# Patient Record
Sex: MALE | Race: OTHER | HISPANIC OR LATINO | Employment: FULL TIME | URBAN - METROPOLITAN AREA
[De-identification: names, ages, dates, MRNs, and addresses within clinical notes are randomized per-mention and may not be internally consistent; named-entity substitution may affect disease eponyms.]

---

## 2020-04-02 ENCOUNTER — APPOINTMENT (EMERGENCY)
Dept: RADIOLOGY | Facility: HOSPITAL | Age: 65
DRG: 415 | End: 2020-04-02
Payer: OTHER MISCELLANEOUS

## 2020-04-02 ENCOUNTER — ANESTHESIA EVENT (INPATIENT)
Dept: ANESTHESIOLOGY | Facility: HOSPITAL | Age: 65
DRG: 415 | End: 2020-04-02
Payer: OTHER MISCELLANEOUS

## 2020-04-02 ENCOUNTER — HOSPITAL ENCOUNTER (INPATIENT)
Facility: HOSPITAL | Age: 65
LOS: 11 days | Discharge: HOME | DRG: 415 | End: 2020-04-13
Attending: EMERGENCY MEDICINE | Admitting: ANESTHESIOLOGY
Payer: OTHER MISCELLANEOUS

## 2020-04-02 DIAGNOSIS — J96.01 ACUTE RESPIRATORY FAILURE WITH HYPOXIA (HCC): ICD-10-CM

## 2020-04-02 DIAGNOSIS — U07.1 COVID-19: Primary | ICD-10-CM

## 2020-04-02 DIAGNOSIS — J18.9 BILATERAL PNEUMONIA: ICD-10-CM

## 2020-04-02 PROBLEM — N17.9 AKI (ACUTE KIDNEY INJURY) (HCC): Status: ACTIVE | Noted: 2020-04-02

## 2020-04-02 PROBLEM — E87.20 LACTIC ACID ACIDOSIS: Status: RESOLVED | Noted: 2020-04-02 | Resolved: 2020-04-02

## 2020-04-02 PROBLEM — E87.2 LACTIC ACID ACIDOSIS: Status: RESOLVED | Noted: 2020-04-02 | Resolved: 2020-04-02

## 2020-04-02 PROBLEM — E87.2 LACTIC ACID ACIDOSIS: Status: ACTIVE | Noted: 2020-04-02

## 2020-04-02 PROBLEM — R79.89 TROPONIN LEVEL ELEVATED: Status: ACTIVE | Noted: 2020-04-02

## 2020-04-02 PROBLEM — E87.2 HIGH ANION GAP METABOLIC ACIDOSIS: Status: ACTIVE | Noted: 2020-04-02

## 2020-04-02 PROBLEM — Z78.9 ELECTRONIC CIGARETTE USE: Status: ACTIVE | Noted: 2020-04-02

## 2020-04-02 PROBLEM — R79.89 ELEVATED BRAIN NATRIURETIC PEPTIDE (BNP) LEVEL: Status: ACTIVE | Noted: 2020-04-02

## 2020-04-02 PROBLEM — R74.01 TRANSAMINITIS: Status: ACTIVE | Noted: 2020-04-02

## 2020-04-02 PROBLEM — R77.8 TROPONIN LEVEL ELEVATED: Status: ACTIVE | Noted: 2020-04-02

## 2020-04-02 PROBLEM — A41.9 SEVERE SEPSIS (HCC): Status: ACTIVE | Noted: 2020-04-02

## 2020-04-02 PROBLEM — E87.20 LACTIC ACID ACIDOSIS: Status: ACTIVE | Noted: 2020-04-02

## 2020-04-02 PROBLEM — R65.20 SEVERE SEPSIS (HCC): Status: ACTIVE | Noted: 2020-04-02

## 2020-04-02 PROBLEM — J96.91 RESPIRATORY FAILURE WITH HYPOXIA (HCC): Status: ACTIVE | Noted: 2020-04-02

## 2020-04-02 PROBLEM — E87.29 HIGH ANION GAP METABOLIC ACIDOSIS: Status: ACTIVE | Noted: 2020-04-02

## 2020-04-02 LAB
ALBUMIN SERPL BCP-MCNC: 2.7 G/DL (ref 3.5–5)
ALP SERPL-CCNC: 78 U/L (ref 46–116)
ALT SERPL W P-5'-P-CCNC: 23 U/L (ref 12–78)
ANION GAP SERPL CALCULATED.3IONS-SCNC: 18 MMOL/L (ref 4–13)
APTT PPP: 31 SECONDS (ref 23–37)
AST SERPL W P-5'-P-CCNC: 58 U/L (ref 5–45)
BACTERIA UR QL AUTO: ABNORMAL /HPF
BASE EX.OXY STD BLDV CALC-SCNC: 52.9 % (ref 60–80)
BASE EXCESS BLDA CALC-SCNC: -5.5 MMOL/L
BASE EXCESS BLDV CALC-SCNC: -5.5 MMOL/L
BASOPHILS # BLD AUTO: 0.03 THOUSANDS/ΜL (ref 0–0.1)
BASOPHILS NFR BLD AUTO: 0 % (ref 0–1)
BILIRUB SERPL-MCNC: 0.8 MG/DL (ref 0.2–1)
BILIRUB UR QL STRIP: ABNORMAL
BODY TEMPERATURE: 102.2 DEGREES FEHRENHEIT
BUN SERPL-MCNC: 25 MG/DL (ref 5–25)
CALCIUM SERPL-MCNC: 8.7 MG/DL (ref 8.3–10.1)
CHLORIDE SERPL-SCNC: 96 MMOL/L (ref 100–108)
CLARITY UR: ABNORMAL
CO2 SERPL-SCNC: 20 MMOL/L (ref 21–32)
COARSE GRAN CASTS URNS QL MICRO: ABNORMAL /LPF
COLOR UR: ABNORMAL
CREAT SERPL-MCNC: 1.54 MG/DL (ref 0.6–1.3)
CRP SERPL QL: >90 MG/L
D DIMER PPP FEU-MCNC: 3.77 UG/ML FEU
EOSINOPHIL # BLD AUTO: 0.01 THOUSAND/ΜL (ref 0–0.61)
EOSINOPHIL NFR BLD AUTO: 0 % (ref 0–6)
ERYTHROCYTE [DISTWIDTH] IN BLOOD BY AUTOMATED COUNT: 13.7 % (ref 11.6–15.1)
GFR SERPL CREATININE-BSD FRML MDRD: 48 ML/MIN/1.73SQ M
GLUCOSE SERPL-MCNC: 162 MG/DL (ref 65–140)
GLUCOSE SERPL-MCNC: 171 MG/DL (ref 65–140)
GLUCOSE SERPL-MCNC: 192 MG/DL (ref 65–140)
GLUCOSE SERPL-MCNC: 203 MG/DL (ref 65–140)
GLUCOSE UR STRIP-MCNC: NEGATIVE MG/DL
HCO3 BLDA-SCNC: 21.9 MMOL/L (ref 22–28)
HCO3 BLDV-SCNC: 19.4 MMOL/L (ref 24–30)
HCT VFR BLD AUTO: 47 % (ref 36.5–49.3)
HGB BLD-MCNC: 15.3 G/DL (ref 12–17)
HGB UR QL STRIP.AUTO: NEGATIVE
HOROWITZ INDEX BLDA+IHG-RTO: 80 MM[HG]
I-TIME: 0.8
IMM GRANULOCYTES # BLD AUTO: 0.17 THOUSAND/UL (ref 0–0.2)
IMM GRANULOCYTES NFR BLD AUTO: 1 % (ref 0–2)
INR PPP: 1.18 (ref 0.84–1.19)
KETONES UR STRIP-MCNC: NEGATIVE MG/DL
LACTATE SERPL-SCNC: 2 MMOL/L (ref 0.5–2)
LACTATE SERPL-SCNC: 6.6 MMOL/L (ref 0.5–2)
LEUKOCYTE ESTERASE UR QL STRIP: NEGATIVE
LYMPHOCYTES # BLD AUTO: 0.42 THOUSANDS/ΜL (ref 0.6–4.47)
LYMPHOCYTES NFR BLD AUTO: 3 % (ref 14–44)
MCH RBC QN AUTO: 28.5 PG (ref 26.8–34.3)
MCHC RBC AUTO-ENTMCNC: 32.6 G/DL (ref 31.4–37.4)
MCV RBC AUTO: 88 FL (ref 82–98)
MONOCYTES # BLD AUTO: 0.33 THOUSAND/ΜL (ref 0.17–1.22)
MONOCYTES NFR BLD AUTO: 2 % (ref 4–12)
NEUTROPHILS # BLD AUTO: 13.11 THOUSANDS/ΜL (ref 1.85–7.62)
NEUTS SEG NFR BLD AUTO: 94 % (ref 43–75)
NITRITE UR QL STRIP: NEGATIVE
NON-SQ EPI CELLS URNS QL MICRO: ABNORMAL /HPF
NRBC BLD AUTO-RTO: 0 /100 WBCS
NT-PROBNP SERPL-MCNC: 570 PG/ML
O2 CT BLDA-SCNC: 21.1 ML/DL (ref 16–23)
O2 CT BLDV-SCNC: 11.9 ML/DL
OXYHGB MFR BLDA: 95.2 % (ref 94–97)
PCO2 BLDA: 50 MM HG (ref 36–44)
PCO2 BLDV: 36.2 MM HG (ref 42–50)
PEEP RESPIRATORY: 16 CM[H2O]
PH BLDA: 7.26 [PH] (ref 7.35–7.45)
PH BLDV: 7.35 [PH] (ref 7.3–7.4)
PH UR STRIP.AUTO: 5.5 [PH]
PLATELET # BLD AUTO: 212 THOUSANDS/UL (ref 149–390)
PLATELET # BLD AUTO: 226 THOUSANDS/UL (ref 149–390)
PMV BLD AUTO: 11.1 FL (ref 8.9–12.7)
PMV BLD AUTO: 11.1 FL (ref 8.9–12.7)
PO2 BLDA: 108.7 MM HG (ref 75–129)
PO2 BLDV: 31.4 MM HG (ref 35–45)
POTASSIUM SERPL-SCNC: 3.4 MMOL/L (ref 3.5–5.3)
PROCALCITONIN SERPL-MCNC: 0.4 NG/ML
PROT SERPL-MCNC: 7.2 G/DL (ref 6.4–8.2)
PROT UR STRIP-MCNC: ABNORMAL MG/DL
PROTHROMBIN TIME: 15 SECONDS (ref 11.6–14.5)
RBC # BLD AUTO: 5.36 MILLION/UL (ref 3.88–5.62)
RBC #/AREA URNS AUTO: ABNORMAL /HPF
S PNEUM AG UR QL: NEGATIVE
SODIUM SERPL-SCNC: 134 MMOL/L (ref 136–145)
SP GR UR STRIP.AUTO: >=1.03 (ref 1–1.03)
SPECIMEN SOURCE: ABNORMAL
TROPONIN I SERPL-MCNC: 0.13 NG/ML
TROPONIN I SERPL-MCNC: 0.2 NG/ML
TROPONIN I SERPL-MCNC: 0.22 NG/ML
UROBILINOGEN UR QL STRIP.AUTO: 4 E.U./DL
VENT AC: 16
VENT- AC: AC
VT SETTING VENT: 450 ML
WBC # BLD AUTO: 14.07 THOUSAND/UL (ref 4.31–10.16)
WBC #/AREA URNS AUTO: ABNORMAL /HPF

## 2020-04-02 PROCEDURE — 4A133J1 MONITORING OF ARTERIAL PULSE, PERIPHERAL, PERCUTANEOUS APPROACH: ICD-10-PCS | Performed by: ANESTHESIOLOGY

## 2020-04-02 PROCEDURE — 99291 CRITICAL CARE FIRST HOUR: CPT | Performed by: ANESTHESIOLOGY

## 2020-04-02 PROCEDURE — 80053 COMPREHEN METABOLIC PANEL: CPT | Performed by: EMERGENCY MEDICINE

## 2020-04-02 PROCEDURE — 94760 N-INVAS EAR/PLS OXIMETRY 1: CPT

## 2020-04-02 PROCEDURE — 84484 ASSAY OF TROPONIN QUANT: CPT | Performed by: NURSE PRACTITIONER

## 2020-04-02 PROCEDURE — 87205 SMEAR GRAM STAIN: CPT | Performed by: NURSE PRACTITIONER

## 2020-04-02 PROCEDURE — 87635 SARS-COV-2 COVID-19 AMP PRB: CPT | Performed by: EMERGENCY MEDICINE

## 2020-04-02 PROCEDURE — 83625 ASSAY OF LDH ENZYMES: CPT | Performed by: EMERGENCY MEDICINE

## 2020-04-02 PROCEDURE — 85610 PROTHROMBIN TIME: CPT | Performed by: EMERGENCY MEDICINE

## 2020-04-02 PROCEDURE — 31500 INSERT EMERGENCY AIRWAY: CPT | Performed by: NURSE ANESTHETIST, CERTIFIED REGISTERED

## 2020-04-02 PROCEDURE — 86140 C-REACTIVE PROTEIN: CPT | Performed by: EMERGENCY MEDICINE

## 2020-04-02 PROCEDURE — 82948 REAGENT STRIP/BLOOD GLUCOSE: CPT

## 2020-04-02 PROCEDURE — 85025 COMPLETE CBC W/AUTO DIFF WBC: CPT | Performed by: EMERGENCY MEDICINE

## 2020-04-02 PROCEDURE — 99285 EMERGENCY DEPT VISIT HI MDM: CPT | Performed by: EMERGENCY MEDICINE

## 2020-04-02 PROCEDURE — 85730 THROMBOPLASTIN TIME PARTIAL: CPT | Performed by: EMERGENCY MEDICINE

## 2020-04-02 PROCEDURE — 99285 EMERGENCY DEPT VISIT HI MDM: CPT

## 2020-04-02 PROCEDURE — 87070 CULTURE OTHR SPECIMN AEROBIC: CPT | Performed by: NURSE PRACTITIONER

## 2020-04-02 PROCEDURE — 87449 NOS EACH ORGANISM AG IA: CPT | Performed by: NURSE PRACTITIONER

## 2020-04-02 PROCEDURE — 0BH17EZ INSERTION OF ENDOTRACHEAL AIRWAY INTO TRACHEA, VIA NATURAL OR ARTIFICIAL OPENING: ICD-10-PCS | Performed by: ANESTHESIOLOGY

## 2020-04-02 PROCEDURE — 93005 ELECTROCARDIOGRAM TRACING: CPT

## 2020-04-02 PROCEDURE — 85379 FIBRIN DEGRADATION QUANT: CPT | Performed by: EMERGENCY MEDICINE

## 2020-04-02 PROCEDURE — 5A1955Z RESPIRATORY VENTILATION, GREATER THAN 96 CONSECUTIVE HOURS: ICD-10-PCS | Performed by: ANESTHESIOLOGY

## 2020-04-02 PROCEDURE — 83615 LACTATE (LD) (LDH) ENZYME: CPT | Performed by: EMERGENCY MEDICINE

## 2020-04-02 PROCEDURE — 82805 BLOOD GASES W/O2 SATURATION: CPT | Performed by: EMERGENCY MEDICINE

## 2020-04-02 PROCEDURE — 02HV33Z INSERTION OF INFUSION DEVICE INTO SUPERIOR VENA CAVA, PERCUTANEOUS APPROACH: ICD-10-PCS | Performed by: STUDENT IN AN ORGANIZED HEALTH CARE EDUCATION/TRAINING PROGRAM

## 2020-04-02 PROCEDURE — 85049 AUTOMATED PLATELET COUNT: CPT | Performed by: NURSE PRACTITIONER

## 2020-04-02 PROCEDURE — 83605 ASSAY OF LACTIC ACID: CPT | Performed by: NURSE PRACTITIONER

## 2020-04-02 PROCEDURE — 03HY32Z INSERTION OF MONITORING DEVICE INTO UPPER ARTERY, PERCUTANEOUS APPROACH: ICD-10-PCS | Performed by: ANESTHESIOLOGY

## 2020-04-02 PROCEDURE — 87040 BLOOD CULTURE FOR BACTERIA: CPT | Performed by: EMERGENCY MEDICINE

## 2020-04-02 PROCEDURE — 82728 ASSAY OF FERRITIN: CPT | Performed by: NURSE PRACTITIONER

## 2020-04-02 PROCEDURE — 84145 PROCALCITONIN (PCT): CPT | Performed by: EMERGENCY MEDICINE

## 2020-04-02 PROCEDURE — 36620 INSERTION CATHETER ARTERY: CPT | Performed by: ANESTHESIOLOGY

## 2020-04-02 PROCEDURE — 94002 VENT MGMT INPAT INIT DAY: CPT

## 2020-04-02 PROCEDURE — 82805 BLOOD GASES W/O2 SATURATION: CPT | Performed by: NURSE PRACTITIONER

## 2020-04-02 PROCEDURE — 36415 COLL VENOUS BLD VENIPUNCTURE: CPT | Performed by: EMERGENCY MEDICINE

## 2020-04-02 PROCEDURE — 99292 CRITICAL CARE ADDL 30 MIN: CPT | Performed by: ANESTHESIOLOGY

## 2020-04-02 PROCEDURE — 71045 X-RAY EXAM CHEST 1 VIEW: CPT

## 2020-04-02 PROCEDURE — 83880 ASSAY OF NATRIURETIC PEPTIDE: CPT | Performed by: NURSE PRACTITIONER

## 2020-04-02 PROCEDURE — 4A133B1 MONITORING OF ARTERIAL PRESSURE, PERIPHERAL, PERCUTANEOUS APPROACH: ICD-10-PCS | Performed by: ANESTHESIOLOGY

## 2020-04-02 PROCEDURE — 81001 URINALYSIS AUTO W/SCOPE: CPT | Performed by: NURSE PRACTITIONER

## 2020-04-02 PROCEDURE — 87081 CULTURE SCREEN ONLY: CPT | Performed by: NURSE PRACTITIONER

## 2020-04-02 PROCEDURE — NC001 PR NO CHARGE: Performed by: ANESTHESIOLOGY

## 2020-04-02 RX ORDER — VECURONIUM BROMIDE 1 MG/ML
10 INJECTION, POWDER, LYOPHILIZED, FOR SOLUTION INTRAVENOUS ONCE
Status: COMPLETED | OUTPATIENT
Start: 2020-04-02 | End: 2020-04-02

## 2020-04-02 RX ORDER — FENTANYL CITRATE 50 UG/ML
100 INJECTION, SOLUTION INTRAMUSCULAR; INTRAVENOUS ONCE
Status: DISCONTINUED | OUTPATIENT
Start: 2020-04-02 | End: 2020-04-04

## 2020-04-02 RX ORDER — MINERAL OIL AND PETROLATUM 150; 830 MG/G; MG/G
OINTMENT OPHTHALMIC
Status: DISCONTINUED | OUTPATIENT
Start: 2020-04-02 | End: 2020-04-04

## 2020-04-02 RX ORDER — METHYLPREDNISOLONE SODIUM SUCCINATE 125 MG/2ML
200 INJECTION, POWDER, LYOPHILIZED, FOR SOLUTION INTRAMUSCULAR; INTRAVENOUS ONCE
Status: COMPLETED | OUTPATIENT
Start: 2020-04-02 | End: 2020-04-02

## 2020-04-02 RX ORDER — ATORVASTATIN CALCIUM 40 MG/1
40 TABLET, FILM COATED ORAL
Status: DISCONTINUED | OUTPATIENT
Start: 2020-04-02 | End: 2020-04-13

## 2020-04-02 RX ORDER — AZITHROMYCIN 250 MG/1
250 TABLET, FILM COATED ORAL EVERY 24 HOURS
Status: DISCONTINUED | OUTPATIENT
Start: 2020-04-03 | End: 2020-04-02

## 2020-04-02 RX ORDER — VECURONIUM BROMIDE 1 MG/ML
20 INJECTION, POWDER, LYOPHILIZED, FOR SOLUTION INTRAVENOUS ONCE
Status: DISCONTINUED | OUTPATIENT
Start: 2020-04-02 | End: 2020-04-04

## 2020-04-02 RX ORDER — CHLORHEXIDINE GLUCONATE 0.12 MG/ML
15 RINSE ORAL EVERY 12 HOURS SCHEDULED
Status: DISCONTINUED | OUTPATIENT
Start: 2020-04-02 | End: 2020-04-07

## 2020-04-02 RX ORDER — VANCOMYCIN HYDROCHLORIDE 1 G/200ML
12.5 INJECTION, SOLUTION INTRAVENOUS EVERY 12 HOURS
Status: DISCONTINUED | OUTPATIENT
Start: 2020-04-02 | End: 2020-04-03

## 2020-04-02 RX ORDER — HYDROXYCHLOROQUINE SULFATE 200 MG/1
200 TABLET, FILM COATED ORAL EVERY 8 HOURS
Status: DISCONTINUED | OUTPATIENT
Start: 2020-04-02 | End: 2020-04-02

## 2020-04-02 RX ORDER — VANCOMYCIN HYDROCHLORIDE 1 G/200ML
1000 INJECTION, SOLUTION INTRAVENOUS ONCE
Status: COMPLETED | OUTPATIENT
Start: 2020-04-02 | End: 2020-04-02

## 2020-04-02 RX ORDER — FENTANYL CITRATE-0.9 % NACL/PF 10 MCG/ML
75 PLASTIC BAG, INJECTION (ML) INTRAVENOUS CONTINUOUS
Status: DISCONTINUED | OUTPATIENT
Start: 2020-04-02 | End: 2020-04-08

## 2020-04-02 RX ORDER — ACETAMINOPHEN 325 MG/1
975 TABLET ORAL EVERY 8 HOURS PRN
Status: DISCONTINUED | OUTPATIENT
Start: 2020-04-02 | End: 2020-04-09

## 2020-04-02 RX ORDER — HYDROXYCHLOROQUINE SULFATE 200 MG/1
600 TABLET, FILM COATED ORAL 2 TIMES DAILY
Status: DISCONTINUED | OUTPATIENT
Start: 2020-04-02 | End: 2020-04-02

## 2020-04-02 RX ORDER — AZITHROMYCIN 250 MG/1
500 TABLET, FILM COATED ORAL EVERY 24 HOURS
Status: DISCONTINUED | OUTPATIENT
Start: 2020-04-02 | End: 2020-04-02

## 2020-04-02 RX ORDER — ZINC GLUCONATE 50 MG
50 TABLET ORAL 2 TIMES DAILY
Status: DISCONTINUED | OUTPATIENT
Start: 2020-04-02 | End: 2020-04-04 | Stop reason: RX

## 2020-04-02 RX ORDER — MIDAZOLAM HYDROCHLORIDE 2 MG/2ML
4 INJECTION, SOLUTION INTRAMUSCULAR; INTRAVENOUS ONCE
Status: DISCONTINUED | OUTPATIENT
Start: 2020-04-02 | End: 2020-04-04

## 2020-04-02 RX ORDER — HEPARIN SODIUM 5000 [USP'U]/ML
5000 INJECTION, SOLUTION INTRAVENOUS; SUBCUTANEOUS EVERY 8 HOURS SCHEDULED
Status: DISCONTINUED | OUTPATIENT
Start: 2020-04-02 | End: 2020-04-12

## 2020-04-02 RX ORDER — ASCORBIC ACID 500 MG
1000 TABLET ORAL 2 TIMES DAILY
Status: DISCONTINUED | OUTPATIENT
Start: 2020-04-02 | End: 2020-04-11

## 2020-04-02 RX ORDER — EPINEPHRINE 1 MG/ML
INJECTION, SOLUTION, CONCENTRATE INTRAVENOUS
Status: DISPENSED
Start: 2020-04-02 | End: 2020-04-03

## 2020-04-02 RX ORDER — HYDROXYCHLOROQUINE SULFATE 200 MG/1
200 TABLET, FILM COATED ORAL EVERY 8 HOURS
Status: COMPLETED | OUTPATIENT
Start: 2020-04-03 | End: 2020-04-07

## 2020-04-02 RX ORDER — PROPOFOL 10 MG/ML
5-50 INJECTION, EMULSION INTRAVENOUS
Status: DISCONTINUED | OUTPATIENT
Start: 2020-04-02 | End: 2020-04-07

## 2020-04-02 RX ORDER — HYDROXYCHLOROQUINE SULFATE 200 MG/1
600 TABLET, FILM COATED ORAL 2 TIMES DAILY
Status: COMPLETED | OUTPATIENT
Start: 2020-04-02 | End: 2020-04-02

## 2020-04-02 RX ORDER — AZITHROMYCIN 250 MG/1
250 TABLET, FILM COATED ORAL EVERY 24 HOURS
Status: COMPLETED | OUTPATIENT
Start: 2020-04-03 | End: 2020-04-07

## 2020-04-02 RX ORDER — NOREPINEPHRINE BITARTRATE 1 MG/ML
INJECTION, SOLUTION INTRAVENOUS
Status: COMPLETED
Start: 2020-04-02 | End: 2020-04-02

## 2020-04-02 RX ADMIN — HEPARIN SODIUM 5000 UNITS: 5000 INJECTION INTRAVENOUS; SUBCUTANEOUS at 13:32

## 2020-04-02 RX ADMIN — NOREPINEPHRINE BITARTRATE: 1 INJECTION INTRAVENOUS at 15:05

## 2020-04-02 RX ADMIN — PROPOFOL 35 MCG/KG/MIN: 10 INJECTION, EMULSION INTRAVENOUS at 21:29

## 2020-04-02 RX ADMIN — METHYLPREDNISOLONE SODIUM SUCCINATE 200 MG: 125 INJECTION, POWDER, FOR SOLUTION INTRAMUSCULAR; INTRAVENOUS at 13:31

## 2020-04-02 RX ADMIN — Medication: at 19:01

## 2020-04-02 RX ADMIN — HYDROXYCHLOROQUINE SULFATE 600 MG: 200 TABLET, FILM COATED ORAL at 18:57

## 2020-04-02 RX ADMIN — SODIUM CHLORIDE 1 UNITS/HR: 9 INJECTION, SOLUTION INTRAVENOUS at 18:39

## 2020-04-02 RX ADMIN — VANCOMYCIN HYDROCHLORIDE 1000 MG: 1 INJECTION, SOLUTION INTRAVENOUS at 11:17

## 2020-04-02 RX ADMIN — HEPARIN SODIUM 5000 UNITS: 5000 INJECTION INTRAVENOUS; SUBCUTANEOUS at 22:08

## 2020-04-02 RX ADMIN — Medication 50 MG: at 13:33

## 2020-04-02 RX ADMIN — ATORVASTATIN CALCIUM 40 MG: 40 TABLET, FILM COATED ORAL at 18:57

## 2020-04-02 RX ADMIN — SODIUM CHLORIDE 500 MG: 0.9 INJECTION, SOLUTION INTRAVENOUS at 11:13

## 2020-04-02 RX ADMIN — PROPOFOL 50 MCG/KG/MIN: 10 INJECTION, EMULSION INTRAVENOUS at 14:41

## 2020-04-02 RX ADMIN — PROPOFOL 50 MCG/KG/MIN: 10 INJECTION, EMULSION INTRAVENOUS at 18:51

## 2020-04-02 RX ADMIN — Medication: at 20:00

## 2020-04-02 RX ADMIN — SODIUM CHLORIDE 3 MCG/MIN: 0.9 INJECTION, SOLUTION INTRAVENOUS at 15:06

## 2020-04-02 RX ADMIN — OXYCODONE HYDROCHLORIDE AND ACETAMINOPHEN 1000 MG: 500 TABLET ORAL at 13:33

## 2020-04-02 RX ADMIN — HYDROXYCHLOROQUINE SULFATE 600 MG: 200 TABLET, FILM COATED ORAL at 13:32

## 2020-04-02 RX ADMIN — PROPOFOL 30 MCG/KG/MIN: 10 INJECTION, EMULSION INTRAVENOUS at 13:00

## 2020-04-02 RX ADMIN — Medication: at 22:08

## 2020-04-02 RX ADMIN — CHLORHEXIDINE GLUCONATE 0.12% ORAL RINSE 15 ML: 1.2 LIQUID ORAL at 20:45

## 2020-04-02 RX ADMIN — Medication 50 MG: at 18:55

## 2020-04-02 RX ADMIN — OXYCODONE HYDROCHLORIDE AND ACETAMINOPHEN 1000 MG: 500 TABLET ORAL at 19:01

## 2020-04-02 RX ADMIN — FENTANYL CITRATE 75 MCG/HR: 50 INJECTION, SOLUTION INTRAMUSCULAR; INTRAVENOUS at 14:15

## 2020-04-02 RX ADMIN — SODIUM CHLORIDE 0.1 MCG/KG/MIN: 0.9 INJECTION, SOLUTION INTRAVENOUS at 17:12

## 2020-04-02 RX ADMIN — ACETAMINOPHEN 975 MG: 325 TABLET ORAL at 14:32

## 2020-04-02 RX ADMIN — VECURONIUM BROMIDE 10 MG: 1 INJECTION, POWDER, LYOPHILIZED, FOR SOLUTION INTRAVENOUS at 16:34

## 2020-04-02 RX ADMIN — CHLORHEXIDINE GLUCONATE 0.12% ORAL RINSE 15 ML: 1.2 LIQUID ORAL at 13:32

## 2020-04-02 RX ADMIN — CEFEPIME HYDROCHLORIDE 2000 MG: 2 INJECTION, POWDER, FOR SOLUTION INTRAVENOUS at 20:38

## 2020-04-02 NOTE — ANESTHESIA PROCEDURE NOTES
"Central Line Insertion  Performed by: Reggie Francois CRNA  Authorized by: Reggie Francois CRNA   Date/Time: 4/2/2020 1:10 PM  Catheter Type:  triple lumen  Consent: Verbal consent obtained. Written consent obtained.  Consent given by: patient and spouse  Patient understanding: patient states understanding of the procedure being performed  Patient consent: the patient's understanding of the procedure matches consent given  Procedure consent: procedure consent matches procedure scheduled  Relevant documents: relevant documents present and verified  Test results: test results available and properly labeled  Site marked: the operative site was marked  Patient identity confirmed: arm band and hospital-assigned identification number  Time out: Immediately prior to procedure a \"time out\" was called to verify the correct patient, procedure, equipment, support staff and site/side marked as required.  Indications: vascular access  Location details: left internal jugular  Catheter size: 7 Fr  Patient position: Trendelenburg  Anesthesia: see MAR for details    Sedation:  Patient sedated: GETA.    Assessment: blood return through all ports and free fluid flow (CXR Pending)  Preparation: Chloraprep  Skin prep agent dried: skin prep agent completely dried prior to procedure  Sterile barriers: all five maximum sterile barriers used - cap, mask, sterile gown, sterile gloves, and large sterile sheet  Hand hygiene: hand hygiene performed prior to central venous catheter insertion  Ultrasound guidance: yes  sterile gel and probe cover used in ultrasound-guided central venous catheter insertionultrasound permanent image saved  Reason All Sterile Barriers Not Used:  All elements of maximal sterile barrier technique not followed for medical reasons  Pre-procedure: landmarks identified  Vessel of Catheter Tip End: SVC  Number of attempts: 1  Successful placement: yes  Post-procedure: chlorhexidine patch applied,  dressing applied and line " sutured  Patient tolerance: Patient tolerated the procedure well with no immediate complications

## 2020-04-03 LAB
ALBUMIN SERPL BCP-MCNC: 2.5 G/DL (ref 3.5–5)
ALP SERPL-CCNC: 76 U/L (ref 46–116)
ALT SERPL W P-5'-P-CCNC: 19 U/L (ref 12–78)
ANION GAP SERPL CALCULATED.3IONS-SCNC: 10 MMOL/L (ref 4–13)
AST SERPL W P-5'-P-CCNC: 43 U/L (ref 5–45)
ATRIAL RATE: 100 BPM
ATRIAL RATE: 158 BPM
BASE EXCESS BLDA CALC-SCNC: -3.8 MMOL/L
BASE EXCESS BLDA CALC-SCNC: -4.4 MMOL/L
BASE EXCESS BLDA CALC-SCNC: -4.5 MMOL/L
BASE EXCESS BLDA CALC-SCNC: -4.6 MMOL/L
BASE EXCESS BLDA CALC-SCNC: -5.5 MMOL/L
BASE EXCESS BLDA CALC-SCNC: -7 MMOL/L
BILIRUB SERPL-MCNC: 0.9 MG/DL (ref 0.2–1)
BUN SERPL-MCNC: 41 MG/DL (ref 5–25)
CALCIUM SERPL-MCNC: 8.5 MG/DL (ref 8.3–10.1)
CHLORIDE SERPL-SCNC: 97 MMOL/L (ref 100–108)
CO2 SERPL-SCNC: 25 MMOL/L (ref 21–32)
CREAT SERPL-MCNC: 1.93 MG/DL (ref 0.6–1.3)
CRP SERPL QL: >90 MG/L
D DIMER PPP FEU-MCNC: 3.87 UG/ML FEU
ERYTHROCYTE [DISTWIDTH] IN BLOOD BY AUTOMATED COUNT: 14.3 % (ref 11.6–15.1)
ERYTHROCYTE [SEDIMENTATION RATE] IN BLOOD: 85 MM/HOUR (ref 0–10)
FERRITIN SERPL-MCNC: 4877 NG/ML (ref 8–388)
FERRITIN SERPL-MCNC: 5570 NG/ML (ref 8–388)
FIBRINOGEN PPP-MCNC: 844 MG/DL (ref 227–495)
GFR SERPL CREATININE-BSD FRML MDRD: 36 ML/MIN/1.73SQ M
GLUCOSE SERPL-MCNC: 103 MG/DL (ref 65–140)
GLUCOSE SERPL-MCNC: 112 MG/DL (ref 65–140)
GLUCOSE SERPL-MCNC: 115 MG/DL (ref 65–140)
GLUCOSE SERPL-MCNC: 116 MG/DL (ref 65–140)
GLUCOSE SERPL-MCNC: 123 MG/DL (ref 65–140)
GLUCOSE SERPL-MCNC: 124 MG/DL (ref 65–140)
GLUCOSE SERPL-MCNC: 126 MG/DL (ref 65–140)
GLUCOSE SERPL-MCNC: 139 MG/DL (ref 65–140)
GLUCOSE SERPL-MCNC: 146 MG/DL (ref 65–140)
GLUCOSE SERPL-MCNC: 151 MG/DL (ref 65–140)
GLUCOSE SERPL-MCNC: 156 MG/DL (ref 65–140)
GLUCOSE SERPL-MCNC: 158 MG/DL (ref 65–140)
GLUCOSE SERPL-MCNC: 159 MG/DL (ref 65–140)
HCO3 BLDA-SCNC: 18.5 MMOL/L (ref 22–28)
HCO3 BLDA-SCNC: 21.1 MMOL/L (ref 22–28)
HCO3 BLDA-SCNC: 21.6 MMOL/L (ref 22–28)
HCO3 BLDA-SCNC: 22.1 MMOL/L (ref 22–28)
HCO3 BLDA-SCNC: 23 MMOL/L (ref 22–28)
HCO3 BLDA-SCNC: 25.1 MMOL/L (ref 22–28)
HCT VFR BLD AUTO: 43.5 % (ref 36.5–49.3)
HGB BLD-MCNC: 14.3 G/DL (ref 12–17)
HOROWITZ INDEX BLDA+IHG-RTO: 40 MM[HG]
L PNEUMO1 AG UR QL IA.RAPID: NEGATIVE
LDH SERPL-CCNC: 736 U/L (ref 81–234)
LDH SERPL-CCNC: 865 IU/L (ref 121–224)
LDH1 CFR SERPL ELPH: 12 % (ref 17–32)
LDH2 CFR SERPL ELPH: 29 % (ref 25–40)
LDH3 CFR SERPL ELPH: 30 % (ref 17–27)
LDH4 CFR SERPL ELPH: 18 % (ref 5–13)
LDH5 CFR SERPL ELPH: 11 % (ref 4–20)
MCH RBC QN AUTO: 29.3 PG (ref 26.8–34.3)
MCHC RBC AUTO-ENTMCNC: 32.9 G/DL (ref 31.4–37.4)
MCV RBC AUTO: 89 FL (ref 82–98)
NT-PROBNP SERPL-MCNC: 2447 PG/ML
O2 CT BLDA-SCNC: 16.6 ML/DL (ref 16–23)
O2 CT BLDA-SCNC: 18.6 ML/DL (ref 16–23)
O2 CT BLDA-SCNC: 19.6 ML/DL (ref 16–23)
O2 CT BLDA-SCNC: 19.6 ML/DL (ref 16–23)
O2 CT BLDA-SCNC: 19.7 ML/DL (ref 16–23)
O2 CT BLDA-SCNC: 20.1 ML/DL (ref 16–23)
OXYHGB MFR BLDA: 93.9 % (ref 94–97)
OXYHGB MFR BLDA: 93.9 % (ref 94–97)
OXYHGB MFR BLDA: 95.2 % (ref 94–97)
OXYHGB MFR BLDA: 96.3 % (ref 94–97)
OXYHGB MFR BLDA: 96.9 % (ref 94–97)
OXYHGB MFR BLDA: 97.2 % (ref 94–97)
P AXIS: 69 DEGREES
P AXIS: 83 DEGREES
PCO2 BLDA: 37.3 MM HG (ref 36–44)
PCO2 BLDA: 43 MM HG (ref 36–44)
PCO2 BLDA: 43.9 MM HG (ref 36–44)
PCO2 BLDA: 45.4 MM HG (ref 36–44)
PCO2 BLDA: 51.7 MM HG (ref 36–44)
PCO2 BLDA: 66.5 MM HG (ref 36–44)
PEEP RESPIRATORY: 16 CM[H2O]
PH BLDA: 7.2 [PH] (ref 7.35–7.45)
PH BLDA: 7.27 [PH] (ref 7.35–7.45)
PH BLDA: 7.29 [PH] (ref 7.35–7.45)
PH BLDA: 7.31 [PH] (ref 7.35–7.45)
PH BLDA: 7.31 [PH] (ref 7.35–7.45)
PH BLDA: 7.33 [PH] (ref 7.35–7.45)
PLATELET # BLD AUTO: 262 THOUSANDS/UL (ref 149–390)
PMV BLD AUTO: 11 FL (ref 8.9–12.7)
PO2 BLDA: 102.6 MM HG (ref 75–129)
PO2 BLDA: 105.6 MM HG (ref 75–129)
PO2 BLDA: 127.4 MM HG (ref 75–129)
PO2 BLDA: 80.3 MM HG (ref 75–129)
PO2 BLDA: 87.1 MM HG (ref 75–129)
PO2 BLDA: 88.5 MM HG (ref 75–129)
POTASSIUM SERPL-SCNC: 4.3 MMOL/L (ref 3.5–5.3)
PR INTERVAL: 120 MS
PR INTERVAL: 124 MS
PROCALCITONIN SERPL-MCNC: 2.8 NG/ML
PROT SERPL-MCNC: 6.7 G/DL (ref 6.4–8.2)
QRS AXIS: 102 DEGREES
QRS AXIS: 73 DEGREES
QRSD INTERVAL: 108 MS
QRSD INTERVAL: 74 MS
QT INTERVAL: 268 MS
QT INTERVAL: 338 MS
QTC INTERVAL: 434 MS
QTC INTERVAL: 436 MS
RBC # BLD AUTO: 4.88 MILLION/UL (ref 3.88–5.62)
SODIUM SERPL-SCNC: 132 MMOL/L (ref 136–145)
SPECIMEN SOURCE: ABNORMAL
T WAVE AXIS: 257 DEGREES
T WAVE AXIS: 43 DEGREES
TROPONIN I SERPL-MCNC: 0.05 NG/ML
VENT AC: 16
VENT AC: 24
VENT- AC: AC
VENTRICULAR RATE: 100 BPM
VENTRICULAR RATE: 158 BPM
VT SETTING VENT: 450 ML
WBC # BLD AUTO: 15.31 THOUSAND/UL (ref 4.31–10.16)

## 2020-04-03 PROCEDURE — 82948 REAGENT STRIP/BLOOD GLUCOSE: CPT

## 2020-04-03 PROCEDURE — 93010 ELECTROCARDIOGRAM REPORT: CPT | Performed by: INTERNAL MEDICINE

## 2020-04-03 PROCEDURE — 82728 ASSAY OF FERRITIN: CPT | Performed by: NURSE PRACTITIONER

## 2020-04-03 PROCEDURE — 85379 FIBRIN DEGRADATION QUANT: CPT | Performed by: NURSE PRACTITIONER

## 2020-04-03 PROCEDURE — 82805 BLOOD GASES W/O2 SATURATION: CPT | Performed by: PHYSICIAN ASSISTANT

## 2020-04-03 PROCEDURE — 83880 ASSAY OF NATRIURETIC PEPTIDE: CPT | Performed by: NURSE PRACTITIONER

## 2020-04-03 PROCEDURE — 83615 LACTATE (LD) (LDH) ENZYME: CPT | Performed by: NURSE PRACTITIONER

## 2020-04-03 PROCEDURE — 82805 BLOOD GASES W/O2 SATURATION: CPT | Performed by: NURSE PRACTITIONER

## 2020-04-03 PROCEDURE — 86140 C-REACTIVE PROTEIN: CPT | Performed by: NURSE PRACTITIONER

## 2020-04-03 PROCEDURE — 84145 PROCALCITONIN (PCT): CPT | Performed by: PHYSICIAN ASSISTANT

## 2020-04-03 PROCEDURE — 84484 ASSAY OF TROPONIN QUANT: CPT | Performed by: NURSE PRACTITIONER

## 2020-04-03 PROCEDURE — 93005 ELECTROCARDIOGRAM TRACING: CPT

## 2020-04-03 PROCEDURE — 82805 BLOOD GASES W/O2 SATURATION: CPT | Performed by: ANESTHESIOLOGY

## 2020-04-03 PROCEDURE — 80053 COMPREHEN METABOLIC PANEL: CPT | Performed by: NURSE PRACTITIONER

## 2020-04-03 PROCEDURE — 85652 RBC SED RATE AUTOMATED: CPT | Performed by: NURSE PRACTITIONER

## 2020-04-03 PROCEDURE — 94760 N-INVAS EAR/PLS OXIMETRY 1: CPT

## 2020-04-03 PROCEDURE — 85027 COMPLETE CBC AUTOMATED: CPT | Performed by: NURSE PRACTITIONER

## 2020-04-03 PROCEDURE — 85384 FIBRINOGEN ACTIVITY: CPT | Performed by: NURSE PRACTITIONER

## 2020-04-03 PROCEDURE — 99233 SBSQ HOSP IP/OBS HIGH 50: CPT | Performed by: ANESTHESIOLOGY

## 2020-04-03 PROCEDURE — 94003 VENT MGMT INPAT SUBQ DAY: CPT

## 2020-04-03 RX ORDER — METHYLPREDNISOLONE SODIUM SUCCINATE 125 MG/2ML
200 INJECTION, POWDER, LYOPHILIZED, FOR SOLUTION INTRAMUSCULAR; INTRAVENOUS DAILY
Status: DISCONTINUED | OUTPATIENT
Start: 2020-04-04 | End: 2020-04-03

## 2020-04-03 RX ORDER — METHYLPREDNISOLONE SODIUM SUCCINATE 125 MG/2ML
100 INJECTION, POWDER, LYOPHILIZED, FOR SOLUTION INTRAMUSCULAR; INTRAVENOUS EVERY 12 HOURS SCHEDULED
Status: DISCONTINUED | OUTPATIENT
Start: 2020-04-03 | End: 2020-04-03

## 2020-04-03 RX ORDER — METHYLPREDNISOLONE SODIUM SUCCINATE 125 MG/2ML
200 INJECTION, POWDER, LYOPHILIZED, FOR SOLUTION INTRAMUSCULAR; INTRAVENOUS DAILY
Status: DISCONTINUED | OUTPATIENT
Start: 2020-04-03 | End: 2020-04-03 | Stop reason: DRUGHIGH

## 2020-04-03 RX ORDER — METHYLPREDNISOLONE SODIUM SUCCINATE 125 MG/2ML
100 INJECTION, POWDER, LYOPHILIZED, FOR SOLUTION INTRAMUSCULAR; INTRAVENOUS EVERY 12 HOURS SCHEDULED
Status: DISCONTINUED | OUTPATIENT
Start: 2020-04-03 | End: 2020-04-06

## 2020-04-03 RX ORDER — METHYLPREDNISOLONE SODIUM SUCCINATE 125 MG/2ML
100 INJECTION, POWDER, LYOPHILIZED, FOR SOLUTION INTRAMUSCULAR; INTRAVENOUS ONCE
Status: DISCONTINUED | OUTPATIENT
Start: 2020-04-03 | End: 2020-04-03

## 2020-04-03 RX ADMIN — HYDROXYCHLOROQUINE SULFATE 200 MG: 200 TABLET, FILM COATED ORAL at 16:10

## 2020-04-03 RX ADMIN — HEPARIN SODIUM 5000 UNITS: 5000 INJECTION INTRAVENOUS; SUBCUTANEOUS at 05:08

## 2020-04-03 RX ADMIN — Medication 50 MG: at 08:07

## 2020-04-03 RX ADMIN — Medication: at 10:06

## 2020-04-03 RX ADMIN — Medication: at 13:23

## 2020-04-03 RX ADMIN — FENTANYL CITRATE 75 MCG/HR: 50 INJECTION, SOLUTION INTRAMUSCULAR; INTRAVENOUS at 00:09

## 2020-04-03 RX ADMIN — SODIUM CHLORIDE 2 MCG/MIN: 0.9 INJECTION, SOLUTION INTRAVENOUS at 18:00

## 2020-04-03 RX ADMIN — OXYCODONE HYDROCHLORIDE AND ACETAMINOPHEN 1000 MG: 500 TABLET ORAL at 09:32

## 2020-04-03 RX ADMIN — ATORVASTATIN CALCIUM 40 MG: 40 TABLET, FILM COATED ORAL at 16:11

## 2020-04-03 RX ADMIN — Medication: at 04:00

## 2020-04-03 RX ADMIN — PROPOFOL 50 MCG/KG/MIN: 10 INJECTION, EMULSION INTRAVENOUS at 18:58

## 2020-04-03 RX ADMIN — Medication 50 MG: at 18:00

## 2020-04-03 RX ADMIN — Medication: at 00:00

## 2020-04-03 RX ADMIN — FENTANYL CITRATE 75 MCG/HR: 50 INJECTION, SOLUTION INTRAMUSCULAR; INTRAVENOUS at 13:30

## 2020-04-03 RX ADMIN — VANCOMYCIN HYDROCHLORIDE 750 MG: 750 INJECTION, SOLUTION INTRAVENOUS at 13:34

## 2020-04-03 RX ADMIN — CHLORHEXIDINE GLUCONATE 0.12% ORAL RINSE 15 ML: 1.2 LIQUID ORAL at 20:13

## 2020-04-03 RX ADMIN — PROPOFOL 40 MCG/KG/MIN: 10 INJECTION, EMULSION INTRAVENOUS at 13:26

## 2020-04-03 RX ADMIN — METHYLPREDNISOLONE SODIUM SUCCINATE 100 MG: 125 INJECTION, POWDER, FOR SOLUTION INTRAMUSCULAR; INTRAVENOUS at 21:02

## 2020-04-03 RX ADMIN — AZITHROMYCIN 250 MG: 250 TABLET, FILM COATED ORAL at 08:08

## 2020-04-03 RX ADMIN — PROPOFOL 50 MCG/KG/MIN: 10 INJECTION, EMULSION INTRAVENOUS at 22:05

## 2020-04-03 RX ADMIN — Medication: at 06:02

## 2020-04-03 RX ADMIN — Medication: at 02:00

## 2020-04-03 RX ADMIN — Medication: at 19:56

## 2020-04-03 RX ADMIN — CHLORHEXIDINE GLUCONATE 0.12% ORAL RINSE 15 ML: 1.2 LIQUID ORAL at 08:07

## 2020-04-03 RX ADMIN — SODIUM CHLORIDE 2.5 MCG/KG/MIN: 0.9 INJECTION, SOLUTION INTRAVENOUS at 19:45

## 2020-04-03 RX ADMIN — OXYCODONE HYDROCHLORIDE AND ACETAMINOPHEN 1000 MG: 500 TABLET ORAL at 18:00

## 2020-04-03 RX ADMIN — HEPARIN SODIUM 5000 UNITS: 5000 INJECTION INTRAVENOUS; SUBCUTANEOUS at 21:02

## 2020-04-03 RX ADMIN — PROPOFOL 45 MCG/KG/MIN: 10 INJECTION, EMULSION INTRAVENOUS at 00:54

## 2020-04-03 RX ADMIN — Medication: at 23:45

## 2020-04-03 RX ADMIN — METHYLPREDNISOLONE SODIUM SUCCINATE 100 MG: 125 INJECTION, POWDER, FOR SOLUTION INTRAMUSCULAR; INTRAVENOUS at 10:08

## 2020-04-03 RX ADMIN — CEFEPIME HYDROCHLORIDE 2000 MG: 2 INJECTION, POWDER, FOR SOLUTION INTRAVENOUS at 20:13

## 2020-04-03 RX ADMIN — Medication: at 08:07

## 2020-04-03 RX ADMIN — HYDROXYCHLOROQUINE SULFATE 200 MG: 200 TABLET, FILM COATED ORAL at 09:37

## 2020-04-03 RX ADMIN — VANCOMYCIN HYDROCHLORIDE 1000 MG: 1 INJECTION, SOLUTION INTRAVENOUS at 00:26

## 2020-04-03 RX ADMIN — Medication: at 16:11

## 2020-04-03 RX ADMIN — METHYLPREDNISOLONE SODIUM SUCCINATE 100 MG: 125 INJECTION, POWDER, FOR SOLUTION INTRAMUSCULAR; INTRAVENOUS at 09:39

## 2020-04-03 RX ADMIN — PROPOFOL 50 MCG/KG/MIN: 10 INJECTION, EMULSION INTRAVENOUS at 09:31

## 2020-04-03 RX ADMIN — HEPARIN SODIUM 5000 UNITS: 5000 INJECTION INTRAVENOUS; SUBCUTANEOUS at 13:34

## 2020-04-03 RX ADMIN — Medication: at 21:02

## 2020-04-03 RX ADMIN — Medication: at 18:00

## 2020-04-03 RX ADMIN — PROPOFOL 45 MCG/KG/MIN: 10 INJECTION, EMULSION INTRAVENOUS at 16:12

## 2020-04-04 ENCOUNTER — ANESTHESIA (INPATIENT)
Dept: ANESTHESIOLOGY | Facility: HOSPITAL | Age: 65
DRG: 415 | End: 2020-04-04
Payer: OTHER MISCELLANEOUS

## 2020-04-04 PROBLEM — E87.29 HIGH ANION GAP METABOLIC ACIDOSIS: Status: RESOLVED | Noted: 2020-04-02 | Resolved: 2020-04-04

## 2020-04-04 PROBLEM — R74.01 TRANSAMINITIS: Status: RESOLVED | Noted: 2020-04-02 | Resolved: 2020-04-04

## 2020-04-04 PROBLEM — E87.2 HIGH ANION GAP METABOLIC ACIDOSIS: Status: RESOLVED | Noted: 2020-04-02 | Resolved: 2020-04-04

## 2020-04-04 LAB
ALBUMIN SERPL BCP-MCNC: 2.1 G/DL (ref 3.5–5)
ALP SERPL-CCNC: 64 U/L (ref 46–116)
ALT SERPL W P-5'-P-CCNC: 22 U/L (ref 12–78)
ANION GAP SERPL CALCULATED.3IONS-SCNC: 9 MMOL/L (ref 4–13)
AST SERPL W P-5'-P-CCNC: 47 U/L (ref 5–45)
ATRIAL RATE: 84 BPM
BACTERIA SPT RESP CULT: NORMAL
BASE EXCESS BLDA CALC-SCNC: -2 MMOL/L
BASE EXCESS BLDA CALC-SCNC: -3.7 MMOL/L
BASE EXCESS BLDA CALC-SCNC: -4.2 MMOL/L
BASOPHILS # BLD MANUAL: 0 THOUSAND/UL (ref 0–0.1)
BASOPHILS NFR MAR MANUAL: 0 % (ref 0–1)
BILIRUB SERPL-MCNC: 0.5 MG/DL (ref 0.2–1)
BUN SERPL-MCNC: 56 MG/DL (ref 5–25)
CA-I BLD-SCNC: 1.07 MMOL/L (ref 1.12–1.32)
CALCIUM SERPL-MCNC: 7.9 MG/DL (ref 8.3–10.1)
CHLORIDE SERPL-SCNC: 103 MMOL/L (ref 100–108)
CO2 SERPL-SCNC: 25 MMOL/L (ref 21–32)
CREAT SERPL-MCNC: 1.91 MG/DL (ref 0.6–1.3)
CRP SERPL QL: >90 MG/L
D DIMER PPP FEU-MCNC: 3.31 UG/ML FEU
EOSINOPHIL # BLD MANUAL: 0 THOUSAND/UL (ref 0–0.4)
EOSINOPHIL NFR BLD MANUAL: 0 % (ref 0–6)
ERYTHROCYTE [DISTWIDTH] IN BLOOD BY AUTOMATED COUNT: 13.9 % (ref 11.6–15.1)
ERYTHROCYTE [SEDIMENTATION RATE] IN BLOOD: 120 MM/HOUR (ref 0–10)
FERRITIN SERPL-MCNC: 4718 NG/ML (ref 8–388)
FIBRINOGEN PPP-MCNC: 667 MG/DL (ref 227–495)
GFR SERPL CREATININE-BSD FRML MDRD: 37 ML/MIN/1.73SQ M
GLUCOSE SERPL-MCNC: 104 MG/DL (ref 65–140)
GLUCOSE SERPL-MCNC: 125 MG/DL (ref 65–140)
GLUCOSE SERPL-MCNC: 129 MG/DL (ref 65–140)
GLUCOSE SERPL-MCNC: 130 MG/DL (ref 65–140)
GLUCOSE SERPL-MCNC: 132 MG/DL (ref 65–140)
GLUCOSE SERPL-MCNC: 134 MG/DL (ref 65–140)
GLUCOSE SERPL-MCNC: 137 MG/DL (ref 65–140)
GLUCOSE SERPL-MCNC: 139 MG/DL (ref 65–140)
GLUCOSE SERPL-MCNC: 144 MG/DL (ref 65–140)
GLUCOSE SERPL-MCNC: 147 MG/DL (ref 65–140)
GLUCOSE SERPL-MCNC: 154 MG/DL (ref 65–140)
GLUCOSE SERPL-MCNC: 155 MG/DL (ref 65–140)
GLUCOSE SERPL-MCNC: <20 MG/DL (ref 65–140)
GRAM STN SPEC: NORMAL
HCO3 BLDA-SCNC: 22.1 MMOL/L (ref 22–28)
HCO3 BLDA-SCNC: 22.1 MMOL/L (ref 22–28)
HCO3 BLDA-SCNC: 23.1 MMOL/L (ref 22–28)
HCT VFR BLD AUTO: 39 % (ref 36.5–49.3)
HGB BLD-MCNC: 12.6 G/DL (ref 12–17)
HOROWITZ INDEX BLDA+IHG-RTO: 40 MM[HG]
HOROWITZ INDEX BLDA+IHG-RTO: 40 MM[HG]
INPATIENT: NORMAL
INR PPP: 1.09 (ref 0.84–1.19)
LDH SERPL-CCNC: 724 U/L (ref 81–234)
LYMPHOCYTES # BLD AUTO: 0.74 THOUSAND/UL (ref 0.6–4.47)
LYMPHOCYTES # BLD AUTO: 6 % (ref 14–44)
MAGNESIUM SERPL-MCNC: 3 MG/DL (ref 1.6–2.6)
MCH RBC QN AUTO: 29.2 PG (ref 26.8–34.3)
MCHC RBC AUTO-ENTMCNC: 32.3 G/DL (ref 31.4–37.4)
MCV RBC AUTO: 90 FL (ref 82–98)
METAMYELOCYTES NFR BLD MANUAL: 3 % (ref 0–1)
MONOCYTES # BLD AUTO: 0.61 THOUSAND/UL (ref 0–1.22)
MONOCYTES NFR BLD: 5 % (ref 4–12)
MRSA NOSE QL CULT: NORMAL
NEUTROPHILS # BLD MANUAL: 10.57 THOUSAND/UL (ref 1.85–7.62)
NEUTS BAND NFR BLD MANUAL: 10 % (ref 0–8)
NEUTS SEG NFR BLD AUTO: 76 % (ref 43–75)
NRBC BLD AUTO-RTO: 0 /100 WBCS
NT-PROBNP SERPL-MCNC: 712 PG/ML
O2 CT BLDA-SCNC: 18.6 ML/DL (ref 16–23)
O2 CT BLDA-SCNC: 18.6 ML/DL (ref 16–23)
O2 CT BLDA-SCNC: 19.3 ML/DL (ref 16–23)
OXYHGB MFR BLDA: 96.5 % (ref 94–97)
OXYHGB MFR BLDA: 97.2 % (ref 94–97)
OXYHGB MFR BLDA: 97.5 % (ref 94–97)
P AXIS: 62 DEGREES
PCO2 BLDA: 40.8 MM HG (ref 36–44)
PCO2 BLDA: 43 MM HG (ref 36–44)
PCO2 BLDA: 45.2 MM HG (ref 36–44)
PEEP RESPIRATORY: 14 CM[H2O]
PEEP RESPIRATORY: 16 CM[H2O]
PH BLDA: 7.31 [PH] (ref 7.35–7.45)
PH BLDA: 7.33 [PH] (ref 7.35–7.45)
PH BLDA: 7.37 [PH] (ref 7.35–7.45)
PHOSPHATE SERPL-MCNC: 5.7 MG/DL (ref 2.3–4.1)
PLATELET # BLD AUTO: 266 THOUSANDS/UL (ref 149–390)
PLATELET BLD QL SMEAR: ADEQUATE
PMV BLD AUTO: 11.4 FL (ref 8.9–12.7)
PO2 BLDA: 102.6 MM HG (ref 75–129)
PO2 BLDA: 107.7 MM HG (ref 75–129)
PO2 BLDA: 124.5 MM HG (ref 75–129)
POTASSIUM SERPL-SCNC: 4.3 MMOL/L (ref 3.5–5.3)
PR INTERVAL: 128 MS
PROCALCITONIN SERPL-MCNC: 1.77 NG/ML
PROT SERPL-MCNC: 6 G/DL (ref 6.4–8.2)
PROTHROMBIN TIME: 14.1 SECONDS (ref 11.6–14.5)
QRS AXIS: 71 DEGREES
QRSD INTERVAL: 74 MS
QT INTERVAL: 388 MS
QTC INTERVAL: 459 MS
RBC # BLD AUTO: 4.32 MILLION/UL (ref 3.88–5.62)
SARS-COV-2 RNA SPEC QL NAA+PROBE: DETECTED
SODIUM SERPL-SCNC: 137 MMOL/L (ref 136–145)
SPECIMEN SOURCE: ABNORMAL
T WAVE AXIS: 58 DEGREES
TOTAL CELLS COUNTED SPEC: 100
TROPONIN I SERPL-MCNC: 0.02 NG/ML
VENT AC: 24
VENT AC: 24
VENT- AC: AC
VENT- AC: AC
VENTRICULAR RATE: 84 BPM
VT SETTING VENT: 450 ML
VT SETTING VENT: 450 ML
WBC # BLD AUTO: 12.29 THOUSAND/UL (ref 4.31–10.16)

## 2020-04-04 PROCEDURE — 84145 PROCALCITONIN (PCT): CPT | Performed by: PHYSICIAN ASSISTANT

## 2020-04-04 PROCEDURE — 86140 C-REACTIVE PROTEIN: CPT | Performed by: PHYSICIAN ASSISTANT

## 2020-04-04 PROCEDURE — 84100 ASSAY OF PHOSPHORUS: CPT | Performed by: PHYSICIAN ASSISTANT

## 2020-04-04 PROCEDURE — 85610 PROTHROMBIN TIME: CPT | Performed by: PHYSICIAN ASSISTANT

## 2020-04-04 PROCEDURE — 83615 LACTATE (LD) (LDH) ENZYME: CPT | Performed by: PHYSICIAN ASSISTANT

## 2020-04-04 PROCEDURE — 82948 REAGENT STRIP/BLOOD GLUCOSE: CPT

## 2020-04-04 PROCEDURE — 84484 ASSAY OF TROPONIN QUANT: CPT | Performed by: ANESTHESIOLOGY

## 2020-04-04 PROCEDURE — 85007 BL SMEAR W/DIFF WBC COUNT: CPT | Performed by: PHYSICIAN ASSISTANT

## 2020-04-04 PROCEDURE — 94003 VENT MGMT INPAT SUBQ DAY: CPT

## 2020-04-04 PROCEDURE — 94760 N-INVAS EAR/PLS OXIMETRY 1: CPT

## 2020-04-04 PROCEDURE — 82330 ASSAY OF CALCIUM: CPT | Performed by: PHYSICIAN ASSISTANT

## 2020-04-04 PROCEDURE — 82805 BLOOD GASES W/O2 SATURATION: CPT | Performed by: PHYSICIAN ASSISTANT

## 2020-04-04 PROCEDURE — 83880 ASSAY OF NATRIURETIC PEPTIDE: CPT | Performed by: PHYSICIAN ASSISTANT

## 2020-04-04 PROCEDURE — 93010 ELECTROCARDIOGRAM REPORT: CPT | Performed by: INTERNAL MEDICINE

## 2020-04-04 PROCEDURE — 85027 COMPLETE CBC AUTOMATED: CPT | Performed by: PHYSICIAN ASSISTANT

## 2020-04-04 PROCEDURE — 85379 FIBRIN DEGRADATION QUANT: CPT | Performed by: PHYSICIAN ASSISTANT

## 2020-04-04 PROCEDURE — 85652 RBC SED RATE AUTOMATED: CPT | Performed by: PHYSICIAN ASSISTANT

## 2020-04-04 PROCEDURE — 85384 FIBRINOGEN ACTIVITY: CPT | Performed by: PHYSICIAN ASSISTANT

## 2020-04-04 PROCEDURE — 99233 SBSQ HOSP IP/OBS HIGH 50: CPT | Performed by: ANESTHESIOLOGY

## 2020-04-04 PROCEDURE — 82728 ASSAY OF FERRITIN: CPT | Performed by: PHYSICIAN ASSISTANT

## 2020-04-04 PROCEDURE — 93005 ELECTROCARDIOGRAM TRACING: CPT

## 2020-04-04 PROCEDURE — 80053 COMPREHEN METABOLIC PANEL: CPT | Performed by: PHYSICIAN ASSISTANT

## 2020-04-04 PROCEDURE — 83735 ASSAY OF MAGNESIUM: CPT | Performed by: PHYSICIAN ASSISTANT

## 2020-04-04 RX ORDER — ZINC SULFATE 50(220)MG
220 CAPSULE ORAL 2 TIMES DAILY
Status: DISCONTINUED | OUTPATIENT
Start: 2020-04-04 | End: 2020-04-11

## 2020-04-04 RX ORDER — CALCIUM GLUCONATE 20 MG/ML
2 INJECTION, SOLUTION INTRAVENOUS ONCE
Status: COMPLETED | OUTPATIENT
Start: 2020-04-04 | End: 2020-04-04

## 2020-04-04 RX ORDER — FUROSEMIDE 10 MG/ML
20 INJECTION INTRAMUSCULAR; INTRAVENOUS ONCE
Status: COMPLETED | OUTPATIENT
Start: 2020-04-04 | End: 2020-04-05

## 2020-04-04 RX ADMIN — ZINC SULFATE 220 MG (50 MG) CAPSULE 220 MG: CAPSULE at 17:05

## 2020-04-04 RX ADMIN — FENTANYL CITRATE 75 MCG/HR: 50 INJECTION, SOLUTION INTRAMUSCULAR; INTRAVENOUS at 14:34

## 2020-04-04 RX ADMIN — PROPOFOL 40 MCG/KG/MIN: 10 INJECTION, EMULSION INTRAVENOUS at 16:43

## 2020-04-04 RX ADMIN — CALCIUM GLUCONATE 2 G: 20 INJECTION, SOLUTION INTRAVENOUS at 12:11

## 2020-04-04 RX ADMIN — VANCOMYCIN HYDROCHLORIDE 750 MG: 750 INJECTION, SOLUTION INTRAVENOUS at 16:38

## 2020-04-04 RX ADMIN — HYDROXYCHLOROQUINE SULFATE 200 MG: 200 TABLET, FILM COATED ORAL at 12:14

## 2020-04-04 RX ADMIN — HYDROXYCHLOROQUINE SULFATE 200 MG: 200 TABLET, FILM COATED ORAL at 17:01

## 2020-04-04 RX ADMIN — Medication: at 01:21

## 2020-04-04 RX ADMIN — SODIUM CHLORIDE 2.5 MCG/KG/MIN: 0.9 INJECTION, SOLUTION INTRAVENOUS at 09:26

## 2020-04-04 RX ADMIN — HEPARIN SODIUM 5000 UNITS: 5000 INJECTION INTRAVENOUS; SUBCUTANEOUS at 21:49

## 2020-04-04 RX ADMIN — CHLORHEXIDINE GLUCONATE 0.12% ORAL RINSE 15 ML: 1.2 LIQUID ORAL at 20:09

## 2020-04-04 RX ADMIN — METHYLPREDNISOLONE SODIUM SUCCINATE 100 MG: 125 INJECTION, POWDER, FOR SOLUTION INTRAMUSCULAR; INTRAVENOUS at 20:10

## 2020-04-04 RX ADMIN — CHLORHEXIDINE GLUCONATE 0.12% ORAL RINSE 15 ML: 1.2 LIQUID ORAL at 08:00

## 2020-04-04 RX ADMIN — PROPOFOL 40 MCG/KG/MIN: 10 INJECTION, EMULSION INTRAVENOUS at 13:19

## 2020-04-04 RX ADMIN — Medication: at 07:56

## 2020-04-04 RX ADMIN — Medication: at 04:08

## 2020-04-04 RX ADMIN — PROPOFOL 40 MCG/KG/MIN: 10 INJECTION, EMULSION INTRAVENOUS at 08:47

## 2020-04-04 RX ADMIN — OXYCODONE HYDROCHLORIDE AND ACETAMINOPHEN 1000 MG: 500 TABLET ORAL at 17:01

## 2020-04-04 RX ADMIN — OXYCODONE HYDROCHLORIDE AND ACETAMINOPHEN 1000 MG: 500 TABLET ORAL at 08:00

## 2020-04-04 RX ADMIN — AZITHROMYCIN 250 MG: 250 TABLET, FILM COATED ORAL at 07:57

## 2020-04-04 RX ADMIN — HEPARIN SODIUM 5000 UNITS: 5000 INJECTION INTRAVENOUS; SUBCUTANEOUS at 06:30

## 2020-04-04 RX ADMIN — HEPARIN SODIUM 5000 UNITS: 5000 INJECTION INTRAVENOUS; SUBCUTANEOUS at 16:44

## 2020-04-04 RX ADMIN — Medication: at 06:30

## 2020-04-04 RX ADMIN — SODIUM CHLORIDE 0.5 UNITS/HR: 9 INJECTION, SOLUTION INTRAVENOUS at 16:43

## 2020-04-04 RX ADMIN — PROPOFOL 50 MCG/KG/MIN: 10 INJECTION, EMULSION INTRAVENOUS at 01:21

## 2020-04-04 RX ADMIN — Medication 50 MG: at 08:00

## 2020-04-04 RX ADMIN — METHYLPREDNISOLONE SODIUM SUCCINATE 100 MG: 125 INJECTION, POWDER, FOR SOLUTION INTRAMUSCULAR; INTRAVENOUS at 08:00

## 2020-04-04 RX ADMIN — HYDROXYCHLOROQUINE SULFATE 200 MG: 200 TABLET, FILM COATED ORAL at 01:21

## 2020-04-04 RX ADMIN — ATORVASTATIN CALCIUM 40 MG: 40 TABLET, FILM COATED ORAL at 16:50

## 2020-04-04 RX ADMIN — TOCILIZUMAB 400 MG: 20 INJECTION, SOLUTION, CONCENTRATE INTRAVENOUS at 15:07

## 2020-04-04 RX ADMIN — NOREPINEPHRINE BITARTRATE 4 MCG/MIN: 1 INJECTION, SOLUTION, CONCENTRATE INTRAVENOUS at 19:19

## 2020-04-04 RX ADMIN — FENTANYL CITRATE 75 MCG/HR: 50 INJECTION, SOLUTION INTRAMUSCULAR; INTRAVENOUS at 01:21

## 2020-04-04 RX ADMIN — CEFEPIME HYDROCHLORIDE 2000 MG: 2 INJECTION, POWDER, FOR SOLUTION INTRAVENOUS at 20:09

## 2020-04-04 RX ADMIN — PROPOFOL 40 MCG/KG/MIN: 10 INJECTION, EMULSION INTRAVENOUS at 20:07

## 2020-04-04 RX ADMIN — PROPOFOL 50 MCG/KG/MIN: 10 INJECTION, EMULSION INTRAVENOUS at 05:06

## 2020-04-04 RX ADMIN — VANCOMYCIN HYDROCHLORIDE 750 MG: 750 INJECTION, SOLUTION INTRAVENOUS at 01:21

## 2020-04-04 NOTE — ANESTHESIA PROCEDURE NOTES
"Arterial Line Insertion  Performed by: Love Rowe CRNA  Authorized by: Love Rowe CRNA   Consent: Written consent obtained.  Risks and benefits: risks, benefits and alternatives were discussed  Patient understanding: patient states understanding of the procedure being performed  Patient consent: the patient's understanding of the procedure matches consent given  Procedure consent: procedure consent matches procedure scheduled  Relevant documents: relevant documents present and verified  Required items: required blood products, implants, devices, and special equipment available  Patient identity confirmed: arm band, hospital-assigned identification number and provided demographic data  Time out: Immediately prior to procedure a \"time out\" was called to verify the correct patient, procedure, equipment, support staff and site/side marked as required.  Preparation: Patient was prepped and draped in the usual sterile fashion.  Indications: multiple ABGs, respiratory failure and hemodynamic monitoring  Orientation:  Left  Location: radial artery  Sedation:  Patient sedated: Sedated and Paralyzed.    Procedure Details:  Giancarlo's test normal: yes  Needle gauge: 20  Seldinger technique: Seldinger technique used  Number of attempts: 1    Post-procedure:  Post-procedure: chlorhexidine patch applied,  dressing applied and line sutured  Waveform: good waveform  Post-procedure CNS: normal  Patient tolerance: Patient tolerated the procedure well with no immediate complications          "

## 2020-04-05 PROBLEM — J80 ACUTE RESPIRATORY DISTRESS SYNDROME (ARDS) DUE TO COVID-19 VIRUS (HCC): Status: ACTIVE | Noted: 2020-04-02

## 2020-04-05 PROBLEM — U07.1 ACUTE RESPIRATORY DISTRESS SYNDROME (ARDS) DUE TO COVID-19 VIRUS (HCC): Status: ACTIVE | Noted: 2020-04-02

## 2020-04-05 PROBLEM — R77.8 TROPONIN LEVEL ELEVATED: Status: RESOLVED | Noted: 2020-04-02 | Resolved: 2020-04-05

## 2020-04-05 PROBLEM — R79.89 TROPONIN LEVEL ELEVATED: Status: RESOLVED | Noted: 2020-04-02 | Resolved: 2020-04-05

## 2020-04-05 LAB
ALBUMIN SERPL BCP-MCNC: 2.4 G/DL (ref 3.5–5)
ALP SERPL-CCNC: 66 U/L (ref 46–116)
ALT SERPL W P-5'-P-CCNC: 26 U/L (ref 12–78)
ANION GAP SERPL CALCULATED.3IONS-SCNC: 10 MMOL/L (ref 4–13)
AST SERPL W P-5'-P-CCNC: 45 U/L (ref 5–45)
BASE EXCESS BLDA CALC-SCNC: -3.3 MMOL/L
BILIRUB SERPL-MCNC: 0.4 MG/DL (ref 0.2–1)
BUN SERPL-MCNC: 67 MG/DL (ref 5–25)
CALCIUM SERPL-MCNC: 8 MG/DL (ref 8.3–10.1)
CHLORIDE SERPL-SCNC: 104 MMOL/L (ref 100–108)
CO2 SERPL-SCNC: 24 MMOL/L (ref 21–32)
CREAT SERPL-MCNC: 1.95 MG/DL (ref 0.6–1.3)
CRP SERPL QL: 74.3 MG/L
ERYTHROCYTE [DISTWIDTH] IN BLOOD BY AUTOMATED COUNT: 13.8 % (ref 11.6–15.1)
ERYTHROCYTE [SEDIMENTATION RATE] IN BLOOD: 82 MM/HOUR (ref 0–10)
FERRITIN SERPL-MCNC: 2427 NG/ML (ref 8–388)
FIBRINOGEN PPP-MCNC: 505 MG/DL (ref 227–495)
GFR SERPL CREATININE-BSD FRML MDRD: 36 ML/MIN/1.73SQ M
GLUCOSE SERPL-MCNC: 145 MG/DL (ref 65–140)
GLUCOSE SERPL-MCNC: 148 MG/DL (ref 65–140)
GLUCOSE SERPL-MCNC: 164 MG/DL (ref 65–140)
GLUCOSE SERPL-MCNC: 165 MG/DL (ref 65–140)
GLUCOSE SERPL-MCNC: 168 MG/DL (ref 65–140)
GLUCOSE SERPL-MCNC: 169 MG/DL (ref 65–140)
GLUCOSE SERPL-MCNC: 170 MG/DL (ref 65–140)
GLUCOSE SERPL-MCNC: 172 MG/DL (ref 65–140)
GLUCOSE SERPL-MCNC: 172 MG/DL (ref 65–140)
GLUCOSE SERPL-MCNC: 174 MG/DL (ref 65–140)
GLUCOSE SERPL-MCNC: 175 MG/DL (ref 65–140)
GLUCOSE SERPL-MCNC: 177 MG/DL (ref 65–140)
GLUCOSE SERPL-MCNC: 201 MG/DL (ref 65–140)
HCO3 BLDA-SCNC: 21.6 MMOL/L (ref 22–28)
HCT VFR BLD AUTO: 39.3 % (ref 36.5–49.3)
HGB BLD-MCNC: 13 G/DL (ref 12–17)
HOROWITZ INDEX BLDA+IHG-RTO: 40 MM[HG]
LDH SERPL-CCNC: 656 U/L (ref 81–234)
MCH RBC QN AUTO: 29.5 PG (ref 26.8–34.3)
MCHC RBC AUTO-ENTMCNC: 33.1 G/DL (ref 31.4–37.4)
MCV RBC AUTO: 89 FL (ref 82–98)
NRBC BLD AUTO-RTO: 0 /100 WBCS
O2 CT BLDA-SCNC: 18.9 ML/DL (ref 16–23)
OXYHGB MFR BLDA: 97.8 % (ref 94–97)
PCO2 BLDA: 38.4 MM HG (ref 36–44)
PEEP RESPIRATORY: 14 CM[H2O]
PH BLDA: 7.37 [PH] (ref 7.35–7.45)
PLATELET # BLD AUTO: 413 THOUSANDS/UL (ref 149–390)
PMV BLD AUTO: 11.3 FL (ref 8.9–12.7)
PO2 BLDA: 133.9 MM HG (ref 75–129)
POTASSIUM SERPL-SCNC: 4.7 MMOL/L (ref 3.5–5.3)
PROCALCITONIN SERPL-MCNC: 1.13 NG/ML
PROT SERPL-MCNC: 6.3 G/DL (ref 6.4–8.2)
RBC # BLD AUTO: 4.41 MILLION/UL (ref 3.88–5.62)
SODIUM SERPL-SCNC: 138 MMOL/L (ref 136–145)
VENT AC: 24
VENT- AC: AC
VT SETTING VENT: 450 ML
WBC # BLD AUTO: 20.26 THOUSAND/UL (ref 4.31–10.16)

## 2020-04-05 PROCEDURE — 85384 FIBRINOGEN ACTIVITY: CPT | Performed by: PHYSICIAN ASSISTANT

## 2020-04-05 PROCEDURE — 85027 COMPLETE CBC AUTOMATED: CPT | Performed by: PHYSICIAN ASSISTANT

## 2020-04-05 PROCEDURE — 86140 C-REACTIVE PROTEIN: CPT | Performed by: PHYSICIAN ASSISTANT

## 2020-04-05 PROCEDURE — 84145 PROCALCITONIN (PCT): CPT | Performed by: PHYSICIAN ASSISTANT

## 2020-04-05 PROCEDURE — 83615 LACTATE (LD) (LDH) ENZYME: CPT | Performed by: PHYSICIAN ASSISTANT

## 2020-04-05 PROCEDURE — 82805 BLOOD GASES W/O2 SATURATION: CPT | Performed by: PHYSICIAN ASSISTANT

## 2020-04-05 PROCEDURE — 85652 RBC SED RATE AUTOMATED: CPT | Performed by: PHYSICIAN ASSISTANT

## 2020-04-05 PROCEDURE — 99233 SBSQ HOSP IP/OBS HIGH 50: CPT | Performed by: ANESTHESIOLOGY

## 2020-04-05 PROCEDURE — 94003 VENT MGMT INPAT SUBQ DAY: CPT

## 2020-04-05 PROCEDURE — 94760 N-INVAS EAR/PLS OXIMETRY 1: CPT

## 2020-04-05 PROCEDURE — 82948 REAGENT STRIP/BLOOD GLUCOSE: CPT

## 2020-04-05 PROCEDURE — 80053 COMPREHEN METABOLIC PANEL: CPT | Performed by: PHYSICIAN ASSISTANT

## 2020-04-05 PROCEDURE — 82728 ASSAY OF FERRITIN: CPT | Performed by: PHYSICIAN ASSISTANT

## 2020-04-05 RX ORDER — FUROSEMIDE 10 MG/ML
40 INJECTION INTRAMUSCULAR; INTRAVENOUS
Status: DISCONTINUED | OUTPATIENT
Start: 2020-04-05 | End: 2020-04-08

## 2020-04-05 RX ORDER — POLYETHYLENE GLYCOL 3350 17 G/17G
17 POWDER, FOR SOLUTION ORAL ONCE
Status: COMPLETED | OUTPATIENT
Start: 2020-04-05 | End: 2020-04-05

## 2020-04-05 RX ORDER — FUROSEMIDE 10 MG/ML
40 INJECTION INTRAMUSCULAR; INTRAVENOUS ONCE
Status: COMPLETED | OUTPATIENT
Start: 2020-04-05 | End: 2020-04-05

## 2020-04-05 RX ORDER — AMOXICILLIN 250 MG
2 CAPSULE ORAL 2 TIMES DAILY
Status: DISCONTINUED | OUTPATIENT
Start: 2020-04-05 | End: 2020-04-13 | Stop reason: HOSPADM

## 2020-04-05 RX ADMIN — PROPOFOL 45 MCG/KG/MIN: 10 INJECTION, EMULSION INTRAVENOUS at 14:36

## 2020-04-05 RX ADMIN — CHLORHEXIDINE GLUCONATE 0.12% ORAL RINSE 15 ML: 1.2 LIQUID ORAL at 20:00

## 2020-04-05 RX ADMIN — PROPOFOL 45 MCG/KG/MIN: 10 INJECTION, EMULSION INTRAVENOUS at 11:27

## 2020-04-05 RX ADMIN — FENTANYL CITRATE 75 MCG/HR: 50 INJECTION, SOLUTION INTRAMUSCULAR; INTRAVENOUS at 15:57

## 2020-04-05 RX ADMIN — HEPARIN SODIUM 5000 UNITS: 5000 INJECTION INTRAVENOUS; SUBCUTANEOUS at 14:05

## 2020-04-05 RX ADMIN — METHYLPREDNISOLONE SODIUM SUCCINATE 100 MG: 125 INJECTION, POWDER, FOR SOLUTION INTRAMUSCULAR; INTRAVENOUS at 08:01

## 2020-04-05 RX ADMIN — OXYCODONE HYDROCHLORIDE AND ACETAMINOPHEN 1000 MG: 500 TABLET ORAL at 08:01

## 2020-04-05 RX ADMIN — AZITHROMYCIN 250 MG: 250 TABLET, FILM COATED ORAL at 08:01

## 2020-04-05 RX ADMIN — ZINC SULFATE 220 MG (50 MG) CAPSULE 220 MG: CAPSULE at 18:11

## 2020-04-05 RX ADMIN — HYDROXYCHLOROQUINE SULFATE 200 MG: 200 TABLET, FILM COATED ORAL at 16:01

## 2020-04-05 RX ADMIN — PROPOFOL 45 MCG/KG/MIN: 10 INJECTION, EMULSION INTRAVENOUS at 22:10

## 2020-04-05 RX ADMIN — ATORVASTATIN CALCIUM 40 MG: 40 TABLET, FILM COATED ORAL at 16:01

## 2020-04-05 RX ADMIN — NOREPINEPHRINE BITARTRATE 6 MCG/MIN: 1 INJECTION, SOLUTION, CONCENTRATE INTRAVENOUS at 00:26

## 2020-04-05 RX ADMIN — HYDROXYCHLOROQUINE SULFATE 200 MG: 200 TABLET, FILM COATED ORAL at 00:26

## 2020-04-05 RX ADMIN — PROPOFOL 50 MCG/KG/MIN: 10 INJECTION, EMULSION INTRAVENOUS at 04:47

## 2020-04-05 RX ADMIN — PROPOFOL 40 MCG/KG/MIN: 10 INJECTION, EMULSION INTRAVENOUS at 00:18

## 2020-04-05 RX ADMIN — VANCOMYCIN HYDROCHLORIDE 750 MG: 750 INJECTION, SOLUTION INTRAVENOUS at 04:00

## 2020-04-05 RX ADMIN — FUROSEMIDE 40 MG: 10 INJECTION, SOLUTION INTRAMUSCULAR; INTRAVENOUS at 20:00

## 2020-04-05 RX ADMIN — PROPOFOL 45 MCG/KG/MIN: 10 INJECTION, EMULSION INTRAVENOUS at 18:11

## 2020-04-05 RX ADMIN — METHYLPREDNISOLONE SODIUM SUCCINATE 100 MG: 125 INJECTION, POWDER, FOR SOLUTION INTRAMUSCULAR; INTRAVENOUS at 20:00

## 2020-04-05 RX ADMIN — FUROSEMIDE 20 MG: 10 INJECTION, SOLUTION INTRAMUSCULAR; INTRAVENOUS at 00:21

## 2020-04-05 RX ADMIN — HEPARIN SODIUM 5000 UNITS: 5000 INJECTION INTRAVENOUS; SUBCUTANEOUS at 21:42

## 2020-04-05 RX ADMIN — HYDROXYCHLOROQUINE SULFATE 200 MG: 200 TABLET, FILM COATED ORAL at 08:00

## 2020-04-05 RX ADMIN — PROPOFOL 45 MCG/KG/MIN: 10 INJECTION, EMULSION INTRAVENOUS at 07:30

## 2020-04-05 RX ADMIN — CEFEPIME HYDROCHLORIDE 2000 MG: 2 INJECTION, POWDER, FOR SOLUTION INTRAVENOUS at 19:59

## 2020-04-05 RX ADMIN — FUROSEMIDE 40 MG: 10 INJECTION, SOLUTION INTRAMUSCULAR; INTRAVENOUS at 11:28

## 2020-04-05 RX ADMIN — POLYETHYLENE GLYCOL 3350 17 G: 17 POWDER, FOR SOLUTION ORAL at 14:05

## 2020-04-05 RX ADMIN — SENNOSIDES AND DOCUSATE SODIUM 2 TABLET: 8.6; 5 TABLET ORAL at 18:11

## 2020-04-05 RX ADMIN — FENTANYL CITRATE 75 MCG/HR: 50 INJECTION, SOLUTION INTRAMUSCULAR; INTRAVENOUS at 02:33

## 2020-04-05 RX ADMIN — ZINC SULFATE 220 MG (50 MG) CAPSULE 220 MG: CAPSULE at 08:02

## 2020-04-05 RX ADMIN — OXYCODONE HYDROCHLORIDE AND ACETAMINOPHEN 1000 MG: 500 TABLET ORAL at 18:11

## 2020-04-05 RX ADMIN — HEPARIN SODIUM 5000 UNITS: 5000 INJECTION INTRAVENOUS; SUBCUTANEOUS at 06:38

## 2020-04-05 RX ADMIN — CHLORHEXIDINE GLUCONATE 0.12% ORAL RINSE 15 ML: 1.2 LIQUID ORAL at 08:00

## 2020-04-06 LAB
ALBUMIN SERPL BCP-MCNC: 2.2 G/DL (ref 3.5–5)
ALP SERPL-CCNC: 61 U/L (ref 46–116)
ALT SERPL W P-5'-P-CCNC: 30 U/L (ref 12–78)
ANION GAP SERPL CALCULATED.3IONS-SCNC: 8 MMOL/L (ref 4–13)
AST SERPL W P-5'-P-CCNC: 44 U/L (ref 5–45)
BASE EXCESS BLDA CALC-SCNC: -1.2 MMOL/L
BASE EXCESS BLDA CALC-SCNC: 1.3 MMOL/L
BILIRUB SERPL-MCNC: 0.4 MG/DL (ref 0.2–1)
BODY TEMPERATURE: 98.8 DEGREES FEHRENHEIT
BUN SERPL-MCNC: 75 MG/DL (ref 5–25)
CALCIUM SERPL-MCNC: 8.1 MG/DL (ref 8.3–10.1)
CHLORIDE SERPL-SCNC: 109 MMOL/L (ref 100–108)
CO2 SERPL-SCNC: 27 MMOL/L (ref 21–32)
CREAT SERPL-MCNC: 1.88 MG/DL (ref 0.6–1.3)
CRP SERPL QL: 39.3 MG/L
D DIMER PPP FEU-MCNC: 1.7 UG/ML FEU
ERYTHROCYTE [DISTWIDTH] IN BLOOD BY AUTOMATED COUNT: 13.8 % (ref 11.6–15.1)
FERRITIN SERPL-MCNC: 1616 NG/ML (ref 8–388)
FIBRINOGEN PPP-MCNC: 511 MG/DL (ref 227–495)
GFR SERPL CREATININE-BSD FRML MDRD: 37 ML/MIN/1.73SQ M
GLUCOSE SERPL-MCNC: 107 MG/DL (ref 65–140)
GLUCOSE SERPL-MCNC: 108 MG/DL (ref 65–140)
GLUCOSE SERPL-MCNC: 109 MG/DL (ref 65–140)
GLUCOSE SERPL-MCNC: 113 MG/DL (ref 65–140)
GLUCOSE SERPL-MCNC: 120 MG/DL (ref 65–140)
GLUCOSE SERPL-MCNC: 127 MG/DL (ref 65–140)
GLUCOSE SERPL-MCNC: 160 MG/DL (ref 65–140)
GLUCOSE SERPL-MCNC: 165 MG/DL (ref 65–140)
GLUCOSE SERPL-MCNC: 166 MG/DL (ref 65–140)
GLUCOSE SERPL-MCNC: 168 MG/DL (ref 65–140)
GLUCOSE SERPL-MCNC: 173 MG/DL (ref 65–140)
GLUCOSE SERPL-MCNC: <20 MG/DL (ref 65–140)
HCO3 BLDA-SCNC: 23.4 MMOL/L (ref 22–28)
HCO3 BLDA-SCNC: 26.4 MMOL/L (ref 22–28)
HCT VFR BLD AUTO: 36.8 % (ref 36.5–49.3)
HGB BLD-MCNC: 11.9 G/DL (ref 12–17)
HOROWITZ INDEX BLDA+IHG-RTO: 40 MM[HG]
LDH SERPL-CCNC: 477 U/L (ref 81–234)
MCH RBC QN AUTO: 29.2 PG (ref 26.8–34.3)
MCHC RBC AUTO-ENTMCNC: 32.3 G/DL (ref 31.4–37.4)
MCV RBC AUTO: 90 FL (ref 82–98)
NRBC BLD AUTO-RTO: 0 /100 WBCS
O2 CT BLDA-SCNC: 16.8 ML/DL (ref 16–23)
O2 CT BLDA-SCNC: 18.2 ML/DL (ref 16–23)
OXYHGB MFR BLDA: 94.3 % (ref 94–97)
OXYHGB MFR BLDA: 96.5 % (ref 94–97)
PCO2 BLDA: 38.7 MM HG (ref 36–44)
PCO2 BLDA: 43.7 MM HG (ref 36–44)
PEEP RESPIRATORY: 8 CM[H2O]
PH BLDA: 7.4 [PH] (ref 7.35–7.45)
PH BLDA: 7.4 [PH] (ref 7.35–7.45)
PLATELET # BLD AUTO: 344 THOUSANDS/UL (ref 149–390)
PMV BLD AUTO: 11.1 FL (ref 8.9–12.7)
PO2 BLDA: 79.8 MM HG (ref 75–129)
PO2 BLDA: 92.8 MM HG (ref 75–129)
POTASSIUM SERPL-SCNC: 4.6 MMOL/L (ref 3.5–5.3)
PROCALCITONIN SERPL-MCNC: 0.65 NG/ML
PROT SERPL-MCNC: 5.9 G/DL (ref 6.4–8.2)
PS VENT FIO2: 40
PS VENT PEEP: 8
RBC # BLD AUTO: 4.07 MILLION/UL (ref 3.88–5.62)
SODIUM SERPL-SCNC: 144 MMOL/L (ref 136–145)
SPECIMEN SOURCE: NORMAL
SPECIMEN SOURCE: NORMAL
VENT - PS: NORMAL
VENT AC: 24
VENT- AC: AC
VT SETTING VENT: 450 ML
WBC # BLD AUTO: 13.79 THOUSAND/UL (ref 4.31–10.16)

## 2020-04-06 PROCEDURE — 85379 FIBRIN DEGRADATION QUANT: CPT | Performed by: PHYSICIAN ASSISTANT

## 2020-04-06 PROCEDURE — 80053 COMPREHEN METABOLIC PANEL: CPT | Performed by: PHYSICIAN ASSISTANT

## 2020-04-06 PROCEDURE — 85384 FIBRINOGEN ACTIVITY: CPT | Performed by: PHYSICIAN ASSISTANT

## 2020-04-06 PROCEDURE — 99233 SBSQ HOSP IP/OBS HIGH 50: CPT | Performed by: ANESTHESIOLOGY

## 2020-04-06 PROCEDURE — 84145 PROCALCITONIN (PCT): CPT | Performed by: PHYSICIAN ASSISTANT

## 2020-04-06 PROCEDURE — 86140 C-REACTIVE PROTEIN: CPT | Performed by: PHYSICIAN ASSISTANT

## 2020-04-06 PROCEDURE — 82805 BLOOD GASES W/O2 SATURATION: CPT | Performed by: NURSE PRACTITIONER

## 2020-04-06 PROCEDURE — 85027 COMPLETE CBC AUTOMATED: CPT | Performed by: PHYSICIAN ASSISTANT

## 2020-04-06 PROCEDURE — 82948 REAGENT STRIP/BLOOD GLUCOSE: CPT

## 2020-04-06 PROCEDURE — 82728 ASSAY OF FERRITIN: CPT | Performed by: PHYSICIAN ASSISTANT

## 2020-04-06 PROCEDURE — 82805 BLOOD GASES W/O2 SATURATION: CPT | Performed by: PHYSICIAN ASSISTANT

## 2020-04-06 PROCEDURE — 83615 LACTATE (LD) (LDH) ENZYME: CPT | Performed by: PHYSICIAN ASSISTANT

## 2020-04-06 PROCEDURE — 94760 N-INVAS EAR/PLS OXIMETRY 1: CPT

## 2020-04-06 PROCEDURE — 99255 IP/OBS CONSLTJ NEW/EST HI 80: CPT | Performed by: INTERNAL MEDICINE

## 2020-04-06 PROCEDURE — 94003 VENT MGMT INPAT SUBQ DAY: CPT

## 2020-04-06 RX ORDER — ACETAZOLAMIDE 500 MG/5ML
500 INJECTION, POWDER, LYOPHILIZED, FOR SOLUTION INTRAVENOUS EVERY 12 HOURS SCHEDULED
Status: COMPLETED | OUTPATIENT
Start: 2020-04-06 | End: 2020-04-06

## 2020-04-06 RX ORDER — DEXTROSE MONOHYDRATE 25 G/50ML
INJECTION, SOLUTION INTRAVENOUS
Status: DISPENSED
Start: 2020-04-06 | End: 2020-04-07

## 2020-04-06 RX ORDER — METHYLPREDNISOLONE SODIUM SUCCINATE 125 MG/2ML
100 INJECTION, POWDER, LYOPHILIZED, FOR SOLUTION INTRAMUSCULAR; INTRAVENOUS DAILY
Status: COMPLETED | OUTPATIENT
Start: 2020-04-07 | End: 2020-04-09

## 2020-04-06 RX ORDER — FENTANYL CITRATE 50 UG/ML
50 INJECTION, SOLUTION INTRAMUSCULAR; INTRAVENOUS EVERY 2 HOUR PRN
Status: DISCONTINUED | OUTPATIENT
Start: 2020-04-06 | End: 2020-04-08

## 2020-04-06 RX ADMIN — FUROSEMIDE 40 MG: 10 INJECTION, SOLUTION INTRAMUSCULAR; INTRAVENOUS at 09:30

## 2020-04-06 RX ADMIN — OXYCODONE HYDROCHLORIDE AND ACETAMINOPHEN 1000 MG: 500 TABLET ORAL at 09:29

## 2020-04-06 RX ADMIN — HYDROXYCHLOROQUINE SULFATE 200 MG: 200 TABLET, FILM COATED ORAL at 01:19

## 2020-04-06 RX ADMIN — PROPOFOL 35 MCG/KG/MIN: 10 INJECTION, EMULSION INTRAVENOUS at 05:42

## 2020-04-06 RX ADMIN — HEPARIN SODIUM 5000 UNITS: 5000 INJECTION INTRAVENOUS; SUBCUTANEOUS at 20:45

## 2020-04-06 RX ADMIN — ACETAZOLAMIDE SODIUM 500 MG: 500 INJECTION, POWDER, LYOPHILIZED, FOR SOLUTION INTRAVENOUS at 11:46

## 2020-04-06 RX ADMIN — WATER 5 ML: 1 INJECTION INTRAMUSCULAR; INTRAVENOUS; SUBCUTANEOUS at 11:46

## 2020-04-06 RX ADMIN — PROPOFOL 40 MCG/KG/MIN: 10 INJECTION, EMULSION INTRAVENOUS at 18:17

## 2020-04-06 RX ADMIN — PROPOFOL 35 MCG/KG/MIN: 10 INJECTION, EMULSION INTRAVENOUS at 05:41

## 2020-04-06 RX ADMIN — PROPOFOL 35 MCG/KG/MIN: 10 INJECTION, EMULSION INTRAVENOUS at 10:31

## 2020-04-06 RX ADMIN — ZINC SULFATE 220 MG (50 MG) CAPSULE 220 MG: CAPSULE at 09:28

## 2020-04-06 RX ADMIN — HYDROXYCHLOROQUINE SULFATE 200 MG: 200 TABLET, FILM COATED ORAL at 16:57

## 2020-04-06 RX ADMIN — CHLORHEXIDINE GLUCONATE 0.12% ORAL RINSE 15 ML: 1.2 LIQUID ORAL at 20:40

## 2020-04-06 RX ADMIN — PROPOFOL 40 MCG/KG/MIN: 10 INJECTION, EMULSION INTRAVENOUS at 22:20

## 2020-04-06 RX ADMIN — HEPARIN SODIUM 5000 UNITS: 5000 INJECTION INTRAVENOUS; SUBCUTANEOUS at 06:03

## 2020-04-06 RX ADMIN — SENNOSIDES AND DOCUSATE SODIUM 2 TABLET: 8.6; 5 TABLET ORAL at 09:29

## 2020-04-06 RX ADMIN — FENTANYL CITRATE 75 MCG/HR: 50 INJECTION, SOLUTION INTRAMUSCULAR; INTRAVENOUS at 05:42

## 2020-04-06 RX ADMIN — ZINC SULFATE 220 MG (50 MG) CAPSULE 220 MG: CAPSULE at 17:00

## 2020-04-06 RX ADMIN — OXYCODONE HYDROCHLORIDE AND ACETAMINOPHEN 1000 MG: 500 TABLET ORAL at 17:00

## 2020-04-06 RX ADMIN — CHLORHEXIDINE GLUCONATE 0.12% ORAL RINSE 15 ML: 1.2 LIQUID ORAL at 09:28

## 2020-04-06 RX ADMIN — AZITHROMYCIN 250 MG: 250 TABLET, FILM COATED ORAL at 09:28

## 2020-04-06 RX ADMIN — HEPARIN SODIUM 5000 UNITS: 5000 INJECTION INTRAVENOUS; SUBCUTANEOUS at 13:28

## 2020-04-06 RX ADMIN — ACETAZOLAMIDE SODIUM 500 MG: 500 INJECTION, POWDER, LYOPHILIZED, FOR SOLUTION INTRAVENOUS at 20:36

## 2020-04-06 RX ADMIN — NOREPINEPHRINE BITARTRATE 2 MCG/MIN: 1 INJECTION, SOLUTION, CONCENTRATE INTRAVENOUS at 02:33

## 2020-04-06 RX ADMIN — ATORVASTATIN CALCIUM 40 MG: 40 TABLET, FILM COATED ORAL at 15:37

## 2020-04-06 RX ADMIN — FENTANYL CITRATE 75 MCG/HR: 50 INJECTION, SOLUTION INTRAMUSCULAR; INTRAVENOUS at 18:17

## 2020-04-06 RX ADMIN — CEFEPIME HYDROCHLORIDE 2000 MG: 2 INJECTION, POWDER, FOR SOLUTION INTRAVENOUS at 20:38

## 2020-04-06 RX ADMIN — METHYLPREDNISOLONE SODIUM SUCCINATE 100 MG: 125 INJECTION, POWDER, FOR SOLUTION INTRAMUSCULAR; INTRAVENOUS at 20:48

## 2020-04-06 RX ADMIN — METHYLPREDNISOLONE SODIUM SUCCINATE 100 MG: 125 INJECTION, POWDER, FOR SOLUTION INTRAMUSCULAR; INTRAVENOUS at 09:29

## 2020-04-06 RX ADMIN — FUROSEMIDE 40 MG: 10 INJECTION, SOLUTION INTRAMUSCULAR; INTRAVENOUS at 15:37

## 2020-04-06 RX ADMIN — HYDROXYCHLOROQUINE SULFATE 200 MG: 200 TABLET, FILM COATED ORAL at 09:29

## 2020-04-06 RX ADMIN — PROPOFOL 40 MCG/KG/MIN: 10 INJECTION, EMULSION INTRAVENOUS at 15:31

## 2020-04-06 RX ADMIN — SENNOSIDES AND DOCUSATE SODIUM 2 TABLET: 8.6; 5 TABLET ORAL at 17:00

## 2020-04-07 PROBLEM — J96.91 RESPIRATORY FAILURE WITH HYPOXIA (HCC): Status: ACTIVE | Noted: 2020-04-07

## 2020-04-07 LAB
ALBUMIN SERPL BCP-MCNC: 2.5 G/DL (ref 3.5–5)
ALP SERPL-CCNC: 62 U/L (ref 46–116)
ALT SERPL W P-5'-P-CCNC: 52 U/L (ref 12–78)
ANION GAP SERPL CALCULATED.3IONS-SCNC: 9 MMOL/L (ref 4–13)
ARTERIAL PATENCY WRIST A: NO
ARTERIAL PATENCY WRIST A: YES
AST SERPL W P-5'-P-CCNC: 69 U/L (ref 5–45)
BACTERIA BLD CULT: NORMAL
BACTERIA BLD CULT: NORMAL
BASE EXCESS BLDA CALC-SCNC: 0 MMOL/L
BASE EXCESS BLDA CALC-SCNC: 1.5 MMOL/L
BASOPHILS # BLD MANUAL: 0 THOUSAND/UL (ref 0–0.1)
BASOPHILS NFR MAR MANUAL: 0 % (ref 0–1)
BILIRUB SERPL-MCNC: 0.4 MG/DL (ref 0.2–1)
BODY TEMPERATURE: 99.1 DEGREES FEHRENHEIT
BUN SERPL-MCNC: 71 MG/DL (ref 5–25)
CALCIUM SERPL-MCNC: 7.5 MG/DL (ref 8.3–10.1)
CHLORIDE SERPL-SCNC: 112 MMOL/L (ref 100–108)
CO2 SERPL-SCNC: 27 MMOL/L (ref 21–32)
CREAT SERPL-MCNC: 1.76 MG/DL (ref 0.6–1.3)
CRP SERPL QL: 23 MG/L
D DIMER PPP FEU-MCNC: 2.07 UG/ML FEU
EOSINOPHIL # BLD MANUAL: 0.33 THOUSAND/UL (ref 0–0.4)
EOSINOPHIL NFR BLD MANUAL: 2 % (ref 0–6)
ERYTHROCYTE [DISTWIDTH] IN BLOOD BY AUTOMATED COUNT: 13.9 % (ref 11.6–15.1)
ERYTHROCYTE [SEDIMENTATION RATE] IN BLOOD: 59 MM/HOUR (ref 0–10)
FERRITIN SERPL-MCNC: 1690 NG/ML (ref 8–388)
FIBRINOGEN PPP-MCNC: 417 MG/DL (ref 227–495)
GFR SERPL CREATININE-BSD FRML MDRD: 41 ML/MIN/1.73SQ M
GLUCOSE SERPL-MCNC: 100 MG/DL (ref 65–140)
GLUCOSE SERPL-MCNC: 101 MG/DL (ref 65–140)
GLUCOSE SERPL-MCNC: 102 MG/DL (ref 65–140)
GLUCOSE SERPL-MCNC: 111 MG/DL (ref 65–140)
GLUCOSE SERPL-MCNC: 116 MG/DL (ref 65–140)
GLUCOSE SERPL-MCNC: 127 MG/DL (ref 65–140)
GLUCOSE SERPL-MCNC: 136 MG/DL (ref 65–140)
GLUCOSE SERPL-MCNC: 94 MG/DL (ref 65–140)
HCO3 BLDA-SCNC: 24.5 MMOL/L (ref 22–28)
HCO3 BLDA-SCNC: 26.2 MMOL/L (ref 22–28)
HCT VFR BLD AUTO: 41.7 % (ref 36.5–49.3)
HFNC FLOW LPM: 30
HGB BLD-MCNC: 13.3 G/DL (ref 12–17)
LDH SERPL-CCNC: 620 U/L (ref 81–234)
LYMPHOCYTES # BLD AUTO: 1.81 THOUSAND/UL (ref 0.6–4.47)
LYMPHOCYTES # BLD AUTO: 11 % (ref 14–44)
MCH RBC QN AUTO: 29.4 PG (ref 26.8–34.3)
MCHC RBC AUTO-ENTMCNC: 31.9 G/DL (ref 31.4–37.4)
MCV RBC AUTO: 92 FL (ref 82–98)
METAMYELOCYTES NFR BLD MANUAL: 2 % (ref 0–1)
MONOCYTES # BLD AUTO: 0.66 THOUSAND/UL (ref 0–1.22)
MONOCYTES NFR BLD: 4 % (ref 4–12)
NEUTROPHILS # BLD MANUAL: 12.82 THOUSAND/UL (ref 1.85–7.62)
NEUTS BAND NFR BLD MANUAL: 4 % (ref 0–8)
NEUTS SEG NFR BLD AUTO: 74 % (ref 43–75)
NON VENT HFNC FIO2: 45
NON VENT TYPE HFNC: NORMAL
NRBC BLD AUTO-RTO: 0 /100 WBCS
O2 CT BLDA-SCNC: 18.1 ML/DL (ref 16–23)
O2 CT BLDA-SCNC: 21.6 ML/DL (ref 16–23)
OXYHGB MFR BLDA: 94.1 % (ref 94–97)
OXYHGB MFR BLDA: 96 % (ref 94–97)
PCO2 BLDA: 39.2 MM HG (ref 36–44)
PCO2 BLDA: 41.4 MM HG (ref 36–44)
PH BLDA: 7.41 [PH] (ref 7.35–7.45)
PH BLDA: 7.42 [PH] (ref 7.35–7.45)
PLATELET # BLD AUTO: 406 THOUSANDS/UL (ref 149–390)
PLATELET BLD QL SMEAR: ABNORMAL
PMV BLD AUTO: 11 FL (ref 8.9–12.7)
PO2 BLDA: 73.3 MM HG (ref 75–129)
PO2 BLDA: 88 MM HG (ref 75–129)
POTASSIUM SERPL-SCNC: 3.9 MMOL/L (ref 3.5–5.3)
PROT SERPL-MCNC: 6.2 G/DL (ref 6.4–8.2)
RBC # BLD AUTO: 4.52 MILLION/UL (ref 3.88–5.62)
SODIUM SERPL-SCNC: 148 MMOL/L (ref 136–145)
SPECIMEN SOURCE: ABNORMAL
SPECIMEN SOURCE: NORMAL
TOTAL CELLS COUNTED SPEC: 100
VARIANT LYMPHS # BLD AUTO: 3 %
WBC # BLD AUTO: 16.43 THOUSAND/UL (ref 4.31–10.16)

## 2020-04-07 PROCEDURE — 94640 AIRWAY INHALATION TREATMENT: CPT

## 2020-04-07 PROCEDURE — 82805 BLOOD GASES W/O2 SATURATION: CPT | Performed by: NURSE PRACTITIONER

## 2020-04-07 PROCEDURE — 83615 LACTATE (LD) (LDH) ENZYME: CPT | Performed by: PHYSICIAN ASSISTANT

## 2020-04-07 PROCEDURE — 80053 COMPREHEN METABOLIC PANEL: CPT | Performed by: PHYSICIAN ASSISTANT

## 2020-04-07 PROCEDURE — 82805 BLOOD GASES W/O2 SATURATION: CPT | Performed by: PHYSICIAN ASSISTANT

## 2020-04-07 PROCEDURE — 94003 VENT MGMT INPAT SUBQ DAY: CPT

## 2020-04-07 PROCEDURE — 82728 ASSAY OF FERRITIN: CPT | Performed by: PHYSICIAN ASSISTANT

## 2020-04-07 PROCEDURE — 82948 REAGENT STRIP/BLOOD GLUCOSE: CPT

## 2020-04-07 PROCEDURE — 85007 BL SMEAR W/DIFF WBC COUNT: CPT | Performed by: PHYSICIAN ASSISTANT

## 2020-04-07 PROCEDURE — 94760 N-INVAS EAR/PLS OXIMETRY 1: CPT

## 2020-04-07 PROCEDURE — 86140 C-REACTIVE PROTEIN: CPT | Performed by: PHYSICIAN ASSISTANT

## 2020-04-07 PROCEDURE — 85652 RBC SED RATE AUTOMATED: CPT | Performed by: PHYSICIAN ASSISTANT

## 2020-04-07 PROCEDURE — 85027 COMPLETE CBC AUTOMATED: CPT | Performed by: PHYSICIAN ASSISTANT

## 2020-04-07 PROCEDURE — 99233 SBSQ HOSP IP/OBS HIGH 50: CPT | Performed by: ANESTHESIOLOGY

## 2020-04-07 PROCEDURE — 85379 FIBRIN DEGRADATION QUANT: CPT | Performed by: PHYSICIAN ASSISTANT

## 2020-04-07 PROCEDURE — 99233 SBSQ HOSP IP/OBS HIGH 50: CPT | Performed by: INTERNAL MEDICINE

## 2020-04-07 PROCEDURE — 85384 FIBRINOGEN ACTIVITY: CPT | Performed by: PHYSICIAN ASSISTANT

## 2020-04-07 PROCEDURE — 94660 CPAP INITIATION&MGMT: CPT

## 2020-04-07 RX ORDER — PROPOFOL 10 MG/ML
INJECTION, EMULSION INTRAVENOUS
Status: COMPLETED
Start: 2020-04-07 | End: 2020-04-07

## 2020-04-07 RX ORDER — LEVALBUTEROL 1.25 MG/.5ML
1.25 SOLUTION, CONCENTRATE RESPIRATORY (INHALATION)
Status: DISCONTINUED | OUTPATIENT
Start: 2020-04-07 | End: 2020-04-08

## 2020-04-07 RX ORDER — QUETIAPINE FUMARATE 25 MG/1
25 TABLET, FILM COATED ORAL 2 TIMES DAILY
Status: DISCONTINUED | OUTPATIENT
Start: 2020-04-07 | End: 2020-04-13

## 2020-04-07 RX ORDER — HALOPERIDOL 5 MG/ML
5 INJECTION INTRAMUSCULAR ONCE
Status: COMPLETED | OUTPATIENT
Start: 2020-04-07 | End: 2020-04-07

## 2020-04-07 RX ORDER — POLYETHYLENE GLYCOL 3350 17 G/17G
17 POWDER, FOR SOLUTION ORAL DAILY
Status: DISCONTINUED | OUTPATIENT
Start: 2020-04-07 | End: 2020-04-13 | Stop reason: HOSPADM

## 2020-04-07 RX ORDER — BISACODYL 10 MG
10 SUPPOSITORY, RECTAL RECTAL DAILY
Status: DISCONTINUED | OUTPATIENT
Start: 2020-04-07 | End: 2020-04-09

## 2020-04-07 RX ADMIN — FENTANYL CITRATE 75 MCG/HR: 50 INJECTION, SOLUTION INTRAMUSCULAR; INTRAVENOUS at 01:02

## 2020-04-07 RX ADMIN — DEXMEDETOMIDINE 0.4 MCG/KG/HR: 100 INJECTION, SOLUTION, CONCENTRATE INTRAVENOUS at 05:28

## 2020-04-07 RX ADMIN — CEFEPIME HYDROCHLORIDE 2000 MG: 2 INJECTION, POWDER, FOR SOLUTION INTRAVENOUS at 20:58

## 2020-04-07 RX ADMIN — PROPOFOL 20 MCG/KG/MIN: 10 INJECTION, EMULSION INTRAVENOUS at 07:38

## 2020-04-07 RX ADMIN — QUETIAPINE FUMARATE 25 MG: 25 TABLET ORAL at 08:58

## 2020-04-07 RX ADMIN — LEVALBUTEROL HYDROCHLORIDE 1.25 MG: 1.25 SOLUTION, CONCENTRATE RESPIRATORY (INHALATION) at 13:14

## 2020-04-07 RX ADMIN — OXYCODONE HYDROCHLORIDE AND ACETAMINOPHEN 1000 MG: 500 TABLET ORAL at 18:13

## 2020-04-07 RX ADMIN — OXYCODONE HYDROCHLORIDE AND ACETAMINOPHEN 1000 MG: 500 TABLET ORAL at 08:58

## 2020-04-07 RX ADMIN — DEXMEDETOMIDINE 1.4 MCG/KG/HR: 100 INJECTION, SOLUTION, CONCENTRATE INTRAVENOUS at 09:08

## 2020-04-07 RX ADMIN — DEXMEDETOMIDINE 1.4 MCG/KG/HR: 100 INJECTION, SOLUTION, CONCENTRATE INTRAVENOUS at 11:57

## 2020-04-07 RX ADMIN — IPRATROPIUM BROMIDE 0.5 MG: 0.5 SOLUTION RESPIRATORY (INHALATION) at 19:32

## 2020-04-07 RX ADMIN — IPRATROPIUM BROMIDE 0.5 MG: 0.5 SOLUTION RESPIRATORY (INHALATION) at 13:14

## 2020-04-07 RX ADMIN — HALOPERIDOL LACTATE 5 MG: 5 INJECTION INTRAMUSCULAR at 07:40

## 2020-04-07 RX ADMIN — ZINC SULFATE 220 MG (50 MG) CAPSULE 220 MG: CAPSULE at 08:58

## 2020-04-07 RX ADMIN — HEPARIN SODIUM 5000 UNITS: 5000 INJECTION INTRAVENOUS; SUBCUTANEOUS at 21:04

## 2020-04-07 RX ADMIN — FUROSEMIDE 40 MG: 10 INJECTION, SOLUTION INTRAMUSCULAR; INTRAVENOUS at 07:50

## 2020-04-07 RX ADMIN — LEVALBUTEROL HYDROCHLORIDE 1.25 MG: 1.25 SOLUTION, CONCENTRATE RESPIRATORY (INHALATION) at 19:32

## 2020-04-07 RX ADMIN — ZINC SULFATE 220 MG (50 MG) CAPSULE 220 MG: CAPSULE at 18:13

## 2020-04-07 RX ADMIN — IPRATROPIUM BROMIDE 0.5 MG: 0.5 SOLUTION RESPIRATORY (INHALATION) at 07:53

## 2020-04-07 RX ADMIN — LEVALBUTEROL HYDROCHLORIDE 1.25 MG: 1.25 SOLUTION, CONCENTRATE RESPIRATORY (INHALATION) at 07:53

## 2020-04-07 RX ADMIN — CHLORHEXIDINE GLUCONATE 0.12% ORAL RINSE 15 ML: 1.2 LIQUID ORAL at 08:59

## 2020-04-07 RX ADMIN — SENNOSIDES AND DOCUSATE SODIUM 2 TABLET: 8.6; 5 TABLET ORAL at 18:12

## 2020-04-07 RX ADMIN — SENNOSIDES AND DOCUSATE SODIUM 2 TABLET: 8.6; 5 TABLET ORAL at 08:58

## 2020-04-07 RX ADMIN — METHYLPREDNISOLONE SODIUM SUCCINATE 100 MG: 125 INJECTION, POWDER, FOR SOLUTION INTRAMUSCULAR; INTRAVENOUS at 08:59

## 2020-04-07 RX ADMIN — FUROSEMIDE 40 MG: 10 INJECTION, SOLUTION INTRAMUSCULAR; INTRAVENOUS at 15:43

## 2020-04-07 RX ADMIN — AZITHROMYCIN 250 MG: 250 TABLET, FILM COATED ORAL at 07:43

## 2020-04-07 RX ADMIN — ATORVASTATIN CALCIUM 40 MG: 40 TABLET, FILM COATED ORAL at 15:43

## 2020-04-07 RX ADMIN — DEXMEDETOMIDINE 0.2 MCG/KG/HR: 100 INJECTION, SOLUTION, CONCENTRATE INTRAVENOUS at 18:18

## 2020-04-07 RX ADMIN — QUETIAPINE FUMARATE 25 MG: 25 TABLET ORAL at 18:12

## 2020-04-07 RX ADMIN — HEPARIN SODIUM 5000 UNITS: 5000 INJECTION INTRAVENOUS; SUBCUTANEOUS at 05:32

## 2020-04-07 RX ADMIN — HYDROXYCHLOROQUINE SULFATE 200 MG: 200 TABLET, FILM COATED ORAL at 01:01

## 2020-04-07 RX ADMIN — POLYETHYLENE GLYCOL 3350 17 G: 17 POWDER, FOR SOLUTION ORAL at 08:58

## 2020-04-07 RX ADMIN — FENTANYL CITRATE 50 MCG: 50 INJECTION INTRAMUSCULAR; INTRAVENOUS at 07:11

## 2020-04-07 RX ADMIN — HEPARIN SODIUM 5000 UNITS: 5000 INJECTION INTRAVENOUS; SUBCUTANEOUS at 13:38

## 2020-04-08 LAB
ANION GAP SERPL CALCULATED.3IONS-SCNC: 11 MMOL/L (ref 4–13)
ARTERIAL PATENCY WRIST A: NO
ATRIAL RATE: 104 BPM
BASE EXCESS BLDA CALC-SCNC: -1 MMOL/L
BODY TEMPERATURE: 99.9 DEGREES FEHRENHEIT
BUN SERPL-MCNC: 74 MG/DL (ref 5–25)
CALCIUM SERPL-MCNC: 7.6 MG/DL (ref 8.3–10.1)
CHLORIDE SERPL-SCNC: 115 MMOL/L (ref 100–108)
CO2 SERPL-SCNC: 25 MMOL/L (ref 21–32)
CREAT SERPL-MCNC: 1.51 MG/DL (ref 0.6–1.3)
ERYTHROCYTE [DISTWIDTH] IN BLOOD BY AUTOMATED COUNT: 13.8 % (ref 11.6–15.1)
GFR SERPL CREATININE-BSD FRML MDRD: 49 ML/MIN/1.73SQ M
GLUCOSE SERPL-MCNC: 114 MG/DL (ref 65–140)
GLUCOSE SERPL-MCNC: 122 MG/DL (ref 65–140)
GLUCOSE SERPL-MCNC: 131 MG/DL (ref 65–140)
GLUCOSE SERPL-MCNC: 138 MG/DL (ref 65–140)
HCO3 BLDA-SCNC: 22.2 MMOL/L (ref 22–28)
HCT VFR BLD AUTO: 41.8 % (ref 36.5–49.3)
HGB BLD-MCNC: 13 G/DL (ref 12–17)
MAGNESIUM SERPL-MCNC: 3.1 MG/DL (ref 1.6–2.6)
MCH RBC QN AUTO: 28.4 PG (ref 26.8–34.3)
MCHC RBC AUTO-ENTMCNC: 31.1 G/DL (ref 31.4–37.4)
MCV RBC AUTO: 92 FL (ref 82–98)
NASAL CANNULA: 30
NON VENT ROOM AIR: 55 %
O2 CT BLDA-SCNC: 17.5 ML/DL (ref 16–23)
OXYHGB MFR BLDA: 91.4 % (ref 94–97)
P AXIS: 52 DEGREES
PCO2 BLDA: 32.8 MM HG (ref 36–44)
PH BLDA: 7.45 [PH] (ref 7.35–7.45)
PHOSPHATE SERPL-MCNC: 4.2 MG/DL (ref 2.3–4.1)
PLATELET # BLD AUTO: 355 THOUSANDS/UL (ref 149–390)
PMV BLD AUTO: 10.8 FL (ref 8.9–12.7)
PO2 BLDA: 64.5 MM HG (ref 75–129)
POTASSIUM SERPL-SCNC: 3.8 MMOL/L (ref 3.5–5.3)
PR INTERVAL: 130 MS
QRS AXIS: 65 DEGREES
QRSD INTERVAL: 88 MS
QT INTERVAL: 376 MS
QTC INTERVAL: 494 MS
RBC # BLD AUTO: 4.57 MILLION/UL (ref 3.88–5.62)
SODIUM SERPL-SCNC: 151 MMOL/L (ref 136–145)
SPECIMEN SOURCE: ABNORMAL
T WAVE AXIS: 36 DEGREES
VENTRICULAR RATE: 104 BPM
WBC # BLD AUTO: 12.8 THOUSAND/UL (ref 4.31–10.16)

## 2020-04-08 PROCEDURE — 82805 BLOOD GASES W/O2 SATURATION: CPT | Performed by: PHYSICIAN ASSISTANT

## 2020-04-08 PROCEDURE — 82948 REAGENT STRIP/BLOOD GLUCOSE: CPT

## 2020-04-08 PROCEDURE — 99233 SBSQ HOSP IP/OBS HIGH 50: CPT | Performed by: INTERNAL MEDICINE

## 2020-04-08 PROCEDURE — 83735 ASSAY OF MAGNESIUM: CPT | Performed by: PHYSICIAN ASSISTANT

## 2020-04-08 PROCEDURE — 92610 EVALUATE SWALLOWING FUNCTION: CPT

## 2020-04-08 PROCEDURE — 94660 CPAP INITIATION&MGMT: CPT

## 2020-04-08 PROCEDURE — 99233 SBSQ HOSP IP/OBS HIGH 50: CPT | Performed by: ANESTHESIOLOGY

## 2020-04-08 PROCEDURE — 94760 N-INVAS EAR/PLS OXIMETRY 1: CPT

## 2020-04-08 PROCEDURE — 84100 ASSAY OF PHOSPHORUS: CPT | Performed by: PHYSICIAN ASSISTANT

## 2020-04-08 PROCEDURE — 93005 ELECTROCARDIOGRAM TRACING: CPT

## 2020-04-08 PROCEDURE — 80048 BASIC METABOLIC PNL TOTAL CA: CPT | Performed by: PHYSICIAN ASSISTANT

## 2020-04-08 PROCEDURE — 85027 COMPLETE CBC AUTOMATED: CPT | Performed by: PHYSICIAN ASSISTANT

## 2020-04-08 PROCEDURE — 93010 ELECTROCARDIOGRAM REPORT: CPT | Performed by: INTERNAL MEDICINE

## 2020-04-08 RX ORDER — OLANZAPINE 10 MG/1
2.5 INJECTION, POWDER, LYOPHILIZED, FOR SOLUTION INTRAMUSCULAR ONCE
Status: COMPLETED | OUTPATIENT
Start: 2020-04-08 | End: 2020-04-08

## 2020-04-08 RX ADMIN — OXYCODONE HYDROCHLORIDE AND ACETAMINOPHEN 1000 MG: 500 TABLET ORAL at 17:36

## 2020-04-08 RX ADMIN — QUETIAPINE FUMARATE 25 MG: 25 TABLET ORAL at 17:37

## 2020-04-08 RX ADMIN — OLANZAPINE 2.5 MG: 10 INJECTION, POWDER, FOR SOLUTION INTRAMUSCULAR at 03:39

## 2020-04-08 RX ADMIN — ATORVASTATIN CALCIUM 40 MG: 40 TABLET, FILM COATED ORAL at 17:30

## 2020-04-08 RX ADMIN — DEXMEDETOMIDINE 0.6 MCG/KG/HR: 100 INJECTION, SOLUTION, CONCENTRATE INTRAVENOUS at 03:38

## 2020-04-08 RX ADMIN — OLANZAPINE 2.5 MG: 10 INJECTION, POWDER, FOR SOLUTION INTRAMUSCULAR at 02:37

## 2020-04-08 RX ADMIN — HEPARIN SODIUM 5000 UNITS: 5000 INJECTION INTRAVENOUS; SUBCUTANEOUS at 14:32

## 2020-04-08 RX ADMIN — HEPARIN SODIUM 5000 UNITS: 5000 INJECTION INTRAVENOUS; SUBCUTANEOUS at 05:58

## 2020-04-08 RX ADMIN — SENNOSIDES AND DOCUSATE SODIUM 2 TABLET: 8.6; 5 TABLET ORAL at 17:37

## 2020-04-08 RX ADMIN — OLANZAPINE 2.5 MG: 10 INJECTION, POWDER, FOR SOLUTION INTRAMUSCULAR at 01:28

## 2020-04-08 RX ADMIN — HEPARIN SODIUM 5000 UNITS: 5000 INJECTION INTRAVENOUS; SUBCUTANEOUS at 22:59

## 2020-04-08 RX ADMIN — METHYLPREDNISOLONE SODIUM SUCCINATE 100 MG: 125 INJECTION, POWDER, FOR SOLUTION INTRAMUSCULAR; INTRAVENOUS at 08:32

## 2020-04-08 RX ADMIN — ZINC SULFATE 220 MG (50 MG) CAPSULE 220 MG: CAPSULE at 17:36

## 2020-04-09 PROBLEM — R65.20 SEVERE SEPSIS (HCC): Status: RESOLVED | Noted: 2020-04-02 | Resolved: 2020-04-09

## 2020-04-09 PROBLEM — A41.9 SEVERE SEPSIS (HCC): Status: RESOLVED | Noted: 2020-04-02 | Resolved: 2020-04-09

## 2020-04-09 LAB
ALBUMIN SERPL BCP-MCNC: 2.7 G/DL (ref 3.5–5)
ALP SERPL-CCNC: 61 U/L (ref 46–116)
ALT SERPL W P-5'-P-CCNC: 79 U/L (ref 12–78)
ANION GAP SERPL CALCULATED.3IONS-SCNC: 11 MMOL/L (ref 4–13)
AST SERPL W P-5'-P-CCNC: 70 U/L (ref 5–45)
BILIRUB SERPL-MCNC: 0.7 MG/DL (ref 0.2–1)
BUN SERPL-MCNC: 69 MG/DL (ref 5–25)
CA-I BLD-SCNC: 1.14 MMOL/L (ref 1.12–1.32)
CALCIUM SERPL-MCNC: 7.8 MG/DL (ref 8.3–10.1)
CHLORIDE SERPL-SCNC: 117 MMOL/L (ref 100–108)
CO2 SERPL-SCNC: 24 MMOL/L (ref 21–32)
CREAT SERPL-MCNC: 1.35 MG/DL (ref 0.6–1.3)
ERYTHROCYTE [DISTWIDTH] IN BLOOD BY AUTOMATED COUNT: 14 % (ref 11.6–15.1)
GFR SERPL CREATININE-BSD FRML MDRD: 56 ML/MIN/1.73SQ M
GLUCOSE SERPL-MCNC: 108 MG/DL (ref 65–140)
GLUCOSE SERPL-MCNC: 110 MG/DL (ref 65–140)
GLUCOSE SERPL-MCNC: 96 MG/DL (ref 65–140)
GLUCOSE SERPL-MCNC: 97 MG/DL (ref 65–140)
HCT VFR BLD AUTO: 44.4 % (ref 36.5–49.3)
HGB BLD-MCNC: 13.8 G/DL (ref 12–17)
MAGNESIUM SERPL-MCNC: 2.9 MG/DL (ref 1.6–2.6)
MCH RBC QN AUTO: 28.6 PG (ref 26.8–34.3)
MCHC RBC AUTO-ENTMCNC: 31.1 G/DL (ref 31.4–37.4)
MCV RBC AUTO: 92 FL (ref 82–98)
NRBC BLD AUTO-RTO: 0 /100 WBCS
PHOSPHATE SERPL-MCNC: 3.7 MG/DL (ref 2.3–4.1)
PLATELET # BLD AUTO: 367 THOUSANDS/UL (ref 149–390)
PMV BLD AUTO: 10.7 FL (ref 8.9–12.7)
POTASSIUM SERPL-SCNC: 3.5 MMOL/L (ref 3.5–5.3)
PROT SERPL-MCNC: 6.4 G/DL (ref 6.4–8.2)
RBC # BLD AUTO: 4.82 MILLION/UL (ref 3.88–5.62)
SODIUM SERPL-SCNC: 152 MMOL/L (ref 136–145)
WBC # BLD AUTO: 14.13 THOUSAND/UL (ref 4.31–10.16)

## 2020-04-09 PROCEDURE — 82948 REAGENT STRIP/BLOOD GLUCOSE: CPT

## 2020-04-09 PROCEDURE — 99233 SBSQ HOSP IP/OBS HIGH 50: CPT | Performed by: ANESTHESIOLOGY

## 2020-04-09 PROCEDURE — NC001 PR NO CHARGE: Performed by: NURSE PRACTITIONER

## 2020-04-09 PROCEDURE — 80053 COMPREHEN METABOLIC PANEL: CPT | Performed by: NURSE PRACTITIONER

## 2020-04-09 PROCEDURE — 83735 ASSAY OF MAGNESIUM: CPT | Performed by: NURSE PRACTITIONER

## 2020-04-09 PROCEDURE — 99233 SBSQ HOSP IP/OBS HIGH 50: CPT | Performed by: INTERNAL MEDICINE

## 2020-04-09 PROCEDURE — 85027 COMPLETE CBC AUTOMATED: CPT | Performed by: NURSE PRACTITIONER

## 2020-04-09 PROCEDURE — 82330 ASSAY OF CALCIUM: CPT | Performed by: NURSE PRACTITIONER

## 2020-04-09 PROCEDURE — 84100 ASSAY OF PHOSPHORUS: CPT | Performed by: NURSE PRACTITIONER

## 2020-04-09 RX ORDER — PREDNISONE 20 MG/1
20 TABLET ORAL DAILY
Status: DISCONTINUED | OUTPATIENT
Start: 2020-04-19 | End: 2020-04-13 | Stop reason: HOSPADM

## 2020-04-09 RX ORDER — PREDNISONE 10 MG/1
10 TABLET ORAL DAILY
Status: DISCONTINUED | OUTPATIENT
Start: 2020-04-25 | End: 2020-04-13 | Stop reason: HOSPADM

## 2020-04-09 RX ORDER — POTASSIUM CHLORIDE 20 MEQ/1
20 TABLET, EXTENDED RELEASE ORAL
Status: COMPLETED | OUTPATIENT
Start: 2020-04-09 | End: 2020-04-09

## 2020-04-09 RX ORDER — PREDNISONE 20 MG/1
40 TABLET ORAL DAILY
Status: DISCONTINUED | OUTPATIENT
Start: 2020-04-13 | End: 2020-04-13 | Stop reason: HOSPADM

## 2020-04-09 RX ORDER — BISACODYL 10 MG
10 SUPPOSITORY, RECTAL RECTAL DAILY PRN
Status: DISCONTINUED | OUTPATIENT
Start: 2020-04-09 | End: 2020-04-13 | Stop reason: HOSPADM

## 2020-04-09 RX ORDER — ACETAMINOPHEN 325 MG/1
975 TABLET ORAL EVERY 6 HOURS PRN
Status: DISCONTINUED | OUTPATIENT
Start: 2020-04-09 | End: 2020-04-13 | Stop reason: HOSPADM

## 2020-04-09 RX ORDER — PREDNISONE 10 MG/1
10 TABLET ORAL DAILY
Status: DISCONTINUED | OUTPATIENT
Start: 2020-04-22 | End: 2020-04-13 | Stop reason: HOSPADM

## 2020-04-09 RX ORDER — NICOTINE 21 MG/24HR
14 PATCH, TRANSDERMAL 24 HOURS TRANSDERMAL DAILY
Status: DISCONTINUED | OUTPATIENT
Start: 2020-04-09 | End: 2020-04-13 | Stop reason: HOSPADM

## 2020-04-09 RX ADMIN — POTASSIUM CHLORIDE 20 MEQ: 1500 TABLET, EXTENDED RELEASE ORAL at 07:48

## 2020-04-09 RX ADMIN — NICOTINE 14 MG: 14 PATCH TRANSDERMAL at 11:37

## 2020-04-09 RX ADMIN — ATORVASTATIN CALCIUM 40 MG: 40 TABLET, FILM COATED ORAL at 17:38

## 2020-04-09 RX ADMIN — HEPARIN SODIUM 5000 UNITS: 5000 INJECTION INTRAVENOUS; SUBCUTANEOUS at 05:20

## 2020-04-09 RX ADMIN — HEPARIN SODIUM 5000 UNITS: 5000 INJECTION INTRAVENOUS; SUBCUTANEOUS at 23:44

## 2020-04-09 RX ADMIN — OXYCODONE HYDROCHLORIDE AND ACETAMINOPHEN 1000 MG: 500 TABLET ORAL at 08:16

## 2020-04-09 RX ADMIN — HEPARIN SODIUM 5000 UNITS: 5000 INJECTION INTRAVENOUS; SUBCUTANEOUS at 14:43

## 2020-04-09 RX ADMIN — SENNOSIDES AND DOCUSATE SODIUM 2 TABLET: 8.6; 5 TABLET ORAL at 17:38

## 2020-04-09 RX ADMIN — QUETIAPINE FUMARATE 25 MG: 25 TABLET ORAL at 08:16

## 2020-04-09 RX ADMIN — OXYCODONE HYDROCHLORIDE AND ACETAMINOPHEN 1000 MG: 500 TABLET ORAL at 17:38

## 2020-04-09 RX ADMIN — METHYLPREDNISOLONE SODIUM SUCCINATE 100 MG: 125 INJECTION, POWDER, FOR SOLUTION INTRAMUSCULAR; INTRAVENOUS at 08:15

## 2020-04-09 RX ADMIN — SENNOSIDES AND DOCUSATE SODIUM 2 TABLET: 8.6; 5 TABLET ORAL at 08:16

## 2020-04-09 RX ADMIN — POTASSIUM CHLORIDE 20 MEQ: 1500 TABLET, EXTENDED RELEASE ORAL at 11:37

## 2020-04-09 RX ADMIN — ZINC SULFATE 220 MG (50 MG) CAPSULE 220 MG: CAPSULE at 17:38

## 2020-04-09 RX ADMIN — ZINC SULFATE 220 MG (50 MG) CAPSULE 220 MG: CAPSULE at 08:16

## 2020-04-09 RX ADMIN — QUETIAPINE FUMARATE 25 MG: 25 TABLET ORAL at 17:38

## 2020-04-09 RX ADMIN — POLYETHYLENE GLYCOL 3350 17 G: 17 POWDER, FOR SOLUTION ORAL at 08:16

## 2020-04-09 RX ADMIN — POTASSIUM CHLORIDE 20 MEQ: 1500 TABLET, EXTENDED RELEASE ORAL at 17:38

## 2020-04-10 PROBLEM — R65.21 SEPTIC SHOCK (HCC): Status: ACTIVE | Noted: 2020-04-10

## 2020-04-10 PROBLEM — E87.0 HYPERNATREMIA: Status: ACTIVE | Noted: 2020-04-10

## 2020-04-10 PROBLEM — A41.9 SEPTIC SHOCK (HCC): Status: ACTIVE | Noted: 2020-04-10

## 2020-04-10 LAB
ALBUMIN SERPL BCP-MCNC: 3.1 G/DL (ref 3.5–5)
ALP SERPL-CCNC: 67 U/L (ref 46–116)
ALT SERPL W P-5'-P-CCNC: 94 U/L (ref 12–78)
ANION GAP SERPL CALCULATED.3IONS-SCNC: 10 MMOL/L (ref 4–13)
AST SERPL W P-5'-P-CCNC: 55 U/L (ref 5–45)
BILIRUB SERPL-MCNC: 0.5 MG/DL (ref 0.2–1)
BUN SERPL-MCNC: 60 MG/DL (ref 5–25)
CALCIUM SERPL-MCNC: 8.5 MG/DL (ref 8.3–10.1)
CHLORIDE SERPL-SCNC: 114 MMOL/L (ref 100–108)
CO2 SERPL-SCNC: 26 MMOL/L (ref 21–32)
CREAT SERPL-MCNC: 1.3 MG/DL (ref 0.6–1.3)
CRP SERPL QL: 5.1 MG/L
D DIMER PPP FEU-MCNC: 1.77 UG/ML FEU
ERYTHROCYTE [DISTWIDTH] IN BLOOD BY AUTOMATED COUNT: 14 % (ref 11.6–15.1)
FERRITIN SERPL-MCNC: 1227 NG/ML (ref 8–388)
FIBRINOGEN PPP-MCNC: 378 MG/DL (ref 227–495)
GFR SERPL CREATININE-BSD FRML MDRD: 58 ML/MIN/1.73SQ M
GLUCOSE SERPL-MCNC: 120 MG/DL (ref 65–140)
HCT VFR BLD AUTO: 49.6 % (ref 36.5–49.3)
HGB BLD-MCNC: 15.1 G/DL (ref 12–17)
LDH SERPL-CCNC: 525 U/L (ref 81–234)
MCH RBC QN AUTO: 29 PG (ref 26.8–34.3)
MCHC RBC AUTO-ENTMCNC: 30.4 G/DL (ref 31.4–37.4)
MCV RBC AUTO: 95 FL (ref 82–98)
NRBC BLD AUTO-RTO: 0 /100 WBCS
NT-PROBNP SERPL-MCNC: 304 PG/ML
PLATELET # BLD AUTO: 340 THOUSANDS/UL (ref 149–390)
PMV BLD AUTO: 11 FL (ref 8.9–12.7)
POTASSIUM SERPL-SCNC: 3.9 MMOL/L (ref 3.5–5.3)
PROT SERPL-MCNC: 6.9 G/DL (ref 6.4–8.2)
RBC # BLD AUTO: 5.21 MILLION/UL (ref 3.88–5.62)
SODIUM SERPL-SCNC: 150 MMOL/L (ref 136–145)
TROPONIN I SERPL-MCNC: <0.02 NG/ML
WBC # BLD AUTO: 13.86 THOUSAND/UL (ref 4.31–10.16)

## 2020-04-10 PROCEDURE — 80053 COMPREHEN METABOLIC PANEL: CPT | Performed by: NURSE PRACTITIONER

## 2020-04-10 PROCEDURE — 97167 OT EVAL HIGH COMPLEX 60 MIN: CPT

## 2020-04-10 PROCEDURE — 86140 C-REACTIVE PROTEIN: CPT | Performed by: NURSE PRACTITIONER

## 2020-04-10 PROCEDURE — 83880 ASSAY OF NATRIURETIC PEPTIDE: CPT | Performed by: NURSE PRACTITIONER

## 2020-04-10 PROCEDURE — 99233 SBSQ HOSP IP/OBS HIGH 50: CPT | Performed by: STUDENT IN AN ORGANIZED HEALTH CARE EDUCATION/TRAINING PROGRAM

## 2020-04-10 PROCEDURE — 94760 N-INVAS EAR/PLS OXIMETRY 1: CPT

## 2020-04-10 PROCEDURE — 85384 FIBRINOGEN ACTIVITY: CPT | Performed by: NURSE PRACTITIONER

## 2020-04-10 PROCEDURE — 83615 LACTATE (LD) (LDH) ENZYME: CPT | Performed by: NURSE PRACTITIONER

## 2020-04-10 PROCEDURE — 82728 ASSAY OF FERRITIN: CPT | Performed by: NURSE PRACTITIONER

## 2020-04-10 PROCEDURE — 85379 FIBRIN DEGRADATION QUANT: CPT | Performed by: NURSE PRACTITIONER

## 2020-04-10 PROCEDURE — 85027 COMPLETE CBC AUTOMATED: CPT | Performed by: NURSE PRACTITIONER

## 2020-04-10 PROCEDURE — 99233 SBSQ HOSP IP/OBS HIGH 50: CPT | Performed by: INTERNAL MEDICINE

## 2020-04-10 PROCEDURE — 84484 ASSAY OF TROPONIN QUANT: CPT | Performed by: NURSE PRACTITIONER

## 2020-04-10 RX ORDER — SODIUM CHLORIDE 9 MG/ML
50 INJECTION, SOLUTION INTRAVENOUS CONTINUOUS
Status: DISPENSED | OUTPATIENT
Start: 2020-04-10 | End: 2020-04-10

## 2020-04-10 RX ADMIN — OXYCODONE HYDROCHLORIDE AND ACETAMINOPHEN 1000 MG: 500 TABLET ORAL at 08:23

## 2020-04-10 RX ADMIN — ZINC SULFATE 220 MG (50 MG) CAPSULE 220 MG: CAPSULE at 08:23

## 2020-04-10 RX ADMIN — HEPARIN SODIUM 5000 UNITS: 5000 INJECTION INTRAVENOUS; SUBCUTANEOUS at 21:16

## 2020-04-10 RX ADMIN — HEPARIN SODIUM 5000 UNITS: 5000 INJECTION INTRAVENOUS; SUBCUTANEOUS at 05:09

## 2020-04-10 RX ADMIN — NICOTINE 14 MG: 14 PATCH TRANSDERMAL at 08:24

## 2020-04-10 RX ADMIN — ZINC SULFATE 220 MG (50 MG) CAPSULE 220 MG: CAPSULE at 16:13

## 2020-04-10 RX ADMIN — QUETIAPINE FUMARATE 25 MG: 25 TABLET ORAL at 08:22

## 2020-04-10 RX ADMIN — SODIUM CHLORIDE 50 ML/HR: 0.9 INJECTION, SOLUTION INTRAVENOUS at 11:02

## 2020-04-10 RX ADMIN — SENNOSIDES AND DOCUSATE SODIUM 2 TABLET: 8.6; 5 TABLET ORAL at 08:21

## 2020-04-10 RX ADMIN — QUETIAPINE FUMARATE 25 MG: 25 TABLET ORAL at 16:15

## 2020-04-10 RX ADMIN — OXYCODONE HYDROCHLORIDE AND ACETAMINOPHEN 1000 MG: 500 TABLET ORAL at 16:14

## 2020-04-10 RX ADMIN — ATORVASTATIN CALCIUM 40 MG: 40 TABLET, FILM COATED ORAL at 16:12

## 2020-04-10 RX ADMIN — POLYETHYLENE GLYCOL 3350 17 G: 17 POWDER, FOR SOLUTION ORAL at 08:20

## 2020-04-10 RX ADMIN — HEPARIN SODIUM 5000 UNITS: 5000 INJECTION INTRAVENOUS; SUBCUTANEOUS at 14:18

## 2020-04-10 RX ADMIN — PREDNISONE 50 MG: 20 TABLET ORAL at 08:22

## 2020-04-11 LAB
ALBUMIN SERPL BCP-MCNC: 2.6 G/DL (ref 3.5–5)
ALP SERPL-CCNC: 57 U/L (ref 46–116)
ALT SERPL W P-5'-P-CCNC: 77 U/L (ref 12–78)
ANION GAP SERPL CALCULATED.3IONS-SCNC: 10 MMOL/L (ref 4–13)
AST SERPL W P-5'-P-CCNC: 46 U/L (ref 5–45)
BASOPHILS # BLD MANUAL: 0 THOUSAND/UL (ref 0–0.1)
BASOPHILS NFR MAR MANUAL: 0 % (ref 0–1)
BILIRUB SERPL-MCNC: 0.5 MG/DL (ref 0.2–1)
BUN SERPL-MCNC: 45 MG/DL (ref 5–25)
CALCIUM SERPL-MCNC: 8 MG/DL (ref 8.3–10.1)
CHLORIDE SERPL-SCNC: 114 MMOL/L (ref 100–108)
CO2 SERPL-SCNC: 23 MMOL/L (ref 21–32)
CREAT SERPL-MCNC: 1.06 MG/DL (ref 0.6–1.3)
EOSINOPHIL # BLD MANUAL: 0.79 THOUSAND/UL (ref 0–0.4)
EOSINOPHIL NFR BLD MANUAL: 6 % (ref 0–6)
ERYTHROCYTE [DISTWIDTH] IN BLOOD BY AUTOMATED COUNT: 13.6 % (ref 11.6–15.1)
GFR SERPL CREATININE-BSD FRML MDRD: 75 ML/MIN/1.73SQ M
GLUCOSE SERPL-MCNC: 88 MG/DL (ref 65–140)
HCT VFR BLD AUTO: 46 % (ref 36.5–49.3)
HGB BLD-MCNC: 13.8 G/DL (ref 12–17)
LYMPHOCYTES # BLD AUTO: 2.64 THOUSAND/UL (ref 0.6–4.47)
LYMPHOCYTES # BLD AUTO: 20 % (ref 14–44)
MCH RBC QN AUTO: 28.8 PG (ref 26.8–34.3)
MCHC RBC AUTO-ENTMCNC: 30 G/DL (ref 31.4–37.4)
MCV RBC AUTO: 96 FL (ref 82–98)
MONOCYTES # BLD AUTO: 0.79 THOUSAND/UL (ref 0–1.22)
MONOCYTES NFR BLD: 6 % (ref 4–12)
MYELOCYTES NFR BLD MANUAL: 3 % (ref 0–1)
NEUTROPHILS # BLD MANUAL: 8.18 THOUSAND/UL (ref 1.85–7.62)
NEUTS SEG NFR BLD AUTO: 62 % (ref 43–75)
NRBC BLD AUTO-RTO: 0 /100 WBCS
PLATELET # BLD AUTO: 268 THOUSANDS/UL (ref 149–390)
PLATELET BLD QL SMEAR: ADEQUATE
PMV BLD AUTO: 11 FL (ref 8.9–12.7)
POTASSIUM SERPL-SCNC: 4.1 MMOL/L (ref 3.5–5.3)
PROT SERPL-MCNC: 5.8 G/DL (ref 6.4–8.2)
RBC # BLD AUTO: 4.8 MILLION/UL (ref 3.88–5.62)
RBC MORPH BLD: NORMAL
SODIUM SERPL-SCNC: 147 MMOL/L (ref 136–145)
TOTAL CELLS COUNTED SPEC: 100
VARIANT LYMPHS # BLD AUTO: 3 %
WBC # BLD AUTO: 13.19 THOUSAND/UL (ref 4.31–10.16)

## 2020-04-11 PROCEDURE — 93005 ELECTROCARDIOGRAM TRACING: CPT

## 2020-04-11 PROCEDURE — 94760 N-INVAS EAR/PLS OXIMETRY 1: CPT

## 2020-04-11 PROCEDURE — 99232 SBSQ HOSP IP/OBS MODERATE 35: CPT | Performed by: STUDENT IN AN ORGANIZED HEALTH CARE EDUCATION/TRAINING PROGRAM

## 2020-04-11 PROCEDURE — 85027 COMPLETE CBC AUTOMATED: CPT | Performed by: STUDENT IN AN ORGANIZED HEALTH CARE EDUCATION/TRAINING PROGRAM

## 2020-04-11 PROCEDURE — 80053 COMPREHEN METABOLIC PANEL: CPT | Performed by: STUDENT IN AN ORGANIZED HEALTH CARE EDUCATION/TRAINING PROGRAM

## 2020-04-11 PROCEDURE — 85007 BL SMEAR W/DIFF WBC COUNT: CPT | Performed by: STUDENT IN AN ORGANIZED HEALTH CARE EDUCATION/TRAINING PROGRAM

## 2020-04-11 RX ADMIN — ATORVASTATIN CALCIUM 40 MG: 40 TABLET, FILM COATED ORAL at 17:21

## 2020-04-11 RX ADMIN — QUETIAPINE FUMARATE 25 MG: 25 TABLET ORAL at 17:21

## 2020-04-11 RX ADMIN — PREDNISONE 50 MG: 20 TABLET ORAL at 08:29

## 2020-04-11 RX ADMIN — QUETIAPINE FUMARATE 25 MG: 25 TABLET ORAL at 08:29

## 2020-04-11 RX ADMIN — HEPARIN SODIUM 5000 UNITS: 5000 INJECTION INTRAVENOUS; SUBCUTANEOUS at 06:25

## 2020-04-11 RX ADMIN — NICOTINE 14 MG: 14 PATCH TRANSDERMAL at 08:29

## 2020-04-11 RX ADMIN — HEPARIN SODIUM 5000 UNITS: 5000 INJECTION INTRAVENOUS; SUBCUTANEOUS at 14:30

## 2020-04-11 RX ADMIN — HEPARIN SODIUM 5000 UNITS: 5000 INJECTION INTRAVENOUS; SUBCUTANEOUS at 21:21

## 2020-04-12 LAB
ALBUMIN SERPL BCP-MCNC: 3.1 G/DL (ref 3.5–5)
ALP SERPL-CCNC: 68 U/L (ref 46–116)
ALT SERPL W P-5'-P-CCNC: 104 U/L (ref 12–78)
ANION GAP SERPL CALCULATED.3IONS-SCNC: 11 MMOL/L (ref 4–13)
AST SERPL W P-5'-P-CCNC: 51 U/L (ref 5–45)
ATRIAL RATE: 82 BPM
BILIRUB SERPL-MCNC: 0.6 MG/DL (ref 0.2–1)
BUN SERPL-MCNC: 42 MG/DL (ref 5–25)
CALCIUM SERPL-MCNC: 8.1 MG/DL (ref 8.3–10.1)
CHLORIDE SERPL-SCNC: 109 MMOL/L (ref 100–108)
CO2 SERPL-SCNC: 23 MMOL/L (ref 21–32)
CREAT SERPL-MCNC: 1.23 MG/DL (ref 0.6–1.3)
GFR SERPL CREATININE-BSD FRML MDRD: 63 ML/MIN/1.73SQ M
GLUCOSE SERPL-MCNC: 164 MG/DL (ref 65–140)
P AXIS: 43 DEGREES
POTASSIUM SERPL-SCNC: 4.2 MMOL/L (ref 3.5–5.3)
PR INTERVAL: 120 MS
PROT SERPL-MCNC: 6.5 G/DL (ref 6.4–8.2)
QRS AXIS: 62 DEGREES
QRSD INTERVAL: 76 MS
QT INTERVAL: 410 MS
QTC INTERVAL: 479 MS
SODIUM SERPL-SCNC: 143 MMOL/L (ref 136–145)
T WAVE AXIS: 50 DEGREES
VENTRICULAR RATE: 82 BPM

## 2020-04-12 PROCEDURE — 93010 ELECTROCARDIOGRAM REPORT: CPT | Performed by: INTERNAL MEDICINE

## 2020-04-12 PROCEDURE — 80053 COMPREHEN METABOLIC PANEL: CPT | Performed by: STUDENT IN AN ORGANIZED HEALTH CARE EDUCATION/TRAINING PROGRAM

## 2020-04-12 PROCEDURE — 99232 SBSQ HOSP IP/OBS MODERATE 35: CPT | Performed by: STUDENT IN AN ORGANIZED HEALTH CARE EDUCATION/TRAINING PROGRAM

## 2020-04-12 RX ADMIN — HEPARIN SODIUM 5000 UNITS: 5000 INJECTION INTRAVENOUS; SUBCUTANEOUS at 05:16

## 2020-04-12 RX ADMIN — QUETIAPINE FUMARATE 25 MG: 25 TABLET ORAL at 08:12

## 2020-04-12 RX ADMIN — QUETIAPINE FUMARATE 25 MG: 25 TABLET ORAL at 18:00

## 2020-04-12 RX ADMIN — ATORVASTATIN CALCIUM 40 MG: 40 TABLET, FILM COATED ORAL at 17:58

## 2020-04-12 RX ADMIN — PREDNISONE 50 MG: 20 TABLET ORAL at 08:11

## 2020-04-12 RX ADMIN — NICOTINE 14 MG: 14 PATCH TRANSDERMAL at 08:12

## 2020-04-12 RX ADMIN — ENOXAPARIN SODIUM 40 MG: 40 INJECTION SUBCUTANEOUS at 11:55

## 2020-04-13 VITALS
TEMPERATURE: 97.9 F | SYSTOLIC BLOOD PRESSURE: 130 MMHG | RESPIRATION RATE: 18 BRPM | HEIGHT: 68 IN | BODY MASS INDEX: 30.17 KG/M2 | HEART RATE: 105 BPM | OXYGEN SATURATION: 91 % | WEIGHT: 199.08 LBS | DIASTOLIC BLOOD PRESSURE: 66 MMHG

## 2020-04-13 PROCEDURE — 92526 ORAL FUNCTION THERAPY: CPT

## 2020-04-13 PROCEDURE — 99232 SBSQ HOSP IP/OBS MODERATE 35: CPT | Performed by: INTERNAL MEDICINE

## 2020-04-13 PROCEDURE — 94761 N-INVAS EAR/PLS OXIMETRY MLT: CPT

## 2020-04-13 PROCEDURE — 99239 HOSP IP/OBS DSCHRG MGMT >30: CPT | Performed by: STUDENT IN AN ORGANIZED HEALTH CARE EDUCATION/TRAINING PROGRAM

## 2020-04-13 PROCEDURE — 97163 PT EVAL HIGH COMPLEX 45 MIN: CPT

## 2020-04-13 PROCEDURE — 97535 SELF CARE MNGMENT TRAINING: CPT

## 2020-04-13 PROCEDURE — 93005 ELECTROCARDIOGRAM TRACING: CPT

## 2020-04-13 PROCEDURE — 97530 THERAPEUTIC ACTIVITIES: CPT

## 2020-04-13 RX ORDER — PREDNISONE 20 MG/1
TABLET ORAL
Qty: 13 TABLET | Refills: 0 | Status: SHIPPED | OUTPATIENT
Start: 2020-04-13 | End: 2020-04-22 | Stop reason: ALTCHOICE

## 2020-04-13 RX ADMIN — NICOTINE 14 MG: 14 PATCH TRANSDERMAL at 09:21

## 2020-04-13 RX ADMIN — ENOXAPARIN SODIUM 40 MG: 40 INJECTION SUBCUTANEOUS at 09:21

## 2020-04-13 RX ADMIN — PREDNISONE 40 MG: 20 TABLET ORAL at 09:21

## 2020-04-14 LAB
ATRIAL RATE: 0 BPM
QRS AXIS: 0 DEGREES
QRSD INTERVAL: 0 MS
QT INTERVAL: 0 MS
QTC INTERVAL: 0 MS
T WAVE AXIS: 0 DEGREES
VENTRICULAR RATE: 0 BPM

## 2020-04-18 ENCOUNTER — TELEMEDICINE (OUTPATIENT)
Dept: INTERNAL MEDICINE CLINIC | Facility: CLINIC | Age: 65
End: 2020-04-18
Payer: OTHER MISCELLANEOUS

## 2020-04-18 DIAGNOSIS — U07.1 ACUTE RESPIRATORY DISTRESS SYNDROME (ARDS) DUE TO COVID-19 VIRUS (HCC): Primary | ICD-10-CM

## 2020-04-18 DIAGNOSIS — J80 ACUTE RESPIRATORY DISTRESS SYNDROME (ARDS) DUE TO COVID-19 VIRUS (HCC): Primary | ICD-10-CM

## 2020-04-18 PROCEDURE — 99213 OFFICE O/P EST LOW 20 MIN: CPT | Performed by: INTERNAL MEDICINE

## 2020-04-20 ENCOUNTER — TELEPHONE (OUTPATIENT)
Dept: INTERNAL MEDICINE CLINIC | Facility: CLINIC | Age: 65
End: 2020-04-20

## 2020-04-21 ENCOUNTER — APPOINTMENT (OUTPATIENT)
Dept: LAB | Facility: CLINIC | Age: 65
End: 2020-04-21
Payer: OTHER MISCELLANEOUS

## 2020-04-21 DIAGNOSIS — U07.1 COVID-19: ICD-10-CM

## 2020-04-21 LAB
ALBUMIN SERPL BCP-MCNC: 3.3 G/DL (ref 3.5–5)
ALP SERPL-CCNC: 88 U/L (ref 46–116)
ALT SERPL W P-5'-P-CCNC: 58 U/L (ref 12–78)
ANION GAP SERPL CALCULATED.3IONS-SCNC: 7 MMOL/L (ref 4–13)
AST SERPL W P-5'-P-CCNC: 16 U/L (ref 5–45)
BASOPHILS # BLD AUTO: 0.1 THOUSANDS/ΜL (ref 0–0.1)
BASOPHILS NFR BLD AUTO: 1 % (ref 0–1)
BILIRUB SERPL-MCNC: 0.35 MG/DL (ref 0.2–1)
BUN SERPL-MCNC: 18 MG/DL (ref 5–25)
CALCIUM SERPL-MCNC: 8.5 MG/DL (ref 8.3–10.1)
CHLORIDE SERPL-SCNC: 114 MMOL/L (ref 100–108)
CO2 SERPL-SCNC: 25 MMOL/L (ref 21–32)
CREAT SERPL-MCNC: 1.11 MG/DL (ref 0.6–1.3)
EOSINOPHIL # BLD AUTO: 0.36 THOUSAND/ΜL (ref 0–0.61)
EOSINOPHIL NFR BLD AUTO: 2 % (ref 0–6)
ERYTHROCYTE [DISTWIDTH] IN BLOOD BY AUTOMATED COUNT: 14.9 % (ref 11.6–15.1)
GFR SERPL CREATININE-BSD FRML MDRD: 71 ML/MIN/1.73SQ M
GLUCOSE P FAST SERPL-MCNC: 87 MG/DL (ref 65–99)
HCT VFR BLD AUTO: 44.2 % (ref 36.5–49.3)
HGB BLD-MCNC: 14 G/DL (ref 12–17)
IMM GRANULOCYTES # BLD AUTO: 0.36 THOUSAND/UL (ref 0–0.2)
IMM GRANULOCYTES NFR BLD AUTO: 2 % (ref 0–2)
LYMPHOCYTES # BLD AUTO: 1.39 THOUSANDS/ΜL (ref 0.6–4.47)
LYMPHOCYTES NFR BLD AUTO: 8 % (ref 14–44)
MCH RBC QN AUTO: 29 PG (ref 26.8–34.3)
MCHC RBC AUTO-ENTMCNC: 31.7 G/DL (ref 31.4–37.4)
MCV RBC AUTO: 92 FL (ref 82–98)
MONOCYTES # BLD AUTO: 1.15 THOUSAND/ΜL (ref 0.17–1.22)
MONOCYTES NFR BLD AUTO: 7 % (ref 4–12)
NEUTROPHILS # BLD AUTO: 13.97 THOUSANDS/ΜL (ref 1.85–7.62)
NEUTS SEG NFR BLD AUTO: 80 % (ref 43–75)
NRBC BLD AUTO-RTO: 0 /100 WBCS
PLATELET # BLD AUTO: 189 THOUSANDS/UL (ref 149–390)
PMV BLD AUTO: 11.1 FL (ref 8.9–12.7)
POTASSIUM SERPL-SCNC: 3.2 MMOL/L (ref 3.5–5.3)
PROT SERPL-MCNC: 6.2 G/DL (ref 6.4–8.2)
RBC # BLD AUTO: 4.82 MILLION/UL (ref 3.88–5.62)
SODIUM SERPL-SCNC: 146 MMOL/L (ref 136–145)
WBC # BLD AUTO: 17.33 THOUSAND/UL (ref 4.31–10.16)

## 2020-04-21 PROCEDURE — 80053 COMPREHEN METABOLIC PANEL: CPT

## 2020-04-21 PROCEDURE — 36415 COLL VENOUS BLD VENIPUNCTURE: CPT

## 2020-04-21 PROCEDURE — 85025 COMPLETE CBC W/AUTO DIFF WBC: CPT

## 2020-04-22 ENCOUNTER — TELEMEDICINE (OUTPATIENT)
Dept: INTERNAL MEDICINE CLINIC | Facility: CLINIC | Age: 65
End: 2020-04-22
Payer: COMMERCIAL

## 2020-04-22 ENCOUNTER — TELEPHONE (OUTPATIENT)
Dept: INTERNAL MEDICINE CLINIC | Facility: CLINIC | Age: 65
End: 2020-04-22

## 2020-04-22 VITALS — WEIGHT: 205 LBS | BODY MASS INDEX: 31.17 KG/M2 | TEMPERATURE: 98.4 F

## 2020-04-22 DIAGNOSIS — U07.1 ACUTE RESPIRATORY DISTRESS SYNDROME (ARDS) DUE TO COVID-19 VIRUS (HCC): Primary | ICD-10-CM

## 2020-04-22 DIAGNOSIS — F43.10 PTSD (POST-TRAUMATIC STRESS DISORDER): ICD-10-CM

## 2020-04-22 DIAGNOSIS — N17.9 AKI (ACUTE KIDNEY INJURY) (HCC): ICD-10-CM

## 2020-04-22 DIAGNOSIS — E87.0 HYPERNATREMIA: ICD-10-CM

## 2020-04-22 DIAGNOSIS — R79.89 ELEVATED BRAIN NATRIURETIC PEPTIDE (BNP) LEVEL: ICD-10-CM

## 2020-04-22 DIAGNOSIS — J80 ACUTE RESPIRATORY DISTRESS SYNDROME (ARDS) DUE TO COVID-19 VIRUS (HCC): Primary | ICD-10-CM

## 2020-04-22 PROCEDURE — 99214 OFFICE O/P EST MOD 30 MIN: CPT | Performed by: NURSE PRACTITIONER

## 2020-04-22 RX ORDER — PREDNISONE 10 MG/1
10 TABLET ORAL DAILY
Qty: 30 TABLET | Refills: 0 | Status: SHIPPED | OUTPATIENT
Start: 2020-04-22 | End: 2020-06-01 | Stop reason: ALTCHOICE

## 2020-04-23 ENCOUNTER — TELEPHONE (OUTPATIENT)
Dept: INTERNAL MEDICINE CLINIC | Facility: CLINIC | Age: 65
End: 2020-04-23

## 2020-04-24 ENCOUNTER — TELEPHONE (OUTPATIENT)
Dept: INTERNAL MEDICINE CLINIC | Facility: CLINIC | Age: 65
End: 2020-04-24

## 2020-04-24 DIAGNOSIS — J80 ACUTE RESPIRATORY DISTRESS SYNDROME (ARDS) DUE TO COVID-19 VIRUS (HCC): Primary | ICD-10-CM

## 2020-04-24 DIAGNOSIS — U07.1 ACUTE RESPIRATORY DISTRESS SYNDROME (ARDS) DUE TO COVID-19 VIRUS (HCC): Primary | ICD-10-CM

## 2020-04-29 ENCOUNTER — OFFICE VISIT (OUTPATIENT)
Dept: FAMILY MEDICINE CLINIC | Facility: CLINIC | Age: 65
End: 2020-04-29
Payer: OTHER MISCELLANEOUS

## 2020-04-29 ENCOUNTER — TELEMEDICINE (OUTPATIENT)
Dept: INTERNAL MEDICINE CLINIC | Facility: CLINIC | Age: 65
End: 2020-04-29

## 2020-04-29 VITALS
DIASTOLIC BLOOD PRESSURE: 74 MMHG | OXYGEN SATURATION: 99 % | HEART RATE: 70 BPM | SYSTOLIC BLOOD PRESSURE: 110 MMHG | TEMPERATURE: 97.9 F

## 2020-04-29 VITALS — SYSTOLIC BLOOD PRESSURE: 156 MMHG | DIASTOLIC BLOOD PRESSURE: 88 MMHG

## 2020-04-29 DIAGNOSIS — J80 ACUTE RESPIRATORY DISTRESS SYNDROME (ARDS) DUE TO COVID-19 VIRUS (HCC): Primary | ICD-10-CM

## 2020-04-29 DIAGNOSIS — J84.9 INTERSTITIAL LUNG DISEASE (HCC): ICD-10-CM

## 2020-04-29 DIAGNOSIS — U07.1 ACUTE RESPIRATORY DISTRESS SYNDROME (ARDS) DUE TO COVID-19 VIRUS (HCC): Primary | ICD-10-CM

## 2020-04-29 PROCEDURE — NC001 PR NO CHARGE: Performed by: NURSE PRACTITIONER

## 2020-04-29 PROCEDURE — 1111F DSCHRG MED/CURRENT MED MERGE: CPT | Performed by: FAMILY MEDICINE

## 2020-04-29 PROCEDURE — 99214 OFFICE O/P EST MOD 30 MIN: CPT | Performed by: FAMILY MEDICINE

## 2020-04-30 ENCOUNTER — PATIENT OUTREACH (OUTPATIENT)
Dept: CASE MANAGEMENT | Facility: HOSPITAL | Age: 65
End: 2020-04-30

## 2020-04-30 DIAGNOSIS — R09.02 HYPOXIA: Primary | ICD-10-CM

## 2020-05-04 ENCOUNTER — TELEPHONE (OUTPATIENT)
Dept: PULMONOLOGY | Facility: CLINIC | Age: 65
End: 2020-05-04

## 2020-05-05 ENCOUNTER — APPOINTMENT (OUTPATIENT)
Dept: LAB | Facility: CLINIC | Age: 65
End: 2020-05-05
Payer: OTHER MISCELLANEOUS

## 2020-05-05 ENCOUNTER — TELEPHONE (OUTPATIENT)
Dept: INTERNAL MEDICINE CLINIC | Facility: CLINIC | Age: 65
End: 2020-05-05

## 2020-05-05 ENCOUNTER — APPOINTMENT (OUTPATIENT)
Dept: RADIOLOGY | Facility: CLINIC | Age: 65
End: 2020-05-05
Payer: OTHER MISCELLANEOUS

## 2020-05-05 ENCOUNTER — TELEPHONE (OUTPATIENT)
Dept: PSYCHIATRY | Facility: CLINIC | Age: 65
End: 2020-05-05

## 2020-05-05 DIAGNOSIS — J80 ACUTE RESPIRATORY DISTRESS SYNDROME (ARDS) DUE TO COVID-19 VIRUS (HCC): ICD-10-CM

## 2020-05-05 DIAGNOSIS — E87.0 HYPERNATREMIA: ICD-10-CM

## 2020-05-05 DIAGNOSIS — U07.1 ACUTE RESPIRATORY DISTRESS SYNDROME (ARDS) DUE TO COVID-19 VIRUS (HCC): ICD-10-CM

## 2020-05-05 DIAGNOSIS — N17.9 AKI (ACUTE KIDNEY INJURY) (HCC): ICD-10-CM

## 2020-05-05 DIAGNOSIS — U07.1 ACUTE RESPIRATORY DISTRESS SYNDROME (ARDS) DUE TO COVID-19 VIRUS (HCC): Primary | ICD-10-CM

## 2020-05-05 DIAGNOSIS — J80 ACUTE RESPIRATORY DISTRESS SYNDROME (ARDS) DUE TO COVID-19 VIRUS (HCC): Primary | ICD-10-CM

## 2020-05-05 DIAGNOSIS — R79.89 ELEVATED BRAIN NATRIURETIC PEPTIDE (BNP) LEVEL: ICD-10-CM

## 2020-05-05 LAB
ALBUMIN SERPL BCP-MCNC: 3.3 G/DL (ref 3.5–5)
ALP SERPL-CCNC: 70 U/L (ref 46–116)
ALT SERPL W P-5'-P-CCNC: 32 U/L (ref 12–78)
ANION GAP SERPL CALCULATED.3IONS-SCNC: 6 MMOL/L (ref 4–13)
AST SERPL W P-5'-P-CCNC: 15 U/L (ref 5–45)
BASOPHILS # BLD MANUAL: 0.12 THOUSAND/UL (ref 0–0.1)
BASOPHILS NFR MAR MANUAL: 1 % (ref 0–1)
BILIRUB SERPL-MCNC: 0.3 MG/DL (ref 0.2–1)
BILIRUB UR QL STRIP: NEGATIVE
BUN SERPL-MCNC: 18 MG/DL (ref 5–25)
CALCIUM SERPL-MCNC: 9 MG/DL (ref 8.3–10.1)
CHLORIDE SERPL-SCNC: 108 MMOL/L (ref 100–108)
CLARITY UR: CLEAR
CO2 SERPL-SCNC: 28 MMOL/L (ref 21–32)
COLOR UR: YELLOW
CREAT SERPL-MCNC: 1.27 MG/DL (ref 0.6–1.3)
EOSINOPHIL # BLD MANUAL: 0 THOUSAND/UL (ref 0–0.4)
EOSINOPHIL NFR BLD MANUAL: 0 % (ref 0–6)
ERYTHROCYTE [DISTWIDTH] IN BLOOD BY AUTOMATED COUNT: 14.3 % (ref 11.6–15.1)
GFR SERPL CREATININE-BSD FRML MDRD: 60 ML/MIN/1.73SQ M
GLUCOSE P FAST SERPL-MCNC: 87 MG/DL (ref 65–99)
GLUCOSE UR STRIP-MCNC: NEGATIVE MG/DL
HCT VFR BLD AUTO: 43.3 % (ref 36.5–49.3)
HGB BLD-MCNC: 13.7 G/DL (ref 12–17)
HGB UR QL STRIP.AUTO: NEGATIVE
KETONES UR STRIP-MCNC: NEGATIVE MG/DL
LEUKOCYTE ESTERASE UR QL STRIP: NEGATIVE
LYMPHOCYTES # BLD AUTO: 1.63 THOUSAND/UL (ref 0.6–4.47)
LYMPHOCYTES # BLD AUTO: 14 % (ref 14–44)
MCH RBC QN AUTO: 28.8 PG (ref 26.8–34.3)
MCHC RBC AUTO-ENTMCNC: 31.6 G/DL (ref 31.4–37.4)
MCV RBC AUTO: 91 FL (ref 82–98)
MONOCYTES # BLD AUTO: 0.23 THOUSAND/UL (ref 0–1.22)
MONOCYTES NFR BLD: 2 % (ref 4–12)
NEUTROPHILS # BLD MANUAL: 9.3 THOUSAND/UL (ref 1.85–7.62)
NEUTS BAND NFR BLD MANUAL: 1 % (ref 0–8)
NEUTS SEG NFR BLD AUTO: 79 % (ref 43–75)
NITRITE UR QL STRIP: NEGATIVE
NRBC BLD AUTO-RTO: 0 /100 WBCS
NT-PROBNP SERPL-MCNC: 445 PG/ML
PH UR STRIP.AUTO: 6 [PH]
PLATELET # BLD AUTO: 245 THOUSANDS/UL (ref 149–390)
PLATELET BLD QL SMEAR: ADEQUATE
PMV BLD AUTO: 10.7 FL (ref 8.9–12.7)
POTASSIUM SERPL-SCNC: 3.8 MMOL/L (ref 3.5–5.3)
PROT SERPL-MCNC: 6.9 G/DL (ref 6.4–8.2)
PROT UR STRIP-MCNC: NEGATIVE MG/DL
RBC # BLD AUTO: 4.75 MILLION/UL (ref 3.88–5.62)
RBC MORPH BLD: NORMAL
SODIUM SERPL-SCNC: 142 MMOL/L (ref 136–145)
SP GR UR STRIP.AUTO: 1.01 (ref 1–1.03)
UROBILINOGEN UR QL STRIP.AUTO: 0.2 E.U./DL
VARIANT LYMPHS # BLD AUTO: 3 %
WBC # BLD AUTO: 11.63 THOUSAND/UL (ref 4.31–10.16)

## 2020-05-05 PROCEDURE — 85027 COMPLETE CBC AUTOMATED: CPT

## 2020-05-05 PROCEDURE — 83880 ASSAY OF NATRIURETIC PEPTIDE: CPT

## 2020-05-05 PROCEDURE — 71046 X-RAY EXAM CHEST 2 VIEWS: CPT

## 2020-05-05 PROCEDURE — 80053 COMPREHEN METABOLIC PANEL: CPT

## 2020-05-05 PROCEDURE — 85007 BL SMEAR W/DIFF WBC COUNT: CPT

## 2020-05-05 PROCEDURE — 36415 COLL VENOUS BLD VENIPUNCTURE: CPT

## 2020-05-05 PROCEDURE — 81003 URINALYSIS AUTO W/O SCOPE: CPT

## 2020-05-06 ENCOUNTER — TELEMEDICINE (OUTPATIENT)
Dept: BEHAVIORAL/MENTAL HEALTH CLINIC | Facility: CLINIC | Age: 65
End: 2020-05-06
Payer: COMMERCIAL

## 2020-05-06 ENCOUNTER — TELEPHONE (OUTPATIENT)
Dept: PSYCHIATRY | Facility: CLINIC | Age: 65
End: 2020-05-06

## 2020-05-06 DIAGNOSIS — Z63.4 EXPECTED BEREAVEMENT DUE TO LIFE EVENT: ICD-10-CM

## 2020-05-06 DIAGNOSIS — F43.11 ACUTE POST-TRAUMATIC STRESS DISORDER: Primary | ICD-10-CM

## 2020-05-06 PROCEDURE — 90837 PSYTX W PT 60 MINUTES: CPT | Performed by: SOCIAL WORKER

## 2020-05-06 SDOH — SOCIAL STABILITY - SOCIAL INSECURITY: DISSAPEARANCE AND DEATH OF FAMILY MEMBER: Z63.4

## 2020-05-07 ENCOUNTER — TELEMEDICINE (OUTPATIENT)
Dept: PSYCHIATRY | Facility: CLINIC | Age: 65
End: 2020-05-07
Payer: COMMERCIAL

## 2020-05-07 ENCOUNTER — TELEPHONE (OUTPATIENT)
Dept: INTERNAL MEDICINE CLINIC | Facility: CLINIC | Age: 65
End: 2020-05-07

## 2020-05-07 ENCOUNTER — TELEMEDICINE (OUTPATIENT)
Dept: INTERNAL MEDICINE CLINIC | Facility: CLINIC | Age: 65
End: 2020-05-07
Payer: OTHER MISCELLANEOUS

## 2020-05-07 VITALS — WEIGHT: 204 LBS | SYSTOLIC BLOOD PRESSURE: 139 MMHG | BODY MASS INDEX: 31.02 KG/M2 | DIASTOLIC BLOOD PRESSURE: 70 MMHG

## 2020-05-07 DIAGNOSIS — U07.1 ACUTE RESPIRATORY DISTRESS SYNDROME (ARDS) DUE TO COVID-19 VIRUS (HCC): ICD-10-CM

## 2020-05-07 DIAGNOSIS — F43.11 ACUTE POST-TRAUMATIC STRESS DISORDER: ICD-10-CM

## 2020-05-07 DIAGNOSIS — F41.1 GAD (GENERALIZED ANXIETY DISORDER): ICD-10-CM

## 2020-05-07 DIAGNOSIS — F43.11 ACUTE POST-TRAUMATIC STRESS DISORDER: Primary | ICD-10-CM

## 2020-05-07 DIAGNOSIS — J80 ACUTE RESPIRATORY DISTRESS SYNDROME (ARDS) DUE TO COVID-19 VIRUS (HCC): ICD-10-CM

## 2020-05-07 DIAGNOSIS — F43.10 PTSD (POST-TRAUMATIC STRESS DISORDER): ICD-10-CM

## 2020-05-07 DIAGNOSIS — R94.31 ABNORMAL FINDING ON EKG: ICD-10-CM

## 2020-05-07 DIAGNOSIS — J80 ACUTE RESPIRATORY DISTRESS SYNDROME (ARDS) DUE TO COVID-19 VIRUS (HCC): Primary | ICD-10-CM

## 2020-05-07 DIAGNOSIS — G44.52 NEW DAILY PERSISTENT HEADACHE: ICD-10-CM

## 2020-05-07 DIAGNOSIS — U07.1 ACUTE RESPIRATORY DISTRESS SYNDROME (ARDS) DUE TO COVID-19 VIRUS (HCC): Primary | ICD-10-CM

## 2020-05-07 PROCEDURE — 99214 OFFICE O/P EST MOD 30 MIN: CPT | Performed by: NURSE PRACTITIONER

## 2020-05-07 PROCEDURE — G2012 BRIEF CHECK IN BY MD/QHP: HCPCS | Performed by: NURSE PRACTITIONER

## 2020-05-07 RX ORDER — BUSPIRONE HYDROCHLORIDE 10 MG/1
5 TABLET ORAL 2 TIMES DAILY
Qty: 60 TABLET | Refills: 1 | Status: SHIPPED | OUTPATIENT
Start: 2020-05-07 | End: 2020-06-12

## 2020-05-08 ENCOUNTER — PATIENT OUTREACH (OUTPATIENT)
Dept: CASE MANAGEMENT | Facility: HOSPITAL | Age: 65
End: 2020-05-08

## 2020-05-08 ENCOUNTER — TELEMEDICINE (OUTPATIENT)
Dept: PULMONOLOGY | Facility: CLINIC | Age: 65
End: 2020-05-08
Payer: OTHER MISCELLANEOUS

## 2020-05-08 DIAGNOSIS — R93.89 ABNORMAL CHEST X-RAY: ICD-10-CM

## 2020-05-08 DIAGNOSIS — J12.82 PNEUMONIA DUE TO COVID-19 VIRUS: Primary | ICD-10-CM

## 2020-05-08 DIAGNOSIS — U07.1 PNEUMONIA DUE TO COVID-19 VIRUS: Primary | ICD-10-CM

## 2020-05-08 PROCEDURE — 99203 OFFICE O/P NEW LOW 30 MIN: CPT | Performed by: INTERNAL MEDICINE

## 2020-05-08 PROCEDURE — 1111F DSCHRG MED/CURRENT MED MERGE: CPT | Performed by: INTERNAL MEDICINE

## 2020-05-11 DIAGNOSIS — U07.1 ACUTE RESPIRATORY DISTRESS SYNDROME (ARDS) DUE TO COVID-19 VIRUS (HCC): ICD-10-CM

## 2020-05-11 DIAGNOSIS — J80 ACUTE RESPIRATORY DISTRESS SYNDROME (ARDS) DUE TO COVID-19 VIRUS (HCC): ICD-10-CM

## 2020-05-11 PROCEDURE — U0003 INFECTIOUS AGENT DETECTION BY NUCLEIC ACID (DNA OR RNA); SEVERE ACUTE RESPIRATORY SYNDROME CORONAVIRUS 2 (SARS-COV-2) (CORONAVIRUS DISEASE [COVID-19]), AMPLIFIED PROBE TECHNIQUE, MAKING USE OF HIGH THROUGHPUT TECHNOLOGIES AS DESCRIBED BY CMS-2020-01-R: HCPCS

## 2020-05-12 ENCOUNTER — TELEMEDICINE (OUTPATIENT)
Dept: INTERNAL MEDICINE CLINIC | Facility: CLINIC | Age: 65
End: 2020-05-12
Payer: OTHER MISCELLANEOUS

## 2020-05-12 ENCOUNTER — TELEPHONE (OUTPATIENT)
Dept: INTERNAL MEDICINE CLINIC | Facility: CLINIC | Age: 65
End: 2020-05-12

## 2020-05-12 VITALS — HEART RATE: 88 BPM | OXYGEN SATURATION: 98 %

## 2020-05-12 DIAGNOSIS — R41.3 ACUTE MEMORY IMPAIRMENT: ICD-10-CM

## 2020-05-12 DIAGNOSIS — F43.11 ACUTE POST-TRAUMATIC STRESS DISORDER: ICD-10-CM

## 2020-05-12 DIAGNOSIS — J80 ACUTE RESPIRATORY DISTRESS SYNDROME (ARDS) DUE TO COVID-19 VIRUS (HCC): Primary | ICD-10-CM

## 2020-05-12 DIAGNOSIS — U07.1 ACUTE RESPIRATORY DISTRESS SYNDROME (ARDS) DUE TO COVID-19 VIRUS (HCC): Primary | ICD-10-CM

## 2020-05-12 DIAGNOSIS — Z63.4 EXPECTED BEREAVEMENT DUE TO LIFE EVENT: ICD-10-CM

## 2020-05-12 DIAGNOSIS — R94.31 ABNORMAL FINDING ON EKG: ICD-10-CM

## 2020-05-12 DIAGNOSIS — G44.52 NEW DAILY PERSISTENT HEADACHE: ICD-10-CM

## 2020-05-12 PROBLEM — E87.0 HYPERNATREMIA: Status: RESOLVED | Noted: 2020-04-10 | Resolved: 2020-05-12

## 2020-05-12 PROBLEM — R65.21 SEPTIC SHOCK (HCC): Status: RESOLVED | Noted: 2020-04-10 | Resolved: 2020-05-12

## 2020-05-12 PROBLEM — A41.9 SEPTIC SHOCK (HCC): Status: RESOLVED | Noted: 2020-04-10 | Resolved: 2020-05-12

## 2020-05-12 LAB — SARS-COV-2 RNA SPEC QL NAA+PROBE: NOT DETECTED

## 2020-05-12 PROCEDURE — 99214 OFFICE O/P EST MOD 30 MIN: CPT | Performed by: NURSE PRACTITIONER

## 2020-05-12 SDOH — SOCIAL STABILITY - SOCIAL INSECURITY: DISSAPEARANCE AND DEATH OF FAMILY MEMBER: Z63.4

## 2020-05-14 ENCOUNTER — TELEMEDICINE (OUTPATIENT)
Dept: BEHAVIORAL/MENTAL HEALTH CLINIC | Facility: CLINIC | Age: 65
End: 2020-05-14
Payer: COMMERCIAL

## 2020-05-14 DIAGNOSIS — Z63.4 EXPECTED BEREAVEMENT DUE TO LIFE EVENT: ICD-10-CM

## 2020-05-14 DIAGNOSIS — F43.11 ACUTE POST-TRAUMATIC STRESS DISORDER: ICD-10-CM

## 2020-05-14 DIAGNOSIS — F43.10 PTSD (POST-TRAUMATIC STRESS DISORDER): Primary | ICD-10-CM

## 2020-05-14 PROCEDURE — 90834 PSYTX W PT 45 MINUTES: CPT | Performed by: SOCIAL WORKER

## 2020-05-14 SDOH — SOCIAL STABILITY - SOCIAL INSECURITY: DISSAPEARANCE AND DEATH OF FAMILY MEMBER: Z63.4

## 2020-05-15 ENCOUNTER — TELEPHONE (OUTPATIENT)
Dept: INTERNAL MEDICINE CLINIC | Facility: CLINIC | Age: 65
End: 2020-05-15

## 2020-05-18 ENCOUNTER — TELEPHONE (OUTPATIENT)
Dept: INTERNAL MEDICINE CLINIC | Facility: CLINIC | Age: 65
End: 2020-05-18

## 2020-05-18 ENCOUNTER — TELEMEDICINE (OUTPATIENT)
Dept: INTERNAL MEDICINE CLINIC | Facility: CLINIC | Age: 65
End: 2020-05-18
Payer: OTHER MISCELLANEOUS

## 2020-05-18 VITALS
HEART RATE: 108 BPM | WEIGHT: 205 LBS | OXYGEN SATURATION: 95 % | DIASTOLIC BLOOD PRESSURE: 80 MMHG | SYSTOLIC BLOOD PRESSURE: 140 MMHG | BODY MASS INDEX: 31.17 KG/M2

## 2020-05-18 DIAGNOSIS — M25.551 HIP PAIN, RIGHT: ICD-10-CM

## 2020-05-18 DIAGNOSIS — G44.52 NEW DAILY PERSISTENT HEADACHE: ICD-10-CM

## 2020-05-18 DIAGNOSIS — U07.1 ACUTE RESPIRATORY DISTRESS SYNDROME (ARDS) DUE TO COVID-19 VIRUS (HCC): Primary | ICD-10-CM

## 2020-05-18 DIAGNOSIS — F43.11 ACUTE POST-TRAUMATIC STRESS DISORDER: ICD-10-CM

## 2020-05-18 DIAGNOSIS — J80 ACUTE RESPIRATORY DISTRESS SYNDROME (ARDS) DUE TO COVID-19 VIRUS (HCC): Primary | ICD-10-CM

## 2020-05-18 DIAGNOSIS — R94.31 ABNORMAL FINDING ON EKG: ICD-10-CM

## 2020-05-18 PROCEDURE — 99214 OFFICE O/P EST MOD 30 MIN: CPT | Performed by: NURSE PRACTITIONER

## 2020-05-19 ENCOUNTER — TELEPHONE (OUTPATIENT)
Dept: INTERNAL MEDICINE CLINIC | Facility: CLINIC | Age: 65
End: 2020-05-19

## 2020-05-20 ENCOUNTER — TELEPHONE (OUTPATIENT)
Dept: INTERNAL MEDICINE CLINIC | Facility: CLINIC | Age: 65
End: 2020-05-20

## 2020-05-21 ENCOUNTER — TELEPHONE (OUTPATIENT)
Dept: INTERNAL MEDICINE CLINIC | Facility: CLINIC | Age: 65
End: 2020-05-21

## 2020-05-21 ENCOUNTER — TELEMEDICINE (OUTPATIENT)
Dept: INTERNAL MEDICINE CLINIC | Facility: CLINIC | Age: 65
End: 2020-05-21
Payer: OTHER MISCELLANEOUS

## 2020-05-21 DIAGNOSIS — J84.9 INTERSTITIAL LUNG DISEASE (HCC): Primary | ICD-10-CM

## 2020-05-21 DIAGNOSIS — U07.1 ACUTE RESPIRATORY DISTRESS SYNDROME (ARDS) DUE TO COVID-19 VIRUS (HCC): ICD-10-CM

## 2020-05-21 DIAGNOSIS — F43.10 PTSD (POST-TRAUMATIC STRESS DISORDER): ICD-10-CM

## 2020-05-21 DIAGNOSIS — G44.52 NEW DAILY PERSISTENT HEADACHE: ICD-10-CM

## 2020-05-21 DIAGNOSIS — R94.31 ABNORMAL FINDING ON EKG: ICD-10-CM

## 2020-05-21 DIAGNOSIS — J80 ACUTE RESPIRATORY DISTRESS SYNDROME (ARDS) DUE TO COVID-19 VIRUS (HCC): ICD-10-CM

## 2020-05-21 DIAGNOSIS — M25.551 HIP PAIN, RIGHT: ICD-10-CM

## 2020-05-21 PROBLEM — Z78.9 ELECTRONIC CIGARETTE USE: Status: RESOLVED | Noted: 2020-04-02 | Resolved: 2020-05-21

## 2020-05-21 PROBLEM — J96.91 RESPIRATORY FAILURE WITH HYPOXIA (HCC): Status: RESOLVED | Noted: 2020-04-07 | Resolved: 2020-05-21

## 2020-05-21 PROCEDURE — 99214 OFFICE O/P EST MOD 30 MIN: CPT | Performed by: NURSE PRACTITIONER

## 2020-05-21 RX ORDER — NAPROXEN SODIUM 550 MG/1
550 TABLET ORAL 2 TIMES DAILY PRN
Qty: 60 TABLET | Refills: 0 | Status: SHIPPED | OUTPATIENT
Start: 2020-05-21 | End: 2021-04-06 | Stop reason: SDUPTHER

## 2020-05-22 ENCOUNTER — TELEPHONE (OUTPATIENT)
Dept: INTERNAL MEDICINE CLINIC | Facility: CLINIC | Age: 65
End: 2020-05-22

## 2020-05-22 ENCOUNTER — TELEMEDICINE (OUTPATIENT)
Dept: PSYCHIATRY | Facility: CLINIC | Age: 65
End: 2020-05-22
Payer: COMMERCIAL

## 2020-05-22 VITALS — HEART RATE: 93 BPM | OXYGEN SATURATION: 98 % | SYSTOLIC BLOOD PRESSURE: 138 MMHG | DIASTOLIC BLOOD PRESSURE: 78 MMHG

## 2020-05-22 DIAGNOSIS — R03.0 ELEVATED BLOOD PRESSURE, SITUATIONAL: Primary | ICD-10-CM

## 2020-05-22 DIAGNOSIS — F43.10 PTSD (POST-TRAUMATIC STRESS DISORDER): Primary | ICD-10-CM

## 2020-05-22 PROCEDURE — 99213 OFFICE O/P EST LOW 20 MIN: CPT | Performed by: NURSE PRACTITIONER

## 2020-05-22 RX ORDER — AMLODIPINE BESYLATE 2.5 MG/1
2.5 TABLET ORAL
Qty: 30 TABLET | Refills: 5 | Status: SHIPPED | OUTPATIENT
Start: 2020-05-22 | End: 2020-10-05

## 2020-05-26 ENCOUNTER — TELEPHONE (OUTPATIENT)
Dept: PULMONOLOGY | Facility: CLINIC | Age: 65
End: 2020-05-26

## 2020-05-27 ENCOUNTER — OFFICE VISIT (OUTPATIENT)
Dept: PULMONOLOGY | Facility: CLINIC | Age: 65
End: 2020-05-27
Payer: OTHER MISCELLANEOUS

## 2020-05-27 VITALS
HEART RATE: 88 BPM | OXYGEN SATURATION: 96 % | HEIGHT: 68 IN | DIASTOLIC BLOOD PRESSURE: 86 MMHG | WEIGHT: 215 LBS | SYSTOLIC BLOOD PRESSURE: 150 MMHG | BODY MASS INDEX: 32.58 KG/M2

## 2020-05-27 DIAGNOSIS — J12.82 PNEUMONIA DUE TO COVID-19 VIRUS: Primary | ICD-10-CM

## 2020-05-27 DIAGNOSIS — U07.1 PNEUMONIA DUE TO COVID-19 VIRUS: Primary | ICD-10-CM

## 2020-05-27 DIAGNOSIS — R93.89 ABNORMAL CHEST X-RAY: ICD-10-CM

## 2020-05-27 PROCEDURE — 3008F BODY MASS INDEX DOCD: CPT | Performed by: INTERNAL MEDICINE

## 2020-05-27 PROCEDURE — 1111F DSCHRG MED/CURRENT MED MERGE: CPT | Performed by: INTERNAL MEDICINE

## 2020-05-27 PROCEDURE — 99214 OFFICE O/P EST MOD 30 MIN: CPT | Performed by: INTERNAL MEDICINE

## 2020-05-27 PROCEDURE — 1036F TOBACCO NON-USER: CPT | Performed by: INTERNAL MEDICINE

## 2020-05-28 ENCOUNTER — PATIENT OUTREACH (OUTPATIENT)
Dept: CASE MANAGEMENT | Facility: HOSPITAL | Age: 65
End: 2020-05-28

## 2020-06-01 ENCOUNTER — TELEMEDICINE (OUTPATIENT)
Dept: INTERNAL MEDICINE CLINIC | Facility: CLINIC | Age: 65
End: 2020-06-01
Payer: OTHER MISCELLANEOUS

## 2020-06-01 DIAGNOSIS — G44.52 NEW DAILY PERSISTENT HEADACHE: ICD-10-CM

## 2020-06-01 DIAGNOSIS — U07.1 ACUTE RESPIRATORY DISTRESS SYNDROME (ARDS) DUE TO COVID-19 VIRUS (HCC): Primary | ICD-10-CM

## 2020-06-01 DIAGNOSIS — J80 ACUTE RESPIRATORY DISTRESS SYNDROME (ARDS) DUE TO COVID-19 VIRUS (HCC): Primary | ICD-10-CM

## 2020-06-01 DIAGNOSIS — M25.551 HIP PAIN, RIGHT: ICD-10-CM

## 2020-06-01 DIAGNOSIS — F43.11 ACUTE POST-TRAUMATIC STRESS DISORDER: ICD-10-CM

## 2020-06-01 PROCEDURE — 99214 OFFICE O/P EST MOD 30 MIN: CPT | Performed by: NURSE PRACTITIONER

## 2020-06-03 ENCOUNTER — HOSPITAL ENCOUNTER (OUTPATIENT)
Dept: NON INVASIVE DIAGNOSTICS | Facility: CLINIC | Age: 65
Discharge: HOME | End: 2020-06-03
Payer: OTHER MISCELLANEOUS

## 2020-06-03 DIAGNOSIS — R94.31 ABNORMAL FINDING ON EKG: ICD-10-CM

## 2020-06-03 PROCEDURE — 93306 TTE W/DOPPLER COMPLETE: CPT | Performed by: INTERNAL MEDICINE

## 2020-06-03 PROCEDURE — 93306 TTE W/DOPPLER COMPLETE: CPT

## 2020-06-08 ENCOUNTER — TELEMEDICINE (OUTPATIENT)
Dept: INTERNAL MEDICINE CLINIC | Facility: CLINIC | Age: 65
End: 2020-06-08
Payer: OTHER MISCELLANEOUS

## 2020-06-08 VITALS — OXYGEN SATURATION: 97 % | HEART RATE: 79 BPM

## 2020-06-08 DIAGNOSIS — G44.52 NEW DAILY PERSISTENT HEADACHE: ICD-10-CM

## 2020-06-08 DIAGNOSIS — I51.89 GRADE I DIASTOLIC DYSFUNCTION: ICD-10-CM

## 2020-06-08 DIAGNOSIS — U07.1 ACUTE RESPIRATORY DISTRESS SYNDROME (ARDS) DUE TO COVID-19 VIRUS (HCC): ICD-10-CM

## 2020-06-08 DIAGNOSIS — F43.11 ACUTE POST-TRAUMATIC STRESS DISORDER: ICD-10-CM

## 2020-06-08 DIAGNOSIS — M25.551 ACUTE RIGHT HIP PAIN: Primary | ICD-10-CM

## 2020-06-08 DIAGNOSIS — J80 ACUTE RESPIRATORY DISTRESS SYNDROME (ARDS) DUE TO COVID-19 VIRUS (HCC): ICD-10-CM

## 2020-06-08 PROCEDURE — 99214 OFFICE O/P EST MOD 30 MIN: CPT | Performed by: NURSE PRACTITIONER

## 2020-06-08 RX ORDER — TIZANIDINE 2 MG/1
2 TABLET ORAL
Qty: 30 TABLET | Refills: 1 | Status: SHIPPED | OUTPATIENT
Start: 2020-06-08 | End: 2020-07-30

## 2020-06-11 ENCOUNTER — TELEPHONE (OUTPATIENT)
Dept: INTERNAL MEDICINE CLINIC | Facility: CLINIC | Age: 65
End: 2020-06-11

## 2020-06-11 ENCOUNTER — PATIENT OUTREACH (OUTPATIENT)
Dept: CASE MANAGEMENT | Facility: HOSPITAL | Age: 65
End: 2020-06-11

## 2020-06-12 ENCOUNTER — TELEMEDICINE (OUTPATIENT)
Dept: PSYCHIATRY | Facility: CLINIC | Age: 65
End: 2020-06-12
Payer: COMMERCIAL

## 2020-06-12 ENCOUNTER — TELEMEDICINE (OUTPATIENT)
Dept: INTERNAL MEDICINE CLINIC | Facility: CLINIC | Age: 65
End: 2020-06-12
Payer: OTHER MISCELLANEOUS

## 2020-06-12 VITALS — HEART RATE: 89 BPM | OXYGEN SATURATION: 95 %

## 2020-06-12 DIAGNOSIS — U07.1 ACUTE RESPIRATORY DISTRESS SYNDROME (ARDS) DUE TO COVID-19 VIRUS (HCC): ICD-10-CM

## 2020-06-12 DIAGNOSIS — F41.1 GAD (GENERALIZED ANXIETY DISORDER): ICD-10-CM

## 2020-06-12 DIAGNOSIS — J80 ACUTE RESPIRATORY DISTRESS SYNDROME (ARDS) DUE TO COVID-19 VIRUS (HCC): ICD-10-CM

## 2020-06-12 DIAGNOSIS — F43.11 ACUTE POST-TRAUMATIC STRESS DISORDER: ICD-10-CM

## 2020-06-12 DIAGNOSIS — M25.551 HIP PAIN, RIGHT: Primary | ICD-10-CM

## 2020-06-12 DIAGNOSIS — F43.10 PTSD (POST-TRAUMATIC STRESS DISORDER): Primary | ICD-10-CM

## 2020-06-12 DIAGNOSIS — F32.1 CURRENT MODERATE EPISODE OF MAJOR DEPRESSIVE DISORDER WITHOUT PRIOR EPISODE (HCC): ICD-10-CM

## 2020-06-12 DIAGNOSIS — G44.52 NEW DAILY PERSISTENT HEADACHE: ICD-10-CM

## 2020-06-12 PROCEDURE — 99442 PR PHYS/QHP TELEPHONE EVALUATION 11-20 MIN: CPT | Performed by: NURSE PRACTITIONER

## 2020-06-12 PROCEDURE — 99214 OFFICE O/P EST MOD 30 MIN: CPT | Performed by: NURSE PRACTITIONER

## 2020-06-12 RX ORDER — BUSPIRONE HYDROCHLORIDE 10 MG/1
10 TABLET ORAL 2 TIMES DAILY
Qty: 60 TABLET | Refills: 1
Start: 2020-06-12 | End: 2020-07-01 | Stop reason: SDUPTHER

## 2020-06-12 RX ORDER — SERTRALINE HYDROCHLORIDE 100 MG/1
100 TABLET, FILM COATED ORAL DAILY
Qty: 30 TABLET | Refills: 0 | Status: SHIPPED | OUTPATIENT
Start: 2020-06-12 | End: 2020-07-01 | Stop reason: SDUPTHER

## 2020-06-12 RX ORDER — TRAZODONE HYDROCHLORIDE 50 MG/1
25 TABLET ORAL
Qty: 30 TABLET | Refills: 0 | Status: SHIPPED | OUTPATIENT
Start: 2020-06-12 | End: 2020-07-01 | Stop reason: SDUPTHER

## 2020-06-16 ENCOUNTER — TELEPHONE (OUTPATIENT)
Dept: INTERNAL MEDICINE CLINIC | Facility: CLINIC | Age: 65
End: 2020-06-16

## 2020-06-17 ENCOUNTER — APPOINTMENT (OUTPATIENT)
Dept: RADIOLOGY | Facility: CLINIC | Age: 65
End: 2020-06-17
Payer: OTHER MISCELLANEOUS

## 2020-06-17 ENCOUNTER — HOSPITAL ENCOUNTER (OUTPATIENT)
Dept: MRI IMAGING | Facility: CLINIC | Age: 65
Discharge: HOME | End: 2020-06-17
Payer: OTHER MISCELLANEOUS

## 2020-06-17 ENCOUNTER — TELEPHONE (OUTPATIENT)
Dept: INTERNAL MEDICINE CLINIC | Facility: CLINIC | Age: 65
End: 2020-06-17

## 2020-06-17 DIAGNOSIS — M25.551 ACUTE RIGHT HIP PAIN: ICD-10-CM

## 2020-06-17 DIAGNOSIS — J80 ACUTE RESPIRATORY DISTRESS SYNDROME (ARDS) DUE TO COVID-19 VIRUS (HCC): ICD-10-CM

## 2020-06-17 DIAGNOSIS — J84.9 INTERSTITIAL LUNG DISEASE (HCC): ICD-10-CM

## 2020-06-17 DIAGNOSIS — U07.1 ACUTE RESPIRATORY DISTRESS SYNDROME (ARDS) DUE TO COVID-19 VIRUS (HCC): ICD-10-CM

## 2020-06-17 DIAGNOSIS — G44.52 NEW DAILY PERSISTENT HEADACHE: ICD-10-CM

## 2020-06-17 PROCEDURE — 71046 X-RAY EXAM CHEST 2 VIEWS: CPT

## 2020-06-17 PROCEDURE — 70551 MRI BRAIN STEM W/O DYE: CPT

## 2020-06-17 PROCEDURE — 73502 X-RAY EXAM HIP UNI 2-3 VIEWS: CPT

## 2020-06-18 ENCOUNTER — TELEPHONE (OUTPATIENT)
Dept: INTERNAL MEDICINE CLINIC | Facility: CLINIC | Age: 65
End: 2020-06-18

## 2020-06-19 ENCOUNTER — CONSULT (OUTPATIENT)
Dept: PAIN MEDICINE | Facility: CLINIC | Age: 65
End: 2020-06-19
Payer: OTHER MISCELLANEOUS

## 2020-06-19 ENCOUNTER — TELEPHONE (OUTPATIENT)
Dept: INTERNAL MEDICINE CLINIC | Facility: CLINIC | Age: 65
End: 2020-06-19

## 2020-06-19 VITALS
SYSTOLIC BLOOD PRESSURE: 119 MMHG | WEIGHT: 217 LBS | HEIGHT: 68 IN | BODY MASS INDEX: 32.89 KG/M2 | HEART RATE: 82 BPM | RESPIRATION RATE: 20 BRPM | DIASTOLIC BLOOD PRESSURE: 73 MMHG

## 2020-06-19 DIAGNOSIS — M25.551 HIP PAIN, RIGHT: ICD-10-CM

## 2020-06-19 PROCEDURE — 99204 OFFICE O/P NEW MOD 45 MIN: CPT | Performed by: ANESTHESIOLOGY

## 2020-06-22 ENCOUNTER — TELEPHONE (OUTPATIENT)
Dept: PULMONOLOGY | Facility: CLINIC | Age: 65
End: 2020-06-22

## 2020-06-23 ENCOUNTER — PATIENT OUTREACH (OUTPATIENT)
Dept: CASE MANAGEMENT | Facility: HOSPITAL | Age: 65
End: 2020-06-23

## 2020-06-23 ENCOUNTER — OFFICE VISIT (OUTPATIENT)
Dept: PULMONOLOGY | Facility: CLINIC | Age: 65
End: 2020-06-23
Payer: OTHER MISCELLANEOUS

## 2020-06-23 VITALS
WEIGHT: 218 LBS | BODY MASS INDEX: 33.04 KG/M2 | OXYGEN SATURATION: 98 % | TEMPERATURE: 98.6 F | DIASTOLIC BLOOD PRESSURE: 72 MMHG | HEIGHT: 68 IN | HEART RATE: 66 BPM | SYSTOLIC BLOOD PRESSURE: 118 MMHG

## 2020-06-23 DIAGNOSIS — R93.89 ABNORMAL CHEST X-RAY: ICD-10-CM

## 2020-06-23 DIAGNOSIS — U07.1 PNEUMONIA DUE TO COVID-19 VIRUS: ICD-10-CM

## 2020-06-23 DIAGNOSIS — J12.82 PNEUMONIA DUE TO COVID-19 VIRUS: ICD-10-CM

## 2020-06-23 DIAGNOSIS — R06.02 SOB (SHORTNESS OF BREATH): Primary | ICD-10-CM

## 2020-06-23 PROCEDURE — 3008F BODY MASS INDEX DOCD: CPT | Performed by: INTERNAL MEDICINE

## 2020-06-23 PROCEDURE — 1036F TOBACCO NON-USER: CPT | Performed by: INTERNAL MEDICINE

## 2020-06-23 PROCEDURE — 99214 OFFICE O/P EST MOD 30 MIN: CPT | Performed by: INTERNAL MEDICINE

## 2020-06-25 ENCOUNTER — TELEPHONE (OUTPATIENT)
Dept: NEUROLOGY | Facility: CLINIC | Age: 65
End: 2020-06-25

## 2020-06-29 ENCOUNTER — TELEMEDICINE (OUTPATIENT)
Dept: INTERNAL MEDICINE CLINIC | Facility: CLINIC | Age: 65
End: 2020-06-29
Payer: OTHER MISCELLANEOUS

## 2020-06-29 DIAGNOSIS — F43.11 ACUTE POST-TRAUMATIC STRESS DISORDER: Primary | ICD-10-CM

## 2020-06-29 DIAGNOSIS — G44.52 NEW DAILY PERSISTENT HEADACHE: ICD-10-CM

## 2020-06-29 DIAGNOSIS — M25.551 HIP PAIN, RIGHT: ICD-10-CM

## 2020-06-29 DIAGNOSIS — J84.9 INTERSTITIAL LUNG DISEASE (HCC): ICD-10-CM

## 2020-06-29 PROCEDURE — 99214 OFFICE O/P EST MOD 30 MIN: CPT | Performed by: NURSE PRACTITIONER

## 2020-07-01 ENCOUNTER — TELEMEDICINE (OUTPATIENT)
Dept: PSYCHIATRY | Facility: CLINIC | Age: 65
End: 2020-07-01
Payer: COMMERCIAL

## 2020-07-01 ENCOUNTER — TELEPHONE (OUTPATIENT)
Dept: INTERNAL MEDICINE CLINIC | Facility: CLINIC | Age: 65
End: 2020-07-01

## 2020-07-01 ENCOUNTER — TELEPHONE (OUTPATIENT)
Dept: PAIN MEDICINE | Facility: CLINIC | Age: 65
End: 2020-07-01

## 2020-07-01 DIAGNOSIS — F32.1 CURRENT MODERATE EPISODE OF MAJOR DEPRESSIVE DISORDER WITHOUT PRIOR EPISODE (HCC): ICD-10-CM

## 2020-07-01 DIAGNOSIS — G44.52 NEW DAILY PERSISTENT HEADACHE: ICD-10-CM

## 2020-07-01 DIAGNOSIS — F41.1 GAD (GENERALIZED ANXIETY DISORDER): ICD-10-CM

## 2020-07-01 DIAGNOSIS — F43.10 PTSD (POST-TRAUMATIC STRESS DISORDER): Primary | ICD-10-CM

## 2020-07-01 DIAGNOSIS — M25.551 HIP PAIN, RIGHT: ICD-10-CM

## 2020-07-01 PROCEDURE — 99442 PR PHYS/QHP TELEPHONE EVALUATION 11-20 MIN: CPT | Performed by: NURSE PRACTITIONER

## 2020-07-01 RX ORDER — SERTRALINE HYDROCHLORIDE 100 MG/1
100 TABLET, FILM COATED ORAL DAILY
Qty: 30 TABLET | Refills: 1 | Status: SHIPPED | OUTPATIENT
Start: 2020-07-01 | End: 2020-08-05 | Stop reason: SDUPTHER

## 2020-07-01 RX ORDER — TRAZODONE HYDROCHLORIDE 50 MG/1
25 TABLET ORAL
Qty: 30 TABLET | Refills: 1 | Status: SHIPPED | OUTPATIENT
Start: 2020-07-01 | End: 2020-08-05 | Stop reason: SDUPTHER

## 2020-07-01 RX ORDER — BUSPIRONE HYDROCHLORIDE 10 MG/1
10 TABLET ORAL 2 TIMES DAILY
Qty: 60 TABLET | Refills: 1 | Status: SHIPPED | OUTPATIENT
Start: 2020-07-01 | End: 2020-08-05 | Stop reason: SDUPTHER
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
## 2020-07-01 NOTE — TELEPHONE ENCOUNTER
Pt's daughter called and ask if we received the authorization for the pt's PT and injection  Please advise      Pt can be reached at  [phone]

## 2020-07-01 NOTE — TELEPHONE ENCOUNTER
Patient states he needs [facility] to change the dates on the back to work letter  He states he is still not doing well and cannot return but his employer requires an updated notice  Call when letter is ready so he can pick it up

## 2020-07-06 ENCOUNTER — TELEPHONE (OUTPATIENT)
Dept: INTERNAL MEDICINE CLINIC | Facility: CLINIC | Age: 65
End: 2020-07-06

## 2020-07-06 NOTE — TELEPHONE ENCOUNTER
Patient needs forms filled out for his compensation  His daughter will be dropping the forms off tomorrow   Please contact Magi Calle when completed at  [phone]

## 2020-07-07 ENCOUNTER — TELEPHONE (OUTPATIENT)
Dept: INTERNAL MEDICINE CLINIC | Facility: CLINIC | Age: 65
End: 2020-07-07

## 2020-07-07 NOTE — TELEPHONE ENCOUNTER
Sujata Collazo dropped off form for  for [employer] to fill out, put envelope in her bin    Call back iliana ( daughter) upon completion  [phone]

## 2020-07-08 ENCOUNTER — TELEPHONE (OUTPATIENT)
Dept: INTERNAL MEDICINE CLINIC | Facility: CLINIC | Age: 65
End: 2020-07-08

## 2020-07-08 NOTE — TELEPHONE ENCOUNTER
----- Message from Jcarlos Mckenzie sent at 7/8/2020 10:43 AM EDT -----  Please call patient's daughter to let her know forms are ready for   There are printed sheets of the charges from office visits  If patient needs a print out of charges from the hospital he will need to contact the hospital directly  Thank you! Will bring to the

## 2020-07-10 ENCOUNTER — HOSPITAL ENCOUNTER (OUTPATIENT)
Dept: CT IMAGING | Facility: CLINIC | Age: 65
Discharge: HOME | End: 2020-07-10
Payer: OTHER MISCELLANEOUS

## 2020-07-10 ENCOUNTER — TELEPHONE (OUTPATIENT)
Dept: INTERNAL MEDICINE CLINIC | Facility: CLINIC | Age: 65
End: 2020-07-10

## 2020-07-10 DIAGNOSIS — R06.02 SOB (SHORTNESS OF BREATH): ICD-10-CM

## 2020-07-10 PROCEDURE — 71250 CT THORAX DX C-: CPT

## 2020-07-13 ENCOUNTER — TELEMEDICINE (OUTPATIENT)
Dept: BEHAVIORAL/MENTAL HEALTH CLINIC | Facility: CLINIC | Age: 65
End: 2020-07-13
Payer: COMMERCIAL

## 2020-07-13 DIAGNOSIS — F43.10 PTSD (POST-TRAUMATIC STRESS DISORDER): Primary | ICD-10-CM

## 2020-07-13 DIAGNOSIS — Z63.4 EXPECTED BEREAVEMENT DUE TO LIFE EVENT: ICD-10-CM

## 2020-07-13 DIAGNOSIS — F43.11 ACUTE POST-TRAUMATIC STRESS DISORDER: ICD-10-CM

## 2020-07-13 PROCEDURE — 1036F TOBACCO NON-USER: CPT | Performed by: SOCIAL WORKER

## 2020-07-13 PROCEDURE — 90834 PSYTX W PT 45 MINUTES: CPT | Performed by: SOCIAL WORKER

## 2020-07-13 SDOH — SOCIAL STABILITY - SOCIAL INSECURITY: DISSAPEARANCE AND DEATH OF FAMILY MEMBER: Z63.4

## 2020-07-13 NOTE — PSYCH
Virtual Regular Visit           It was my intent to perform this visit via video technology but the patient was not able to do a video connection so the visit was completed via audio telephone only  Assessment/Plan:    Problem List Items Addressed This Visit        Other    PTSD (post-traumatic stress disorder) - Primary    Acute post-traumatic stress disorder    Expected bereavement due to life event               Reason for visit is due to the covid 23 global pandemic     Encounter provider Stephen Barrios /MARLENAW/Psychotherapist  Provider located at [address] [place] [id number]      Recent Visits  Date Type Provider Dept   07/06/20 Telephone Dez Baptiste  [facility] recent visits within past 7 days and meeting all other requirements     Future Appointments  No visits were found meeting these conditions  Showing future appointments within next 150 days and meeting all other requirements        The patient was identified by name and date of birth  Buck Corona was informed that this is a telemedicine visit and that the visit is being conducted through [employer]  My office door was closed  No one else was in the room  He acknowledged consent and understanding of privacy and security of the video platform  The patient has agreed to participate and understands they can discontinue the visit at any time  Patient is aware this is a billable service  Subjective  Buck Corona is a 60 y o  male    HPI     No past medical history on file  No past surgical history on file      Current Outpatient Medications   Medication Sig Dispense Refill    amLODIPine (NORVASC) 2 5 mg tablet Take 1 tablet (2 5 mg total) by mouth daily after dinner 30 tablet 5    busPIRone (BUSPAR) 10 mg tablet Take 1 tablet (10 mg total) by mouth 2 (two) times a day 60 tablet 1    naproxen sodium (ANAPROX) 550 mg tablet Take 1 tablet (550 mg total) by mouth 2 (two) times a day as needed for mild pain or headaches 60 tablet 0    sertraline (ZOLOFT) 100 mg tablet Take 1 tablet (100 mg total) by mouth daily 30 tablet 1    tiZANidine (ZANAFLEX) 2 mg tablet Take 1 tablet (2 mg total) by mouth daily at bedtime 30 tablet 1    traZODone (DESYREL) 50 mg tablet Take 0 5 tablets (25 mg total) by mouth daily at bedtime as needed for sleep May increase to whole tablet if needed 30 tablet 1     No current facility-administered medications for this visit  Allergies   Allergen Reactions    Tetracycline Rash       Review of Systems DATA: Jason Galaviz wanted to do a virtual video but could not get his camera to work  Jason Galaviz shared he is still dealing with aftermath symptoms of covid  He is in need of O2, his hips hurt,He was emotionally affected having severe depression, mod swings, nightmares and night terrors along with flashbacks  He shared he was always healthy but he admits he is not dealing well and never wants to be alone  His bills are piling up  He cant do the things he use to do  He said how he appreciates his wife but is sad that their are things he use to do that he now can't  He is looking forward to meeting the undersigned on the 20th face to face  Assessment: Jason Galaviz continues to be quite depressed and traumatized from having covid 23 which is still causing him physical and emotional issues he never had  He is grateful we can talk and he is looking forward to our first face to face  He denies suicidal and or homicidal ideation, plan or intent  We continue to work on mindfulness strategies, cognitive behavioral strategies and I provide trauma informed care also  He is not seen the undersigned for so long due to all the medical appointments he has scheduled  He will call our support team today to schedule his 4 appointments  Regarding goal 1 he is still emotionally and physically dealing with the fact that he is frustrated that he is not back to where and who he was both physically and emotionally since having covid  We continue to work on coping skills to deal with this  Regarding goal 2 we continue to work on strategies to help him cope with his sleep disruptions of nightmares and night terrors  Regarding goal 3 he is still sad but is learning to deal with the loss of his brother  Plan: Will see Burt Brice on the 20th face to face which he shared he is looking forward to  Video Exam    There were no vitals filed for this visit  Physical Exam N/A    I spent 45 minutes directly with the patient during this visit      VIRTUAL VISIT DISCLAIMER    Katarzyna Medrano acknowledges that he has consented to an online visit or consultation  He understands that the online visit is based solely on information provided by him, and that, in the absence of a face-to-face physical evaluation by the physician, the diagnosis he receives is both limited and provisional in terms of accuracy and completeness  This is not intended to replace a full medical face-to-face evaluation by the physician  Katarzyna Medrano understands and accepts these terms

## 2020-07-17 DIAGNOSIS — R06.02 SOB (SHORTNESS OF BREATH): ICD-10-CM

## 2020-07-17 PROCEDURE — U0003 INFECTIOUS AGENT DETECTION BY NUCLEIC ACID (DNA OR RNA); SEVERE ACUTE RESPIRATORY SYNDROME CORONAVIRUS 2 (SARS-COV-2) (CORONAVIRUS DISEASE [COVID-19]), AMPLIFIED PROBE TECHNIQUE, MAKING USE OF HIGH THROUGHPUT TECHNOLOGIES AS DESCRIBED BY CMS-2020-01-R: HCPCS

## 2020-07-18 LAB
INPATIENT: NORMAL
SARS-COV-2 RNA SPEC QL NAA+PROBE: NOT DETECTED

## 2020-07-20 ENCOUNTER — TELEMEDICINE (OUTPATIENT)
Dept: BEHAVIORAL/MENTAL HEALTH CLINIC | Facility: CLINIC | Age: 65
End: 2020-07-20
Payer: COMMERCIAL

## 2020-07-20 ENCOUNTER — TELEPHONE (OUTPATIENT)
Dept: INTERNAL MEDICINE CLINIC | Facility: CLINIC | Age: 65
End: 2020-07-20

## 2020-07-20 ENCOUNTER — TELEMEDICINE (OUTPATIENT)
Dept: INTERNAL MEDICINE CLINIC | Facility: CLINIC | Age: 65
End: 2020-07-20
Payer: OTHER MISCELLANEOUS

## 2020-07-20 DIAGNOSIS — J84.9 INTERSTITIAL LUNG DISEASE (HCC): ICD-10-CM

## 2020-07-20 DIAGNOSIS — F43.11 ACUTE POST-TRAUMATIC STRESS DISORDER: ICD-10-CM

## 2020-07-20 DIAGNOSIS — Z63.4 EXPECTED BEREAVEMENT DUE TO LIFE EVENT: ICD-10-CM

## 2020-07-20 DIAGNOSIS — G44.52 NEW DAILY PERSISTENT HEADACHE: Primary | ICD-10-CM

## 2020-07-20 DIAGNOSIS — F43.10 PTSD (POST-TRAUMATIC STRESS DISORDER): Primary | ICD-10-CM

## 2020-07-20 DIAGNOSIS — M25.551 HIP PAIN, RIGHT: ICD-10-CM

## 2020-07-20 PROCEDURE — 1036F TOBACCO NON-USER: CPT | Performed by: SOCIAL WORKER

## 2020-07-20 PROCEDURE — 90834 PSYTX W PT 45 MINUTES: CPT | Performed by: SOCIAL WORKER

## 2020-07-20 PROCEDURE — 99214 OFFICE O/P EST MOD 30 MIN: CPT | Performed by: NURSE PRACTITIONER

## 2020-07-20 SDOH — SOCIAL STABILITY - SOCIAL INSECURITY: DISSAPEARANCE AND DEATH OF FAMILY MEMBER: Z63.4

## 2020-07-20 NOTE — TELEPHONE ENCOUNTER
CALLED AND THEY HAVE NOTE THAT PT WILL CALL BACK TO SCHEDULE AND HAS NOT CALLED THEM BACK TO SCHEDULE

## 2020-07-20 NOTE — ASSESSMENT & PLAN NOTE
Patient continues with his daily headaches  Has initial consultation with neurology in 3 days from now

## 2020-07-20 NOTE — ASSESSMENT & PLAN NOTE
Patient recently completed chest CT ordered by pulmonology  Extensive bronchiectasis and air trapping noted  Scattered pulmonary nodules measuring up to 4 mm at the right upper and left lower lobe  Patient has follow-up appointment with pulmonology in 2 days to have pulmonary function tests  Patient does continue to be short of breath which has increased due to higher temperatures recently  Patient is unable to go outside of the house without use of oxygen due to shortness of breath  Patient is currently stable

## 2020-07-20 NOTE — PSYCH
Virtual Regular Visit     It was my intent to perform this visit via video technology but the patient was not able to do a video connection so the visit was completed via audio telephone only  Assessment/Plan:    Problem List Items Addressed This Visit        Other    PTSD (post-traumatic stress disorder) - Primary    Acute post-traumatic stress disorder    Expected bereavement due to life event               Reason for visit is due to te global 19 pandemic  Encounter provider Florin Garzon/VERITO    Provider located at [id number] [place] [address] [address]  [facility] [id number]      Recent Visits  Date Type Provider Dept   07/13/20 [address] [facility]rap[facility]   Showing recent visits within past 7 days and meeting all other requirements     Today's Visits  Date Type Provider Dept   07/20/20 [address] [facility]rapist   Showing today's visits and meeting all other requirements     Future Appointments  No visits were found meeting these conditions  Showing future appointments within next 150 days and meeting all other requirements        The patient was identified by name and date of birth  Buck Corona was informed that this is a telemedicine visit and that the visit is being conducted through [employer]  My office door was closed  No one else was in the room  He acknowledged consent and understanding of privacy and security of the video platform  The patient has agreed to participate and understands they can discontinue the visit at any time  Patient is aware this is a billable service  Subjective  Buck Corona is a 60 y o  male    HPI     No past medical history on file  No past surgical history on file      Current Outpatient Medications   Medication Sig Dispense Refill    amLODIPine (NORVASC) 2 5 mg tablet Take 1 tablet (2 5 mg total) by mouth daily after dinner 30 tablet 5    busPIRone (BUSPAR) 10 mg tablet Take 1 tablet (10 mg total) by mouth 2 (two) times a day 60 tablet 1    naproxen sodium (ANAPROX) 550 mg tablet Take 1 tablet (550 mg total) by mouth 2 (two) times a day as needed for mild pain or headaches 60 tablet 0    sertraline (ZOLOFT) 100 mg tablet Take 1 tablet (100 mg total) by mouth daily 30 tablet 1    tiZANidine (ZANAFLEX) 2 mg tablet Take 1 tablet (2 mg total) by mouth daily at bedtime 30 tablet 1    traZODone (DESYREL) 50 mg tablet Take 0 5 tablets (25 mg total) by mouth daily at bedtime as needed for sleep May increase to whole tablet if needed 30 tablet 1     No current facility-administered medications for this visit  Allergies   Allergen Reactions    Tetracycline Rash       Review of Systems Data: Spoke with Mastre Townsend and he stated how things are still not doing well since he had covid  He has pain in his hip, pain and sensations in his legs, mood swings, and headaches since he has had covid  He is more aggravated and more irritable  He admits he is more scared about things and admits he gets more easily upset  He lost a lot of friends and and his brother  He admits his first goal is to feel as good as he did before covid and he feels he is far from this goal  Regarding goal 2 he is sleeping better as long as his wife is there  Assessment: The biggest issue he is dealing with are all the symptoms he never had pre covid that he does have now  He is also upset that his 45year old daughter calls not to see how his wife and he are but to complain about her brother  We discussed strategies to help him cope and to deal with his daughter  He is upset but he denies suicidal and homicidal ideation plan or intent  I recommended his wife gets some therapy and they will call since she was in tears for 2 days over what the daughter has been doing  Plan: Continue to help [place] cope more effectively       Video Exam    There were no vitals filed for this visit     Physical Exam N/A    I spent 45 minutes directly with the patient during this visit      VIRTUAL VISIT DISCLAIMER    Mona Beltran acknowledges that he has consented to an online visit or consultation  He understands that the online visit is based solely on information provided by him, and that, in the absence of a face-to-face physical evaluation by the physician, the diagnosis he receives is both limited and provisional in terms of accuracy and completeness  This is not intended to replace a full medical face-to-face evaluation by the physician  Mona Beltran understands and accepts these terms

## 2020-07-20 NOTE — ASSESSMENT & PLAN NOTE
Patient continues to have right-sided hip pain  Is awaiting insurance authorization for joint injection by Pain Management  Will have medical assistance reach out to office to check for authorization  If patient is unable to be seen will refer to a different pain specialist for treatment

## 2020-07-20 NOTE — TELEPHONE ENCOUNTER
----- Message from Sarah Austin, [address] Ros[address] sent at 7/20/2020 11:35 AM EDT -----  Are we able to reach out to Dr Porfirio Chopra of pain management to see about this patient getting a joint injection of the right hip  The patient's daughter had called the office to check on the insurance authorization but never heard back whether this was approved or not   If the authorization did not go through, I will try to refer him to a different pain specialist  Thank you!!

## 2020-07-20 NOTE — TELEPHONE ENCOUNTER
----- Message from Janak Gallardo, Martina Sophie Rosales sent at 7/20/2020 11:35 AM EDT -----  Are we able to reach out to Dr Kathi Schuler of pain management to see about this patient getting a joint injection of the right hip  The patient's daughter had called the office to check on the insurance authorization but never heard back whether this was approved or not   If the authorization did not go through, I will try to refer him to a different pain specialist  Thank you!!

## 2020-07-20 NOTE — TELEPHONE ENCOUNTER
Can we please call caden to let him know he or his daughter should call pain management to schedule appt for his hip shot  They were awaiting him to schedule an appointment  Thank you

## 2020-07-20 NOTE — PROGRESS NOTES
COVID-19 Virtual Follow Up Visit       Assessment/Plan:  Patient will follow-up in 2 weeks after his pulmonary function test and neurology consultation appointments  Problem List Items Addressed This Visit        Respiratory    Interstitial lung disease [facility])     Patient recently completed chest CT ordered by pulmonology  Extensive bronchiectasis and air trapping noted  Scattered pulmonary nodules measuring up to 4 mm at the right upper and left lower lobe  Patient has follow-up appointment with pulmonology in 2 days to have pulmonary function tests  Patient does continue to be short of breath which has increased due to higher temperatures recently  Patient is unable to go outside of the house without use of oxygen due to shortness of breath  Patient is currently stable  Other    Acute post-traumatic stress disorder     Patient continues to follow with Behavioral Health  Medical management is controlled by psychiatry  New daily persistent headache - Primary     Patient continues with his daily headaches  Has initial consultation with neurology in 3 days from now  Hip pain, right     Patient continues to have right-sided hip pain  Is awaiting insurance authorization for joint injection by Pain Management  Will have medical assistance reach out to office to check for authorization  If patient is unable to be seen will refer to a different pain specialist for treatment  Disposition:      no isolation needed       I spent 15 minutes with patient today in which greater than 50% of the time was spent in counseling/coordination of care regarding respiratory status and pain management    This virtual check-in was done via [employer] and patient was informed that this is not a secure, HIPAA-complaint platform  He agrees to proceed     Encounter provider ERIC Velarde    Provider located at [address] [place] [address] [facility] [id number]    Recent Visits  No visits were found meeting these conditions  Showing recent visits within past 7 days and meeting all other requirements     Today's Visits  Date Type Provider Dept   07/20/20 [address] today's visits and meeting all other requirements     Future Appointments  Date Type Provider Dept   07/20/20 [address], [address]   Showing future appointments within next 150 days and meeting all other requirements        Patient agrees to participate in a virtual check in via telephone or video visit instead of presenting to the office to address urgent/immediate medical needs  Patient is aware this is a billable service  After connecting through [facility], the patient was identified by name and date of birth  Mona Beltran was informed that this was a telemedicine visit and that the exam was being conducted confidentially over secure lines  My office door was closed  No one else was in the room  Mona Beltran acknowledged consent and understanding of privacy and security of the telemedicine visit  I informed the patient that I have reviewed his record in Epic and presented the opportunity for him to ask any questions regarding the visit today  The patient agreed to participate  Subjective  Mona Beltran is a 60 y o  male who has been screened for COVID-19  Since the last visit, Alberto Christianson reports that he is unchanged  He reports COVID-19 SYMPTOMS: shortness of breath, headache, fatigue and myalgias  He has not experienced fever, chills, sore throat, anosmia, abdominal pain, diarrhea, nausea and vomiting Alberto Christianson has been staying home and has isolated themselves in his home   He is taking care to not share personal items and is cleaning all surfaces that are touched often, like counters, tabletops, and doorknobs using household cleaning sprays or wipes  He is wearing a mask when he leaves his room  No past medical history on file  No past surgical history on file  Current Outpatient Medications   Medication Sig Dispense Refill    amLODIPine (NORVASC) 2 5 mg tablet Take 1 tablet (2 5 mg total) by mouth daily after dinner 30 tablet 5    busPIRone (BUSPAR) 10 mg tablet Take 1 tablet (10 mg total) by mouth 2 (two) times a day 60 tablet 1    naproxen sodium (ANAPROX) 550 mg tablet Take 1 tablet (550 mg total) by mouth 2 (two) times a day as needed for mild pain or headaches 60 tablet 0    sertraline (ZOLOFT) 100 mg tablet Take 1 tablet (100 mg total) by mouth daily 30 tablet 1    tiZANidine (ZANAFLEX) 2 mg tablet Take 1 tablet (2 mg total) by mouth daily at bedtime 30 tablet 1    traZODone (DESYREL) 50 mg tablet Take 0 5 tablets (25 mg total) by mouth daily at bedtime as needed for sleep May increase to whole tablet if needed 30 tablet 1     No current facility-administered medications for this visit  Allergies   Allergen Reactions    Tetracycline Rash       Review of Systems   Constitutional: Positive for fatigue  Negative for chills and fever  Respiratory: Positive for cough and shortness of breath  Negative for chest tightness  Cardiovascular: Negative for chest pain  Musculoskeletal: Positive for arthralgias and myalgias  Neurological: Positive for numbness (tingling of bilateral thighs) and headaches  Psychiatric/Behavioral: Positive for dysphoric mood and sleep disturbance  Video Exam    There were no vitals filed for this visit  Randy De La Torre appears alert, no distress, cooperative  Physical Exam   Constitutional: He is oriented to person, place, and time  He appears well-developed and well-nourished  He is cooperative  He does not appear ill  No distress  HENT:   Head: Normocephalic and atraumatic     Right Ear: Hearing and external ear normal    Left Ear: Hearing and external ear normal    Nose: Nose normal  Eyes: Conjunctivae, EOM and lids are normal    Pulmonary/Chest: Effort normal  No accessory muscle usage  No respiratory distress  He has no wheezes  Neurological: He is alert and oriented to person, place, and time  He has normal strength  Skin: He is not diaphoretic  Psychiatric: His speech is normal and behavior is normal  Thought content normal  He exhibits a depressed mood  VIRTUAL VISIT DISCLAIMER    Buck Corona acknowledges that he has consented to an online visit or consultation  He understands that the online visit is based solely on information provided by him, and that, in the absence of a face-to-face physical evaluation by the physician, the diagnosis he receives is both limited and provisional in terms of accuracy and completeness  This is not intended to replace a full medical face-to-face evaluation by the physician  Buck Corona understands and accepts these terms

## 2020-07-21 ENCOUNTER — TELEPHONE (OUTPATIENT)
Dept: PAIN MEDICINE | Facility: CLINIC | Age: 65
End: 2020-07-21

## 2020-07-21 ENCOUNTER — TELEPHONE (OUTPATIENT)
Dept: PULMONOLOGY | Facility: CLINIC | Age: 65
End: 2020-07-21

## 2020-07-21 NOTE — TELEPHONE ENCOUNTER
Pt daughter Vivian Mooney called in to schedule her father's injection  Please be advise thank you        Please call Vivian Mooney  back @ [phone]

## 2020-07-22 ENCOUNTER — HOSPITAL ENCOUNTER (OUTPATIENT)
Dept: PULMONOLOGY | Facility: HOSPITAL | Age: 65
Discharge: HOME | End: 2020-07-22
Attending: INTERNAL MEDICINE
Payer: OTHER MISCELLANEOUS

## 2020-07-22 DIAGNOSIS — R06.02 SOB (SHORTNESS OF BREATH): ICD-10-CM

## 2020-07-22 PROCEDURE — 94727 GAS DIL/WSHOT DETER LNG VOL: CPT | Performed by: INTERNAL MEDICINE

## 2020-07-22 PROCEDURE — 94727 GAS DIL/WSHOT DETER LNG VOL: CPT

## 2020-07-22 PROCEDURE — 94010 BREATHING CAPACITY TEST: CPT | Performed by: INTERNAL MEDICINE

## 2020-07-22 PROCEDURE — 94010 BREATHING CAPACITY TEST: CPT

## 2020-07-22 PROCEDURE — 94761 N-INVAS EAR/PLS OXIMETRY MLT: CPT

## 2020-07-22 PROCEDURE — 94618 PULMONARY STRESS TESTING: CPT | Performed by: INTERNAL MEDICINE

## 2020-07-22 PROCEDURE — 94729 DIFFUSING CAPACITY: CPT | Performed by: INTERNAL MEDICINE

## 2020-07-22 PROCEDURE — 94729 DIFFUSING CAPACITY: CPT

## 2020-07-23 ENCOUNTER — CONSULT (OUTPATIENT)
Dept: NEUROLOGY | Facility: CLINIC | Age: 65
End: 2020-07-23
Payer: OTHER MISCELLANEOUS

## 2020-07-23 ENCOUNTER — PATIENT OUTREACH (OUTPATIENT)
Dept: CASE MANAGEMENT | Facility: HOSPITAL | Age: 65
End: 2020-07-23

## 2020-07-23 VITALS
TEMPERATURE: 99.3 F | OXYGEN SATURATION: 98 % | BODY MASS INDEX: 33.95 KG/M2 | HEIGHT: 68 IN | HEART RATE: 86 BPM | RESPIRATION RATE: 14 BRPM | WEIGHT: 224 LBS | SYSTOLIC BLOOD PRESSURE: 130 MMHG | DIASTOLIC BLOOD PRESSURE: 60 MMHG

## 2020-07-23 DIAGNOSIS — F43.10 PTSD (POST-TRAUMATIC STRESS DISORDER): ICD-10-CM

## 2020-07-23 DIAGNOSIS — G44.52 NEW DAILY PERSISTENT HEADACHE: Primary | ICD-10-CM

## 2020-07-23 DIAGNOSIS — R20.0 NUMBNESS AND TINGLING: ICD-10-CM

## 2020-07-23 DIAGNOSIS — M25.559 HIP PAIN: ICD-10-CM

## 2020-07-23 DIAGNOSIS — R41.3 MEMORY DIFFICULTY: ICD-10-CM

## 2020-07-23 DIAGNOSIS — R20.2 NUMBNESS AND TINGLING: ICD-10-CM

## 2020-07-23 PROCEDURE — 99245 OFF/OP CONSLTJ NEW/EST HI 55: CPT | Performed by: PSYCHIATRY & NEUROLOGY

## 2020-07-23 RX ORDER — GABAPENTIN 100 MG/1
CAPSULE ORAL
Qty: 90 CAPSULE | Refills: 1 | Status: SHIPPED | OUTPATIENT
Start: 2020-07-23 | End: 2020-10-26

## 2020-07-23 NOTE — PROGRESS NOTES
Choco Lorenzo is a 60 y o  male  Chief Complaint   Patient presents with    Headache     has headaches every day, come and goes   Memory Loss     can't remmeber what he did yesterday     Numbness     numbness and tingling in right hip and bilateral thighs        Assessment:  1  New daily persistent headache    2  PTSD (post-traumatic stress disorder)    3  Memory difficulty    4  Numbness and tingling          Discussion:  Differential diagnosis of headache discussed with the patient, he does have some migraine features, his MRI scan of the brain did not show any acute pathology, his headache started after he was diagnosed with COVID, also patient has cognitive impairment which could be secondary to his recent COVID and posttraumatic stress disorder, would recommend a detailed neuropsych testing and blood work to evaluate for his symptoms, patient in the family to call me after the test to discuss the results  He was advised to avoid migraine triggers which we discussed in detail, including foods to avoid, was advised to avoid stress, limit caffeine to 12 oz per day, to keep his blood pressure cholesterol and sugar under control, sleep regularly for 7-8 hours a night, different medications were discussed with the patient, would recommend Neurontin 100 mg at nighttime for a week then increasing it to twice a day for a week and then increasing it to 3 times a day if he is able to tolerated, since it could help with his headaches and his numbness and tingling in the legs and hip pain, side effects of the medication discussed with the patient in detail, he was advised to let me know if he has side effects and then we could wean him off of the medication  Family was advised that he should be under constant supervision and to take fall safety precautions and not to be left alone, and not to drive, he has not been driving, if he does not get any relief with the medication then we could do an MRA/MRV,      Would recommend patient to be seen by an orthopedic surgeon regarding his hip pain and do blood work for neuropathy, if he continues to have numbness and tingling in the lower extremities then would recommend an EMG in the future, he was advised to follow up with his psychologist in other physicians including his pulmonologist and family physician    Subjective:    HPI   Patient is here for evaluation of headache, memory issues and numbness and tingling in his thighs and right hip pain, since he was discharged from the hospital for [facility], patient was intubated with acute respiratory failure in April, since then he has been having headaches that he describes bitemporal or in the occipital head region about 8 on 10 associated with photophobia and phonophobia sometimes relieved with lying in a dark room, he also has been having mood swings for which she is in follow up with the psychologist and a Behavioral therapist, he has been having short-term memory issues that he does not recall things in the past few days as to what he has eaten and difficulty with names, no seizures, he has not been driving, he is under constant supervision with his wife, he also complains of right hip pain, no history of any trauma, and does complain of numbness and tingling in the legs mostly in both thighs, denies any back pain, no bowel and bladder incontinence, he is ambulating with a limp, no fevers, he still is short of breath and is in follow up with the pulmonologist, denies any numbness or tingling in the upper extremities, no neck pain or back pain, no falls, no seizures, appetite is good, weight has been stable, no other complaints      Vitals:    07/23/20 1200   BP: 130/60   BP Location: Right arm   Patient Position: Sitting   Cuff Size: Standard   Pulse: 86   Resp: 14   Temp: 99 3 °F (37 4 °C)   Weight: 102 kg (224 lb)   Height: 5' 8" (1 727 m)       Current Medications    Current Outpatient Medications:     amLODIPine (NORVASC) 2 5 mg tablet, Take 1 tablet (2 5 mg total) by mouth daily after dinner, Disp: 30 tablet, Rfl: 5    busPIRone (BUSPAR) 10 mg tablet, Take 1 tablet (10 mg total) by mouth 2 (two) times a day, Disp: 60 tablet, Rfl: 1    naproxen sodium (ANAPROX) 550 mg tablet, Take 1 tablet (550 mg total) by mouth 2 (two) times a day as needed for mild pain or headaches, Disp: 60 tablet, Rfl: 0    sertraline (ZOLOFT) 100 mg tablet, Take 1 tablet (100 mg total) by mouth daily, Disp: 30 tablet, Rfl: 1    tiZANidine (ZANAFLEX) 2 mg tablet, Take 1 tablet (2 mg total) by mouth daily at bedtime, Disp: 30 tablet, Rfl: 1    traZODone (DESYREL) 50 mg tablet, Take 0 5 tablets (25 mg total) by mouth daily at bedtime as needed for sleep May increase to whole tablet if needed, Disp: 30 tablet, Rfl: 1      Allergies  Tetracycline    Past Medical History  Past Medical History:   Diagnosis Date    Memory loss          Past Surgical History:  History reviewed  No pertinent surgical history  Family History:  History reviewed  No pertinent family history  Social History:   reports that he has quit smoking  His smoking use included cigarettes  He has a 60 00 pack-year smoking history  He has never used smokeless tobacco  He reports that he drank alcohol  He reports that he does not use drugs  I have reviewed the past medical history, surgical history, social and family history, current medications, allergies vitals, review of systems, and updated this information as appropriate today  Objective:    Physical Exam    Neurological Exam    GENERAL:  Cooperative in no acute distress  Well-developed and well-nourished    HEAD and NECK   Head is atraumatic normocephalic with no lesions or masses  Neck is supple with full range of motion    NEUROLOGIC:  Mental Status-the patient is awake alert and oriented without aphasia or apraxia,moca is 14/30  Cranial Nerves: Visual fields are full to confrontation    Visual acuity is 20/20 with hand-held chart Extraocular movements are full without nystagmus  Pupils are 2-1/2 mm and reactive  Face is symmetrical to light touch  Movements of facial expression move symmetrically  Hearing is normal to finger rub bilaterally  Soft palate lifts symmetrically  Shoulder shrug is symmetrical  Tongue is midline without atrophy  Motor: No drift is noted on arm extension  Strength is full in the upper and lower extremities with normal bulk and tone  Sensory: Intact to temperature and vibratory sensation in the upper and lower extremities bilaterally  Cortical function is intact  Coordination: Finger to nose testing is performed accurately  Romberg is negative  Gait reveals a normal base with symmetrical arm swing  Tandem walk is normal   Reflexes:  1+ and symmetrical  No meningeal signs, no temporal artery tenderness          ROS:  Review of Systems   Constitutional: Negative  Negative for appetite change and fever  HENT: Negative  Negative for hearing loss, tinnitus, trouble swallowing and voice change  Eyes: Negative  Negative for photophobia and pain  Respiratory: Negative  Negative for shortness of breath  Cardiovascular: Negative  Negative for palpitations  Gastrointestinal: Negative  Negative for nausea and vomiting  Endocrine: Negative  Negative for cold intolerance  Genitourinary: Negative  Negative for dysuria, frequency and urgency  Musculoskeletal: Negative  Negative for myalgias and neck pain  Skin: Negative  Negative for rash  Allergic/Immunologic: Negative  Neurological: Positive for tremors (bilateral hands ), numbness (right hip, Bilateral thighs ) and headaches  Negative for dizziness, seizures, syncope, facial asymmetry, speech difficulty, weakness and light-headedness  Hematological: Negative  Does not bruise/bleed easily  Psychiatric/Behavioral: Positive for agitation, confusion and sleep disturbance  Negative for hallucinations          Memory problems   All other systems reviewed and are negative

## 2020-07-28 ENCOUNTER — TELEPHONE (OUTPATIENT)
Dept: INTERNAL MEDICINE CLINIC | Facility: CLINIC | Age: 65
End: 2020-07-28

## 2020-07-29 ENCOUNTER — TELEPHONE (OUTPATIENT)
Dept: INTERNAL MEDICINE CLINIC | Facility: CLINIC | Age: 65
End: 2020-07-29

## 2020-07-29 ENCOUNTER — APPOINTMENT (OUTPATIENT)
Dept: LAB | Facility: CLINIC | Age: 65
End: 2020-07-29
Payer: OTHER MISCELLANEOUS

## 2020-07-29 DIAGNOSIS — R41.3 MEMORY DIFFICULTY: ICD-10-CM

## 2020-07-29 DIAGNOSIS — R20.0 NUMBNESS AND TINGLING: ICD-10-CM

## 2020-07-29 DIAGNOSIS — R20.2 NUMBNESS AND TINGLING: ICD-10-CM

## 2020-07-29 LAB
25(OH)D3 SERPL-MCNC: 23.7 NG/ML (ref 30–100)
FOLATE SERPL-MCNC: 11.7 NG/ML (ref 3.1–17.5)
VIT B12 SERPL-MCNC: 364 PG/ML (ref 100–900)

## 2020-07-29 PROCEDURE — 82746 ASSAY OF FOLIC ACID SERUM: CPT

## 2020-07-29 PROCEDURE — 84207 ASSAY OF VITAMIN B-6: CPT

## 2020-07-29 PROCEDURE — 86592 SYPHILIS TEST NON-TREP QUAL: CPT

## 2020-07-29 PROCEDURE — 82306 VITAMIN D 25 HYDROXY: CPT

## 2020-07-29 PROCEDURE — 84425 ASSAY OF VITAMIN B-1: CPT

## 2020-07-29 PROCEDURE — 82607 VITAMIN B-12: CPT

## 2020-07-29 PROCEDURE — 84165 PROTEIN E-PHORESIS SERUM: CPT

## 2020-07-29 PROCEDURE — 36415 COLL VENOUS BLD VENIPUNCTURE: CPT

## 2020-07-29 PROCEDURE — 84166 PROTEIN E-PHORESIS/URINE/CSF: CPT | Performed by: PSYCHIATRY & NEUROLOGY

## 2020-07-29 PROCEDURE — 84165 PROTEIN E-PHORESIS SERUM: CPT | Performed by: PATHOLOGY

## 2020-07-29 PROCEDURE — 84166 PROTEIN E-PHORESIS/URINE/CSF: CPT | Performed by: PATHOLOGY

## 2020-07-29 PROCEDURE — 86618 LYME DISEASE ANTIBODY: CPT

## 2020-07-29 NOTE — TELEPHONE ENCOUNTER
Patient's daughter Alan Marquez called checking the status on patients physical therapy authorization   She has yet to hear from someone regarding this referral  Please advise, thx    Call back# [phone]

## 2020-07-29 NOTE — TELEPHONE ENCOUNTER
Patient was prescribed Gabapentin but was told not to take it until he speaks with the doctor  Patient would like to know if it is safe for him to take the Gabapentin or does it have an interaction with his other medications  Patient also needs an updated note for work      # [phone]

## 2020-07-30 ENCOUNTER — TELEPHONE (OUTPATIENT)
Dept: INTERNAL MEDICINE CLINIC | Facility: CLINIC | Age: 65
End: 2020-07-30

## 2020-07-30 ENCOUNTER — TELEPHONE (OUTPATIENT)
Dept: PULMONOLOGY | Facility: CLINIC | Age: 65
End: 2020-07-30

## 2020-07-30 DIAGNOSIS — M25.551 ACUTE RIGHT HIP PAIN: ICD-10-CM

## 2020-07-30 LAB
ALBUMIN SERPL ELPH-MCNC: 4.25 G/DL (ref 3.5–5)
ALBUMIN SERPL ELPH-MCNC: 63.5 % (ref 52–65)
ALBUMIN UR ELPH-MCNC: 100 %
ALPHA1 GLOB MFR UR ELPH: 0 %
ALPHA1 GLOB SERPL ELPH-MCNC: 0.27 G/DL (ref 0.1–0.4)
ALPHA1 GLOB SERPL ELPH-MCNC: 4 % (ref 2.5–5)
ALPHA2 GLOB MFR UR ELPH: 0 %
ALPHA2 GLOB SERPL ELPH-MCNC: 0.84 G/DL (ref 0.4–1.2)
ALPHA2 GLOB SERPL ELPH-MCNC: 12.6 % (ref 7–13)
B BURGDOR IGG+IGM SER-ACNC: <0.91 ISR (ref 0–0.9)
B-GLOBULIN MFR UR ELPH: 0 %
BETA GLOB ABNORMAL SERPL ELPH-MCNC: 0.33 G/DL (ref 0.4–0.8)
BETA1 GLOB SERPL ELPH-MCNC: 4.9 % (ref 5–13)
BETA2 GLOB SERPL ELPH-MCNC: 4.3 % (ref 2–8)
BETA2+GAMMA GLOB SERPL ELPH-MCNC: 0.29 G/DL (ref 0.2–0.5)
GAMMA GLOB ABNORMAL SERPL ELPH-MCNC: 0.72 G/DL (ref 0.5–1.6)
GAMMA GLOB MFR UR ELPH: 0 %
GAMMA GLOB SERPL ELPH-MCNC: 10.7 % (ref 12–22)
IGG/ALB SER: 1.74 {RATIO} (ref 1.1–1.8)
PROT PATTERN SERPL ELPH-IMP: ABNORMAL
PROT PATTERN UR ELPH-IMP: ABNORMAL
PROT SERPL-MCNC: 6.7 G/DL (ref 6.4–8.2)
PROT UR-MCNC: 28 MG/DL
RPR SER QL: NORMAL

## 2020-07-30 RX ORDER — TIZANIDINE 2 MG/1
2 TABLET ORAL
Qty: 30 TABLET | Refills: 1 | Status: SHIPPED | OUTPATIENT
Start: 2020-07-30 | End: 2020-10-30 | Stop reason: SDUPTHER

## 2020-07-30 NOTE — LETTER
To Whom it May Concern:    Mirella Herring is under my professional care  He needs to be off of work indefinitely  If you have any questions or concerns, please don't hesitate to call  Mirella Herring next appointment in this office is 8/3/2020           Sincerely,          Danyel Segura PA-C

## 2020-07-30 NOTE — TELEPHONE ENCOUNTER
----- Message from [facility] sent at 7/30/2020  2:55 PM EDT -----  Regarding: Non-Urgent Medical Question  Contact: [phone], my employer is requesting a doctors letter indicating that I cannot return to work yet and the day of my next evaluation  I really do apologize for the inconvenience but my employer is requesting a note after every appointment  I can  the letter tomorrow as I will be at the pulmonologist for an appointment

## 2020-07-31 ENCOUNTER — OFFICE VISIT (OUTPATIENT)
Dept: PULMONOLOGY | Facility: CLINIC | Age: 65
End: 2020-07-31
Payer: OTHER MISCELLANEOUS

## 2020-07-31 VITALS
SYSTOLIC BLOOD PRESSURE: 108 MMHG | WEIGHT: 222 LBS | BODY MASS INDEX: 33.65 KG/M2 | HEIGHT: 68 IN | HEART RATE: 80 BPM | DIASTOLIC BLOOD PRESSURE: 60 MMHG | OXYGEN SATURATION: 99 % | TEMPERATURE: 98.7 F

## 2020-07-31 DIAGNOSIS — J47.9 BRONCHIECTASIS WITHOUT COMPLICATION (HCC): Primary | ICD-10-CM

## 2020-07-31 DIAGNOSIS — J80 ACUTE RESPIRATORY DISTRESS SYNDROME (ARDS) DUE TO COVID-19 VIRUS (HCC): ICD-10-CM

## 2020-07-31 DIAGNOSIS — U07.1 ACUTE RESPIRATORY DISTRESS SYNDROME (ARDS) DUE TO COVID-19 VIRUS (HCC): ICD-10-CM

## 2020-07-31 DIAGNOSIS — J98.4 RESTRICTIVE LUNG DISEASE: ICD-10-CM

## 2020-07-31 LAB — VIT B1 BLD-SCNC: 119.6 NMOL/L (ref 66.5–200)

## 2020-07-31 PROCEDURE — 1036F TOBACCO NON-USER: CPT | Performed by: PHYSICIAN ASSISTANT

## 2020-07-31 PROCEDURE — 3008F BODY MASS INDEX DOCD: CPT | Performed by: PHYSICIAN ASSISTANT

## 2020-07-31 PROCEDURE — 99214 OFFICE O/P EST MOD 30 MIN: CPT | Performed by: PHYSICIAN ASSISTANT

## 2020-07-31 RX ORDER — IPRATROPIUM BROMIDE AND ALBUTEROL SULFATE 2.5; .5 MG/3ML; MG/3ML
3 SOLUTION RESPIRATORY (INHALATION) 3 TIMES DAILY
Qty: 90 VIAL | Refills: 2 | Status: SHIPPED | OUTPATIENT
Start: 2020-07-31 | End: 2020-10-08

## 2020-07-31 NOTE — PROGRESS NOTES
Assessment/Plan:   Diagnoses and all orders for this visit:    Bronchiectasis without complication ([facility] )  -     Nebulizer  -     ipratropium-albuterol (DUO-NEB) 0 5-2 5 mg/3 mL nebulizer solution; Take 1 vial (3 mL total) by nebulization 3 (three) times a day    Acute respiratory distress syndrome (ARDS) due to COVID-19 virus ([facility] )  -     Discontinue home oxygen    Restrictive lung disease     Patient is here today for follow-up  He was hospitalized in April for COVID-19 infection requiring intubation, he was treated with azithromycin and Plaquenil as well as tocilizumab  He continues to have ongoing shortness of breath/dyspnea on exertion as well as a mild dry cough  He had a PFT done which shows restrictive pattern with FEV1 77%, moderately decreased DLCO, mildly decreased lung volumes  High-resolution CT scan shows extensive bronchiectasis and scarring throughout the bilateral lungs more conspicuous at the upper lobes with cystic bronchiectasis noted at the right upper lobe  There are scattered pulmonary nodules measuring up to 4 mm at the right upper and left lower lobe  Would repeat a CT scan in 6-12 months  Can decide this at his next visit  Findings likely related to scarring and damage from COVID-19  Patient was also a heavy smoker and works as an   He has never been on bronchodilators in the past, will order him a nebulizer to use 4 times per day as needed for the bronchiectasis to help with mucus clearance  He will use DuoNeb in the nebulizer 4 times per day as needed  He would benefit from the use of the nebulizer  He will follow up with us in 4 weeks or sooner if necessary  Return in about 3 months (around 10/31/2020)  All questions are answered to the patient's satisfaction and understanding  He verbalizes understanding  He is encouraged to call with any further questions or concerns      Portions of the record may have been created with voice recognition software  Occasional wrong word or "sound a like" substitutions may have occurred due to the inherent limitations of voice recognition software  Read the chart carefully and recognize, using context, where substitutions have occurred  Electronically Signed by Annelise Joseph PA-C    ______________________________________________________________________    Chief Complaint:   Chief Complaint   Patient presents with    Follow-up     COVID       Patient ID: Johanna Myers is a 60 y o  y o  male has a past medical history of Memory loss  7/31/2020  Patient presents today for follow-up visit  Patient is a 33-year-old male former smoker who works as an  at [employer] with history of COVID-19 infection requiring intubation, treated with Plaquenil and a Zithromax send as well as tocilzumab  He was discharged home on 2 L of oxygen  He is here today for follow-up  He continues to have shortness of breath/dyspnea on exertion and some limitation of his activity  He does on occasion have a cough which is mostly dry  Prior to his COVID infection he had no limitation of his daily activity  Has never been on bronchodilators in the past          Review of Systems   Constitutional: Negative  HENT: Negative  Respiratory: Positive for cough and shortness of breath  Cardiovascular: Negative  Gastrointestinal: Negative  Genitourinary: Negative  Musculoskeletal: Negative  Skin: Negative  Allergic/Immunologic: Negative  Neurological: Negative  Psychiatric/Behavioral: Negative  Smoking history: He reports that he has quit smoking  His smoking use included cigarettes  He has a 60 00 pack-year smoking history   He has never used smokeless tobacco     The following portions of the patient's history were reviewed and updated as appropriate: allergies, current medications, past family history, past medical history, past social history, past surgical history and problem list       There is no immunization history on file for this patient  Current Outpatient Medications   Medication Sig Dispense Refill    amLODIPine (NORVASC) 2 5 mg tablet Take 1 tablet (2 5 mg total) by mouth daily after dinner 30 tablet 5    busPIRone (BUSPAR) 10 mg tablet Take 1 tablet (10 mg total) by mouth 2 (two) times a day 60 tablet 1    gabapentin (NEURONTIN) 100 mg capsule One tablet p o  q h s  for 1 week if able to tolerate then can increase it to 1 tablet b i d  and if tolerating it well then increasing it to 1 tablet 3 times a day 90 capsule 1    naproxen sodium (ANAPROX) 550 mg tablet Take 1 tablet (550 mg total) by mouth 2 (two) times a day as needed for mild pain or headaches 60 tablet 0    sertraline (ZOLOFT) 100 mg tablet Take 1 tablet (100 mg total) by mouth daily 30 tablet 1    tiZANidine (ZANAFLEX) 2 mg tablet TAKE 1 TABLET (2 MG TOTAL) BY MOUTH DAILY AT BEDTIME 30 tablet 1    traZODone (DESYREL) 50 mg tablet Take 0 5 tablets (25 mg total) by mouth daily at bedtime as needed for sleep May increase to whole tablet if needed 30 tablet 1    ipratropium-albuterol (DUO-NEB) 0 5-2 5 mg/3 mL nebulizer solution Take 1 vial (3 mL total) by nebulization 3 (three) times a day 90 vial 2     No current facility-administered medications for this visit  Allergies: Tetracycline    Objective:  Vitals:    07/31/20 1400   BP: 108/60   Pulse: 80   Temp: 98 7 °F (37 1 °C)   SpO2: 99%   Weight: 101 kg (222 lb)   Height: 5' 8" (1 727 m)   Oxygen Therapy  SpO2: 99 %    Wt Readings from Last 3 Encounters:   07/31/20 101 kg (222 lb)   07/23/20 102 kg (224 lb)   06/23/20 98 9 kg (218 lb)     Body mass index is 33 75 kg/m²  Physical Exam   Constitutional: He is oriented to person, place, and time  He appears well-developed and well-nourished  No distress  HENT:   Mouth/Throat: Oropharynx is clear and moist    Eyes: Pupils are equal, round, and reactive to light     Cardiovascular: Normal rate, regular rhythm and normal heart sounds  No murmur heard  Pulmonary/Chest: Effort normal and breath sounds normal  No accessory muscle usage  No respiratory distress  He has no decreased breath sounds  He has no wheezes  He has no rhonchi  He has no rales  Abdominal: Soft  There is no tenderness  Musculoskeletal: Normal range of motion  Neurological: He is alert and oriented to person, place, and time  Skin: Skin is warm and dry  No rash noted  Psychiatric: He has a normal mood and affect  Lab Review:   Lab Results   Component Value Date    K 3 8 05/05/2020     05/05/2020    CO2 28 05/05/2020    BUN 18 05/05/2020    CREATININE 1 27 05/05/2020    CALCIUM 9 0 05/05/2020     Lab Results   Component Value Date    WBC 11 63 (H) 05/05/2020    HGB 13 7 05/05/2020    HCT 43 3 05/05/2020    MCV 91 05/05/2020     05/05/2020       Diagnostics:  I have personally reviewed pertinent reports  and I have personally reviewed pertinent films in PACS  Reviewed CT scan and PFT  Office Spirometry Results:     ESS:    Ct Chest High Resolution    Result Date: 7/13/2020  Narrative: CT CHEST INCLUDING HIGH RESOLUTION SLICES - WITHOUT CONTRAST INDICATION:   R06 02: Shortness of breath  COMPARISON:  None  TECHNIQUE: CT examination of the chest was performed without intravenous contrast   Contiguous 1 25 mm transverse images were obtained along with 1 x 10 mm transverse images with the patient prone  Additional thin section images were performed at 10 mm intervals in supine and prone positioning in inspiration as well as supine in expiration  Reformatted images were created in sagittal, and coronal planes  MIP reconstructions were created in the transverse and coronal planes along with minIP reconstructions in the coronal plane  Sagittal reformatted images were also created  Radiation dose length product (DLP) for this visit:  700 mGy-cm     This examination, like all CT scans performed in the [facility], was performed utilizing techniques to minimize radiation dose exposure, including the use of iterative reconstruction and automated exposure control  FINDINGS: LUNGS:  Extensive bronchiectasis and scarring throughout the bilateral lungs more conspicuous at the upper lobes with cystic bronchiectasis at the right upper lobe extending to the pleural surface  Secondary air trapping at the lingula    4 mm nodule at the anterior right upper lobe (series 207, image 54)  4 mm nodule along the left major fissure within the left lower lobe (series 207, image 60)  PLEURA:  Unremarkable  VESSELS:  Unremarkable for patient's age  HEART:  Unremarkable  MEDIASTINUM AND ELIDA:  Unremarkable  CHEST WALL AND LOWER NECK:   More advanced atherosclerotic vascular calcification than would be expected for the patient's age is noted at the carotid bifurcations bilaterally  VISUALIZED STRUCTURES IN THE UPPER ABDOMEN:  Unremarkable  OSSEOUS STRUCTURES:  No acute fracture or destructive osseous lesion  Impression: Extensive bronchiectasis and scarring throughout the bilateral lungs more conspicuous at the upper lobes with cystic bronchiectasis noted at the right upper lobe  Air trapping at the lingula  Scattered pulmonary nodules measuring up to 4 mm at the right upper and left lower lobe  Based on current Fleischner Society 2017 Guidelines on incidental pulmonary nodule, no routine follow-up is needed if the patient is considered low risk for lung cancer  If the patient is considered high risk for lung cancer, 12 month follow-up non-contrast chest CT is recommended   Workstation performed: [id number]

## 2020-08-03 ENCOUNTER — TELEMEDICINE (OUTPATIENT)
Dept: INTERNAL MEDICINE CLINIC | Facility: CLINIC | Age: 65
End: 2020-08-03
Payer: OTHER MISCELLANEOUS

## 2020-08-03 DIAGNOSIS — G44.52 NEW DAILY PERSISTENT HEADACHE: ICD-10-CM

## 2020-08-03 DIAGNOSIS — R20.0 NUMBNESS AND TINGLING: ICD-10-CM

## 2020-08-03 DIAGNOSIS — R41.3 MEMORY DIFFICULTY: ICD-10-CM

## 2020-08-03 DIAGNOSIS — F43.11 ACUTE POST-TRAUMATIC STRESS DISORDER: ICD-10-CM

## 2020-08-03 DIAGNOSIS — M25.559 HIP PAIN: ICD-10-CM

## 2020-08-03 DIAGNOSIS — J84.9 INTERSTITIAL LUNG DISEASE (HCC): Primary | ICD-10-CM

## 2020-08-03 DIAGNOSIS — R20.2 NUMBNESS AND TINGLING: ICD-10-CM

## 2020-08-03 LAB — VIT B6 SERPL-MCNC: 3.4 UG/L (ref 5.3–46.7)

## 2020-08-03 PROCEDURE — 1036F TOBACCO NON-USER: CPT | Performed by: NURSE PRACTITIONER

## 2020-08-03 PROCEDURE — 99214 OFFICE O/P EST MOD 30 MIN: CPT | Performed by: NURSE PRACTITIONER

## 2020-08-03 NOTE — PATIENT INSTRUCTIONS
Memory Loss in Older Adults   AMBULATORY CARE:   Some memory loss  is common with aging  You may have sharp long-term memories from many years ago but have trouble remembering new information  Normal memory loss does not get worse and does not affect daily activities  Memory loss that gets worse over time or affects daily activities can be a sign of a serious medical problem, such as Alzheimer disease  Talk with your healthcare provider if you or someone close to you notices that your memory is worsening  Signs and symptoms of memory loss that may happen with aging:   · Not remembering where you put your keys or glasses    · Trouble recalling a familiar person's name, but then remembering it    · Trouble remembering why you walked into a room    · Missing an appointment because you forgot about it    · Forgetting someone's birthday or an anniversary  Signs and symptoms of severe memory loss: The following may be signs of a more serious health problem that needs treatment:  · Not knowing how to do something you used to do    · Trouble learning new facts, or trouble learning skills that need you to remember steps    · Trouble following directions, or getting lost, even in an area you know    · Not remembering if you took your medicine or finished a task    · Not being able to remember events from your past, such as a trip you took    · Thinking events that happened years ago happened recently    · Forgetting to bathe, brush your teeth, or do other daily care tasks    · Not recognizing a family member or friend you have known a long time  You or someone close to you should contact your healthcare provider if:   · You have new, sudden, or worsening memory problems  · You have questions or concerns about your condition or care  Follow up with your healthcare provider as directed: You may need to have regular memory tests to check for new or worsening problems   Write down your questions so you remember to ask them during your visits  Manage memory loss:  Some memory loss cannot be treated, but you may be able to stop it from getting worse  Your healthcare provider may need to stop or change certain medicines you are taking, or change the dose  The provider may also recommend vitamins or supplements to help improve your memory  The following are ways to help manage memory loss:  · Ask someone to help you if needed  Ask the person to help you create lists of things you need to do or set up medicine reminders  The person might be able to call you to remind you of an upcoming task, event, or anniversary  · Find a place for items you use often  You may be able to remember where your keys, wallet, glasses, or other items are if you create a place for each item  It may also help if you say that you are returning an item to its place  This may trigger your memory later when you are looking for the item  · Set up reminders  Calendars, timers, or alarm clocks can help you remember to do tasks such as taking your medicine  Some medicine dispensers can be set to sound an alarm when it is time to take the medicine  Containers are also available that are labelled for each day of the week  These containers can help you remember if you need to take the medicine or already took it  You may be able to set up reminders with your bank to help you remember to pay your bills on time  · Write down anything you need to remember  Examples include upcoming healthcare appointments and medication refills  Put the reminders where you will see them, such as on your bathroom mirror or refrigerator  You may want to make a list of things you need to do each day  You can cross the item off when the task is finished  · Create a quiet learning environment  This may help you remember new information more easily  Try to find a place where you will not be distracted by noise, light, or other people   Go slowly and repeat the information to help you remember  Prevent your memory loss from getting worse:   · Play brain games  Games such as crossword puzzles, jigsaw puzzles, or math games can help sharpen your memory  · Spend time with other people  This can help strengthen your memory, especially if you talk about past or upcoming events  · Take a class to learn something new  This will help you think in new ways and make your memory work harder  · Eat a variety of healthy foods  Healthy foods include fruits, vegetables, low-fat dairy products, lean meats, fish, whole-grain breads, and cooked beans  Eat more foods with high amounts of omega-3 fatty acids  Examples include salmon, tuna, walnuts, and flaxseeds  Ask your healthcare provider for a list of foods that contain fatty acids and how much you should eat each day  · Exercise regularly  Exercise improves blood flow and can help you think and remember more easily  Most healthy adults should try to get at least 30 minutes of exercise on most days of the week  Ask your healthcare provider how much exercise you need and which exercises are best for you  · Create a sleep routine  Try to go to bed and wake up at the same times each day  Sleep is important for memory  Talk to your healthcare provider if you are having trouble sleeping  · Do not smoke  Nicotine and other chemicals in cigarettes and cigars can reduce the amount of oxygen going to your brain  This can make memory problems worse  Ask your healthcare provider for information if you currently smoke and need help to quit  E-cigarettes or smokeless tobacco still contain nicotine  Talk to your healthcare provider before you use these products  · Limit or do not drink alcohol  Alcohol can lead to both short-term and long-term memory problems  Ask your healthcare provider if alcohol is safe for you, and how much is safe to drink    © 2017 [employer] Information is for End User's use only and may not be sold, redistributed or otherwise used for commercial purposes  All illustrations and images included in CareNotes® are the copyrighted property of [employer]  or Memo Ramachandran  The above information is an  only  It is not intended as medical advice for individual conditions or treatments  Talk to your doctor, nurse or pharmacist before following any medical regimen to see if it is safe and effective for you

## 2020-08-03 NOTE — ASSESSMENT & PLAN NOTE
Patient is to start physical therapy this week  Has evaluation with orthopedic surgeon on 08/19/2020  Patient is still waiting to hear back from pain management about hip injection

## 2020-08-03 NOTE — ASSESSMENT & PLAN NOTE
Patient has close follow-up with pulmonology  Was recently started on DuoNeb treatments q i d   Follow-up CT of chest will be ordered in 6-12 months per pulmonology

## 2020-08-03 NOTE — PROGRESS NOTES
Virtual Regular Visit      Assessment/Plan:  Patient advised to begin taking daily vitamin D 1000 untis and vitamin B6 supplements  Problem List Items Addressed This Visit        Respiratory    Interstitial lung disease ([address] ) - Primary     Patient has close follow-up with pulmonology  Was recently started on DuoNeb treatments q i d   Follow-up CT of chest will be ordered in 6-12 months per pulmonology  Other    Acute post-traumatic stress disorder     Patient to continue follow-up visits with psychiatry  Continue current medications  New daily persistent headache     Continue follow-up with neurology  Hip pain     Patient is to start physical therapy this week  Has evaluation with orthopedic surgeon on 08/19/2020  Patient is still waiting to hear back from pain management about hip injection  Memory difficulty    Numbness and tingling     Patient was started on gabapentin 100 mg daily by Neurology  Advised to increase this to 100 mg b i d   If tolerated patient can increase to 100 mg t i d  Per neurology's order  Neurology advised EMG if no improvement with medication                      Reason for visit is   Chief Complaint   Patient presents with    Virtual Regular Visit     2 week follow up    Virtual Regular Visit        Encounter provider ERIC Childers    Provider located at [address] [place] [address]      Recent Visits  Date Type Provider Dept   07/30/20 Telephone Wilner [place], [address]   07/29/20 Telephone Trinidad Childers [facility]   07/28/20 Telephone Krystina Haider RN [address] [place] [address] recent visits within past 7 days and meeting all other requirements     Today's Visits  Date Type Provider [facility]   [facility] [address] today's visits and meeting all other requirements     Future Appointments  No visits were found meeting these conditions  Showing future appointments within next 150 days and meeting all other requirements        The patient was identified by name and date of birth  Jasson Pruitt was informed that this is a telemedicine visit and that the visit is being conducted through [facility] and patient was informed that this is not a secure, HIPAA-complaint platform  He agrees to proceed     My office door was closed  No one else was in the room  He acknowledged consent and understanding of privacy and security of the video platform  The patient has agreed to participate and understands they can discontinue the visit at any time  Patient is aware this is a billable service  Subjective  Jasson Pruitt is a 60 y o  male recovering from coronavirus infection   Patient seen for one-month follow-up covid  Patient has completed his pulmonary function test and was recently seen by pulmonology  Is recommended to do a repeat CT scan in 6-12 months to evaluate any progression or improvement of lung damage  Patient was also started on a DuoNeb to use 4 times a day  Patient has only been doing this for the past 2-3 days and states that at this time he does not see any improvement in symptoms  Patient is hopeful that with continued use he will have improved breathing function  Patient states that he is no longer using the oxygen at this time however he does often feel short of breath  Breathing is worse when outside of the home in hot temperatures  Patient states that while taking a short walk even around his property he must go back inside or if out of the house has to sit in the car with the air conditioning running  Patient had consult with neurology Dr Jordana Wang up    He was started on gabapentin 100 mg daily and advised to increase to twice a day after 1 week and then up to 3 times a day after another week if no side effects are noted  Patient states that he has been taking this medication on a daily basis and has not noticed any improvement in symptoms at this time  Patient advised to increased to taking this medication twice a day  Patient was advised to have a neuropsychology evaluation and patient is working on scheduling this appointment  Neurology advised that patient follow-up with a ortho surgeon and physical therapy as well for his hip pains  Patient is scheduled on August 6 to begin physical therapy and has an appointment with orthopedics on August 19th  Patient states that he does still have constant pain in hip and has numbness and tingling in the upper bilateral thighs  Patient has been walking with a limp due to his joint pains  Patient also had some blood work completed recently showing low levels of vitamin-D and vitamin B6  Patient was advised to begin taking a daily supplement and we will recheck these levels in the future  Patient continues to have issues with short-term memory loss  Patient states he was speaking to his son a few days ago and was informed by his son that he was repeating himself during the conversation  Patient's son got frustrated during the conversation however the patient states that he was able to explain to a son that he has been having trouble with his memory since the coronavirus infection  Patient has no change in past long-term memories it is only issues with short term day today memories  Patient states that his psychiatrist appointments have been going well  Patient is scheduled to have his next visit in-person up-to-date all of the visits have been virtual   Patient's brother is also recovering from covered infection and the patient states that they often discuss there similar complaints and concerns  Patient has a very good amount of family support to help him  Past Medical History:   Diagnosis Date    Memory loss        History reviewed   No pertinent surgical history  Current Outpatient Medications   Medication Sig Dispense Refill    amLODIPine (NORVASC) 2 5 mg tablet Take 1 tablet (2 5 mg total) by mouth daily after dinner 30 tablet 5    busPIRone (BUSPAR) 10 mg tablet Take 1 tablet (10 mg total) by mouth 2 (two) times a day 60 tablet 1    gabapentin (NEURONTIN) 100 mg capsule One tablet p o  q h s  for 1 week if able to tolerate then can increase it to 1 tablet b i d  and if tolerating it well then increasing it to 1 tablet 3 times a day 90 capsule 1    naproxen sodium (ANAPROX) 550 mg tablet Take 1 tablet (550 mg total) by mouth 2 (two) times a day as needed for mild pain or headaches 60 tablet 0    sertraline (ZOLOFT) 100 mg tablet Take 1 tablet (100 mg total) by mouth daily 30 tablet 1    tiZANidine (ZANAFLEX) 2 mg tablet TAKE 1 TABLET (2 MG TOTAL) BY MOUTH DAILY AT BEDTIME 30 tablet 1    traZODone (DESYREL) 50 mg tablet Take 0 5 tablets (25 mg total) by mouth daily at bedtime as needed for sleep May increase to whole tablet if needed 30 tablet 1    ipratropium-albuterol (DUO-NEB) 0 5-2 5 mg/3 mL nebulizer solution Take 1 vial (3 mL total) by nebulization 3 (three) times a day 90 vial 2     No current facility-administered medications for this visit  Allergies   Allergen Reactions    Tetracycline Rash       Review of Systems   Constitutional: Positive for fatigue  Negative for chills and fever  HENT: Negative for congestion, ear pain and sore throat  Eyes: Positive for photophobia  Respiratory: Positive for chest tightness and shortness of breath  Cardiovascular: Negative for chest pain and palpitations  Musculoskeletal: Positive for arthralgias and gait problem  Skin: Negative for rash  Neurological: Positive for weakness and headaches  Psychiatric/Behavioral: Positive for dysphoric mood and sleep disturbance  The patient is nervous/anxious  Video Exam    There were no vitals filed for this visit      Physical Exam   Constitutional: He is oriented to person, place, and time  He is cooperative  He does not appear ill  No distress  HENT:   Head: Normocephalic and atraumatic  Right Ear: Hearing and external ear normal    Left Ear: Hearing and external ear normal    Nose: Nose normal    Eyes: Conjunctivae and lids are normal    Pulmonary/Chest: No accessory muscle usage  No tachypnea  No respiratory distress  He has no wheezes  Abdominal: Normal appearance  Neurological: He is alert and oriented to person, place, and time  Psychiatric: His speech is normal and behavior is normal  Affect, judgment and thought content normal  He exhibits a depressed mood  He exhibits abnormal recent memory  I spent 25 minutes directly with the patient during this visit      VIRTUAL VISIT DISCLAIMER    London Barrera acknowledges that he has consented to an online visit or consultation  He understands that the online visit is based solely on information provided by him, and that, in the absence of a face-to-face physical evaluation by the physician, the diagnosis he receives is both limited and provisional in terms of accuracy and completeness  This is not intended to replace a full medical face-to-face evaluation by the physician  London Barrera understands and accepts these terms

## 2020-08-03 NOTE — ASSESSMENT & PLAN NOTE
Patient was started on gabapentin 100 mg daily by Neurology  Advised to increase this to 100 mg b i d   If tolerated patient can increase to 100 mg t i d  Per neurology's order  Neurology advised EMG if no improvement with medication

## 2020-08-04 NOTE — TELEPHONE ENCOUNTER
Pt/s dtr r/c, scheduled Right Hip injection with Dr Jose Francisco Knight on 8/26/20 920 am  Gave pre procedure instructions, masking/ policy and COVID screening  Asked if any cxl to be called for sooner appt, also made her aware I did fax auth req to [facility] but have no heard back yet

## 2020-08-05 ENCOUNTER — TELEMEDICINE (OUTPATIENT)
Dept: PSYCHIATRY | Facility: CLINIC | Age: 65
End: 2020-08-05
Payer: COMMERCIAL

## 2020-08-05 DIAGNOSIS — F43.10 PTSD (POST-TRAUMATIC STRESS DISORDER): ICD-10-CM

## 2020-08-05 DIAGNOSIS — F32.1 CURRENT MODERATE EPISODE OF MAJOR DEPRESSIVE DISORDER WITHOUT PRIOR EPISODE (HCC): ICD-10-CM

## 2020-08-05 DIAGNOSIS — F41.1 GAD (GENERALIZED ANXIETY DISORDER): ICD-10-CM

## 2020-08-05 PROCEDURE — 99443 PR PHYS/QHP TELEPHONE EVALUATION 21-30 MIN: CPT | Performed by: NURSE PRACTITIONER

## 2020-08-05 RX ORDER — TRAZODONE HYDROCHLORIDE 50 MG/1
25 TABLET ORAL
Qty: 30 TABLET | Refills: 2 | Status: SHIPPED | OUTPATIENT
Start: 2020-08-05 | End: 2020-10-06 | Stop reason: SDUPTHER

## 2020-08-05 RX ORDER — SERTRALINE HYDROCHLORIDE 100 MG/1
100 TABLET, FILM COATED ORAL DAILY
Qty: 30 TABLET | Refills: 2 | Status: SHIPPED | OUTPATIENT
Start: 2020-08-05 | End: 2020-08-31

## 2020-08-05 RX ORDER — BUSPIRONE HYDROCHLORIDE 10 MG/1
10 TABLET ORAL 2 TIMES DAILY
Qty: 60 TABLET | Refills: 2 | Status: SHIPPED | OUTPATIENT
Start: 2020-08-05 | End: 2020-08-21

## 2020-08-10 ENCOUNTER — TELEPHONE (OUTPATIENT)
Dept: INTERNAL MEDICINE CLINIC | Facility: CLINIC | Age: 65
End: 2020-08-10

## 2020-08-10 NOTE — TELEPHONE ENCOUNTER
Patient called he needs an updated doctor note for his job stating he will not be going to work anymore  He stated it was as per Bree Payne, the patient had covid in April      Patient phone [phone]

## 2020-08-10 NOTE — LETTER
To Whom it May Concern:     Bonnie Roth is under my professional care  He needs to be off of work indefinitely  If you have any questions or concerns, please don't hesitate to call          Sincerely,            ERIC Jovel

## 2020-08-13 ENCOUNTER — TELEMEDICINE (OUTPATIENT)
Dept: BEHAVIORAL/MENTAL HEALTH CLINIC | Facility: CLINIC | Age: 65
End: 2020-08-13
Payer: COMMERCIAL

## 2020-08-13 DIAGNOSIS — F43.11 ACUTE POST-TRAUMATIC STRESS DISORDER: ICD-10-CM

## 2020-08-13 DIAGNOSIS — F43.10 PTSD (POST-TRAUMATIC STRESS DISORDER): Primary | ICD-10-CM

## 2020-08-13 PROCEDURE — 90834 PSYTX W PT 45 MINUTES: CPT | Performed by: SOCIAL WORKER

## 2020-08-13 PROCEDURE — 1036F TOBACCO NON-USER: CPT | Performed by: SOCIAL WORKER

## 2020-08-13 NOTE — PSYCH
Virtual Regular Visit It was my intent to perform this visit via video technology but the patient was not able to do a video connection so the visit was completed via audio telephone only  Assessment/Plan:    Problem List Items Addressed This Visit        Other    PTSD (post-traumatic stress disorder) - Primary    Acute post-traumatic stress disorder               Reason for visit is DUE TO THE COVID 19 PANDEMIC     Encounter provider Bhupinder Mcgee    Provider located at [address] [facility]  [address]  [facility] [address]      Recent Visits  No visits were found meeting these conditions  Showing recent visits within past 7 days and meeting all other requirements     Today's Visits  Date Type Provider Dept   08/13/20 [address] [facility] Therapist   Showing today's visits and meeting all other requirements     Future Appointments  No visits were found meeting these conditions  Showing future appointments within next 150 days and meeting all other requirements        The patient was identified by name and date of birth  Joanie Caruso was informed that this is a telemedicine visit and that the visit is being conducted through [employer]  My office door was closed  No one else was in the room  He acknowledged consent and understanding of privacy and security of the video platform  The patient has agreed to participate and understands they can discontinue the visit at any time  Patient is aware this is a billable service  Subjective  Joanie Caruso is a 60 y o  male    HPI     Past Medical History:   Diagnosis Date    Memory loss        No past surgical history on file      Current Outpatient Medications   Medication Sig Dispense Refill    amLODIPine (NORVASC) 2 5 mg tablet Take 1 tablet (2 5 mg total) by mouth daily after dinner 30 tablet 5    busPIRone (BUSPAR) 10 mg tablet Take 1 tablet (10 mg total) by mouth 2 (two) times a day 60 tablet 2    gabapentin (NEURONTIN) 100 mg capsule One tablet p o  q h s  for 1 week if able to tolerate then can increase it to 1 tablet b i d  and if tolerating it well then increasing it to 1 tablet 3 times a day 90 capsule 1    ipratropium-albuterol (DUO-NEB) 0 5-2 5 mg/3 mL nebulizer solution Take 1 vial (3 mL total) by nebulization 3 (three) times a day 90 vial 2    naproxen sodium (ANAPROX) 550 mg tablet Take 1 tablet (550 mg total) by mouth 2 (two) times a day as needed for mild pain or headaches 60 tablet 0    sertraline (ZOLOFT) 100 mg tablet Take 1 tablet (100 mg total) by mouth daily 30 tablet 2    tiZANidine (ZANAFLEX) 2 mg tablet TAKE 1 TABLET (2 MG TOTAL) BY MOUTH DAILY AT BEDTIME 30 tablet 1    traZODone (DESYREL) 50 mg tablet Take 0 5 tablets (25 mg total) by mouth daily at bedtime as needed for sleep May take whole tablet if needed 30 tablet 2     No current facility-administered medications for this visit  Allergies   Allergen Reactions    Tetracycline Rash       Review of Systems  DATA: Stonewall Ear could not get his camera to work  He shared he has been very tired  He has been dealing with disability thru his employer  He reported how sluggish he has been lately  He reports since having covid he feels terrible even though he is recovered from covid  Regarding his goals he is not feeling better than before having covid  He continues to have issues with his sleep since having covid  He is still grieving the loss of his brother  He shared all the symptoms he has had since having covid  Assessment: Stonewall Ear denies suicidal or homicidal ideation, plan or intent  Stonewall Ear is dealing with the stress of the symptoms he has since having covid  Plan: Will continue to provide Stonewall Ear support thru this difficult journey  Video Exam    There were no vitals filed for this visit      Physical Exam N/A    I spent 45 minutes directly with the patient during this visit      VIRTUAL VISIT DISCLAIMER    Emilie Mays acknowledges that he has consented to an online visit or consultation  He understands that the online visit is based solely on information provided by him, and that, in the absence of a face-to-face physical evaluation by the physician, the diagnosis he receives is both limited and provisional in terms of accuracy and completeness  This is not intended to replace a full medical face-to-face evaluation by the physician  Emilie Mays understands and accepts these terms

## 2020-08-14 ENCOUNTER — TELEPHONE (OUTPATIENT)
Dept: INTERNAL MEDICINE CLINIC | Facility: CLINIC | Age: 65
End: 2020-08-14

## 2020-08-14 NOTE — TELEPHONE ENCOUNTER
Received denial from [facility]ON Chacko stating "Right Hip is not a reported injury on this case"  S/w pt's daughter, made her aware of WC denial and also we are non par with health insurance  Advised to reach out to RAMON Chacko  and let me know of any updates  I will keep on the schedule for 8/26/20 for now and await update from daughter next week

## 2020-08-14 NOTE — TELEPHONE ENCOUNTER
Nelli Thompson wrote a note for patient for [employer] on 8/10 but the agency will not accept it as is  Please have Nelli Thompson write a note stating that "patient is under my care for treatment of COVID-19 " Patient's next appointment is (give day)  Patient's daughter will  later today

## 2020-08-17 ENCOUNTER — TELEPHONE (OUTPATIENT)
Dept: INTERNAL MEDICINE CLINIC | Facility: CLINIC | Age: 65
End: 2020-08-17

## 2020-08-17 NOTE — TELEPHONE ENCOUNTER
PATIENT IS REQUESTING A CALL BACK FROM [facility],  HE STATES THAT HIS MEDS ARE NOT COVERED BY HIS INSURANCE    CALL BACK # [phone]

## 2020-08-17 NOTE — TELEPHONE ENCOUNTER
Amlodipine and Buspar are the medications that aren't covered  He has workers compensation for his insurance and they were not sure that this was work related  I spoke with the pharmacist and she confirmed that as well  He informed me that the medications are to much money to pay out of pocket ($20 00-$40 00)    If this is covered under his workers compensation, can you please document it in your notes and we can fax it over

## 2020-08-18 NOTE — TELEPHONE ENCOUNTER
PENNIE WORKING ON PRIOR AUTH, CALLED PT   AND WAS NOTIFIED AND STATED HIS DAUGHTER CAN CHECK HIS BLOOD PRESSURES AND SHE WILL BE THERE TOMORROW AND ASKED HIM TO HAVE HER CALL US WITH READINGS

## 2020-08-18 NOTE — TELEPHONE ENCOUNTER
So Jessika is on the [address] [place] [address], but I think just needs a prior auth  Amlodipine is not listed, however patient's blood pressures look like they have been well controlled  We could possibly stop the blood pressure medication if he is able to monitor blood pressures at home  And if he needs to be put back on a medication losartan or lisinopril should be covered

## 2020-08-18 NOTE — TELEPHONE ENCOUNTER
CALLED [facility] AND WAS NOTIFIED AND STATED NEED TO CALL [phone] AND THEY HAVE A LIST OF COVID APPROVED MEDS

## 2020-08-18 NOTE — TELEPHONE ENCOUNTER
I am not sure what type of documentation they are looking for? I can write a note saying that prior to his covid infection there were no documented mental or functional health concerns  All of these are technically results of covid, which is what the workers comp is for  The two medications were started as a result of covid related issues

## 2020-08-20 ENCOUNTER — TELEPHONE (OUTPATIENT)
Dept: INTERNAL MEDICINE CLINIC | Facility: CLINIC | Age: 65
End: 2020-08-20

## 2020-08-20 ENCOUNTER — TELEPHONE (OUTPATIENT)
Dept: NEUROLOGY | Facility: CLINIC | Age: 65
End: 2020-08-20

## 2020-08-20 NOTE — TELEPHONE ENCOUNTER
S/w pt's dtr, pt's  is requesting a hearing for [facility] approval  Cxl 8/26/20 hip injection  Pt will reach out after hearing

## 2020-08-20 NOTE — TELEPHONE ENCOUNTER
Spoke with patient, discussed scheduling options  Explained we are booked thruogh 2020 and JAN>FEB 2021  Pt has opted to be on our waitlist   I will call in sept to schedule  For march unless and opening comes up before  I offered to send a list of outside providers to do eval, pt did not want at this time

## 2020-08-21 ENCOUNTER — OFFICE VISIT (OUTPATIENT)
Dept: OBGYN CLINIC | Facility: CLINIC | Age: 65
End: 2020-08-21
Payer: OTHER MISCELLANEOUS

## 2020-08-21 ENCOUNTER — TELEPHONE (OUTPATIENT)
Dept: PSYCHIATRY | Facility: CLINIC | Age: 65
End: 2020-08-21

## 2020-08-21 VITALS
SYSTOLIC BLOOD PRESSURE: 120 MMHG | HEART RATE: 97 BPM | WEIGHT: 220 LBS | BODY MASS INDEX: 33.34 KG/M2 | TEMPERATURE: 99.1 F | HEIGHT: 68 IN | DIASTOLIC BLOOD PRESSURE: 78 MMHG

## 2020-08-21 DIAGNOSIS — F41.1 GAD (GENERALIZED ANXIETY DISORDER): Primary | ICD-10-CM

## 2020-08-21 DIAGNOSIS — M16.11 PRIMARY OSTEOARTHRITIS OF RIGHT HIP: Primary | ICD-10-CM

## 2020-08-21 PROCEDURE — 3008F BODY MASS INDEX DOCD: CPT | Performed by: ORTHOPAEDIC SURGERY

## 2020-08-21 PROCEDURE — 99203 OFFICE O/P NEW LOW 30 MIN: CPT | Performed by: ORTHOPAEDIC SURGERY

## 2020-08-21 PROCEDURE — 1036F TOBACCO NON-USER: CPT | Performed by: ORTHOPAEDIC SURGERY

## 2020-08-21 PROCEDURE — 3008F BODY MASS INDEX DOCD: CPT | Performed by: NURSE PRACTITIONER

## 2020-08-21 RX ORDER — SERTRALINE HYDROCHLORIDE 25 MG/1
25 TABLET, FILM COATED ORAL DAILY
Qty: 30 TABLET | Refills: 1 | Status: SHIPPED | OUTPATIENT
Start: 2020-08-21 | End: 2020-08-31

## 2020-08-21 NOTE — PATIENT INSTRUCTIONS
Weightbear as tolerated bilateral lower extremity  Right hip image guided injection has been ordered, please let your worker's  know about the prescription    Call orthopedic office with information if doctor to doctor discussion required  If able to obtain injection, follow-up in clinic in 6 weeks

## 2020-08-21 NOTE — PROGRESS NOTES
Orthopaedics Office Visit - New Patient Visit    ASSESSMENT/PLAN:    Assessment:   Right hip osteoarthritis, exacerbated by recent inpatient admission and positioning requirements for COVID-19  Of note, patient contracted the virus while at work, therefore resulting in a worker's compensation claim  As a direct result of the positioning required for treating this injury, this is most likely what led to exacerbation of his right hip osteoarthritis  Plan:   Weightbear as tolerated bilateral lower extremity  Right hip corticosteroid injection under image guidance ordered  Patient will likely require approval from worker's compensation and may require a peer to peer from myself to approve this injection  Follow-up as needed or in 6 weeks for repeat evaluation of hip pain    To Do Next Visit:  Re-evaluate right hip pain    _____________________________________________________  CHIEF COMPLAINT:  Chief Complaint   Patient presents with    Right Hip - Pain         SUBJECTIVE:  Oscar Ferguson is a 60 y o  male who presents with 4 months of right hip pain that is well localized to the intra-articular and anterior thigh area of his right lower extremity  His left lower extremity is not affected  Right lower extremity hip pain is exacerbated by bending over to obtain objects as well as internal and external rotation of his lower extremity  He has difficulty getting in and out of cars with this leg  He also has affected gait because of this pain  He currently takes Zanaflex and oral Naprosyn which only provides minimal relief of his symptoms  Of note he was hospitalized for 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 and required frequent proning in the hospital and periods of decreased mobility  He denies any motor sensory deficits  PAST MEDICAL HISTORY:  Past Medical History:   Diagnosis Date    Memory loss        PAST SURGICAL HISTORY:  History reviewed   No pertinent surgical history  FAMILY HISTORY:  History reviewed  No pertinent family history  SOCIAL HISTORY:  Social History     Tobacco Use    Smoking status: Former Smoker     Packs/day: 2 00     Years: 30 00     Pack years: 60 00     Types: Cigarettes    Smokeless tobacco: Never Used   Substance Use Topics    Alcohol use: Not Currently    Drug use: Never       MEDICATIONS:    Current Outpatient Medications:     amLODIPine (NORVASC) 2 5 mg tablet, Take 1 tablet (2 5 mg total) by mouth daily after dinner, Disp: 30 tablet, Rfl: 5    gabapentin (NEURONTIN) 100 mg capsule, One tablet p o  q h s  for 1 week if able to tolerate then can increase it to 1 tablet b i d  and if tolerating it well then increasing it to 1 tablet 3 times a day, Disp: 90 capsule, Rfl: 1    ipratropium-albuterol (DUO-NEB) 0 5-2 5 mg/3 mL nebulizer solution, Take 1 vial (3 mL total) by nebulization 3 (three) times a day, Disp: 90 vial, Rfl: 2    naproxen sodium (ANAPROX) 550 mg tablet, Take 1 tablet (550 mg total) by mouth 2 (two) times a day as needed for mild pain or headaches, Disp: 60 tablet, Rfl: 0    sertraline (ZOLOFT) 100 mg tablet, Take 1 tablet (100 mg total) by mouth daily, Disp: 30 tablet, Rfl: 2    sertraline (ZOLOFT) 25 mg tablet, Take 1 tablet (25 mg total) by mouth daily Take in addition to 100 mg tablet, Disp: 30 tablet, Rfl: 1    tiZANidine (ZANAFLEX) 2 mg tablet, TAKE 1 TABLET (2 MG TOTAL) BY MOUTH DAILY AT BEDTIME, Disp: 30 tablet, Rfl: 1    traZODone (DESYREL) 50 mg tablet, Take 0 5 tablets (25 mg total) by mouth daily at bedtime as needed for sleep May take whole tablet if needed, Disp: 30 tablet, Rfl: 2    ALLERGIES:  Allergies   Allergen Reactions    Tetracycline Rash       REVIEW OF SYSTEMS:  MSK:  Right hip pain  Neuro:  Negative  Pertinent items are otherwise noted in HPI  A comprehensive review of systems was otherwise negative      LABS:  HgA1c: No results found for: HGBA1C  BMP:   Lab Results   Component Value Date CALCIUM 9 0 05/05/2020    K 3 8 05/05/2020    CO2 28 05/05/2020     05/05/2020    BUN 18 05/05/2020    CREATININE 1 27 05/05/2020     CBC: No components found for: CBC    _____________________________________________________  PHYSICAL EXAMINATION:  Vital signs: /78   Pulse 97   Temp 99 1 °F (37 3 °C)   Ht 5' 8" (1 727 m)   Wt 99 8 kg (220 lb)   BMI 33 45 kg/m²   General: No acute distress, awake and alert  Psychiatric: Mood and affect appear appropriate  HEENT: Trachea Midline, No torticollis, no apparent facial trauma  Cardiovascular: No audible murmurs; Extremities appear perfused  Pulmonary: No audible wheezing or stridor  Skin: No open lesions; see further details (if any) below    MUSCULOSKELETAL EXAMINATION:  Extremities:  RLE:  Stinchfield test produces significant right hip intra-articular pain  Significant pain in the right intra-articular hip area with internal external rotation of leg  No tenderness to palpation over right greater trochanteric bursa  Seated straight leg raise does not produce lumbar radicular symptoms  Extremity warm well perfused  + ankle df/pf, ehl/fhl motor functions      _____________________________________________________  STUDIES REVIEWED:  Plain films of right hip reveal slight flattening of femoral head, significant joint space narrowing, probable cam deformity to femoral head    Degenerative changes are noted compared to left femoral head      PROCEDURES PERFORMED:  None    Natalia Box MD

## 2020-08-31 ENCOUNTER — TELEMEDICINE (OUTPATIENT)
Dept: INTERNAL MEDICINE CLINIC | Facility: CLINIC | Age: 65
End: 2020-08-31
Payer: OTHER MISCELLANEOUS

## 2020-08-31 DIAGNOSIS — J80 ACUTE RESPIRATORY DISTRESS SYNDROME (ARDS) DUE TO COVID-19 VIRUS (HCC): Primary | ICD-10-CM

## 2020-08-31 DIAGNOSIS — U07.1 ACUTE RESPIRATORY DISTRESS SYNDROME (ARDS) DUE TO COVID-19 VIRUS (HCC): Primary | ICD-10-CM

## 2020-08-31 DIAGNOSIS — M25.559 HIP PAIN: ICD-10-CM

## 2020-08-31 PROCEDURE — 99212 OFFICE O/P EST SF 10 MIN: CPT | Performed by: NURSE PRACTITIONER

## 2020-08-31 PROCEDURE — 1036F TOBACCO NON-USER: CPT | Performed by: NURSE PRACTITIONER

## 2020-08-31 NOTE — PROGRESS NOTES
Virtual Regular Visit      Assessment/Plan:  Patient to contact orthopedics for most recent office notes to be sent to worker's compensation, which states that hip pain is a result of positioning during hospitalization for coronavirus infection earlier this year  Patient is unfortunately awaiting insurance coverage for many medications and his recommended joint injection  Problem List Items Addressed This Visit        Respiratory    Acute respiratory distress syndrome (ARDS) due to COVID-19 virus ([address] ) - Primary       Other    Hip pain               Reason for visit is   Chief Complaint   Patient presents with    Virtual Regular Visit     hip problems    Virtual Regular Visit        Encounter provider ERIC Berger    Provider located at [facility] [place] [address]      Recent Visits  No visits were found meeting these conditions  Showing recent visits within past 7 days and meeting all other requirements     Today's Visits  Date Type Provider Dept   08/31/20 [facility], [facility]  today's visits and meeting all other requirements     Future Appointments  No visits were found meeting these conditions  Showing future appointments within next 150 days and meeting all other requirements        The patient was identified by name and date of birth  Jennifer Merino was informed that this is a telemedicine visit and that the visit is being conducted through [address] [place] [address] and patient was informed that this is not a secure, HIPAA-complaint platform  He agrees to proceed     My office door was closed  No one else was in the room  He acknowledged consent and understanding of privacy and security of the video platform  The patient has agreed to participate and understands they can discontinue the visit at any time  Patient is aware this is a billable service       Kyle Ahumada Isabella Ramey is a 63 y o  male follow up of hip pains   Patient called to discuss troubles with insurance coverage regarding his care  States he is unable to get some of his medications because they are not being covered and therefore he has to pay out of pocket  Patient states he continues to have debilitating hip pains which effect his ability to ambulate and also contribute a fair amount of stress and anxiety  Patient did not experience any types of pain like this prior to his coronavirus infection this year with the subsequent hospitalization  Since discharge home 4/13/2020 patient has been struggling with these pains  Patient was unaware of the pains at first until he was able to gain more strength and walk around the house  Patient recently was seen by orthopedics who recommends joint injections of the right hip  Patient is awaiting insurance approval for this procedure  Patient was evaluated by pain management prior to this most recent visit, who as well wanted to complete hip injections, but insurance authorization was denied  Patient currently has a personal  who he is using to try and get worker's compensation approval for his treatments  Patient advised to call his orthopedic physician to obtain copies of the most recent ortho evaluation for his  and workman's compensation to review  Patient also states that he gets lightheaded or dizzy depending on certain activities  The other day he stepped outside for some fresh air but had to go back inside due to light headedness  Uses oxygen occasionally when he gets these feelings  Past Medical History:   Diagnosis Date    Memory loss        History reviewed  No pertinent surgical history      Current Outpatient Medications   Medication Sig Dispense Refill    amLODIPine (NORVASC) 2 5 mg tablet Take 1 tablet (2 5 mg total) by mouth daily after dinner 30 tablet 5    gabapentin (NEURONTIN) 100 mg capsule One tablet p o  q h s  for 1 week if able to tolerate then can increase it to 1 tablet b i d  and if tolerating it well then increasing it to 1 tablet 3 times a day 90 capsule 1    ipratropium-albuterol (DUO-NEB) 0 5-2 5 mg/3 mL nebulizer solution Take 1 vial (3 mL total) by nebulization 3 (three) times a day 90 vial 2    naproxen sodium (ANAPROX) 550 mg tablet Take 1 tablet (550 mg total) by mouth 2 (two) times a day as needed for mild pain or headaches 60 tablet 0    tiZANidine (ZANAFLEX) 2 mg tablet TAKE 1 TABLET (2 MG TOTAL) BY MOUTH DAILY AT BEDTIME 30 tablet 1    traZODone (DESYREL) 50 mg tablet Take 0 5 tablets (25 mg total) by mouth daily at bedtime as needed for sleep May take whole tablet if needed 30 tablet 2     No current facility-administered medications for this visit  Allergies   Allergen Reactions    Tetracycline Rash       Review of Systems   Constitutional: Positive for activity change and fatigue  Negative for chills and fever  Respiratory: Positive for chest tightness and shortness of breath  Musculoskeletal: Positive for arthralgias (right hip) and myalgias  Neurological: Positive for headaches  Psychiatric/Behavioral: Positive for confusion (memory difficulties)  Video Exam    There were no vitals filed for this visit  Physical Exam  Constitutional:       General: He is not in acute distress  Appearance: Normal appearance  He is normal weight  He is not ill-appearing  HENT:      Head: Normocephalic and atraumatic  Right Ear: Hearing and external ear normal       Left Ear: Hearing and external ear normal    Pulmonary:      Effort: No tachypnea, accessory muscle usage or respiratory distress  Musculoskeletal:      Right hip: He exhibits decreased range of motion and tenderness  Neurological:      General: No focal deficit present  Mental Status: He is alert and oriented to person, place, and time  Motor: Motor function is intact     Psychiatric:         Attention and Perception: Attention and perception normal          Mood and Affect: Mood is depressed  Speech: Speech normal          Behavior: Behavior normal  Behavior is cooperative  Thought Content: Thought content normal          Cognition and Memory: He exhibits impaired recent memory  Judgment: Judgment normal           I spent 15 minutes with patient today in which greater than 50% of the time was spent in counseling/coordination of care regarding unknown changes due to covid infections      VIRTUAL VISIT DISCLAIMER    Jasson Pruitt acknowledges that he has consented to an online visit or consultation  He understands that the online visit is based solely on information provided by him, and that, in the absence of a face-to-face physical evaluation by the physician, the diagnosis he receives is both limited and provisional in terms of accuracy and completeness  This is not intended to replace a full medical face-to-face evaluation by the physician  Jasson Pruitt understands and accepts these terms

## 2020-08-31 NOTE — LETTER
August 31, 2020     Patient: Jennifer Merino   YOB: 1955   Date of Visit: 8/31/2020       To Whom it May Concern:    Jennifer Merino is under my professional care  He was seen for a telemedicine visit on 8/31/2020  He was advised to obtain most recent office note from orthopedic physician Dr Aylin Mclaughlin for submission to workman's compensation  Next follow up visit with myself for primary care is scheduled for 10/5/2020  If you have any questions or concerns, please don't hesitate to call           Sincerely,          Shan Bear, ERIC

## 2020-09-01 ENCOUNTER — TELEPHONE (OUTPATIENT)
Dept: OBGYN CLINIC | Facility: MEDICAL CENTER | Age: 65
End: 2020-09-01

## 2020-09-01 NOTE — TELEPHONE ENCOUNTER
Patient see Dr Shanell Gomez  Patient is calling in stating he needs a note written for his  for workman's compensation  He wants the note to state that the [place] he contracted is related to his hip and why he is getting the injections and doctor would need to sign  He will  the note once ready       [phone]

## 2020-09-02 ENCOUNTER — TELEPHONE (OUTPATIENT)
Dept: PULMONOLOGY | Facility: CLINIC | Age: 65
End: 2020-09-02

## 2020-09-02 NOTE — TELEPHONE ENCOUNTER
I followed up with patient and made him aware the note is ready for   He acknowedged and said thank you to Doctor Jessie Vincent

## 2020-09-03 ENCOUNTER — OFFICE VISIT (OUTPATIENT)
Dept: PULMONOLOGY | Facility: CLINIC | Age: 65
End: 2020-09-03
Payer: OTHER MISCELLANEOUS

## 2020-09-03 VITALS
HEIGHT: 68 IN | DIASTOLIC BLOOD PRESSURE: 84 MMHG | TEMPERATURE: 97.9 F | HEART RATE: 80 BPM | OXYGEN SATURATION: 92 % | SYSTOLIC BLOOD PRESSURE: 126 MMHG | BODY MASS INDEX: 34.56 KG/M2 | WEIGHT: 228 LBS

## 2020-09-03 DIAGNOSIS — J12.82 PNEUMONIA DUE TO COVID-19 VIRUS: ICD-10-CM

## 2020-09-03 DIAGNOSIS — Z00.00 ROUTINE HEALTH MAINTENANCE: ICD-10-CM

## 2020-09-03 DIAGNOSIS — J47.9 BRONCHIECTASIS WITHOUT COMPLICATION (HCC): Primary | ICD-10-CM

## 2020-09-03 DIAGNOSIS — R06.02 SOB (SHORTNESS OF BREATH): ICD-10-CM

## 2020-09-03 DIAGNOSIS — U07.1 PNEUMONIA DUE TO COVID-19 VIRUS: ICD-10-CM

## 2020-09-03 DIAGNOSIS — J98.4 RESTRICTIVE LUNG DISEASE: ICD-10-CM

## 2020-09-03 PROCEDURE — 99214 OFFICE O/P EST MOD 30 MIN: CPT | Performed by: INTERNAL MEDICINE

## 2020-09-03 NOTE — PROGRESS NOTES
Assessment/Plan:   Diagnoses and all orders for this visit:    Bronchiectasis without complication ([address] )  -     umeclidinium-vilanterol (ANORO ELLIPTA) 62 5-25 MCG/INH inhaler; Inhale 1 puff daily    SOB (shortness of breath)  -     umeclidinium-vilanterol (ANORO ELLIPTA) 62 5-25 MCG/INH inhaler; Inhale 1 puff daily    Restrictive lung disease    Pneumonia due to COVID-19 virus    Routine health maintenance  -     Ambulatory referral to Gastroenterology; Future      recent PFTs demonstrating restrictive pattern with FEV1 of 77%, and moderately decreased DLCO mildly decreased lung volumes  High-resolution CT of the chest demonstrating extensive bronchiectasis and scarring bilateral lungs more conspicuous at the upper lobes with cystic bronchiectasis in the right upper lobe  There are scattered pulmonary nodules  He would need a repeat CT of the chest in 6-12 months  He is continuing with his nebulizer treatments with albuterol ipratropium 3 to 4 times a day and he states he is benefiting from that  Add anoro 1 puff daily  Call if still with increased use of the rescue MDI otherwise I will see him back in 4-6 weeks or p r n  Earlier as needed  Return in about 4 weeks (around 10/1/2020)  All questions are answered to the patient's satisfaction and understanding  He verbalizes understanding  He is encouraged to call with any further questions or concerns  Portions of the record may have been created with voice recognition software  Occasional wrong word or "sound a like" substitutions may have occurred due to the inherent limitations of voice recognition software  Read the chart carefully and recognize, using context, where substitutions have occurred      Electronically Signed by Booker Weiss MD    ______________________________________________________________________    Chief Complaint:   Chief Complaint   Patient presents with    Follow-up       Patient ID: Moses Muniz is a 63 y o  y o  male has a past medical history of Memory loss  9/3/2020  Patient presents today for follow-up visit  Patient is a 62-year-old male former smoker who works as an  at [employer] with history of COVID-19 infection requiring intubation, treated with Plaquenil and a Zithromax send as well as tocilzumab  He was discharged home on 2 L of oxygen      He is here today for follow-up  He continues to have shortness of breath/dyspnea on exertion and some limitation of his activity  He does on occasion have a cough which is mostly dry  Prior to his COVID infection he had no limitation of his daily activity  Has never been on bronchodilators in the past          Review of Systems   Constitutional: Positive for fatigue  HENT: Negative  Eyes: Negative  Respiratory: Positive for cough (States the cough has decreased from before ) and shortness of breath  Cardiovascular: Negative  Gastrointestinal: Negative  Endocrine: Negative  Genitourinary: Negative  Allergic/Immunologic: Negative  Neurological: Negative  Hematological: Negative  Psychiatric/Behavioral: Negative  Smoking history: He reports that he has quit smoking  His smoking use included cigarettes  He has a 60 00 pack-year smoking history  He has never used smokeless tobacco     The following portions of the patient's history were reviewed and updated as appropriate: allergies, current medications, past family history, past medical history, past social history, past surgical history and problem list       There is no immunization history on file for this patient    Current Outpatient Medications   Medication Sig Dispense Refill    amLODIPine (NORVASC) 2 5 mg tablet Take 1 tablet (2 5 mg total) by mouth daily after dinner 30 tablet 5    gabapentin (NEURONTIN) 100 mg capsule One tablet p o  q h s  for 1 week if able to tolerate then can increase it to 1 tablet b i d  and if tolerating it well then increasing it to 1 tablet 3 times a day 90 capsule 1    ipratropium-albuterol (DUO-NEB) 0 5-2 5 mg/3 mL nebulizer solution Take 1 vial (3 mL total) by nebulization 3 (three) times a day 90 vial 2    naproxen sodium (ANAPROX) 550 mg tablet Take 1 tablet (550 mg total) by mouth 2 (two) times a day as needed for mild pain or headaches 60 tablet 0    tiZANidine (ZANAFLEX) 2 mg tablet TAKE 1 TABLET (2 MG TOTAL) BY MOUTH DAILY AT BEDTIME 30 tablet 1    traZODone (DESYREL) 50 mg tablet Take 0 5 tablets (25 mg total) by mouth daily at bedtime as needed for sleep May take whole tablet if needed 30 tablet 2    umeclidinium-vilanterol (ANORO ELLIPTA) 62 5-25 MCG/INH inhaler Inhale 1 puff daily 1 Inhaler 0     No current facility-administered medications for this visit  Allergies: Tetracycline    Objective:  Vitals:    09/03/20 1512   BP: 126/84   Pulse: 80   Temp: 97 9 °F (36 6 °C)   SpO2: 92%   Weight: 103 kg (228 lb)   Height: 5' 8" (1 727 m)   Oxygen Therapy  SpO2: 92 %    Wt Readings from Last 3 Encounters:   09/03/20 103 kg (228 lb)   08/21/20 99 8 kg (220 lb)   07/31/20 101 kg (222 lb)     Body mass index is 34 67 kg/m²  Physical Exam  Vitals signs and nursing note reviewed  Constitutional:       Appearance: He is well-developed  HENT:      Head: Normocephalic and atraumatic  Eyes:      Conjunctiva/sclera: Conjunctivae normal       Pupils: Pupils are equal, round, and reactive to light  Neck:      Musculoskeletal: Normal range of motion and neck supple  Thyroid: No thyromegaly  Vascular: No JVD  Cardiovascular:      Rate and Rhythm: Normal rate and regular rhythm  Heart sounds: Normal heart sounds  No murmur  No friction rub  No gallop  Pulmonary:      Effort: Pulmonary effort is normal  No respiratory distress  Breath sounds: No wheezing or rales  Comments: Diminished breath sounds bilaterally no rhonchi  Chest:      Chest wall: No tenderness  Musculoskeletal: Normal range of motion  General: No tenderness or deformity  Lymphadenopathy:      Cervical: No cervical adenopathy  Skin:     General: Skin is warm and dry  Neurological:      Mental Status: He is alert and oriented to person, place, and time             Diagnostics:  I have personally reviewed pertinent films in PACS

## 2020-09-04 NOTE — TELEPHONE ENCOUNTER
Dr Jose Hernandez    Patient needs the note he was given to be signed by Dr Yasmany Nielsen # [phone]

## 2020-09-15 ENCOUNTER — OFFICE VISIT (OUTPATIENT)
Dept: NEUROLOGY | Facility: CLINIC | Age: 65
End: 2020-09-15
Payer: OTHER MISCELLANEOUS

## 2020-09-15 VITALS
SYSTOLIC BLOOD PRESSURE: 112 MMHG | DIASTOLIC BLOOD PRESSURE: 64 MMHG | BODY MASS INDEX: 34.56 KG/M2 | HEART RATE: 82 BPM | HEIGHT: 68 IN | WEIGHT: 228 LBS

## 2020-09-15 DIAGNOSIS — M25.559 HIP PAIN: ICD-10-CM

## 2020-09-15 DIAGNOSIS — R41.3 MEMORY DIFFICULTY: ICD-10-CM

## 2020-09-15 DIAGNOSIS — G44.52 NEW DAILY PERSISTENT HEADACHE: Primary | ICD-10-CM

## 2020-09-15 PROCEDURE — 99214 OFFICE O/P EST MOD 30 MIN: CPT | Performed by: PSYCHIATRY & NEUROLOGY

## 2020-09-15 NOTE — PROGRESS NOTES
Zuleyka Valdivia is a 63 y o  male  Chief Complaint   Patient presents with    Headache    Memory Loss    Numbness       Assessment:  1  New daily persistent headache    2  Memory difficulty    3  Hip pain        Plan:  Differential diagnosis of headache discussed with the patient, he does have some migraine features, his MRI scan of the brain did not show any acute pathology, he is 25% better on Neurontin, he is taking it him on 100 mg twice a day I have advised him to increase it to 3 times a day and continue avoiding migraine triggers, if he continues to have headaches then we may repeat an MRI scan of the brain  His memory is stable, will wait for the neuropsych testing, he is on vitamin D replacement and vitamin B6 replacement, he was advised mentally stimulating exercises, to keep his blood pressure cholesterol and sugar under control      He was advised to follow up with orthopedic surgeon regarding his hip pain, to go to the hospital if has any worsening symptoms and call me otherwise to see me back in 3 months and follow up with his other physicians,          Subjective:    HPI   Patient is here in follow-up for his headaches memory issues and numbness and tingling sensation in his thighs and right hip pain, he is accompanied with his family, since his last visit he feels his headaches are 25-30% better still gets headache every day but the last for few minutes to sometimes in our they are mostly in the occipital head region radiating to the front associated with photophobia and phonophobia, he is in follow up with a psychologist and behavior therapist for his mood swings, no side effects to Neurontin, he still continues to have right hip pain and is in follow up with the orthopedic surgeon and is waiting for authorization for injections for his hip pain, he also is in follow up with the pulmonologist regarding his shortness of breath, denies any numbness or tingling in the upper extremities his numbness in the leg is slightly better, he ambulates with a cane, no bowel and bladder incontinence, no falls, denies any neck pain, no vision difficulty, no dizziness no speech difficulty, no seizures, appetite is good, weight has been stable, no other complaints      Vitals:    09/15/20 1553   BP: 112/64   BP Location: Left arm   Patient Position: Sitting   Cuff Size: Adult   Pulse: 82   Weight: 103 kg (228 lb)   Height: 5' 8" (1 727 m)       Current Medications    Current Outpatient Medications:     amLODIPine (NORVASC) 2 5 mg tablet, Take 1 tablet (2 5 mg total) by mouth daily after dinner, Disp: 30 tablet, Rfl: 5    gabapentin (NEURONTIN) 100 mg capsule, One tablet p o  q h s  for 1 week if able to tolerate then can increase it to 1 tablet b i d  and if tolerating it well then increasing it to 1 tablet 3 times a day (Patient taking differently: 100 mg 2 (two) times a day One tablet p o  q h s  for 1 week if able to tolerate then can increase it to 1 tablet b i d  and if tolerating it well then increasing it to 1 tablet 3 times a day), Disp: 90 capsule, Rfl: 1    ipratropium-albuterol (DUO-NEB) 0 5-2 5 mg/3 mL nebulizer solution, Take 1 vial (3 mL total) by nebulization 3 (three) times a day, Disp: 90 vial, Rfl: 2    naproxen sodium (ANAPROX) 550 mg tablet, Take 1 tablet (550 mg total) by mouth 2 (two) times a day as needed for mild pain or headaches, Disp: 60 tablet, Rfl: 0    tiZANidine (ZANAFLEX) 2 mg tablet, TAKE 1 TABLET (2 MG TOTAL) BY MOUTH DAILY AT BEDTIME, Disp: 30 tablet, Rfl: 1    traZODone (DESYREL) 50 mg tablet, Take 0 5 tablets (25 mg total) by mouth daily at bedtime as needed for sleep May take whole tablet if needed, Disp: 30 tablet, Rfl: 2    umeclidinium-vilanterol (ANORO ELLIPTA) 62 5-25 MCG/INH inhaler, Inhale 1 puff daily, Disp: 1 Inhaler, Rfl: 0      Allergies  Tetracycline    Past Medical History  Past Medical History:   Diagnosis Date    COVID-19 03/2020    Memory loss     PTSD (post-traumatic stress disorder)          Past Surgical History:  Past Surgical History:   Procedure Laterality Date    NO PAST SURGERIES           Family History:  Family History   Problem Relation Age of Onset    Heart disease Mother     Sudden death Father     Kidney disease Brother        Social History:   reports that he quit smoking about 5 months ago  His smoking use included cigarettes  He has a 60 00 pack-year smoking history  He has never used smokeless tobacco  He reports previous alcohol use  He reports that he does not use drugs  I have reviewed the past medical history, surgical history, social and family history, current medications, allergies vitals, review of systems, and updated this information as appropriate today  Objective:    Physical Exam    Neurological Exam      GENERAL:  Cooperative in no acute distress  Well-developed and well-nourished    HEAD and NECK   Head is atraumatic normocephalic with no lesions or masses  Neck is supple with full range of motion    CARDIOVASCULAR  Carotid Arteries-no carotid bruits  NEUROLOGIC:  Mental Status-the patient is awake alert and oriented without aphasia or apraxia  Cranial Nerves: Visual fields are full to confrontation  Extraocular movements are full without nystagmus  Pupils are 2-1/2 mm and reactive  Face is symmetrical to light touch  Movements of facial expression move symmetrically  Hearing is normal to finger rub bilaterally  Soft palate lifts symmetrically  Shoulder shrug is symmetrical  Tongue is midline without atrophy  Motor: No drift is noted on arm extension  Strength is full in the upper and lower extremities with normal bulk and tone  Poor effort in the right lower extremity secondary to hip pain  Ambulates with a cane        ROS:  Review of Systems   Constitutional: Positive for fatigue  Negative for activity change, appetite change (prefers sweeter type foods) and fever  HENT: Negative    Negative for hearing loss, tinnitus, trouble swallowing and voice change  Eyes: Negative for photophobia and pain  Respiratory: Positive for shortness of breath  Cardiovascular: Positive for leg swelling  Negative for palpitations  Gastrointestinal: Negative  Negative for constipation, diarrhea, nausea and vomiting  Endocrine: Negative  Negative for cold intolerance  Genitourinary: Negative  Negative for dysuria, enuresis, frequency and urgency  Musculoskeletal: Positive for gait problem and myalgias  Negative for back pain and neck pain  Skin: Negative  Negative for rash  Neurological: Positive for weakness (right leg), numbness and headaches  Negative for seizures, syncope, facial asymmetry and light-headedness  Tingling to lateral right and lateral left thigh   Hematological: Negative  Does not bruise/bleed easily  Psychiatric/Behavioral: Positive for agitation, decreased concentration and sleep disturbance  Negative for hallucinations          Night Terrors

## 2020-09-17 ENCOUNTER — TELEPHONE (OUTPATIENT)
Dept: NEUROLOGY | Facility: CLINIC | Age: 65
End: 2020-09-17

## 2020-09-17 NOTE — TELEPHONE ENCOUNTER
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 [employer]     -     [address]  - [id number]    Date of Injury: 3/27/2020

## 2020-10-05 ENCOUNTER — OFFICE VISIT (OUTPATIENT)
Dept: INTERNAL MEDICINE CLINIC | Facility: CLINIC | Age: 65
End: 2020-10-05
Payer: OTHER MISCELLANEOUS

## 2020-10-05 ENCOUNTER — TELEMEDICINE (OUTPATIENT)
Dept: BEHAVIORAL/MENTAL HEALTH CLINIC | Facility: CLINIC | Age: 65
End: 2020-10-05
Payer: COMMERCIAL

## 2020-10-05 VITALS
TEMPERATURE: 97 F | DIASTOLIC BLOOD PRESSURE: 70 MMHG | HEIGHT: 68 IN | SYSTOLIC BLOOD PRESSURE: 116 MMHG | BODY MASS INDEX: 36.1 KG/M2 | WEIGHT: 238.2 LBS

## 2020-10-05 DIAGNOSIS — J47.9 BRONCHIECTASIS WITHOUT COMPLICATION (HCC): ICD-10-CM

## 2020-10-05 DIAGNOSIS — M25.559 HIP PAIN: ICD-10-CM

## 2020-10-05 DIAGNOSIS — R06.02 SOB (SHORTNESS OF BREATH): ICD-10-CM

## 2020-10-05 DIAGNOSIS — Z23 ENCOUNTER FOR VACCINATION: Primary | ICD-10-CM

## 2020-10-05 DIAGNOSIS — F43.11 ACUTE POST-TRAUMATIC STRESS DISORDER: ICD-10-CM

## 2020-10-05 DIAGNOSIS — Z63.4 EXPECTED BEREAVEMENT DUE TO LIFE EVENT: ICD-10-CM

## 2020-10-05 DIAGNOSIS — F43.10 PTSD (POST-TRAUMATIC STRESS DISORDER): Primary | ICD-10-CM

## 2020-10-05 PROCEDURE — 90471 IMMUNIZATION ADMIN: CPT | Performed by: NURSE PRACTITIONER

## 2020-10-05 PROCEDURE — 90682 RIV4 VACC RECOMBINANT DNA IM: CPT | Performed by: NURSE PRACTITIONER

## 2020-10-05 PROCEDURE — 90472 IMMUNIZATION ADMIN EACH ADD: CPT | Performed by: NURSE PRACTITIONER

## 2020-10-05 PROCEDURE — 90670 PCV13 VACCINE IM: CPT | Performed by: NURSE PRACTITIONER

## 2020-10-05 PROCEDURE — 90834 PSYTX W PT 45 MINUTES: CPT | Performed by: SOCIAL WORKER

## 2020-10-05 PROCEDURE — 99213 OFFICE O/P EST LOW 20 MIN: CPT | Performed by: NURSE PRACTITIONER

## 2020-10-05 RX ORDER — SERTRALINE HYDROCHLORIDE 100 MG/1
100 TABLET, FILM COATED ORAL DAILY
COMMUNITY
Start: 2020-09-25 | End: 2020-10-06 | Stop reason: SDUPTHER

## 2020-10-05 SDOH — SOCIAL STABILITY - SOCIAL INSECURITY: DISSAPEARANCE AND DEATH OF FAMILY MEMBER: Z63.4

## 2020-10-06 ENCOUNTER — TELEMEDICINE (OUTPATIENT)
Dept: PSYCHIATRY | Facility: CLINIC | Age: 65
End: 2020-10-06
Payer: COMMERCIAL

## 2020-10-06 DIAGNOSIS — F32.1 CURRENT MODERATE EPISODE OF MAJOR DEPRESSIVE DISORDER WITHOUT PRIOR EPISODE (HCC): ICD-10-CM

## 2020-10-06 DIAGNOSIS — F43.10 PTSD (POST-TRAUMATIC STRESS DISORDER): Primary | ICD-10-CM

## 2020-10-06 PROCEDURE — 99213 OFFICE O/P EST LOW 20 MIN: CPT | Performed by: PHYSICIAN ASSISTANT

## 2020-10-06 RX ORDER — TRAZODONE HYDROCHLORIDE 50 MG/1
TABLET ORAL
Qty: 45 TABLET | Refills: 3 | Status: SHIPPED | OUTPATIENT
Start: 2020-10-06 | End: 2021-01-29 | Stop reason: SDUPTHER

## 2020-10-06 RX ORDER — SERTRALINE HYDROCHLORIDE 100 MG/1
100 TABLET, FILM COATED ORAL DAILY
Qty: 30 TABLET | Refills: 3 | Status: SHIPPED | OUTPATIENT
Start: 2020-10-06 | End: 2021-01-29 | Stop reason: SDUPTHER

## 2020-10-08 ENCOUNTER — OFFICE VISIT (OUTPATIENT)
Dept: PULMONOLOGY | Facility: CLINIC | Age: 65
End: 2020-10-08
Payer: OTHER MISCELLANEOUS

## 2020-10-08 VITALS
SYSTOLIC BLOOD PRESSURE: 124 MMHG | WEIGHT: 233 LBS | HEIGHT: 68 IN | TEMPERATURE: 97.7 F | OXYGEN SATURATION: 95 % | HEART RATE: 75 BPM | DIASTOLIC BLOOD PRESSURE: 84 MMHG | BODY MASS INDEX: 35.31 KG/M2

## 2020-10-08 DIAGNOSIS — U07.1 ACUTE RESPIRATORY DISTRESS SYNDROME (ARDS) DUE TO COVID-19 VIRUS (HCC): ICD-10-CM

## 2020-10-08 DIAGNOSIS — J84.9 INTERSTITIAL LUNG DISEASE (HCC): ICD-10-CM

## 2020-10-08 DIAGNOSIS — J80 ACUTE RESPIRATORY DISTRESS SYNDROME (ARDS) DUE TO COVID-19 VIRUS (HCC): ICD-10-CM

## 2020-10-08 DIAGNOSIS — R91.1 LUNG NODULE: ICD-10-CM

## 2020-10-08 DIAGNOSIS — R06.02 SOB (SHORTNESS OF BREATH): Primary | ICD-10-CM

## 2020-10-08 PROCEDURE — 99214 OFFICE O/P EST MOD 30 MIN: CPT | Performed by: PHYSICIAN ASSISTANT

## 2020-10-08 RX ORDER — ALBUTEROL SULFATE 2.5 MG/3ML
2.5 SOLUTION RESPIRATORY (INHALATION) EVERY 6 HOURS PRN
Qty: 120 VIAL | Refills: 2 | Status: SHIPPED | OUTPATIENT
Start: 2020-10-08 | End: 2020-12-30 | Stop reason: SDUPTHER

## 2020-10-16 DIAGNOSIS — J47.9 BRONCHIECTASIS WITHOUT COMPLICATION (HCC): ICD-10-CM

## 2020-10-16 DIAGNOSIS — R06.02 SOB (SHORTNESS OF BREATH): ICD-10-CM

## 2020-10-19 ENCOUNTER — TELEMEDICINE (OUTPATIENT)
Dept: BEHAVIORAL/MENTAL HEALTH CLINIC | Facility: CLINIC | Age: 65
End: 2020-10-19
Payer: COMMERCIAL

## 2020-10-19 DIAGNOSIS — F43.11 ACUTE POST-TRAUMATIC STRESS DISORDER: ICD-10-CM

## 2020-10-19 DIAGNOSIS — Z63.4 EXPECTED BEREAVEMENT DUE TO LIFE EVENT: ICD-10-CM

## 2020-10-19 DIAGNOSIS — F43.10 PTSD (POST-TRAUMATIC STRESS DISORDER): Primary | ICD-10-CM

## 2020-10-19 DIAGNOSIS — R41.3 MEMORY DIFFICULTY: ICD-10-CM

## 2020-10-19 PROCEDURE — 90834 PSYTX W PT 45 MINUTES: CPT | Performed by: SOCIAL WORKER

## 2020-10-19 SDOH — SOCIAL STABILITY - SOCIAL INSECURITY: DISSAPEARANCE AND DEATH OF FAMILY MEMBER: Z63.4

## 2020-10-21 ENCOUNTER — TELEPHONE (OUTPATIENT)
Dept: PAIN MEDICINE | Facility: CLINIC | Age: 65
End: 2020-10-21

## 2020-10-23 ENCOUNTER — OFFICE VISIT (OUTPATIENT)
Dept: PAIN MEDICINE | Facility: CLINIC | Age: 65
End: 2020-10-23
Payer: OTHER MISCELLANEOUS

## 2020-10-23 VITALS
WEIGHT: 233.8 LBS | HEART RATE: 82 BPM | DIASTOLIC BLOOD PRESSURE: 65 MMHG | BODY MASS INDEX: 35.43 KG/M2 | SYSTOLIC BLOOD PRESSURE: 98 MMHG | HEIGHT: 68 IN | TEMPERATURE: 98.2 F | RESPIRATION RATE: 18 BRPM

## 2020-10-23 DIAGNOSIS — M25.551 RIGHT HIP PAIN: Primary | ICD-10-CM

## 2020-10-23 DIAGNOSIS — M16.11 OSTEOARTHRITIS OF RIGHT HIP, UNSPECIFIED OSTEOARTHRITIS TYPE: ICD-10-CM

## 2020-10-23 PROCEDURE — 1036F TOBACCO NON-USER: CPT | Performed by: STUDENT IN AN ORGANIZED HEALTH CARE EDUCATION/TRAINING PROGRAM

## 2020-10-23 PROCEDURE — 99214 OFFICE O/P EST MOD 30 MIN: CPT | Performed by: STUDENT IN AN ORGANIZED HEALTH CARE EDUCATION/TRAINING PROGRAM

## 2020-10-24 DIAGNOSIS — G44.52 NEW DAILY PERSISTENT HEADACHE: ICD-10-CM

## 2020-10-24 DIAGNOSIS — R20.0 NUMBNESS AND TINGLING: ICD-10-CM

## 2020-10-24 DIAGNOSIS — R20.2 NUMBNESS AND TINGLING: ICD-10-CM

## 2020-10-26 RX ORDER — GABAPENTIN 100 MG/1
CAPSULE ORAL
Qty: 90 CAPSULE | Refills: 1 | Status: SHIPPED | OUTPATIENT
Start: 2020-10-26 | End: 2021-01-04

## 2020-10-28 ENCOUNTER — TELEPHONE (OUTPATIENT)
Dept: RADIOLOGY | Facility: CLINIC | Age: 65
End: 2020-10-28

## 2020-10-28 ENCOUNTER — TELEPHONE (OUTPATIENT)
Dept: OBGYN CLINIC | Facility: HOSPITAL | Age: 65
End: 2020-10-28

## 2020-10-28 ENCOUNTER — TELEPHONE (OUTPATIENT)
Dept: INTERNAL MEDICINE CLINIC | Facility: CLINIC | Age: 65
End: 2020-10-28

## 2020-10-30 DIAGNOSIS — M25.551 ACUTE RIGHT HIP PAIN: ICD-10-CM

## 2020-10-30 RX ORDER — TIZANIDINE 2 MG/1
2 TABLET ORAL
Qty: 90 TABLET | Refills: 3 | Status: SHIPPED | OUTPATIENT
Start: 2020-10-30 | End: 2021-10-06

## 2020-11-03 ENCOUNTER — OFFICE VISIT (OUTPATIENT)
Dept: PULMONOLOGY | Facility: CLINIC | Age: 65
End: 2020-11-03
Payer: OTHER MISCELLANEOUS

## 2020-11-03 VITALS
HEIGHT: 68 IN | BODY MASS INDEX: 35.46 KG/M2 | TEMPERATURE: 97.9 F | SYSTOLIC BLOOD PRESSURE: 104 MMHG | DIASTOLIC BLOOD PRESSURE: 64 MMHG | OXYGEN SATURATION: 95 % | WEIGHT: 234 LBS | HEART RATE: 78 BPM

## 2020-11-03 DIAGNOSIS — R91.1 LUNG NODULE: ICD-10-CM

## 2020-11-03 DIAGNOSIS — J47.9 BRONCHIECTASIS WITHOUT COMPLICATION (HCC): ICD-10-CM

## 2020-11-03 DIAGNOSIS — R06.02 SOB (SHORTNESS OF BREATH): Primary | ICD-10-CM

## 2020-11-03 PROCEDURE — 3008F BODY MASS INDEX DOCD: CPT | Performed by: PHYSICIAN ASSISTANT

## 2020-11-03 PROCEDURE — 99214 OFFICE O/P EST MOD 30 MIN: CPT | Performed by: INTERNAL MEDICINE

## 2020-11-10 ENCOUNTER — TELEMEDICINE (OUTPATIENT)
Dept: PSYCHIATRY | Facility: CLINIC | Age: 65
End: 2020-11-10
Payer: COMMERCIAL

## 2020-11-10 DIAGNOSIS — F43.10 PTSD (POST-TRAUMATIC STRESS DISORDER): Primary | ICD-10-CM

## 2020-11-10 DIAGNOSIS — Z63.4 EXPECTED BEREAVEMENT DUE TO LIFE EVENT: ICD-10-CM

## 2020-11-10 PROCEDURE — 99213 OFFICE O/P EST LOW 20 MIN: CPT | Performed by: PHYSICIAN ASSISTANT

## 2020-11-10 PROCEDURE — 1036F TOBACCO NON-USER: CPT | Performed by: PHYSICIAN ASSISTANT

## 2020-11-10 SDOH — SOCIAL STABILITY - SOCIAL INSECURITY: DISSAPEARANCE AND DEATH OF FAMILY MEMBER: Z63.4

## 2020-11-19 ENCOUNTER — TELEMEDICINE (OUTPATIENT)
Dept: BEHAVIORAL/MENTAL HEALTH CLINIC | Facility: CLINIC | Age: 65
End: 2020-11-19
Payer: COMMERCIAL

## 2020-11-19 DIAGNOSIS — F43.11 ACUTE POST-TRAUMATIC STRESS DISORDER: ICD-10-CM

## 2020-11-19 PROCEDURE — 90834 PSYTX W PT 45 MINUTES: CPT | Performed by: SOCIAL WORKER

## 2020-11-30 ENCOUNTER — TELEMEDICINE (OUTPATIENT)
Dept: BEHAVIORAL/MENTAL HEALTH CLINIC | Facility: CLINIC | Age: 65
End: 2020-11-30
Payer: COMMERCIAL

## 2020-11-30 ENCOUNTER — HOSPITAL ENCOUNTER (OUTPATIENT)
Dept: RADIOLOGY | Facility: HOSPITAL | Age: 65
Discharge: HOME | End: 2020-11-30
Attending: ORTHOPAEDIC SURGERY | Admitting: ORTHOPAEDIC SURGERY
Payer: OTHER MISCELLANEOUS

## 2020-11-30 DIAGNOSIS — R41.3 MEMORY DIFFICULTY: ICD-10-CM

## 2020-11-30 DIAGNOSIS — F43.10 PTSD (POST-TRAUMATIC STRESS DISORDER): ICD-10-CM

## 2020-11-30 DIAGNOSIS — M16.11 PRIMARY OSTEOARTHRITIS OF RIGHT HIP: ICD-10-CM

## 2020-11-30 PROCEDURE — 77002 NEEDLE LOCALIZATION BY XRAY: CPT

## 2020-11-30 PROCEDURE — 90834 PSYTX W PT 45 MINUTES: CPT | Performed by: SOCIAL WORKER

## 2020-11-30 PROCEDURE — 20610 DRAIN/INJ JOINT/BURSA W/O US: CPT

## 2020-11-30 RX ORDER — LIDOCAINE HYDROCHLORIDE 10 MG/ML
10 INJECTION, SOLUTION EPIDURAL; INFILTRATION; INTRACAUDAL; PERINEURAL ONCE
Status: COMPLETED | OUTPATIENT
Start: 2020-11-30 | End: 2020-11-30

## 2020-11-30 RX ORDER — BUPIVACAINE HYDROCHLORIDE 2.5 MG/ML
10 INJECTION, SOLUTION EPIDURAL; INFILTRATION; INTRACAUDAL ONCE
Status: COMPLETED | OUTPATIENT
Start: 2020-11-30 | End: 2020-11-30

## 2020-11-30 RX ORDER — METHYLPREDNISOLONE ACETATE 80 MG/ML
80 INJECTION, SUSPENSION INTRA-ARTICULAR; INTRALESIONAL; INTRAMUSCULAR; SOFT TISSUE ONCE
Status: COMPLETED | OUTPATIENT
Start: 2020-11-30 | End: 2020-11-30

## 2020-11-30 RX ADMIN — LIDOCAINE HYDROCHLORIDE 7 ML: 10 INJECTION, SOLUTION EPIDURAL; INFILTRATION; INTRACAUDAL at 15:35

## 2020-11-30 RX ADMIN — IOHEXOL 1 ML: 300 INJECTION, SOLUTION INTRAVENOUS at 15:30

## 2020-11-30 RX ADMIN — METHYLPREDNISOLONE ACETATE 80 MG: 80 INJECTION, SUSPENSION INTRA-ARTICULAR; INTRALESIONAL; INTRAMUSCULAR; SOFT TISSUE at 15:35

## 2020-11-30 RX ADMIN — BUPIVACAINE HYDROCHLORIDE 2 ML: 2.5 INJECTION, SOLUTION EPIDURAL; INFILTRATION; INTRACAUDAL; PERINEURAL at 15:30

## 2020-12-08 ENCOUNTER — OFFICE VISIT (OUTPATIENT)
Dept: INTERNAL MEDICINE CLINIC | Facility: CLINIC | Age: 65
End: 2020-12-08
Payer: OTHER MISCELLANEOUS

## 2020-12-08 VITALS
TEMPERATURE: 97 F | SYSTOLIC BLOOD PRESSURE: 110 MMHG | HEIGHT: 68 IN | BODY MASS INDEX: 36.22 KG/M2 | HEART RATE: 91 BPM | WEIGHT: 239 LBS | OXYGEN SATURATION: 97 % | DIASTOLIC BLOOD PRESSURE: 70 MMHG

## 2020-12-08 DIAGNOSIS — R06.02 SOB (SHORTNESS OF BREATH): ICD-10-CM

## 2020-12-08 DIAGNOSIS — F43.10 PTSD (POST-TRAUMATIC STRESS DISORDER): ICD-10-CM

## 2020-12-08 DIAGNOSIS — G44.52 NEW DAILY PERSISTENT HEADACHE: ICD-10-CM

## 2020-12-08 DIAGNOSIS — R41.3 MEMORY DIFFICULTY: ICD-10-CM

## 2020-12-08 DIAGNOSIS — R94.31 ABNORMAL ELECTROCARDIOGRAM: ICD-10-CM

## 2020-12-08 PROBLEM — N17.9 AKI (ACUTE KIDNEY INJURY) (HCC): Status: RESOLVED | Noted: 2020-04-02 | Resolved: 2020-12-08

## 2020-12-08 PROBLEM — U07.1 ACUTE RESPIRATORY DISTRESS SYNDROME (ARDS) DUE TO COVID-19 VIRUS (HCC): Status: RESOLVED | Noted: 2020-04-02 | Resolved: 2020-12-08

## 2020-12-08 PROBLEM — J80 ACUTE RESPIRATORY DISTRESS SYNDROME (ARDS) DUE TO COVID-19 VIRUS (HCC): Status: RESOLVED | Noted: 2020-04-02 | Resolved: 2020-12-08

## 2020-12-08 PROCEDURE — 3725F SCREEN DEPRESSION PERFORMED: CPT | Performed by: INTERNAL MEDICINE

## 2020-12-08 PROCEDURE — 1036F TOBACCO NON-USER: CPT | Performed by: INTERNAL MEDICINE

## 2020-12-08 PROCEDURE — 99215 OFFICE O/P EST HI 40 MIN: CPT | Performed by: INTERNAL MEDICINE

## 2020-12-08 PROCEDURE — 93000 ELECTROCARDIOGRAM COMPLETE: CPT | Performed by: INTERNAL MEDICINE

## 2020-12-08 PROCEDURE — 3008F BODY MASS INDEX DOCD: CPT | Performed by: INTERNAL MEDICINE

## 2020-12-08 RX ORDER — AMLODIPINE BESYLATE 2.5 MG/1
TABLET ORAL
COMMUNITY
Start: 2020-07-15

## 2020-12-08 RX ORDER — BUSPIRONE HYDROCHLORIDE 10 MG/1
10 TABLET ORAL 2 TIMES DAILY
COMMUNITY
Start: 2020-07-09 | End: 2020-12-22 | Stop reason: ALTCHOICE

## 2020-12-22 ENCOUNTER — TELEMEDICINE (OUTPATIENT)
Dept: PSYCHIATRY | Facility: CLINIC | Age: 65
End: 2020-12-22
Payer: COMMERCIAL

## 2020-12-22 DIAGNOSIS — F43.10 PTSD (POST-TRAUMATIC STRESS DISORDER): Primary | ICD-10-CM

## 2020-12-22 PROCEDURE — 99213 OFFICE O/P EST LOW 20 MIN: CPT | Performed by: PHYSICIAN ASSISTANT

## 2020-12-28 ENCOUNTER — TELEPHONE (OUTPATIENT)
Dept: OBGYN CLINIC | Facility: HOSPITAL | Age: 65
End: 2020-12-28

## 2020-12-30 DIAGNOSIS — J84.9 INTERSTITIAL LUNG DISEASE (HCC): ICD-10-CM

## 2020-12-30 RX ORDER — ALBUTEROL SULFATE 2.5 MG/3ML
2.5 SOLUTION RESPIRATORY (INHALATION) EVERY 6 HOURS PRN
Qty: 120 VIAL | Refills: 2 | Status: SHIPPED | OUTPATIENT
Start: 2020-12-30 | End: 2021-08-12 | Stop reason: SDUPTHER

## 2021-01-02 DIAGNOSIS — R20.2 NUMBNESS AND TINGLING: ICD-10-CM

## 2021-01-02 DIAGNOSIS — G44.52 NEW DAILY PERSISTENT HEADACHE: ICD-10-CM

## 2021-01-02 DIAGNOSIS — R20.0 NUMBNESS AND TINGLING: ICD-10-CM

## 2021-01-04 RX ORDER — GABAPENTIN 100 MG/1
CAPSULE ORAL
Qty: 90 CAPSULE | Refills: 1 | Status: SHIPPED | OUTPATIENT
Start: 2021-01-04 | End: 2021-02-26

## 2021-01-07 ENCOUNTER — TELEPHONE (OUTPATIENT)
Dept: PULMONOLOGY | Facility: CLINIC | Age: 66
End: 2021-01-07

## 2021-01-07 NOTE — TELEPHONE ENCOUNTER
Carla Dallas tried explaining this to patient but he still is not understanding and is stating he received two different inhalers from the pharmacy  Can you please call him and try to explain to him to see if he understands you? Thank you!

## 2021-01-07 NOTE — TELEPHONE ENCOUNTER
Anoro is the every day maintenance inhaler that is once per day  Albuterol is the rescue medication that he would use 4 times per day as needed

## 2021-01-07 NOTE — TELEPHONE ENCOUNTER
SPOKE TO PT, HE STATES HE USES IPRATROPIUM-ALBUTEROL, SPOKE TO THE PHARMACIST, PT GOT IPRATROPIUM FILLED ON 7/31/20, PLEASE ADVISE, THANK YOU

## 2021-01-07 NOTE — TELEPHONE ENCOUNTER
Patient is a little confused  He states he was taking anoro but when he was last seen he was given albuterol  Can you please advise which one he is suppose to be taking and I will let him know? Thank you

## 2021-01-08 ENCOUNTER — HOSPITAL ENCOUNTER (OUTPATIENT)
Dept: NON INVASIVE DIAGNOSTICS | Facility: CLINIC | Age: 66
Discharge: HOME | End: 2021-01-08
Payer: OTHER MISCELLANEOUS

## 2021-01-08 DIAGNOSIS — R06.02 SOB (SHORTNESS OF BREATH): ICD-10-CM

## 2021-01-08 DIAGNOSIS — R94.31 ABNORMAL ELECTROCARDIOGRAM: ICD-10-CM

## 2021-01-08 PROCEDURE — 78452 HT MUSCLE IMAGE SPECT MULT: CPT | Performed by: INTERNAL MEDICINE

## 2021-01-08 PROCEDURE — G1004 CDSM NDSC: HCPCS

## 2021-01-08 PROCEDURE — 78452 HT MUSCLE IMAGE SPECT MULT: CPT

## 2021-01-08 PROCEDURE — 93017 CV STRESS TEST TRACING ONLY: CPT

## 2021-01-08 PROCEDURE — 93018 CV STRESS TEST I&R ONLY: CPT | Performed by: INTERNAL MEDICINE

## 2021-01-08 PROCEDURE — 93016 CV STRESS TEST SUPVJ ONLY: CPT | Performed by: INTERNAL MEDICINE

## 2021-01-08 PROCEDURE — A9502 TC99M TETROFOSMIN: HCPCS

## 2021-01-08 RX ADMIN — REGADENOSON 0.4 MG: 0.08 INJECTION, SOLUTION INTRAVENOUS at 09:05

## 2021-01-08 NOTE — TELEPHONE ENCOUNTER
Since he is taking the Anoro, he does not need the ipratropium/albuterol, can just use the albuterol in the nebulizer  Thanks

## 2021-01-11 LAB
MAX DIASTOLIC BP: 74 MMHG
MAX HEART RATE: 100 BPM
MAX PREDICTED HEART RATE: 157 BPM
MAX. SYSTOLIC BP: 124 MMHG
PROTOCOL NAME: NORMAL
REASON FOR TERMINATION: NORMAL
TARGET HR FORMULA: NORMAL
TIME IN EXERCISE PHASE: NORMAL

## 2021-01-11 NOTE — TELEPHONE ENCOUNTER
Called patient to schedule appointment  Attempt #3  Letter generated and mailed to patient    If patient calls back, OK to schedule with Shannen/Amairani

## 2021-01-26 ENCOUNTER — TELEPHONE (OUTPATIENT)
Dept: PSYCHIATRY | Facility: CLINIC | Age: 66
End: 2021-01-26

## 2021-01-26 NOTE — TELEPHONE ENCOUNTER
Patient called requesting a letter sent to his , stated he got PTSD from covid  Spoke with the patient he does need to reach out to his  for a records release, before we can release a note to them

## 2021-01-27 NOTE — TELEPHONE ENCOUNTER
Per the provider we are unable to except the letter from the 's office  Spoke to the  office and they will be mailing the patient a hippa form to be signed  The patient is aware

## 2021-01-29 ENCOUNTER — TELEPHONE (OUTPATIENT)
Dept: PSYCHIATRY | Facility: CLINIC | Age: 66
End: 2021-01-29

## 2021-01-29 ENCOUNTER — TELEMEDICINE (OUTPATIENT)
Dept: PSYCHIATRY | Facility: CLINIC | Age: 66
End: 2021-01-29
Payer: COMMERCIAL

## 2021-01-29 DIAGNOSIS — F43.10 PTSD (POST-TRAUMATIC STRESS DISORDER): ICD-10-CM

## 2021-01-29 DIAGNOSIS — F32.1 CURRENT MODERATE EPISODE OF MAJOR DEPRESSIVE DISORDER WITHOUT PRIOR EPISODE (HCC): ICD-10-CM

## 2021-01-29 PROCEDURE — 99213 OFFICE O/P EST LOW 20 MIN: CPT | Performed by: PHYSICIAN ASSISTANT

## 2021-01-29 RX ORDER — TRAZODONE HYDROCHLORIDE 50 MG/1
TABLET ORAL
Qty: 45 TABLET | Refills: 2 | Status: SHIPPED | OUTPATIENT
Start: 2021-01-29 | End: 2021-04-05 | Stop reason: SDUPTHER

## 2021-01-29 RX ORDER — SERTRALINE HYDROCHLORIDE 100 MG/1
150 TABLET, FILM COATED ORAL DAILY
Qty: 45 TABLET | Refills: 2 | Status: SHIPPED | OUTPATIENT
Start: 2021-01-29 | End: 2021-04-05 | Stop reason: SDUPTHER

## 2021-01-29 NOTE — PSYCH
Virtual Brief Visit                   Encounter provider Lianne Gaspar PA-C    Provider located at [address] [address] [phone]    Recent Visits  Date Type Provider Dept   01/26/21 Telephone [facility]   Showing recent visits within past 7 days and meeting all other requirements     Future Appointments  No visits were found meeting these conditions  Showing future appointments within next 150 days and meeting all other requirements        After connecting through phone, the patient was identified by name and date of birth  Pancho Jama was informed that this is a telemedicine visit and that the visit is being conducted through phone  My office door was closed  No one else was in the room  He acknowledged consent and understanding of privacy and security of the platform  The patient has agreed to participate and understands he can discontinue the visit at any time  Patient is aware this is a billable service  I spent 20 minutes directly with the patient during this visit    VIRTUAL VISIT DISCLAIMER    Pancho Jama acknowledges that he has consented to an online visit or consultation  He understands that the online visit is based solely on information provided by him, and that, in the absence of a face-to-face physical evaluation by the physician, the diagnosis he receives is both limited and provisional in terms of accuracy and completeness  This is not intended to replace a full medical face-to-face evaluation by the physician  Pancho Jama understands and accepts these terms          MEDICATION MANAGEMENT NOTE        [facility] [place]  [address] [facility] [address] [phone]        Name and Date of Birth:  Joseph Patel Avila Mendoza 63 y o  1955    Date of Visit: January 29, 2021    SUBJECTIVE:    Conor Morales seen by [facility] 12/22 at which time meds unchanged  PA-C never got a response from PCP regarding prazosin  Conor Morales continues to experience significant symptoms  We did PTSD checklist (PCL-C) over the phone  Devon responded moderately to extremely to 15 of 17 symptoms  Extremely: repeated/disturbing memories/thoughts/images; repeated/disturbing dreams; suddenly acting or feeling as if stressful experience were happening again; physical reactions when reminded of the past; avoids activities and situations reminding him of the past; trouble remembering important parts of the trauma; feels distant and cut off from others; sense of foreshortened future; trouble falling and staying asleep; irritability, angry outbursts  Conor Morales denies SI  Has significant sense of loss and recent memory remains impaired  Review of Systems   Neurological: Positive for dizziness  Negative for syncope  Mild and intermittent   Psychiatric/Behavioral: Positive for sleep disturbance  Psychiatric History      This office since 5/7/20  No IP or suicide attempts  Trauma/Loss History       ARDS secondary to covid 4/2-4/11/20- intubated x 5 days  Lost brother and friends/coworkers to covid  Had to stop working  Social History       Lives with wife >40 yrs  3 children  Longtime  at [employer] in [place]  OBJECTIVE:   Spoke to Conor Morales by phone   +coughing  Speech clear for the most part  Coherent/organized  No SI     Lab Review: I have reviewed all pertinent labs    Lab Results   Component Value Date    SODIUM 142 05/05/2020    K 3 8 05/05/2020     05/05/2020    CO2 28 05/05/2020    AGAP 6 05/05/2020    BUN 18 05/05/2020    CREATININE 1 27 05/05/2020    GLUC 164 (H) 04/12/2020    GLUF 87 05/05/2020    CALCIUM 9 0 05/05/2020    AST 15 05/05/2020    ALT 32 05/05/2020    ALKPHOS 70 05/05/2020    TP 6 7 07/29/2020    TBILI 0 30 05/05/2020    EGFR 60 05/05/2020     Lab Results   Component Value Date    WBC 11 63 (H) 05/05/2020    HGB 13 7 05/05/2020    HCT 43 3 05/05/2020    MCV 91 05/05/2020     05/05/2020     Lab Results   Component Value Date    KEUWDEEC00 364 07/29/2020     Lab Results   Component Value Date    FOLATE 11 7 07/29/2020           ASSESSMENT & PLAN          PTSD    Current Outpatient Medications   Medication Sig Dispense Refill    albuterol (2 5 mg/3 mL) 0 083 % nebulizer solution Take 1 vial (2 5 mg total) by nebulization every 6 (six) hours as needed for wheezing or shortness of breath 120 vial 2    amLODIPine (NORVASC) 2 5 mg tablet TAKE 1 TABLET (2 5 MG TOTAL) BY MOUTH DAILY AFTER DINNER      gabapentin (NEURONTIN) 100 mg capsule TAKE 1 CAPSULE BY MOUTH THREE TIMES DAILY 90 capsule 1    Multiple Vitamins-Minerals (MULTIVITAMIN ADULT PO) Take 1 tablet by mouth daily      naproxen sodium (ANAPROX) 550 mg tablet Take 1 tablet (550 mg total) by mouth 2 (two) times a day as needed for mild pain or headaches 60 tablet 0    sertraline (ZOLOFT) 100 mg tablet Take 1 5 tablets (150 mg total) by mouth daily 45 tablet 2    tiZANidine (ZANAFLEX) 2 mg tablet Take 1 tablet (2 mg total) by mouth daily at bedtime 90 tablet 3    traZODone (DESYREL) 50 mg tablet 1-1 5 tab po q bedtime 45 tablet 2    umeclidinium-vilanterol (ANORO ELLIPTA) 62 5-25 MCG/INH inhaler Inhale 1 puff daily 1 Inhaler 3     No current facility-administered medications for this visit  Plan:       Increase zoloft to 150 mg   cont trazodone 75 mg q bedtime  Cont f/u with Indio Balderrama for ind therapy  Reviewed risks, benefits, side effects of medications, including no medication  Patient understands and agrees to treatment plan  PAMariluzC 6 weeks, sooner prn       Patient has been informed of 24 hours and weekend coverage for urgent situations accessed by calling the main clinic phone number       Mickie Johnson Brandon Rader PA-C

## 2021-02-04 ENCOUNTER — TELEPHONE (OUTPATIENT)
Dept: INTERNAL MEDICINE CLINIC | Facility: CLINIC | Age: 66
End: 2021-02-04

## 2021-02-04 NOTE — LETTER
February 4, 2021     Patient: No patient name on file  Date of Birth: There is no date of birth on file  Date of Visit: No visit date found  To Whom it May Concern:    Martha Bruce is under my professional care  Patient was seen in my office on 12/08/2020  If you have any questions or concerns, please don't hesitate to call          Sincerely,   Kaci Mendez

## 2021-02-04 NOTE — TELEPHONE ENCOUNTER
Patient is asking if the letter written for him today can be more specific  Please include his name,   State that patient is still under professional care for various conditions and cannot work at this time  Patient will have someone  letter tomorrow

## 2021-02-04 NOTE — TELEPHONE ENCOUNTER
Pt is asking for letter stating he is still out of work due to his health  Questions call pt [phone] call when done

## 2021-02-08 ENCOUNTER — HOSPITAL ENCOUNTER (OUTPATIENT)
Dept: CT IMAGING | Facility: HOSPITAL | Age: 66
Discharge: HOME | End: 2021-02-08
Payer: COMMERCIAL

## 2021-02-08 ENCOUNTER — TELEMEDICINE (OUTPATIENT)
Dept: PULMONOLOGY | Facility: CLINIC | Age: 66
End: 2021-02-08
Payer: COMMERCIAL

## 2021-02-08 VITALS — BODY MASS INDEX: 36.22 KG/M2 | HEIGHT: 68 IN | TEMPERATURE: 98.1 F | WEIGHT: 239 LBS

## 2021-02-08 DIAGNOSIS — J84.9 INTERSTITIAL LUNG DISEASE (HCC): ICD-10-CM

## 2021-02-08 DIAGNOSIS — R91.1 LUNG NODULE: ICD-10-CM

## 2021-02-08 DIAGNOSIS — R06.02 SOB (SHORTNESS OF BREATH): Primary | ICD-10-CM

## 2021-02-08 DIAGNOSIS — J47.9 BRONCHIECTASIS WITHOUT COMPLICATION (HCC): ICD-10-CM

## 2021-02-08 PROCEDURE — 99214 OFFICE O/P EST MOD 30 MIN: CPT | Performed by: INTERNAL MEDICINE

## 2021-02-08 PROCEDURE — G1004 CDSM NDSC: HCPCS

## 2021-02-08 PROCEDURE — 71250 CT THORAX DX C-: CPT

## 2021-02-08 NOTE — PROGRESS NOTES
Virtual Regular Visit      Assessment/Plan:    Problem List Items Addressed This Visit        Other    SOB (shortness of breath) - Primary      Other Visit Diagnoses     Bronchiectasis without complication ([address] )        Relevant Medications    fluticasone-umeclidinium-vilanterol (TRELEGY) 200-62 5-25 MCG/INH AEPB inhaler        recent PFTs demonstrating restrictive pattern with FEV1 of 77%, and moderately decreased DLCO mildly decreased lung volumes  High-resolution CT of the chest demonstrating extensive bronchiectasis and scarring bilateral lungs more conspicuous at the upper lobes with cystic bronchiectasis in the right upper lobe     There are scattered pulmonary nodules subcm  He would need a repeat CT of the chest   Six months from then, which is due he states next week will give him a call with the results of the CT chest   He is continuing with his nebulizer treatments with albuterol ipratropium 3 to 4 times a day and he states he is benefiting from that  DC an anora  add Trelegy 1 puff daily, rinse mouth after use  echocardiogram with normal pulmonary artery pressures normal ejection fraction   has refused flu shot for this for all and she did and choir about COVID vaccine information given to the patient for signing up on my chart as well as I gave him the phone number to call  Answered patient's questions with regards to COVID vaccine  Will see him back in 6 weeks or p r n  earlier as needed      Reason for visit is   Chief Complaint   Patient presents with    Follow-up    Shortness of Breath    Virtual Regular Visit        Encounter provider Quang Romero MD    Provider located at [address] [place] [address],[address] [facility] [place] [address]      Recent Visits  Date Type Provider Dept   02/04/21 Telephone Velna Kussmaul, MD [address] recent visits within past 7 days and meeting all other requirements     Today's Visits  Date Type Provider Dept   02/08/21 Telemedicine Christen Luna MD [facility]   Showing today's visits and meeting all other requirements     Future Appointments  No visits were found meeting these conditions  Showing future appointments within next 150 days and meeting all other requirements        The patient was identified by name and date of birth  Abhinav Vail was informed that this is a telemedicine visit and that the visit is being conducted through [address] [place] [address] and patient was informed that this is not a secure, HIPAA-compliant platform  He agrees to proceed     My office door was closed  No one else was in the room  He acknowledged consent and understanding of privacy and security of the video platform  The patient has agreed to participate and understands they can discontinue the visit at any time  Patient is aware this is a billable service  Subjective  Abhinav Vail is a 63 y o  male  For follow-up of his shortness of breath  Patient is a 74-year-old male former smoker who works as an  at [employer] with history of COVID-19 infection requiring intubation, treated with Plaquenil and Zithromax as well as tocilzumab  He did require oxygen upon discharge but has since been titrated off O2      He is here today for follow-up  He continues to have dyspnea on exertion similar to his prior visit but overall improved from his hospitalization  He continues to be limited in his activity, does find some relief with the use of the nebulizer, also continues with his Anoro daily         Past Medical History:   Diagnosis Date    COVID-19 03/2020    PTSD (post-traumatic stress disorder)        Past Surgical History:   Procedure Laterality Date    FL INJECTION RIGHT HIP (NON ARTHROGRAM)  11/30/2020    NO PAST SURGERIES         Current Outpatient Medications   Medication Sig Dispense Refill    albuterol (2 5 mg/3 mL) 0 083 % nebulizer solution Take 1 vial (2 5 mg total) by nebulization every 6 (six) hours as needed for wheezing or shortness of breath 120 vial 2    amLODIPine (NORVASC) 2 5 mg tablet TAKE 1 TABLET (2 5 MG TOTAL) BY MOUTH DAILY AFTER DINNER      gabapentin (NEURONTIN) 100 mg capsule TAKE 1 CAPSULE BY MOUTH THREE TIMES DAILY 90 capsule 1    Multiple Vitamins-Minerals (MULTIVITAMIN ADULT PO) Take 1 tablet by mouth daily      naproxen sodium (ANAPROX) 550 mg tablet Take 1 tablet (550 mg total) by mouth 2 (two) times a day as needed for mild pain or headaches 60 tablet 0    sertraline (ZOLOFT) 100 mg tablet Take 1 5 tablets (150 mg total) by mouth daily 45 tablet 2    tiZANidine (ZANAFLEX) 2 mg tablet Take 1 tablet (2 mg total) by mouth daily at bedtime 90 tablet 3    traZODone (DESYREL) 50 mg tablet 1-1 5 tab po q bedtime 45 tablet 2    fluticasone-umeclidinium-vilanterol (TRELEGY) 200-62 5-25 MCG/INH AEPB inhaler Inhale 1 puff daily Rinse mouth after use  (Patient not taking: Reported on 2/8/2021) 1 Inhaler 3     No current facility-administered medications for this visit  Allergies   Allergen Reactions    Tetracycline Rash       Review of Systems   Constitutional: Positive for fatigue  HENT: Negative  Eyes: Negative  Respiratory: Positive for shortness of breath  Cardiovascular: Negative  Gastrointestinal: Negative  Endocrine: Negative  Genitourinary: Negative  Musculoskeletal: Negative  Allergic/Immunologic: Negative  Neurological: Negative  Hematological: Negative  Psychiatric/Behavioral: Negative  Video Exam  Not in any acute respiratory distress  Speaking in full sentences  Vitals:    02/08/21 0857   Temp: 98 1 °F (36 7 °C)   Weight: 108 kg (239 lb)   Height: 5' 8" (1 727 m)       Physical Exam   General:  Awake, alert, and oriented  No acute distress  WDWN  Looks comfortable  HEENT: Normocephalic, without obvious abnormality, atraumatic    Conjunctiva and EOM are normal  Sclera nonicteric, noninjected  Hearing intact  Normal range of motion of neck  No tracheal deviation is noted  Pulm:  Effort normal  Normal work of breathing  No respiratory distress  Cardiovascular:  No JVD or edema appreciated  Musculoskeletal:  Moving all limbs appropriately  FROM  No gross deformity noted  Neuro:  No aphasia  No facial asymmetry  No acute focal neurologic deficit noted  Skin:  Appears to be dry  Not diaphoretic  No lesions or rash appreciated  No jaundice, erythema, or cyanosis appreciated  Psych:  Cooperative  Normal mood and affect  Behavior is normal  Judgement and thought content normal  No acute psychosis  VIRTUAL VISIT DISCLAIMER    Caffie Meckel acknowledges that he has consented to an online visit or consultation  He understands that the online visit is based solely on information provided by him, and that, in the absence of a face-to-face physical evaluation by the physician, the diagnosis he receives is both limited and provisional in terms of accuracy and completeness  This is not intended to replace a full medical face-to-face evaluation by the physician  Caffie Meckel understands and accepts these terms

## 2021-02-09 ENCOUNTER — TELEPHONE (OUTPATIENT)
Dept: INTERNAL MEDICINE CLINIC | Facility: CLINIC | Age: 66
End: 2021-02-09

## 2021-02-09 ENCOUNTER — TELEPHONE (OUTPATIENT)
Dept: PSYCHIATRY | Facility: CLINIC | Age: 66
End: 2021-02-09

## 2021-02-09 ENCOUNTER — TELEMEDICINE (OUTPATIENT)
Dept: INTERNAL MEDICINE CLINIC | Facility: CLINIC | Age: 66
End: 2021-02-09
Payer: COMMERCIAL

## 2021-02-09 DIAGNOSIS — R06.02 SOB (SHORTNESS OF BREATH): ICD-10-CM

## 2021-02-09 DIAGNOSIS — I45.10 RIGHT BUNDLE BRANCH BLOCK (RBBB) ON ELECTROCARDIOGRAM (ECG): Primary | ICD-10-CM

## 2021-02-09 DIAGNOSIS — R94.31 ABNORMAL ELECTROCARDIOGRAM: ICD-10-CM

## 2021-02-09 DIAGNOSIS — Z00.00 ROUTINE ADULT HEALTH MAINTENANCE: ICD-10-CM

## 2021-02-09 DIAGNOSIS — U07.1 COVID-19 VIRUS INFECTION: ICD-10-CM

## 2021-02-09 DIAGNOSIS — R41.3 MEMORY DIFFICULTY: ICD-10-CM

## 2021-02-09 DIAGNOSIS — E55.9 VITAMIN D DEFICIENCY: ICD-10-CM

## 2021-02-09 DIAGNOSIS — Z12.5 SCREENING PSA (PROSTATE SPECIFIC ANTIGEN): ICD-10-CM

## 2021-02-09 PROBLEM — Z57.9 OCCUPATIONAL EXPOSURE IN WORKPLACE: Status: ACTIVE | Noted: 2020-09-10

## 2021-02-09 PROCEDURE — 1036F TOBACCO NON-USER: CPT | Performed by: NURSE PRACTITIONER

## 2021-02-09 PROCEDURE — 99214 OFFICE O/P EST MOD 30 MIN: CPT | Performed by: NURSE PRACTITIONER

## 2021-02-09 NOTE — TELEPHONE ENCOUNTER
Spoke with patient and informed him I will send an MARIA GUADALUPE that needs to be completed fully and returned    He agreed

## 2021-02-09 NOTE — PATIENT INSTRUCTIONS
Post Traumatic Stress Disorder   WHAT YOU NEED TO KNOW:   What is post traumatic stress disorder (PTSD)? PTSD is a condition that may occur after you have experienced a traumatic situation or event  This event may have caused you to feel intense fear, pain, or sorrow  You may think you are going to get hurt or die  You may also continue to feel helpless after the event  These feelings affect your daily activities and relationships  What causes PTSD? · An accident    · A crime done to you or a crime you may have seen, such as a murder, robbery, or shooting    · A serious disease, such as cancer, or the death of a loved one    · A natural disaster, such as a flood, earthquake, hurricane, or tornado    · Physical or sexual abuse    · Violence, war, or terrorism    What are the signs and symptoms of PTSD? · Nightmares, flashbacks, bad memories, hallucinations    · Feeling anxious, restless, or on edge    · Trouble sleeping, feeling depressed    · Feeling afraid, helpless, numb, or detached from others    · Angry or violent outbursts    · Avoiding things or people that remind you of the trauma    · Negative feelings about yourself, feeling guilty    How is PTSD diagnosed? Healthcare providers will ask you questions about your symptoms and use a guide to diagnose PTSD  You have PTSD if you have had all of the following for at least 1 month:  · You have seen, faced, or experienced an event that involved serious injury, near death, or death  · Your response to the event was great fear, helplessness, or horror  · You have at least 1 constant symptom of re-experiencing the traumatic event  · You have at least 3 symptoms of avoidance  · You have at least 2 hyperarousal symptoms  · Your symptoms cause distress and affect your daily activities, work, and relationships  How is PTSD treated? · Medicines  may be given to decrease anxiety, depression, or help you stay calm and relaxed      · Therapy  may be done in a group or one on one with a therapist  Family and friends are also an important part of recovery  ? Cognitive behavior therapy  helps you learn to face the feared object or situation slowly and carefully  You will also learn to control your mental and physical reactions of fear  § During cognitive processing therapy , a therapist helps you identify which thoughts about the trauma cause anxiety  He or she will help you see the event differently  This may help you learn to change your thoughts and decrease your anxiety  § During prolonged exposure , a therapist helps you work through thoughts, feelings, and memories about the trauma  A therapist helps you learn how to handle your thoughts and feelings  This can decrease your fear or anxiety  ? Talk therapy  may be one or more meetings with a therapist to have crisis counseling  You may have this right after a traumatic event to prevent or decrease further emotional problems  ? Relaxation therapy  teaches you how to feel less physical and emotional stress  Stress may cause pain, lead to illness, and slow healing  Deep breathing, muscle relaxation, and music are some forms of relaxation therapy  ? Eye movement desensitization and reprocessing (EMDR)  is a type of exposure therapy  Healthcare providers help you make your eyes move back and forth while you imagine the trauma  Where can I find support and more information? · [facility] for [address] [facility] Traumatic Stress Disorder  Phone: 3- 011 - [phone]  Web Address: [email or web address] sanz/  va gov/    · [address] [facility], Office of [address] [place] [address], Planning, and Communications  [address] [place] [address], [address]  [place], [place] [address]   Phone: [phone]  Phone: [phone]  Web Address: Aftba grayson  Call your local emergency number (911 in the [address]) if:   · You think about hurting or killing yourself or someone else        When should I call my doctor? · You cannot sleep or are sleeping too much  · You have questions or concerns about your condition or care  CARE AGREEMENT:   You have the right to help plan your care  Learn about your health condition and how it may be treated  Discuss treatment options with your healthcare providers to decide what care you want to receive  You always have the right to refuse treatment  The above information is an  only  It is not intended as medical advice for individual conditions or treatments  Talk to your doctor, nurse or pharmacist before following any medical regimen to see if it is safe and effective for you  © Copyright [address] [place] [address] Drive Information is for End User's use only and may not be sold, redistributed or otherwise used for commercial purposes   All illustrations and images included in CareNotes® are the copyrighted property of A D A M , Inc  or [address] [place] [address]

## 2021-02-09 NOTE — ASSESSMENT & PLAN NOTE
Continues to suffer from loss of thoughts and blank staring  Follows with neurology for post covid issues

## 2021-02-09 NOTE — ASSESSMENT & PLAN NOTE
Patient referred to cardiology for follow up of Right BBB which is a new finding  Patient does get chest pains intermittently about 3 times per week  Denies dizziness, lightheadedness

## 2021-02-09 NOTE — TELEPHONE ENCOUNTER
Burley Lesches called requesting a letter from you that he suffers from PTSD due to [place]  He needs to give this to his   Burley Lesches is trying to get workers comp  Burley Lesches can be reached at [phone]  Requesting this letter be mailed to him

## 2021-02-09 NOTE — LETTER
February 9, 2021     Patient: Heriberto Sheppard   YOB: 1955   Date of Visit: 2/9/2021       To Whom it May Concern:    Heriberto Sheppard is under my professional care  He was seen in my office on 2/9/2021  He is scheduled for a follow up visit in 2 months  If you have any questions or concerns, please don't hesitate to call           Sincerely,          ERIC Breaux

## 2021-02-09 NOTE — PROGRESS NOTES
Virtual Regular Visit      Assessment/Plan:    Problem List Items Addressed This Visit        Other    Abnormal electrocardiogram    SOB (shortness of breath)     Patient reminded to  new prescription for trelegy inhaler  Follow up with pulmonology as ordered  Memory difficulty     Continues to suffer from loss of thoughts and blank staring  Follows with neurology for post covid issues  COVID-19 virus infection     Patient will follow up in office in 2 months for routine physical with labs  Right bundle branch block (RBBB) on electrocardiogram (ECG) - Primary     Patient referred to cardiology for follow up of Right BBB which is a new finding  Patient does get chest pains intermittently about 3 times per week  Denies dizziness, lightheadedness  Relevant Orders    Ambulatory referral to Cardiology      Other Visit Diagnoses     Routine adult health maintenance        Relevant Orders    CBC and differential    Comprehensive metabolic panel    TSH, 3rd generation with Free T4 reflex    Lipid panel    Screening PSA (prostate specific antigen)        Relevant Orders    PSA, total and free    Vitamin D deficiency        Relevant Orders    Vitamin D 25 hydroxy          BMI Counseling: There is no height or weight on file to calculate BMI  The BMI is above normal  Nutrition recommendations include encouraging healthy choices of fruits and vegetables, moderation in carbohydrate intake, increasing intake of lean protein, reducing intake of saturated and trans fat and reducing intake of cholesterol  Exercise recommendations include strength training exercises  No pharmacotherapy was ordered  Reason for visit is   Chief Complaint   Patient presents with    Follow-up     would like to discuss test results      Virtual Regular Visit        Encounter provider Cheryl Cota, Martina Rosales    Provider located at [address]  [address] [facility] [place] [address]      Recent Visits  Date Type Provider Dept   02/04/21 Telephone Abi Mccain MD [address]   02/04/21 Telephone [address] recent visits within past 7 days and meeting all other requirements     Today's Visits  Date Type Provider Dept   02/09/21 [address], [address] today's visits and meeting all other requirements     Future Appointments  No visits were found meeting these conditions  Showing future appointments within next 150 days and meeting all other requirements        The patient was identified by name and date of birth  Caffie Meckel was informed that this is a telemedicine visit and that the visit is being conducted through [address] and patient was informed that this is not a secure, HIPAA-compliant platform  He agrees to proceed     My office door was closed  No one else was in the room  He acknowledged consent and understanding of privacy and security of the video platform  The patient has agreed to participate and understands they can discontinue the visit at any time  Patient is aware this is a billable service  Subjective  Caffie Meckel is a 63 y o  male follow up recent testing   Patient here for follow up visit  Recently seen by pulmonology  Patient was instructed to stop anoro inhaler and begin treatment with trelegy  Patient has not had a chance to  this prescription due to the weather  He had a repeat chest CT completed yesterday and report is not back at this time  Patient still has sob with exertion  States his house is fairly small and in the past he was able to run around  Currently has to stop 5-6 times just while walking around the house to catch his breath  Patient had EKG in office in December showing a right BBB  He was sent for a nuclear stress test which was completed   Patient did not experience any chest pains during the exam and there were no arrhythmias or conduction abnormalities  Resting EKG confirmed right BBB  Stress test was non diagnostic  Patient states that he does experience left sided upper chest pains intermittently about 3 times per week  This has existed since his covid diagnosis  Patient still following with psych  He does continue to suffer from depression related to his illness  States that at times he goes to his garage or room to be by himself and gets teary eyed or daniel  Is used to always working and being self sufficient, but not has to rely on others which is hard  Has difficulty sleeping due to pains and night moura  Patient also continues with headaches, has follow up with neurology next week          Past Medical History:   Diagnosis Date    COVID-19 03/2020    PTSD (post-traumatic stress disorder)        Past Surgical History:   Procedure Laterality Date    FL INJECTION RIGHT HIP (NON ARTHROGRAM)  11/30/2020    NO PAST SURGERIES         Current Outpatient Medications   Medication Sig Dispense Refill    albuterol (2 5 mg/3 mL) 0 083 % nebulizer solution Take 1 vial (2 5 mg total) by nebulization every 6 (six) hours as needed for wheezing or shortness of breath 120 vial 2    amLODIPine (NORVASC) 2 5 mg tablet TAKE 1 TABLET (2 5 MG TOTAL) BY MOUTH DAILY AFTER DINNER      gabapentin (NEURONTIN) 100 mg capsule TAKE 1 CAPSULE BY MOUTH THREE TIMES DAILY 90 capsule 1    Multiple Vitamins-Minerals (MULTIVITAMIN ADULT PO) Take 1 tablet by mouth daily      naproxen sodium (ANAPROX) 550 mg tablet Take 1 tablet (550 mg total) by mouth 2 (two) times a day as needed for mild pain or headaches 60 tablet 0    sertraline (ZOLOFT) 100 mg tablet Take 1 5 tablets (150 mg total) by mouth daily 45 tablet 2    tiZANidine (ZANAFLEX) 2 mg tablet Take 1 tablet (2 mg total) by mouth daily at bedtime 90 tablet 3    traZODone (DESYREL) 50 mg tablet 1-1 5 tab po q bedtime 45 tablet 2    fluticasone-umeclidinium-vilanterol (TRELEGY) 200-62 5-25 MCG/INH AEPB inhaler Inhale 1 puff daily Rinse mouth after use  (Patient not taking: Reported on 2/8/2021) 1 Inhaler 3     No current facility-administered medications for this visit  Allergies   Allergen Reactions    Tetracycline Rash       Review of Systems   Constitutional: Positive for activity change and fatigue  Negative for appetite change  Respiratory: Positive for chest tightness and shortness of breath  Cardiovascular: Positive for chest pain  Negative for palpitations  Neurological: Positive for headaches  Negative for dizziness  Psychiatric/Behavioral: Positive for dysphoric mood and sleep disturbance  The patient is nervous/anxious  Video Exam    There were no vitals filed for this visit  Physical Exam  Constitutional:       General: He is not in acute distress  Appearance: Normal appearance  He is well-developed and well-groomed  He is not ill-appearing  HENT:      Head: Normocephalic and atraumatic  Right Ear: Hearing normal       Left Ear: Hearing normal       Nose: Nose normal    Pulmonary:      Effort: No tachypnea, accessory muscle usage or respiratory distress  Neurological:      Mental Status: He is alert and oriented to person, place, and time  Psychiatric:         Attention and Perception: Attention normal          Mood and Affect: Mood is depressed  Affect is flat  Speech: Speech normal          Behavior: Behavior normal  Behavior is cooperative  Thought Content: Thought content normal          Cognition and Memory: Cognition normal          Judgment: Judgment normal           I spent 20 minutes directly with the patient during this visit      VIRTUAL VISIT DISCLAIMER    Martha Bruce acknowledges that he has consented to an online visit or consultation   He understands that the online visit is based solely on information provided by him, and that, in the absence of a face-to-face physical evaluation by the physician, the diagnosis he receives is both limited and provisional in terms of accuracy and completeness  This is not intended to replace a full medical face-to-face evaluation by the physician  Laura Jean-Baptiste understands and accepts these terms

## 2021-02-17 ENCOUNTER — OFFICE VISIT (OUTPATIENT)
Dept: NEUROLOGY | Facility: CLINIC | Age: 66
End: 2021-02-17
Payer: OTHER MISCELLANEOUS

## 2021-02-17 VITALS
SYSTOLIC BLOOD PRESSURE: 98 MMHG | HEIGHT: 68 IN | WEIGHT: 241.6 LBS | DIASTOLIC BLOOD PRESSURE: 64 MMHG | HEART RATE: 91 BPM | BODY MASS INDEX: 36.62 KG/M2

## 2021-02-17 DIAGNOSIS — R41.3 MEMORY DIFFICULTY: Primary | ICD-10-CM

## 2021-02-17 DIAGNOSIS — U07.1 COVID-19 VIRUS INFECTION: ICD-10-CM

## 2021-02-17 DIAGNOSIS — F43.11 ACUTE POST-TRAUMATIC STRESS DISORDER: ICD-10-CM

## 2021-02-17 DIAGNOSIS — G44.52 NEW DAILY PERSISTENT HEADACHE: ICD-10-CM

## 2021-02-17 PROCEDURE — 99214 OFFICE O/P EST MOD 30 MIN: CPT | Performed by: PSYCHIATRY & NEUROLOGY

## 2021-02-17 NOTE — PROGRESS NOTES
Kt Suarez is a 63 y o  male  Chief Complaint   Patient presents with    Headache    Memory Loss       Assessment:  1  Memory difficulty    2  Acute post-traumatic stress disorder    3  New daily persistent headache    4  COVID-19 virus infection        Plan:   MRI of the brain with neuro quant   avoid migraine triggers  increase Neurontin to 400 mg a day  continue with mentally stimulating exercises  follow-up in 3-4 months    Discussion:   differential diagnosis of headache discussed with the patient, he has migraine features, it is also possible that this is secondary to post COVID state as he never had any headaches before, Neurontin seems to be helping him I have advised him to increase Neurontin to 200 mg in the morning and 100 mg in the afternoon and evening, also patient has mild cognitive impairment, who would recommend an MRI scan of the brain with neuro quant, he is not keen to go on any medications, he has a neuropsych testing in June, he was advised to continue with mentally stimulating exercises, to keep his blood pressure cholesterol and sugar under control, follow-up with his orthopedic surgeon and pain specialist regarding his hip pain, avoid migraine triggers which we discussed in detail including foods to avoid, keep himself well hydrated, to go to the hospital if has any worsening symptoms and call me otherwise to see me back in 4 months and follow up with his other physicians      Subjective:    HPI    patient is here in follow-up for his headaches, memory issues and numbness and tingling sensation in the thighs and right hip pain, is accompanied with his family, since his last visit he feels his headaches are slightly better but he still continues to have 4-5 headaches a week, they are mostly start from the back and come to the front, associated with photophobia and phonophobia, sometimes they can last for a few minutes to  Few hours, he continues to have short-term memory issues, no seizures, he is in follow up with the psychologist and behavioral therapist for his mood swings, no side effects to Neurontin, still continues to have right hip pain and is in follow up with an orthopedic surgeon and pain specialist, he is in follow up with his pulmonologist regarding his bronchiectasis and shortness of breath, he walks with a limp denies any back pain, no bowel and bladder incontinence, no dizziness no speech difficulty no seizures appetite is good weight has been stable no other complaints  Vitals:    02/17/21 0925   BP: 98/64   BP Location: Left arm   Patient Position: Sitting   Cuff Size: Large   Pulse: 91   Weight: 110 kg (241 lb 9 6 oz)   Height: 5' 8" (1 727 m)       Current Medications    Current Outpatient Medications:     albuterol (2 5 mg/3 mL) 0 083 % nebulizer solution, Take 1 vial (2 5 mg total) by nebulization every 6 (six) hours as needed for wheezing or shortness of breath, Disp: 120 vial, Rfl: 2    amLODIPine (NORVASC) 2 5 mg tablet, TAKE 1 TABLET (2 5 MG TOTAL) BY MOUTH DAILY AFTER DINNER, Disp: , Rfl:     gabapentin (NEURONTIN) 100 mg capsule, TAKE 1 CAPSULE BY MOUTH THREE TIMES DAILY, Disp: 90 capsule, Rfl: 1    Multiple Vitamins-Minerals (MULTIVITAMIN ADULT PO), Take 1 tablet by mouth daily, Disp: , Rfl:     naproxen sodium (ANAPROX) 550 mg tablet, Take 1 tablet (550 mg total) by mouth 2 (two) times a day as needed for mild pain or headaches, Disp: 60 tablet, Rfl: 0    sertraline (ZOLOFT) 100 mg tablet, Take 1 5 tablets (150 mg total) by mouth daily, Disp: 45 tablet, Rfl: 2    tiZANidine (ZANAFLEX) 2 mg tablet, Take 1 tablet (2 mg total) by mouth daily at bedtime, Disp: 90 tablet, Rfl: 3    traZODone (DESYREL) 50 mg tablet, 1-1 5 tab po q bedtime, Disp: 45 tablet, Rfl: 2    fluticasone-umeclidinium-vilanterol (TRELEGY) 200-62 5-25 MCG/INH AEPB inhaler, Inhale 1 puff daily Rinse mouth after use   (Patient not taking: Reported on 2/8/2021), Disp: 1 Inhaler, Rfl: 3      Allergies  Tetracycline    Past Medical History  Past Medical History:   Diagnosis Date    COVID-19 03/2020    PTSD (post-traumatic stress disorder)          Past Surgical History:  Past Surgical History:   Procedure Laterality Date    FL INJECTION RIGHT HIP (NON ARTHROGRAM)  11/30/2020    NO PAST SURGERIES           Family History:  Family History   Problem Relation Age of Onset    Heart disease Mother     Sudden death Father     Kidney disease Brother        Social History:   reports that he quit smoking about 10 months ago  His smoking use included cigarettes  He has a 60 00 pack-year smoking history  He has never used smokeless tobacco  He reports previous alcohol use  He reports that he does not use drugs  I have reviewed the past medical history, surgical history, social and family history, current medications, allergies vitals, review of systems, and updated this information as appropriate today  Objective:    Physical Exam    Neurological Exam    GENERAL:  Cooperative in no acute distress  Well-developed and well-nourished    HEAD and NECK   Head is atraumatic normocephalic with no lesions or masses  Neck is supple with full range of motion      NEUROLOGIC:  Mental Status-the patient is awake alert and oriented without aphasia or apraxia, Charles is 18/30  Cranial Nerves: Visual fields are full to confrontation  Extraocular movements are full without nystagmus  Pupils are 2-1/2 mm and reactive  Face is symmetrical to light touch  Movements of facial expression move symmetrically  Hearing is normal to finger rub bilaterally  Soft palate lifts symmetrically  Shoulder shrug is symmetrical  Tongue is midline without atrophy  Motor: No drift is noted on arm extension  Strength is full in the upper and lower extremities with normal bulk and tone  Coordination: Finger to nose testing is performed accurately    Ambulates with a limp          ROS:  Review of Systems   Constitutional: Positive for fatigue  Negative for appetite change and fever  HENT: Negative  Negative for hearing loss, tinnitus, trouble swallowing and voice change  Eyes: Negative  Negative for photophobia and pain  Respiratory: Positive for shortness of breath  Cardiovascular: Positive for chest pain (occasionally left side )  Negative for palpitations  Gastrointestinal: Negative  Negative for nausea and vomiting  Endocrine: Negative  Negative for cold intolerance  Genitourinary: Positive for frequency  Negative for dysuria and urgency  Musculoskeletal: Negative  Negative for myalgias and neck pain  Skin: Negative  Negative for rash  Allergic/Immunologic: Negative  Neurological: Positive for headaches  Negative for dizziness, tremors, seizures, syncope, facial asymmetry, speech difficulty, weakness, light-headedness and numbness  Hematological: Negative  Does not bruise/bleed easily  Psychiatric/Behavioral: Positive for agitation, confusion and sleep disturbance  Negative for hallucinations  The patient is nervous/anxious

## 2021-02-26 DIAGNOSIS — R20.2 NUMBNESS AND TINGLING: ICD-10-CM

## 2021-02-26 DIAGNOSIS — R20.0 NUMBNESS AND TINGLING: ICD-10-CM

## 2021-02-26 DIAGNOSIS — G44.52 NEW DAILY PERSISTENT HEADACHE: ICD-10-CM

## 2021-02-26 RX ORDER — GABAPENTIN 100 MG/1
CAPSULE ORAL
Qty: 90 CAPSULE | Refills: 1 | Status: SHIPPED | OUTPATIENT
Start: 2021-02-26 | End: 2021-04-27

## 2021-02-28 ENCOUNTER — HOSPITAL ENCOUNTER (OUTPATIENT)
Dept: MRI IMAGING | Facility: HOSPITAL | Age: 66
Discharge: HOME | End: 2021-02-28
Attending: PSYCHIATRY & NEUROLOGY
Payer: COMMERCIAL

## 2021-02-28 DIAGNOSIS — R41.3 MEMORY DIFFICULTY: ICD-10-CM

## 2021-02-28 PROCEDURE — G1004 CDSM NDSC: HCPCS

## 2021-02-28 PROCEDURE — 70551 MRI BRAIN STEM W/O DYE: CPT

## 2021-03-05 ENCOUNTER — TELEMEDICINE (OUTPATIENT)
Dept: BEHAVIORAL/MENTAL HEALTH CLINIC | Facility: CLINIC | Age: 66
End: 2021-03-05
Payer: COMMERCIAL

## 2021-03-05 DIAGNOSIS — Z63.4 EXPECTED BEREAVEMENT DUE TO LIFE EVENT: ICD-10-CM

## 2021-03-05 DIAGNOSIS — F43.10 PTSD (POST-TRAUMATIC STRESS DISORDER): ICD-10-CM

## 2021-03-05 DIAGNOSIS — F43.11 ACUTE POST-TRAUMATIC STRESS DISORDER: ICD-10-CM

## 2021-03-05 PROCEDURE — 90834 PSYTX W PT 45 MINUTES: CPT | Performed by: SOCIAL WORKER

## 2021-03-05 SDOH — SOCIAL STABILITY - SOCIAL INSECURITY: DISSAPEARANCE AND DEATH OF FAMILY MEMBER: Z63.4

## 2021-03-05 NOTE — PSYCH
Treatment Plan Tracking    # The updatedTreatment Plan not completed within required time limits due to:it was not due 2/19 it is not due until 4/19  The undersigned was doing the plans every 4 months and they are not due for update except for every 6 months                      This note was not shared with the patient due to this is a psychotherapy note   It was my intent to perform this visit via video technology but the patient was not able to do a video connection so the visit was completed via audio telephone only  Virtual Regular Visit      Assessment/Plan:    Problem List Items Addressed This Visit     None               Reason for visit is due to ongoing symptoms and the covid 19 pandemic     Encounter provider Diana Carias    Provider located at [address] [place] [id number] [phone]      Recent Visits  No visits were found meeting these conditions  Showing recent visits within past 7 days and meeting all other requirements     Future Appointments  No visits were found meeting these conditions  Showing future appointments within next 150 days and meeting all other requirements        The patient was identified by name and date of birth  Elder Ochoa was informed that this is a telemedicine visit and that the visit is being conducted through [place] and patient was informed that this is a secure, HIPAA-compliant platform  He agrees to proceed     My office door was closed  No one else was in the room  He acknowledged consent and understanding of privacy and security of the video platform  The patient has agreed to participate and understands they can discontinue the visit at any time  Patient is aware this is a billable service  Subjective  Elder Ochoa is a 63 y o  male          HPI     Past Medical History:   Diagnosis Date    COVID-19 03/2020    PTSD (post-traumatic stress disorder)        Past Surgical History:   Procedure Laterality Date    FL INJECTION RIGHT HIP (NON ARTHROGRAM)  11/30/2020    NO PAST SURGERIES         Current Outpatient Medications   Medication Sig Dispense Refill    albuterol (2 5 mg/3 mL) 0 083 % nebulizer solution Take 1 vial (2 5 mg total) by nebulization every 6 (six) hours as needed for wheezing or shortness of breath 120 vial 2    amLODIPine (NORVASC) 2 5 mg tablet TAKE 1 TABLET (2 5 MG TOTAL) BY MOUTH DAILY AFTER DINNER      fluticasone-umeclidinium-vilanterol (TRELEGY) 200-62 5-25 MCG/INH AEPB inhaler Inhale 1 puff daily Rinse mouth after use  (Patient not taking: Reported on 2/8/2021) 1 Inhaler 3    gabapentin (NEURONTIN) 100 mg capsule TAKE 1 CAPSULE BY MOUTH THREE TIMES A DAY 90 capsule 1    Multiple Vitamins-Minerals (MULTIVITAMIN ADULT PO) Take 1 tablet by mouth daily      naproxen sodium (ANAPROX) 550 mg tablet Take 1 tablet (550 mg total) by mouth 2 (two) times a day as needed for mild pain or headaches 60 tablet 0    sertraline (ZOLOFT) 100 mg tablet Take 1 5 tablets (150 mg total) by mouth daily 45 tablet 2    tiZANidine (ZANAFLEX) 2 mg tablet Take 1 tablet (2 mg total) by mouth daily at bedtime 90 tablet 3    traZODone (DESYREL) 50 mg tablet 1-1 5 tab po q bedtime 45 tablet 2     No current facility-administered medications for this visit  Allergies   Allergen Reactions    Tetracycline Rash       Review of Systems Data: [facility] discussed in great detail how upset he is with his employer that from a workman's comp perspective how they are trying to get out of payment for his work related blanche of covid 23 which has left him with new limitations that he never had prior to covid and how he can't get back to work because of these issues  Regarding his goals he is trying to work on accepting his new limitations that his doctors are telling him may be permanent as a result of covid   Regarding goal 2 he is deeply upset about his adult children not really being there for him and his wife who also had covid  Assessment: Jimmy is not suicidal or homicidal but he is quite upset about his employer, his new limitations and his adult children  He is emotionally dealing with these issues  The undersigned provided empathy and supportive counseling and we discussed mindulness strategies to help him cope  Plan: Continue to help Jimmy skill build in distress tolerance  Video Exam    There were no vitals filed for this visit  Physical Exam N/A    I spent 45 minutes directly with the patient during this visit      VIRTUAL VISIT DISCLAIMER    Safia Fry acknowledges that he has consented to an online visit or consultation  He understands that the online visit is based solely on information provided by him, and that, in the absence of a face-to-face physical evaluation by the physician, the diagnosis he receives is both limited and provisional in terms of accuracy and completeness  This is not intended to replace a full medical face-to-face evaluation by the physician  Safia Fry understands and accepts these terms

## 2021-03-11 ENCOUNTER — TELEPHONE (OUTPATIENT)
Dept: INTERNAL MEDICINE CLINIC | Facility: CLINIC | Age: 66
End: 2021-03-11

## 2021-03-11 NOTE — TELEPHONE ENCOUNTER
Patient is asking for a letter stating he must remain out of work indefinitely  She states he is disabled due to the effects of COVID on his health and that he needs the letter to present in court tomorrow

## 2021-03-19 ENCOUNTER — TELEMEDICINE (OUTPATIENT)
Dept: BEHAVIORAL/MENTAL HEALTH CLINIC | Facility: CLINIC | Age: 66
End: 2021-03-19
Payer: COMMERCIAL

## 2021-03-19 DIAGNOSIS — R41.3 MEMORY DIFFICULTY: ICD-10-CM

## 2021-03-19 DIAGNOSIS — F43.10 PTSD (POST-TRAUMATIC STRESS DISORDER): ICD-10-CM

## 2021-03-19 DIAGNOSIS — Z63.4 EXPECTED BEREAVEMENT DUE TO LIFE EVENT: ICD-10-CM

## 2021-03-19 DIAGNOSIS — F43.11 ACUTE POST-TRAUMATIC STRESS DISORDER: ICD-10-CM

## 2021-03-19 PROCEDURE — 90834 PSYTX W PT 45 MINUTES: CPT | Performed by: SOCIAL WORKER

## 2021-03-19 SDOH — SOCIAL STABILITY - SOCIAL INSECURITY: DISSAPEARANCE AND DEATH OF FAMILY MEMBER: Z63.4

## 2021-03-19 NOTE — PSYCH
It was my intent to perform this visit via video technology but the patient was not able to do a video connection so the visit was completed via audio telephone only  This note was not shared with the patient due to this is a psychotherapy note  Virtual Regular Visit      Assessment/Plan:    Problem List Items Addressed This Visit        Other    PTSD (post-traumatic stress disorder)    Acute post-traumatic stress disorder    Expected bereavement due to life event    Memory difficulty               Reason for visit is due to ongoing symptoms and the ongoing covid 19 pandemic  Encounter provider [facility]    Provider located at [address] [place] [address] [phone]      Recent Visits  No visits were found meeting these conditions  Showing recent visits within past 7 days and meeting all other requirements     Today's Visits  Date Type Provider Dept   03/19/21 4050 [facility] Therapist [address]   Showing today's visits and meeting all other requirements     Future Appointments  No visits were found meeting these conditions  Showing future appointments within next 150 days and meeting all other requirements        The patient was identified by name and date of birth  Tim Rodríguez was informed that this is a telemedicine visit and that the visit is being conducted through [employer] and patient was informed that this is a secure, HIPAA-compliant platform  He agrees to proceed     My office door was closed  No one else was in the room  He acknowledged consent and understanding of privacy and security of the video platform  The patient has agreed to participate and understands they can discontinue the visit at any time  Patient is aware this is a billable service  Subjective  Tim Rodríguez is a 63 y o  male          HPI     Past Medical History: Diagnosis Date    COVID-19 03/2020    PTSD (post-traumatic stress disorder)        Past Surgical History:   Procedure Laterality Date    FL INJECTION RIGHT HIP (NON ARTHROGRAM)  11/30/2020    NO PAST SURGERIES         Current Outpatient Medications   Medication Sig Dispense Refill    albuterol (2 5 mg/3 mL) 0 083 % nebulizer solution Take 1 vial (2 5 mg total) by nebulization every 6 (six) hours as needed for wheezing or shortness of breath 120 vial 2    amLODIPine (NORVASC) 2 5 mg tablet TAKE 1 TABLET (2 5 MG TOTAL) BY MOUTH DAILY AFTER DINNER      fluticasone-umeclidinium-vilanterol (TRELEGY) 200-62 5-25 MCG/INH AEPB inhaler Inhale 1 puff daily Rinse mouth after use  (Patient not taking: Reported on 2/8/2021) 1 Inhaler 3    gabapentin (NEURONTIN) 100 mg capsule TAKE 1 CAPSULE BY MOUTH THREE TIMES A DAY 90 capsule 1    Multiple Vitamins-Minerals (MULTIVITAMIN ADULT PO) Take 1 tablet by mouth daily      naproxen sodium (ANAPROX) 550 mg tablet Take 1 tablet (550 mg total) by mouth 2 (two) times a day as needed for mild pain or headaches 60 tablet 0    sertraline (ZOLOFT) 100 mg tablet Take 1 5 tablets (150 mg total) by mouth daily 45 tablet 2    tiZANidine (ZANAFLEX) 2 mg tablet Take 1 tablet (2 mg total) by mouth daily at bedtime 90 tablet 3    traZODone (DESYREL) 50 mg tablet 1-1 5 tab po q bedtime 45 tablet 2     No current facility-administered medications for this visit  Allergies   Allergen Reactions    Tetracycline Rash       Review of Systems Data: Yulia Peterson talked about his ongoing symptoms from having covid 19 He still has multiple remaining issues as a result of it  His work is threatening him that if he does not return to work they will terminate him  He is dealing with a  who is helping him with workman's compensation  He feels his union is not doing anything for him  Regarding his goals he is trying to learn to cope and live with his issues as a result of covid   Financially he shared he is hurting  Regarding goal 2 he is allowing his adult children to work out their issues without him trying to control it  Assessment: He denies suicidal/homicidal ideation, plan or intent  However he is depressed and anxious over his issues as a result from covid  Plan: Continue to help him skill build in distress tolerance  Video Exam    There were no vitals filed for this visit  Physical Exam N/A    I spent 45 minutes directly with the patient during this visit      VIRTUAL VISIT DISCLAIMER    Elder Ochoa acknowledges that he has consented to an online visit or consultation  He understands that the online visit is based solely on information provided by him, and that, in the absence of a face-to-face physical evaluation by the physician, the diagnosis he receives is both limited and provisional in terms of accuracy and completeness  This is not intended to replace a full medical face-to-face evaluation by the physician  Elder Ochoa understands and accepts these terms

## 2021-04-02 ENCOUNTER — TELEMEDICINE (OUTPATIENT)
Dept: BEHAVIORAL/MENTAL HEALTH CLINIC | Facility: CLINIC | Age: 66
End: 2021-04-02
Payer: COMMERCIAL

## 2021-04-02 DIAGNOSIS — F43.10 PTSD (POST-TRAUMATIC STRESS DISORDER): Primary | ICD-10-CM

## 2021-04-02 PROCEDURE — 90834 PSYTX W PT 45 MINUTES: CPT | Performed by: SOCIAL WORKER

## 2021-04-02 NOTE — PSYCH
This note was not shared with the patient due to this is a psychotherapy note  It was my intent to perform this visit via video technology but the patient was not able to do a video connection so the visit was completed via audio telephone only  Virtual Regular Visit      Assessment/Plan:    Problem List Items Addressed This Visit     None               Reason for visit is due to the covid 19 pandemic and due to ongoing symptoms     Encounter provider Cammie Louis    Provider located at [address] [place] [id number] [phone]      Recent Visits  No visits were found meeting these conditions  Showing recent visits within past 7 days and meeting all other requirements     Today's Visits  Date Type Provider Dept   04/02/21 4050 [facility] Therapist [place]   Showing today's visits and meeting all other requirements     Future Appointments  No visits were found meeting these conditions  Showing future appointments within next 150 days and meeting all other requirements        The patient was identified by name and date of birth  Aldo Patient was informed that this is a telemedicine visit and that the visit is being conducted through [employer] and patient was informed that this is a secure, HIPAA-compliant platform  He agrees to proceed     My office door was closed  No one else was in the room  He acknowledged consent and understanding of privacy and security of the video platform  The patient has agreed to participate and understands they can discontinue the visit at any time  Patient is aware this is a billable service  Subjective  Aldo Patient is a 63 y o  male          HPI     Past Medical History:   Diagnosis Date    COVID-19 03/2020    PTSD (post-traumatic stress disorder)        Past Surgical History:   Procedure Laterality Date    FL INJECTION RIGHT HIP (NON ARTHROGRAM)  11/30/2020    NO PAST SURGERIES         Current Outpatient Medications   Medication Sig Dispense Refill    albuterol (2 5 mg/3 mL) 0 083 % nebulizer solution Take 1 vial (2 5 mg total) by nebulization every 6 (six) hours as needed for wheezing or shortness of breath 120 vial 2    amLODIPine (NORVASC) 2 5 mg tablet TAKE 1 TABLET (2 5 MG TOTAL) BY MOUTH DAILY AFTER DINNER      fluticasone-umeclidinium-vilanterol (TRELEGY) 200-62 5-25 MCG/INH AEPB inhaler Inhale 1 puff daily Rinse mouth after use  (Patient not taking: Reported on 2/8/2021) 1 Inhaler 3    gabapentin (NEURONTIN) 100 mg capsule TAKE 1 CAPSULE BY MOUTH THREE TIMES A DAY 90 capsule 1    Multiple Vitamins-Minerals (MULTIVITAMIN ADULT PO) Take 1 tablet by mouth daily      naproxen sodium (ANAPROX) 550 mg tablet Take 1 tablet (550 mg total) by mouth 2 (two) times a day as needed for mild pain or headaches 60 tablet 0    sertraline (ZOLOFT) 100 mg tablet Take 1 5 tablets (150 mg total) by mouth daily 45 tablet 2    tiZANidine (ZANAFLEX) 2 mg tablet Take 1 tablet (2 mg total) by mouth daily at bedtime 90 tablet 3    traZODone (DESYREL) 50 mg tablet 1-1 5 tab po q bedtime 45 tablet 2     No current facility-administered medications for this visit  Allergies   Allergen Reactions    Tetracycline Rash       Review of Salbador Rodrigues continues to struggle with some of the lasting effects he has due to having covid  He also is dealing with the loss of family members due to covid  He is upset that he and his wife still have not received the vaccine  I gave him the info for [facility] with the 3 digit code to perhaps help him along  He discussed how it gets better then it sounds like the pandemic is getting worse again  He discussed how fearful he remains about the virus and how financially hurting in his words he is   Assessment: Ro Herrera is not suicidal or homicidal  However he is upset about the virus and the fact he still does not have the vaccine  He is happier his kids are talking  He shared he looks forward to the talks and support he gets from our talks  Plan: Continue to provide the support [place] needs to get thru this difficult time  Video Exam    There were no vitals filed for this visit  Physical Exam N/A    I spent 45 minutes directly with the patient during this visit      VIRTUAL VISIT DISCLAIMER    Aldo Patient acknowledges that he has consented to an online visit or consultation  He understands that the online visit is based solely on information provided by him, and that, in the absence of a face-to-face physical evaluation by the physician, the diagnosis he receives is both limited and provisional in terms of accuracy and completeness  This is not intended to replace a full medical face-to-face evaluation by the physician  Aldo Patient understands and accepts these terms

## 2021-04-05 ENCOUNTER — TELEMEDICINE (OUTPATIENT)
Dept: PSYCHIATRY | Facility: CLINIC | Age: 66
End: 2021-04-05
Payer: COMMERCIAL

## 2021-04-05 DIAGNOSIS — F43.10 PTSD (POST-TRAUMATIC STRESS DISORDER): ICD-10-CM

## 2021-04-05 DIAGNOSIS — F32.1 CURRENT MODERATE EPISODE OF MAJOR DEPRESSIVE DISORDER WITHOUT PRIOR EPISODE (HCC): ICD-10-CM

## 2021-04-05 PROCEDURE — 99213 OFFICE O/P EST LOW 20 MIN: CPT | Performed by: PHYSICIAN ASSISTANT

## 2021-04-05 RX ORDER — TRAZODONE HYDROCHLORIDE 50 MG/1
TABLET ORAL
Qty: 45 TABLET | Refills: 2 | Status: SHIPPED | OUTPATIENT
Start: 2021-04-05 | End: 2021-04-27 | Stop reason: ALTCHOICE

## 2021-04-05 RX ORDER — SERTRALINE HYDROCHLORIDE 100 MG/1
150 TABLET, FILM COATED ORAL DAILY
Qty: 45 TABLET | Refills: 2 | Status: SHIPPED | OUTPATIENT
Start: 2021-04-05 | End: 2021-04-27 | Stop reason: SDUPTHER

## 2021-04-05 NOTE — PSYCH
Virtual Brief Visit                Encounter provider Greg Clark PA-C    Provider located at [address] [address] [phone]    Recent Visits  No visits were found meeting these conditions  Showing recent visits within past 7 days and meeting all other requirements     Future Appointments  No visits were found meeting these conditions  Showing future appointments within next 150 days and meeting all other requirements        After connecting through telephone, the patient was identified by name and date of birth  Kavon Scottan was informed that this is a telemedicine visit and that the visit is being conducted through telephone  My office door was closed  No one else was in the room  He acknowledged consent and understanding of privacy and security of the platform  The patient has agreed to participate and understands he can discontinue the visit at any time  Patient is aware this is a billable service  I spent 15 minutes directly with the patient during this visit    VIRTUAL VISIT DISCLAIMER    Kavon Scottan acknowledges that he has consented to an online visit or consultation  He understands that the online visit is based solely on information provided by him, and that, in the absence of a face-to-face physical evaluation by the physician, the diagnosis he receives is both limited and provisional in terms of accuracy and completeness  This is not intended to replace a full medical face-to-face evaluation by the physician  Kavon Stock understands and accepts these terms      MEDICATION MANAGEMENT NOTE        [facility] [place]  [address] [facility] [address] [phone]        Name and Date of Birth:  Kavon Stock 63 y o  1955    Date of Visit: April 5, 2021    SUBJECTIVE:       Jose Luis spoke to ALDOJERONIMO on the phone  Last spoke with him 1/29 at which time zoloft increased to 150 mg  LINDSEY notes no symptoms worse but cannot identify any that are better  Sleeps "a little bit"  Still with nightmares 2-3 nights/week  Is scared to leave the house and felt "closed-in" when his children came to visit  Continues to feel frustrated and helpless regarding limitations placed on him by his health and finances are a struggle  Continues to see Paulette Esparza for therapy  Review of Systems   Constitutional: Positive for fatigue  Respiratory:        Dyspnea on exertion   Musculoskeletal: Positive for arthralgias  Neurological: Positive for numbness and headaches  Tingling lower ext; headaches since covid- no worse with increases in doses of psychotropics  Psychiatric History     This office since 5/7/20  No IP or suicide attempts  Trauma/Loss History         ARDS secondary to covid -4/2/20-4/11/20 -intubated x 5 days  Lost brother and friends/coworkers to covid  Social History        Lives with wife >40 yrs  3 children  [facility] [place]   62 yo male  Jose Luis spoke to him by phone  Speech clear and coherent  Normal rate and volume  Thought processes intact  No SI/HI         Lab Review: I have reviewed all pertinent labs    Lab Results   Component Value Date    SODIUM 142 05/05/2020    K 3 8 05/05/2020     05/05/2020    CO2 28 05/05/2020    AGAP 6 05/05/2020    BUN 18 05/05/2020    CREATININE 1 27 05/05/2020    GLUC 164 (H) 04/12/2020    GLUF 87 05/05/2020    CALCIUM 9 0 05/05/2020    AST 15 05/05/2020    ALT 32 05/05/2020    ALKPHOS 70 05/05/2020    TP 6 7 07/29/2020    TBILI 0 30 05/05/2020    EGFR 60 05/05/2020     Lab Results   Component Value Date    WBC 11 63 (H) 05/05/2020    HGB 13 7 05/05/2020    HCT 43 3 05/05/2020    MCV 91 05/05/2020     05/05/2020     Lab Results   Component Value Date    MBMJXTJN42 590 07/29/2020     Lab Results   Component Value Date    FOLATE 11 7 07/29/2020           ASSESSMENT & PLAN          Diagnoses and all orders for this visit:    Current moderate episode of major depressive disorder without prior episode (HCC)  -     traZODone (DESYREL) 50 mg tablet; 1-1 5 tab po q bedtime    PTSD (post-traumatic stress disorder)  -     sertraline (ZOLOFT) 100 mg tablet; Take 1 5 tablets (150 mg total) by mouth daily      Current Outpatient Medications   Medication Sig Dispense Refill    albuterol (2 5 mg/3 mL) 0 083 % nebulizer solution Take 1 vial (2 5 mg total) by nebulization every 6 (six) hours as needed for wheezing or shortness of breath 120 vial 2    amLODIPine (NORVASC) 2 5 mg tablet TAKE 1 TABLET (2 5 MG TOTAL) BY MOUTH DAILY AFTER DINNER      fluticasone-umeclidinium-vilanterol (TRELEGY) 200-62 5-25 MCG/INH AEPB inhaler Inhale 1 puff daily Rinse mouth after use  1 Inhaler 3    gabapentin (NEURONTIN) 100 mg capsule TAKE 1 CAPSULE BY MOUTH THREE TIMES A DAY 90 capsule 1    Multiple Vitamins-Minerals (MULTIVITAMIN ADULT PO) Take 1 tablet by mouth daily      naproxen sodium (ANAPROX) 550 mg tablet Take 1 tablet (550 mg total) by mouth 2 (two) times a day as needed for mild pain or headaches 90 tablet 1    sertraline (ZOLOFT) 100 mg tablet Take 1 5 tablets (150 mg total) by mouth daily 45 tablet 2    tiZANidine (ZANAFLEX) 2 mg tablet Take 1 tablet (2 mg total) by mouth daily at bedtime 90 tablet 3    traZODone (DESYREL) 50 mg tablet 1-1 5 tab po q bedtime 45 tablet 2     No current facility-administered medications for this visit  Plan:     cont present meds- zoloft 150 mg/d and trazodone 75 mg q bedtime  Cont f/u Timbo Jacobus for ind therapy    Reviewed risks, benefits, side effects of medications, including no medication  Patient understands and agrees to treatment plan       F/u PA-C 3 mths, sooner prn       Patient has been informed of 24 hours and weekend coverage for urgent situations accessed by calling the main clinic phone number       Rylee Brock PA-C

## 2021-04-06 ENCOUNTER — APPOINTMENT (OUTPATIENT)
Dept: LAB | Facility: CLINIC | Age: 66
End: 2021-04-06
Payer: COMMERCIAL

## 2021-04-06 ENCOUNTER — OFFICE VISIT (OUTPATIENT)
Dept: INTERNAL MEDICINE CLINIC | Facility: CLINIC | Age: 66
End: 2021-04-06
Payer: COMMERCIAL

## 2021-04-06 VITALS
HEIGHT: 68 IN | WEIGHT: 237.8 LBS | TEMPERATURE: 98.7 F | BODY MASS INDEX: 36.04 KG/M2 | OXYGEN SATURATION: 95 % | HEART RATE: 74 BPM | SYSTOLIC BLOOD PRESSURE: 128 MMHG | DIASTOLIC BLOOD PRESSURE: 84 MMHG

## 2021-04-06 DIAGNOSIS — M25.559 HIP PAIN: ICD-10-CM

## 2021-04-06 DIAGNOSIS — M25.551 HIP PAIN, RIGHT: ICD-10-CM

## 2021-04-06 DIAGNOSIS — R41.3 MEMORY DIFFICULTY: ICD-10-CM

## 2021-04-06 DIAGNOSIS — R06.02 SOB (SHORTNESS OF BREATH): ICD-10-CM

## 2021-04-06 DIAGNOSIS — J47.9 BRONCHIECTASIS WITHOUT COMPLICATION (HCC): ICD-10-CM

## 2021-04-06 DIAGNOSIS — Z00.00 ROUTINE ADULT HEALTH MAINTENANCE: ICD-10-CM

## 2021-04-06 DIAGNOSIS — Z00.00 ANNUAL PHYSICAL EXAM: Primary | ICD-10-CM

## 2021-04-06 DIAGNOSIS — F43.10 PTSD (POST-TRAUMATIC STRESS DISORDER): ICD-10-CM

## 2021-04-06 DIAGNOSIS — Z12.5 SCREENING PSA (PROSTATE SPECIFIC ANTIGEN): ICD-10-CM

## 2021-04-06 DIAGNOSIS — Z12.11 SCREENING FOR COLON CANCER: ICD-10-CM

## 2021-04-06 DIAGNOSIS — E55.9 VITAMIN D DEFICIENCY: ICD-10-CM

## 2021-04-06 DIAGNOSIS — G44.52 NEW DAILY PERSISTENT HEADACHE: ICD-10-CM

## 2021-04-06 LAB
25(OH)D3 SERPL-MCNC: 21.8 NG/ML (ref 30–100)
ALBUMIN SERPL BCP-MCNC: 4.3 G/DL (ref 3.5–5)
ALP SERPL-CCNC: 109 U/L (ref 46–116)
ALT SERPL W P-5'-P-CCNC: 44 U/L (ref 12–78)
ANION GAP SERPL CALCULATED.3IONS-SCNC: 6 MMOL/L (ref 4–13)
AST SERPL W P-5'-P-CCNC: 28 U/L (ref 5–45)
BASOPHILS # BLD AUTO: 0.07 THOUSANDS/ΜL (ref 0–0.1)
BASOPHILS NFR BLD AUTO: 1 % (ref 0–1)
BILIRUB SERPL-MCNC: 0.47 MG/DL (ref 0.2–1)
BUN SERPL-MCNC: 18 MG/DL (ref 5–25)
CALCIUM SERPL-MCNC: 9.2 MG/DL (ref 8.3–10.1)
CHLORIDE SERPL-SCNC: 108 MMOL/L (ref 100–108)
CHOLEST SERPL-MCNC: 196 MG/DL (ref 50–200)
CO2 SERPL-SCNC: 26 MMOL/L (ref 21–32)
CREAT SERPL-MCNC: 1.36 MG/DL (ref 0.6–1.3)
EOSINOPHIL # BLD AUTO: 0.25 THOUSAND/ΜL (ref 0–0.61)
EOSINOPHIL NFR BLD AUTO: 3 % (ref 0–6)
ERYTHROCYTE [DISTWIDTH] IN BLOOD BY AUTOMATED COUNT: 15.2 % (ref 11.6–15.1)
GFR SERPL CREATININE-BSD FRML MDRD: 55 ML/MIN/1.73SQ M
GLUCOSE P FAST SERPL-MCNC: 94 MG/DL (ref 65–99)
HCT VFR BLD AUTO: 50.5 % (ref 36.5–49.3)
HDLC SERPL-MCNC: 41 MG/DL
HGB BLD-MCNC: 16.3 G/DL (ref 12–17)
IMM GRANULOCYTES # BLD AUTO: 0.09 THOUSAND/UL (ref 0–0.2)
IMM GRANULOCYTES NFR BLD AUTO: 1 % (ref 0–2)
LDLC SERPL CALC-MCNC: 132 MG/DL (ref 0–100)
LYMPHOCYTES # BLD AUTO: 1.36 THOUSANDS/ΜL (ref 0.6–4.47)
LYMPHOCYTES NFR BLD AUTO: 14 % (ref 14–44)
MCH RBC QN AUTO: 28.1 PG (ref 26.8–34.3)
MCHC RBC AUTO-ENTMCNC: 32.3 G/DL (ref 31.4–37.4)
MCV RBC AUTO: 87 FL (ref 82–98)
MONOCYTES # BLD AUTO: 0.92 THOUSAND/ΜL (ref 0.17–1.22)
MONOCYTES NFR BLD AUTO: 10 % (ref 4–12)
NEUTROPHILS # BLD AUTO: 6.84 THOUSANDS/ΜL (ref 1.85–7.62)
NEUTS SEG NFR BLD AUTO: 71 % (ref 43–75)
NONHDLC SERPL-MCNC: 155 MG/DL
NRBC BLD AUTO-RTO: 0 /100 WBCS
PLATELET # BLD AUTO: 192 THOUSANDS/UL (ref 149–390)
PMV BLD AUTO: 10.5 FL (ref 8.9–12.7)
POTASSIUM SERPL-SCNC: 4.3 MMOL/L (ref 3.5–5.3)
PROT SERPL-MCNC: 7.5 G/DL (ref 6.4–8.2)
RBC # BLD AUTO: 5.8 MILLION/UL (ref 3.88–5.62)
SODIUM SERPL-SCNC: 140 MMOL/L (ref 136–145)
TRIGL SERPL-MCNC: 113 MG/DL
TSH SERPL DL<=0.05 MIU/L-ACNC: 2.24 UIU/ML (ref 0.36–3.74)
WBC # BLD AUTO: 9.53 THOUSAND/UL (ref 4.31–10.16)

## 2021-04-06 PROCEDURE — 99214 OFFICE O/P EST MOD 30 MIN: CPT | Performed by: NURSE PRACTITIONER

## 2021-04-06 PROCEDURE — 80061 LIPID PANEL: CPT

## 2021-04-06 PROCEDURE — 82306 VITAMIN D 25 HYDROXY: CPT

## 2021-04-06 PROCEDURE — 84443 ASSAY THYROID STIM HORMONE: CPT

## 2021-04-06 PROCEDURE — 84153 ASSAY OF PSA TOTAL: CPT

## 2021-04-06 PROCEDURE — 84154 ASSAY OF PSA FREE: CPT

## 2021-04-06 PROCEDURE — 80053 COMPREHEN METABOLIC PANEL: CPT

## 2021-04-06 PROCEDURE — 36415 COLL VENOUS BLD VENIPUNCTURE: CPT

## 2021-04-06 PROCEDURE — 85025 COMPLETE CBC W/AUTO DIFF WBC: CPT

## 2021-04-06 RX ORDER — NAPROXEN SODIUM 550 MG/1
550 TABLET ORAL 2 TIMES DAILY PRN
Qty: 90 TABLET | Refills: 1 | Status: SHIPPED | OUTPATIENT
Start: 2021-04-06

## 2021-04-06 NOTE — PROGRESS NOTES
ADULT ANNUAL PHYSICAL   [facility]    NAME: Leonides Page  AGE: 63 y o  SEX: male  : 1955     DATE: 2021     Assessment and Plan:     Problem List Items Addressed This Visit        Other    PTSD (post-traumatic stress disorder)     Patient continues to struggle with memories of his ICU stay  He is following with psychiatry and taking Zoloft 100 mg daily  He has difficulty sleeping due to flashbacks and uses trazodone to help improve sleep  New daily persistent headache    Relevant Medications    naproxen sodium (ANAPROX) 550 mg tablet    Hip pain     Continues with right sided hip pain  Unable to bend to reach feet  Was just notified that workman's comp will now include hip complaints so he should be able to follow up with ortho for injections  Relevant Medications    naproxen sodium (ANAPROX) 550 mg tablet    SOB (shortness of breath)     Secondary to covid 19  Continue follow up with pulmonology, continue trelegy inhaler  Memory difficulty     Secondary to covid 19  Following with neurology who recommend brain stimulating activities  Other Visit Diagnoses     Annual physical exam    -  Primary    Screening for colon cancer        Relevant Orders    Ambulatory referral to Gastroenterology    Hip pain, right        Relevant Medications    naproxen sodium (ANAPROX) 550 mg tablet    Bronchiectasis without complication ([address] )        Relevant Medications    fluticasone-umeclidinium-vilanterol ([employer]) 200-62 5-25 MCG/INH AEPB inhaler          Immunizations and preventive care screenings were discussed with patient today  Appropriate education was printed on patient's after visit summary  Counseling:  Alcohol/drug use: discussed moderation in alcohol intake, the recommendations for healthy alcohol use, and avoidance of illicit drug use    Dental Health: discussed importance of regular tooth brushing, flossing, and dental visits  Injury prevention: discussed safety/seat belts, safety helmets, smoke detectors, carbon dioxide detectors, and smoking near bedding or upholstery  Sexual health: discussed sexually transmitted diseases, partner selection, use of condoms, avoidance of unintended pregnancy, and contraceptive alternatives  · Exercise: the importance of regular exercise/physical activity was discussed  Recommend exercise 3-5 times per week for at least 30 minutes  Return in about 6 months (around 10/6/2021), or if symptoms worsen or fail to improve, for Next scheduled follow up  Chief Complaint:     Chief Complaint   Patient presents with    Physical Exam      History of Present Illness:     Adult Annual Physical   Patient here for a comprehensive physical exam  The patient reports problems - covid related  Continues with PTSD from ICU stay  States he still can see the face of his nurse  He remembers being moved from ICU to [facility] and he was trying to escape the hospital so he was retrained with mitts  He is following with psych but still struggles  States he keeps to himself  Does not like to be around family other than his wife  When his children come over he goes to another room  States when he worked in [place] he had more people to talk to, now he is more isolated here  States family and friends never call to check in  His one brother who had covid is going through similar issues so they can at least relate to each other  Continues with daily headaches and right sided hip pains  States he had to buy slip on shoes because he cannot bend to tie shoes  He had to use a long brush in the shower to wash his feet because he cannot bend and does not want to ask his wife to wash his feet for him  He is also struggling without income to pay bills and this causes extra stress on him  Diet and Physical Activity  · Diet/Nutrition: well balanced diet  · Exercise: no formal exercise        Depression Screening  PHQ-9 Depression Screening    PHQ-9:   Frequency of the following problems over the past two weeks:           General Health  · Sleep: sleeps poorly, gets 1-3 hours of sleep on average and experiences daytime hypersomnolence  · Hearing: normal - bilateral   · Vision: no vision problems and no vision insurance currently since he is not working  · Dental: regular dental visits, brushes teeth twice daily and needs dental work, but no insurance currently since not working   Health  · Symptoms include: none     Review of Systems:     Review of Systems   Constitutional: Positive for fatigue  Negative for chills  HENT: Positive for dental problem (needs dental work)  Negative for postnasal drip and sore throat  Eyes: Negative for visual disturbance  Respiratory: Positive for chest tightness and shortness of breath  Cardiovascular: Negative for chest pain  Gastrointestinal: Negative for abdominal pain  Musculoskeletal: Positive for arthralgias, gait problem (due to right hip pains) and myalgias  Neurological: Positive for headaches  Negative for dizziness  Psychiatric/Behavioral: Positive for confusion, dysphoric mood and sleep disturbance  The patient is nervous/anxious         Past Medical History:     Past Medical History:   Diagnosis Date    COVID-19 03/2020    PTSD (post-traumatic stress disorder)       Past Surgical History:     Past Surgical History:   Procedure Laterality Date    FL INJECTION RIGHT HIP (NON ARTHROGRAM)  11/30/2020    NO PAST SURGERIES        Family History:     Family History   Problem Relation Age of Onset    Heart disease Mother     Sudden death Father     Kidney disease Brother       Social History:     E-Cigarette/Vaping    E-Cigarette Use Former User     Quit Date 4/1/20      E-Cigarette/Vaping Substances    Nicotine Yes     THC No     CBD No     Flavoring Yes     Other No     Unknown No      Social History     Socioeconomic History    Marital status: /Civil Union     Spouse name: None    Number of children: None    Years of education: None    Highest education level: None   Occupational History    None   Social Needs    Financial resource strain: None    Food insecurity     Worry: None     Inability: None    Transportation needs     Medical: None     Non-medical: None   Tobacco Use    Smoking status: Former Smoker     Packs/day: 2 00     Years: 30 00     Pack years: 60 00     Types: Cigarettes     Quit date: 2020     Years since quittin 0    Smokeless tobacco: Never Used   Substance and Sexual Activity    Alcohol use: Not Currently    Drug use: Never    Sexual activity: Yes     Partners: Female   Lifestyle    Physical activity     Days per week: 0 days     Minutes per session: 0 min    Stress: Very much   Relationships    Social connections     Talks on phone: None     Gets together: None     Attends Church service: None     Active member of club or organization: None     Attends meetings of clubs or organizations: None     Relationship status: None    Intimate partner violence     Fear of current or ex partner: None     Emotionally abused: None     Physically abused: None     Forced sexual activity: None   Other Topics Concern    None   Social History Narrative    None      Current Medications:     Current Outpatient Medications   Medication Sig Dispense Refill    albuterol (2 5 mg/3 mL) 0 083 % nebulizer solution Take 1 vial (2 5 mg total) by nebulization every 6 (six) hours as needed for wheezing or shortness of breath 120 vial 2    amLODIPine (NORVASC) 2 5 mg tablet TAKE 1 TABLET (2 5 MG TOTAL) BY MOUTH DAILY AFTER DINNER      gabapentin (NEURONTIN) 100 mg capsule TAKE 1 CAPSULE BY MOUTH THREE TIMES A DAY 90 capsule 1    Multiple Vitamins-Minerals (MULTIVITAMIN ADULT PO) Take 1 tablet by mouth daily      naproxen sodium (ANAPROX) 550 mg tablet Take 1 tablet (550 mg total) by mouth 2 (two) times a day as needed for mild pain or headaches 90 tablet 1    sertraline (ZOLOFT) 100 mg tablet Take 1 5 tablets (150 mg total) by mouth daily 45 tablet 2    tiZANidine (ZANAFLEX) 2 mg tablet Take 1 tablet (2 mg total) by mouth daily at bedtime 90 tablet 3    traZODone (DESYREL) 50 mg tablet 1-1 5 tab po q bedtime 45 tablet 2    fluticasone-umeclidinium-vilanterol (TRELEGY) 200-62 5-25 MCG/INH AEPB inhaler Inhale 1 puff daily Rinse mouth after use  1 Inhaler 3     No current facility-administered medications for this visit  Allergies: Allergies   Allergen Reactions    Tetracycline Rash      Physical Exam:     /84 (BP Location: Right arm, Patient Position: Sitting, Cuff Size: Standard)   Pulse 74   Temp 98 7 °F (37 1 °C) (Temporal) Comment: NO NSAIDS  Ht 5' 8" (1 727 m)   Wt 108 kg (237 lb 12 8 oz)   SpO2 95%   BMI 36 16 kg/m²     Physical Exam  Vitals signs reviewed  Constitutional:       Appearance: Normal appearance  He is well-developed and well-groomed  He is obese  He is not ill-appearing  HENT:      Head: Normocephalic and atraumatic  Right Ear: Hearing, tympanic membrane, ear canal and external ear normal       Left Ear: Hearing, tympanic membrane, ear canal and external ear normal       Nose: Nose normal       Mouth/Throat:      Lips: Pink  Mouth: Mucous membranes are moist       Pharynx: Oropharynx is clear  No posterior oropharyngeal erythema  Tonsils: No tonsillar exudate  Eyes:      General: Lids are normal       Extraocular Movements: Extraocular movements intact  Conjunctiva/sclera: Conjunctivae normal    Neck:      Musculoskeletal: Full passive range of motion without pain and normal range of motion  Thyroid: No thyroid mass or thyromegaly  Trachea: Trachea and phonation normal    Cardiovascular:      Rate and Rhythm: Normal rate and regular rhythm        Heart sounds: Normal heart sounds, S1 normal and S2 normal    Pulmonary:      Effort: Pulmonary effort is normal  No accessory muscle usage  Breath sounds: Normal breath sounds  No wheezing  Abdominal:      General: Abdomen is flat  Bowel sounds are normal       Palpations: Abdomen is soft  Tenderness: There is no abdominal tenderness  Lymphadenopathy:      Cervical: No cervical adenopathy  Skin:     General: Skin is warm and dry  Capillary Refill: Capillary refill takes less than 2 seconds  Neurological:      General: No focal deficit present  Mental Status: He is alert  Motor: Motor function is intact  Psychiatric:         Attention and Perception: Attention and perception normal          Mood and Affect: Mood and affect normal          Speech: Speech normal          Behavior: Behavior normal  Behavior is cooperative  Thought Content:  Thought content normal          Cognition and Memory: Cognition and memory normal          Judgment: Judgment normal           Jarold Credit, [address]

## 2021-04-06 NOTE — LETTER
April 6, 2021     Patient: Rosa Monreal   YOB: 1955   Date of Visit: 4/6/2021       To Whom it May Concern:    Rosa Monreal is under my professional care  He was seen in my office on 4/6/2021 for a follow up visit  He continue's with specialist follow ups, and will return for general medical exam in 6 months  If you have any questions or concerns, please don't hesitate to call          Sincerely,          ERIC Phipps

## 2021-04-06 NOTE — PATIENT INSTRUCTIONS

## 2021-04-06 NOTE — ASSESSMENT & PLAN NOTE
Continues with right sided hip pain  Unable to bend to reach feet  Was just notified that workman's comp will now include hip complaints so he should be able to follow up with ortho for injections

## 2021-04-06 NOTE — ASSESSMENT & PLAN NOTE
Patient continues to struggle with memories of his ICU stay  He is following with psychiatry and taking Zoloft 100 mg daily  He has difficulty sleeping due to flashbacks and uses trazodone to help improve sleep

## 2021-04-07 ENCOUNTER — TELEPHONE (OUTPATIENT)
Dept: INTERNAL MEDICINE CLINIC | Facility: CLINIC | Age: 66
End: 2021-04-07

## 2021-04-07 LAB
PSA FREE MFR SERPL: 43.3 %
PSA FREE SERPL-MCNC: 0.13 NG/ML
PSA SERPL-MCNC: 0.3 NG/ML (ref 0–4)

## 2021-04-14 ENCOUNTER — TELEPHONE (OUTPATIENT)
Dept: INTERNAL MEDICINE CLINIC | Facility: CLINIC | Age: 66
End: 2021-04-14

## 2021-04-14 NOTE — TELEPHONE ENCOUNTER
Allison Chopra called from [facility] and stated the   fluticasone-umeclidinium-vilanterol ([facility]) 392-07 1-64 MCG/INH AEPB inhaler needs prior authorization or Daxan Maida can send a different one to the pharmacy        "anoroellippa"  "atrovent hsa"  "bevespi aerosphere"   "combivent respimat"  "Ipratropium nebulizer vial"  Nebulizer vial Ipratropium-albuterol"  "spiriva inhaler"  "spirvia resprimat"    Allsion Chopra spelled them out    [employer]   Ref- [id number]  [phone]

## 2021-04-14 NOTE — TELEPHONE ENCOUNTER
Can we prior auth this, or forward to pulmonology  Dr Saranya Vela started him on this inhaler  Thanks

## 2021-04-15 ENCOUNTER — TELEPHONE (OUTPATIENT)
Dept: PULMONOLOGY | Facility: CLINIC | Age: 66
End: 2021-04-15

## 2021-04-15 ENCOUNTER — IMMUNIZATIONS (OUTPATIENT)
Dept: FAMILY MEDICINE CLINIC | Facility: HOSPITAL | Age: 66
End: 2021-04-15

## 2021-04-15 DIAGNOSIS — J47.9 BRONCHIECTASIS WITHOUT COMPLICATION (HCC): Primary | ICD-10-CM

## 2021-04-15 DIAGNOSIS — Z23 ENCOUNTER FOR IMMUNIZATION: Primary | ICD-10-CM

## 2021-04-15 PROCEDURE — 0001A SARS-COV-2 / COVID-19 MRNA VACCINE (PFIZER-BIONTECH) 30 MCG: CPT

## 2021-04-15 PROCEDURE — 91300 SARS-COV-2 / COVID-19 MRNA VACCINE (PFIZER-BIONTECH) 30 MCG: CPT

## 2021-04-15 NOTE — TELEPHONE ENCOUNTER
I would have him go back to the Anoro and I will add Arnuity - so he would be on 2 inhalers but would be the equivalent of the Trelegy  If his insurance does not cover Arnuity we will have to try a different combo of inhalers

## 2021-04-16 ENCOUNTER — TELEMEDICINE (OUTPATIENT)
Dept: BEHAVIORAL/MENTAL HEALTH CLINIC | Facility: CLINIC | Age: 66
End: 2021-04-16
Payer: COMMERCIAL

## 2021-04-16 DIAGNOSIS — F33.2 MAJOR DEPRESSIVE DISORDER, RECURRENT SEVERE WITHOUT PSYCHOTIC FEATURES (HCC): Primary | ICD-10-CM

## 2021-04-16 PROCEDURE — 90834 PSYTX W PT 45 MINUTES: CPT | Performed by: SOCIAL WORKER

## 2021-04-16 NOTE — PSYCH
This note was not shared with the patient due to this is a psychotherapy note  It was my intent to perform this visit via video technology but the patient was not able to do a video connection so the visit was completed via audio telephone only  Virtual Regular Visit      Assessment/Plan:    Problem List Items Addressed This Visit     None               Reason for visit is due to ongoing symptoms psychiatrically and physically from covid 19  Encounter provider Elizabeth Tsang    Provider located at [address] [place] [id number] [phone]      Recent Visits  No visits were found meeting these conditions  Showing recent visits within past 7 days and meeting all other requirements     Future Appointments  No visits were found meeting these conditions  Showing future appointments within next 150 days and meeting all other requirements        The patient was identified by name and date of birth  Austin Duran was informed that this is a telemedicine visit and that the visit is being conducted through [employer] and patient was informed that this is a secure, HIPAA-compliant platform  He agrees to proceed     My office door was closed  No one else was in the room  He acknowledged consent and understanding of privacy and security of the video platform  The patient has agreed to participate and understands they can discontinue the visit at any time  Patient is aware this is a billable service       Subjective  Austin Duran is a 63 y o  male       HPI     Past Medical History:   Diagnosis Date    COVID-19 03/2020    PTSD (post-traumatic stress disorder)        Past Surgical History:   Procedure Laterality Date    FL INJECTION RIGHT HIP (NON ARTHROGRAM)  11/30/2020    NO PAST SURGERIES         Current Outpatient Medications   Medication Sig Dispense Refill    albuterol (2 5 mg/3 mL) 0 083 % nebulizer solution Take 1 vial (2 5 mg total) by nebulization every 6 (six) hours as needed for wheezing or shortness of breath 120 vial 2    amLODIPine (NORVASC) 2 5 mg tablet TAKE 1 TABLET (2 5 MG TOTAL) BY MOUTH DAILY AFTER DINNER      fluticasone (ARNUITY ELLIPTA) 100 MCG/ACT AEPB inhaler Inhale 1 puff daily Rinse mouth after use  1 Inhaler 3    gabapentin (NEURONTIN) 100 mg capsule TAKE 1 CAPSULE BY MOUTH THREE TIMES A DAY 90 capsule 1    Multiple Vitamins-Minerals (MULTIVITAMIN ADULT PO) Take 1 tablet by mouth daily      naproxen sodium (ANAPROX) 550 mg tablet Take 1 tablet (550 mg total) by mouth 2 (two) times a day as needed for mild pain or headaches 90 tablet 1    sertraline (ZOLOFT) 100 mg tablet Take 1 5 tablets (150 mg total) by mouth daily 45 tablet 2    tiZANidine (ZANAFLEX) 2 mg tablet Take 1 tablet (2 mg total) by mouth daily at bedtime 90 tablet 3    traZODone (DESYREL) 50 mg tablet 1-1 5 tab po q bedtime 45 tablet 2     No current facility-administered medications for this visit  Allergies   Allergen Reactions    Tetracycline Rash       Review of Systems Data: Ravinder Lu continues to have post-acute sequelae of covid [facility] so I provided the referral and the number  Regarding his symptoms he is trying to adjust to his new symptoms that are lingering post covid  This is his first goal  Regarding goal 2 his kids have constant conflicts  He said there are always periods where some of them are not talking   He discussed his various adult childrens conflicts  Assessment: Ravinder Lu is not suicidal/homicidal but is depressed about his lingering covid symptoms  I provided him the number for the [facility] clinic  He is also depressed how his children argue  We discussed mindfuless and CBT strategies  Plan: Continue to skill build in distress tolerance  Video Exam    There were no vitals filed for this visit      Physical Exam N/A    I spent 45 minutes directly with the patient during this visit      VIRTUAL VISIT DISCLAIMER    Linda Vazquez acknowledges that he has consented to an online visit or consultation  He understands that the online visit is based solely on information provided by him, and that, in the absence of a face-to-face physical evaluation by the physician, the diagnosis he receives is both limited and provisional in terms of accuracy and completeness  This is not intended to replace a full medical face-to-face evaluation by the physician  Linda Vazquez understands and accepts these terms

## 2021-04-20 ENCOUNTER — TELEPHONE (OUTPATIENT)
Dept: PSYCHIATRY | Facility: CLINIC | Age: 66
End: 2021-04-20

## 2021-04-20 ENCOUNTER — OFFICE VISIT (OUTPATIENT)
Dept: INTERNAL MEDICINE CLINIC | Facility: CLINIC | Age: 66
End: 2021-04-20
Payer: COMMERCIAL

## 2021-04-20 VITALS
OXYGEN SATURATION: 97 % | BODY MASS INDEX: 36.37 KG/M2 | DIASTOLIC BLOOD PRESSURE: 70 MMHG | HEIGHT: 68 IN | HEART RATE: 75 BPM | TEMPERATURE: 97.7 F | SYSTOLIC BLOOD PRESSURE: 102 MMHG | WEIGHT: 240 LBS

## 2021-04-20 DIAGNOSIS — J84.9 INTERSTITIAL LUNG DISEASE (HCC): Primary | ICD-10-CM

## 2021-04-20 DIAGNOSIS — U07.1 COVID-19 VIRUS INFECTION: ICD-10-CM

## 2021-04-20 DIAGNOSIS — F43.11 ACUTE POST-TRAUMATIC STRESS DISORDER: ICD-10-CM

## 2021-04-20 DIAGNOSIS — R06.02 SOB (SHORTNESS OF BREATH): ICD-10-CM

## 2021-04-20 PROCEDURE — 3725F SCREEN DEPRESSION PERFORMED: CPT | Performed by: NURSE PRACTITIONER

## 2021-04-20 PROCEDURE — 3008F BODY MASS INDEX DOCD: CPT | Performed by: PHYSICIAN ASSISTANT

## 2021-04-20 PROCEDURE — 99213 OFFICE O/P EST LOW 20 MIN: CPT | Performed by: NURSE PRACTITIONER

## 2021-04-20 RX ORDER — IPRATROPIUM BROMIDE AND ALBUTEROL SULFATE 2.5; .5 MG/3ML; MG/3ML
3 SOLUTION RESPIRATORY (INHALATION) 4 TIMES DAILY
Qty: 15 VIAL | Refills: 0 | Status: SHIPPED | OUTPATIENT
Start: 2021-04-20 | End: 2021-12-29 | Stop reason: SDUPTHER

## 2021-04-20 NOTE — ASSESSMENT & PLAN NOTE
Will reach out to patient's psychiatrist Nish Bennett for possibly adding a prn medication for patient's agitation

## 2021-04-20 NOTE — PROGRESS NOTES
INTERNAL MEDICINE FOLLOW-UP OFFICE VISIT  [facility] - [facility] [address] [place] [address]    NAME: Katarzyna Medrano  AGE: 63 y o  SEX: male    DATE OF ENCOUNTER: 4/20/2021   Assessment and Plan:     Problem List Items Addressed This Visit        Respiratory    Interstitial lung disease ([address] ) - Primary     Patient to see if duo neb is covered instead of albuterol nebulizer solution  Will follow up with [facility]/insurance to see progress of inhaler authorizations  Relevant Medications    ipratropium-albuterol (DUO-NEB) 0 5-2 5 mg/3 mL nebulizer solution       Other    Acute post-traumatic stress disorder     Will reach out to patient's psychiatrist Mary Fernandez for possibly adding a prn medication for patient's agitation  SOB (shortness of breath)    COVID-19 virus infection    Relevant Medications    ipratropium-albuterol (DUO-NEB) 0 5-2 5 mg/3 mL nebulizer solution          No follow-ups on file  Counseling:     · Medication Side Effects - Adverse side effects of medications were reviewed with the patient/guardian today: Yes  · Counseling was given regarding: Prognosis, Risks and benefits of tx options, Intructions for management, Patient and family education, Importance of tx compliance, Risk factor reductions and Impressions  · Barriers to treatment include: No identified barriers      Chief Complaint:     Chief Complaint   Patient presents with    feeling agitated last few days        History of Present Illness:     Patient continues to struggle with his covid recovery  He is having insurance issues with getting inhalers covered  He currently does not have an inhaler and is using albuterol nebulizer 5-6 times per day  Over the weekend he did have some dizziness with bending over  He states at night when lying down he gets scared that he may stop breathing  Pulmonology sent in new orders for an inhaler since trelegy was not covered   Patient plans to call insurance to follow up on this  Will order duo neb to see if this is covered and may help while waiting on inhalers to be filled  Patient states he is more agitated lately  Daughter states that he gets frustrated and snaps at people  Was like this with just scheduling the appt today  Patient states he does not know how is wife and kids put up with him  Patient denies any SI/HI, just frustrated with his situation  He sees psychology and is very thankful for his therapist who helps him  Patient also see psychiatry who prescribes his Zoloft and trazodone  Patient states he still is struggling to sleep  Some nights will wake up at 3 am and is awake for the rest of the night  Discussed possibly adding a benzo prn to help  Will discuss with psychiatry before making changes  Patient needs a detailed letter for work describing his disability  Patient state there is a new boss at work who does not know him, he has never met  He is afraid that if he is terminated from his employer this will effect his pension plan  Patient will be 61 this year and does not know 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  The following portions of the patient's history were reviewed and updated as appropriate: allergies, current medications, past family history, past medical history, past social history, past surgical history and problem list      Review of Systems:     Review of Systems   Constitutional: Positive for fatigue  Respiratory: Positive for chest tightness and shortness of breath  Cardiovascular: Positive for chest pain  Musculoskeletal: Positive for arthralgias and myalgias  Neurological: Positive for dizziness and weakness  Psychiatric/Behavioral: Positive for agitation, dysphoric mood and sleep disturbance  Negative for self-injury and suicidal ideas  The patient is nervous/anxious           Problem List:     Patient Active Problem List   Diagnosis    Elevated brain natriuretic peptide (BNP) level    PTSD (post-traumatic stress disorder)    Interstitial lung disease (HCC)    Acute post-traumatic stress disorder    Expected bereavement due to life event    New daily persistent headache    Abnormal electrocardiogram    Hip pain    Grade I diastolic dysfunction    SOB (shortness of breath)    Memory difficulty    Numbness and tingling    COVID-19 virus infection    Occupational exposure in workplace    Right bundle branch block (RBBB) on electrocardiogram (ECG)        Objective:     /70 (BP Location: Left arm, Patient Position: Sitting) Comment: gfdgfdg  Pulse 75   Temp 97 7 °F (36 5 °C) (Tympanic) Comment: gdfgdfg  Ht 5' 8" (1 727 m)   Wt 109 kg (240 lb)   SpO2 97%   BMI 36 49 kg/m²     Physical Exam  Vitals signs reviewed  Constitutional:       General: He is not in acute distress  Appearance: Normal appearance  He is not ill-appearing  HENT:      Head: Normocephalic and atraumatic  Cardiovascular:      Rate and Rhythm: Normal rate and regular rhythm  Heart sounds: Normal heart sounds, S1 normal and S2 normal    Pulmonary:      Effort: Pulmonary effort is normal  No accessory muscle usage  Breath sounds: Examination of the right-lower field reveals decreased breath sounds  Examination of the left-lower field reveals decreased breath sounds  Decreased breath sounds present  No wheezing  Skin:     General: Skin is warm and dry  Capillary Refill: Capillary refill takes less than 2 seconds  Findings: No rash  Neurological:      General: No focal deficit present  Mental Status: He is alert and oriented to person, place, and time  Motor: Motor function is intact  Psychiatric:         Attention and Perception: Attention and perception normal          Mood and Affect: Affect normal  Mood is depressed  Speech: Speech normal          Behavior: Behavior normal  Behavior is cooperative  Thought Content:  Thought content normal  Thought content does not include homicidal or suicidal ideation  Thought content does not include homicidal or suicidal plan  Pertinent Laboratory/Diagnostic Studies:    Laboratory Results: I have personally reviewed the pertinent laboratory results/reports   Radiology/Other Diagnostic Testing Results: I have personally reviewed pertinent reports  Current Medications:     Current Outpatient Medications   Medication Sig Dispense Refill    albuterol (2 5 mg/3 mL) 0 083 % nebulizer solution Take 1 vial (2 5 mg total) by nebulization every 6 (six) hours as needed for wheezing or shortness of breath 120 vial 2    amLODIPine (NORVASC) 2 5 mg tablet TAKE 1 TABLET (2 5 MG TOTAL) BY MOUTH DAILY AFTER DINNER      fluticasone (ARNUITY ELLIPTA) 100 MCG/ACT AEPB inhaler Inhale 1 puff daily Rinse mouth after use  1 Inhaler 3    gabapentin (NEURONTIN) 100 mg capsule TAKE 1 CAPSULE BY MOUTH THREE TIMES A DAY 90 capsule 1    Multiple Vitamins-Minerals (MULTIVITAMIN ADULT PO) Take 1 tablet by mouth daily      naproxen sodium (ANAPROX) 550 mg tablet Take 1 tablet (550 mg total) by mouth 2 (two) times a day as needed for mild pain or headaches 90 tablet 1    sertraline (ZOLOFT) 100 mg tablet Take 1 5 tablets (150 mg total) by mouth daily 45 tablet 2    tiZANidine (ZANAFLEX) 2 mg tablet Take 1 tablet (2 mg total) by mouth daily at bedtime 90 tablet 3    traZODone (DESYREL) 50 mg tablet 1-1 5 tab po q bedtime 45 tablet 2    ipratropium-albuterol (DUO-NEB) 0 5-2 5 mg/3 mL nebulizer solution Take 1 vial (3 mL total) by nebulization 4 (four) times a day 15 vial 0     No current facility-administered medications for this visit  There are no Patient Instructions on file for this visit      ERIC Huerta  [facility]

## 2021-04-20 NOTE — PATIENT INSTRUCTIONS
COPD Exacerbation, Ambulatory Care   GENERAL INFORMATION:   A COPD (chronic obstructive pulmonary disease ) exacerbation  is a flare up or worsening of COPD  Common symptoms include the following:   · Shortness of breath     · A dry cough     · Coughing fits that bring up mucus from your lungs     · Wheezing and chest tightness  Seek immediate care for the following symptoms:   · Confusion, dizziness, or lightheadedness    · Red, swollen, warm arm or leg    · Shortness of breath or chest pain    · Coughing up blood  Treatment for a COPD exacerbation  may include medicines to help decrease swelling and inflammation in your lungs  Medicines may also help open your airways or treat and infection  You may need pulmonary rehabilitation to help you manage your symptoms and improve your quality of life  You may need extra oxygen to help you breathe easier  Prevent another exacerbation:   · Do not smoke, and avoid others who smoke  If you smoke, it is never too late to quit  You may have fewer exacerbations  Ask for information about medicines and support programs that can help you quit  · Avoid triggers that make your symptoms worse  Cold weather and sudden temperature changes can trigger an exacerbation  Fumes from cars and chemicals, air pollution, and perfume can also increase your symptoms  · Use pursed-lip breathing when you feel short of breath  Take a deep breath in through your nose  Slowly breathe out through your mouth with your lips pursed for twice as long as you inhaled  You can also practice this breathing pattern while you bend, lift, climb stairs, or exercise  Pursed-lip breathing slows down your breathing and helps move more air in and out of your lungs  · Exercise for at least 20 minutes each day  Exercise can help increase your energy and decrease shortness of breath  Ask about the best exercise plan for you  · Prevent infections that can be dangerous when you have COPD    Get a flu vaccine every year as soon as it becomes available  Ask if you should also get other vaccines, such as those given to prevent pneumonia and tetanus  Avoid people who are sick, and wash your hands often  Follow up with your healthcare provider as directed:  Write down your questions so you remember to ask them during your visits  CARE AGREEMENT:   You have the right to help plan your care  Learn about your health condition and how it may be treated  Discuss treatment options with your caregivers to decide what care you want to receive  You always have the right to refuse treatment  The above information is an  only  It is not intended as medical advice for individual conditions or treatments  Talk to your doctor, nurse or pharmacist before following any medical regimen to see if it is safe and effective for you  © 2014 [address] is for End User's use only and may not be sold, redistributed or otherwise used for commercial purposes  All illustrations and images included in CareNotes® are the copyrighted property of [employer]  or Memo Ramachandran

## 2021-04-20 NOTE — ASSESSMENT & PLAN NOTE
Patient to see if duo neb is covered instead of albuterol nebulizer solution  Will follow up with [facility]insurance to see progress of inhaler authorizations

## 2021-04-22 ENCOUNTER — TELEPHONE (OUTPATIENT)
Dept: INTERNAL MEDICINE CLINIC | Facility: CLINIC | Age: 66
End: 2021-04-22

## 2021-04-27 ENCOUNTER — OFFICE VISIT (OUTPATIENT)
Dept: PSYCHIATRY | Facility: CLINIC | Age: 66
End: 2021-04-27
Payer: COMMERCIAL

## 2021-04-27 DIAGNOSIS — F43.10 PTSD (POST-TRAUMATIC STRESS DISORDER): ICD-10-CM

## 2021-04-27 DIAGNOSIS — F32.A DEPRESSION, UNSPECIFIED DEPRESSION TYPE: Primary | ICD-10-CM

## 2021-04-27 DIAGNOSIS — R20.2 NUMBNESS AND TINGLING: ICD-10-CM

## 2021-04-27 DIAGNOSIS — R20.0 NUMBNESS AND TINGLING: ICD-10-CM

## 2021-04-27 DIAGNOSIS — G44.52 NEW DAILY PERSISTENT HEADACHE: ICD-10-CM

## 2021-04-27 PROCEDURE — 99214 OFFICE O/P EST MOD 30 MIN: CPT | Performed by: PHYSICIAN ASSISTANT

## 2021-04-27 PROCEDURE — 1036F TOBACCO NON-USER: CPT | Performed by: PHYSICIAN ASSISTANT

## 2021-04-27 RX ORDER — QUETIAPINE FUMARATE 50 MG/1
50 TABLET, FILM COATED ORAL
Qty: 30 TABLET | Refills: 1 | Status: SHIPPED | OUTPATIENT
Start: 2021-04-27 | End: 2021-06-10 | Stop reason: SDUPTHER

## 2021-04-27 RX ORDER — GABAPENTIN 100 MG/1
CAPSULE ORAL
Qty: 90 CAPSULE | Refills: 1 | Status: SHIPPED | OUTPATIENT
Start: 2021-04-27 | End: 2021-06-30

## 2021-04-27 RX ORDER — SERTRALINE HYDROCHLORIDE 100 MG/1
100 TABLET, FILM COATED ORAL DAILY
Qty: 30 TABLET | Refills: 2 | Status: SHIPPED | OUTPATIENT
Start: 2021-04-27 | End: 2021-05-27 | Stop reason: SDUPTHER

## 2021-04-27 NOTE — PSYCH
MEDICATION MANAGEMENT NOTE        [address] [facility]  [address] [facility] [address] [phone]        Name and Date of Birth:  Nida Conley 63 y o  1955    Date of Visit: April 27, 2021    SUBJECTIVE:    Seen with CORRINE Swanson Avtarhattie seen with his wife Don Rees  This is Jose Luis's first time seeing Christine Triana  Last telephone visit was 4/5/21 at which time meds unchanged  Since that appt Jose Luis had communication with PCP  Christinetommy Triana has been more irritable, agitated, disrupted sleep  Denies SI  Complicating things is that Christine Triana has needed to increase his use of nebulizers up to several times a day waiting for issues to be worked out to get his inhalers  He continues to experience financial stressors and problems with his employer  He continues to experience anxiety with fearfulness, hyperarousal and hypervigilance  He continues to have frustration related to his physical limitations  Review of Systems   Constitutional: Positive for activity change  Eyes: Positive for visual disturbance  Respiratory: Positive for shortness of breath  Musculoskeletal: Positive for arthralgias and gait problem  Neurological: Positive for headaches  Lightheadedness   Psychiatric/Behavioral: Positive for sleep disturbance  Psychiatric History     This office since 5/7/20  No IP or suicide attempts  Continues to see Jose Milner for ind therapy  Trauma/Loss History       Experienced ARDS secondary to covid April 2020- was intubated x 5 days  Lost brother and coworkers to [place]  Social History        to wife Don Rees >40 yrs  Lives with her  Has 3 children- 2 daughters and a son  Was  at [employer] x 37 yrs until covid  Enjoyed working on car         OBJECTIVE:     MENTAL STATUS EXAM  Appearance:  age appropriate, dressed casually Behavior:  cooperative    shortness of breath on exertion   Speech:  Normal volume, regular rate and rhythm   Mood:  depressed and anxious   Affect:  mood congruent   Language: intact and appropriate for age, education, and intellect   Thought Process:  goal directed   Associations: intact associations   Thought Content:  no overt delusions   Perceptual Disturbances: no auditory or visual hallcunations   Risk Potential / Abnormal Thoughts: Suicidal ideation - None  Homicidal ideation - None  Potential for aggression - No       Consciousness:  Alert & Awake   Sensorium:  Grossly oriented   Attention: attention span and concentration are age appropriate       Fund of Knowledge:  Memory: awareness of current events: yes  recent and remote memory grossly intact   Insight:  good   Judgment: good   Muscle Strength Muscle Tone: Grossly normal  normal   Gait/Station: slow   Motor Activity: no abnormal movements       Lab Review: I have reviewed all pertinent labs    Lab Results   Component Value Date    CHOLESTEROL 196 04/06/2021     Lab Results   Component Value Date    HDL 41 04/06/2021     Lab Results   Component Value Date    TRIG 113 04/06/2021     Lab Results   Component Value Date    NONHDLC 155 04/06/2021     Lab Results   Component Value Date    SODIUM 140 04/06/2021    K 4 3 04/06/2021     04/06/2021    CO2 26 04/06/2021    AGAP 6 04/06/2021    BUN 18 04/06/2021    CREATININE 1 36 (H) 04/06/2021    GLUC 164 (H) 04/12/2020    GLUF 94 04/06/2021    CALCIUM 9 2 04/06/2021    AST 28 04/06/2021    ALT 44 04/06/2021    ALKPHOS 109 04/06/2021    TP 7 5 04/06/2021    TBILI 0 47 04/06/2021    EGFR 55 04/06/2021     Lab Results   Component Value Date    WBC 9 53 04/06/2021    HGB 16 3 04/06/2021    HCT 50 5 (H) 04/06/2021    MCV 87 04/06/2021     04/06/2021     Lab Results   Component Value Date    OGANRLFJ21 364 07/29/2020     Lab Results   Component Value Date    FOLATE 11 7 07/29/2020     Lab Results Component Value Date    GYP7GZRJFUOT 2 240 04/06/2021           ASSESSMENT & PLAN          Diagnoses and all orders for this visit:    Depression, unspecified depression type    PTSD (post-traumatic stress disorder)  -     QUEtiapine (SEROquel) 50 mg tablet; Take 1 tablet (50 mg total) by mouth daily at bedtime  -     sertraline (ZOLOFT) 100 mg tablet; Take 1 tablet (100 mg total) by mouth daily      Current Outpatient Medications   Medication Sig Dispense Refill    albuterol (2 5 mg/3 mL) 0 083 % nebulizer solution Take 1 vial (2 5 mg total) by nebulization every 6 (six) hours as needed for wheezing or shortness of breath 120 vial 2    amLODIPine (NORVASC) 2 5 mg tablet TAKE 1 TABLET (2 5 MG TOTAL) BY MOUTH DAILY AFTER DINNER      fluticasone (ARNUITY ELLIPTA) 100 MCG/ACT AEPB inhaler Inhale 1 puff daily Rinse mouth after use  1 Inhaler 3    gabapentin (NEURONTIN) 100 mg capsule TAKE 1 CAPSULE BY MOUTH THREE TIMES A DAY 90 capsule 1    ipratropium-albuterol (DUO-NEB) 0 5-2 5 mg/3 mL nebulizer solution Take 1 vial (3 mL total) by nebulization 4 (four) times a day 15 vial 0    Multiple Vitamins-Minerals (MULTIVITAMIN ADULT PO) Take 1 tablet by mouth daily      naproxen sodium (ANAPROX) 550 mg tablet Take 1 tablet (550 mg total) by mouth 2 (two) times a day as needed for mild pain or headaches 90 tablet 1    QUEtiapine (SEROquel) 50 mg tablet Take 1 tablet (50 mg total) by mouth daily at bedtime 30 tablet 1    sertraline (ZOLOFT) 100 mg tablet Take 1 tablet (100 mg total) by mouth daily 30 tablet 2    tiZANidine (ZANAFLEX) 2 mg tablet Take 1 tablet (2 mg total) by mouth daily at bedtime 90 tablet 3     No current facility-administered medications for this visit  Plan:         More irritable and agitated- complicated by physical limitations and increased use of nebulizers    Will decrease zoloft to 100 mg from 150 mg to see if this helps decrease agitation and change trazodone to seroquel for mood, anxiety  Monitor for dizziness on position change and weight gain,  [place] to call if he gains 5 or more lbs in a week  Reviewed risks, benefits, side effects of medications, including no medication  Patient understands and agrees to treatment plan  and Reviewed metabolic risks associated with use of Seroquel    F/u Jose Luis 4 weeks  Cont f/u with Priscila Zapata for ind therapy      Patient has been informed of 24 hours and weekend coverage for urgent situations accessed by calling the main clinic phone number       Lakshmi Head PA-C

## 2021-05-05 ENCOUNTER — TELEPHONE (OUTPATIENT)
Dept: INTERNAL MEDICINE CLINIC | Facility: CLINIC | Age: 66
End: 2021-05-05

## 2021-05-08 ENCOUNTER — IMMUNIZATIONS (OUTPATIENT)
Dept: FAMILY MEDICINE CLINIC | Facility: HOSPITAL | Age: 66
End: 2021-05-08

## 2021-05-08 DIAGNOSIS — Z23 ENCOUNTER FOR IMMUNIZATION: Primary | ICD-10-CM

## 2021-05-08 PROCEDURE — 0002A SARS-COV-2 / COVID-19 MRNA VACCINE (PFIZER-BIONTECH) 30 MCG: CPT

## 2021-05-08 PROCEDURE — 91300 SARS-COV-2 / COVID-19 MRNA VACCINE (PFIZER-BIONTECH) 30 MCG: CPT

## 2021-05-10 ENCOUNTER — TELEPHONE (OUTPATIENT)
Dept: PULMONOLOGY | Facility: CLINIC | Age: 66
End: 2021-05-10

## 2021-05-10 DIAGNOSIS — J47.9 BRONCHIECTASIS WITHOUT COMPLICATION (HCC): Primary | ICD-10-CM

## 2021-05-10 RX ORDER — FLUTICASONE PROPIONATE 110 UG/1
2 AEROSOL, METERED RESPIRATORY (INHALATION) 2 TIMES DAILY
Qty: 12 G | Refills: 3 | Status: SHIPPED | OUTPATIENT
Start: 2021-05-10 | End: 2021-06-30

## 2021-05-10 NOTE — TELEPHONE ENCOUNTER
Patient states insurance is not paying for arnuity and he said it needs a prior authorization  Can you please look into this for patient? Thank you

## 2021-05-13 ENCOUNTER — DOCUMENTATION (OUTPATIENT)
Dept: NEUROLOGY | Facility: CLINIC | Age: 66
End: 2021-05-13

## 2021-05-13 NOTE — PROGRESS NOTES
Prior authorization submitted to Nancy 05/12/2021  Received fax from [facility] 05/12/2021    Authorization #[id number]  Valid: 5/12/2021 - 8/12/2021    Scanned into chart under media

## 2021-05-27 ENCOUNTER — TELEMEDICINE (OUTPATIENT)
Dept: PSYCHIATRY | Facility: CLINIC | Age: 66
End: 2021-05-27
Payer: COMMERCIAL

## 2021-05-27 DIAGNOSIS — F43.10 PTSD (POST-TRAUMATIC STRESS DISORDER): ICD-10-CM

## 2021-05-27 DIAGNOSIS — F32.A DEPRESSION, UNSPECIFIED DEPRESSION TYPE: Primary | ICD-10-CM

## 2021-05-27 PROCEDURE — 99214 OFFICE O/P EST MOD 30 MIN: CPT | Performed by: PHYSICIAN ASSISTANT

## 2021-05-27 RX ORDER — SERTRALINE HYDROCHLORIDE 100 MG/1
50 TABLET, FILM COATED ORAL DAILY
COMMUNITY
Start: 2021-05-27 | End: 2021-06-10 | Stop reason: DRUGHIGH

## 2021-05-27 NOTE — PSYCH
Virtual Regular Visit                Encounter provider Gin Tirado PA-C    Provider located at [address] [address] [phone]      Recent Visits  No visits were found meeting these conditions  Showing recent visits within past 7 days and meeting all other requirements     Future Appointments  No visits were found meeting these conditions  Showing future appointments within next 150 days and meeting all other requirements        The patient was identified by name and date of birth  Bonnie Roth was informed that this is a telemedicine visit and that the visit is being conducted through [address] [place] [address] Now and patient was informed that this is a secure, HIPAA-compliant platform  He agrees to proceed     My office door was closed  No one else was in the room  He acknowledged consent and understanding of privacy and security of the video platform  The patient has agreed to participate and understands they can discontinue the visit at any time  Patient is aware this is a billable service  VIRTUAL VISIT DISCLAIMER    Bonnie Roth acknowledges that he has consented to an online visit or consultation  He understands that the online visit is based solely on information provided by him, and that, in the absence of a face-to-face physical evaluation by the physician, the diagnosis he receives is both limited and provisional in terms of accuracy and completeness  This is not intended to replace a full medical face-to-face evaluation by the physician  Bonnie Roth understands and accepts these terms        MEDICATION MANAGEMENT NOTE        [address]  [facility]  [address] [facility] [id number] [phone]        Name and Date of Birth:  Bonnie Roth 63 y o  1955    Date of Visit: May 27, 2021    SUBJECTIVE:    Bo Luther seen by [facility] 4/27 at which time zoloft decreased from 150 mg/d to 100 mg/d; trazodone stopped and seroquel started  Bo Luther notes no change, although sleeping a little better  Is awakened from nightmares about 3x/week  No symptoms are worse  Reports ongoing agitation and mood swings exacerbated by pain and frustration regarding his physical limitations  Bo Luther continues to need his nebulizer 3-4x/d and inhaler mult times/d, demario with increase in humidity recently  Denies SI  Has been weighing himself and has not gained any weight on seroquel at this time  Was given info for [facility] covid support grp by PCP but has not called them yet  Review of Systems   Constitutional: Negative for unexpected weight change  Respiratory: Positive for shortness of breath  Musculoskeletal: Positive for arthralgias  Neurological: Positive for headaches  Negative for dizziness  Psychiatric History    This office since 5/7/20  No IP or suicide attempts  Ind therapist- Priscila Zapata    Trauma/Loss History      ARDS secondary to covid April 2020 -intubated x 5 days  Lost brother and coworkers to [employer]  Has been unable to return to work secondary to ongoing disabling symptoms  Social History      Lives with wife Francesca Jasso of >40 yrs  Has 2 daughters and a son  Was  at [employer] x 37 yrs  Enjoyed working on his car         OBJECTIVE:  Per pt weight is 240 lbs    MENTAL STATUS EXAM  Appearance:  age appropriate   Behavior:  Pleasant & cooperative   Speech:  Regular rate and rhythm   Mood:  low/tense   Affect:  mood congruent   Language: intact and appropriate for age, education, and intellect   Thought Process:  goal directed   Associations: intact associations   Thought Content:  normal and appropriate   Perceptual Disturbances: no auditory or visual hallcunations   Risk Potential / Abnormal Thoughts: Suicidal ideation - None  Homicidal ideation - None  Potential for aggression - No       Consciousness:  Alert & Awake   Sensorium:  Grossly oriented   Attention: attention span and concentration are age appropriate       Fund of Knowledge:  Memory: awareness of current events: yes  recent and remote memory grossly intact   Insight:  good   Judgment: good       Lab Review: I have reviewed all pertinent labs    Lab Results   Component Value Date    CHOLESTEROL 196 04/06/2021     Lab Results   Component Value Date    HDL 41 04/06/2021     Lab Results   Component Value Date    TRIG 113 04/06/2021     Lab Results   Component Value Date    NONHDLC 155 04/06/2021     Lab Results   Component Value Date    SODIUM 140 04/06/2021    K 4 3 04/06/2021     04/06/2021    CO2 26 04/06/2021    AGAP 6 04/06/2021    BUN 18 04/06/2021    CREATININE 1 36 (H) 04/06/2021    GLUC 164 (H) 04/12/2020    GLUF 94 04/06/2021    CALCIUM 9 2 04/06/2021    AST 28 04/06/2021    ALT 44 04/06/2021    ALKPHOS 109 04/06/2021    TP 7 5 04/06/2021    TBILI 0 47 04/06/2021    EGFR 55 04/06/2021     Lab Results   Component Value Date    WBC 9 53 04/06/2021    HGB 16 3 04/06/2021    HCT 50 5 (H) 04/06/2021    MCV 87 04/06/2021     04/06/2021     Lab Results   Component Value Date    VYYKQQUG99 364 07/29/2020     Lab Results   Component Value Date    FOLATE 11 7 07/29/2020     Lab Results   Component Value Date    MTG6NIHKPUMG 2 240 04/06/2021           ASSESSMENT & PLAN          Diagnoses and all orders for this visit:    Depression, unspecified depression type    PTSD (post-traumatic stress disorder)  -     sertraline (ZOLOFT) 100 mg tablet;  Take 0 5 tablets (50 mg total) by mouth daily      Current Outpatient Medications   Medication Sig Dispense Refill    albuterol (2 5 mg/3 mL) 0 083 % nebulizer solution Take 1 vial (2 5 mg total) by nebulization every 6 (six) hours as needed for wheezing or shortness of breath 120 vial 2    amLODIPine (NORVASC) 2 5 mg tablet TAKE 1 TABLET (2 5 MG TOTAL) BY MOUTH DAILY AFTER DINNER      fluticasone (FLOVENT HFA) 110 MCG/ACT inhaler Inhale 2 puffs 2 (two) times a day Rinse mouth after use  12 g 3    gabapentin (NEURONTIN) 100 mg capsule TAKE 1 CAPSULE BY MOUTH THREE TIMES A DAY 90 capsule 1    ipratropium-albuterol (DUO-NEB) 0 5-2 5 mg/3 mL nebulizer solution Take 1 vial (3 mL total) by nebulization 4 (four) times a day 15 vial 0    Multiple Vitamins-Minerals (MULTIVITAMIN ADULT PO) Take 1 tablet by mouth daily      naproxen sodium (ANAPROX) 550 mg tablet Take 1 tablet (550 mg total) by mouth 2 (two) times a day as needed for mild pain or headaches 90 tablet 1    QUEtiapine (SEROquel) 50 mg tablet Take 1 tablet (50 mg total) by mouth daily at bedtime 30 tablet 1    sertraline (ZOLOFT) 100 mg tablet Take 0 5 tablets (50 mg total) by mouth daily      tiZANidine (ZANAFLEX) 2 mg tablet Take 1 tablet (2 mg total) by mouth daily at bedtime 90 tablet 3     No current facility-administered medications for this visit  Plan:        cont seroquel 50 mg/d  Cont to monitor weight  Will decrease zoloft to 50 mg with plans to further taper and try alternate therapy next visit  Thaddeus De strongly encouraged to call if any symptoms worse  Reviewed risks, benefits, side effects of medications, including no medication  Patient understands and agrees to treatment plan  Cont f u with Dominique Jeff for ind therapy  Encouraged to call Lorna rodríg[facility] support grp  F/u Jose Luis 6/14/21        Patient has been informed of 24 hours and weekend coverage for urgent situations accessed by calling the main clinic phone number       Carlitos Spain PA-C

## 2021-06-08 ENCOUNTER — TELEPHONE (OUTPATIENT)
Dept: NEUROLOGY | Facility: CLINIC | Age: 66
End: 2021-06-08

## 2021-06-08 NOTE — TELEPHONE ENCOUNTER
Spoke with patient and offered sooner appt with dr Radha Rojas - availability today 6/8 @ 3:30 - patient declined has another appt

## 2021-06-10 ENCOUNTER — TELEMEDICINE (OUTPATIENT)
Dept: PSYCHIATRY | Facility: CLINIC | Age: 66
End: 2021-06-10
Payer: COMMERCIAL

## 2021-06-10 DIAGNOSIS — F43.10 PTSD (POST-TRAUMATIC STRESS DISORDER): ICD-10-CM

## 2021-06-10 DIAGNOSIS — F32.A DEPRESSION, UNSPECIFIED DEPRESSION TYPE: Primary | ICD-10-CM

## 2021-06-10 PROCEDURE — 99212 OFFICE O/P EST SF 10 MIN: CPT | Performed by: PHYSICIAN ASSISTANT

## 2021-06-10 RX ORDER — QUETIAPINE FUMARATE 50 MG/1
50 TABLET, FILM COATED ORAL
Qty: 90 TABLET | Refills: 0 | Status: SHIPPED | OUTPATIENT
Start: 2021-06-10 | End: 2021-08-13 | Stop reason: SDUPTHER

## 2021-06-10 NOTE — PSYCH
Virtual Regular Visit                 Encounter provider Caitlyn Pollock PA-C    Provider located at [address] [address] [phone]      Recent Visits  No visits were found meeting these conditions  Showing recent visits within past 7 days and meeting all other requirements     Future Appointments  No visits were found meeting these conditions  Showing future appointments within next 150 days and meeting all other requirements        The patient was identified by name and date of birth  Choco Lorenzo was informed that this is a telemedicine visit and that the visit is being conducted through [address] [place] [address] Now and patient was informed that this is a secure, HIPAA-compliant platform  He agrees to proceed     My office door was closed  No one else was in the room  He acknowledged consent and understanding of privacy and security of the video platform  The patient has agreed to participate and understands they can discontinue the visit at any time  Patient is aware this is a billable service  VIRTUAL VISIT DISCLAIMER    Choco Lorenzo acknowledges that he has consented to an online visit or consultation  He understands that the online visit is based solely on information provided by him, and that, in the absence of a face-to-face physical evaluation by the physician, the diagnosis he receives is both limited and provisional in terms of accuracy and completeness  This is not intended to replace a full medical face-to-face evaluation by the physician  Choco Lorenzo understands and accepts these terms  MEDICATION MANAGEMENT NOTE        [address]  [facility]  [address] [facility] [address] [phone]        Name and Date of Birth:  Choco Lorenzo 63 y o  1955    Date of Visit: Stacy 10, 2021  SUBJECTIVE:     Nandini Mustafa seen by [facility] 5/27 at which time zoloft decreased  Nandini Mustafa reports feeling the same  No symptoms worse or better  Continues to need nebulizer 3-4x/d  Sleep is variable  Appetite ok and denies weight gain  Reports he felt "closed-in and annoyed" during recent visit with his family  Had to isolate himself  Infrequent crying spells  Denies SI  Hasn't been able to attend [facility] [employer] support grp yet secondary to transportation problems but has been speaking to a friend of his who is going thru similar situation and is feeling some support there  Review of Systems   Constitutional: Positive for fatigue  Negative for unexpected weight change  Respiratory: Positive for shortness of breath  Musculoskeletal: Positive for arthralgias  Neurological: Positive for headaches  Psychiatric/Behavioral: Positive for sleep disturbance         Psychiatric, medical, social, trauma/loss hx reviewed        OBJECTIVE:     MENTAL STATUS EXAM  Appearance:  age appropriate, dressed casually   Behavior:  Pleasant & cooperative   Speech:  Normal volume, regular rate and rhythm   Mood:  low   Affect:  mood congruent   Language: intact and appropriate for age, education, and intellect   Thought Process:  goal directed   Associations: intact associations   Thought Content:  no overt delusions   Perceptual Disturbances: no auditory or visual hallcunations   Risk Potential / Abnormal Thoughts: Suicidal ideation - None  Homicidal ideation - None  Potential for aggression - No       Consciousness:  Alert & Awake   Sensorium:  Grossly oriented   Attention: attention span and concentration are age appropriate       Fund of Knowledge:  Memory: awareness of current events: yes  recent and remote memory grossly intact   Insight:  good   Judgment: good       Lab Review: I have reviewed all pertinent labs    Lab Results   Component Value Date    CHOLESTEROL 196 04/06/2021 Lab Results   Component Value Date    HDL 41 04/06/2021     Lab Results   Component Value Date    TRIG 113 04/06/2021     Lab Results   Component Value Date    NONHDLC 155 04/06/2021     Lab Results   Component Value Date    SODIUM 140 04/06/2021    K 4 3 04/06/2021     04/06/2021    CO2 26 04/06/2021    AGAP 6 04/06/2021    BUN 18 04/06/2021    CREATININE 1 36 (H) 04/06/2021    GLUC 164 (H) 04/12/2020    GLUF 94 04/06/2021    CALCIUM 9 2 04/06/2021    AST 28 04/06/2021    ALT 44 04/06/2021    ALKPHOS 109 04/06/2021    TP 7 5 04/06/2021    TBILI 0 47 04/06/2021    EGFR 55 04/06/2021     Lab Results   Component Value Date    WBC 9 53 04/06/2021    HGB 16 3 04/06/2021    HCT 50 5 (H) 04/06/2021    MCV 87 04/06/2021     04/06/2021     Lab Results   Component Value Date    TIZGCIJB30 364 07/29/2020     Lab Results   Component Value Date    FOLATE 11 7 07/29/2020     Lab Results   Component Value Date    [id number] 2 240 04/06/2021           ASSESSMENT & PLAN          Diagnoses and all orders for this visit:    Depression, unspecified depression type  -     sertraline (ZOLOFT) 50 mg tablet; Take 1 tablet (50 mg total) by mouth daily    PTSD (post-traumatic stress disorder)  -     sertraline (ZOLOFT) 50 mg tablet; Take 1 tablet (50 mg total) by mouth daily  -     QUEtiapine (SEROquel) 50 mg tablet; Take 1 tablet (50 mg total) by mouth daily at bedtime      Current Outpatient Medications   Medication Sig Dispense Refill    albuterol (2 5 mg/3 mL) 0 083 % nebulizer solution Take 1 vial (2 5 mg total) by nebulization every 6 (six) hours as needed for wheezing or shortness of breath 120 vial 2    amLODIPine (NORVASC) 2 5 mg tablet TAKE 1 TABLET (2 5 MG TOTAL) BY MOUTH DAILY AFTER DINNER      fluticasone (FLOVENT HFA) 110 MCG/ACT inhaler Inhale 2 puffs 2 (two) times a day Rinse mouth after use   12 g 3    gabapentin (NEURONTIN) 100 mg capsule TAKE 1 CAPSULE BY MOUTH THREE TIMES A DAY 90 capsule 1    ipratropium-albuterol (DUO-NEB) 0 5-2 5 mg/3 mL nebulizer solution Take 1 vial (3 mL total) by nebulization 4 (four) times a day 15 vial 0    Multiple Vitamins-Minerals (MULTIVITAMIN ADULT PO) Take 1 tablet by mouth daily      naproxen sodium (ANAPROX) 550 mg tablet Take 1 tablet (550 mg total) by mouth 2 (two) times a day as needed for mild pain or headaches 90 tablet 1    QUEtiapine (SEROquel) 50 mg tablet Take 1 tablet (50 mg total) by mouth daily at bedtime 90 tablet 0    sertraline (ZOLOFT) 50 mg tablet Take 1 tablet (50 mg total) by mouth daily 90 tablet 0    tiZANidine (ZANAFLEX) 2 mg tablet Take 1 tablet (2 mg total) by mouth daily at bedtime 90 tablet 3     No current facility-administered medications for this visit  Plan:         Devon's symptpms ar not changing both on increasing and decreasing zoloft  Etiology of symptoms appear to be mult-factorial   PaMariluzC discussed with Desiree Mora options for meds inc leaving current meds unchanged or trying alternative to zoloft  Desiree Mora prefers to leave meds unchanged  Will cont zoloft 50 mg/d and seroquel 50 mg q bedtime  Reviewed risks, benefits, side effects of medications, including no medication  Patient understands and agrees to treatment plan  Cont f/u with ind therapist and f/u with long covid support grp MARTHATOMASA  F/u PaMariluzC 3 mths, sooner prn     Patient has been informed of 24 hours and weekend coverage for urgent situations accessed by calling the main clinic phone number       Chester Beatty PA-C

## 2021-06-14 ENCOUNTER — OFFICE VISIT (OUTPATIENT)
Dept: NEUROLOGY | Facility: CLINIC | Age: 66
End: 2021-06-14

## 2021-06-14 DIAGNOSIS — R41.3 MEMORY DIFFICULTY: Primary | ICD-10-CM

## 2021-06-14 PROCEDURE — NC001 PR NO CHARGE: Performed by: CLINICAL NEUROPSYCHOLOGIST

## 2021-06-14 PROCEDURE — NC001 PR NO CHARGE

## 2021-06-14 NOTE — LETTER
2021     Padmini Roy MD  [facility] [address] [address]    Patient: Lorne Bolanos   YOB: 1955   Date of Visit: 2021       Dear Dr Zelalem Garrido: Thank you for referring Lorne Bolanos to me for evaluation  Below are my notes for this consultation  If you have questions, please do not hesitate to call me  Sincerely,        Wilner Snell PSYD        CC: [facility]  Wilner Snell PSYD  2021  3:16 PM  Sign when Signing Visit  Neuropsychological Evaluation  [facility]  [address] [place] [address]  P: [phone] F: [phone]     Patient Name: Lorne Bolanos  Age: 63 y o    MRN: [id number] : 1955  DOS: 2021    Results/Interpretation    Sources of Information:   [employer] (ACS): Test of Premorbid Functioning (TOPF)  Animal Naming Fluency  Brief Visuospatial Memory Test - Revised (BVMT-R) - Form 1  [place] Verbal Learning Test - 3rd Edition (CVLT-3)  Clinical Interview (see interview note below for history and presenting problems)  Anish Continuous Performance Test - 3 (CPT-3)  Controlled Oral Word Association Test (COWAT)  Neuropsychological Assessment Battery (NAB) - Form 1   Selected Subtests: Naming  PTSD Checklist for DSM-5 (PCL-5)  Repeatable Battery for the Assessment of Neuropsychological Status (R-BANS) - Form A   Selected Subtests: Line Orientation  [place] Color and Word Test (SCWT)  Symbol Digit Modality Test (SDMT)  Symptom Checklist - 90 - Revised (SCL-90-R)  TOMM  [place] Making Test (TMT)  Wechsler Abbreviated Scale of Intelligence, Second Edition (WASI-II)   Selected Subtests: Matrix Reasoning, Vocabulary  Wechsler Adult Intelligence Scale, Fourth Edition (WAIS-IV):  Selected Subtests: Digit Span  Wechsler Memory Scale, Fourth Edition (WMS-IV):   Selected Subtests: Logical Memory I/II/Recognition, Visual Reproduction I/II/ Recognition/Copy, Symbol Span  [employer] - 64 Card Version (UTRG-26)    Evaluation Findings:  Findings, as documented below, are presented with qualitative descriptors (adapted from the Wechsler system unless otherwise specified) based on age-appropriate normative data  For ease of readability, findings are categorized by cognitive domain with brief descriptions of the abilities targeted by tasks administered  Engagement/Performance Validity:    Sensory/Motor factors: The patient did not have his glasses with him for testing; He does not wear hearing aids; He required some assistance reading the small print of two questionnaires; He did not otherwise report any problems seeing stimuli; The patient did not use any ambulatory assistive devices  Fatigue and other factors: Patient appeared alert throughout testing; He did not report fatigue or significant pain symptoms; He commented that his hip felt stiff after a long period of sitting; Some breathing difficulties were noted by the examiner and this could have impacted the speed with which he was able to verbally respond to timed tasks at times; Breaks were taken as needed  Approach to testing:  Cooperative; Adequate rapport was established; The patient seemed to grasp simple task instructions; He had some difficulty with more complex task instructions and benefitted from repetition/rephrasing; He sometimes did not persist when items became difficult and required examiner encouragement to continue to try; He was often apologetic for his performance; Affect during testing was noted to be mildly anxious at the outset and frustrated about testing at other times; The patient appeared neutral with regard to interest in testing; He demonstrated good ability to independently correct mistakes; Testing pace was steady throughout;  He appeared to be broadly engaged in testing   Formal assessment of performance validity: One suspect indicator on an embedded measure of auditory attention; All other findings were within acceptable limits  Interpretation: Findings are thought to be a valid estimate of the patients current cognitive abilities    Premorbid/General Intellectual Functioning:   Estimate in relation to demographic factors: Borderline  Estimate per word reading test: Borderline  IQ Estimate: Borderline  Interpretation: Estimated broadly borderline range premorbid abilities; These estimates are thought to be broadly consistent with developmental and academic history  *The presence of some lower than expected scores on neuropsychological measures does not necessarily suggest decline from baseline cognitive functioning  *    Attention:   Simple auditory attention: Average  Auditory attention/working memory: Average  Simple visual attention: Borderline  Aspects of complex visual attention: Indications of a balanced response style that emphasizes both speed and accuracy; There were strong indications of difficulties with inattention manifesting as difficulties differentiating target from non-target items and efficiently processing information as it was presented to him; There was some indication of difficulties with sustained attention manifesting as some increase in response speed over the course of the test with a substantial increase in omission errors; There were also indications of difficulties with vigilance such that he had some problems with performance on trials with longer intervals between stimuli;  There were no significant problems with primary impulsivity     Information Processing Speed:  Speeded visual scanning/sequencing: High average  Timed word reading: Extremely low  Timed color naming: Borderline  Timed number-symbol matching:   Written trial: Extremely low  Oral trial: Borderline    Language:   Confrontation picture naming: Average  Word knowledge/Verbal concept formation: Low average  Semantic fluency: Average  Phonemic fluency: Extremely low     Visual Perception:   Spatial Orientation: Average on a line orientation task (Raw = 15/20)  Visuoconstruction abilities: Average on a copy task (Raw = 42/43); Patients copy for a series of increasingly complex line figures was notable for good appreciation for the overall gestalt of the figures with only minor distortions/omissions of fine details of the figures     Memory and Learning:   Verbal:  List learning: Average range learning performance across 5/5 trials; Patient demonstrated benefit from repeated exposure to stimuli; At best performance, patient recalled 7/16 words from the list (trials 3, 4, and 5/5)   List recall: Short delay - Free recall: Low average (5/16 words recalled)  List recall: Short delay - Cued recall: Average; Score was improved with semantic cueing (7/16 words recalled)   List recall: Long delay - Free recall: Average (5/16 words recalled)  List recall: Long delay - Cued recall: Average; Score was modestly improved with semantic cueing (6/16 words recalled)  List recognition: Average range discriminability score; 14/16 recognition hits (average); 11 false positive errors (low average)  Story learning: Low average  Story recall: Borderline performance when compared to the normative sample; When considered relative to his low average immediate recall performance, the patients delayed recall was slightly weaker than expected and could reflect slight information retention problems   Story recognition: Extremely low when compared to the normative sample; When considered relative to his extremely low recognition performance, the patients delayed recall was consistent and does not suggest primary retrieval deficits  Visual:  Simple shape learning: Extremely low overall;  Inconsistent indication of benefit from repeated exposure to stimuli  Simple shape recall: Borderline; Patient retained 100% of what he initially learned  Simple shape recognition: Low average range discriminability score; 5/6 recognition hits; 0 false positive errors  Moderate to complex shape learning: Borderline immediate recall for a series of increasingly complex line figures  Moderate to complex shape recall: Low average delayed recall for a series of increasingly complex line figures when compared to the normative sample; When considered relative to his borderline immediate recall performance, the patients delayed recall score is consistent with what would be expected and does not suggest primary retention problems  Moderate to complex shape recognition: Average recognition when compared to the normative sample; When considered relative to his average recognition performance, the patients delayed recall score is significantly weaker than expected and this could suggest retrieval problems      Executive Functions:    Visual organization/reasoning: Borderline  Visual-motor sequencing/set-shifting: Average  Phonemic fluency: Extremely low; Patients response set was notable for very few responses; Observationally, the patient was quick to give up before the time limit and did not seem to benefit from examiner encouragement to continue  Selective attention/inhibition: Low average  Concept formation/problem solving: Number of completed trials was WNL (3 total categories completed); Total error score was within the average range    Rating Scales:    PCL-5: On a brief screening measure, the patient endorsed a significant number of symptoms that can be suggestive of PTSD in the appropriate clinical context; The patients responses suggest probably PTSD; however, further evaluation is needed for consideration of formal diagnosis  SCL-90-R: On a self-report measure designed to assess symptoms associated with general emotional and behavioral dysfunction, the patients response set was notable for some scale elevations that can reflect clinically relevant degrees of distress   Individual scale elevations suggest that the patient may experience feelings of dysphoric mood, hopelessness, nervousness, tension, dread, and anger; There were also indications that the patient may have a lack of interest/motivation, may demonstrate avoidance or escape behavior, and is likely to feel isolated or alienated from others  Summary and Interpretation:  Mr Raul Collazo performance on neurocognitive testing is notable for some areas concerning for cognitive decline  Compared to the normative sample, scores within a typically expected range were found on measures of basic auditory attention, auditory working memory, expressive language, visual perception, and visuoconstruction  Performances on measures of visual attention were concerning for weaker performance with indications of inattention, problems with sustained attention, and poor vigilance  The patients scores on measures of executive functioning were somewhat variable with weaker than expected performance on measures of verbal generativity and visual reasoning  His performance on measures of problem solving, mental flexibility, inhibition, and set-shifting were within expected limits  The patients scores suggested normative weaknesses in areas that include information processing speed  The patients profile of scores on measures of learning and memory are somewhat inconsistent  On measures of visual learning/memory, the patients performances consistently suggested some degree of low learning with good information retention over time  On measures of verbal learning/memory, the patient demonstrated normal learning and memory on a non-contextual list learning task; however, his performance on a story-based contextual measure of verbal learning suggested some information retention problems  In terms of psychiatric functioning, the patients responses to self-report measures suggest significant and varied symptomology, including indications of probable PTSD, depression and anxiety  There were also indications that the patients experience of psychiatric symptomology is likely to cause or contribute to restriction of normal activity in- and outside the home  Overall, Mr Yudi Brown neurocognitive profile could suggest some decree of cognitive decline; however, this determination is complicated by estimated borderline premorbid functioning in the context of low educational attainment and other developmental factors  Nevertheless, findings on current testing are most notable for indications of problems with aspects of attention, slow information processing speed, and some indications of difficulties with learning  Information retention (memory) deficits were only evidenced to a mild degree on one verbal memory measure  In the context of independence for ADLs and some problems with IADLs that are not clearly attributable to cognitive dysfunction, findings are viewed as being most consistent with mild cognitive impairment (MCI)  Etiological considerations for the patients cognitive dysfunction are thought likely to be multifactorial  Given the patients history of COVID-19 with intubation secondary to ARDS, cognitive symptoms could reasonably reflect hypoxic/anoxic injury and/or possible secondary effects thereof  However, the etiological picture is complicated by suspected posttraumatic stress disorder, as the nature of particular weaknesses evidenced on testing could also be reasonably attributed to PTSD associated cognitive impairment  Additional psychiatric symptoms suggestive of depression and/or other anxiety disorders could also contribute to the patients cognitive symptoms  Other considerations at the present time should include the potential cognitive effects of chronic pain, insufficient sleep quality, and medication side effects (e g , gabapentin, tizanidine)   While it is certainly possible that the patients cognitive symptoms could be directly related to neuropathology resulting from COVID-19, research has not yet determined any clear neurocognitive profile associated with the virus  The patient may wish to have neuropsychological re-evaluation conducted in the future as more data becomes available  Recommendations:  1  The patient is encouraged to continue working with pulmonology as medically indicated for management of breathing dysfunction to minimize the risk of ongoing/recurrent hypoxic injury due to associated chronic conditions  2  The patient endorsed a moderate to severe degree of psychiatric symptomology of varied nature  Given the perceived level of distress, it is thought likely that the patients experience of cognitive symptoms is related, at least in part, to psychiatric factors  The patient is strongly encouraged to continue working with his behavioral health providers for management of his symptoms  He may wish to discuss alternative medication options with his psychiatrist given reported inefficacy of his current medication regimen  He is also encouraged to work on cognitive-behavioral strategies for anxiety reduction with his mental health therapist        3  The patients current medication list includes some agents that can be sedating or can otherwise be associated with cognitive side-effects  He is encouraged to discuss the potential risks/benefits of ongoing use of this medication with his medical provider(s) if he intends to continue use  4  The patient experiences some degree of chronic pain which can cause or exacerbate cognitive dysfunction  He may benefit from incorporating cognitive behavioral strategies for pain management into his self-care practices  The patient may benefit from looking into programs offered through [employer]  This service offers evidence-based strategies for cognitive behavioral management of chronic pain  5  The patient reported insufficient sleep quality   A good summary of commonly used behavioral strategies for sleep improvement can be found at http://sleepeducation  org/essentials-in-sleep/healthy-sleep-habits  Referral to sleep medicine for evaluation of possible ANTHONY may be appropriate at the discretion of his medical provider(s)  6  The patient may experience cognitive inefficiencies in his day-to-day life  As such, he may benefit from incorporating the following compensatory strategies into his routine practices  a  Write information down, rather than relying upon your memory alone (e g , use a notepad by the phone, Post-It notes, or a dry erase board set up in a central location in the home)     b  Set alarms, timers, or reminders for important events or repeating occurrences on a cell phone, watch, or other electronic device (e g , alarm to remember to take medications, reminder for daily tasks, create an event on your electronic calendar to remember birthdays/appointments)  c  It can be helpful to set up a central location where items used on a daily basis (e g , keys, wallet) are always placed  d  Repetition can be helpful in forming new memories  Recite important information several times to encourage retention of information  7  There is some research that suggests potential cognitive benefit from regular engagement in cognitively stimulating activities  The patient may wish to consider participating in conventional [address] such as sudoku puzzles, word searches, crossword puzzles, or other activities available in puzzle books or on websites (such as [employer] or [employer])  Additionally, other, novel activities that require cognitive flexibility and new skill building (e g , learning a new language, learning to play an instrument) may also provide mental stimulation  8  Regular physical activity can have a significant impact on physical, emotional, and cognitive health   The patient is encouraged to incorporate aspects of cardiovascular and strength-building exercise into his life  Discussion of current medical status with health care providers is strongly encouraged before starting any new exercise regimen  9  Research supports eating healthy foods that have been shown to have a positive impact on brain health  The patient may wish to consider incorporating these sorts of foods into his diet  The MIND diet is a popular option that combines the health benefits of the Mediterranean and DASH diets and has been proven to boost brain health and potentially reduce your risk of cognitive decline  A good summary of the MIND diet can be found at: [employer]    10  The patient is encouraged to continue working with neurology for ongoing evaluation and management of his cognitive/neurological symptoms  11  Should the patient, his medical providers, or his family members have ongoing concerns regarding cognitive worsening over time, repeat neuropsychological evaluation can be conducted no sooner than one year from the time of the present evaluation (2022)  Manjula Nicolas PsyD  Neuropsychologist  [place] Licensed Psychologist  Lic  #[id number]    Tito Loaiza PSYD  2021 12:31 PM  Sign when Signing Visit  Neuropsychological Evaluation  [facility]  [address] [place] [address] 3  P: [phone] ? F: [phone]    History and Clinical Interview    Patient Name: Manuel Talbert     Age: 63 y o  MRN: [id number]   : 1955  DOS: 2021     Referral/Presenting Information:   Mr Manuel Talbert is a 63 y o , right handed, male with an 8th grade level of education who presents for neuropsychological evaluation upon kind referral from his neurology provider, Jet Styles MD, for assessment of cognitive functioning in the context of COVID-19 infection history () with resultant intubation for ARDS and subsequent symptoms of PTSD   The patient was unaccompanied to today's appointment  The history, as reported below, incorporates information obtained through medical record review, patient report, and clinical observation, as well as informant report and outside record review as applicable  History of Presenting Problem(s):  The patient was initially seen by Dr Bernarda Amaya on 07/23/2020 for evaluation of headaches, memory problems, and parasthesias (numbness and tingling in his thighs, right hip pain) which came on after COVID-19 infection and resultant acute respiratory failure with intubation in April 2020  He was also noted to have developed mood swings and was in treatment with psychiatry and a mental health counselor/therapist  With regard to reported cognitive symptoms, impressions by Dr Bernarda Amaya at the time included potential cognitive impairment secondary to COVID and resultant PTSD  Headaches were improved with addition of Neurontin at follow-up on 09/15/20 and memory was reported to be stable on vitamin D and B6 supplementation  MoCA Scores:  07/23/20 - 14/30 02/17/21 - 18/30    Relevant Studies:  MRI Brain NeuroQuant without contrast (02/28/2021)  "    IMPRESSION:  1  No acute disease  Minor degree of chronic small vessel disease  2   NeuroQuant analysis was performed: Normal study; Does not support neurodegeneration   "     Lab Results (04/06/2021)  Component      Latest Ref Rng & Units 4/6/2021   WBC      4 31 - 10 16 Thousand/uL 9 53   Red Blood Cell Count      3 88 - 5 62 Million/uL 5 80 (H)   Hemoglobin      12 0 - 17 0 g/dL 16 3   HCT      36 5 - 49 3 % 50 5 (H)   MCV      82 - 98 fL 87   MCH      26 8 - 34 3 pg 28 1   MCHC      31 4 - 37 4 g/dL 32 3   RDW      11 6 - 15 1 % 15 2 (H)   MPV      8 9 - 12 7 fL 10 5   Platelet Count      235 - 390 Thousands/uL 192   nRBC      /100 WBCs 0   Neutrophils %      43 - 75 % 71   Immat GRANS %      0 - 2 % 1   Lymphocytes Relative      14 - 44 % 14   Monocytes Relative      4 - 12 % 10   Eosinophils      0 - 6 % 3 Basophils Relative      0 - 1 % 1   Absolute Neutrophils      1 85 - 7 62 Thousands/µL 6 84   Immature Grans Absolute      0 00 - 0 20 Thousand/uL 0 09   Lymphocytes Absolute      0 60 - 4 47 Thousands/µL 1 36   Absolute Monocytes      0 17 - 1 22 Thousand/µL 0 92   Absolute Eosinophils      0 00 - 0 61 Thousand/µL 0 25   Basophils Absolute      0 00 - 0 10 Thousands/µL 0 07   Sodium      136 - 145 mmol/L 140   Potassium      3 5 - 5 3 mmol/L 4 3   Chloride      100 - 108 mmol/L 108   CO2      21 - 32 mmol/L 26   Anion Gap      4 - 13 mmol/L 6   BUN      5 - 25 mg/dL 18   Creatinine      0 60 - 1 30 mg/dL 1 36 (H)   GLUCOSE FASTING      65 - 99 mg/dL 94   Calcium      8 3 - 10 1 mg/dL 9 2   AST      5 - 45 U/L 28   ALT      12 - 78 U/L 44   Alkaline Phosphatase      46 - 116 U/L 109   Total Protein      6 4 - 8 2 g/dL 7 5   Albumin      3 5 - 5 0 g/dL 4 3   TOTAL BILIRUBIN      0 20 - 1 00 mg/dL 0 47   eGFR      ml/min/1 73sq m 55   Cholesterol      50 - 200 mg/dL 196   Triglycerides      <=150 mg/dL 113   HDL      >=40 mg/dL 41   LDL Calculated      0 - 100 mg/dL 132 (H)   Non-HDL Cholesterol      mg/dl 155   PSA, Total      0 0 - 4 0 ng/mL 0 3   PROSTATE SPECIFIC ANTIGEN FREE      N/A ng/mL 0 13   PROSTATE SPECIFIC ANTIGEN PERCENT FREE      % 43 3   TSH 3RD GENERATON      0 358 - 3 740 uIU/mL 2 240   Vit D, 25-Hydroxy      30 0 - 100 0 ng/mL 21 8 (L)       Current Medications:     Current Outpatient Medications:     albuterol (2 5 mg/3 mL) 0 083 % nebulizer solution, Take 1 vial (2 5 mg total) by nebulization every 6 (six) hours as needed for wheezing or shortness of breath, Disp: 120 vial, Rfl: 2    amLODIPine (NORVASC) 2 5 mg tablet, TAKE 1 TABLET (2 5 MG TOTAL) BY MOUTH DAILY AFTER DINNER, Disp: , Rfl:     fluticasone (FLOVENT HFA) 110 MCG/ACT inhaler, Inhale 2 puffs 2 (two) times a day Rinse mouth after use , Disp: 12 g, Rfl: 3    gabapentin (NEURONTIN) 100 mg capsule, TAKE 1 CAPSULE BY MOUTH THREE TIMES A DAY, Disp: 90 capsule, Rfl: 1    ipratropium-albuterol (DUO-NEB) 0 5-2 5 mg/3 mL nebulizer solution, Take 1 vial (3 mL total) by nebulization 4 (four) times a day, Disp: 15 vial, Rfl: 0    Multiple Vitamins-Minerals (MULTIVITAMIN ADULT PO), Take 1 tablet by mouth daily, Disp: , Rfl:     naproxen sodium (ANAPROX) 550 mg tablet, Take 1 tablet (550 mg total) by mouth 2 (two) times a day as needed for mild pain or headaches, Disp: 90 tablet, Rfl: 1    QUEtiapine (SEROquel) 50 mg tablet, Take 1 tablet (50 mg total) by mouth daily at bedtime, Disp: 90 tablet, Rfl: 0    sertraline (ZOLOFT) 50 mg tablet, Take 1 tablet (50 mg total) by mouth daily, Disp: 90 tablet, Rfl: 0    tiZANidine (ZANAFLEX) 2 mg tablet, Take 1 tablet (2 mg total) by mouth daily at bedtime, Disp: 90 tablet, Rfl: 3    Review of Current Symptoms:  Primary Concerns/Complaints: At the time of the present evaluation, the patient reported an approximate 14 month history of cognitive changes that came on abruptly after he was hospitalized and intubated due to complications of [id number] infection  The patient reported primary concerns related to difficulties with recent memory  Specifically, he noted that he has difficulties remembering events and conversations he has had with others  He struggles to remember the storyline of TV shows he watches and reported that others have commented that he repeats himself in conversation  He denied any problems remembering names of people he should know  The patient reported problems with visual memory such that he has difficulty forming a mental image of the faces of loved ones  He denied any difficulties recognizing faces when presented with pictures of these individuals  He also noted that he sometimes feels disoriented in places that should be familiar to him  He denied any problems misplacing things around the house and attributed this to having constant supervision from his wife   Other cognitive symptoms were noted to include problems with attention such that he "blanks out" when trying to focus on something  He noted that he does not feel he is actively distracted by something but cited that his mind feels empty  He additionally reported feeling that his thinking is slower and more effortful in general  He has some degree of word-finding difficulty that has increased since the time of his hospital admission in 04/2020  In terms of visual perception changes, the patient reported having episodes of "fog" in his vision but stated this comes and goes without clear precipitant  The patient notes difficulties with executive functioning such that he struggles to make decisions, manage his schedule, stay organized, plan tasks/activities, solve problems, and use good judgment  The patient also reported some degree of increased impulsivity  Cognitive symptoms were noted to be worsening gradually over time  The patient has not noticed any patterns in his cognitive symptoms related to time of day, pain, sleep, or mood  Emotionally, the patient endorsed significant and constant low mood  He reported that he feels his experience of low mood has been consistent for at least the past few months (likely longer) but was not significant prior to his hospitalization in 04/2020  He reported that he has not experienced any significant improvement in mood with the addition of Zoloft or Seroquel under the supervision of his psychiatrist  He endorsed symptoms commonly associated with depression that include low motivation, apathy, and anhedonia that are present more days than not  He endorsed frequent feelings of helplessness and hopelessness about his recovery from COVID-19 and potential for a return to his previous level of functioning   He endorsed a number of losses related to COVID-19, including his subsequent inability to work/provide for his family, engage in previously beloved hobbies, and help his family with typical household activities that he used to complete easily  Of note, the patient's brother passed away from [id number] infection while the patient was medically admitted  The patient's wife did not inform the patient until he was back at home and beginning to recover  The patient describes this series of events as highly traumatic  The patient reported more recent experiences of stress in the setting of being around family  He noted that he was always very eager to spend time with loved ones, but that he had a recent family event at his home and found that it felt overwhelming and that he needed to take time away from the party to calm his nerves  He reported some intermittent attacks of anxiety with symptoms that include feeling as though he can't catch his breath, that his heart is beating too fast, and feeling an overwhelming sense of dread  The patient indicated that he has become quite anxious about leaving his home and reported more recent hesitancy in the setting of state re-opening and ongoing news of new COVID-19 variants  He occasionally accompanies his wife and daughter(s) to the grocery store, but tends to stay in the car while they shop  The patient emphasized his most notable emotional concern is related to increasing irritability and having a "shorter fuse" than he used to  He reported that frustration contributes to his experience of low mood and complicates his family relationships at times  With regard to other symptoms that can be associated with psychopathology, the patient endorsed some hypnopompic visual anomalies that include seeing figures and imagery associated with his hospital stay (e g , seeing the nurse standing over his bed, seeing the TV that was in his hospital room, etc )  He denied any experience of visual hallucinations during waking hours and does not see any hallucinatory imagery that is not associated with his hospital stay  He denied any experience of auditory or tactile anomalies   The patient denied any current or historical experience of suicidal or homicidal ideation  Protective factors were noted to include his family and trying to work to be productive around the house as much as possible  The patient has been in treatment with [facility] since May 2020  The patient remains independent for ADLs, although he may occasionally need help putting on pants due to hip pain  In terms of IADLs, the patient is able to complete simple meal preparation without difficulty  He does not use the stove/oven but this is not a change from his previous functioning  He is able to assist with some simple chores around the house and finds that he is limited primarily by his breathing difficulties, citing that engaging in strenuous tasks often necessitates subsequent at-home nebulizer treatment (patient is currently using this 3-4x per day)  The patient's wife and daughter(s) typically manage other household chores (e g , laundry, dishes, shopping, etc )  The patient manages his morning medications without difficulty  He benefits from assistance from his wife for remembering to take his evening medication doses  The patient's wife manages household finances  The patient used to assist with this but does not do this anymore  The patient is not currently driving and is dependent on his daughter for transportation to appointments etc  The patient has not returned to work since he was discharged from the hospital but was previously working full-time without difficulty  Additional Symptoms:  Sensory/Motor:  Tremor/Shakiness: Yes - Patient reported sometime in hands and toes upon waking; Does not tend to last into the day  Problems with walking/balance: Yes - Patient reported due to hip problems (R hip); Has a limp even since hip pain started;  Patient uses a cane on occasion; Balance is not as much a problem and patient feels fairly steady on his feet in general  Problems with fine motor dexterity: No  Changes in sense of smell/taste: Yes - Patient reported anosmia since COVID-19 infection; Sense of smell has improved over time but has not returned to baseline  Changes in vision: Yes - Patient reported he should be wearing glasses but needs a new prescription; He denied any experience of diplopia or noticeable visual field cut  Changes in hearing: No  Numbness/Tingling: Yes - In bilateral thighs    Physical:  Headaches: Yes - Ongoing; 3-4x per week; Patient feels Neurontin is somewhat helpful  Dizziness/Vertigo: No  Shortness of breath: Yes - Constant; Patient was discharged from hospital on supplemental oxygen; Now uses at-home nebulizer 3-4x per day and has a rescue inhaler as needed  Nausea: No  Incontinence: No   Pain: Yes - Pain in R hip daily; L side of chest at times (electrical sensation); Pain rating of 8 5/10 on average; Current pain rating is 4/10     Sleep:  Hours per night: 8-9 hours, but notably broken sleep throughout  Difficulty falling asleep: No  Difficulty staying asleep: Yes - Patients wakes frequently with visual anomalies and due to pain  Quality of sleep: Broken; Poor; Patient does not typically fell well rested upon waking  Medications: Seroquel (nightly)  Snoring: Yes  Sleep apnea: No history of sleep study;  Patient reported some instances of "snoring" while awake  Parasomnias: No  Daytime napping: Yes - 1x per day (~45 min)     Energy:   Daytime fatigue: Sometimes  Overall level of energy: Poor    Appetite:   Changes from normal: No  Weight changes: Yes - Patient reported he lost significant weight while in the hospital; Now is 10 lbs heavier than previous baseline    History:  Personal History:  Social:  Birthplace: [address] [place] [address]; Raised in [place], [place]  Developmental History: Noncontributory  Family of Origin: Raised by biological mother and step-father; Biological father passed away when patient was 3 y o ; Patient has 1 brother still living, and 3 others passed away; Childhood home was described as supportive and the patient denied any experience of abuse/neglect  Language(s) Spoken: Fluent in [place]; Speaks some Norwegian  Current Living Situation: Live with wife and 3 dogs in a private residence  Relationship Status:  (37 years)  Children: 2 daughters and 1 son (ages 43, 44, 32); Daughters live nearby; Patient has 5 grandkids   Educational:  Highest level of education: 8th grade; Patient left school to enter the workforce when his step-father was very ill; Patient believes he was about 15 y o  when he left school   School performance: Patient reported average scores in most classes; Bs and Cs; He had some failing grades at times  Learning disability: No formal diagnoses  Special education classes: No  Academic retention: Yes - 7th grade; Due to achievement problems  Attention problems/ADHD: Yes - No formal diagnosis of ADHD; Patient had problems listening and being still; Preferred to be at work  Behavior problems: No  Vocational:  Current occupation: Not currently working; Patient has not returned to work since his COVID-19 infection; Had been working as an  at 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, at a garage/tire shop, as a , and as an   Applications for disability: Patient is currently 's compensation due to COVID-19 exposure at work;  The patient has not applied for disability benefits through his employer or through the state at this time  [place]: No    Medical:     Patient Active Problem List   Diagnosis    Elevated brain natriuretic peptide (BNP) level    PTSD (post-traumatic stress disorder)    Interstitial lung disease ([facility] [address] )    Acute post-traumatic stress disorder    Expected bereavement due to life event    New daily persistent headache    Abnormal electrocardiogram    Hip pain    Grade I diastolic dysfunction    SOB (shortness of breath)    Memory difficulty    Numbness and tingling    COVID-19 virus infection    Occupational exposure in workplace    Right bundle branch block (RBBB) on electrocardiogram (ECG)    Depression        Past Medical History:   Diagnosis Date    COVID-19 2020    PTSD (post-traumatic stress disorder)       Other Medical History (per patient report):   · Patient was recently diagnosed with RBBB (2020); Patient is taking medications but was uncertain of any need for follow-up with a specialist    Mood/Psychiatric:  Problem history: Depression; PTSD; None prior to COVID-19 infection  Treatment history: Patient currently sees psychiatrist and mental health counselor regularly; No previous mental health treatment  Psychiatric hospitalizations: None  Abuse history: None  History of self-harm / violence: None  Substance Use:     Social History     Tobacco Use    Smoking status: Former Smoker     Packs/day: 2 00     Years: 30 00     Pack years: 60 00     Types: Cigarettes     Quit date: 2020     Years since quittin 2    Smokeless tobacco: Never Used   Substance Use Topics    Alcohol use: Not Currently     Tobacco: No current use; The patient reported he has not been using any tobacco products for the past year; He had a previous 60-pack year smoking history  Alcohol: No current use;  The patient denied any history of problematic alcohol use  Illicit Substance: No current or historical use  Treatment history: None  Legal:  Involvement in Criminal Justice System: No  Current Litigation: Yes - Patient is currently involved in worker's compensation case due to COVID-19 exposure at work  POA: None on file    Family History:     Family History   Problem Relation Age of Onset    Heart disease Mother     Sudden death Father     Kidney disease Brother       Other Family History: · Mother and father with cardiovascular problems  · Kidney disease in brother; Brother with seizures later in life thought related to kidney disease  · Maternal aunt with brain cancer  · No other known family history of neurological problems  · No other known family history of psychiatric problems     Behavioral Observations/Mental Status:   Arousal: Awake; Alert; Oriented x3  Prosthetic Devices: Patient did not have glasses with him; No hearing aids; No ambulatory assistive devices  Appearance: Appeared stated age; Of average stature; Clothing was casual and appropriate to the setting and weather conditions  Grooming/Hygiene: Adequately groomed and hygienic  Posture/Gait: Seated posture was WNL; Patient ambulated in clinic with slight limp  Motor: No abnormalities noted  Social Relatedness: Patient was pleasant and cooperative throughout the evaluation process; Eye contact was fleeting at times; Facial expressiveness was WNL  Mood: Reported to be depressed  Affect: Dysthymic; Anxious at times  Thought: Linear; Logical; Goal-directed; No evidence of hallucination or delusion noted during clinical interview  Speech: Volume, clarity, rate, and tone were WNL; Patient's speech was mildly dysprosodic  Insight: Adequate    Plan: Mr Tobin Lamb presented for comprehensive neuropsychological evaluation  Clinical interview was completed by the attending neuropsychologist  Test administration and scoring was conducted by a trained psychometrician under supervision and direct instruction of the attending provider  Tests are to be interpreted by the attending neuropsychologist with consideration given to the clinical history as described above  A detailed report of findings and impressions will follow  Virl Sandifer Kavin Musty, PsyD  Neuropsychologist  [place] Licensed Psychologist  Lic  #[id number]

## 2021-06-14 NOTE — PROGRESS NOTES
Maty Ellington was seen by Vinicius Bailey, neuropsychometrist, for neuropsychological assessment on 06/14/21

## 2021-06-14 NOTE — PROGRESS NOTES
Neuropsychological Evaluation  [facility]  [address] [place] [address]  P: [phone] F: [phone]    History and Clinical Interview    Patient Name: Bharat Onofre     Age: 63 y o  MRN: [id number]   : 1955  DOS: 2021     Referral/Presenting Information:   Mr Bharat Onofre is a 63 y o , right handed, male with an 8th grade level of education who presents for neuropsychological evaluation upon kind referral from his neurology provider, Hilda Gavin MD, for assessment of cognitive functioning in the context of COVID-19 infection history () with resultant intubation for ARDS and subsequent symptoms of PTSD  The patient was unaccompanied to today's appointment  The history, as reported below, incorporates information obtained through medical record review, patient report, and clinical observation, as well as informant report and outside record review as applicable  History of Presenting Problem(s):  The patient was initially seen by Dr Martin Rendon on 2020 for evaluation of headaches, memory problems, and parasthesias (numbness and tingling in his thighs, right hip pain) which came on after COVID-19 infection and resultant acute respiratory failure with intubation in 2020  He was also noted to have developed mood swings and was in treatment with psychiatry and a mental health counselor/therapist  With regard to reported cognitive symptoms, impressions by Dr Martin Rendon at the time included potential cognitive impairment secondary to COVID and resultant PTSD  Headaches were improved with addition of Neurontin at follow-up on 09/15/20 and memory was reported to be stable on vitamin D and B6 supplementation  MoCA Scores:  20 - 21 -     Relevant Studies:  MRI Brain NeuroQuant without contrast (2021)  "    IMPRESSION:  1  No acute disease  Minor degree of chronic small vessel disease    2   NeuroQuant analysis was performed: Normal study; Does not support neurodegeneration   "     Lab Results (04/06/2021)  Component      Latest Ref Rng & Units 4/6/2021   WBC      4 31 - 10 16 Thousand/uL 9 53   Red Blood Cell Count      3 88 - 5 62 Million/uL 5 80 (H)   Hemoglobin      12 0 - 17 0 g/dL 16 3   HCT      36 5 - 49 3 % 50 5 (H)   MCV      82 - 98 fL 87   MCH      26 8 - 34 3 pg 28 1   MCHC      31 4 - 37 4 g/dL 32 3   RDW      11 6 - 15 1 % 15 2 (H)   MPV      8 9 - 12 7 fL 10 5   Platelet Count      396 - 390 Thousands/uL 192   nRBC      /100 WBCs 0   Neutrophils %      43 - 75 % 71   Immat GRANS %      0 - 2 % 1   Lymphocytes Relative      14 - 44 % 14   Monocytes Relative      4 - 12 % 10   Eosinophils      0 - 6 % 3   Basophils Relative      0 - 1 % 1   Absolute Neutrophils      1 85 - 7 62 Thousands/µL 6 84   Immature Grans Absolute      0 00 - 0 20 Thousand/uL 0 09   Lymphocytes Absolute      0 60 - 4 47 Thousands/µL 1 36   Absolute Monocytes      0 17 - 1 22 Thousand/µL 0 92   Absolute Eosinophils      0 00 - 0 61 Thousand/µL 0 25   Basophils Absolute      0 00 - 0 10 Thousands/µL 0 07   Sodium      136 - 145 mmol/L 140   Potassium      3 5 - 5 3 mmol/L 4 3   Chloride      100 - 108 mmol/L 108   CO2      21 - 32 mmol/L 26   Anion Gap      4 - 13 mmol/L 6   BUN      5 - 25 mg/dL 18   Creatinine      0 60 - 1 30 mg/dL 1 36 (H)   GLUCOSE FASTING      65 - 99 mg/dL 94   Calcium      8 3 - 10 1 mg/dL 9 2   AST      5 - 45 U/L 28   ALT      12 - 78 U/L 44   Alkaline Phosphatase      46 - 116 U/L 109   Total Protein      6 4 - 8 2 g/dL 7 5   Albumin      3 5 - 5 0 g/dL 4 3   TOTAL BILIRUBIN      0 20 - 1 00 mg/dL 0 47   eGFR      ml/min/1 73sq m 55   Cholesterol      50 - 200 mg/dL 196   Triglycerides      <=150 mg/dL 113   HDL      >=40 mg/dL 41   LDL Calculated      0 - 100 mg/dL 132 (H)   Non-HDL Cholesterol      mg/dl 155   PSA, Total      0 0 - 4 0 ng/mL 0 3   PROSTATE SPECIFIC ANTIGEN FREE      N/A ng/mL 0 13 PROSTATE SPECIFIC ANTIGEN PERCENT FREE      % 43 3   TSH 3RD GENERATON      0 358 - 3 740 uIU/mL 2 240   Vit D, 25-Hydroxy      30 0 - 100 0 ng/mL 21 8 (L)       Current Medications:     Current Outpatient Medications:     albuterol (2 5 mg/3 mL) 0 083 % nebulizer solution, Take 1 vial (2 5 mg total) by nebulization every 6 (six) hours as needed for wheezing or shortness of breath, Disp: 120 vial, Rfl: 2    amLODIPine (NORVASC) 2 5 mg tablet, TAKE 1 TABLET (2 5 MG TOTAL) BY MOUTH DAILY AFTER DINNER, Disp: , Rfl:     fluticasone (FLOVENT HFA) 110 MCG/ACT inhaler, Inhale 2 puffs 2 (two) times a day Rinse mouth after use , Disp: 12 g, Rfl: 3    gabapentin (NEURONTIN) 100 mg capsule, TAKE 1 CAPSULE BY MOUTH THREE TIMES A DAY, Disp: 90 capsule, Rfl: 1    ipratropium-albuterol (DUO-NEB) 0 5-2 5 mg/3 mL nebulizer solution, Take 1 vial (3 mL total) by nebulization 4 (four) times a day, Disp: 15 vial, Rfl: 0    Multiple Vitamins-Minerals (MULTIVITAMIN ADULT PO), Take 1 tablet by mouth daily, Disp: , Rfl:     naproxen sodium (ANAPROX) 550 mg tablet, Take 1 tablet (550 mg total) by mouth 2 (two) times a day as needed for mild pain or headaches, Disp: 90 tablet, Rfl: 1    QUEtiapine (SEROquel) 50 mg tablet, Take 1 tablet (50 mg total) by mouth daily at bedtime, Disp: 90 tablet, Rfl: 0    sertraline (ZOLOFT) 50 mg tablet, Take 1 tablet (50 mg total) by mouth daily, Disp: 90 tablet, Rfl: 0    tiZANidine (ZANAFLEX) 2 mg tablet, Take 1 tablet (2 mg total) by mouth daily at bedtime, Disp: 90 tablet, Rfl: 3    Review of Current Symptoms:  Primary Concerns/Complaints: At the time of the present evaluation, the patient reported an approximate 14 month history of cognitive changes that came on abruptly after he was hospitalized and intubated due to complications of EWPIN-47 infection  The patient reported primary concerns related to difficulties with recent memory   Specifically, he noted that he has difficulties remembering events and conversations he has had with others  He struggles to remember the storyline of TV shows he watches and reported that others have commented that he repeats himself in conversation  He denied any problems remembering names of people he should know  The patient reported problems with visual memory such that he has difficulty forming a mental image of the faces of loved ones  He denied any difficulties recognizing faces when presented with pictures of these individuals  He also noted that he sometimes feels disoriented in places that should be familiar to him  He denied any problems misplacing things around the house and attributed this to having constant supervision from his wife  Other cognitive symptoms were noted to include problems with attention such that he "blanks out" when trying to focus on something  He noted that he does not feel he is actively distracted by something but cited that his mind feels empty  He additionally reported feeling that his thinking is slower and more effortful in general  He has some degree of word-finding difficulty that has increased since the time of his hospital admission in 04/2020  In terms of visual perception changes, the patient reported having episodes of "fog" in his vision but stated this comes and goes without clear precipitant  The patient notes difficulties with executive functioning such that he struggles to make decisions, manage his schedule, stay organized, plan tasks/activities, solve problems, and use good judgment  The patient also reported some degree of increased impulsivity  Cognitive symptoms were noted to be worsening gradually over time  The patient has not noticed any patterns in his cognitive symptoms related to time of day, pain, sleep, or mood  Emotionally, the patient endorsed significant and constant low mood   He reported that he feels his experience of low mood has been consistent for at least the past few months (likely longer) but was not significant prior to his hospitalization in 04/2020  He reported that he has not experienced any significant improvement in mood with the addition of Zoloft or Seroquel under the supervision of his psychiatrist  He endorsed symptoms commonly associated with depression that include low motivation, apathy, and anhedonia that are present more days than not  He endorsed frequent feelings of helplessness and hopelessness about his recovery from COVID-19 and potential for a return to his previous level of functioning  He endorsed a number of losses related to COVID-19, including his subsequent inability to work/provide for his family, engage in previously beloved hobbies, and help his family with typical household activities that he used to complete easily  Of note, the patient's brother passed away from COVID-23 infection while the patient was medically admitted  The patient's wife did not inform the patient until he was back at home and beginning to recover  The patient describes this series of events as highly traumatic  The patient reported more recent experiences of stress in the setting of being around family  He noted that he was always very eager to spend time with loved ones, but that he had a recent family event at his home and found that it felt overwhelming and that he needed to take time away from the party to calm his nerves  He reported some intermittent attacks of anxiety with symptoms that include feeling as though he can't catch his breath, that his heart is beating too fast, and feeling an overwhelming sense of dread  The patient indicated that he has become quite anxious about leaving his home and reported more recent hesitancy in the setting of state re-opening and ongoing news of new COVID-19 variants  He occasionally accompanies his wife and daughter(s) to the grocery store, but tends to stay in the car while they shop   The patient emphasized his most notable emotional concern is related to increasing irritability and having a "shorter fuse" than he used to  He reported that frustration contributes to his experience of low mood and complicates his family relationships at times  With regard to other symptoms that can be associated with psychopathology, the patient endorsed some hypnopompic visual anomalies that include seeing figures and imagery associated with his hospital stay (e g , seeing the nurse standing over his bed, seeing the TV that was in his hospital room, etc )  He denied any experience of visual hallucinations during waking hours and does not see any hallucinatory imagery that is not associated with his hospital stay  He denied any experience of auditory or tactile anomalies  The patient denied any current or historical experience of suicidal or homicidal ideation  Protective factors were noted to include his family and trying to work to be productive around the house as much as possible  The patient has been in treatment with [facility] since May 2020  The patient remains independent for ADLs, although he may occasionally need help putting on pants due to hip pain  In terms of IADLs, the patient is able to complete simple meal preparation without difficulty  He does not use the stove/oven but this is not a change from his previous functioning  He is able to assist with some simple chores around the house and finds that he is limited primarily by his breathing difficulties, citing that engaging in strenuous tasks often necessitates subsequent at-home nebulizer treatment (patient is currently using this 3-4x per day)  The patient's wife and daughter(s) typically manage other household chores (e g , laundry, dishes, shopping, etc )  The patient manages his morning medications without difficulty  He benefits from assistance from his wife for remembering to take his evening medication doses  The patient's wife manages household finances   The patient used to assist with this but does not do this anymore  The patient is not currently driving and is dependent on his daughter for transportation to appointments etc  The patient has not returned to work since he was discharged from the hospital but was previously working full-time without difficulty  Additional Symptoms:  Sensory/Motor:  Tremor/Shakiness: Yes - Patient reported sometime in hands and toes upon waking; Does not tend to last into the day  Problems with walking/balance: Yes - Patient reported due to hip problems (R hip); Has a limp even since hip pain started; Patient uses a cane on occasion; Balance is not as much a problem and patient feels fairly steady on his feet in general  Problems with fine motor dexterity: No  Changes in sense of smell/taste: Yes - Patient reported anosmia since COVID-19 infection; Sense of smell has improved over time but has not returned to baseline  Changes in vision: Yes - Patient reported he should be wearing glasses but needs a new prescription; He denied any experience of diplopia or noticeable visual field cut  Changes in hearing: No  Numbness/Tingling: Yes - In bilateral thighs    Physical:  Headaches: Yes - Ongoing; 3-4x per week; Patient feels Neurontin is somewhat helpful  Dizziness/Vertigo: No  Shortness of breath: Yes - Constant; Patient was discharged from hospital on supplemental oxygen; Now uses at-home nebulizer 3-4x per day and has a rescue inhaler as needed  Nausea: No  Incontinence: No   Pain: Yes - Pain in R hip daily; L side of chest at times (electrical sensation);  Pain rating of 8 5/10 on average; Current pain rating is 4/10     Sleep:  Hours per night: 8-9 hours, but notably broken sleep throughout  Difficulty falling asleep: No  Difficulty staying asleep: Yes - Patients wakes frequently with visual anomalies and due to pain  Quality of sleep: Broken; Poor; Patient does not typically fell well rested upon waking  Medications: Seroquel (nightly)  Snoring: Yes  Sleep apnea: No history of sleep study; Patient reported some instances of "snoring" while awake  Parasomnias: No  Daytime napping: Yes - 1x per day (~45 min)     Energy:   Daytime fatigue: Sometimes  Overall level of energy: Poor    Appetite:   Changes from normal: No  Weight changes: Yes - Patient reported he lost significant weight while in the hospital; Now is 10 lbs heavier than previous baseline    History:  Personal History:  Social:  Birthplace: Former[address] [facility] [address]; Raised in [place], [place]  Developmental History: Noncontributory  Family of Origin: Raised by biological mother and step-father; Biological father passed away when patient was 3 y o ; Patient has 1 brother still living, and 3 others passed away; Childhood home was described as supportive and the patient denied any experience of abuse/neglect  Language(s) Spoken: Fluent in [place]; Speaks some Kazakh  Current Living Situation: Live with wife and 3 dogs in a private residence  Relationship Status:  (37 years)  Children: 2 daughters and 1 son (ages 43, 44, 32); Daughters live nearby; Patient has 5 grandkids   Educational:  Highest level of education: 8th grade; Patient left school to enter the workforce when his step-father was very ill; Patient believes he was about 15 y o  when he left school   School performance: Patient reported average scores in most classes; Bs and Cs; He had some failing grades at times  Learning disability: No formal diagnoses  Special education classes: No  Academic retention: Yes - 7th grade; Due to achievement problems  Attention problems/ADHD: Yes - No formal diagnosis of ADHD; Patient had problems listening and being still; Preferred to be at work  Behavior problems: No  Vocational:  Current occupation: Not currently working; Patient has not returned to work since his COVID-19 infection;  Had been working as an  at [employer] of current employment: 34 years  Work problems: None prior to leaving work  Previous vocational history: Worked from a very young age; Did work as a , at [employer], 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, at a garage/tire shop, as a , and as an   Applications for disability: Patient is currently 's compensation due to COVID-19 exposure at work; The patient has not applied for disability benefits through his employer or through the state at this time   Service: No    Medical:     Patient Active Problem List   Diagnosis    Elevated brain natriuretic peptide (BNP) level    PTSD (post-traumatic stress disorder)    Interstitial lung disease (HCC)    Acute post-traumatic stress disorder    Expected bereavement due to life event    New daily persistent headache    Abnormal electrocardiogram    Hip pain    Grade I diastolic dysfunction    SOB (shortness of breath)    Memory difficulty    Numbness and tingling    COVID-19 virus infection    Occupational exposure in workplace    Right bundle branch block (RBBB) on electrocardiogram (ECG)    Depression        Past Medical History:   Diagnosis Date    COVID-2020    PTSD (post-traumatic stress disorder)       Other Medical History (per patient report):   · Patient was recently diagnosed with RBBB (2020); Patient is taking medications but was uncertain of any need for follow-up with a specialist    Mood/Psychiatric:  Problem history: Depression; PTSD; None prior to COVID-19 infection  Treatment history: Patient currently sees psychiatrist and mental health counselor regularly;  No previous mental health treatment  Psychiatric hospitalizations: None  Abuse history: None  History of self-harm / violence: None  Substance Use:     Social History     Tobacco Use    Smoking status: Former Smoker     Packs/day: 2 00     Years: 30 00     Pack years: 60 00     Types: Cigarettes     Quit date: 2020     Years since quittin 2    Smokeless tobacco: Never Used   Substance Use Topics    Alcohol use: Not Currently     Tobacco: No current use; The patient reported he has not been using any tobacco products for the past year; He had a previous 60-pack year smoking history  Alcohol: No current use; The patient denied any history of problematic alcohol use  Illicit Substance: No current or historical use  Treatment history: None  Legal:  Involvement in Criminal Justice System: No  Current Litigation: Yes - Patient is currently involved in worker's compensation case due to COVID-19 exposure at work  POA: None on file    Family History:     Family History   Problem Relation Age of Onset    Heart disease Mother     Sudden death Father     Kidney disease Brother       Other Family History:   · Mother and father with cardiovascular problems  · Kidney disease in brother; Brother with seizures later in life thought related to kidney disease  · Maternal aunt with brain cancer  · No other known family history of neurological problems  · No other known family history of psychiatric problems     Behavioral Observations/Mental Status:   Arousal: Awake; Alert; Oriented x3  Prosthetic Devices: Patient did not have glasses with him; No hearing aids; No ambulatory assistive devices  Appearance: Appeared stated age; Of average stature; Clothing was casual and appropriate to the setting and weather conditions  Grooming/Hygiene: Adequately groomed and hygienic  Posture/Gait: Seated posture was WNL; Patient ambulated in clinic with slight limp  Motor: No abnormalities noted  Social Relatedness: Patient was pleasant and cooperative throughout the evaluation process; Eye contact was fleeting at times; Facial expressiveness was WNL  Mood: Reported to be depressed  Affect: Dysthymic; Anxious at times  Thought: Linear; Logical; Goal-directed; No evidence of hallucination or delusion noted during clinical interview  Speech: Volume, clarity, rate, and tone were WNL;  Patient's speech was mildly dysprosodic  Insight: Adequate    Plan: Mr Josefa Carias presented for comprehensive neuropsychological evaluation  Clinical interview was completed by the attending neuropsychologist  Test administration and scoring was conducted by a trained psychometrician under supervision and direct instruction of the attending provider  Tests are to be interpreted by the attending neuropsychologist with consideration given to the clinical history as described above  A detailed report of findings and impressions will follow  Olive Oconnell PsyD  Neuropsychologist  [place] Licensed Psychologist  Lic  #[id number]

## 2021-06-15 ENCOUNTER — TELEPHONE (OUTPATIENT)
Dept: NEUROLOGY | Facility: CLINIC | Age: 66
End: 2021-06-15

## 2021-06-16 ENCOUNTER — OFFICE VISIT (OUTPATIENT)
Dept: NEUROLOGY | Facility: CLINIC | Age: 66
End: 2021-06-16
Payer: OTHER MISCELLANEOUS

## 2021-06-16 VITALS
HEIGHT: 68 IN | DIASTOLIC BLOOD PRESSURE: 70 MMHG | SYSTOLIC BLOOD PRESSURE: 134 MMHG | HEART RATE: 88 BPM | BODY MASS INDEX: 36.98 KG/M2 | WEIGHT: 244 LBS

## 2021-06-16 DIAGNOSIS — U07.1 COVID-19 VIRUS INFECTION: ICD-10-CM

## 2021-06-16 DIAGNOSIS — G44.89 OTHER HEADACHE SYNDROME: ICD-10-CM

## 2021-06-16 DIAGNOSIS — F43.10 PTSD (POST-TRAUMATIC STRESS DISORDER): ICD-10-CM

## 2021-06-16 DIAGNOSIS — R41.3 MEMORY DIFFICULTY: Primary | ICD-10-CM

## 2021-06-16 PROCEDURE — 99214 OFFICE O/P EST MOD 30 MIN: CPT | Performed by: PSYCHIATRY & NEUROLOGY

## 2021-06-16 PROCEDURE — 3008F BODY MASS INDEX DOCD: CPT | Performed by: PHYSICIAN ASSISTANT

## 2021-06-16 NOTE — PROGRESS NOTES
Moisés Barnes is a 63 y o  male  Chief Complaint   Patient presents with    Memory difficulty    Acute post-traumatic stress disorder    New daily persistent headache       Assessment:  1  Memory difficulty    2  COVID-19 virus infection    3  PTSD (post-traumatic stress disorder)    4  Other headache syndrome        Plan:   continue with mentally stimulating exercises     follow-up in 3-4 months    Discussion:   patient recently had neuropsych testing the results are not available, I advised him to call me next week and follow-up on the results, he was also advised to take vitamin-D 5000 units every day, continue with Neurontin 100 mg 3 times a day to help with his headaches and his hip pain, continue with mentally stimulating exercises, to keep his blood pressure cholesterol and sugar under control, to follow up with his orthopedic surgeon and pain specialist regarding his hip pain, avoid migraine triggers which we discussed in detail including foods to avoid and to keep himself well hydrated, to go to the hospital if has any worsening symptoms and call me otherwise to see me back in3- 4 months and follow up with his other physicians    Subjective:    HPI    patient is here in follow-up for his headaches memory issues, since his last visit he still continues to have short-term memory issues especially difficulty with attention and concentration, he is currently retired, he continues to have 2-3 headaches a week they are mostly start from the back and come to the front associated with photophobia and phonophobia and gets better with lying down in a dark room, he denies any seizures he is in follow up with an orthopedic surgeon and pain specialist regarding his hip pain and is going for pain shots he is also in follow up with the pulmonologist regarding his bronchiectasis and shortness of breath, no bowel and bladder incontinence he has been complaining of some left-sided muscular chest pain for which  He is going to follow-up with the family physician, no other complaints      Vitals:    06/16/21 1603   BP: 134/70   BP Location: Left arm   Patient Position: Sitting   Cuff Size: Adult   Pulse: 88   Weight: 111 kg (244 lb)   Height: 5' 8" (1 727 m)       Current Medications    Current Outpatient Medications:     albuterol (2 5 mg/3 mL) 0 083 % nebulizer solution, Take 1 vial (2 5 mg total) by nebulization every 6 (six) hours as needed for wheezing or shortness of breath, Disp: 120 vial, Rfl: 2    amLODIPine (NORVASC) 2 5 mg tablet, TAKE 1 TABLET (2 5 MG TOTAL) BY MOUTH DAILY AFTER DINNER, Disp: , Rfl:     fluticasone (FLOVENT HFA) 110 MCG/ACT inhaler, Inhale 2 puffs 2 (two) times a day Rinse mouth after use , Disp: 12 g, Rfl: 3    gabapentin (NEURONTIN) 100 mg capsule, TAKE 1 CAPSULE BY MOUTH THREE TIMES A DAY, Disp: 90 capsule, Rfl: 1    ipratropium-albuterol (DUO-NEB) 0 5-2 5 mg/3 mL nebulizer solution, Take 1 vial (3 mL total) by nebulization 4 (four) times a day, Disp: 15 vial, Rfl: 0    Multiple Vitamins-Minerals (MULTIVITAMIN ADULT PO), Take 1 tablet by mouth daily, Disp: , Rfl:     naproxen sodium (ANAPROX) 550 mg tablet, Take 1 tablet (550 mg total) by mouth 2 (two) times a day as needed for mild pain or headaches, Disp: 90 tablet, Rfl: 1    QUEtiapine (SEROquel) 50 mg tablet, Take 1 tablet (50 mg total) by mouth daily at bedtime, Disp: 90 tablet, Rfl: 0    sertraline (ZOLOFT) 50 mg tablet, Take 1 tablet (50 mg total) by mouth daily, Disp: 90 tablet, Rfl: 0    tiZANidine (ZANAFLEX) 2 mg tablet, Take 1 tablet (2 mg total) by mouth daily at bedtime, Disp: 90 tablet, Rfl: 3      Allergies  Tetracycline    Past Medical History  Past Medical History:   Diagnosis Date    COVID-19 03/2020    PTSD (post-traumatic stress disorder)          Past Surgical History:  Past Surgical History:   Procedure Laterality Date    FL INJECTION RIGHT HIP (NON ARTHROGRAM)  11/30/2020    NO PAST SURGERIES           Family History:  Family History   Problem Relation Age of Onset    Heart disease Mother     Sudden death Father     No Known Problems Son     No Known Problems Daughter     No Known Problems Maternal Grandmother     No Known Problems Maternal Grandfather     No Known Problems Paternal Grandmother     No Known Problems Paternal Grandfather     No Known Problems Daughter     Kidney disease Brother        Social History:   reports that he quit smoking about 14 months ago  His smoking use included cigarettes  He smoked 0 00 packs per day for 0 00 years  He has never used smokeless tobacco  He reports previous alcohol use  He reports that he does not use drugs  I have reviewed the past medical history, surgical history, social and family history, current medications, allergies vitals, review of systems, and updated this information as appropriate today  Objective:    Physical Exam    Neurological Exam    GENERAL:  Cooperative in no acute distress  Well-developed and well-nourished    HEAD and NECK   Head is atraumatic normocephalic with no lesions or masses  Neck is supple with full range of motion    CARDIOVASCULAR  Carotid Arteries-no carotid bruits  NEUROLOGIC:  Mental Status-the patient is awake alert and oriented without aphasia or apraxia  Cranial Nerves: Visual fields are full to confrontation  Extraocular movements are full without nystagmus  Pupils are 2-1/2 mm and reactive  Face is symmetrical to light touch  Movements of facial expression move symmetrically  Hearing is normal to finger rub bilaterally  Soft palate lifts symmetrically  Shoulder shrug is symmetrical  Tongue is midline without atrophy  Motor: No drift is noted on arm extension  Strength is full in the upper and lower extremities with normal bulk and tone  Except for right lower extremity weakness  With poor effortsecondary to hip pain     ambulates with a limp          ROS:  Review of Systems   Constitutional: Negative for appetite change, fatigue and fever  HENT: Negative for drooling, ear pain, tinnitus, trouble swallowing and voice change  Eyes: Negative for photophobia, pain and visual disturbance  Respiratory: Positive for shortness of breath  Negative for chest tightness  Cardiovascular: Negative for chest pain, palpitations and leg swelling  Gastrointestinal: Negative for abdominal pain, constipation, diarrhea, nausea and vomiting  Endocrine: Negative for cold intolerance and heat intolerance  Genitourinary: Negative for difficulty urinating, frequency and urgency  Musculoskeletal: Negative for back pain, gait problem, joint swelling, myalgias and neck pain  Skin: Negative for rash  Neurological: Positive for numbness (And tingling on both legs)  Negative for dizziness, tremors, seizures, syncope, facial asymmetry, speech difficulty, weakness, light-headedness and headaches  Psychiatric/Behavioral: Positive for agitation, behavioral problems, confusion, decreased concentration, dysphoric mood, hallucinations and sleep disturbance  The patient is nervous/anxious  The patient is not hyperactive

## 2021-06-29 ENCOUNTER — TELEPHONE (OUTPATIENT)
Dept: NEUROLOGY | Facility: CLINIC | Age: 66
End: 2021-06-29

## 2021-06-29 DIAGNOSIS — R20.2 NUMBNESS AND TINGLING: ICD-10-CM

## 2021-06-29 DIAGNOSIS — J47.9 BRONCHIECTASIS WITHOUT COMPLICATION (HCC): ICD-10-CM

## 2021-06-29 DIAGNOSIS — R20.0 NUMBNESS AND TINGLING: ICD-10-CM

## 2021-06-29 DIAGNOSIS — G44.52 NEW DAILY PERSISTENT HEADACHE: ICD-10-CM

## 2021-06-29 NOTE — TELEPHONE ENCOUNTER
I believe the results of the test can be found in the 6/14/2021 encounter with Dr Margaret Oconnell       Results also routed to you by ASHLEY Wood- can be found in "[facility]"

## 2021-06-30 RX ORDER — DEXAMETHASONE 4 MG/1
TABLET ORAL
Qty: 12 G | Refills: 1 | Status: SHIPPED | OUTPATIENT
Start: 2021-06-30 | End: 2021-07-27

## 2021-06-30 RX ORDER — GABAPENTIN 100 MG/1
CAPSULE ORAL
Qty: 90 CAPSULE | Refills: 1 | Status: SHIPPED | OUTPATIENT
Start: 2021-06-30 | End: 2021-08-31

## 2021-07-08 ENCOUNTER — TELEMEDICINE (OUTPATIENT)
Dept: NEUROLOGY | Facility: CLINIC | Age: 66
End: 2021-07-08
Payer: COMMERCIAL

## 2021-07-08 DIAGNOSIS — R41.3 MEMORY DIFFICULTY: Primary | ICD-10-CM

## 2021-07-08 PROCEDURE — 96121 NUBHVL XM PHY/QHP EA ADDL HR: CPT | Performed by: CLINICAL NEUROPSYCHOLOGIST

## 2021-07-08 PROCEDURE — 96116 NUBHVL XM PHYS/QHP 1ST HR: CPT | Performed by: CLINICAL NEUROPSYCHOLOGIST

## 2021-07-08 PROCEDURE — 96138 PSYCL/NRPSYC TECH 1ST: CPT | Performed by: CLINICAL NEUROPSYCHOLOGIST

## 2021-07-08 PROCEDURE — 96132 NRPSYC TST EVAL PHYS/QHP 1ST: CPT | Performed by: CLINICAL NEUROPSYCHOLOGIST

## 2021-07-08 PROCEDURE — 96139 PSYCL/NRPSYC TST TECH EA: CPT | Performed by: CLINICAL NEUROPSYCHOLOGIST

## 2021-07-12 ENCOUNTER — TELEMEDICINE (OUTPATIENT)
Dept: PSYCHIATRY | Facility: CLINIC | Age: 66
End: 2021-07-12
Payer: COMMERCIAL

## 2021-07-12 DIAGNOSIS — F32.A DEPRESSION, UNSPECIFIED DEPRESSION TYPE: ICD-10-CM

## 2021-07-12 DIAGNOSIS — F43.10 PTSD (POST-TRAUMATIC STRESS DISORDER): ICD-10-CM

## 2021-07-12 PROCEDURE — 99212 OFFICE O/P EST SF 10 MIN: CPT | Performed by: PHYSICIAN ASSISTANT

## 2021-07-12 RX ORDER — ESCITALOPRAM OXALATE 10 MG/1
10 TABLET ORAL EVERY MORNING
Qty: 30 TABLET | Refills: 1 | Status: SHIPPED | OUTPATIENT
Start: 2021-07-12 | End: 2021-09-02 | Stop reason: SDUPTHER

## 2021-07-12 NOTE — PSYCH
Virtual Regular Visit         Encounter provider Chloe Cardenas PA-C    Provider located at [address] [address] [phone]      Recent Visits  No visits were found meeting these conditions  Showing recent visits within past 7 days and meeting all other requirements  Future Appointments  No visits were found meeting these conditions  Showing future appointments within next 150 days and meeting all other requirements       The patient was identified by name and date of birth  Santi Victor was informed that this is a telemedicine visit and that the visit is being conducted through [facility] and patient was informed that this is a secure, HIPAA-compliant platform  He agrees to proceed     My office door was closed  No one else was in the room  He acknowledged consent and understanding of privacy and security of the video platform  The patient has agreed to participate and understands they can discontinue the visit at any time  Patient is aware this is a billable service  VIRTUAL VISIT DISCLAIMER    Santi Marshallhattie acknowledges that he has consented to an online visit or consultation  He understands that the online visit is based solely on information provided by him, and that, in the absence of a face-to-face physical evaluation by the physician, the diagnosis he receives is both limited and provisional in terms of accuracy and completeness  This is not intended to replace a full medical face-to-face evaluation by the physician  Santi Victor understands and accepts these terms          MEDICATION MANAGEMENT NOTE        [facility] [place] [address]  [address] [facility] [address] [phone]        Name and Date of Birth:  Santi Victor 63 y o  1955    Date of Visit: July 12, 2021    SUBJECTIVE:    Clement Flatness seen by Jose Luis 6/10/21 at which time meds unchanged; Clement Flatness declined med change  Clement Flatness has since had evaluation by Dr Nikki Mix  -although full report and recommendations are not yet in [address] he did discuss them with her and one of the recommendations was to consider med change/adjustment  Clement Flatness states his mood is a little better in that he is not yelling  Reports ruminating thoughts, irritability/agitation, helplessness  Rates depressed mood as 8/10  Denies SI  Finances and health continue to be significant stressors  Clement Flatness says he gets up once a night about 3-4x/week and usually takes 30-60 min to fall back to sleep  Review of Systems   Respiratory: Positive for shortness of breath  Musculoskeletal: Positive for arthralgias  Psychiatric History     This office since 5/7/20  No IP or suicide attempts  Trauma/Loss History       ARDS secondary to covid- intubated x 5 days, April 2020  Lost brother and coworkers to [facility]  Has been too ill to return to work  Social History        Lives with wife Nancie Dunn -  >40 yrs  2 daugthers and a son  Worked at [employer] x 40 yrs -         OBJECTIVE:     MENTAL STATUS EXAM  Appearance:  dressed casually   Behavior:  Pleasant & cooperative   Speech:  Normal volume, regular rate and rhythm   Mood:  low/tense   Affect:  mood congruent   Language: intact and appropriate for age, education, and intellect   Thought Process:  goal directed   Associations: intact associations   Thought Content:  no overt delusions   Perceptual Disturbances: no auditory or visual hallcunations   Risk Potential / Abnormal Thoughts: Suicidal ideation - None  Homicidal ideation - None  Potential for aggression - No       Consciousness:  Alert & Awake   Sensorium:  Grossly oriented   Attention: attention span and concentration are age appropriate       Fund of Knowledge:  Memory: awareness of current events: yes  recent and remote memory grossly intact   Insight:  good   Judgment: good       Lab Review: I have reviewed all pertinent labs    Lab Results   Component Value Date    CHOLESTEROL 196 04/06/2021     Lab Results   Component Value Date    HDL 41 04/06/2021     Lab Results   Component Value Date    TRIG 113 04/06/2021     Lab Results   Component Value Date    NONHDLC 155 04/06/2021     Lab Results   Component Value Date    SODIUM 140 04/06/2021    K 4 3 04/06/2021     04/06/2021    CO2 26 04/06/2021    AGAP 6 04/06/2021    BUN 18 04/06/2021    CREATININE 1 36 (H) 04/06/2021    GLUC 164 (H) 04/12/2020    GLUF 94 04/06/2021    CALCIUM 9 2 04/06/2021    AST 28 04/06/2021    ALT 44 04/06/2021    ALKPHOS 109 04/06/2021    TP 7 5 04/06/2021    TBILI 0 47 04/06/2021    EGFR 55 04/06/2021     Lab Results   Component Value Date    WBC 9 53 04/06/2021    HGB 16 3 04/06/2021    HCT 50 5 (H) 04/06/2021    MCV 87 04/06/2021     04/06/2021     Lab Results   Component Value Date    OWRQCGEZ55 364 07/29/2020     Lab Results   Component Value Date    FOLATE 11 7 07/29/2020     Lab Results   Component Value Date    NCH8LRBTEHPS 2 240 04/06/2021           ASSESSMENT & PLAN          Diagnoses and all orders for this visit:    PTSD (post-traumatic stress disorder)  -     sertraline (ZOLOFT) 50 mg tablet; 1/2 tab po daily x 10 days then stop  -     escitalopram (LEXAPRO) 10 mg tablet; Take 1 tablet (10 mg total) by mouth every morning    Depression, unspecified depression type  -     sertraline (ZOLOFT) 50 mg tablet; 1/2 tab po daily x 10 days then stop  -     escitalopram (LEXAPRO) 10 mg tablet;  Take 1 tablet (10 mg total) by mouth every morning      Current Outpatient Medications   Medication Sig Dispense Refill    albuterol (2 5 mg/3 mL) 0 083 % nebulizer solution Take 1 vial (2 5 mg total) by nebulization every 6 (six) hours as needed for wheezing or shortness of breath 120 vial 2    amLODIPine (NORVASC) 2 5 mg tablet TAKE 1 TABLET (2 5 MG TOTAL) BY MOUTH DAILY AFTER DINNER      escitalopram (LEXAPRO) 10 mg tablet Take 1 tablet (10 mg total) by mouth every morning 30 tablet 1    Flovent  MCG/ACT inhaler INHALE 2 PUFFS 2 (TWO) TIMES A DAY RINSE MOUTH AFTER USE  12 g 1    gabapentin (NEURONTIN) 100 mg capsule TAKE 1 CAPSULE BY MOUTH THREE TIMES A DAY 90 capsule 1    ipratropium-albuterol (DUO-NEB) 0 5-2 5 mg/3 mL nebulizer solution Take 1 vial (3 mL total) by nebulization 4 (four) times a day 15 vial 0    Multiple Vitamins-Minerals (MULTIVITAMIN ADULT PO) Take 1 tablet by mouth daily      naproxen sodium (ANAPROX) 550 mg tablet Take 1 tablet (550 mg total) by mouth 2 (two) times a day as needed for mild pain or headaches 90 tablet 1    QUEtiapine (SEROquel) 50 mg tablet Take 1 tablet (50 mg total) by mouth daily at bedtime 90 tablet 0    sertraline (ZOLOFT) 50 mg tablet 1/2 tab po daily x 10 days then stop      tiZANidine (ZANAFLEX) 2 mg tablet Take 1 tablet (2 mg total) by mouth daily at bedtime 90 tablet 3     No current facility-administered medications for this visit  Plan:         No significant change with zoloft on doses up to 150 mg  Will give trial lexapro 10 mg/d- taper and stop zoloft  Cont seroquel 50 mg q bedtime for now  Reviewed risks, benefits, side effects of medications, including no medication  Patient understands and agrees to treatment plan  F/u 4 weeks PAKATLYN , sooner prn  Encouraged to call if he feels worse  Patient has been informed of 24 hours and weekend coverage for urgent situations accessed by calling the main clinic phone number       Zuly Tillman PA-C

## 2021-07-15 ENCOUNTER — TELEMEDICINE (OUTPATIENT)
Dept: BEHAVIORAL/MENTAL HEALTH CLINIC | Facility: CLINIC | Age: 66
End: 2021-07-15
Payer: COMMERCIAL

## 2021-07-15 DIAGNOSIS — Z63.4 EXPECTED BEREAVEMENT DUE TO LIFE EVENT: ICD-10-CM

## 2021-07-15 DIAGNOSIS — F32.A DEPRESSION, UNSPECIFIED DEPRESSION TYPE: ICD-10-CM

## 2021-07-15 DIAGNOSIS — F43.11 ACUTE POST-TRAUMATIC STRESS DISORDER: ICD-10-CM

## 2021-07-15 DIAGNOSIS — F43.10 PTSD (POST-TRAUMATIC STRESS DISORDER): ICD-10-CM

## 2021-07-15 PROCEDURE — 90834 PSYTX W PT 45 MINUTES: CPT | Performed by: SOCIAL WORKER

## 2021-07-15 SDOH — SOCIAL STABILITY - SOCIAL INSECURITY: DISSAPEARANCE AND DEATH OF FAMILY MEMBER: Z63.4

## 2021-07-15 NOTE — PSYCH
This note was not shared with the patient due to this is a psychotherapy note    Virtual Regular Visit    Verification of patient location:    Patient is currently located in the state of [place]  Patient is currently located in a state in which I am licensed      Assessment/Plan:    Problem List Items Addressed This Visit        Other    PTSD (post-traumatic stress disorder)    Acute post-traumatic stress disorder    Expected bereavement due to life event    Depression          Goals addressed in session: Goal 1 and Goal 2          Reason for visit is   Chief Complaint   Patient presents with    Virtual Regular Visit        Encounter provider Cesar Bernard    Provider located at [address] [place] [id number] [phone]      Recent Visits  No visits were found meeting these conditions  Showing recent visits within past 7 days and meeting all other requirements  Today's Visits  Date Type Provider Dept   07/15/21 4050 [facility] Therapist [facility]   Showing today's visits and meeting all other requirements  Future Appointments  No visits were found meeting these conditions  Showing future appointments within next 150 days and meeting all other requirements       The patient was identified by name and date of birth  Redd Schaffer was informed that this is a telemedicine visit and that the visit is being conducted throughMicrosoft Teams and patient was informed that this is a secure, HIPAA-compliant platform  He agrees to proceed     My office door was closed  No one else was in the room  He acknowledged consent and understanding of privacy and security of the video platform  The patient has agreed to participate and understands they can discontinue the visit at any time  Patient is aware this is a billable service  Subjective  Redd Schaffer is a 63 y o  male         HPI Past Medical History:   Diagnosis Date    COVID-19 03/2020    PTSD (post-traumatic stress disorder)        Past Surgical History:   Procedure Laterality Date    FL INJECTION RIGHT HIP (NON ARTHROGRAM)  11/30/2020    NO PAST SURGERIES         Current Outpatient Medications   Medication Sig Dispense Refill    albuterol (2 5 mg/3 mL) 0 083 % nebulizer solution Take 1 vial (2 5 mg total) by nebulization every 6 (six) hours as needed for wheezing or shortness of breath 120 vial 2    amLODIPine (NORVASC) 2 5 mg tablet TAKE 1 TABLET (2 5 MG TOTAL) BY MOUTH DAILY AFTER DINNER      escitalopram (LEXAPRO) 10 mg tablet Take 1 tablet (10 mg total) by mouth every morning 30 tablet 1    Flovent  MCG/ACT inhaler INHALE 2 PUFFS 2 (TWO) TIMES A DAY RINSE MOUTH AFTER USE  12 g 1    gabapentin (NEURONTIN) 100 mg capsule TAKE 1 CAPSULE BY MOUTH THREE TIMES A DAY 90 capsule 1    ipratropium-albuterol (DUO-NEB) 0 5-2 5 mg/3 mL nebulizer solution Take 1 vial (3 mL total) by nebulization 4 (four) times a day 15 vial 0    Multiple Vitamins-Minerals (MULTIVITAMIN ADULT PO) Take 1 tablet by mouth daily      naproxen sodium (ANAPROX) 550 mg tablet Take 1 tablet (550 mg total) by mouth 2 (two) times a day as needed for mild pain or headaches 90 tablet 1    QUEtiapine (SEROquel) 50 mg tablet Take 1 tablet (50 mg total) by mouth daily at bedtime 90 tablet 0    sertraline (ZOLOFT) 50 mg tablet 1/2 tab po daily x 10 days then stop      tiZANidine (ZANAFLEX) 2 mg tablet Take 1 tablet (2 mg total) by mouth daily at bedtime 90 tablet 3     No current facility-administered medications for this visit  Allergies   Allergen Reactions    Tetracycline Rash       Review of Systems Data: Today Michelle Monson admitted he is dealing still with so much as it relates to covid 19  He still has many remaining medical issues due to it  Financially his [address] is giving him problems that his  is working on   His 2 daughters are still not talking and they are all sensing something is wrong marital grande with his older daughter  He is also bothered by his memory issues  Assessment: He is not suicidal or homicidal  However he is totally frustrated with his health, his finances His 2 kids fighting and in his words his one daughter checking out concerning the family and his memory issues  We continue to work on mindfulness, distress tolerance and radical acceptance  Plan: Continue to help him skill build in distress tolerance  Video Exam    There were no vitals filed for this visit  Physical Exam N/A    I spent 45 minutes directly with the patient during this visit    VIRTUAL VISIT DISCLAIMER    Bharat Onofre verbally agrees to participate in [employer]  Pt is aware that [employer] could be limited without vital signs or the ability to perform a full hands-on physical Vola Half understands he or the provider may request at any time to terminate the video visit and request the patient to seek care or treatment in person

## 2021-07-15 NOTE — BH TREATMENT PLAN
Brigette Gee                           Due to covid restrictions and the fact that the signature pad does not work the [employer]  and the client verbally signed off on the plan  1955       Date of Initial Treatment Plan: 05/06/2020   Date of Current Treatment Plan: 04/16/2021    Treatment Plan Number 2nd update     Strengths/Personal Resources for Self Care: loves his family, hard worker, and wants to get better  Diagnosis:   1  Major depressive disorder, recurrent severe without psychotic features ([facility] [address] )         Area of Needs: Please see below      Long Term Goal 1: I need to learn to cope better with my new medical and financial limitations due to covid  Target Date: 10/16/2021  Completion Date: tbd         Short Term Objectives for Goal 1: mindfulness, distress tolerance, CBT and Solution focused strategies  Long Term Goal 2: I need to learn to let my adult children work out their differences  Target Date: 10/16/2021  Completion Date: TBD    Short Term Objectives for Goal 2: discuss boundary drawing in therapy         Long Term Goal # 3: N/A     Target Date: N/A  Completion Date: N/A    Short Term Objectives for Goal 3: N/A    GOAL 1: Modality: Individual 2x per month   Completion Date tbd    GOAL 2: Modality: Individual 2x per month   Completion Date tbd     GOAL 3: Modality: Individual n/ax per month   Completion Date n/a      Behavioral Health Treatment Plan [facility]: Diagnosis and Treatment Plan explained to Jonah Powers relates understanding diagnosis and is agreeable to Treatment Plan  Client Comments : Please share your thoughts, feelings, need and/or experiences regarding your treatment plan: The undersigned and the client will work as a team to help him progress on his goals

## 2021-07-19 NOTE — TELEPHONE ENCOUNTER
Called patient to let him know report has been mailed out as of 7/19/2021   Left name and number in case he had any further questions

## 2021-07-19 NOTE — PROGRESS NOTES
Neuropsychological Evaluation  [facility]  [address] [facility] [place] [facility]  P: [phone] F: [phone]    Feedback from Neuropsychological Evaluation    Virtual Regular Visit    Assessment/Plan:    Problem List Items Addressed This Visit        Other    Memory difficulty - Primary          Reason for visit is   Chief Complaint   Patient presents with    Virtual Regular Visit        Encounter provider Cinthya Mcdonough PSYD    Provider located at [address] [place] [address] Shashank WEIR [id number]  Heather Griffin 41  Visits  No visits were found meeting these conditions  Showing recent visits within past 7 days and meeting all other requirements  Future Appointments  No visits were found meeting these conditions  Showing future appointments within next 150 days and meeting all other requirements       The patient was identified by name and date of birth  Moo Alexandra was informed that this is a telemedicine visit and that the visit is being conducted through [address] [facility] and patient was informed that this is a secure, HIPAA-compliant platform  He agrees to proceed  My office door was closed  No one else was in the room  He acknowledged consent and understanding of privacy and security of the video platform  The patient has agreed to participate and understands they can discontinue the visit at any time  Patient is aware this is a billable service  Subjective  Mr Armando Goldman presented for virtual visit via [address] [facility] for a feedback appointment to review the findings and recommendations from a neuropsychological evaluation conducted on 6/14/2021  Findings from the evaluation, including problems with aspects of attention, information processing speed, and learning, were discussed and the patient expressed understanding  Recommendations were reviewed (see evaluation report from 6/14/2021 for full details)   The patient was given the opportunity to ask questions and expressed satisfaction with answers provided  Contact information for this provider was given to the patient and he was encouraged to contact the office with any further questions or concerns  The neuropsychological evaluation report was routed to the referring provider, Dr Toñito Davies, for review and follow-up as needed  At the patient's request, copies of the evaluation report were also sent to his behavioral health providers ([place] and Harini De La Garza [facility]) and directly to the patient at his home address  Manjula Nicolas PsyD  Neuropsychologist  [place] Licensed Psychologist  Lic  #[id number]     Tasks Conducted Total Time Spent Associated CPT Codes   Clinical Interview 61 min  08361 x 1 unit  96121 x 4 units   Report Writing 211 min  Neuropsychological Test Administration (PhD/PsyD) 0 min  13372 x 0 unit  96137 x 0 units   Scoring (PhD/PsyD) 0 min  Neuropsychological Test Administration [facility] ) 161 min  37569 x 1 unit  96139 x 6 units   Scoring (Tech ) 43 min  Chart Review 33 min  96132 x 1 unit  96133 x 0 units   Interpretation of Test Data 38 min  Feedback to Patient/Family Members (Virtual) 20 min  VIRTUAL VISIT DISCLAIMER    Manuel Talbert verbally agrees to participate in [employer]  Pt is aware that [employer] could be limited without vital signs or the ability to perform a full hands-on physical Bell Kevin understands he or the provider may request at any time to terminate the video visit and request the patient to seek care or treatment in person

## 2021-07-19 NOTE — PROGRESS NOTES
Neuropsychological Evaluation  [facility]  [address] [facility]  Becky López   P: [phone] F: [phone]     Patient Name: Tatiana Ritchie  Age: 63 y o  MRN: [id number] : 1955  DOS: 2021    Results/Interpretation    Sources of Information:   [employer] (ACS): Test of Premorbid Functioning (TOPF)  Animal Naming Fluency  Brief Visuospatial Memory Test - Revised (BVMT-R) - Form 1  [place] Verbal Learning Test - 3rd Edition (CVLT-3)  Clinical Interview (see interview note below for history and presenting problems)  Anish Continuous Performance Test - 3 (CPT-3)  Controlled Oral Word Association Test (COWAT)  Neuropsychological Assessment Battery (NAB) - Form 1   Selected Subtests: Naming  PTSD Checklist for DSM-5 (PCL-5)  Repeatable Battery for the Assessment of Neuropsychological Status (R-BANS) - Form A   Selected Subtests: Line Orientation  Stroop Color and Word Test (SCWT)  Symbol Digit Modality Test (SDMT)  Symptom Checklist - 90 - Revised (SCL-90-R)  TOMM  Preston Making Test (TMT)  Wechsler Abbreviated Scale of Intelligence, Second Edition (WASI-II)   Selected Subtests: Matrix Reasoning, Vocabulary  Wechsler Adult Intelligence Scale, Fourth Edition (WAIS-IV):  Selected Subtests: Digit Span  Wechsler Memory Scale, Fourth Edition (WMS-IV):   Selected Subtests: Logical Memory I/II/Recognition, Visual Reproduction I/II/ Recognition/Copy, Symbol Span  [employer] - 64 Card Version (WCST-64)    Evaluation Findings:  Findings, as documented below, are presented with qualitative descriptors (adapted from the Wechsler system unless otherwise specified) based on age-appropriate normative data  For ease of readability, findings are categorized by cognitive domain with brief descriptions of the abilities targeted by tasks administered  Engagement/Performance Validity:    Sensory/Motor factors:  The patient did not have his glasses with him for testing; He does not wear hearing aids; He required some assistance reading the small print of two questionnaires; He did not otherwise report any problems seeing stimuli; The patient did not use any ambulatory assistive devices  Fatigue and other factors: Patient appeared alert throughout testing; He did not report fatigue or significant pain symptoms; He commented that his hip felt stiff after a long period of sitting; Some breathing difficulties were noted by the examiner and this could have impacted the speed with which he was able to verbally respond to timed tasks at times; Breaks were taken as needed  Approach to testing:  Cooperative; Adequate rapport was established; The patient seemed to grasp simple task instructions; He had some difficulty with more complex task instructions and benefitted from repetition/rephrasing; He sometimes did not persist when items became difficult and required examiner encouragement to continue to try; He was often apologetic for his performance; Affect during testing was noted to be mildly anxious at the outset and frustrated about testing at other times; The patient appeared neutral with regard to interest in testing; He demonstrated good ability to independently correct mistakes; Testing pace was steady throughout; He appeared to be broadly engaged in testing   Formal assessment of performance validity: One suspect indicator on an embedded measure of auditory attention; All other findings were within acceptable limits  Interpretation: Findings are thought to be a valid estimate of the patients current cognitive abilities    Premorbid/General Intellectual Functioning:   Estimate in relation to demographic factors: Borderline  Estimate per word reading test: Borderline  IQ Estimate: Borderline  Interpretation: Estimated broadly borderline range premorbid abilities;  These estimates are thought to be broadly consistent with developmental and academic history  *The presence of some lower than expected scores on neuropsychological measures does not necessarily suggest decline from baseline cognitive functioning  *    Attention:   Simple auditory attention: Average  Auditory attention/working memory: Average  Simple visual attention: Borderline  Aspects of complex visual attention: Indications of a balanced response style that emphasizes both speed and accuracy; There were strong indications of difficulties with inattention manifesting as difficulties differentiating target from non-target items and efficiently processing information as it was presented to him; There was some indication of difficulties with sustained attention manifesting as some increase in response speed over the course of the test with a substantial increase in omission errors; There were also indications of difficulties with vigilance such that he had some problems with performance on trials with longer intervals between stimuli; There were no significant problems with primary impulsivity     Information Processing Speed:  Speeded visual scanning/sequencing: High average  Timed word reading: Extremely low  Timed color naming: Borderline  Timed number-symbol matching:   Written trial: Extremely low  Oral trial: Borderline    Language:   Confrontation picture naming: Average  Word knowledge/Verbal concept formation: Low average  Semantic fluency: Average  Phonemic fluency: Extremely low     Visual Perception:   Spatial Orientation: Average on a line orientation task (Raw = 15/20)  Visuoconstruction abilities: Average on a copy task (Raw = 42/43); Patients copy for a series of increasingly complex line figures was notable for good appreciation for the overall gestalt of the figures with only minor distortions/omissions of fine details of the figures     Memory and Learning:   Verbal:  List learning: Average range learning performance across 5/5 trials;  Patient demonstrated benefit from repeated exposure to stimuli; At best performance, patient recalled 7/16 words from the list (trials 3, 4, and 5/5)   List recall: Short delay - Free recall: Low average (5/16 words recalled)  List recall: Short delay - Cued recall: Average; Score was improved with semantic cueing (7/16 words recalled)   List recall: Long delay - Free recall: Average (5/16 words recalled)  List recall: Long delay - Cued recall: Average; Score was modestly improved with semantic cueing (6/16 words recalled)  List recognition: Average range discriminability score; 14/16 recognition hits (average); 11 false positive errors (low average)  Story learning: Low average  Story recall: Borderline performance when compared to the normative sample; When considered relative to his low average immediate recall performance, the patients delayed recall was slightly weaker than expected and could reflect slight information retention problems   Story recognition: Extremely low when compared to the normative sample; When considered relative to his extremely low recognition performance, the patients delayed recall was consistent and does not suggest primary retrieval deficits  Visual:  Simple shape learning: Extremely low overall; Inconsistent indication of benefit from repeated exposure to stimuli  Simple shape recall: Borderline; Patient retained 100% of what he initially learned  Simple shape recognition: Low average range discriminability score; 5/6 recognition hits; 0 false positive errors  Moderate to complex shape learning: Borderline immediate recall for a series of increasingly complex line figures  Moderate to complex shape recall: Low average delayed recall for a series of increasingly complex line figures when compared to the normative sample;  When considered relative to his borderline immediate recall performance, the patients delayed recall score is consistent with what would be expected and does not suggest primary retention problems  Moderate to complex shape recognition: Average recognition when compared to the normative sample; When considered relative to his average recognition performance, the patients delayed recall score is significantly weaker than expected and this could suggest retrieval problems      Executive Functions:    Visual organization/reasoning: Borderline  Visual-motor sequencing/set-shifting: Average  Phonemic fluency: Extremely low; Patients response set was notable for very few responses; Observationally, the patient was quick to give up before the time limit and did not seem to benefit from examiner encouragement to continue  Selective attention/inhibition: Low average  Concept formation/problem solving: Number of completed trials was WNL (3 total categories completed); Total error score was within the average range    Rating Scales:    PCL-5: On a brief screening measure, the patient endorsed a significant number of symptoms that can be suggestive of PTSD in the appropriate clinical context; The patients responses suggest probably PTSD; however, further evaluation is needed for consideration of formal diagnosis  SCL-90-R: On a self-report measure designed to assess symptoms associated with general emotional and behavioral dysfunction, the patients response set was notable for some scale elevations that can reflect clinically relevant degrees of distress  Individual scale elevations suggest that the patient may experience feelings of dysphoric mood, hopelessness, nervousness, tension, dread, and anger; There were also indications that the patient may have a lack of interest/motivation, may demonstrate avoidance or escape behavior, and is likely to feel isolated or alienated from others  Summary and Interpretation:  Mr Gloria Frankel performance on neurocognitive testing is notable for some areas concerning for cognitive decline   Compared to the normative sample, scores within a typically expected range were found on measures of basic auditory attention, auditory working memory, expressive language, visual perception, and visuoconstruction  Performances on measures of visual attention were concerning for weaker performance with indications of inattention, problems with sustained attention, and poor vigilance  The patients scores on measures of executive functioning were somewhat variable with weaker than expected performance on measures of verbal generativity and visual reasoning  His performance on measures of problem solving, mental flexibility, inhibition, and set-shifting were within expected limits  The patients scores suggested normative weaknesses in areas that include information processing speed  The patients profile of scores on measures of learning and memory are somewhat inconsistent  On measures of visual learning/memory, the patients performances consistently suggested some degree of low learning with good information retention over time  On measures of verbal learning/memory, the patient demonstrated normal learning and memory on a non-contextual list learning task; however, his performance on a story-based contextual measure of verbal learning suggested some information retention problems  In terms of psychiatric functioning, the patients responses to self-report measures suggest significant and varied symptomology, including indications of probable PTSD, depression and anxiety  There were also indications that the patients experience of psychiatric symptomology is likely to cause or contribute to restriction of normal activity in- and outside the home  Overall, Mr Lucas Santos neurocognitive profile could suggest some decree of cognitive decline; however, this determination is complicated by estimated borderline premorbid functioning in the context of low educational attainment and other developmental factors   Nevertheless, findings on current testing are most notable for indications of problems with aspects of attention, slow information processing speed, and some indications of difficulties with learning  Information retention (memory) deficits were only evidenced to a mild degree on one verbal memory measure  In the context of independence for ADLs and some problems with IADLs that are not clearly attributable to cognitive dysfunction, findings are viewed as being most consistent with mild cognitive impairment (MCI)  Etiological considerations for the patients cognitive dysfunction are thought likely to be multifactorial  Given the patients history of COVID-19 with intubation secondary to ARDS, cognitive symptoms could reasonably reflect hypoxic/anoxic injury and/or possible secondary effects thereof  However, the etiological picture is complicated by suspected posttraumatic stress disorder, as the nature of particular weaknesses evidenced on testing could also be reasonably attributed to PTSD associated cognitive impairment  Additional psychiatric symptoms suggestive of depression and/or other anxiety disorders could also contribute to the patients cognitive symptoms  Other considerations at the present time should include the potential cognitive effects of chronic pain, insufficient sleep quality, and medication side effects (e g , gabapentin, tizanidine)  While it is certainly possible that the patients cognitive symptoms could be directly related to neuropathology resulting from COVID-19, research has not yet determined any clear neurocognitive profile associated with the virus  The patient may wish to have neuropsychological re-evaluation conducted in the future as more data becomes available  Recommendations:  1  The patient is encouraged to continue working with pulmonology as medically indicated for management of breathing dysfunction to minimize the risk of ongoing/recurrent hypoxic injury due to associated chronic conditions       2  The patient endorsed a moderate to severe degree of psychiatric symptomology of varied nature  Given the perceived level of distress, it is thought likely that the patients experience of cognitive symptoms is related, at least in part, to psychiatric factors  The patient is strongly encouraged to continue working with his behavioral health providers for management of his symptoms  He may wish to discuss alternative medication options with his psychiatrist given reported inefficacy of his current medication regimen  He is also encouraged to work on cognitive-behavioral strategies for anxiety reduction with his mental health therapist        3  The patients current medication list includes some agents that can be sedating or can otherwise be associated with cognitive side-effects  He is encouraged to discuss the potential risks/benefits of ongoing use of this medication with his medical provider(s) if he intends to continue use  4  The patient experiences some degree of chronic pain which can cause or exacerbate cognitive dysfunction  He may benefit from incorporating cognitive behavioral strategies for pain management into his self-care practices  The patient may benefit from looking into programs offered through [employer]  This service offers evidence-based strategies for cognitive behavioral management of chronic pain  5  The patient reported insufficient sleep quality  A good summary of commonly used behavioral strategies for sleep improvement can be found at http://sleepeducation  org/essentials-in-sleep/healthy-sleep-habits  Referral to sleep medicine for evaluation of possible ANTHONY may be appropriate at the discretion of his medical provider(s)  6  The patient may experience cognitive inefficiencies in his day-to-day life  As such, he may benefit from incorporating the following compensatory strategies into his routine practices      a  Write information down, rather than relying upon your memory alone (e g , use a notepad by the phone, Post-It notes, or a dry erase board set up in a central location in the home)     b  Set alarms, timers, or reminders for important events or repeating occurrences on a cell phone, watch, or other electronic device (e g , alarm to remember to take medications, reminder for daily tasks, create an event on your electronic calendar to remember birthdays/appointments)  c  It can be helpful to set up a central location where items used on a daily basis (e g , keys, wallet) are always placed  d  Repetition can be helpful in forming new memories  Recite important information several times to encourage retention of information  7  There is some research that suggests potential cognitive benefit from regular engagement in cognitively stimulating activities  The patient may wish to consider participating in conventional [address] such as sudoku puzzles, word searches, crossword puzzles, or other activities available in puzzle books or on websites (such as [employer] or [employer])  Additionally, other, novel activities that require cognitive flexibility and new skill building (e g , learning a new language, learning to play an instrument) may also provide mental stimulation  8  Regular physical activity can have a significant impact on physical, emotional, and cognitive health  The patient is encouraged to incorporate aspects of cardiovascular and strength-building exercise into his life  Discussion of current medical status with health care providers is strongly encouraged before starting any new exercise regimen  9  Research supports eating healthy foods that have been shown to have a positive impact on brain health  The patient may wish to consider incorporating these sorts of foods into his diet  The MIND diet is a popular option that combines the health benefits of the Mediterranean and DASH diets and has been proven to boost brain health and potentially reduce your risk of cognitive decline   A good summary of the MIND diet can be found at: [employer]    10  The patient is encouraged to continue working with neurology for ongoing evaluation and management of his cognitive/neurological symptoms  11  Should the patient, his medical providers, or his family members have ongoing concerns regarding cognitive worsening over time, repeat neuropsychological evaluation can be conducted no sooner than one year from the time of the present evaluation (June 2022)  Gerold Lynch Morley Osler, PsyD  Neuropsychologist  [place] Licensed Psychologist  Lic  #[id number]

## 2021-07-26 ENCOUNTER — TELEPHONE (OUTPATIENT)
Dept: OBGYN CLINIC | Facility: HOSPITAL | Age: 66
End: 2021-07-26

## 2021-07-26 NOTE — TELEPHONE ENCOUNTER
Dr Dillard [facility]    [phone]    Patient is requesting an order for a Right hip FL injection  Please advise

## 2021-07-27 DIAGNOSIS — M16.11 PRIMARY OSTEOARTHRITIS OF RIGHT HIP: Primary | ICD-10-CM

## 2021-07-28 ENCOUNTER — TELEMEDICINE (OUTPATIENT)
Dept: INTERNAL MEDICINE CLINIC | Facility: CLINIC | Age: 66
End: 2021-07-28
Payer: COMMERCIAL

## 2021-07-28 DIAGNOSIS — M79.18 MUSCLE PAIN, MYOFASCIAL: Primary | ICD-10-CM

## 2021-07-28 PROCEDURE — 99213 OFFICE O/P EST LOW 20 MIN: CPT | Performed by: NURSE PRACTITIONER

## 2021-07-28 PROCEDURE — 1036F TOBACCO NON-USER: CPT | Performed by: NURSE PRACTITIONER

## 2021-07-28 NOTE — PATIENT INSTRUCTIONS
Muscle Spasm   WHAT YOU NEED TO KNOW:   A muscle spasm is a sudden contraction of any muscle or group of muscles  A muscle cramp is a painful muscle spasm  Muscle cramps commonly occur after intense exercise or during pregnancy  They may also be caused by certain medications, dehydration, low calcium or magnesium levels, or another medical condition  DISCHARGE INSTRUCTIONS:   Medicines: You may need the following:  · NSAIDs  help decrease swelling and pain or fever  This medicine is available with or without a doctor's order  NSAIDs can cause stomach bleeding or kidney problems in certain people  If you take blood thinner medicine, always ask your healthcare provider if NSAIDs are safe for you  Always read the medicine label and follow directions  · Take your medicine as directed  Contact your healthcare provider if you think your medicine is not helping or if you have side effects  Tell him of her if you are allergic to any medicine  Keep a list of the medicines, vitamins, and herbs you take  Include the amounts, and when and why you take them  Bring the list or the pill bottles to follow-up visits  Carry your medicine list with you in case of an emergency  Follow up with your healthcare provider as directed: You may need other tests or treatment  You may also be referred to a physical therapist or other specialist  Write down your questions so you remember to ask them during your visits  Self-care:   · Stretch  your muscle to help relieve the cramp  It may be helpful to keep your muscle in the stretched position until the cramp is gone  · Apply heat  to help decrease pain and muscle spasms  Apply heat on the area for 20 to 30 minutes every 2 hours for as many days as directed  · Apply ice  to help decrease swelling and pain  Ice may also help prevent tissue damage  Use an ice pack, or put crushed ice in a plastic bag   Cover it with a towel and place it on your muscle for 15 to 20 minutes every hour or as directed  · Drink more liquids  to help prevent muscle cramps caused by dehydration  Sports drinks may help replace electrolytes you lose through sweat during exercise  Ask your healthcare provider how much liquid to drink each day and which liquids are best for you  · Eat healthy foods , such as fruits, vegetables, whole grains, low-fat dairy products, and lean proteins (meat, beans, and fish)  If you are pregnant, ask your healthcare provider about foods that are high in magnesium and sodium  They may help to relieve cramps during pregnancy  · Massage your muscle  to help relieve the cramp  · Take frequent deep breaths  until the cramp feels better  Lie down while you take the deep breaths so you do not get dizzy or lightheaded  Contact your healthcare provider if:   · You have signs of dehydration, such as a headache, dark yellow urine, dry eyes or mouth, or a fast heartbeat  · You have questions or concerns about your condition or care  Return to the emergency department if:   · You have warmth, swelling, or redness in the cramping muscle  · You have frequent or unrelieved muscle cramps in several different muscles  · You have muscle cramps with numbness, tingling, and burning in your hands and feet  © Copyright [employer] 2021 Information is for End User's use only and may not be sold, redistributed or otherwise used for commercial purposes  All illustrations and images included in CareNotes® are the copyrighted property of A D A M , Inc  or Giovanni Mitchell   The above information is an  only  It is not intended as medical advice for individual conditions or treatments  Talk to your doctor, nurse or pharmacist before following any medical regimen to see if it is safe and effective for you

## 2021-07-28 NOTE — NURSING NOTE
Call placed to patient to discuss upcoming appointment at [place] [address] radiology department and complete consultation with patient  Patient is having right hip injection utilizing fluoroscopy  Reviewed patient's allergies, no current anticoagulant medication present, patient had procedure in the past, no questions asked  Patient made aware of need for  post procedure  Reminded patient of location and time expected for procedure, Patient expressed understanding by verbalizing and repeating instructions  My number was also supplied to patient to call if any further questions or concerns should arise pre or post procedure

## 2021-07-28 NOTE — PROGRESS NOTES
Virtual Regular Visit    Verification of patient location:    Patient is located in the following state in which I hold an active license [place]      Assessment/Plan:  Patient with probable left pectoral muscle strain  Advised to take the naproxen bid and massage area with Voltaren gel  Discussed possible prednisone taper, however patient is scheduled for a hip injection under fluoroscopy in one week  Will follow up after this injection if pain has not improved  He has baseline sob due to post covid effects, no worsening of breathing or dizziness with pains of the left chest  Pain does not radiate  Problem List Items Addressed This Visit     None      Visit Diagnoses     Muscle pain, myofascial    -  Primary    Relevant Medications    Diclofenac Sodium (VOLTAREN) 1 %               Reason for visit is   Chief Complaint   Patient presents with    Virtual Regular Visit        Encounter provider ERIC Franco    Provider located at [address] [place] [address]      Recent Visits  No visits were found meeting these conditions  Showing recent visits within past 7 days and meeting all other requirements  Today's Visits  Date Type Provider Dept   07/28/21 [facility] [facility] today's visits and meeting all other requirements  Future Appointments  No visits were found meeting these conditions  Showing future appointments within next 150 days and meeting all other requirements       The patient was identified by name and date of birth  Santi Victor was informed that this is a telemedicine visit and that the visit is being conducted through [facility] [place] [facility] and patient was informed that this is a secure, HIPAA-compliant platform  He agrees to proceed     My office door was closed  No one else was in the room    He acknowledged consent and understanding of privacy and security of the video platform  The patient has agreed to participate and understands they can discontinue the visit at any time  Patient is aware this is a billable service  Subjective  Gabriele Leavitt is a 63 y o  male follow up muscle pains   Patient presents for follow up regarding muscle pains  Having pain under left breast for 2 weeks  States "I was in the tub and I reached for something and it felt like the muscle tightened up " States he rubbed the are of pain and it felt better  Since this time with certain movements he will get flare ups of the pain then it goes away  Says it feels like if a rubber band was being stretched  The pain does not radiate  It does not cause any sob aside from chronic sob related to post covid syndrome  He has tried putting heating pad or ice on the chest but states this made the pain worse          Past Medical History:   Diagnosis Date    COVID-19 03/2020    PTSD (post-traumatic stress disorder)        Past Surgical History:   Procedure Laterality Date    FL INJECTION RIGHT HIP (NON ARTHROGRAM)  11/30/2020    NO PAST SURGERIES         Current Outpatient Medications   Medication Sig Dispense Refill    albuterol (2 5 mg/3 mL) 0 083 % nebulizer solution Take 1 vial (2 5 mg total) by nebulization every 6 (six) hours as needed for wheezing or shortness of breath 120 vial 2    amLODIPine (NORVASC) 2 5 mg tablet TAKE 1 TABLET (2 5 MG TOTAL) BY MOUTH DAILY AFTER DINNER      Diclofenac Sodium (VOLTAREN) 1 % Apply 2 g topically 4 (four) times a day 100 g 0    escitalopram (LEXAPRO) 10 mg tablet Take 1 tablet (10 mg total) by mouth every morning 30 tablet 1    fluticasone (Arnuity Ellipta) 100 MCG/ACT AEPB inhaler Inhale 1 puff daily 30 blister 3    gabapentin (NEURONTIN) 100 mg capsule TAKE 1 CAPSULE BY MOUTH THREE TIMES A DAY 90 capsule 1    ipratropium-albuterol (DUO-NEB) 0 5-2 5 mg/3 mL nebulizer solution Take 1 vial (3 mL total) by nebulization 4 (four) times a day 15 vial 0    Multiple Vitamins-Minerals (MULTIVITAMIN ADULT PO) Take 1 tablet by mouth daily      naproxen sodium (ANAPROX) 550 mg tablet Take 1 tablet (550 mg total) by mouth 2 (two) times a day as needed for mild pain or headaches 90 tablet 1    QUEtiapine (SEROquel) 50 mg tablet Take 1 tablet (50 mg total) by mouth daily at bedtime 90 tablet 0    sertraline (ZOLOFT) 50 mg tablet 1/2 tab po daily x 10 days then stop      tiZANidine (ZANAFLEX) 2 mg tablet Take 1 tablet (2 mg total) by mouth daily at bedtime 90 tablet 3     No current facility-administered medications for this visit  Allergies   Allergen Reactions    Tetracycline Rash       Review of Systems   Constitutional: Negative for chills, diaphoresis and fatigue  Respiratory: Positive for chest tightness (post covid) and shortness of breath (chronic post covid)  Cardiovascular: Positive for chest pain (2 weeks pain under left breast)  Negative for palpitations  Musculoskeletal: Positive for arthralgias and myalgias  Skin: Negative for color change and rash  Neurological: Negative for dizziness  Psychiatric/Behavioral: Positive for dysphoric mood and sleep disturbance  Video Exam    There were no vitals filed for this visit  Physical Exam  Constitutional:       General: He is not in acute distress  Appearance: He is not ill-appearing  HENT:      Head: Normocephalic and atraumatic  Right Ear: Hearing normal       Left Ear: Hearing normal    Pulmonary:      Effort: No tachypnea or respiratory distress  Chest:      Chest wall: Tenderness present  No mass, deformity or swelling  Skin:     Findings: No bruising, ecchymosis, erythema or rash  Neurological:      Mental Status: He is alert and oriented to person, place, and time  Psychiatric:         Attention and Perception: Attention normal          Mood and Affect: Mood is depressed           Speech: Speech normal          Behavior: Behavior normal  Behavior is cooperative  Thought Content: Thought content normal           I spent 15 minutes directly with the patient during this visit    VIRTUAL VISIT DISCLAIMER      Gerhardt Fries verbally agrees to participate in [employer]  Pt is aware that [employer] could be limited without vital signs or the ability to perform a full hands-on physical Chayo Viramontes understands he or the provider may request at any time to terminate the video visit and request the patient to seek care or treatment in person

## 2021-07-28 NOTE — LETTER
July 28, 2021     Patient: Pari Lee   YOB: 1955   Date of Visit: 7/28/2021       To Whom it May Concern:    Pari Lee is under my professional care  He was seen for a telemedicine visit on 7/28/2021  He is still recovering from post covid symptoms and remains unable to work at this time  If you have any questions or concerns, please don't hesitate to call           Sincerely,          ERIC Elkins

## 2021-07-29 ENCOUNTER — TELEMEDICINE (OUTPATIENT)
Dept: BEHAVIORAL/MENTAL HEALTH CLINIC | Facility: CLINIC | Age: 66
End: 2021-07-29
Payer: COMMERCIAL

## 2021-07-29 DIAGNOSIS — Z63.4 EXPECTED BEREAVEMENT DUE TO LIFE EVENT: ICD-10-CM

## 2021-07-29 DIAGNOSIS — F43.10 PTSD (POST-TRAUMATIC STRESS DISORDER): Primary | ICD-10-CM

## 2021-07-29 DIAGNOSIS — R41.3 MEMORY DIFFICULTY: ICD-10-CM

## 2021-07-29 DIAGNOSIS — F33.2 MAJOR DEPRESSIVE DISORDER, RECURRENT SEVERE WITHOUT PSYCHOTIC FEATURES (HCC): ICD-10-CM

## 2021-07-29 PROCEDURE — 90834 PSYTX W PT 45 MINUTES: CPT | Performed by: SOCIAL WORKER

## 2021-07-29 SDOH — SOCIAL STABILITY - SOCIAL INSECURITY: DISSAPEARANCE AND DEATH OF FAMILY MEMBER: Z63.4

## 2021-07-29 NOTE — PSYCH
Virtual Regular Visit    Verification of patient location:    Patient is located in the following state in which I hold an active license [place]      Assessment/Plan:    Problem List Items Addressed This Visit     None          Goals addressed in session: Goal 1 and Goal 2          Reason for visit is   Chief Complaint   Patient presents with    Virtual Regular Visit        Encounter provider Keyona Wright    Provider located at [address] [place] [id number]  [phone]      Recent Visits  No visits were found meeting these conditions  Showing recent visits within past 7 days and meeting all other requirements  Future Appointments  No visits were found meeting these conditions  Showing future appointments within next 150 days and meeting all other requirements       The patient was identified by name and date of birth  Esau Curling was informed that this is a telemedicine visit and that the visit is being conducted through[employer] Teams and patient was informed that this is a secure, HIPAA-compliant platform  He agrees to proceed     My office door was closed  No one else was in the room  He acknowledged consent and understanding of privacy and security of the video platform  The patient has agreed to participate and understands they can discontinue the visit at any time  Patient is aware this is a billable service  Subjective  Esau Curling is a 63 y o  male          HPI     Past Medical History:   Diagnosis Date    COVID-19 03/2020    PTSD (post-traumatic stress disorder)        Past Surgical History:   Procedure Laterality Date    FL INJECTION RIGHT HIP (NON ARTHROGRAM)  11/30/2020    NO PAST SURGERIES         Current Outpatient Medications   Medication Sig Dispense Refill    albuterol (2 5 mg/3 mL) 0 083 % nebulizer solution Take 1 vial (2 5 mg total) by nebulization every 6 (six) hours as needed for wheezing or shortness of breath 120 vial 2    amLODIPine (NORVASC) 2 5 mg tablet TAKE 1 TABLET (2 5 MG TOTAL) BY MOUTH DAILY AFTER DINNER      Diclofenac Sodium (VOLTAREN) 1 % Apply 2 g topically 4 (four) times a day 100 g 0    escitalopram (LEXAPRO) 10 mg tablet Take 1 tablet (10 mg total) by mouth every morning 30 tablet 1    fluticasone (Arnuity Ellipta) 100 MCG/ACT AEPB inhaler Inhale 1 puff daily 30 blister 3    gabapentin (NEURONTIN) 100 mg capsule TAKE 1 CAPSULE BY MOUTH THREE TIMES A DAY 90 capsule 1    ipratropium-albuterol (DUO-NEB) 0 5-2 5 mg/3 mL nebulizer solution Take 1 vial (3 mL total) by nebulization 4 (four) times a day 15 vial 0    Multiple Vitamins-Minerals (MULTIVITAMIN ADULT PO) Take 1 tablet by mouth daily      naproxen sodium (ANAPROX) 550 mg tablet Take 1 tablet (550 mg total) by mouth 2 (two) times a day as needed for mild pain or headaches 90 tablet 1    QUEtiapine (SEROquel) 50 mg tablet Take 1 tablet (50 mg total) by mouth daily at bedtime 90 tablet 0    sertraline (ZOLOFT) 50 mg tablet 1/2 tab po daily x 10 days then stop      tiZANidine (ZANAFLEX) 2 mg tablet Take 1 tablet (2 mg total) by mouth daily at bedtime 90 tablet 3     No current facility-administered medications for this visit  Allergies   Allergen Reactions    Tetracycline Rash       Review of Systems Data:Galileo discussed how he is adjusting to his new financial and medical issues since covid  He also continues to draw boundaries and not get in the middle of 2 daughters who dont talk  Assessment: Estrellitadadatommy Angela denies suicidal/homicidal ideation, plan or intent  He is depressed over his medical and financial issues  Mindfulness and CBT provided  Plan: Continue to help him skill build in distress tolerance  Video Exam    There were no vitals filed for this visit      Physical Exam N/A    I spent 45 minutes directly with the patient during this visit    VIRTUAL VISIT DISCLAIMER    Moo Alexandra verbally agrees to participate in [employer]  Pt is aware that [employer] could be limited without vital signs or the ability to perform a full hands-on physical Gilda Morris understands he or the provider may request at any time to terminate the video visit and request the patient to seek care or treatment in person

## 2021-08-04 ENCOUNTER — TELEPHONE (OUTPATIENT)
Dept: RADIOLOGY | Facility: HOSPITAL | Age: 66
End: 2021-08-04

## 2021-08-04 ENCOUNTER — HOSPITAL ENCOUNTER (OUTPATIENT)
Dept: RADIOLOGY | Facility: HOSPITAL | Age: 66
Discharge: HOME | End: 2021-08-04
Attending: ORTHOPAEDIC SURGERY | Admitting: RADIOLOGY
Payer: COMMERCIAL

## 2021-08-04 DIAGNOSIS — M16.11 PRIMARY OSTEOARTHRITIS OF RIGHT HIP: ICD-10-CM

## 2021-08-04 PROCEDURE — 20610 DRAIN/INJ JOINT/BURSA W/O US: CPT

## 2021-08-04 PROCEDURE — 77002 NEEDLE LOCALIZATION BY XRAY: CPT

## 2021-08-04 RX ORDER — LIDOCAINE HYDROCHLORIDE 10 MG/ML
10 INJECTION, SOLUTION EPIDURAL; INFILTRATION; INTRACAUDAL; PERINEURAL
Status: COMPLETED | OUTPATIENT
Start: 2021-08-04 | End: 2021-08-04

## 2021-08-04 RX ORDER — BUPIVACAINE HYDROCHLORIDE 2.5 MG/ML
30 INJECTION, SOLUTION EPIDURAL; INFILTRATION; INTRACAUDAL
Status: COMPLETED | OUTPATIENT
Start: 2021-08-04 | End: 2021-08-04

## 2021-08-04 RX ORDER — METHYLPREDNISOLONE ACETATE 80 MG/ML
80 INJECTION, SUSPENSION INTRA-ARTICULAR; INTRALESIONAL; INTRAMUSCULAR; SOFT TISSUE
Status: COMPLETED | OUTPATIENT
Start: 2021-08-04 | End: 2021-08-04

## 2021-08-04 RX ADMIN — IOHEXOL 5 ML: 300 INJECTION, SOLUTION INTRAVENOUS at 12:20

## 2021-08-04 RX ADMIN — METHYLPREDNISOLONE ACETATE 80 MG: 80 INJECTION, SUSPENSION INTRA-ARTICULAR; INTRALESIONAL; INTRAMUSCULAR; SOFT TISSUE at 12:20

## 2021-08-04 RX ADMIN — BUPIVACAINE HYDROCHLORIDE 3 ML: 2.5 INJECTION, SOLUTION EPIDURAL; INFILTRATION; INTRACAUDAL; PERINEURAL at 12:20

## 2021-08-04 RX ADMIN — LIDOCAINE HYDROCHLORIDE 7 ML: 10 INJECTION, SOLUTION EPIDURAL; INFILTRATION; INTRACAUDAL; PERINEURAL at 12:20

## 2021-08-04 NOTE — TELEPHONE ENCOUNTER
Jonatan texted Dr Scott Mehta today at [address] to discuss orders for Mr Jon Garcia fluoroscopy guided right hip injection  Dr Scott Mehta is agreeable to us giving 1 ml of Depomedrol for the hip injection instead of 2ml       Rashmi Band [facility]

## 2021-08-06 ENCOUNTER — TELEPHONE (OUTPATIENT)
Dept: PULMONOLOGY | Facility: CLINIC | Age: 66
End: 2021-08-06

## 2021-08-06 NOTE — TELEPHONE ENCOUNTER
Patient is requesting Flovent inhaler  I do not see it on his medication list   Can you please advise if he is on it and send if necessary? Thank you

## 2021-08-11 ENCOUNTER — TELEMEDICINE (OUTPATIENT)
Dept: PSYCHIATRY | Facility: CLINIC | Age: 66
End: 2021-08-11
Payer: COMMERCIAL

## 2021-08-11 DIAGNOSIS — F32.A DEPRESSION, UNSPECIFIED DEPRESSION TYPE: Primary | ICD-10-CM

## 2021-08-11 DIAGNOSIS — F43.10 PTSD (POST-TRAUMATIC STRESS DISORDER): ICD-10-CM

## 2021-08-11 PROCEDURE — 99212 OFFICE O/P EST SF 10 MIN: CPT | Performed by: PHYSICIAN ASSISTANT

## 2021-08-12 ENCOUNTER — NURSE TRIAGE (OUTPATIENT)
Dept: OTHER | Facility: OTHER | Age: 66
End: 2021-08-12

## 2021-08-12 DIAGNOSIS — J84.9 INTERSTITIAL LUNG DISEASE (HCC): ICD-10-CM

## 2021-08-12 DIAGNOSIS — J47.9 BRONCHIECTASIS WITHOUT COMPLICATION (HCC): ICD-10-CM

## 2021-08-12 NOTE — TELEPHONE ENCOUNTER
Answer Assessment - Initial Assessment Questions  1  DRUG NAME: "What medicine do you need to have refilled?"      flovent and albuterol nebulizer solution  2  REFILLS REMAINING: "How many refills are remaining?" (Note: The label on the medicine or pill bottle will show how many refills are remaining  If there are no refills remaining, then a renewal may be needed )      0 refills on flovent as it was discontinued   3  EXPIRATION DATE: "What is the expiration date?" (Note: The label states when the prescription will , and thus can no longer be refilled )      unknown  4  PRESCRIBING HCP: "Who prescribed it?" Reason: If prescribed by specialist, call should be referred to that group  pcp  5  SYMPTOMS: "Do you have any symptoms?"      None   6   PREGNANCY: "Is there any chance that you are pregnant?" "When was your last menstrual period?"     N/a    Protocols used: MEDICATION REFILL AND RENEWAL CALL-[facility]

## 2021-08-12 NOTE — TELEPHONE ENCOUNTER
Regarding: Out of Medications Flovent/albuterol   ----- Message from Lazaro Nelson sent at 8/12/2021 11:45 AM EDT -----  "I am out of these medications Flovent  MCG/ACT inhaler albuterol (2 5 mg/3 mL) 0 083 % nebulizer solution "    Patient called in said that he was at the pharmacy and they told him he has no prescription for flovent  He also stated that his insurance will not pay for fluticasone so he he needs flovent reordered  Sent to provider for review

## 2021-08-13 RX ORDER — QUETIAPINE FUMARATE 50 MG/1
25 TABLET, FILM COATED ORAL
COMMUNITY
Start: 2021-08-13 | End: 2021-09-02 | Stop reason: ALTCHOICE
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
## 2021-08-16 ENCOUNTER — TELEPHONE (OUTPATIENT)
Dept: INTERNAL MEDICINE CLINIC | Facility: CLINIC | Age: 66
End: 2021-08-16

## 2021-08-16 NOTE — TELEPHONE ENCOUNTER
Pt is asking if Val Bruno would write a letter for him regarding his disability    since he's had covid, he has breathing problem, he's on a nebulizer & inhaler, he is under her care    he's been trying to get disability for sometime and needs a letter from Val Bruno stating his conditions that hinder him from working

## 2021-08-17 ENCOUNTER — TELEPHONE (OUTPATIENT)
Dept: INTERNAL MEDICINE CLINIC | Facility: CLINIC | Age: 66
End: 2021-08-17

## 2021-08-17 NOTE — TELEPHONE ENCOUNTER
Patient has a letter written by Miguel Angel Mosquera in his MyChart for out of work  Patient would like to  a signed copy of the letter to be sent to his employer  Please have Miguel Angel Mosquera sign the letter and leave at the  for pickup

## 2021-08-18 RX ORDER — DEXAMETHASONE 4 MG/1
2 TABLET ORAL 2 TIMES DAILY
Qty: 12 G | Refills: 0 | Status: SHIPPED | OUTPATIENT
Start: 2021-08-18 | End: 2021-09-09

## 2021-08-18 RX ORDER — ALBUTEROL SULFATE 2.5 MG/3ML
2.5 SOLUTION RESPIRATORY (INHALATION) EVERY 6 HOURS PRN
Qty: 18 ML | Refills: 11 | Status: SHIPPED | OUTPATIENT
Start: 2021-08-18 | End: 2021-09-20

## 2021-08-30 DIAGNOSIS — R20.0 NUMBNESS AND TINGLING: ICD-10-CM

## 2021-08-30 DIAGNOSIS — R20.2 NUMBNESS AND TINGLING: ICD-10-CM

## 2021-08-30 DIAGNOSIS — G44.52 NEW DAILY PERSISTENT HEADACHE: ICD-10-CM

## 2021-08-31 RX ORDER — GABAPENTIN 100 MG/1
CAPSULE ORAL
Qty: 90 CAPSULE | Refills: 1 | Status: SHIPPED | OUTPATIENT
Start: 2021-08-31 | End: 2021-11-01 | Stop reason: SDUPTHER

## 2021-09-02 ENCOUNTER — TELEPHONE (OUTPATIENT)
Dept: PSYCHIATRY | Facility: CLINIC | Age: 66
End: 2021-09-02

## 2021-09-02 ENCOUNTER — TELEMEDICINE (OUTPATIENT)
Dept: PSYCHIATRY | Facility: CLINIC | Age: 66
End: 2021-09-02
Payer: COMMERCIAL

## 2021-09-02 DIAGNOSIS — F32.A DEPRESSION, UNSPECIFIED DEPRESSION TYPE: ICD-10-CM

## 2021-09-02 DIAGNOSIS — F43.10 PTSD (POST-TRAUMATIC STRESS DISORDER): ICD-10-CM

## 2021-09-02 PROCEDURE — 99212 OFFICE O/P EST SF 10 MIN: CPT | Performed by: PHYSICIAN ASSISTANT

## 2021-09-02 RX ORDER — ESCITALOPRAM OXALATE 10 MG/1
10 TABLET ORAL EVERY MORNING
Qty: 30 TABLET | Refills: 1 | Status: SHIPPED | OUTPATIENT
Start: 2021-09-02 | End: 2021-09-22

## 2021-09-02 NOTE — PSYCH
Virtual Regular Visit    Verification of patient location:    Patient is located in the following state in which I hold an active license [place]           Encounter provider Cirilo Sierra PA-C    Provider located at [address]  [phone]      Recent Visits  No visits were found meeting these conditions  Showing recent visits within past 7 days and meeting all other requirements  Future Appointments  No visits were found meeting these conditions  Showing future appointments within next 150 days and meeting all other requirements       The patient was identified by name and date of birth  Tatiana Ritchie was informed that this is a telemedicine visit and that the visit is being conducted throughSendRR and patient was informed that this is a secure, HIPAA-compliant platform  He agrees to proceed     My office door was closed  No one else was in the room  He acknowledged consent and understanding of privacy and security of the video platform  The patient has agreed to participate and understands they can discontinue the visit at any time  Patient is aware this is a billable service  VIRTUAL VISIT DISCLAIMER    Tatiana Ritchie verbally agrees to participate in [employer]  Pt is aware that [employer] could be limited without vital signs or the ability to perform a full hands-on physical Richard Connolly understands he or the provider may request at any time to terminate the video visit and request the patient to seek care or treatment in person        MEDICATION MANAGEMENT NOTE        [address] [facility] [place]  [address] [facility] [id number] [phone]        Name and Date of Birth:  Tatiana Ritchie 61 y o  1955    Date of Visit: September 2, 2021    SUBJECTIVE:    Ozzie Whelan seen with and without his wife  Last seen by Jose Luis 8/11 at which time lexapro 10 mg continued and seroquel decreased to 25 mg secondary to muscle twitching  Mrs Josey Fernando states that Devon's lower extremities have been twitching since he had covid but have been worse past 2 mths  He is so active at night that it keeps her awake  Also notes that Devon's lower ext inc feet and toes will twitch when he is at rest during the day  Lexapro continues to help his mood and anxiety  Family has been visiting and Ozzie Whelan visits with them outside  He leaves the house very infrequently secondary to fear of delta variant  Sleep is disrupted secondary to hip pain  He continues to need nebulizers for shortness of breath  Review of Systems   Musculoskeletal: Positive for arthralgias  Neurological:        Muscle twitching   Psychiatric/Behavioral: Positive for sleep disturbance  Psychiatric History     This office since 5/7/20  No IP or suicide attempts  Trauma/Loss History       ARDS secondary to covid- intubated x 5 days, April 2020  Lost brother and coworkers to [email or web address]  Has been too ill to return to work  Social History        Lives with wife Elbert Andrews -  >40 yrs  2 daugthers and a son  Worked at [employer] [place] 40 yrs -            OBJECTIVE:     MENTAL STATUS EXAM  Appearance:  age appropriate, dressed casually   Behavior:  Pleasant & cooperative   Speech:  Normal volume, regular rate and rhythm   Mood:  mildly anxious   Affect:  mood congruent   Language: intact and appropriate for age, education, and intellect   Thought Process:  goal directed   Associations: intact associations   Thought Content:  normal and appropriate   Perceptual Disturbances: no auditory or visual hallcunations   Risk Potential / Abnormal Thoughts: Suicidal ideation - None  Homicidal ideation - None  Potential for aggression - No       Consciousness:  Alert & Awake Sensorium:  Grossly oriented   Attention: attention span and concentration are age appropriate       Fund of Knowledge:  Memory: awareness of current events: yes  recent and remote memory grossly intact   Insight:  good   Judgment: good       Lab Review: I have reviewed all pertinent labs    Lab Results   Component Value Date    CHOLESTEROL 196 04/06/2021     Lab Results   Component Value Date    HDL 41 04/06/2021     Lab Results   Component Value Date    TRIG 113 04/06/2021     Lab Results   Component Value Date    NONHDLC 155 04/06/2021     Lab Results   Component Value Date    SODIUM 140 04/06/2021    K 4 3 04/06/2021     04/06/2021    CO2 26 04/06/2021    AGAP 6 04/06/2021    BUN 18 04/06/2021    CREATININE 1 36 (H) 04/06/2021    GLUC 164 (H) 04/12/2020    GLUF 94 04/06/2021    CALCIUM 9 2 04/06/2021    AST 28 04/06/2021    ALT 44 04/06/2021    ALKPHOS 109 04/06/2021    TP 7 5 04/06/2021    TBILI 0 47 04/06/2021    EGFR 55 04/06/2021     Lab Results   Component Value Date    WBC 9 53 04/06/2021    HGB 16 3 04/06/2021    HCT 50 5 (H) 04/06/2021    MCV 87 04/06/2021     04/06/2021       Lab Results   Component Value Date    [id number] 2 240 04/06/2021           ASSESSMENT & PLAN          Diagnoses and all orders for this visit:    PTSD (post-traumatic stress disorder)  -     escitalopram (LEXAPRO) 10 mg tablet; Take 1 tablet (10 mg total) by mouth every morning    Depression, unspecified depression type  -     escitalopram (LEXAPRO) 10 mg tablet;  Take 1 tablet (10 mg total) by mouth every morning        Current Outpatient Medications   Medication Sig Dispense Refill    albuterol (2 5 mg/3 mL) 0 083 % nebulizer solution Take 3 mL (2 5 mg total) by nebulization every 6 (six) hours as needed for wheezing or shortness of breath 18 mL 11    amLODIPine (NORVASC) 2 5 mg tablet TAKE 1 TABLET (2 5 MG TOTAL) BY MOUTH DAILY AFTER DINNER      Diclofenac Sodium (VOLTAREN) 1 % Apply 2 g topically 4 (four) times a day 100 g 0    escitalopram (LEXAPRO) 10 mg tablet Take 1 tablet (10 mg total) by mouth every morning 30 tablet 1    fluticasone (Arnuity Ellipta) 100 MCG/ACT AEPB inhaler Inhale 1 puff daily 30 blister 3    fluticasone (Flovent HFA) 110 MCG/ACT inhaler Inhale 2 puffs 2 (two) times a day Rinse mouth after use  12 g 0    gabapentin (NEURONTIN) 100 mg capsule TAKE 1 CAPSULE BY MOUTH THREE TIMES A DAY 90 capsule 1    ipratropium-albuterol (DUO-NEB) 0 5-2 5 mg/3 mL nebulizer solution Take 1 vial (3 mL total) by nebulization 4 (four) times a day 15 vial 0    Multiple Vitamins-Minerals (MULTIVITAMIN ADULT PO) Take 1 tablet by mouth daily      naproxen sodium (ANAPROX) 550 mg tablet Take 1 tablet (550 mg total) by mouth 2 (two) times a day as needed for mild pain or headaches 90 tablet 1    tiZANidine (ZANAFLEX) 2 mg tablet Take 1 tablet (2 mg total) by mouth daily at bedtime 90 tablet 3     No current facility-administered medications for this visit  Plan:           Muscle twitching may have been made worse with psychotropics  Given Devon's good response to lexapro will keep this unchanged for now and stop seroquel 25 mg- this may not make much of a difference  Maria De Jesus العراقي is also on albuterol nebulizer every 6 hrs which may also be exacerbating muscle movements  Reviewed risks, benefits, side effects of medications, including no medication  Patient understands and agrees to treatment plan  F/u Jose Luis 6 weeks, sooner prn       Cont f/u Harini De La Garza for ind therapy       Patient has been informed of 24 hours and weekend coverage for urgent situations accessed by calling the main clinic phone number       Ileana Bay PA-C

## 2021-09-09 DIAGNOSIS — J47.9 BRONCHIECTASIS WITHOUT COMPLICATION (HCC): ICD-10-CM

## 2021-09-09 RX ORDER — DEXAMETHASONE 4 MG/1
2 TABLET ORAL 2 TIMES DAILY
Qty: 12 G | Refills: 1 | Status: SHIPPED | OUTPATIENT
Start: 2021-09-09 | End: 2021-09-27 | Stop reason: SDUPTHER

## 2021-09-20 DIAGNOSIS — J84.9 INTERSTITIAL LUNG DISEASE (HCC): ICD-10-CM

## 2021-09-20 RX ORDER — ALBUTEROL SULFATE 2.5 MG/3ML
SOLUTION RESPIRATORY (INHALATION)
Qty: 375 ML | Refills: 2 | Status: SHIPPED | OUTPATIENT
Start: 2021-09-20 | End: 2021-12-21

## 2021-09-21 DIAGNOSIS — F32.A DEPRESSION, UNSPECIFIED DEPRESSION TYPE: ICD-10-CM

## 2021-09-21 DIAGNOSIS — F43.10 PTSD (POST-TRAUMATIC STRESS DISORDER): ICD-10-CM

## 2021-09-22 ENCOUNTER — TELEMEDICINE (OUTPATIENT)
Dept: BEHAVIORAL/MENTAL HEALTH CLINIC | Facility: CLINIC | Age: 66
End: 2021-09-22
Payer: COMMERCIAL

## 2021-09-22 DIAGNOSIS — R41.3 MEMORY DIFFICULTY: ICD-10-CM

## 2021-09-22 DIAGNOSIS — F32.A DEPRESSION, UNSPECIFIED DEPRESSION TYPE: ICD-10-CM

## 2021-09-22 DIAGNOSIS — F43.10 PTSD (POST-TRAUMATIC STRESS DISORDER): Primary | ICD-10-CM

## 2021-09-22 PROCEDURE — 90834 PSYTX W PT 45 MINUTES: CPT | Performed by: SOCIAL WORKER

## 2021-09-22 RX ORDER — ESCITALOPRAM OXALATE 10 MG/1
TABLET ORAL
Qty: 30 TABLET | Refills: 1 | Status: SHIPPED | OUTPATIENT
Start: 2021-09-22 | End: 2021-10-18 | Stop reason: SDUPTHER

## 2021-09-22 NOTE — PSYCH
Psychotherapy Provided: Individual Psychotherapy 45 minutes     Length of time in session: 45 minutes, follow up in 2 week    No diagnosis found  Goals addressed in session: Goal 1 and Goal 2     Pain:      moderate to severe    0    Current suicide risk : Low     Data:Galileo spoke about his health and financial issues that have happened due to covid 19  He is frustrated with his employer and his disability company  He also continues to be frustrated that his 2 adult daughters do not get along  Assessment: Georgiana Garza denies suicidal/homicidal ideation, plan or intent  However he is depressed over the current world situation, his health and his finances  We continue to work on mindfulness, CBT and solution focused therapy  Plan: Continue to help him skill build in distress tolerance  Behavioral Health Treatment Plan [facility]: Diagnosis and Treatment Plan explained to Ivis Rick relates understanding diagnosis and is agreeable to Treatment Plan   Yes

## 2021-09-27 ENCOUNTER — OFFICE VISIT (OUTPATIENT)
Dept: NEUROLOGY | Facility: CLINIC | Age: 66
End: 2021-09-27
Payer: COMMERCIAL

## 2021-09-27 VITALS
HEART RATE: 72 BPM | WEIGHT: 249 LBS | HEIGHT: 68 IN | BODY MASS INDEX: 37.74 KG/M2 | DIASTOLIC BLOOD PRESSURE: 74 MMHG | SYSTOLIC BLOOD PRESSURE: 132 MMHG | TEMPERATURE: 97.5 F

## 2021-09-27 DIAGNOSIS — F43.10 PTSD (POST-TRAUMATIC STRESS DISORDER): ICD-10-CM

## 2021-09-27 DIAGNOSIS — R41.3 MEMORY DIFFICULTY: Primary | ICD-10-CM

## 2021-09-27 DIAGNOSIS — J47.9 BRONCHIECTASIS WITHOUT COMPLICATION (HCC): ICD-10-CM

## 2021-09-27 DIAGNOSIS — Z86.16 HISTORY OF COVID-19: ICD-10-CM

## 2021-09-27 DIAGNOSIS — G44.89 OTHER HEADACHE SYNDROME: ICD-10-CM

## 2021-09-27 PROCEDURE — 1036F TOBACCO NON-USER: CPT | Performed by: PSYCHIATRY & NEUROLOGY

## 2021-09-27 PROCEDURE — 99214 OFFICE O/P EST MOD 30 MIN: CPT | Performed by: PSYCHIATRY & NEUROLOGY

## 2021-09-27 PROCEDURE — 3008F BODY MASS INDEX DOCD: CPT | Performed by: PSYCHIATRY & NEUROLOGY

## 2021-09-27 RX ORDER — DEXAMETHASONE 4 MG/1
2 TABLET ORAL 2 TIMES DAILY
Qty: 12 G | Refills: 2 | Status: SHIPPED | OUTPATIENT
Start: 2021-09-27 | End: 2021-12-14

## 2021-09-27 NOTE — PROGRESS NOTES
Josefa Carias is a 61 y o  male  No chief complaint on file  Assessment:  1  Memory difficulty    2  Other headache syndrome    3  PTSD (post-traumatic stress disorder)    4  History of COVID-19        Plan:  Increase Neurontin to 100 mg in the morning 100 mg in the afternoon and 200 mg at night time  Continue with mentally stimulating exercises  Follow-up in 6 months  Discussion:  Patient has mild cognitive impairment with a [place] of 25/30, I did review with the patient is neuropsych testing results his memory issues could be multifactorial secondary to his history of COVID-19 posttraumatic stress disorder and other issues, we discussed medications like Aricept he would like to hold off on that and he will continue with mentally stimulating exercises I have advised him to take a multivitamin every day  He is in follow up with his orthopedic surgeon and pain specialist regarding his right hip pain his headaches are currently under control on gabapentin but he does complain of some myoclonic jerking movements at nighttime versus restless leg symptoms, have advised him to empirically increase the gabapentin to 200 mg at nighttime and see how he does, if he is having any worsening symptoms then we will back off on the gabapentin otherwise continue at the dose of 400 mg a day to keep his blood pressure cholesterol sugar under control to take safety precautions to be careful with his driving to go to the hospital if has any worsening symptoms and call me otherwise to see me back in 6 months and follow up with his other physicians      Subjective:    HPI   Patient is here in follow-up for his headaches, memory issues and as per his wife he does have some myoclonic jerking at nighttime, he continues to have some difficulty with his attention and concentration his headaches are much better the gabapentin he had complete in use to have right hip pain and is in follow up with an orthopedic surgeon and a pain specialist he denies any seizures there is no loss of consciousness no confusion no bowel and bladder incontinence according to patient's wife he has been having some restless leg movements at nighttime and some twitching movements of his legs but no seizures, he still has some issues with his breathing after his COVID-19 and is in follow up with his family physician and pulmonologist, appetite is good weight has been stable, mood is good, no other complaints      Vitals:    09/27/21 1257   BP: 132/74   BP Location: Left arm   Patient Position: Sitting   Cuff Size: Standard   Pulse: 72   Temp: 97 5 °F (36 4 °C)   TempSrc: Tympanic   Weight: 113 kg (249 lb)   Height: 5' 8" (1 727 m)       Current Medications    Current Outpatient Medications:     albuterol (2 5 mg/3 mL) 0 083 % nebulizer solution, INHALE 1 VIAL BY NEBULIZATION EVERY 6 (SIX) HOURS AS NEEDED FOR WHEEZING OR SHORTNESS OF BREATH, Disp: 375 mL, Rfl: 2    amLODIPine (NORVASC) 2 5 mg tablet, TAKE 1 TABLET (2 5 MG TOTAL) BY MOUTH DAILY AFTER DINNER, Disp: , Rfl:     Diclofenac Sodium (VOLTAREN) 1 %, Apply 2 g topically 4 (four) times a day, Disp: 100 g, Rfl: 0    escitalopram (LEXAPRO) 10 mg tablet, TAKE 1 TABLET BY MOUTH EVERY DAY IN THE MORNING, Disp: 30 tablet, Rfl: 1    Flovent  MCG/ACT inhaler, INHALE 2 PUFFS 2 (TWO) TIMES A DAY RINSE MOUTH AFTER USE , Disp: 12 g, Rfl: 1    gabapentin (NEURONTIN) 100 mg capsule, TAKE 1 CAPSULE BY MOUTH THREE TIMES A DAY, Disp: 90 capsule, Rfl: 1    ipratropium-albuterol (DUO-NEB) 0 5-2 5 mg/3 mL nebulizer solution, Take 1 vial (3 mL total) by nebulization 4 (four) times a day, Disp: 15 vial, Rfl: 0    Multiple Vitamins-Minerals (MULTIVITAMIN ADULT PO), Take 1 tablet by mouth daily, Disp: , Rfl:     naproxen sodium (ANAPROX) 550 mg tablet, Take 1 tablet (550 mg total) by mouth 2 (two) times a day as needed for mild pain or headaches, Disp: 90 tablet, Rfl: 1    tiZANidine (ZANAFLEX) 2 mg tablet, Take 1 tablet (2 mg total) by mouth daily at bedtime, Disp: 90 tablet, Rfl: 3    fluticasone (Arnuity Ellipta) 100 MCG/ACT AEPB inhaler, Inhale 1 puff daily (Patient not taking: Reported on 9/27/2021), Disp: 30 blister, Rfl: 3      Allergies  Tetracycline    Past Medical History  Past Medical History:   Diagnosis Date    COVID-19 03/2020    PTSD (post-traumatic stress disorder)          Past Surgical History:  Past Surgical History:   Procedure Laterality Date    FL INJECTION RIGHT HIP (NON ARTHROGRAM)  11/30/2020    FL INJECTION RIGHT HIP (NON ARTHROGRAM)  8/4/2021    NO PAST SURGERIES           Family History:  Family History   Problem Relation Age of Onset    Heart disease Mother     Sudden death Father     No Known Problems Son     No Known Problems Daughter     No Known Problems Maternal Grandmother     No Known Problems Maternal Grandfather     No Known Problems Paternal Grandmother     No Known Problems Paternal Grandfather     No Known Problems Daughter     Kidney disease Brother        Social History:   reports that he quit smoking about 17 months ago  His smoking use included cigarettes  He smoked 0 00 packs per day for 0 00 years  He has never used smokeless tobacco  He reports previous alcohol use  He reports that he does not use drugs  I have reviewed the past medical history, surgical history, social and family history, current medications, allergies vitals, review of systems, and updated this information as appropriate today  Objective:    Physical Exam    Neurological Exam    GENERAL:  Cooperative in no acute distress  Well-developed and well-nourished    HEAD and NECK   Head is atraumatic normocephalic with no lesions or masses  Neck is supple with full range of motion    CARDIOVASCULAR  Carotid Arteries-no carotid bruits  NEUROLOGIC:  Mental Status-the patient is awake alert and oriented without aphasia or apraxia, [place] is 25/30    Cranial Nerves: Visual fields are full to confrontation  Extraocular movements are full without nystagmus  Pupils are 2-1/2 mm and reactive  Face is symmetrical to light touch  Movements of facial expression move symmetrically  Hearing is normal to finger rub bilaterally  Soft palate lifts symmetrically  Shoulder shrug is symmetrical  Tongue is midline without atrophy  Motor: No drift is noted on arm extension  Strength is full in the upper and lower extremities with normal bulk and tone  Ambulates with a limp  ROS:  Review of Systems   Constitutional: Negative  Negative for appetite change and fever  HENT: Negative  Negative for hearing loss, tinnitus, trouble swallowing and voice change  Eyes: Negative  Negative for photophobia and pain  Respiratory: Positive for shortness of breath  Cardiovascular: Negative  Negative for palpitations  Gastrointestinal: Negative  Negative for nausea and vomiting  Endocrine: Negative  Negative for cold intolerance  Genitourinary: Negative  Negative for dysuria, frequency and urgency  Musculoskeletal: Negative  Negative for myalgias and neck pain  Skin: Negative  Negative for rash  Neurological: Positive for dizziness  Negative for tremors, seizures, syncope, facial asymmetry, speech difficulty, weakness, light-headedness, numbness and headaches  Hematological: Negative  Does not bruise/bleed easily  Psychiatric/Behavioral: Positive for confusion, hallucinations and sleep disturbance

## 2021-09-28 ENCOUNTER — TELEPHONE (OUTPATIENT)
Dept: INTERNAL MEDICINE CLINIC | Facility: CLINIC | Age: 66
End: 2021-09-28

## 2021-09-28 NOTE — TELEPHONE ENCOUNTER
Patient is asking for a letter that states the dates of his next two appointments and that he is still under the care of Alexa Bowman and is not able to work  Let patient know when letter is ready so he can pick it up and take to employer

## 2021-10-04 NOTE — BH TREATMENT PLAN
Yuri Velazquez  1955       Date of Initial Treatment Plan: 05/06/2020   Date of Current Treatment Plan: 09/22/2021    Treatment Plan Number update     Strengths/Personal Resources for Self Care: kind, generous    Diagnosis:   1  PTSD (post-traumatic stress disorder)     2  Memory difficulty     3  Depression, unspecified depression type         Area of Needs: Please see below      Long Term Goal 1: I still need to learn to cope with my curret health issues and financial issues due to having covid  Target Date: 03/15/2022  Completion Date: TBD         Short Term Objectives for Goal 1: mindfulness, distress tolerance  Long Term Goal 2: I still need to learn that I can't contol my adult childrens interactions with each other  Target Date: 03/15/2022  Completion Date: TBD    Short Term Objectives for Goal 2: mindfulness strategies         Long Term Goal # 3: N/A     Target Date: N/A  Completion Date: N/A    Short Term Objectives for Goal 3: N/A    GOAL 1: Modality: Individual 2x per month   Completion Date tbd    GOAL 2: Modality: Individual 2x per month   Completion Date tbd     GOAL 3: Modality: Individual n/ax per month   Completion Date n/a      Behavioral Health Treatment Plan [facility]: Diagnosis and Treatment Plan explained to Sofia Schulte relates understanding diagnosis and is agreeable to Treatment Plan  Client Comments : Please share your thoughts, feelings, need and/or experiences regarding your treatment plan: The client and the undersigned will work as a team to help him progress on his goals

## 2021-10-05 ENCOUNTER — TELEPHONE (OUTPATIENT)
Dept: PSYCHIATRY | Facility: CLINIC | Age: 66
End: 2021-10-05

## 2021-10-05 DIAGNOSIS — M25.551 ACUTE RIGHT HIP PAIN: ICD-10-CM

## 2021-10-06 ENCOUNTER — TELEPHONE (OUTPATIENT)
Dept: PSYCHIATRY | Facility: CLINIC | Age: 66
End: 2021-10-06

## 2021-10-06 RX ORDER — TIZANIDINE 2 MG/1
2 TABLET ORAL
Qty: 90 TABLET | Refills: 3 | Status: SHIPPED | OUTPATIENT
Start: 2021-10-06

## 2021-10-12 ENCOUNTER — OFFICE VISIT (OUTPATIENT)
Dept: INTERNAL MEDICINE CLINIC | Facility: CLINIC | Age: 66
End: 2021-10-12
Payer: COMMERCIAL

## 2021-10-12 VITALS
DIASTOLIC BLOOD PRESSURE: 72 MMHG | SYSTOLIC BLOOD PRESSURE: 118 MMHG | HEART RATE: 94 BPM | WEIGHT: 247 LBS | BODY MASS INDEX: 37.44 KG/M2 | OXYGEN SATURATION: 97 % | HEIGHT: 68 IN | TEMPERATURE: 98.9 F

## 2021-10-12 DIAGNOSIS — Z13.6 SCREENING FOR CARDIOVASCULAR CONDITION: ICD-10-CM

## 2021-10-12 DIAGNOSIS — Z86.16 HISTORY OF COVID-19: ICD-10-CM

## 2021-10-12 DIAGNOSIS — F43.10 PTSD (POST-TRAUMATIC STRESS DISORDER): ICD-10-CM

## 2021-10-12 DIAGNOSIS — M25.559 HIP PAIN: ICD-10-CM

## 2021-10-12 DIAGNOSIS — J84.9 INTERSTITIAL LUNG DISEASE (HCC): ICD-10-CM

## 2021-10-12 DIAGNOSIS — Z13.220 SCREENING CHOLESTEROL LEVEL: Primary | ICD-10-CM

## 2021-10-12 PROBLEM — G44.229 CHRONIC TENSION-TYPE HEADACHE, NOT INTRACTABLE: Status: ACTIVE | Noted: 2020-05-07

## 2021-10-12 PROCEDURE — 99214 OFFICE O/P EST MOD 30 MIN: CPT | Performed by: NURSE PRACTITIONER

## 2021-10-12 PROCEDURE — 3008F BODY MASS INDEX DOCD: CPT | Performed by: NURSE PRACTITIONER

## 2021-10-12 PROCEDURE — 1036F TOBACCO NON-USER: CPT | Performed by: NURSE PRACTITIONER

## 2021-10-12 RX ORDER — UMECLIDINIUM BROMIDE AND VILANTEROL TRIFENATATE 62.5; 25 UG/1; UG/1
1 POWDER RESPIRATORY (INHALATION) DAILY
Qty: 14 BLISTER | Refills: 3 | Status: SHIPPED | OUTPATIENT
Start: 2021-10-12 | End: 2021-12-29 | Stop reason: SDUPTHER

## 2021-10-18 ENCOUNTER — TELEPHONE (OUTPATIENT)
Dept: INTERNAL MEDICINE CLINIC | Facility: CLINIC | Age: 66
End: 2021-10-18

## 2021-10-18 ENCOUNTER — TELEPHONE (OUTPATIENT)
Dept: PSYCHIATRY | Facility: CLINIC | Age: 66
End: 2021-10-18

## 2021-10-18 ENCOUNTER — TELEPHONE (OUTPATIENT)
Dept: OTHER | Facility: OTHER | Age: 66
End: 2021-10-18

## 2021-10-18 ENCOUNTER — TELEMEDICINE (OUTPATIENT)
Dept: PSYCHIATRY | Facility: CLINIC | Age: 66
End: 2021-10-18
Payer: COMMERCIAL

## 2021-10-18 DIAGNOSIS — Z86.16 HISTORY OF COVID-19: ICD-10-CM

## 2021-10-18 DIAGNOSIS — J84.9 INTERSTITIAL LUNG DISEASE (HCC): ICD-10-CM

## 2021-10-18 DIAGNOSIS — F32.A DEPRESSION, UNSPECIFIED DEPRESSION TYPE: ICD-10-CM

## 2021-10-18 DIAGNOSIS — F43.10 PTSD (POST-TRAUMATIC STRESS DISORDER): ICD-10-CM

## 2021-10-18 PROCEDURE — 99212 OFFICE O/P EST SF 10 MIN: CPT | Performed by: PHYSICIAN ASSISTANT

## 2021-10-18 RX ORDER — UMECLIDINIUM BROMIDE AND VILANTEROL TRIFENATATE 62.5; 25 UG/1; UG/1
1 POWDER RESPIRATORY (INHALATION) DAILY
Qty: 14 BLISTER | Refills: 0 | Status: CANCELLED | OUTPATIENT
Start: 2021-10-18

## 2021-10-18 RX ORDER — QUETIAPINE FUMARATE 50 MG/1
50 TABLET, FILM COATED ORAL
Qty: 30 TABLET | Refills: 1 | Status: SHIPPED | OUTPATIENT
Start: 2021-10-18 | End: 2021-11-30 | Stop reason: ALTCHOICE

## 2021-10-18 RX ORDER — ESCITALOPRAM OXALATE 10 MG/1
10 TABLET ORAL EVERY MORNING
Qty: 30 TABLET | Refills: 1 | Status: SHIPPED | OUTPATIENT
Start: 2021-10-18 | End: 2021-11-30 | Stop reason: SDUPTHER

## 2021-10-20 ENCOUNTER — TELEPHONE (OUTPATIENT)
Dept: PSYCHIATRY | Facility: CLINIC | Age: 66
End: 2021-10-20

## 2021-10-20 ENCOUNTER — TELEMEDICINE (OUTPATIENT)
Dept: BEHAVIORAL/MENTAL HEALTH CLINIC | Facility: CLINIC | Age: 66
End: 2021-10-20
Payer: COMMERCIAL

## 2021-10-20 DIAGNOSIS — F43.10 PTSD (POST-TRAUMATIC STRESS DISORDER): Primary | ICD-10-CM

## 2021-10-20 DIAGNOSIS — Z63.4 EXPECTED BEREAVEMENT DUE TO LIFE EVENT: ICD-10-CM

## 2021-10-20 DIAGNOSIS — F33.2 MAJOR DEPRESSIVE DISORDER, RECURRENT SEVERE WITHOUT PSYCHOTIC FEATURES (HCC): ICD-10-CM

## 2021-10-20 DIAGNOSIS — R41.3 MEMORY DIFFICULTY: ICD-10-CM

## 2021-10-20 PROCEDURE — 90834 PSYTX W PT 45 MINUTES: CPT | Performed by: SOCIAL WORKER

## 2021-10-20 SDOH — SOCIAL STABILITY - SOCIAL INSECURITY: DISSAPEARANCE AND DEATH OF FAMILY MEMBER: Z63.4

## 2021-10-23 ENCOUNTER — TELEPHONE (OUTPATIENT)
Dept: INTERNAL MEDICINE CLINIC | Facility: CLINIC | Age: 66
End: 2021-10-23

## 2021-11-01 DIAGNOSIS — R20.2 NUMBNESS AND TINGLING: ICD-10-CM

## 2021-11-01 DIAGNOSIS — R20.0 NUMBNESS AND TINGLING: ICD-10-CM

## 2021-11-01 DIAGNOSIS — G44.52 NEW DAILY PERSISTENT HEADACHE: ICD-10-CM

## 2021-11-01 RX ORDER — GABAPENTIN 100 MG/1
100 CAPSULE ORAL 3 TIMES DAILY
Qty: 270 CAPSULE | Refills: 6 | Status: SHIPPED | OUTPATIENT
Start: 2021-11-01 | End: 2021-11-30 | Stop reason: SDUPTHER

## 2021-11-03 ENCOUNTER — TELEPHONE (OUTPATIENT)
Dept: INTERNAL MEDICINE CLINIC | Facility: CLINIC | Age: 66
End: 2021-11-03

## 2021-11-22 ENCOUNTER — OFFICE VISIT (OUTPATIENT)
Dept: PULMONOLOGY | Facility: CLINIC | Age: 66
End: 2021-11-22
Payer: COMMERCIAL

## 2021-11-22 VITALS
HEART RATE: 81 BPM | OXYGEN SATURATION: 95 % | TEMPERATURE: 97.8 F | SYSTOLIC BLOOD PRESSURE: 116 MMHG | HEIGHT: 68 IN | BODY MASS INDEX: 37.59 KG/M2 | DIASTOLIC BLOOD PRESSURE: 68 MMHG | WEIGHT: 248 LBS

## 2021-11-22 DIAGNOSIS — R06.00 DYSPNEA ON EXERTION: Primary | ICD-10-CM

## 2021-11-22 DIAGNOSIS — Z86.16 HISTORY OF COVID-19: ICD-10-CM

## 2021-11-22 DIAGNOSIS — J47.9 BRONCHIECTASIS WITHOUT COMPLICATION (HCC): ICD-10-CM

## 2021-11-22 PROCEDURE — 3008F BODY MASS INDEX DOCD: CPT | Performed by: PHYSICIAN ASSISTANT

## 2021-11-22 PROCEDURE — 1036F TOBACCO NON-USER: CPT | Performed by: PHYSICIAN ASSISTANT

## 2021-11-22 PROCEDURE — 99214 OFFICE O/P EST MOD 30 MIN: CPT | Performed by: PHYSICIAN ASSISTANT

## 2021-11-23 ENCOUNTER — TELEPHONE (OUTPATIENT)
Dept: PULMONOLOGY | Facility: CLINIC | Age: 66
End: 2021-11-23

## 2021-11-30 ENCOUNTER — OFFICE VISIT (OUTPATIENT)
Dept: PSYCHIATRY | Facility: CLINIC | Age: 66
End: 2021-11-30
Payer: COMMERCIAL

## 2021-11-30 DIAGNOSIS — F43.10 PTSD (POST-TRAUMATIC STRESS DISORDER): ICD-10-CM

## 2021-11-30 DIAGNOSIS — G44.52 NEW DAILY PERSISTENT HEADACHE: ICD-10-CM

## 2021-11-30 DIAGNOSIS — R20.2 NUMBNESS AND TINGLING: ICD-10-CM

## 2021-11-30 DIAGNOSIS — F32.A DEPRESSION, UNSPECIFIED DEPRESSION TYPE: Primary | ICD-10-CM

## 2021-11-30 DIAGNOSIS — G25.81 RESTLESS LEGS: ICD-10-CM

## 2021-11-30 DIAGNOSIS — R20.0 NUMBNESS AND TINGLING: ICD-10-CM

## 2021-11-30 PROCEDURE — 99214 OFFICE O/P EST MOD 30 MIN: CPT | Performed by: PHYSICIAN ASSISTANT

## 2021-11-30 RX ORDER — ESCITALOPRAM OXALATE 10 MG/1
10 TABLET ORAL EVERY MORNING
Qty: 30 TABLET | Refills: 1 | Status: SHIPPED | OUTPATIENT
Start: 2021-11-30 | End: 2021-12-27 | Stop reason: SDUPTHER

## 2021-11-30 RX ORDER — GABAPENTIN 100 MG/1
200 CAPSULE ORAL 3 TIMES DAILY
COMMUNITY
Start: 2021-11-30 | End: 2021-12-27 | Stop reason: SDUPTHER

## 2021-11-30 RX ORDER — ROPINIROLE 0.25 MG/1
0.25 TABLET, FILM COATED ORAL
Qty: 60 TABLET | Refills: 0 | Status: SHIPPED | OUTPATIENT
Start: 2021-11-30 | End: 2021-12-27 | Stop reason: DRUGHIGH

## 2021-12-10 ENCOUNTER — TELEMEDICINE (OUTPATIENT)
Dept: BEHAVIORAL/MENTAL HEALTH CLINIC | Facility: CLINIC | Age: 66
End: 2021-12-10
Payer: COMMERCIAL

## 2021-12-10 DIAGNOSIS — F32.A DEPRESSION, UNSPECIFIED DEPRESSION TYPE: ICD-10-CM

## 2021-12-10 DIAGNOSIS — F43.10 PTSD (POST-TRAUMATIC STRESS DISORDER): Primary | ICD-10-CM

## 2021-12-10 PROCEDURE — 90834 PSYTX W PT 45 MINUTES: CPT | Performed by: SOCIAL WORKER

## 2021-12-14 DIAGNOSIS — J47.9 BRONCHIECTASIS WITHOUT COMPLICATION (HCC): ICD-10-CM

## 2021-12-14 RX ORDER — DEXAMETHASONE 4 MG/1
2 TABLET ORAL 2 TIMES DAILY
Qty: 12 G | Refills: 2 | Status: SHIPPED | OUTPATIENT
Start: 2021-12-14 | End: 2022-03-09

## 2021-12-15 ENCOUNTER — TELEPHONE (OUTPATIENT)
Dept: OBGYN CLINIC | Facility: HOSPITAL | Age: 66
End: 2021-12-15

## 2021-12-15 DIAGNOSIS — M16.11 PRIMARY OSTEOARTHRITIS OF RIGHT HIP: Primary | ICD-10-CM

## 2021-12-21 DIAGNOSIS — J84.9 INTERSTITIAL LUNG DISEASE (HCC): ICD-10-CM

## 2021-12-21 RX ORDER — ALBUTEROL SULFATE 2.5 MG/3ML
SOLUTION RESPIRATORY (INHALATION)
Qty: 375 ML | Refills: 2 | Status: SHIPPED | OUTPATIENT
Start: 2021-12-21 | End: 2022-03-09

## 2021-12-27 ENCOUNTER — TELEMEDICINE (OUTPATIENT)
Dept: PSYCHIATRY | Facility: CLINIC | Age: 66
End: 2021-12-27
Payer: COMMERCIAL

## 2021-12-27 ENCOUNTER — HOSPITAL ENCOUNTER (OUTPATIENT)
Dept: RADIOLOGY | Facility: HOSPITAL | Age: 66
Discharge: HOME | End: 2021-12-27
Payer: OTHER MISCELLANEOUS

## 2021-12-27 DIAGNOSIS — F43.10 PTSD (POST-TRAUMATIC STRESS DISORDER): ICD-10-CM

## 2021-12-27 DIAGNOSIS — R45.1 MOTOR RESTLESSNESS: ICD-10-CM

## 2021-12-27 DIAGNOSIS — R20.0 NUMBNESS AND TINGLING: ICD-10-CM

## 2021-12-27 DIAGNOSIS — M16.11 PRIMARY OSTEOARTHRITIS OF RIGHT HIP: ICD-10-CM

## 2021-12-27 DIAGNOSIS — F32.A DEPRESSION, UNSPECIFIED DEPRESSION TYPE: Primary | ICD-10-CM

## 2021-12-27 DIAGNOSIS — R20.2 NUMBNESS AND TINGLING: ICD-10-CM

## 2021-12-27 DIAGNOSIS — G44.52 NEW DAILY PERSISTENT HEADACHE: ICD-10-CM

## 2021-12-27 PROCEDURE — 99213 OFFICE O/P EST LOW 20 MIN: CPT | Performed by: PHYSICIAN ASSISTANT

## 2021-12-27 PROCEDURE — 20610 DRAIN/INJ JOINT/BURSA W/O US: CPT

## 2021-12-27 PROCEDURE — 77002 NEEDLE LOCALIZATION BY XRAY: CPT

## 2021-12-27 RX ORDER — LIDOCAINE HYDROCHLORIDE 10 MG/ML
10 INJECTION, SOLUTION EPIDURAL; INFILTRATION; INTRACAUDAL; PERINEURAL ONCE
Status: COMPLETED | OUTPATIENT
Start: 2021-12-27 | End: 2021-12-27

## 2021-12-27 RX ORDER — BUPIVACAINE HYDROCHLORIDE 5 MG/ML
10 INJECTION, SOLUTION PERINEURAL ONCE
Status: COMPLETED | OUTPATIENT
Start: 2021-12-27 | End: 2021-12-27

## 2021-12-27 RX ORDER — ROPINIROLE 0.5 MG/1
0.5 TABLET, FILM COATED ORAL
Qty: 30 TABLET | Refills: 1 | Status: SHIPPED | OUTPATIENT
Start: 2021-12-27 | End: 2022-01-28 | Stop reason: DRUGHIGH

## 2021-12-27 RX ORDER — ESCITALOPRAM OXALATE 10 MG/1
10 TABLET ORAL EVERY MORNING
Qty: 30 TABLET | Refills: 1 | Status: SHIPPED | OUTPATIENT
Start: 2021-12-27 | End: 2022-02-07 | Stop reason: SDUPTHER

## 2021-12-27 RX ORDER — METHYLPREDNISOLONE ACETATE 80 MG/ML
80 INJECTION, SUSPENSION INTRA-ARTICULAR; INTRALESIONAL; INTRAMUSCULAR; SOFT TISSUE ONCE
Status: COMPLETED | OUTPATIENT
Start: 2021-12-27 | End: 2021-12-27

## 2021-12-27 RX ORDER — SODIUM CHLORIDE 9 MG/ML
5 INJECTION INTRAVENOUS ONCE
Status: DISCONTINUED | OUTPATIENT
Start: 2021-12-27 | End: 2021-12-28 | Stop reason: HOSPADM

## 2021-12-27 RX ORDER — GABAPENTIN 100 MG/1
200 CAPSULE ORAL 3 TIMES DAILY
Qty: 180 CAPSULE | Refills: 1 | Status: SHIPPED | OUTPATIENT
Start: 2021-12-27 | End: 2022-02-07 | Stop reason: SDUPTHER

## 2021-12-27 RX ADMIN — BUPIVACAINE HYDROCHLORIDE 10 ML: 5 INJECTION, SOLUTION PERINEURAL at 10:15

## 2021-12-27 RX ADMIN — IOHEXOL 10 ML: 300 INJECTION, SOLUTION INTRAVENOUS at 11:00

## 2021-12-27 RX ADMIN — LIDOCAINE HYDROCHLORIDE 10 ML: 10 INJECTION, SOLUTION EPIDURAL; INFILTRATION; INTRACAUDAL at 11:00

## 2021-12-27 RX ADMIN — METHYLPREDNISOLONE ACETATE 80 MG: 80 INJECTION, SUSPENSION INTRA-ARTICULAR; INTRALESIONAL; INTRAMUSCULAR; SOFT TISSUE at 10:15

## 2021-12-29 ENCOUNTER — OFFICE VISIT (OUTPATIENT)
Dept: INTERNAL MEDICINE CLINIC | Facility: CLINIC | Age: 66
End: 2021-12-29
Payer: OTHER MISCELLANEOUS

## 2021-12-29 ENCOUNTER — TELEPHONE (OUTPATIENT)
Dept: INTERNAL MEDICINE CLINIC | Facility: CLINIC | Age: 66
End: 2021-12-29

## 2021-12-29 VITALS
SYSTOLIC BLOOD PRESSURE: 130 MMHG | OXYGEN SATURATION: 96 % | HEART RATE: 90 BPM | WEIGHT: 255 LBS | RESPIRATION RATE: 18 BRPM | TEMPERATURE: 97.5 F | HEIGHT: 68 IN | BODY MASS INDEX: 38.65 KG/M2 | DIASTOLIC BLOOD PRESSURE: 72 MMHG

## 2021-12-29 DIAGNOSIS — E55.9 VITAMIN D DEFICIENCY: Primary | ICD-10-CM

## 2021-12-29 DIAGNOSIS — U07.1 COVID-19 VIRUS INFECTION: ICD-10-CM

## 2021-12-29 DIAGNOSIS — Z13.1 SCREENING FOR DIABETES MELLITUS: ICD-10-CM

## 2021-12-29 DIAGNOSIS — J84.9 INTERSTITIAL LUNG DISEASE (HCC): ICD-10-CM

## 2021-12-29 DIAGNOSIS — Z86.16 HISTORY OF COVID-19: ICD-10-CM

## 2021-12-29 DIAGNOSIS — Z12.5 SCREENING PSA (PROSTATE SPECIFIC ANTIGEN): ICD-10-CM

## 2021-12-29 PROCEDURE — 99213 OFFICE O/P EST LOW 20 MIN: CPT | Performed by: NURSE PRACTITIONER

## 2021-12-29 PROCEDURE — 3008F BODY MASS INDEX DOCD: CPT | Performed by: PHYSICIAN ASSISTANT

## 2021-12-29 RX ORDER — IPRATROPIUM BROMIDE AND ALBUTEROL SULFATE 2.5; .5 MG/3ML; MG/3ML
3 SOLUTION RESPIRATORY (INHALATION) 4 TIMES DAILY
Qty: 75 ML | Refills: 5 | Status: SHIPPED | OUTPATIENT
Start: 2021-12-29

## 2021-12-29 RX ORDER — UMECLIDINIUM BROMIDE AND VILANTEROL TRIFENATATE 62.5; 25 UG/1; UG/1
1 POWDER RESPIRATORY (INHALATION) DAILY
Qty: 14 BLISTER | Refills: 3 | Status: SHIPPED | OUTPATIENT
Start: 2021-12-29 | End: 2022-04-29

## 2022-01-11 ENCOUNTER — TELEMEDICINE (OUTPATIENT)
Dept: BEHAVIORAL/MENTAL HEALTH CLINIC | Facility: CLINIC | Age: 67
End: 2022-01-11
Payer: COMMERCIAL

## 2022-01-11 DIAGNOSIS — F43.10 PTSD (POST-TRAUMATIC STRESS DISORDER): ICD-10-CM

## 2022-01-11 DIAGNOSIS — F32.A DEPRESSION, UNSPECIFIED DEPRESSION TYPE: Primary | ICD-10-CM

## 2022-01-11 PROCEDURE — 90834 PSYTX W PT 45 MINUTES: CPT | Performed by: SOCIAL WORKER

## 2022-01-13 ENCOUNTER — DOCUMENTATION (OUTPATIENT)
Dept: PSYCHIATRY | Facility: CLINIC | Age: 67
End: 2022-01-13

## 2022-01-13 ENCOUNTER — TELEPHONE (OUTPATIENT)
Dept: PSYCHIATRY | Facility: CLINIC | Age: 67
End: 2022-01-13

## 2022-01-13 NOTE — TELEPHONE ENCOUNTER
Patient calling letting Jose Luis know that he is gaining weight  He feels his medication is causing the weight gain  He stated that this is to much right now  Please advise

## 2022-01-13 NOTE — PSYCH
Dell Ren called c/o weight gain  Is on neurontin, lexapro, requip  Did stop seroquel per previous order  Weight gain most likely from neurontin - unclear benefit at this point  Plan: decrease neurontin to 100 mg tid (from 200 mg tid)    If after 7-10 days still with weight gain, ok to stop neurontin or keep at this dose until appt in Feb

## 2022-01-27 ENCOUNTER — TELEPHONE (OUTPATIENT)
Dept: PSYCHIATRY | Facility: CLINIC | Age: 67
End: 2022-01-27

## 2022-01-27 NOTE — TELEPHONE ENCOUNTER
Spoke with the pharmacy, the patient picked up his Requip 0 5 mg on 01/20/2022, the patient is currently out of medication  Patient is not due for a new script until the middle of February  The patient is taking 2-0 5 mg at bedtime instead of 1- 0 5 mg  Did make the patient aware that Pa-C was out of the office the time of the call

## 2022-01-28 DIAGNOSIS — G25.81 RESTLESS LEGS: Primary | ICD-10-CM

## 2022-01-28 RX ORDER — ROPINIROLE 1 MG/1
1 TABLET, FILM COATED ORAL
Qty: 30 TABLET | Refills: 1 | Status: SHIPPED | OUTPATIENT
Start: 2022-01-28 | End: 2022-04-01

## 2022-02-04 ENCOUNTER — IMMUNIZATIONS (OUTPATIENT)
Dept: FAMILY MEDICINE CLINIC | Facility: HOSPITAL | Age: 67
End: 2022-02-04

## 2022-02-04 DIAGNOSIS — Z23 ENCOUNTER FOR IMMUNIZATION: Primary | ICD-10-CM

## 2022-02-04 PROCEDURE — 0001A COVID-19 PFIZER VACC 0.3 ML: CPT

## 2022-02-04 PROCEDURE — 91300 COVID-19 PFIZER VACC 0.3 ML: CPT

## 2022-02-07 ENCOUNTER — TELEMEDICINE (OUTPATIENT)
Dept: PSYCHIATRY | Facility: CLINIC | Age: 67
End: 2022-02-07
Payer: OTHER MISCELLANEOUS

## 2022-02-07 DIAGNOSIS — R20.0 NUMBNESS AND TINGLING: ICD-10-CM

## 2022-02-07 DIAGNOSIS — G44.52 NEW DAILY PERSISTENT HEADACHE: ICD-10-CM

## 2022-02-07 DIAGNOSIS — F32.A DEPRESSION, UNSPECIFIED DEPRESSION TYPE: ICD-10-CM

## 2022-02-07 DIAGNOSIS — F43.10 PTSD (POST-TRAUMATIC STRESS DISORDER): ICD-10-CM

## 2022-02-07 DIAGNOSIS — R20.2 NUMBNESS AND TINGLING: ICD-10-CM

## 2022-02-07 PROCEDURE — 99213 OFFICE O/P EST LOW 20 MIN: CPT | Performed by: PHYSICIAN ASSISTANT

## 2022-02-07 RX ORDER — GABAPENTIN 100 MG/1
100 CAPSULE ORAL 3 TIMES DAILY
Qty: 90 CAPSULE | Refills: 2 | Status: SHIPPED | OUTPATIENT
Start: 2022-02-07 | End: 2022-05-02 | Stop reason: SDUPTHER

## 2022-02-07 RX ORDER — ESCITALOPRAM OXALATE 10 MG/1
10 TABLET ORAL EVERY MORNING
Qty: 30 TABLET | Refills: 2 | Status: SHIPPED | OUTPATIENT
Start: 2022-02-07 | End: 2022-04-04 | Stop reason: ALTCHOICE

## 2022-02-07 NOTE — PSYCH
Virtual Regular Visit    Verification of patient location:    Patient is located in the following state in which I hold an active license [place]                 Reason for visit is   Chief Complaint   Patient presents with    Virtual Regular Visit        Encounter provider Carlitos Spain PA-C    Provider located at [address]  [phone]      Recent Visits  No visits were found meeting these conditions  Showing recent visits within past 7 days and meeting all other requirements  Future Appointments  No visits were found meeting these conditions  Showing future appointments within next 150 days and meeting all other requirements       The patient was identified by name and date of birth  Oscar Ferguson was informed that this is a telemedicine visit and that the visit is being conducted through[employer] Embedded and patient was informed this is a secure, HIPAA-complaint platform  He agrees to proceed     My office door was closed  No one else was in the room  He acknowledged consent and understanding of privacy and security of the video platform  The patient has agreed to participate and understands they can discontinue the visit at any time  Patient is aware this is a billable service  VIRTUAL VISIT DISCLAIMER    Oscar Ferguson verbally agrees to participate in [employer]  Pt is aware that [employer] could be limited without vital signs or the ability to perform a full hands-on physical Tracey Duncan understands he or the provider may request at any time to terminate the video visit and request the patient to seek care or treatment in person        MEDICATION MANAGEMENT NOTE        [facility] [address] [address]  [phone]        Name and Date of Birth:  Oscar Ferguson 61 y o  1955    Date of Visit:  February 7, 2022  SUBJECTIVE:      Thaddeus De seen by [facility] 12/27 at which time requip increased  Neurontin was decreased since last visit secondary to Devon's concern about weight gain  Thaddeus De continues to see Dominique Jeff for ind therapy  Notes no worsening of symptoms with decrease in neurontin  Restless legs have lessened  Sleep is variable  Is pretty down in the face of continuing physical limitations secondary to long covid- demario shortness of breath on exertion  Nightmares and flashbacks still occur and recently had an experience where nurse caring for him in OP office looked like a nurse on the covid unit  Also describes losing track of time during the day, especially when not engaged in activity  Review of Systems   Respiratory: Positive for shortness of breath  Psychiatric/Behavioral: Positive for sleep disturbance  Psychiatric History     This office since 5/7/20  No IP or suicide attempts  Trauma/Loss History       ARDS secondary to covid- intubated x 5 days, April 2020  Lost brother and coworkers to [employer]  Has been too ill to return to work  Social History        Lives with wife Abhinav Aldana -  >40 yrs  2 daugthers and a son  Worked at [employer] x 40 yrs -            OBJECTIVE:     MENTAL STATUS EXAM  Appearance:  age appropriate   Behavior:  Pleasant & cooperative   Speech:  Normal volume, regular rate and rhythm   Mood:  low   Affect:  mood congruent   Language: intact and appropriate for age, education, and intellect   Thought Process:  goal directed   Associations: intact associations   Thought Content:  negative ruminations   Perceptual Disturbances: no auditory or visual hallcunations   Risk Potential / Abnormal Thoughts: Suicidal ideation - None  Homicidal ideation - None  Potential for aggression - No       Consciousness:  Alert & Awake   Sensorium:  Grossly oriented   Attention: attention span and concentration are age appropriate       Fund of Knowledge:  Memory: awareness of current events: yes  recent and remote memory grossly intact   Insight:  good   Judgment: good       Lab Review: I have reviewed all pertinent labs    Lab Results   Component Value Date    CHOLESTEROL 196 04/06/2021     Lab Results   Component Value Date    HDL 41 04/06/2021     Lab Results   Component Value Date    TRIG 113 04/06/2021     Lab Results   Component Value Date    NONHDLC 155 04/06/2021     Lab Results   Component Value Date    SODIUM 140 04/06/2021    K 4 3 04/06/2021     04/06/2021    CO2 26 04/06/2021    AGAP 6 04/06/2021    BUN 18 04/06/2021    CREATININE 1 36 (H) 04/06/2021    GLUC 164 (H) 04/12/2020    GLUF 94 04/06/2021    CALCIUM 9 2 04/06/2021    AST 28 04/06/2021    ALT 44 04/06/2021    ALKPHOS 109 04/06/2021    TP 7 5 04/06/2021    TBILI 0 47 04/06/2021    EGFR 55 04/06/2021     Lab Results   Component Value Date    WBC 9 53 04/06/2021    HGB 16 3 04/06/2021    HCT 50 5 (H) 04/06/2021    MCV 87 04/06/2021     04/06/2021       Lab Results   Component Value Date    APN2IOZQLNFL 2 240 04/06/2021           ASSESSMENT & PLAN          Diagnoses and all orders for this visit:    PTSD (post-traumatic stress disorder)  -     escitalopram (LEXAPRO) 10 mg tablet; Take 1 tablet (10 mg total) by mouth every morning    Depression, unspecified depression type  -     escitalopram (LEXAPRO) 10 mg tablet; Take 1 tablet (10 mg total) by mouth every morning    New daily persistent headache  -     gabapentin (NEURONTIN) 100 mg capsule; Take 1 capsule (100 mg total) by mouth 3 (three) times a day    Numbness and tingling  -     gabapentin (NEURONTIN) 100 mg capsule;  Take 1 capsule (100 mg total) by mouth 3 (three) times a day        Current Outpatient Medications   Medication Sig Dispense Refill    albuterol (2 5 mg/3 mL) 0 083 % nebulizer solution INHALE 1 VIAL BY NEBULIZATION EVERY 6 (SIX) HOURS AS NEEDED FOR WHEEZING OR SHORTNESS OF BREATH 375 mL 2    amLODIPine (NORVASC) 2 5 mg tablet TAKE 1 TABLET (2 5 MG TOTAL) BY MOUTH DAILY AFTER DINNER      Diclofenac Sodium (VOLTAREN) 1 % Apply 2 g topically 4 (four) times a day 100 g 0    escitalopram (LEXAPRO) 10 mg tablet Take 1 tablet (10 mg total) by mouth every morning 30 tablet 2    Flovent  MCG/ACT inhaler INHALE 2 PUFFS 2 (TWO) TIMES A DAY RINSE MOUTH AFTER USE  12 g 2    gabapentin (NEURONTIN) 100 mg capsule Take 1 capsule (100 mg total) by mouth 3 (three) times a day 90 capsule 2    ipratropium-albuterol (DUO-NEB) 0 5-2 5 mg/3 mL nebulizer solution Take 3 mL by nebulization 4 (four) times a day 75 mL 5    Multiple Vitamins-Minerals (MULTIVITAMIN ADULT PO) Take 1 tablet by mouth daily      naproxen sodium (ANAPROX) 550 mg tablet Take 1 tablet (550 mg total) by mouth 2 (two) times a day as needed for mild pain or headaches 90 tablet 1    rOPINIRole (REQUIP) 1 mg tablet Take 1 tablet (1 mg total) by mouth daily at bedtime 30 tablet 1    tiZANidine (ZANAFLEX) 2 mg tablet TAKE 1 TABLET (2 MG TOTAL) BY MOUTH DAILY AT BEDTIME 90 tablet 3    umeclidinium-vilanterol (Anoro Ellipta) 62 5-25 MCG/INH inhaler Inhale 1 puff daily 14 blister 3     No current facility-administered medications for this visit  Plan:            Discussed making a daily schedule to help with keeping track of time  Cont meds unchanged- requip 1 mg q bedtime, neurontin 100 mg tid, lexapro 10 mg/d  Reviewed risks, benefits, side effects of medications, including no medication  Patient understands and agrees to treatment plan  F/u ADAM 8 weeks, sooner prn      Patient has been informed of 24 hours and weekend coverage for urgent situations accessed by calling the main clinic phone number       Mary Fernandez PA-C

## 2022-03-01 ENCOUNTER — TELEMEDICINE (OUTPATIENT)
Dept: BEHAVIORAL/MENTAL HEALTH CLINIC | Facility: CLINIC | Age: 67
End: 2022-03-01
Payer: COMMERCIAL

## 2022-03-01 DIAGNOSIS — F43.10 PTSD (POST-TRAUMATIC STRESS DISORDER): Primary | ICD-10-CM

## 2022-03-01 DIAGNOSIS — F32.A DEPRESSION, UNSPECIFIED DEPRESSION TYPE: ICD-10-CM

## 2022-03-01 PROCEDURE — 90834 PSYTX W PT 45 MINUTES: CPT | Performed by: SOCIAL WORKER

## 2022-03-01 NOTE — PSYCH
Psychotherapy Provided: Individual Psychotherapy 45 minutes     Length of time in session: 45 minutes, follow up in 2 week    Depression unspecified, PTSD    Goals addressed in session: Goal 1, Goal 2 and Goal 3      Pain:      moderate to severe    0    Current suicide risk : Low     Data:Galileo continues to isolate in his home  He has been in his house for 2 years  He is struggling financially and health wise  He still has serious breathing issues along with night terrors  He has issues with his hip  He does see his adult children  He has to go up to [place] to have the disability company see one of their MD's  Assessment: Scott Sorensen denies suicidal/homicidal ideation, plan or intent  However he is depressed/anxious over the fact his health and finances are not good  He realizes after talking he needs to start getting out of the house  We continue to work on mindfulness , distress tolerance and radical acceptance  Plan: Continue to skill build in distress tolerance  Behavioral Health Treatment Plan [facility]: Diagnosis and Treatment Plan explained to Cinthya Bañuelos relates understanding diagnosis and is agreeable to Treatment Plan   Yes

## 2022-03-09 DIAGNOSIS — J84.9 INTERSTITIAL LUNG DISEASE (HCC): ICD-10-CM

## 2022-03-09 DIAGNOSIS — J47.9 BRONCHIECTASIS WITHOUT COMPLICATION (HCC): ICD-10-CM

## 2022-03-09 RX ORDER — DEXAMETHASONE 4 MG/1
TABLET ORAL
Qty: 12 G | Refills: 2 | Status: SHIPPED | OUTPATIENT
Start: 2022-03-09 | End: 2022-06-07

## 2022-03-09 RX ORDER — ALBUTEROL SULFATE 2.5 MG/3ML
SOLUTION RESPIRATORY (INHALATION)
Qty: 375 ML | Refills: 2 | Status: SHIPPED | OUTPATIENT
Start: 2022-03-09 | End: 2022-06-20

## 2022-03-16 ENCOUNTER — TELEPHONE (OUTPATIENT)
Dept: OBGYN CLINIC | Facility: HOSPITAL | Age: 67
End: 2022-03-16

## 2022-03-16 NOTE — TELEPHONE ENCOUNTER
DR Cortes   RE: injection    Patient left a voicemail asking for R hip FL injection to be ordered/scheudled     I called patient back and advised we received his request    Patient satisfied with call

## 2022-03-17 ENCOUNTER — TELEMEDICINE (OUTPATIENT)
Dept: BEHAVIORAL/MENTAL HEALTH CLINIC | Facility: CLINIC | Age: 67
End: 2022-03-17
Payer: COMMERCIAL

## 2022-03-17 DIAGNOSIS — R41.3 MEMORY DIFFICULTY: ICD-10-CM

## 2022-03-17 DIAGNOSIS — F43.11 ACUTE POST-TRAUMATIC STRESS DISORDER: ICD-10-CM

## 2022-03-17 DIAGNOSIS — F32.A DEPRESSION, UNSPECIFIED DEPRESSION TYPE: Primary | ICD-10-CM

## 2022-03-17 PROCEDURE — 90834 PSYTX W PT 45 MINUTES: CPT | Performed by: SOCIAL WORKER

## 2022-03-17 NOTE — BH TREATMENT PLAN
Austin Mom  1955       Date of Initial Treatment Plan: 05/06/2020   Date of Current Treatment Plan: 03/01/2022    Treatment Plan Number update    Strengths/Personal Resources for Self Care: kind, generous    Diagnosis:   1  PTSD (post-traumatic stress disorder)     2  Depression, unspecified depression type         Area of Needs: please see below      Long Term Goal 1: I continue to have depression and I need to manage it better  Target Date: 08/25/2022  Completion Date: TBD         Short Term Objectives for Goal 1: mindfulness, CBT    Long Term Goal 2: I need to cope better with my multiple health issues and my financial issues    Target Date: 08/25/2022  Completion Date: TBD    Short Term Objectives for Goal 2: I will learn coping and mindfulness strategies         Long Term Goal # 3: N/A     Target Date: N/A  Completion Date: N/A    Short Term Objectives for Goal 3: N/A    GOAL 1: Modality: Individual 2x per month   Completion Date tbd    GOAL 2: Modality: Individual 2x per month   Completion Date tbd     GOAL 3: Modality: Individual 2x per month   Completion Date tbd      Behavioral Health Treatment Plan [facility]: Diagnosis and Treatment Plan explained to Cinthya Bañuelos relates understanding diagnosis and is agreeable to Treatment Plan         Client Comments : Please share your thoughts, feelings, need and/or experiences regarding your treatment plan: Scott Sorensen and the undersigned will work as a team to help him progress on his goals

## 2022-03-17 NOTE — PSYCH
Virtual Regular Visit    Verification of patient location:    Patient is located in the following state in which I hold an active license [place]      Assessment/Plan:    Problem List Items Addressed This Visit     None          Goals addressed in session: Goal 1, Goal 2 and Goal 3           Reason for visit is   Chief Complaint   Patient presents with    Virtual Regular Visit        Encounter provider Cammie Louis    Provider located at [address] [place] [address] Soraya  [facility] [address]  [phone]      Recent Visits  No visits were found meeting these conditions  Showing recent visits within past 7 days and meeting all other requirements  Future Appointments  No visits were found meeting these conditions  Showing future appointments within next 150 days and meeting all other requirements       The patient was identified by name and date of birth  Aldo Patient was informed that this is a telemedicine visit and that the visit is being conducted through[employer]onder[employer] and patient was informed that this is a secure, HIPAA-compliant platform  He agrees to proceed     My office door was closed  No one else was in the room  He acknowledged consent and understanding of privacy and security of the video platform  The patient has agreed to participate and understands they can discontinue the visit at any time  Patient is aware this is a billable service  Subjective  Aldo Patient is a 61 y o  male          HPI     Past Medical History:   Diagnosis Date    COVID-19 03/2020    PTSD (post-traumatic stress disorder)        Past Surgical History:   Procedure Laterality Date    FL INJECTION RIGHT HIP (NON ARTHROGRAM)  11/30/2020    FL INJECTION RIGHT HIP (NON ARTHROGRAM)  8/4/2021    FL INJECTION RIGHT HIP (NON ARTHROGRAM)  12/27/2021    NO PAST SURGERIES         Current Outpatient Medications   Medication Sig Dispense Refill    albuterol (2 5 mg/3 mL) 0 083 % nebulizer solution INHALE 1 VIAL BY NEBULIZATION EVERY 6 (SIX) HOURS AS NEEDED FOR WHEEZING OR SHORTNESS OF BREATH 375 mL 2    amLODIPine (NORVASC) 2 5 mg tablet TAKE 1 TABLET (2 5 MG TOTAL) BY MOUTH DAILY AFTER DINNER      Diclofenac Sodium (VOLTAREN) 1 % Apply 2 g topically 4 (four) times a day 100 g 0    escitalopram (LEXAPRO) 10 mg tablet Take 1 tablet (10 mg total) by mouth every morning 30 tablet 2    Flovent  MCG/ACT inhaler INHALE 2 PUFFS BY MOUTH 2 TIMES A DAY RINSE MOUTH AFTER USE  12 g 2    gabapentin (NEURONTIN) 100 mg capsule Take 1 capsule (100 mg total) by mouth 3 (three) times a day 90 capsule 2    ipratropium-albuterol (DUO-NEB) 0 5-2 5 mg/3 mL nebulizer solution Take 3 mL by nebulization 4 (four) times a day 75 mL 5    Multiple Vitamins-Minerals (MULTIVITAMIN ADULT PO) Take 1 tablet by mouth daily      naproxen sodium (ANAPROX) 550 mg tablet Take 1 tablet (550 mg total) by mouth 2 (two) times a day as needed for mild pain or headaches 90 tablet 1    rOPINIRole (REQUIP) 1 mg tablet Take 1 tablet (1 mg total) by mouth daily at bedtime 30 tablet 1    tiZANidine (ZANAFLEX) 2 mg tablet TAKE 1 TABLET (2 MG TOTAL) BY MOUTH DAILY AT BEDTIME 90 tablet 3    umeclidinium-vilanterol (Anoro Ellipta) 62 5-25 MCG/INH inhaler Inhale 1 puff daily 14 blister 3     No current facility-administered medications for this visit  Allergies   Allergen Reactions    Tetracycline Rash       Review of Systems Data: Due to covid Booker Churchill ignored other health issues he needs to get checked  He continues to have trouble with workman's comp  His daughter has to drive him around  He is hurting financially  He discussed with all of his health issues he really does not want to do anything  Assessment: Booker Churchill denies suicidal/homicidal ideation, plan or intent  However he remains depressed over all of his health and financial issues  We discuss mindfulness and CBT   Plan: Continue to help him skill build in distress tolerance  Video Exam    There were no vitals filed for this visit  Physical Exam N/A    I spent 45 minutes directly with the patient during this visit    VIRTUAL VISIT DISCLAIMER    Mona Beltran verbally agrees to participate in [employer]  Pt is aware that [employer] could be limited without vital signs or the ability to perform a full hands-on physical Hamlet Stephany understands he or the provider may request at any time to terminate the video visit and request the patient to seek care or treatment in person

## 2022-03-23 ENCOUNTER — TELEPHONE (OUTPATIENT)
Dept: NEUROLOGY | Facility: CLINIC | Age: 67
End: 2022-03-23

## 2022-03-28 ENCOUNTER — TELEPHONE (OUTPATIENT)
Dept: PULMONOLOGY | Facility: CLINIC | Age: 67
End: 2022-03-28

## 2022-03-28 ENCOUNTER — OFFICE VISIT (OUTPATIENT)
Dept: NEUROLOGY | Facility: CLINIC | Age: 67
End: 2022-03-28
Payer: COMMERCIAL

## 2022-03-28 VITALS
TEMPERATURE: 97.2 F | SYSTOLIC BLOOD PRESSURE: 130 MMHG | HEIGHT: 68 IN | WEIGHT: 259 LBS | BODY MASS INDEX: 39.25 KG/M2 | OXYGEN SATURATION: 95 % | DIASTOLIC BLOOD PRESSURE: 88 MMHG | HEART RATE: 88 BPM

## 2022-03-28 DIAGNOSIS — F43.10 PTSD (POST-TRAUMATIC STRESS DISORDER): ICD-10-CM

## 2022-03-28 DIAGNOSIS — G44.229 CHRONIC TENSION-TYPE HEADACHE, NOT INTRACTABLE: ICD-10-CM

## 2022-03-28 DIAGNOSIS — Z86.16 HISTORY OF COVID-19: ICD-10-CM

## 2022-03-28 DIAGNOSIS — R41.3 MEMORY DIFFICULTY: Primary | ICD-10-CM

## 2022-03-28 PROCEDURE — 1036F TOBACCO NON-USER: CPT | Performed by: PSYCHIATRY & NEUROLOGY

## 2022-03-28 PROCEDURE — 3008F BODY MASS INDEX DOCD: CPT | Performed by: PSYCHIATRY & NEUROLOGY

## 2022-03-28 PROCEDURE — 99214 OFFICE O/P EST MOD 30 MIN: CPT | Performed by: PSYCHIATRY & NEUROLOGY

## 2022-03-28 RX ORDER — DONEPEZIL HYDROCHLORIDE 5 MG/1
5 TABLET, FILM COATED ORAL
Qty: 30 TABLET | Refills: 0 | Status: SHIPPED | OUTPATIENT
Start: 2022-03-28 | End: 2022-05-31

## 2022-03-28 RX ORDER — DONEPEZIL HYDROCHLORIDE 10 MG/1
10 TABLET, FILM COATED ORAL
Qty: 30 TABLET | Refills: 5 | Status: SHIPPED | OUTPATIENT
Start: 2022-03-28 | End: 2022-08-10

## 2022-03-28 NOTE — TELEPHONE ENCOUNTER
----- Message from Eryn Rushing PA-C sent at 3/25/2022  2:09 PM EDT -----  Can you have patient schedule a follow up with us? I had ordered him a cardiopulmonary stress test but the physician that does them thinks pt will have too hard a time with the test with the joint issues he is having and would not be a valid test  I wanted him to come back in to see how he was doing with his breathing - last seen in November

## 2022-03-28 NOTE — PROGRESS NOTES
Moisés Kim is a 61 y o  male  Chief Complaint   Patient presents with    Memory difficulty       Assessment:  1  Memory difficulty    2  History of COVID-19    3  Chronic tension-type headache, not intractable    4  PTSD (post-traumatic stress disorder)        Plan:  Continue with Neurontin 100 mg 3 times a day  Continue with mentally stimulating exercise  Follow-up in 4 months  Discussion:  Patient has mild cognitive impairment with a [place] of 20/30, his last [place] was 25/30, I discussed with patient different medication options he is agreeable to go on Aricept 5 mg once a day for 1 month followed by 10 mg once a day side effects discussed with him in detail, also he was advised mentally stimulating exercises and to take multivitamin every day  Will check a sed rate and C-reactive protein for his headaches he does not have any temporal artery tenderness he is on gabapentin 100 mg 3 times a day is not too keen to increase the medications if he wants he can increase the gabapentin to 200 mg at nighttime to keep his blood pressure cholesterol and sugar under control to take fall and safety precautions to be under constant supervision and to be careful with his driving to go to the hospital if has any worsening symptoms and call me otherwise to see me back in 4 months and follow up with his other physicians      Subjective:    HPI   Patient is here in follow-up for his headaches and memory difficulty, since his last visit he is about the same according to the patient his wife tells him that his memory has gone down, he is able to manages ADLs and his finances and no issues with his driving it does complain of headaches to 3 times a week mostly in the back of the head sometimes it radiates to the whole head associated with photophobia his restless leg symptoms seems to be reasonably okay no seizures, his appetite is good weight has been stable he is in follow up with his pulmonologist regarding his history of interstitial disease and COVID, appetite is good weight has been stable no other complaints      Vitals:    03/28/22 1251   BP: 130/88   BP Location: Left arm   Patient Position: Sitting   Cuff Size: Adult   Pulse: 88   Temp: (!) 97 2 °F (36 2 °C)   TempSrc: Temporal   SpO2: 95%   Weight: 117 kg (259 lb)   Height: 5' 8" (1 727 m)       Current Medications    Current Outpatient Medications:     albuterol (2 5 mg/3 mL) 0 083 % nebulizer solution, INHALE 1 VIAL BY NEBULIZATION EVERY 6 (SIX) HOURS AS NEEDED FOR WHEEZING OR SHORTNESS OF BREATH, Disp: 375 mL, Rfl: 2    amLODIPine (NORVASC) 2 5 mg tablet, TAKE 1 TABLET (2 5 MG TOTAL) BY MOUTH DAILY AFTER DINNER, Disp: , Rfl:     Diclofenac Sodium (VOLTAREN) 1 %, Apply 2 g topically 4 (four) times a day, Disp: 100 g, Rfl: 0    escitalopram (LEXAPRO) 10 mg tablet, Take 1 tablet (10 mg total) by mouth every morning, Disp: 30 tablet, Rfl: 2    Flovent  MCG/ACT inhaler, INHALE 2 PUFFS BY MOUTH 2 TIMES A DAY RINSE MOUTH AFTER USE , Disp: 12 g, Rfl: 2    gabapentin (NEURONTIN) 100 mg capsule, Take 1 capsule (100 mg total) by mouth 3 (three) times a day, Disp: 90 capsule, Rfl: 2    ipratropium-albuterol (DUO-NEB) 0 5-2 5 mg/3 mL nebulizer solution, Take 3 mL by nebulization 4 (four) times a day, Disp: 75 mL, Rfl: 5    Multiple Vitamins-Minerals (MULTIVITAMIN ADULT PO), Take 1 tablet by mouth daily, Disp: , Rfl:     naproxen sodium (ANAPROX) 550 mg tablet, Take 1 tablet (550 mg total) by mouth 2 (two) times a day as needed for mild pain or headaches, Disp: 90 tablet, Rfl: 1    rOPINIRole (REQUIP) 1 mg tablet, Take 1 tablet (1 mg total) by mouth daily at bedtime, Disp: 30 tablet, Rfl: 1    tiZANidine (ZANAFLEX) 2 mg tablet, TAKE 1 TABLET (2 MG TOTAL) BY MOUTH DAILY AT BEDTIME, Disp: 90 tablet, Rfl: 3    umeclidinium-vilanterol (Anoro Ellipta) 62 5-25 MCG/INH inhaler, Inhale 1 puff daily, Disp: 14 blister, Rfl: 3      Allergies  Tetracycline    Past Medical History  Past Medical History:   Diagnosis Date    COVID-19 03/2020    PTSD (post-traumatic stress disorder)          Past Surgical History:  Past Surgical History:   Procedure Laterality Date    FL INJECTION RIGHT HIP (NON ARTHROGRAM)  11/30/2020    FL INJECTION RIGHT HIP (NON ARTHROGRAM)  8/4/2021    FL INJECTION RIGHT HIP (NON ARTHROGRAM)  12/27/2021    NO PAST SURGERIES           Family History:  Family History   Problem Relation Age of Onset    Heart disease Mother     Sudden death Father     No Known Problems Son     No Known Problems Daughter     No Known Problems Maternal Grandmother     No Known Problems Maternal Grandfather     No Known Problems Paternal Grandmother     No Known Problems Paternal Grandfather     No Known Problems Daughter     Kidney disease Brother     Cancer Brother        Social History:   reports that he quit smoking about 1 years ago  His smoking use included cigarettes  He smoked 0 00 packs per day for 0 00 years  He has never used smokeless tobacco  He reports previous alcohol use  He reports that he does not use drugs  I have reviewed the past medical history, surgical history, social and family history, current medications, allergies vitals, review of systems, and updated this information as appropriate today  Objective:    Physical Exam    Neurological Exam    GENERAL:  Cooperative in no acute distress  Well-developed and well-nourished    HEAD and NECK   Head is atraumatic normocephalic with no lesions or masses  Neck is supple with full range of motion    CARDIOVASCULAR  Carotid Arteries-no carotid bruits  NEUROLOGIC:  Mental Status-the patient is awake alert and oriented without aphasia or apraxia  Cranial Nerves: Visual fields are full to confrontation  Extraocular movements are full without nystagmus  Pupils are 2-1/2 mm and reactive  Face is symmetrical to light touch  Movements of facial expression move symmetrically   Hearing is normal to finger rub bilaterally  Soft palate lifts symmetrically  Shoulder shrug is symmetrical  Tongue is midline without atrophy  Motor: No drift is noted on arm extension  Strength is full in the upper and lower extremities with normal bulk and tone  Except for slightly poor effort in the right lower ext  Ambulates with a cane  ROS:  Review of Systems   Constitutional: Negative  Negative for appetite change and fever  HENT: Negative  Negative for hearing loss, tinnitus, trouble swallowing and voice change  Eyes: Negative  Negative for photophobia and pain  Respiratory: Positive for shortness of breath  Cardiovascular: Positive for palpitations  Gastrointestinal: Negative  Negative for nausea and vomiting  Endocrine: Negative  Negative for cold intolerance  Genitourinary: Negative  Negative for dysuria, frequency and urgency  Musculoskeletal: Negative  Negative for myalgias and neck pain  Skin: Negative  Negative for rash  Neurological: Positive for tremors and headaches  Negative for dizziness, seizures, syncope, facial asymmetry, speech difficulty, weakness and numbness  Patient states tremors in feet   Hematological: Negative  Does not bruise/bleed easily  Psychiatric/Behavioral: Positive for confusion and sleep disturbance  Negative for hallucinations

## 2022-03-30 ENCOUNTER — TELEPHONE (OUTPATIENT)
Dept: PSYCHIATRY | Facility: CLINIC | Age: 67
End: 2022-03-30

## 2022-04-01 ENCOUNTER — TELEMEDICINE (OUTPATIENT)
Dept: BEHAVIORAL/MENTAL HEALTH CLINIC | Facility: CLINIC | Age: 67
End: 2022-04-01
Payer: COMMERCIAL

## 2022-04-01 DIAGNOSIS — F33.2 MAJOR DEPRESSIVE DISORDER, RECURRENT SEVERE WITHOUT PSYCHOTIC FEATURES (HCC): ICD-10-CM

## 2022-04-01 DIAGNOSIS — F43.10 PTSD (POST-TRAUMATIC STRESS DISORDER): Primary | ICD-10-CM

## 2022-04-01 DIAGNOSIS — G25.81 RESTLESS LEGS: ICD-10-CM

## 2022-04-01 DIAGNOSIS — F43.11 ACUTE POST-TRAUMATIC STRESS DISORDER: ICD-10-CM

## 2022-04-01 DIAGNOSIS — R41.3 MEMORY DIFFICULTY: ICD-10-CM

## 2022-04-01 PROCEDURE — 90834 PSYTX W PT 45 MINUTES: CPT | Performed by: SOCIAL WORKER

## 2022-04-01 RX ORDER — ROPINIROLE 1 MG/1
TABLET, FILM COATED ORAL
Qty: 30 TABLET | Refills: 1 | Status: SHIPPED | OUTPATIENT
Start: 2022-04-01 | End: 2022-04-04 | Stop reason: DRUGHIGH

## 2022-04-01 NOTE — PSYCH
Virtual Regular Visit    Verification of patient location:    Patient is located in the following state in which I hold an active license [place]      Assessment/Plan:    Problem List Items Addressed This Visit     None          Goals addressed in session: Goal 1, Goal 2 and Goal 3           Reason for visit is   Chief Complaint   Patient presents with    Virtual Regular Visit        Encounter provider Rachael Carbajal    Provider located at [address] [place] [id number] [phone]      Recent Visits  No visits were found meeting these conditions  Showing recent visits within past 7 days and meeting all other requirements  Future Appointments  No visits were found meeting these conditions  Showing future appointments within next 150 days and meeting all other requirements       The patient was identified by name and date of birth  Maxim Maciel was informed that this is a telemedicine visit and that the visit is being conducted through[employer]IS and patient was informed that this is a secure, HIPAA-compliant platform  He agrees to proceed     My office door was closed  No one else was in the room  He acknowledged consent and understanding of privacy and security of the video platform  The patient has agreed to participate and understands they can discontinue the visit at any time  Patient is aware this is a billable service  Subjective  Maxim Maciel is a 61 y o  male          HPI     Past Medical History:   Diagnosis Date    COVID-19 03/2020    PTSD (post-traumatic stress disorder)        Past Surgical History:   Procedure Laterality Date    FL INJECTION RIGHT HIP (NON ARTHROGRAM)  11/30/2020    FL INJECTION RIGHT HIP (NON ARTHROGRAM)  8/4/2021    FL INJECTION RIGHT HIP (NON ARTHROGRAM)  12/27/2021    NO PAST SURGERIES         Current Outpatient Medications   Medication Sig Dispense Refill    albuterol (2 5 mg/3 mL) 0 083 % nebulizer solution INHALE 1 VIAL BY NEBULIZATION EVERY 6 (SIX) HOURS AS NEEDED FOR WHEEZING OR SHORTNESS OF BREATH 375 mL 2    amLODIPine (NORVASC) 2 5 mg tablet TAKE 1 TABLET (2 5 MG TOTAL) BY MOUTH DAILY AFTER DINNER      Diclofenac Sodium (VOLTAREN) 1 % Apply 2 g topically 4 (four) times a day 100 g 0    donepezil (Aricept) 10 mg tablet Take 1 tablet (10 mg total) by mouth daily at bedtime To start after finishing Aricept 5 mg once a day for 1 month  30 tablet 5    donepezil (ARICEPT) 5 mg tablet Take 1 tablet (5 mg total) by mouth daily at bedtime 30 tablet 0    escitalopram (LEXAPRO) 10 mg tablet Take 1 tablet (10 mg total) by mouth every morning 30 tablet 2    Flovent  MCG/ACT inhaler INHALE 2 PUFFS BY MOUTH 2 TIMES A DAY RINSE MOUTH AFTER USE  12 g 2    gabapentin (NEURONTIN) 100 mg capsule Take 1 capsule (100 mg total) by mouth 3 (three) times a day 90 capsule 2    ipratropium-albuterol (DUO-NEB) 0 5-2 5 mg/3 mL nebulizer solution Take 3 mL by nebulization 4 (four) times a day 75 mL 5    Multiple Vitamins-Minerals (MULTIVITAMIN ADULT PO) Take 1 tablet by mouth daily      naproxen sodium (ANAPROX) 550 mg tablet Take 1 tablet (550 mg total) by mouth 2 (two) times a day as needed for mild pain or headaches 90 tablet 1    rOPINIRole (REQUIP) 1 mg tablet TAKE 1 TABLET BY MOUTH DAILY AT BEDTIME 30 tablet 1    tiZANidine (ZANAFLEX) 2 mg tablet TAKE 1 TABLET (2 MG TOTAL) BY MOUTH DAILY AT BEDTIME 90 tablet 3    umeclidinium-vilanterol (Anoro Ellipta) 62 5-25 MCG/INH inhaler Inhale 1 puff daily 14 blister 3     No current facility-administered medications for this visit  Allergies   Allergen Reactions    Tetracycline Rash       Review of Systems Data:Galileo continues to have extreme social anxiety , memory loss etc since he has had covid  He was placed on donepezil the generic of Aricept  He is quite depressed over the lingering symptoms and side effects from having covid  He is very upset about his health and his finances  Assessment: He denies suicidal/homicidal ideation, plan or intent  However he is depressed over his health, and his finances  I provide support, empathy, and encouragement along with mindfulness  Plan: Encourage him to continue to talk about his feelings  Video Exam    There were no vitals filed for this visit  Physical Exam n/a    I spent 45 minutes directly with the patient during this visit    VIRTUAL VISIT DISCLAIMER    Clara Benítez verbally agrees to participate in [employer]  Pt is aware that [employer] could be limited without vital signs or the ability to perform a full hands-on physical Maryjori Leal understands he or the provider may request at any time to terminate the video visit and request the patient to seek care or treatment in person

## 2022-04-04 ENCOUNTER — TELEMEDICINE (OUTPATIENT)
Dept: PSYCHIATRY | Facility: CLINIC | Age: 67
End: 2022-04-04
Payer: COMMERCIAL

## 2022-04-04 DIAGNOSIS — R20.2 NUMBNESS AND TINGLING: ICD-10-CM

## 2022-04-04 DIAGNOSIS — F32.A DEPRESSION, UNSPECIFIED DEPRESSION TYPE: Primary | ICD-10-CM

## 2022-04-04 DIAGNOSIS — G25.81 RESTLESS LEGS: ICD-10-CM

## 2022-04-04 DIAGNOSIS — F43.10 PTSD (POST-TRAUMATIC STRESS DISORDER): ICD-10-CM

## 2022-04-04 DIAGNOSIS — R20.0 NUMBNESS AND TINGLING: ICD-10-CM

## 2022-04-04 DIAGNOSIS — G44.52 NEW DAILY PERSISTENT HEADACHE: ICD-10-CM

## 2022-04-04 PROCEDURE — 99213 OFFICE O/P EST LOW 20 MIN: CPT | Performed by: PHYSICIAN ASSISTANT

## 2022-04-04 RX ORDER — ROPINIROLE 2 MG/1
2 TABLET, FILM COATED ORAL
Qty: 30 TABLET | Refills: 0 | Status: SHIPPED | OUTPATIENT
Start: 2022-04-04 | End: 2022-05-02 | Stop reason: DRUGHIGH

## 2022-04-04 RX ORDER — DULOXETIN HYDROCHLORIDE 60 MG/1
60 CAPSULE, DELAYED RELEASE ORAL EVERY MORNING
Qty: 30 CAPSULE | Refills: 1 | Status: SHIPPED | OUTPATIENT
Start: 2022-04-04 | End: 2022-05-02 | Stop reason: SDUPTHER

## 2022-04-04 NOTE — PSYCH
Virtual Regular Visit    Verification of patient location:    Patient is located in the following state in which I hold an active license [place]             Reason for visit is   Chief Complaint   Patient presents with    Virtual Regular Visit        Encounter provider Wil Zavala PA-C    Provider located at [address]  [phone]      Recent Visits  No visits were found meeting these conditions  Showing recent visits within past 7 days and meeting all other requirements  Future Appointments  No visits were found meeting these conditions  Showing future appointments within next 150 days and meeting all other requirements       The patient was identified by name and date of birth  Yue Blackwell was informed that this is a telemedicine visit and that the visit is being conducted through[employer] Embedded and patient was informed this is a secure, HIPAA-complaint platform  He agrees to proceed     My office door was closed  No one else was in the room  He acknowledged consent and understanding of privacy and security of the video platform  The patient has agreed to participate and understands they can discontinue the visit at any time  Patient is aware this is a billable service  VIRTUAL VISIT DISCLAIMER    Yue Rishi verbally agrees to participate in [employer]  Pt is aware that [employer] could be limited without vital signs or the ability to perform a full hands-on physical Cortez Kahn understands he or the provider may request at any time to terminate the video visit and request the patient to seek care or treatment in person          MEDICATION MANAGEMENT NOTE        [facility] [address] [id number]  [phone]        Name and Date of Birth:  Jacinda Lema 61 y o  1955    Date of Visit: April 4, 2022    SUBJECTIVE:     José Miguel Rivera seen by [place] 2/7 at which time meds unchanged  José Miguel Rivera has since seen neuro and started on aricept  He continues to see Siena Grier for ind therapy and these notes were reviewed  José Miguel Rivera reports ongoing mood swings, isolation, easily agitated, interrupted sleep  Continues with motor restlessness at night despite requip 1 mg  Ongoing physical limitations impede José Miguel Rivera from doing things like exercising and walking  He denies SI  Nightmares are "once in awhile"  Review of Systems   Constitutional: Negative for appetite change  Respiratory: Positive for shortness of breath  On exertion   Neurological: Positive for headaches  Restless legs   Psychiatric/Behavioral: Positive for sleep disturbance  Psychiatric History     This office since 5/7/20  No IP or suicide attempts  Trauma/Loss History       ARDS secondary to covid- intubated x 5 days, April 2020  Lost brother and coworkers to [employer]  Has been too ill to return to work  Social History        Lives with wife Malika Nuñez -  >40 yrs  2 daugthers and a son  Worked at [employer] x 40 yrs -              OBJECTIVE:     MENTAL STATUS EXAM  Appearance:  age appropriate   Behavior:  Pleasant & cooperative   Speech:  Normal volume, regular rate and rhythm   Mood:  low   Affect:  constricted   Language: intact and appropriate for age, education, and intellect   Thought Process:  goal directed   Associations: intact associations   Thought Content:  no overt delusions   Perceptual Disturbances: no auditory or visual hallcunations   Risk Potential / Abnormal Thoughts: Suicidal ideation - None  Homicidal ideation - None  Potential for aggression - No       Consciousness:  Alert & Awake   Sensorium:  Grossly oriented   Attention: attention span and concentration are age appropriate       Fund of Knowledge:  Memory: awareness of current events: yes  recent and remote memory grossly intact   Insight:  intact   Judgment: intact       Lab Review: I have reviewed all pertinent labs    Lab Results   Component Value Date    CHOLESTEROL 196 04/06/2021     Lab Results   Component Value Date    HDL 41 04/06/2021     Lab Results   Component Value Date    TRIG 113 04/06/2021     Lab Results   Component Value Date    [facility] 155 04/06/2021     Lab Results   Component Value Date    SODIUM 140 04/06/2021    K 4 3 04/06/2021     04/06/2021    CO2 26 04/06/2021    AGAP 6 04/06/2021    BUN 18 04/06/2021    CREATININE 1 36 (H) 04/06/2021    GLUC 164 (H) 04/12/2020    GLUF 94 04/06/2021    CALCIUM 9 2 04/06/2021    AST 28 04/06/2021    ALT 44 04/06/2021    ALKPHOS 109 04/06/2021    TP 7 5 04/06/2021    TBILI 0 47 04/06/2021    EGFR 55 04/06/2021     Lab Results   Component Value Date    WBC 9 53 04/06/2021    HGB 16 3 04/06/2021    HCT 50 5 (H) 04/06/2021    MCV 87 04/06/2021     04/06/2021       Lab Results   Component Value Date    PLP5PAVPZVAD 2 240 04/06/2021           ASSESSMENT & PLAN          Diagnoses and all orders for this visit:    Depression, unspecified depression type  -     DULoxetine (CYMBALTA) 60 mg delayed release capsule; Take 1 capsule (60 mg total) by mouth every morning Stop lexapro 10 mg    PTSD (post-traumatic stress disorder)  -     DULoxetine (CYMBALTA) 60 mg delayed release capsule; Take 1 capsule (60 mg total) by mouth every morning Stop lexapro 10 mg    Restless legs  -     rOPINIRole (REQUIP) 2 mg tablet;  Take 1 tablet (2 mg total) by mouth daily at bedtime      Current Outpatient Medications   Medication Sig Dispense Refill    albuterol (2 5 mg/3 mL) 0 083 % nebulizer solution INHALE 1 VIAL BY NEBULIZATION EVERY 6 (SIX) HOURS AS NEEDED FOR WHEEZING OR SHORTNESS OF BREATH 375 mL 2    amLODIPine (NORVASC) 2 5 mg tablet TAKE 1 TABLET (2 5 MG TOTAL) BY MOUTH DAILY AFTER DINNER      Diclofenac Sodium (VOLTAREN) 1 % Apply 2 g topically 4 (four) times a day 100 g 0    donepezil (Aricept) 10 mg tablet Take 1 tablet (10 mg total) by mouth daily at bedtime To start after finishing Aricept 5 mg once a day for 1 month  30 tablet 5    donepezil (ARICEPT) 5 mg tablet Take 1 tablet (5 mg total) by mouth daily at bedtime 30 tablet 0    DULoxetine (CYMBALTA) 60 mg delayed release capsule Take 1 capsule (60 mg total) by mouth every morning Stop lexapro 10 mg 30 capsule 1    Flovent  MCG/ACT inhaler INHALE 2 PUFFS BY MOUTH 2 TIMES A DAY RINSE MOUTH AFTER USE  12 g 2    gabapentin (NEURONTIN) 100 mg capsule Take 1 capsule (100 mg total) by mouth 3 (three) times a day 90 capsule 2    ipratropium-albuterol (DUO-NEB) 0 5-2 5 mg/3 mL nebulizer solution Take 3 mL by nebulization 4 (four) times a day 75 mL 5    Multiple Vitamins-Minerals (MULTIVITAMIN ADULT PO) Take 1 tablet by mouth daily      naproxen sodium (ANAPROX) 550 mg tablet Take 1 tablet (550 mg total) by mouth 2 (two) times a day as needed for mild pain or headaches 90 tablet 1    rOPINIRole (REQUIP) 2 mg tablet Take 1 tablet (2 mg total) by mouth daily at bedtime 30 tablet 0    tiZANidine (ZANAFLEX) 2 mg tablet TAKE 1 TABLET (2 MG TOTAL) BY MOUTH DAILY AT BEDTIME 90 tablet 3    umeclidinium-vilanterol (Anoro Ellipta) 62 5-25 MCG/INH inhaler Inhale 1 puff daily 14 blister 3     No current facility-administered medications for this visit  Plan:          José Miguel Rivera has had minimal response to 2 SSRIs for his symptoms  Will give trial cymbalta for depression and anxiety and increase requip to 2 mg q bedtime for restless legs  Will cont neurontin 100 mg tid  Reviewed risks, benefits, side effects of medications, including no medication  Patient understands and agrees to treatment plan          F/u Pa-C 4 weeks, sooner prn     Cont f/u with Kori Kapoor for ind therapy         Patient has been informed of 24 hours and weekend coverage for urgent situations accessed by calling the main clinic phone number       Deandra Banda PA-C

## 2022-04-13 ENCOUNTER — OFFICE VISIT (OUTPATIENT)
Dept: INTERNAL MEDICINE CLINIC | Facility: CLINIC | Age: 67
End: 2022-04-13
Payer: COMMERCIAL

## 2022-04-13 ENCOUNTER — APPOINTMENT (OUTPATIENT)
Dept: LAB | Facility: CLINIC | Age: 67
End: 2022-04-13
Payer: COMMERCIAL

## 2022-04-13 VITALS
HEART RATE: 94 BPM | TEMPERATURE: 98.4 F | DIASTOLIC BLOOD PRESSURE: 70 MMHG | SYSTOLIC BLOOD PRESSURE: 118 MMHG | WEIGHT: 256.6 LBS | BODY MASS INDEX: 38.89 KG/M2 | OXYGEN SATURATION: 96 % | HEIGHT: 68 IN

## 2022-04-13 DIAGNOSIS — Z11.4 SCREENING FOR HIV (HUMAN IMMUNODEFICIENCY VIRUS): ICD-10-CM

## 2022-04-13 DIAGNOSIS — Z00.00 HEALTHCARE MAINTENANCE: ICD-10-CM

## 2022-04-13 DIAGNOSIS — U09.9 LONG COVID: Primary | ICD-10-CM

## 2022-04-13 DIAGNOSIS — Z12.11 ENCOUNTER FOR COLORECTAL CANCER SCREENING: ICD-10-CM

## 2022-04-13 DIAGNOSIS — U09.9 LONG COVID: ICD-10-CM

## 2022-04-13 DIAGNOSIS — Z12.5 PROSTATE CANCER SCREENING: ICD-10-CM

## 2022-04-13 DIAGNOSIS — Z87.891 PERSONAL HISTORY OF NICOTINE DEPENDENCE: ICD-10-CM

## 2022-04-13 DIAGNOSIS — Z12.12 ENCOUNTER FOR COLORECTAL CANCER SCREENING: ICD-10-CM

## 2022-04-13 DIAGNOSIS — Z11.59 NEED FOR HEPATITIS C SCREENING TEST: ICD-10-CM

## 2022-04-13 DIAGNOSIS — Z12.2 ENCOUNTER FOR SCREENING FOR LUNG CANCER: ICD-10-CM

## 2022-04-13 LAB
25(OH)D3 SERPL-MCNC: 45.5 NG/ML (ref 30–100)
ALBUMIN SERPL BCP-MCNC: 4.3 G/DL (ref 3.5–5)
ALP SERPL-CCNC: 88 U/L (ref 46–116)
ALT SERPL W P-5'-P-CCNC: 60 U/L (ref 12–78)
ANION GAP SERPL CALCULATED.3IONS-SCNC: 6 MMOL/L (ref 4–13)
AST SERPL W P-5'-P-CCNC: 43 U/L (ref 5–45)
BACTERIA UR QL AUTO: ABNORMAL /HPF
BASOPHILS # BLD AUTO: 0.09 THOUSANDS/ΜL (ref 0–0.1)
BASOPHILS NFR BLD AUTO: 1 % (ref 0–1)
BILIRUB SERPL-MCNC: 0.6 MG/DL (ref 0.2–1)
BILIRUB UR QL STRIP: NEGATIVE
BUN SERPL-MCNC: 16 MG/DL (ref 5–25)
CALCIUM SERPL-MCNC: 9.6 MG/DL (ref 8.3–10.1)
CHLORIDE SERPL-SCNC: 109 MMOL/L (ref 100–108)
CHOLEST SERPL-MCNC: 158 MG/DL
CLARITY UR: CLEAR
CO2 SERPL-SCNC: 23 MMOL/L (ref 21–32)
COLOR UR: YELLOW
CREAT SERPL-MCNC: 1.55 MG/DL (ref 0.6–1.3)
EOSINOPHIL # BLD AUTO: 0.35 THOUSAND/ΜL (ref 0–0.61)
EOSINOPHIL NFR BLD AUTO: 4 % (ref 0–6)
ERYTHROCYTE [DISTWIDTH] IN BLOOD BY AUTOMATED COUNT: 16.1 % (ref 11.6–15.1)
EST. AVERAGE GLUCOSE BLD GHB EST-MCNC: 114 MG/DL
GFR SERPL CREATININE-BSD FRML MDRD: 46 ML/MIN/1.73SQ M
GLUCOSE P FAST SERPL-MCNC: 83 MG/DL (ref 65–99)
GLUCOSE UR STRIP-MCNC: NEGATIVE MG/DL
HBA1C MFR BLD: 5.6 %
HCT VFR BLD AUTO: 53.7 % (ref 36.5–49.3)
HCV AB SER QL: NORMAL
HDLC SERPL-MCNC: 37 MG/DL
HGB BLD-MCNC: 17.3 G/DL (ref 12–17)
HGB UR QL STRIP.AUTO: NEGATIVE
HYALINE CASTS #/AREA URNS LPF: ABNORMAL /LPF
IMM GRANULOCYTES # BLD AUTO: 0.11 THOUSAND/UL (ref 0–0.2)
IMM GRANULOCYTES NFR BLD AUTO: 1 % (ref 0–2)
KETONES UR STRIP-MCNC: NEGATIVE MG/DL
LDLC SERPL CALC-MCNC: 97 MG/DL (ref 0–100)
LEUKOCYTE ESTERASE UR QL STRIP: NEGATIVE
LYMPHOCYTES # BLD AUTO: 1.65 THOUSANDS/ΜL (ref 0.6–4.47)
LYMPHOCYTES NFR BLD AUTO: 16 % (ref 14–44)
MCH RBC QN AUTO: 28.4 PG (ref 26.8–34.3)
MCHC RBC AUTO-ENTMCNC: 32.2 G/DL (ref 31.4–37.4)
MCV RBC AUTO: 88 FL (ref 82–98)
MONOCYTES # BLD AUTO: 0.92 THOUSAND/ΜL (ref 0.17–1.22)
MONOCYTES NFR BLD AUTO: 9 % (ref 4–12)
MUCOUS THREADS UR QL AUTO: ABNORMAL
NEUTROPHILS # BLD AUTO: 7.01 THOUSANDS/ΜL (ref 1.85–7.62)
NEUTS SEG NFR BLD AUTO: 69 % (ref 43–75)
NITRITE UR QL STRIP: NEGATIVE
NON-SQ EPI CELLS URNS QL MICRO: ABNORMAL /HPF
NRBC BLD AUTO-RTO: 0 /100 WBCS
PH UR STRIP.AUTO: 5.5 [PH]
PLATELET # BLD AUTO: 244 THOUSANDS/UL (ref 149–390)
PMV BLD AUTO: 10.4 FL (ref 8.9–12.7)
POTASSIUM SERPL-SCNC: 3.8 MMOL/L (ref 3.5–5.3)
PROT SERPL-MCNC: 7.5 G/DL (ref 6.4–8.2)
PROT UR STRIP-MCNC: ABNORMAL MG/DL
PSA SERPL-MCNC: 0.4 NG/ML (ref 0–4)
RBC # BLD AUTO: 6.09 MILLION/UL (ref 3.88–5.62)
RBC #/AREA URNS AUTO: ABNORMAL /HPF
SODIUM SERPL-SCNC: 138 MMOL/L (ref 136–145)
SP GR UR STRIP.AUTO: 1.02 (ref 1–1.03)
TRIGL SERPL-MCNC: 120 MG/DL
TSH SERPL DL<=0.05 MIU/L-ACNC: 1.89 UIU/ML (ref 0.45–4.5)
UROBILINOGEN UR STRIP-ACNC: <2 MG/DL
WBC # BLD AUTO: 10.13 THOUSAND/UL (ref 4.31–10.16)
WBC #/AREA URNS AUTO: ABNORMAL /HPF

## 2022-04-13 PROCEDURE — 87389 HIV-1 AG W/HIV-1&-2 AB AG IA: CPT

## 2022-04-13 PROCEDURE — 1036F TOBACCO NON-USER: CPT | Performed by: INTERNAL MEDICINE

## 2022-04-13 PROCEDURE — 80061 LIPID PANEL: CPT

## 2022-04-13 PROCEDURE — 82306 VITAMIN D 25 HYDROXY: CPT

## 2022-04-13 PROCEDURE — 36415 COLL VENOUS BLD VENIPUNCTURE: CPT

## 2022-04-13 PROCEDURE — 86803 HEPATITIS C AB TEST: CPT

## 2022-04-13 PROCEDURE — 80053 COMPREHEN METABOLIC PANEL: CPT

## 2022-04-13 PROCEDURE — 81001 URINALYSIS AUTO W/SCOPE: CPT | Performed by: INTERNAL MEDICINE

## 2022-04-13 PROCEDURE — 84443 ASSAY THYROID STIM HORMONE: CPT

## 2022-04-13 PROCEDURE — 85025 COMPLETE CBC W/AUTO DIFF WBC: CPT

## 2022-04-13 PROCEDURE — 99214 OFFICE O/P EST MOD 30 MIN: CPT | Performed by: INTERNAL MEDICINE

## 2022-04-13 PROCEDURE — G0103 PSA SCREENING: HCPCS

## 2022-04-13 PROCEDURE — 83036 HEMOGLOBIN GLYCOSYLATED A1C: CPT

## 2022-04-13 NOTE — PROGRESS NOTES
Assessment/Plan:       Diagnoses and all orders for this visit:    [address] Panel with Direct LDL reflex; Future  -     CBC and differential; Future  -     Hemoglobin A1C; Future  -     Comprehensive metabolic panel; Future  -     TSH, 3rd generation with Free T4 reflex; Future  -     UA (URINE) with reflex to Scope  -     Vitamin D 25 hydroxy; Future  -     Urine Microscopic    Need for hepatitis C screening test  -     Hepatitis C Antibody ([facility]); Future    Screening for HIV (human immunodeficiency virus)  -     HIV 1/2 Antigen/Antibody (4th Generation) w Reflex [facility]; Future    Encounter for screening for lung cancer  -     CT lung screening program; Future    Personal history of nicotine dependence  -     CT lung screening program; Future    Encounter for colorectal cancer screening  -     Ambulatory referral for Colonoscopy; Future    Prostate cancer screening  -     PSA, Total Screen; Future    Healthcare maintenance  -     PSA, Total Screen; Future  -     Lipid Panel with Direct LDL reflex; Future  -     CBC and differential; Future  -     Hemoglobin A1C; Future  -     Comprehensive metabolic panel; Future  -     TSH, 3rd generation with Free T4 reflex; Future  -     UA (URINE) with reflex to Scope  -     Vitamin D 25 hydroxy; Future  -     Urine Microscopic                Subjective:      Patient ID: Alberto Klein is a 61 y o  male  Long COVID  Covid 19 illness: the patient works for [address] [place] [address]  He presented to the hospital April 2nd 2020 with respiratory failure and was intubated for covid 19 bronchopneumonia  He was on the ventilator for 10 days      Please refer to the history and physical     Multiple comorbidities ensued including acute kidney injury, but, after being treated with multiple regimes of anti-inflammatory drugs and also cefepime time is 1 week for presumptive superimposed bacterial pneumonia, he recovered and was extubated and was sent home on a tapering dose of prednisone  Complication of care was development of pressure ulcers of the nares and extremities secondary to proning  This at least is bettetr      lingering affects of this episode include  Shortness of breath on exertion  This is fairly severe; he finds himself breathing heavily even after a fairly low-grade of activity  He is unable to do his job  Daily headache  Headache felt frontally and temporally  Evaluated by Neurology and given gabapentin  He is taking a fairly low dose of 100 mg t i d  with modest improvement  Symptoms of anxiety and depression; I believe he has PTSD   Cognitive dysfunction:  Apparently did poorly on a [facility] cognitive assessment   Taste sense has recovered but the patient still has a partial loss of sense of smell  Right hip pain:  It has been explained to him that this may be due to the fact that he required a lot of passive rolling        He has since been seen by Orthopedics, by Neurology, and by Pulmonary   CT scanning as demonstrated  Pulmonary bronchiectasis   echocardiogram: Grade 1 diastolic dysfunction, mild mitral regurgitation        He is acting a lot like the [address]  syndrome people        The following portions of the patient's history were reviewed and updated as appropriate:   He has a past medical history of COVID-19 (03/2020) and PTSD (post-traumatic stress disorder)  ,  does not have any pertinent problems on file  ,   has a past surgical history that includes No past surgeries and FL injection right hip (non arthrogram) (11/30/2020)  ,  family history includes Heart disease in his mother; Kidney disease in his brother; Sudden death in his father  ,   reports that he quit smoking about 2 years ago  His smoking use included cigarettes  He has a 60 00 pack-year smoking history  He has never used smokeless tobacco  He reports previous alcohol use  He reports that he does not use drugs  ,  is allergic to tetracycline         The following portions of the patient's history were reviewed and updated as appropriate:   He has a past medical history of COVID-19 (03/2020) and PTSD (post-traumatic stress disorder)  ,  does not have any pertinent problems on file  ,   has a past surgical history that includes No past surgeries; FL injection right hip (non arthrogram) (11/30/2020); FL injection right hip (non arthrogram) (8/4/2021); and FL injection right hip (non arthrogram) (12/27/2021)  ,  family history includes Cancer in his brother; Heart disease in his mother; Kidney disease in his brother; No Known Problems in his daughter, daughter, maternal grandfather, maternal grandmother, paternal grandfather, paternal grandmother, and son; Sudden death in his father  ,   reports that he quit smoking about 2 years ago  His smoking use included cigarettes  He smoked 0 00 packs per day for 0 00 years  He has never used smokeless tobacco  He reports previous alcohol use  He reports that he does not use drugs  ,  is allergic to tetracycline     Current Outpatient Medications   Medication Sig Dispense Refill    albuterol (2 5 mg/3 mL) 0 083 % nebulizer solution INHALE 1 VIAL BY NEBULIZATION EVERY 6 (SIX) HOURS AS NEEDED FOR WHEEZING OR SHORTNESS OF BREATH 375 mL 2    amLODIPine (NORVASC) 2 5 mg tablet TAKE 1 TABLET (2 5 MG TOTAL) BY MOUTH DAILY AFTER DINNER      Diclofenac Sodium (VOLTAREN) 1 % Apply 2 g topically 4 (four) times a day 100 g 0    donepezil (Aricept) 10 mg tablet Take 1 tablet (10 mg total) by mouth daily at bedtime To start after finishing Aricept 5 mg once a day for 1 month   30 tablet 5    donepezil (ARICEPT) 5 mg tablet Take 1 tablet (5 mg total) by mouth daily at bedtime 30 tablet 0    DULoxetine (CYMBALTA) 60 mg delayed release capsule Take 1 capsule (60 mg total) by mouth every morning Stop lexapro 10 mg 30 capsule 1    Flovent  MCG/ACT inhaler INHALE 2 PUFFS BY MOUTH 2 TIMES A DAY RINSE MOUTH AFTER USE  12 g 2    gabapentin (NEURONTIN) 100 mg capsule Take 1 capsule (100 mg total) by mouth 3 (three) times a day 90 capsule 2    ipratropium-albuterol (DUO-NEB) 0 5-2 5 mg/3 mL nebulizer solution Take 3 mL by nebulization 4 (four) times a day 75 mL 5    Multiple Vitamins-Minerals (MULTIVITAMIN ADULT PO) Take 1 tablet by mouth daily      naproxen sodium (ANAPROX) 550 mg tablet Take 1 tablet (550 mg total) by mouth 2 (two) times a day as needed for mild pain or headaches 90 tablet 1    rOPINIRole (REQUIP) 2 mg tablet Take 1 tablet (2 mg total) by mouth daily at bedtime 30 tablet 0    tiZANidine (ZANAFLEX) 2 mg tablet TAKE 1 TABLET (2 MG TOTAL) BY MOUTH DAILY AT BEDTIME 90 tablet 3    umeclidinium-vilanterol (Anoro Ellipta) 62 5-25 MCG/INH inhaler Inhale 1 puff daily 14 blister 3     No current facility-administered medications for this visit  Review of Systems   Constitutional: Positive for fatigue  Respiratory: Positive for chest tightness and shortness of breath  Musculoskeletal: Positive for arthralgias and myalgias  Neurological: Positive for headaches  Psychiatric/Behavioral: Positive for agitation, dysphoric mood and sleep disturbance  Decreased concentration: impaired memory  The patient is nervous/anxious  All other systems reviewed and are negative  Objective:  Vitals:    04/13/22 1150   BP: 118/70   Pulse: 94   Temp: 98 4 °F (36 9 °C)   SpO2: 96%      Physical Exam  Constitutional:       Appearance: Normal appearance  Comments: Male patient who appears to be the stated age   Eyes:      General: No scleral icterus  Cardiovascular:      Rate and Rhythm: Normal rate  Pulmonary:      Effort: Pulmonary effort is normal       Breath sounds: Normal breath sounds  Neurological:      General: No focal deficit present  Mental Status: He is alert     Psychiatric:         Mood and Affect: Mood normal          Judgment: Judgment normal            There are no Patient Instructions on file for this visit

## 2022-04-14 LAB — HIV 1+2 AB+HIV1 P24 AG SERPL QL IA: NORMAL

## 2022-04-14 NOTE — PATIENT INSTRUCTIONS
A patient whose symptoms are consistent with a diagnosis of long COVID   He continues to follow with various specialists    Unfortunately, I have nothing to offer him as far as the treatment protocol

## 2022-04-15 ENCOUNTER — TELEPHONE (OUTPATIENT)
Dept: INTERNAL MEDICINE CLINIC | Facility: CLINIC | Age: 67
End: 2022-04-15

## 2022-04-15 ENCOUNTER — TELEMEDICINE (OUTPATIENT)
Dept: BEHAVIORAL/MENTAL HEALTH CLINIC | Facility: CLINIC | Age: 67
End: 2022-04-15
Payer: COMMERCIAL

## 2022-04-15 DIAGNOSIS — F32.A DEPRESSION, UNSPECIFIED DEPRESSION TYPE: ICD-10-CM

## 2022-04-15 DIAGNOSIS — F43.10 PTSD (POST-TRAUMATIC STRESS DISORDER): Primary | ICD-10-CM

## 2022-04-15 DIAGNOSIS — R41.3 MEMORY DIFFICULTY: ICD-10-CM

## 2022-04-15 PROCEDURE — 90834 PSYTX W PT 45 MINUTES: CPT | Performed by: SOCIAL WORKER

## 2022-04-15 NOTE — TELEPHONE ENCOUNTER
----- Message from Poncho Waller MD sent at 4/14/2022  6:32 PM EDT -----  Blood testing shows his kidneys look like they are working at about half speed and he should see a kidney specialist for a consultation  I will place that order

## 2022-04-15 NOTE — PSYCH
Virtual Regular Visit    Verification of patient location:    Patient is located in the following state in which I hold an active license [place]      Assessment/Plan:    Problem List Items Addressed This Visit     None          Goals addressed in session: Goal 1 and Goal 2          Reason for visit is   Chief Complaint   Patient presents with    Virtual Regular Visit        Encounter provider Rocio Morales    Provider located at [address] [place] [id number]  [phone]      Recent Visits  Date Type Provider Dept   04/13/22 Office Visit Mallika Luke MD [address] [place] [address] recent visits within past 7 days and meeting all other requirements  Today's Visits  Date Type Provider Dept   04/15/22 [facility] today's visits and meeting all other requirements  Future Appointments  No visits were found meeting these conditions  Showing future appointments within next 150 days and meeting all other requirements       The patient was identified by name and date of birth  Sulema Mays was informed that this is a telemedicine visit and that the visit is being conducted throughEpic Embedded and patient was informed this is a secure, HIPAA-complaint platform  He agrees to proceed     My office door was closed  No one else was in the room  He acknowledged consent and understanding of privacy and security of the video platform  The patient has agreed to participate and understands they can discontinue the visit at any time  Patient is aware this is a billable service  Subjective  Sulema Mays is a 61 y o  male          HPI     Past Medical History:   Diagnosis Date    COVID-19 03/2020    PTSD (post-traumatic stress disorder)        Past Surgical History:   Procedure Laterality Date    FL INJECTION RIGHT HIP (NON ARTHROGRAM) 11/30/2020    FL INJECTION RIGHT HIP (NON ARTHROGRAM)  8/4/2021    FL INJECTION RIGHT HIP (NON ARTHROGRAM)  12/27/2021    NO PAST SURGERIES         Current Outpatient Medications   Medication Sig Dispense Refill    albuterol (2 5 mg/3 mL) 0 083 % nebulizer solution INHALE 1 VIAL BY NEBULIZATION EVERY 6 (SIX) HOURS AS NEEDED FOR WHEEZING OR SHORTNESS OF BREATH 375 mL 2    amLODIPine (NORVASC) 2 5 mg tablet TAKE 1 TABLET (2 5 MG TOTAL) BY MOUTH DAILY AFTER DINNER      Diclofenac Sodium (VOLTAREN) 1 % Apply 2 g topically 4 (four) times a day 100 g 0    donepezil (Aricept) 10 mg tablet Take 1 tablet (10 mg total) by mouth daily at bedtime To start after finishing Aricept 5 mg once a day for 1 month  30 tablet 5    donepezil (ARICEPT) 5 mg tablet Take 1 tablet (5 mg total) by mouth daily at bedtime 30 tablet 0    DULoxetine (CYMBALTA) 60 mg delayed release capsule Take 1 capsule (60 mg total) by mouth every morning Stop lexapro 10 mg 30 capsule 1    Flovent  MCG/ACT inhaler INHALE 2 PUFFS BY MOUTH 2 TIMES A DAY RINSE MOUTH AFTER USE  12 g 2    gabapentin (NEURONTIN) 100 mg capsule Take 1 capsule (100 mg total) by mouth 3 (three) times a day 90 capsule 2    ipratropium-albuterol (DUO-NEB) 0 5-2 5 mg/3 mL nebulizer solution Take 3 mL by nebulization 4 (four) times a day 75 mL 5    Multiple Vitamins-Minerals (MULTIVITAMIN ADULT PO) Take 1 tablet by mouth daily      naproxen sodium (ANAPROX) 550 mg tablet Take 1 tablet (550 mg total) by mouth 2 (two) times a day as needed for mild pain or headaches 90 tablet 1    rOPINIRole (REQUIP) 2 mg tablet Take 1 tablet (2 mg total) by mouth daily at bedtime 30 tablet 0    tiZANidine (ZANAFLEX) 2 mg tablet TAKE 1 TABLET (2 MG TOTAL) BY MOUTH DAILY AT BEDTIME 90 tablet 3    umeclidinium-vilanterol (Anoro Ellipta) 62 5-25 MCG/INH inhaler Inhale 1 puff daily 14 blister 3     No current facility-administered medications for this visit          Allergies   Allergen Reactions    Tetracycline Rash       Review of Systems Data: Virtually met with Aisha Balderrama today  He received bad news that his kidneys are only half functioning due to him having covid and he now needs to start seeing a nephrologist  This is increasing his depression he already has over his health issues and his finances  Aisha Balderrama has been told by his doctors he has many lingering symptoms from his severe case of covid  Assessment: Aisha Balderrama denies suicidal/homicidal ideation, plan or intent  However he feels that this is just another setback  He feels he is continuing to go downhill  I provided support, encouragement and mindfulness strategies  Plan: Continue to help her skill build in distress tolerance  Video Exam    There were no vitals filed for this visit  Physical Exam N/A    I spent 45 minutes directly with the patient during this visit    VIRTUAL VISIT DISCLAIMER    Kt Suarez verbally agrees to participate in [employer]  Pt is aware that [employer] could be limited without vital signs or the ability to perform a full hands-on physical Jenae Carver understands he or the provider may request at any time to terminate the video visit and request the patient to seek care or treatment in person

## 2022-04-15 NOTE — TELEPHONE ENCOUNTER
Spoke with pt in regards to the [place] workers comp form  Form was completed by Ellen  I made a copy to be scanned and put the original in reception area Ellen's folder to be picked up     N/c

## 2022-04-18 NOTE — NURSING NOTE
Call placed to patient to discuss upcoming fluoro guided right hip injection at [facility]  Allergies reviewed and verified patient does not currently take any anticoagulant medications  Pre procedure instructions including diet and taking own medications discussed with patient  Patient instructed that he may eat normally and take medications as usual before the procedure  Patient verbalizes understanding  Patient had this procedure before and denies any questions at this time  Reminded of the location, date and time of the expected procedure

## 2022-04-20 ENCOUNTER — OFFICE VISIT (OUTPATIENT)
Dept: PULMONOLOGY | Facility: CLINIC | Age: 67
End: 2022-04-20
Payer: COMMERCIAL

## 2022-04-20 VITALS
HEIGHT: 68 IN | DIASTOLIC BLOOD PRESSURE: 68 MMHG | SYSTOLIC BLOOD PRESSURE: 116 MMHG | OXYGEN SATURATION: 99 % | WEIGHT: 256 LBS | HEART RATE: 87 BPM | BODY MASS INDEX: 38.8 KG/M2 | TEMPERATURE: 98.1 F

## 2022-04-20 DIAGNOSIS — J98.4 RESTRICTIVE LUNG DISEASE: ICD-10-CM

## 2022-04-20 DIAGNOSIS — U09.9 COVID-19 LONG HAULER: ICD-10-CM

## 2022-04-20 DIAGNOSIS — R06.02 SOB (SHORTNESS OF BREATH): Primary | ICD-10-CM

## 2022-04-20 PROCEDURE — 99214 OFFICE O/P EST MOD 30 MIN: CPT | Performed by: PHYSICIAN ASSISTANT

## 2022-04-20 PROCEDURE — 3008F BODY MASS INDEX DOCD: CPT | Performed by: INTERNAL MEDICINE

## 2022-04-20 NOTE — LETTER
April 20, 2022     Patient: Hudson Mathew  YOB: 1955  Date of Visit: 4/20/2022      To Whom it May Concern:    Hudson Mathew is under my professional care  Manchesterley Lesches was seen in my office on 4/20/2022  Manchesterley Lesches is under our care for his pulmonary conditions  If you have any questions or concerns, please don't hesitate to call           Sincerely,          Dwayne Dean PA-C        CC: No Recipients

## 2022-04-21 NOTE — PROGRESS NOTES
Assessment/Plan:   Diagnoses and all orders for this visit:    SOB (shortness of breath)  -     Complete PFT with post Bronchodilator and Six Minute walk; Future    COVID-19 long hauler  -     Complete PFT with post Bronchodilator and Six Minute walk; Future    Restrictive lung disease  -     Complete PFT with post Bronchodilator and Six Minute walk; Future    Patient is here today for follow-up  He remains stable with his breathing, continues to have chronic dyspnea on exertion without much change  We did order him for a cardiopulmonary stress test but he has been having issues with his hip and would be unable to complete the testing  Suspect his shortness of breath is a combination of scarring from COVID-19, patient was also former smoker so may have some underlying COPD contributing  Last PFT was done in July of 2020 which showed a restrictive pattern with FEV1 77%, mildly decreased lung volumes, moderately decreased DLCO, he did not require oxygen at that time  Will have him repeat a PFT at this time given continued shortness of breath to reassess his lung function  He had a high-resolution CT scan done in July of 2020 which showed extensive bronchiectasis and scarring throughout the bilateral lungs more conspicuous at the upper lobes with cystic bronchiectasis noted at the right upper lobe, scattered pulmonary nodules  He is due for a CT lung screening which we will schedule for him today  He will follow-up with us in about 3 months or sooner if necessary  Return in about 3 months (around 7/20/2022)  All questions are answered to the patient's satisfaction and understanding  He verbalizes understanding  He is encouraged to call with any further questions or concerns  Portions of the record may have been created with voice recognition software  Occasional wrong word or "sound a like" substitutions may have occurred due to the inherent limitations of voice recognition software    Read the chart carefully and recognize, using context, where substitutions have occurred  Electronically Signed by Sarah Healy PA-C    ______________________________________________________________________    Chief Complaint: No chief complaint on file  Patient ID: Harry Ruth is a 61 y o  y o  male has a past medical history of COVID-19 (03/2020) and PTSD (post-traumatic stress disorder)  4/20/2022  Patient presents today for follow-up visit  Patient is a 70-year-old male former smoker who worked as an  at [employer] with history of COVID-19 infection in April 2020 requiring intubation, treated with Plaquenil and Zithromax as well as tocilzumab  He did require oxygen upon discharge but has since been titrated off O2      He is here today for follow-up  He continues have similar dyspnea on exertion which has overall been stable for an extended time now  He finds difficulty mostly with stairs or long distances  He is using his Anoro, Arnuity, using the nebulizer couple of times per day  He did also follow with Cardiology, had a negative stress test in January 2021  primary symptoms  Associated symptoms include headaches  Pertinent negatives include no chest pain, fever, myalgias or sore throat  Review of Systems   Constitutional: Negative  Negative for appetite change and fever  HENT: Positive for rhinorrhea  Negative for ear pain, postnasal drip, sneezing, sore throat and trouble swallowing  Respiratory: Positive for shortness of breath  Cardiovascular: Negative  Negative for chest pain  Gastrointestinal: Negative  Genitourinary: Negative  Musculoskeletal: Negative  Negative for myalgias  Skin: Negative  Allergic/Immunologic: Negative  Neurological: Positive for headaches  Psychiatric/Behavioral: Negative  Smoking history: He reports that he quit smoking about 2 years ago  His smoking use included cigarettes   He smoked 0 00 packs per day for 0 00 years  He has never used smokeless tobacco     The following portions of the patient's history were reviewed and updated as appropriate: allergies, current medications, past family history, past medical history, past social history, past surgical history and problem list     Immunization History   Administered Date(s) Administered    COVID-19 PFIZER VACCINE 0 3 ML IM 04/15/2021, 05/08/2021, 02/04/2022    Influenza, recombinant, quadrivalent,injectable, preservative free 10/05/2020    Pneumococcal Conjugate 13-Valent 10/05/2020     Current Outpatient Medications   Medication Sig Dispense Refill    albuterol (2 5 mg/3 mL) 0 083 % nebulizer solution INHALE 1 VIAL BY NEBULIZATION EVERY 6 (SIX) HOURS AS NEEDED FOR WHEEZING OR SHORTNESS OF BREATH 375 mL 2    amLODIPine (NORVASC) 2 5 mg tablet TAKE 1 TABLET (2 5 MG TOTAL) BY MOUTH DAILY AFTER DINNER      Diclofenac Sodium (VOLTAREN) 1 % Apply 2 g topically 4 (four) times a day 100 g 0    donepezil (Aricept) 10 mg tablet Take 1 tablet (10 mg total) by mouth daily at bedtime To start after finishing Aricept 5 mg once a day for 1 month   30 tablet 5    donepezil (ARICEPT) 5 mg tablet Take 1 tablet (5 mg total) by mouth daily at bedtime 30 tablet 0    DULoxetine (CYMBALTA) 60 mg delayed release capsule Take 1 capsule (60 mg total) by mouth every morning Stop lexapro 10 mg 30 capsule 1    Flovent  MCG/ACT inhaler INHALE 2 PUFFS BY MOUTH 2 TIMES A DAY RINSE MOUTH AFTER USE  12 g 2    gabapentin (NEURONTIN) 100 mg capsule Take 1 capsule (100 mg total) by mouth 3 (three) times a day 90 capsule 2    ipratropium-albuterol (DUO-NEB) 0 5-2 5 mg/3 mL nebulizer solution Take 3 mL by nebulization 4 (four) times a day 75 mL 5    Multiple Vitamins-Minerals (MULTIVITAMIN ADULT PO) Take 1 tablet by mouth daily      naproxen sodium (ANAPROX) 550 mg tablet Take 1 tablet (550 mg total) by mouth 2 (two) times a day as needed for mild pain or headaches 90 tablet 1  rOPINIRole (REQUIP) 2 mg tablet Take 1 tablet (2 mg total) by mouth daily at bedtime 30 tablet 0    tiZANidine (ZANAFLEX) 2 mg tablet TAKE 1 TABLET (2 MG TOTAL) BY MOUTH DAILY AT BEDTIME 90 tablet 3    umeclidinium-vilanterol (Anoro Ellipta) 62 5-25 MCG/INH inhaler Inhale 1 puff daily 14 blister 3     No current facility-administered medications for this visit  Allergies: Tetracycline    Objective:  Vitals:    04/20/22 1543   BP: 116/68   Pulse: 87   Temp: 98 1 °F (36 7 °C)   SpO2: 99%   Weight: 116 kg (256 lb)   Height: 5' 8" (1 727 m)   Oxygen Therapy  SpO2: 99 %    Wt Readings from Last 3 Encounters:   04/20/22 116 kg (256 lb)   04/13/22 116 kg (256 lb 9 6 oz)   03/28/22 117 kg (259 lb)     Body mass index is 38 92 kg/m²  Physical Exam  Vitals reviewed  Constitutional:       General: He is not in acute distress  Appearance: Normal appearance  He is obese  He is not ill-appearing  HENT:      Head: Normocephalic and atraumatic  Mouth/Throat:      Pharynx: Oropharynx is clear  Eyes:      Conjunctiva/sclera: Conjunctivae normal    Cardiovascular:      Rate and Rhythm: Normal rate and regular rhythm  Pulmonary:      Effort: Pulmonary effort is normal  No respiratory distress  Breath sounds: Normal breath sounds  No decreased breath sounds, wheezing, rhonchi or rales  Abdominal:      General: Abdomen is flat  There is no distension  Musculoskeletal:         General: Normal range of motion  Cervical back: Normal range of motion  Right lower leg: No edema  Left lower leg: No edema  Skin:     General: Skin is warm and dry  Neurological:      Mental Status: He is alert and oriented to person, place, and time     Psychiatric:         Mood and Affect: Mood normal          Behavior: Behavior normal          Lab Review:   Lab Results   Component Value Date    K 3 8 04/13/2022     (H) 04/13/2022    CO2 23 04/13/2022    BUN 16 04/13/2022    CREATININE 1 55 (H) 04/13/2022    CALCIUM 9 6 04/13/2022     Lab Results   Component Value Date    WBC 10 13 04/13/2022    HGB 17 3 (H) 04/13/2022    HCT 53 7 (H) 04/13/2022    MCV 88 04/13/2022     04/13/2022       Diagnostics:  I have personally reviewed pertinent reports  and I have personally reviewed pertinent films in PACS  Reviewed prior PFT and CT scan  Office Spirometry Results:     ESS:    No results found    Answers for HPI/ROS submitted by the patient on 4/20/2022  When did you first notice your symptoms?: more than 1 year ago  How often do your symptoms occur?: daily  Since you first noticed this problem, how has it changed?: unchanged  Do you have shortness of breath that occurs with effort or exertion?: Yes  Do you have ear congestion?: No  Do you have heartburn?: No  Do you have fatigue?: No  Do you have nasal congestion?: No  Do you have shortness of breath when lying flat?: No  Do you have shortness of breath when you wake up?: Yes  Do you have sweats?: No  Have you experienced weight loss?: No  Which of the following makes your symptoms worse?: climbing stairs, emotional stress  Which of the following makes your symptoms better?: steroid inhaler

## 2022-04-25 ENCOUNTER — HOSPITAL ENCOUNTER (OUTPATIENT)
Dept: RADIOLOGY | Facility: HOSPITAL | Age: 67
Discharge: HOME | End: 2022-04-25
Admitting: RADIOLOGY
Payer: OTHER MISCELLANEOUS

## 2022-04-25 DIAGNOSIS — M16.11 PRIMARY OSTEOARTHRITIS OF RIGHT HIP: ICD-10-CM

## 2022-04-25 PROCEDURE — 20610 DRAIN/INJ JOINT/BURSA W/O US: CPT

## 2022-04-25 PROCEDURE — 77002 NEEDLE LOCALIZATION BY XRAY: CPT

## 2022-04-25 RX ORDER — BUPIVACAINE HYDROCHLORIDE 5 MG/ML
2 INJECTION, SOLUTION PERINEURAL ONCE
Status: COMPLETED | OUTPATIENT
Start: 2022-04-25 | End: 2022-04-25

## 2022-04-25 RX ORDER — METHYLPREDNISOLONE ACETATE 80 MG/ML
80 INJECTION, SUSPENSION INTRA-ARTICULAR; INTRALESIONAL; INTRAMUSCULAR; SOFT TISSUE ONCE
Status: COMPLETED | OUTPATIENT
Start: 2022-04-25 | End: 2022-04-25

## 2022-04-25 RX ORDER — LIDOCAINE HYDROCHLORIDE 10 MG/ML
3 INJECTION, SOLUTION EPIDURAL; INFILTRATION; INTRACAUDAL; PERINEURAL ONCE
Status: COMPLETED | OUTPATIENT
Start: 2022-04-25 | End: 2022-04-25

## 2022-04-25 RX ADMIN — LIDOCAINE HYDROCHLORIDE 3 ML: 10 INJECTION, SOLUTION EPIDURAL; INFILTRATION; INTRACAUDAL at 11:45

## 2022-04-25 RX ADMIN — IOHEXOL 2 ML: 300 INJECTION, SOLUTION INTRAVENOUS at 11:45

## 2022-04-25 RX ADMIN — BUPIVACAINE HYDROCHLORIDE 2 ML: 5 INJECTION, SOLUTION PERINEURAL at 11:45

## 2022-04-25 RX ADMIN — METHYLPREDNISOLONE ACETATE 80 MG: 80 INJECTION, SUSPENSION INTRA-ARTICULAR; INTRALESIONAL; INTRAMUSCULAR; SOFT TISSUE at 11:45

## 2022-04-27 ENCOUNTER — TELEPHONE (OUTPATIENT)
Dept: INTERNAL MEDICINE CLINIC | Facility: CLINIC | Age: 67
End: 2022-04-27

## 2022-04-28 DIAGNOSIS — J84.9 INTERSTITIAL LUNG DISEASE (HCC): ICD-10-CM

## 2022-04-28 DIAGNOSIS — Z86.16 HISTORY OF COVID-19: ICD-10-CM

## 2022-04-29 RX ORDER — UMECLIDINIUM BROMIDE AND VILANTEROL TRIFENATATE 62.5; 25 UG/1; UG/1
1 POWDER RESPIRATORY (INHALATION) DAILY
Qty: 60 BLISTER | Refills: 3 | Status: SHIPPED | OUTPATIENT
Start: 2022-04-29 | End: 2022-08-10

## 2022-05-02 ENCOUNTER — TELEMEDICINE (OUTPATIENT)
Dept: PSYCHIATRY | Facility: CLINIC | Age: 67
End: 2022-05-02
Payer: COMMERCIAL

## 2022-05-02 DIAGNOSIS — F32.A DEPRESSION, UNSPECIFIED DEPRESSION TYPE: Primary | ICD-10-CM

## 2022-05-02 DIAGNOSIS — G44.52 NEW DAILY PERSISTENT HEADACHE: ICD-10-CM

## 2022-05-02 DIAGNOSIS — G25.81 RESTLESS LEGS: ICD-10-CM

## 2022-05-02 DIAGNOSIS — F43.10 PTSD (POST-TRAUMATIC STRESS DISORDER): ICD-10-CM

## 2022-05-02 PROCEDURE — 99213 OFFICE O/P EST LOW 20 MIN: CPT | Performed by: PHYSICIAN ASSISTANT

## 2022-05-02 RX ORDER — GABAPENTIN 100 MG/1
100 CAPSULE ORAL 3 TIMES DAILY
Qty: 90 CAPSULE | Refills: 2 | Status: SHIPPED | OUTPATIENT
Start: 2022-05-02 | End: 2022-07-13 | Stop reason: SDUPTHER

## 2022-05-02 RX ORDER — ROPINIROLE 3 MG/1
3 TABLET, FILM COATED ORAL
Qty: 30 TABLET | Refills: 1 | Status: SHIPPED | OUTPATIENT
Start: 2022-05-02 | End: 2022-05-27

## 2022-05-02 RX ORDER — DULOXETIN HYDROCHLORIDE 60 MG/1
60 CAPSULE, DELAYED RELEASE ORAL EVERY MORNING
Qty: 30 CAPSULE | Refills: 1 | Status: SHIPPED | OUTPATIENT
Start: 2022-05-02 | End: 2022-05-27

## 2022-05-02 NOTE — PSYCH
Virtual Regular Visit    Verification of patient location:    Patient is located in the following state in which I hold an active license [place]                 Reason for visit is   Chief Complaint   Patient presents with    Virtual Regular Visit        Encounter provider Renee Gonzales PA-C    Provider located at [address]  [phone]      Recent Visits  No visits were found meeting these conditions  Showing recent visits within past 7 days and meeting all other requirements  Future Appointments  No visits were found meeting these conditions  Showing future appointments within next 150 days and meeting all other requirements       The patient was identified by name and date of birth  Ti Santillan was informed that this is a telemedicine visit and that the visit is being conducted through[employer] Embedded and patient was informed this is a secure, HIPAA-complaint platform  He agrees to proceed     My office door was closed  No one else was in the room  He acknowledged consent and understanding of privacy and security of the video platform  The patient has agreed to participate and understands they can discontinue the visit at any time  Patient is aware this is a billable service  VIRTUAL VISIT DISCLAIMER    Ti Santillan verbally agrees to participate in [employer]  Pt is aware that [employer] could be limited without vital signs or the ability to perform a full hands-on physical Dwana An understands he or the provider may request at any time to terminate the video visit and request the patient to seek care or treatment in person          MEDICATION MANAGEMENT NOTE        [facility] [address] [id number]  [phone]        Name and Date of Birth:  Carlotta Garcia 61 y o  1955    Date of Visit: May 2, 2022    SUBJECTIVE:    Thony Herrera last seen by Jose Luis 4/4 at which time lexapro stopped and cymbalta started ; requip was increased to 2 mg and low dose neurontin continued  Thony Herrera continues to f/u with PCP and mult specialists for Long covid  He continues to see Adam Perez for ind therapy and this note was reviewed  Thony Herrera reports feeling better on cymbalta compared to lexapro  Its a bit difficult to tell what specific improvements Thony Herrera is seeing  Says his mood swings are less but still has irritability and low frustration tolerance  Says he can fall asleep easier but still wakes up quite a bit and restless legs at night unchanged  Thony Herrera says he speaks to his family on the phone and his son has been coming over  Review of Systems   Respiratory: Positive for shortness of breath  On exertion, chronic   Musculoskeletal: Positive for arthralgias  Neurological: Positive for headaches  Chronic   Psychiatric/Behavioral: Positive for sleep disturbance  Psychiatric History     This office since 5/7/20  No IP or suicide attempts  Trauma/Loss History       ARDS secondary to covid- intubated x 10 days, April 2020  Lost brother and coworkers to [place]  Has been too ill to return to work  Social History        Lives with wife Sher Rodriguez -  >40 yrs  2 daugthers and a son  Worked at [employer] x 40 yrs -              OBJECTIVE:     MENTAL STATUS EXAM  Appearance:  age appropriate   Behavior:  Pleasant & cooperative   Speech:  Normal volume, regular rate and rhythm   Mood:  a bit brighter   Affect:  mood congruent   Language: intact and appropriate for age, education, and intellect   Thought Process:  goal directed   Associations: intact associations   Thought Content:  normal and appropriate   Perceptual Disturbances: no auditory or visual hallcunations   Risk Potential / Abnormal Thoughts: Suicidal ideation - None  Homicidal ideation - None  Potential for aggression - No       Consciousness:  Alert & Awake   Sensorium:  Grossly oriented   Attention: attention span and concentration are age appropriate       Fund of Knowledge:  Memory: awareness of current events: yes  recent and remote memory grossly intact   Insight:  good   Judgment: good       Lab Review: I have reviewed all pertinent labs  Lab Results   Component Value Date    HGBA1C 5 6 04/13/2022     Lab Results   Component Value Date    CHOLESTEROL 158 04/13/2022     Lab Results   Component Value Date    HDL 37 (L) 04/13/2022     Lab Results   Component Value Date    TRIG 120 04/13/2022     Lab Results   Component Value Date    NONHDLC 155 04/06/2021     Lab Results   Component Value Date    SODIUM 138 04/13/2022    K 3 8 04/13/2022     (H) 04/13/2022    CO2 23 04/13/2022    AGAP 6 04/13/2022    BUN 16 04/13/2022    CREATININE 1 55 (H) 04/13/2022    GLUC 164 (H) 04/12/2020    GLUF 83 04/13/2022    CALCIUM 9 6 04/13/2022    AST 43 04/13/2022    ALT 60 04/13/2022    ALKPHOS 88 04/13/2022    TP 7 5 04/13/2022    TBILI 0 60 04/13/2022    EGFR 46 04/13/2022     Lab Results   Component Value Date    WBC 10 13 04/13/2022    HGB 17 3 (H) 04/13/2022    HCT 53 7 (H) 04/13/2022    MCV 88 04/13/2022     04/13/2022       Lab Results   Component Value Date    YUJ7PRKLMFKA 1 890 04/13/2022           ASSESSMENT & PLAN          Depression  PTSD  Restless Legs      Current Outpatient Medications   Medication Sig Dispense Refill    albuterol (2 5 mg/3 mL) 0 083 % nebulizer solution INHALE 1 VIAL BY NEBULIZATION EVERY 6 (SIX) HOURS AS NEEDED FOR WHEEZING OR SHORTNESS OF BREATH 375 mL 2    amLODIPine (NORVASC) 2 5 mg tablet TAKE 1 TABLET (2 5 MG TOTAL) BY MOUTH DAILY AFTER DINNER      Anoro Ellipta 62 5-25 MCG/INH inhaler INHALE 1 PUFF DAILY 60 blister 3    Diclofenac Sodium (VOLTAREN) 1 % Apply 2 g topically 4 (four) times a day 100 g 0    donepezil (Aricept) 10 mg tablet Take 1 tablet (10 mg total) by mouth daily at bedtime To start after finishing Aricept 5 mg once a day for 1 month  30 tablet 5    donepezil (ARICEPT) 5 mg tablet Take 1 tablet (5 mg total) by mouth daily at bedtime 30 tablet 0    DULoxetine (CYMBALTA) 60 mg delayed release capsule Take 1 capsule (60 mg total) by mouth every morning g 30 capsule 1    Flovent  MCG/ACT inhaler INHALE 2 PUFFS BY MOUTH 2 TIMES A DAY RINSE MOUTH AFTER USE  12 g 2    gabapentin (NEURONTIN) 100 mg capsule Take 1 capsule (100 mg total) by mouth 3 (three) times a day 90 capsule 2    ipratropium-albuterol (DUO-NEB) 0 5-2 5 mg/3 mL nebulizer solution Take 3 mL by nebulization 4 (four) times a day 75 mL 5    Multiple Vitamins-Minerals (MULTIVITAMIN ADULT PO) Take 1 tablet by mouth daily      naproxen sodium (ANAPROX) 550 mg tablet Take 1 tablet (550 mg total) by mouth 2 (two) times a day as needed for mild pain or headaches 90 tablet 1    rOPINIRole (REQUIP) 3 mg tablet Take 1 tablet (3 mg total) by mouth daily at bedtime 30 tablet 1    tiZANidine (ZANAFLEX) 2 mg tablet TAKE 1 TABLET (2 MG TOTAL) BY MOUTH DAILY AT BEDTIME 90 tablet 3     No current facility-administered medications for this visit  Plan:            Some improvement on cymbalta versus lexapro  Will cont cymbalta 60 mg- will keep at this dose secondary to recent decrease in renal functions  Cont neurontin 100 mg tid and increase requip to 3 mg q bedtime for restless legs  Reviewed risks, benefits, side effects of medications, including no medication  Patient understands and agrees to treatment plan  F/u Jose Luis 8 weeks, sooner prn       Cont f/u with ind therapist       Patient has been informed of 24 hours and weekend coverage for urgent situations accessed by calling the main clinic phone number       Harini Estrella PA-C

## 2022-05-04 NOTE — TELEPHONE ENCOUNTER
Spoke with patient  He will  the original and his daughter is going to take care of where to send it  I made a copy to be scanned in his chart      n/c

## 2022-05-06 ENCOUNTER — TELEPHONE (OUTPATIENT)
Dept: BEHAVIORAL/MENTAL HEALTH CLINIC | Facility: CLINIC | Age: 67
End: 2022-05-06

## 2022-05-06 ENCOUNTER — TELEPHONE (OUTPATIENT)
Dept: INTERNAL MEDICINE CLINIC | Facility: CLINIC | Age: 67
End: 2022-05-06

## 2022-05-06 NOTE — TELEPHONE ENCOUNTER
Can the dr write another letter; his  is requesting this    Caroline Albarado isn't returning to work at all because of disability    Call him when the letter is done

## 2022-05-21 ENCOUNTER — HOSPITAL ENCOUNTER (OUTPATIENT)
Dept: CT IMAGING | Facility: HOSPITAL | Age: 67
Discharge: HOME | End: 2022-05-21
Attending: INTERNAL MEDICINE
Payer: COMMERCIAL

## 2022-05-21 DIAGNOSIS — Z87.891 PERSONAL HISTORY OF NICOTINE DEPENDENCE: ICD-10-CM

## 2022-05-21 DIAGNOSIS — Z12.2 ENCOUNTER FOR SCREENING FOR LUNG CANCER: ICD-10-CM

## 2022-05-21 PROCEDURE — 71271 CT THORAX LUNG CANCER SCR C-: CPT

## 2022-05-30 DIAGNOSIS — R41.3 MEMORY DIFFICULTY: ICD-10-CM

## 2022-05-31 RX ORDER — DONEPEZIL HYDROCHLORIDE 5 MG/1
TABLET, FILM COATED ORAL
Qty: 30 TABLET | Refills: 0 | Status: SHIPPED | OUTPATIENT
Start: 2022-05-31 | End: 2022-06-23

## 2022-06-07 DIAGNOSIS — J47.9 BRONCHIECTASIS WITHOUT COMPLICATION (HCC): ICD-10-CM

## 2022-06-07 RX ORDER — DEXAMETHASONE 4 MG/1
TABLET ORAL
Qty: 12 G | Refills: 2 | Status: SHIPPED | OUTPATIENT
Start: 2022-06-07 | End: 2022-06-27 | Stop reason: SDUPTHER

## 2022-06-16 DIAGNOSIS — J84.9 INTERSTITIAL LUNG DISEASE (HCC): ICD-10-CM

## 2022-06-20 RX ORDER — ALBUTEROL SULFATE 2.5 MG/3ML
SOLUTION RESPIRATORY (INHALATION)
Qty: 375 ML | Refills: 2 | Status: SHIPPED | OUTPATIENT
Start: 2022-06-20

## 2022-06-22 ENCOUNTER — HOSPITAL ENCOUNTER (OUTPATIENT)
Dept: PULMONOLOGY | Facility: HOSPITAL | Age: 67
Discharge: HOME | End: 2022-06-22
Payer: COMMERCIAL

## 2022-06-22 DIAGNOSIS — U09.9 COVID-19 LONG HAULER: ICD-10-CM

## 2022-06-22 DIAGNOSIS — R06.02 SOB (SHORTNESS OF BREATH): ICD-10-CM

## 2022-06-22 DIAGNOSIS — J98.4 RESTRICTIVE LUNG DISEASE: ICD-10-CM

## 2022-06-22 PROCEDURE — 94727 GAS DIL/WSHOT DETER LNG VOL: CPT | Performed by: INTERNAL MEDICINE

## 2022-06-22 PROCEDURE — 94727 GAS DIL/WSHOT DETER LNG VOL: CPT

## 2022-06-22 PROCEDURE — 94729 DIFFUSING CAPACITY: CPT | Performed by: INTERNAL MEDICINE

## 2022-06-22 PROCEDURE — 94060 EVALUATION OF WHEEZING: CPT | Performed by: INTERNAL MEDICINE

## 2022-06-22 PROCEDURE — 94729 DIFFUSING CAPACITY: CPT

## 2022-06-22 PROCEDURE — 94618 PULMONARY STRESS TESTING: CPT | Performed by: INTERNAL MEDICINE

## 2022-06-22 PROCEDURE — 94761 N-INVAS EAR/PLS OXIMETRY MLT: CPT

## 2022-06-22 PROCEDURE — 94060 EVALUATION OF WHEEZING: CPT

## 2022-06-22 RX ORDER — ALBUTEROL SULFATE 2.5 MG/3ML
2.5 SOLUTION RESPIRATORY (INHALATION) ONCE
Status: COMPLETED | OUTPATIENT
Start: 2022-06-22 | End: 2022-06-22

## 2022-06-22 RX ADMIN — ALBUTEROL SULFATE 2.5 MG: 2.5 SOLUTION RESPIRATORY (INHALATION) at 15:54

## 2022-06-23 ENCOUNTER — OFFICE VISIT (OUTPATIENT)
Dept: INTERNAL MEDICINE CLINIC | Facility: CLINIC | Age: 67
End: 2022-06-23
Payer: COMMERCIAL

## 2022-06-23 VITALS
OXYGEN SATURATION: 97 % | SYSTOLIC BLOOD PRESSURE: 120 MMHG | BODY MASS INDEX: 38.95 KG/M2 | HEART RATE: 98 BPM | RESPIRATION RATE: 20 BRPM | WEIGHT: 257 LBS | HEIGHT: 68 IN | TEMPERATURE: 97.6 F | DIASTOLIC BLOOD PRESSURE: 74 MMHG

## 2022-06-23 DIAGNOSIS — Z12.11 SCREEN FOR COLON CANCER: Primary | ICD-10-CM

## 2022-06-23 DIAGNOSIS — N52.9 ERECTILE DYSFUNCTION, UNSPECIFIED ERECTILE DYSFUNCTION TYPE: ICD-10-CM

## 2022-06-23 PROCEDURE — 3008F BODY MASS INDEX DOCD: CPT | Performed by: INTERNAL MEDICINE

## 2022-06-23 PROCEDURE — 99213 OFFICE O/P EST LOW 20 MIN: CPT | Performed by: INTERNAL MEDICINE

## 2022-06-23 PROCEDURE — 1036F TOBACCO NON-USER: CPT | Performed by: INTERNAL MEDICINE

## 2022-06-23 RX ORDER — SILDENAFIL 50 MG/1
50 TABLET, FILM COATED ORAL DAILY PRN
Qty: 10 TABLET | Refills: 0 | Status: SHIPPED | OUTPATIENT
Start: 2022-06-23

## 2022-06-23 RX ORDER — AMOXICILLIN 500 MG/1
CAPSULE ORAL
COMMUNITY
Start: 2022-05-05

## 2022-06-23 RX ORDER — IBUPROFEN 800 MG/1
TABLET ORAL
COMMUNITY
Start: 2022-05-05

## 2022-06-23 RX ORDER — CHLORHEXIDINE GLUCONATE 0.12 MG/ML
RINSE ORAL
COMMUNITY
Start: 2022-05-05

## 2022-06-23 NOTE — PROGRESS NOTES
Assessment/Plan:       Diagnoses and all orders for this visit:    Screen for colon cancer  -     Cologuard    Erectile dysfunction, unspecified erectile dysfunction type  -     sildenafil (VIAGRA) 50 MG tablet; Take 1 tablet (50 mg total) by mouth daily as needed for erectile dysfunction    Other orders  -     amoxicillin (AMOXIL) 500 mg capsule; TAKE 1 CAPSULE BY MOUTH 3 TIMES A DAY UNTIL FINISHED  -     chlorhexidine (PERIDEX) 0 12 % solution; SWISH AND SPIT 15CC ONCE A DAY OR AS DIRECTED  -     ibuprofen (MOTRIN) 800 mg tablet; TAKE ONE TABLET BY MOUTH EVERY 8 HRS AS NEEDED FOR PAIN                Subjective:      Patient ID: Safia Fry is a 61 y o  male  Follow-up for this 60-year-old who is principal problem is long COVI D  What I have done is documented this and also done his usual primary care  the 1 thing left is to get a colonoscopy in the refuses to do so but he will accept a colo guard    otherwise, complaint of erectile dysfunction  He has no cardiac history and is not on a nitrate so will offer a trial of Viagra     From a prior note:    "  Long COVID  Covid 19 illness: the patient works for [address] [place] [address]  He presented to the hospital April 2nd 2020 with respiratory failure and was intubated for covid 19 bronchopneumonia  He was on the ventilator for 10 days  Multiple comorbidities ensued including acute kidney injury, but, after being treated with multiple regimes of anti-inflammatory drugs and also cefepime time is 1 week for presumptive superimposed bacterial pneumonia, he recovered and was extubated and was sent home on a tapering dose of prednisone  Complication of care was development of pressure ulcers of the nares and extremities secondary to proning  This at least is bettetr      lingering affects of this episode include  Shortness of breath on exertion    This is fairly severe; he finds himself breathing heavily even after a fairly low-grade of activity  He is unable to do his job  Daily headache  Headache felt frontally and temporally  Evaluated by Neurology and given gabapentin  He is taking a fairly low dose of 100 mg t i d  with modest improvement  Symptoms of anxiety and depression; I believe he has PTSD   Cognitive dysfunction:  Apparently did poorly on a [place] cognitive assessment   Taste sense has recovered but the patient still has a partial loss of sense of smell  Right hip pain:  It has been explained to him that this may be due to the fact that he required a lot of passive rolling        He has since been seen by Orthopedics, by Neurology, and by Pulmonary   CT scanning as demonstrated  Pulmonary bronchiectasis   echocardiogram: Grade 1 diastolic dysfunction, mild mitral regurgitation        He is acting a lot like the [address]  syndrome people        The following portions of the patient's history were reviewed and updated as appropriate:   He has a past medical history of COVID-19 (03/2020) and PTSD (post-traumatic stress disorder)  ,  does not have any pertinent problems on file  ,   has a past surgical history that includes No past surgeries and FL injection right hip (non arthrogram) (11/30/2020)  ,  family history includes Heart disease in his mother; Kidney disease in his brother; Sudden death in his father  ,   reports that he quit smoking about 2 years ago  His smoking use included cigarettes  He has a 60 00 pack-year smoking history  He has never used smokeless tobacco  He reports previous alcohol use  He reports that he does not use drugs  ,  is allergic to tetracycline   "      The following portions of the patient's history were reviewed and updated as appropriate:   He has a past medical history of COVID-19 (03/2020) and PTSD (post-traumatic stress disorder)  ,  does not have any pertinent problems on file  ,   has a past surgical history that includes No past surgeries; FL injection right hip (non arthrogram) (11/30/2020); FL injection right hip (non arthrogram) (8/4/2021); FL injection right hip (non arthrogram) (12/27/2021); and FL injection right hip (non arthrogram) (4/25/2022)  ,  family history includes Cancer in his brother; Heart disease in his mother; Kidney disease in his brother; No Known Problems in his daughter, daughter, maternal grandfather, maternal grandmother, paternal grandfather, paternal grandmother, and son; Sudden death in his father  ,   reports that he quit smoking about 2 years ago  His smoking use included cigarettes  He smoked 0 00 packs per day for 0 00 years  He has never used smokeless tobacco  He reports previous alcohol use  He reports that he does not use drugs  ,  is allergic to tetracycline     Current Outpatient Medications   Medication Sig Dispense Refill    albuterol (2 5 mg/3 mL) 0 083 % nebulizer solution INHALE 1 VIAL BY NEBULIZATION EVERY 6 (SIX) HOURS AS NEEDED FOR WHEEZING OR SHORTNESS OF BREATH 375 mL 2    amLODIPine (NORVASC) 2 5 mg tablet TAKE 1 TABLET (2 5 MG TOTAL) BY MOUTH DAILY AFTER DINNER      amoxicillin (AMOXIL) 500 mg capsule TAKE 1 CAPSULE BY MOUTH 3 TIMES A DAY UNTIL FINISHED      Anoro Ellipta 62 5-25 MCG/INH inhaler INHALE 1 PUFF DAILY 60 blister 3    chlorhexidine (PERIDEX) 0 12 % solution SWISH AND SPIT 15CC ONCE A DAY OR AS DIRECTED      Diclofenac Sodium (VOLTAREN) 1 % Apply 2 g topically 4 (four) times a day 100 g 0    donepezil (Aricept) 10 mg tablet Take 1 tablet (10 mg total) by mouth daily at bedtime To start after finishing Aricept 5 mg once a day for 1 month   30 tablet 5    DULoxetine (CYMBALTA) 60 mg delayed release capsule TAKE 1 CAPSULE (60 MG TOTAL) BY MOUTH EVERY MORNING 90 capsule 0    Flovent  MCG/ACT inhaler INHALE 2 PUFFS BY MOUTH 2 TIMES A DAY RINSE MOUTH AFTER USE  12 g 2    gabapentin (NEURONTIN) 100 mg capsule Take 1 capsule (100 mg total) by mouth 3 (three) times a day 90 capsule 2    ibuprofen (MOTRIN) 800 mg tablet TAKE ONE TABLET BY MOUTH EVERY 8 HRS AS NEEDED FOR PAIN      ipratropium-albuterol (DUO-NEB) 0 5-2 5 mg/3 mL nebulizer solution Take 3 mL by nebulization 4 (four) times a day 75 mL 5    Multiple Vitamins-Minerals (MULTIVITAMIN ADULT PO) Take 1 tablet by mouth daily      naproxen sodium (ANAPROX) 550 mg tablet Take 1 tablet (550 mg total) by mouth 2 (two) times a day as needed for mild pain or headaches 90 tablet 1    rOPINIRole (REQUIP) 3 mg tablet TAKE 1 TABLET (3 MG TOTAL) BY MOUTH DAILY AT BEDTIME 90 tablet 0    sildenafil (VIAGRA) 50 MG tablet Take 1 tablet (50 mg total) by mouth daily as needed for erectile dysfunction 10 tablet 0    tiZANidine (ZANAFLEX) 2 mg tablet TAKE 1 TABLET (2 MG TOTAL) BY MOUTH DAILY AT BEDTIME 90 tablet 3     No current facility-administered medications for this visit  Review of Systems   Constitutional: Positive for fatigue  Respiratory: Positive for chest tightness and shortness of breath  Musculoskeletal: Positive for arthralgias and myalgias  Neurological: Positive for headaches  Psychiatric/Behavioral: Positive for agitation, dysphoric mood and sleep disturbance  Decreased concentration: impaired memory  The patient is nervous/anxious  All other systems reviewed and are negative  Objective:  Vitals:    06/23/22 1124   BP: 120/74   Pulse: 98   Resp: 20   Temp: 97 6 °F (36 4 °C)   SpO2: 97%      Physical Exam  Constitutional:       Appearance: Normal appearance  Cardiovascular:      Rate and Rhythm: Normal rate  Pulmonary:      Effort: Pulmonary effort is normal    Neurological:      General: No focal deficit present  Mental Status: He is alert  Patient Instructions   Trial of Viagra  colo guard  primary care follow-up yearly

## 2022-06-27 ENCOUNTER — NURSE TRIAGE (OUTPATIENT)
Dept: OTHER | Facility: OTHER | Age: 67
End: 2022-06-27

## 2022-06-27 DIAGNOSIS — J47.9 BRONCHIECTASIS WITHOUT COMPLICATION (HCC): ICD-10-CM

## 2022-06-27 RX ORDER — DEXAMETHASONE 4 MG/1
2 TABLET ORAL 2 TIMES DAILY
Qty: 12 G | Refills: 5 | Status: SHIPPED | OUTPATIENT
Start: 2022-06-27 | End: 2022-06-28

## 2022-06-27 RX ORDER — DEXAMETHASONE 4 MG/1
TABLET ORAL
Qty: 12 G | Refills: 0 | Status: SHIPPED | OUTPATIENT
Start: 2022-06-27 | End: 2022-07-19

## 2022-06-27 NOTE — TELEPHONE ENCOUNTER
Reason for Disposition   [1] Caller requesting a prescription renewal (no refills left), no triage required, AND [2] triager able to renew prescription per department policy    Answer Assessment - Initial Assessment Questions  1  NAME of MEDICATION: "What medicine are you calling about?"      flovent     2  QUESTION: "What is your question?" (e g , medication refill, side effect)      All out of     3  PRESCRIBING HCP: "Who prescribed it?" Reason: if prescribed by specialist, call should be referred to that group  Dr Nyla Muñoz    4  SYMPTOMS: "Do you have any symptoms?"      Denies    5   SEVERITY: If symptoms are present, ask "Are they mild, moderate or severe?"     N/A    Protocols used: MEDICATION QUESTION CALL-ADULT-

## 2022-06-27 NOTE — TELEPHONE ENCOUNTER
Regarding: Medication Refill: Flovent  ----- Message from Semaj Saunders sent at 6/27/2022  4:50 PM EDT -----  "I am completely out of my flovent, and nobody got back to me "

## 2022-06-28 DIAGNOSIS — J47.9 BRONCHIECTASIS WITHOUT COMPLICATION (HCC): ICD-10-CM

## 2022-06-28 RX ORDER — DEXAMETHASONE 4 MG/1
2 TABLET ORAL 2 TIMES DAILY
Qty: 12 G | Refills: 5 | OUTPATIENT
Start: 2022-06-28

## 2022-06-28 RX ORDER — BUDESONIDE 180 UG/1
2 AEROSOL, POWDER RESPIRATORY (INHALATION) 2 TIMES DAILY
Qty: 1 EACH | Refills: 3 | Status: SHIPPED | OUTPATIENT
Start: 2022-06-28

## 2022-06-29 NOTE — TELEPHONE ENCOUNTER
Pt called, requesting a call back from office, at best convenience, regarding medication needing a prior authorization

## 2022-07-03 LAB — COLOGUARD RESULT REPORTABLE: POSITIVE

## 2022-07-06 ENCOUNTER — SOCIAL WORK (OUTPATIENT)
Dept: BEHAVIORAL/MENTAL HEALTH CLINIC | Facility: CLINIC | Age: 67
End: 2022-07-06
Payer: COMMERCIAL

## 2022-07-06 ENCOUNTER — TELEPHONE (OUTPATIENT)
Dept: INTERNAL MEDICINE CLINIC | Facility: CLINIC | Age: 67
End: 2022-07-06

## 2022-07-06 DIAGNOSIS — F32.A DEPRESSION, UNSPECIFIED DEPRESSION TYPE: ICD-10-CM

## 2022-07-06 DIAGNOSIS — R41.3 MEMORY DIFFICULTY: ICD-10-CM

## 2022-07-06 DIAGNOSIS — R19.5 POSITIVE COLORECTAL CANCER SCREENING USING COLOGUARD TEST: Primary | ICD-10-CM

## 2022-07-06 DIAGNOSIS — F43.10 PTSD (POST-TRAUMATIC STRESS DISORDER): Primary | ICD-10-CM

## 2022-07-06 DIAGNOSIS — F33.2 MAJOR DEPRESSIVE DISORDER, RECURRENT SEVERE WITHOUT PSYCHOTIC FEATURES (HCC): ICD-10-CM

## 2022-07-06 DIAGNOSIS — F43.11 ACUTE POST-TRAUMATIC STRESS DISORDER: ICD-10-CM

## 2022-07-06 PROCEDURE — 90834 PSYTX W PT 45 MINUTES: CPT | Performed by: SOCIAL WORKER

## 2022-07-06 NOTE — PSYCH
Psychotherapy Provided: Individual Psychotherapy 45 minutes     Length of time in session: 45 minutes, follow up in 2 week        Goals addressed in session: Goal 1, Goal 2 and Goal 3      Pain:      moderate to severe and n/a    4    Current suicide risk : Low     Data: This was the first in person session the undersigned had with Everardo Russell  They know found irregularities with a colon test and he is naturally concerned  He discussed how he is managing his depression, his multiple health issues since he had covid and his finances  Assessment: Everardo Russell has had a very bad year and 1/2 dealing with the aftermath of severe covid, poor health, poor finances and depression concerning these things  He denies suicidal/homicidal ideation, plan or intent  He is better overall but still quite stressed  We worked on mindfulness and CBT  Plan: Continue to help him skill build in distress tolerance  Behavioral Health Treatment Plan [facility]: Diagnosis and Treatment Plan explained to Aris Ovalles relates understanding diagnosis and is agreeable to Treatment Plan   Yes Cephalexin Counseling: I counseled the patient regarding use of cephalexin as an antibiotic for prophylactic and/or therapeutic purposes. Cephalexin (commonly prescribed under brand name Keflex) is a cephalosporin antibiotic which is active against numerous classes of bacteria, including most skin bacteria. Side effects may include nausea, diarrhea, gastrointestinal upset, rash, hives, yeast infections, and in rare cases, hepatitis, kidney disease, seizures, fever, confusion, neurologic symptoms, and others. Patients with severe allergies to penicillin medications are cautioned that there is about a 10% incidence of cross-reactivity with cephalosporins. When possible, patients with penicillin allergies should use alternatives to cephalosporins for antibiotic therapy.

## 2022-07-06 NOTE — TELEPHONE ENCOUNTER
----- Message from Destiny Reeves MD sent at 7/6/2022  8:10 AM EDT -----  Abnormal Cologuard result he needs to see Gastroenterology for colonoscopy I will place the referral

## 2022-07-11 ENCOUNTER — TELEPHONE (OUTPATIENT)
Dept: PSYCHIATRY | Facility: CLINIC | Age: 67
End: 2022-07-11

## 2022-07-13 ENCOUNTER — TELEMEDICINE (OUTPATIENT)
Dept: PSYCHIATRY | Facility: CLINIC | Age: 67
End: 2022-07-13
Payer: COMMERCIAL

## 2022-07-13 DIAGNOSIS — F43.10 PTSD (POST-TRAUMATIC STRESS DISORDER): ICD-10-CM

## 2022-07-13 DIAGNOSIS — U09.9 COVID-19 LONG HAULER: ICD-10-CM

## 2022-07-13 DIAGNOSIS — G44.52 NEW DAILY PERSISTENT HEADACHE: ICD-10-CM

## 2022-07-13 DIAGNOSIS — F32.A DEPRESSION, UNSPECIFIED DEPRESSION TYPE: Primary | ICD-10-CM

## 2022-07-13 DIAGNOSIS — G25.81 RESTLESS LEGS: ICD-10-CM

## 2022-07-13 PROCEDURE — 99212 OFFICE O/P EST SF 10 MIN: CPT | Performed by: PHYSICIAN ASSISTANT

## 2022-07-13 RX ORDER — GABAPENTIN 100 MG/1
100 CAPSULE ORAL 3 TIMES DAILY
Qty: 90 CAPSULE | Refills: 2 | Status: SHIPPED | OUTPATIENT
Start: 2022-07-13 | End: 2022-10-11 | Stop reason: SDUPTHER

## 2022-07-13 RX ORDER — ROPINIROLE 3 MG/1
3 TABLET, FILM COATED ORAL
Qty: 90 TABLET | Refills: 0 | Status: SHIPPED | OUTPATIENT
Start: 2022-07-13 | End: 2022-10-11 | Stop reason: SDUPTHER

## 2022-07-13 RX ORDER — DULOXETIN HYDROCHLORIDE 60 MG/1
60 CAPSULE, DELAYED RELEASE ORAL EVERY MORNING
Qty: 90 CAPSULE | Refills: 0 | Status: SHIPPED | OUTPATIENT
Start: 2022-07-13 | End: 2022-10-11 | Stop reason: SDUPTHER

## 2022-07-13 NOTE — PSYCH
Virtual Regular Visit    Verification of patient location:    Patient is located in the following state in which I hold an active license [place]               Reason for visit is   Chief Complaint   Patient presents with    Virtual Regular Visit        Encounter provider Keira Ponce PA-C    Provider located at [address]  [phone]      Recent Visits  No visits were found meeting these conditions  Showing recent visits within past 7 days and meeting all other requirements  Future Appointments  No visits were found meeting these conditions  Showing future appointments within next 150 days and meeting all other requirements       The patient was identified by name and date of birth  Minh Beatty was informed that this is a telemedicine visit and that the visit is being conducted through[employer] [place] Embedded and patient was informed this is a secure, HIPAA-complaint platform  He agrees to proceed     My office door was closed  No one else was in the room  He acknowledged consent and understanding of privacy and security of the video platform  The patient has agreed to participate and understands they can discontinue the visit at any time  Patient is aware this is a billable service  VIRTUAL VISIT DISCLAIMER    Minh Beatty verbally agrees to participate in [employer]  Pt is aware that [employer] could be limited without vital signs or the ability to perform a full hands-on physical [place] [facility] understands he or the provider may request at any time to terminate the video visit and request the patient to seek care or treatment in person        MEDICATION MANAGEMENT NOTE        [facility] [address] [address]  [phone]        Name and Date of Birth:  Theone Lindsey 61 y o  1955    Date of Visit: July 13, 2022    SUBJECTIVE:     Britttrent Augusto last seen by Jose Luis  5/2 at which time requip increased to 3 mg  Calvin Casas thinks this has lessened his restless legs  Was able to see No Jamil in person for the first time 7/6 and Calvin Casas expresses appreciation for this  Says he continues to try and accept everything that has happened to him  Continues to feel unappreciated in regards to his employer of 30 yrs who has limited his snf benefits  Finances cont to be a struggle  Calvin Casas describes distressing dreams and lucid/distressing dreams  Experiences flashbacks when he is in medical offices, demario if he looks up at the ceiling  Jose Luis encouraged Calvin Casas to tell his medical providers about this so they can be aware and supportive  Calvin Casas also experiences anxiety/fearfulness when he is walking far- fears he will pass out  Is ok walking in his backyard  Feels "edgy" in traffic  Too fearful to be dropped off in front of his house  Prefers the garage so he doesn't have to be outside  Review of Systems   Respiratory: Positive for shortness of breath  On exertion   Psychiatric/Behavioral: Positive for sleep disturbance  Psychiatric History     This office since 5/7/20  No IP or suicide attempts  Trauma/Loss History       ARDS secondary to covid- intubated x 10 days, April 2020  Lost brother and coworkers to [employer]  Has been too ill to return to work  Social History        Lives with wife Ector Leaver -  >40 yrs  2 daugthers and a son  Worked at [employer] x 40 yrs -            OBJECTIVE:     MENTAL STATUS EXAM  Appearance:  age appropriate   Behavior:  Pleasant & cooperative   Speech:  Normal volume, regular rate and rhythm   Mood:  anxious   Affect:  mood congruent   Language: intact and appropriate for age, education, and intellect   Thought Process:  goal directed   Associations: intact associations   Thought Content:  no overt delusions   Perceptual Disturbances: no auditory or visual hallcunations   Risk Potential / Abnormal Thoughts: Suicidal ideation - None  Homicidal ideation - None  Potential for aggression - No       Consciousness:  Alert & Awake   Sensorium:  Grossly oriented   Attention: attention span and concentration are age appropriate       Fund of Knowledge:  Memory: awareness of current events: yes  recent and remote memory grossly intact   Insight:  intact   Judgment: intact       Lab Review: I have reviewed all pertinent labs  Lab Results   Component Value Date    HGBA1C 5 6 04/13/2022     Lab Results   Component Value Date    CHOLESTEROL 158 04/13/2022     Lab Results   Component Value Date    HDL 37 (L) 04/13/2022     Lab Results   Component Value Date    TRIG 120 04/13/2022       Lab Results   Component Value Date    SODIUM 138 04/13/2022    K 3 8 04/13/2022     (H) 04/13/2022    CO2 23 04/13/2022    AGAP 6 04/13/2022    BUN 16 04/13/2022    CREATININE 1 55 (H) 04/13/2022    GLUC 164 (H) 04/12/2020    GLUF 83 04/13/2022    CALCIUM 9 6 04/13/2022    AST 43 04/13/2022    ALT 60 04/13/2022    ALKPHOS 88 04/13/2022    TP 7 5 04/13/2022    TBILI 0 60 04/13/2022    EGFR 46 04/13/2022     Lab Results   Component Value Date    WBC 10 13 04/13/2022    HGB 17 3 (H) 04/13/2022    HCT 53 7 (H) 04/13/2022    MCV 88 04/13/2022     04/13/2022     Lab Results   Component Value Date    BKR3IDQWDQWZ 1 890 04/13/2022           ASSESSMENT & PLAN          Diagnoses and all orders for this visit:    Depression, unspecified depression type  -     DULoxetine (CYMBALTA) 60 mg delayed release capsule; Take 1 capsule (60 mg total) by mouth every morning    PTSD (post-traumatic stress disorder)  -     DULoxetine (CYMBALTA) 60 mg delayed release capsule;  Take 1 capsule (60 mg total) by mouth every morning    Restless legs  -     rOPINIRole (REQUIP) 3 mg tablet; Take 1 tablet (3 mg total) by mouth daily at bedtime    New daily persistent headache  -     gabapentin (NEURONTIN) 100 mg capsule; Take 1 capsule (100 mg total) by mouth 3 (three) times a day    COVID-19 long hauler        Current Outpatient Medications   Medication Sig Dispense Refill    DULoxetine (CYMBALTA) 60 mg delayed release capsule Take 1 capsule (60 mg total) by mouth every morning 90 capsule 0    gabapentin (NEURONTIN) 100 mg capsule Take 1 capsule (100 mg total) by mouth 3 (three) times a day 90 capsule 2    rOPINIRole (REQUIP) 3 mg tablet Take 1 tablet (3 mg total) by mouth daily at bedtime 90 tablet 0    albuterol (2 5 mg/3 mL) 0 083 % nebulizer solution INHALE 1 VIAL BY NEBULIZATION EVERY 6 (SIX) HOURS AS NEEDED FOR WHEEZING OR SHORTNESS OF BREATH 375 mL 2    amLODIPine (NORVASC) 2 5 mg tablet TAKE 1 TABLET (2 5 MG TOTAL) BY MOUTH DAILY AFTER DINNER      amoxicillin (AMOXIL) 500 mg capsule TAKE 1 CAPSULE BY MOUTH 3 TIMES A DAY UNTIL FINISHED      Anoro Ellipta 62 5-25 MCG/INH inhaler INHALE 1 PUFF DAILY 60 blister 3    budesonide (Pulmicort Flexhaler) 180 MCG/ACT inhaler Inhale 2 puffs 2 (two) times a day 1 each 3    chlorhexidine (PERIDEX) 0 12 % solution SWISH AND SPIT 15CC ONCE A DAY OR AS DIRECTED      Diclofenac Sodium (VOLTAREN) 1 % Apply 2 g topically 4 (four) times a day 100 g 0    donepezil (Aricept) 10 mg tablet Take 1 tablet (10 mg total) by mouth daily at bedtime To start after finishing Aricept 5 mg once a day for 1 month   30 tablet 5    fluticasone (Flovent HFA) 110 MCG/ACT inhaler INHALE 2 PUFFS BY MOUTH 2 TIMES A DAY RINSE MOUTH AFTER USE  12 g 0    ibuprofen (MOTRIN) 800 mg tablet TAKE ONE TABLET BY MOUTH EVERY 8 HRS AS NEEDED FOR PAIN      ipratropium-albuterol (DUO-NEB) 0 5-2 5 mg/3 mL nebulizer solution Take 3 mL by nebulization 4 (four) times a day 75 mL 5    Multiple Vitamins-Minerals (MULTIVITAMIN ADULT PO) Take 1 tablet by mouth daily      naproxen sodium (ANAPROX) 550 mg tablet Take 1 tablet (550 mg total) by mouth 2 (two) times a day as needed for mild pain or headaches 90 tablet 1    sildenafil (VIAGRA) 50 MG tablet Take 1 tablet (50 mg total) by mouth daily as needed for erectile dysfunction 10 tablet 0    tiZANidine (ZANAFLEX) 2 mg tablet TAKE 1 TABLET (2 MG TOTAL) BY MOUTH DAILY AT BEDTIME 90 tablet 3     No current facility-administered medications for this visit  Plan:       Cont cymbalta 60 mg/d, requip 3 mg q bedtime and neurontin 100 mg tid  Discussed managing lucid dreams  Offered Jammie Velasquez to be put on [facility]'s EMDR list but Jammie Velasquez prefers to hold off on this as he would have to come to the office for this treatment modality  He will cont to f/u with [facility] for ind therapy  Sees renal and neuro in 2 weeks  Reviewed risks, benefits, side effects of medications, including no medication  Patient understands and agrees to treatment plan  F/u ADAM 3 mths, sooner prn        Patient has been informed of 24 hours and weekend coverage for urgent situations accessed by calling the main clinic phone number       Tj Tejeda PA-C

## 2022-07-18 ENCOUNTER — APPOINTMENT (OUTPATIENT)
Dept: LAB | Facility: CLINIC | Age: 67
End: 2022-07-18
Payer: COMMERCIAL

## 2022-07-18 ENCOUNTER — OFFICE VISIT (OUTPATIENT)
Dept: PULMONOLOGY | Facility: CLINIC | Age: 67
End: 2022-07-18
Payer: COMMERCIAL

## 2022-07-18 ENCOUNTER — TELEPHONE (OUTPATIENT)
Dept: NEPHROLOGY | Facility: CLINIC | Age: 67
End: 2022-07-18

## 2022-07-18 VITALS
BODY MASS INDEX: 38.95 KG/M2 | HEIGHT: 68 IN | DIASTOLIC BLOOD PRESSURE: 74 MMHG | HEART RATE: 76 BPM | WEIGHT: 257 LBS | OXYGEN SATURATION: 95 % | TEMPERATURE: 97.6 F | SYSTOLIC BLOOD PRESSURE: 120 MMHG

## 2022-07-18 DIAGNOSIS — N18.30 STAGE 3 CHRONIC KIDNEY DISEASE, UNSPECIFIED WHETHER STAGE 3A OR 3B CKD (HCC): ICD-10-CM

## 2022-07-18 DIAGNOSIS — R06.02 SHORTNESS OF BREATH: Primary | ICD-10-CM

## 2022-07-18 DIAGNOSIS — Z86.16 HISTORY OF COVID-19: ICD-10-CM

## 2022-07-18 DIAGNOSIS — N18.30 STAGE 3 CHRONIC KIDNEY DISEASE, UNSPECIFIED WHETHER STAGE 3A OR 3B CKD (HCC): Primary | ICD-10-CM

## 2022-07-18 DIAGNOSIS — J98.4 RESTRICTIVE LUNG DISEASE: ICD-10-CM

## 2022-07-18 LAB
25(OH)D3 SERPL-MCNC: 39.2 NG/ML (ref 30–100)
ANION GAP SERPL CALCULATED.3IONS-SCNC: 5 MMOL/L (ref 4–13)
BACTERIA UR QL AUTO: ABNORMAL /HPF
BASOPHILS # BLD AUTO: 0.08 THOUSANDS/ΜL (ref 0–0.1)
BASOPHILS NFR BLD AUTO: 1 % (ref 0–1)
BILIRUB UR QL STRIP: NEGATIVE
BUN SERPL-MCNC: 15 MG/DL (ref 5–25)
CALCIUM SERPL-MCNC: 8.9 MG/DL (ref 8.3–10.1)
CHLORIDE SERPL-SCNC: 111 MMOL/L (ref 96–108)
CLARITY UR: ABNORMAL
CO2 SERPL-SCNC: 23 MMOL/L (ref 21–32)
COLOR UR: ABNORMAL
CREAT SERPL-MCNC: 1.43 MG/DL (ref 0.6–1.3)
CREAT UR-MCNC: 197 MG/DL
EOSINOPHIL # BLD AUTO: 0.38 THOUSAND/ΜL (ref 0–0.61)
EOSINOPHIL NFR BLD AUTO: 4 % (ref 0–6)
ERYTHROCYTE [DISTWIDTH] IN BLOOD BY AUTOMATED COUNT: 15.2 % (ref 11.6–15.1)
GFR SERPL CREATININE-BSD FRML MDRD: 51 ML/MIN/1.73SQ M
GLUCOSE P FAST SERPL-MCNC: 94 MG/DL (ref 65–99)
GLUCOSE UR STRIP-MCNC: NEGATIVE MG/DL
HCT VFR BLD AUTO: 48.5 % (ref 36.5–49.3)
HGB BLD-MCNC: 15.4 G/DL (ref 12–17)
HGB UR QL STRIP.AUTO: NEGATIVE
IMM GRANULOCYTES # BLD AUTO: 0.09 THOUSAND/UL (ref 0–0.2)
IMM GRANULOCYTES NFR BLD AUTO: 1 % (ref 0–2)
KETONES UR STRIP-MCNC: NEGATIVE MG/DL
LEUKOCYTE ESTERASE UR QL STRIP: NEGATIVE
LYMPHOCYTES # BLD AUTO: 1.7 THOUSANDS/ΜL (ref 0.6–4.47)
LYMPHOCYTES NFR BLD AUTO: 20 % (ref 14–44)
MCH RBC QN AUTO: 28.2 PG (ref 26.8–34.3)
MCHC RBC AUTO-ENTMCNC: 31.8 G/DL (ref 31.4–37.4)
MCV RBC AUTO: 89 FL (ref 82–98)
MONOCYTES # BLD AUTO: 0.84 THOUSAND/ΜL (ref 0.17–1.22)
MONOCYTES NFR BLD AUTO: 10 % (ref 4–12)
MUCOUS THREADS UR QL AUTO: ABNORMAL
NEUTROPHILS # BLD AUTO: 5.48 THOUSANDS/ΜL (ref 1.85–7.62)
NEUTS SEG NFR BLD AUTO: 64 % (ref 43–75)
NITRITE UR QL STRIP: NEGATIVE
NON-SQ EPI CELLS URNS QL MICRO: ABNORMAL /HPF
NRBC BLD AUTO-RTO: 0 /100 WBCS
PH UR STRIP.AUTO: 5.5 [PH]
PHOSPHATE SERPL-MCNC: 2.7 MG/DL (ref 2.3–4.1)
PLATELET # BLD AUTO: 221 THOUSANDS/UL (ref 149–390)
PMV BLD AUTO: 10.7 FL (ref 8.9–12.7)
POTASSIUM SERPL-SCNC: 3.6 MMOL/L (ref 3.5–5.3)
PROT UR STRIP-MCNC: ABNORMAL MG/DL
PROT UR-MCNC: 19 MG/DL
PROT/CREAT UR: 0.1 MG/G{CREAT} (ref 0–0.1)
PTH-INTACT SERPL-MCNC: 65.2 PG/ML (ref 18.4–80.1)
RBC # BLD AUTO: 5.46 MILLION/UL (ref 3.88–5.62)
RBC #/AREA URNS AUTO: ABNORMAL /HPF
SODIUM SERPL-SCNC: 139 MMOL/L (ref 135–147)
SP GR UR STRIP.AUTO: 1.02 (ref 1–1.03)
UROBILINOGEN UR STRIP-ACNC: <2 MG/DL
WBC # BLD AUTO: 8.57 THOUSAND/UL (ref 4.31–10.16)
WBC #/AREA URNS AUTO: ABNORMAL /HPF

## 2022-07-18 PROCEDURE — 36415 COLL VENOUS BLD VENIPUNCTURE: CPT

## 2022-07-18 PROCEDURE — 85025 COMPLETE CBC W/AUTO DIFF WBC: CPT

## 2022-07-18 PROCEDURE — 81001 URINALYSIS AUTO W/SCOPE: CPT

## 2022-07-18 PROCEDURE — 82306 VITAMIN D 25 HYDROXY: CPT

## 2022-07-18 PROCEDURE — 83970 ASSAY OF PARATHORMONE: CPT

## 2022-07-18 PROCEDURE — 80048 BASIC METABOLIC PNL TOTAL CA: CPT

## 2022-07-18 PROCEDURE — 84156 ASSAY OF PROTEIN URINE: CPT

## 2022-07-18 PROCEDURE — 82570 ASSAY OF URINE CREATININE: CPT

## 2022-07-18 PROCEDURE — 99214 OFFICE O/P EST MOD 30 MIN: CPT | Performed by: PHYSICIAN ASSISTANT

## 2022-07-18 PROCEDURE — 84100 ASSAY OF PHOSPHORUS: CPT

## 2022-07-18 RX ORDER — ALBUTEROL SULFATE 90 UG/1
2 AEROSOL, METERED RESPIRATORY (INHALATION) EVERY 6 HOURS PRN
Qty: 18 G | Refills: 3 | Status: SHIPPED | OUTPATIENT
Start: 2022-07-18 | End: 2022-10-17

## 2022-07-18 NOTE — TELEPHONE ENCOUNTER
Called spoke with patient advised patient to get labs done prior to 7/25 Appt, Patient understood and is okay with it

## 2022-07-19 NOTE — PROGRESS NOTES
Assessment/Plan:   Diagnoses and all orders for this visit:    Shortness of breath  -     albuterol (Ventolin HFA) 90 mcg/act inhaler; Inhale 2 puffs every 6 (six) hours as needed for wheezing    Restrictive lung disease    History of COVID-19    Patient here today for follow-up  He remains stable with his breathing with chronic dyspnea on exertion likely related to restrictive lung disease from prior COVID as well as deconditioning  I did ask him to try using the albuterol about 20 minutes prior to exercise to see if this helps his exercise tolerance  Does not qualify for pulmonary rehab given his FEV1 of 72%  Recent CT lung screening shows stable tiny nodules with mild emphysema  Recent PFT shows restrictive limitation, moderately decreased lung volumes, they decreased DLCO  Overall not much change from prior PFT in July 2020  He continues with the Anoro, Pulmicort, albuterol as needed patient is  Does note some improvement with his breathing with the bronchodilators  He will follow-up with us in 4 months or sooner if necessary  Return in about 4 months (around 11/18/2022)  All questions are answered to the patient's satisfaction and understanding  He verbalizes understanding  He is encouraged to call with any further questions or concerns  Portions of the record may have been created with voice recognition software  Occasional wrong word or "sound a like" substitutions may have occurred due to the inherent limitations of voice recognition software  Read the chart carefully and recognize, using context, where substitutions have occurred      Electronically Signed by Agueda Feliz PA-C    ______________________________________________________________________    Chief Complaint:   Chief Complaint   Patient presents with    Follow-up       Patient ID: Berlin Delgado is a 61 y o  y o  male has a past medical history of COVID-19 (03/2020) and PTSD (post-traumatic stress disorder)  7/18/2022  Patient presents today for follow-up visit  Patient is a 65-year-old male former smoker who worked as an  at [employer] with history of COVID-19 infection in April 2020 requiring intubation, treated with Plaquenil and Zithromax as well as tocilzumab  He did require oxygen upon discharge but has since been titrated off O2      He is here today for follow-up  He remains stable with his breathing, does still have dyspnea on exertion mostly with stairs and long distances  He continues with his Anoro, Pulmicort, using the nebulizer as needed  primary symptoms  Associated symptoms include headaches and myalgias  Pertinent negatives include no chest pain, fever or sore throat  Review of Systems   Constitutional: Negative  Negative for appetite change and fever  HENT: Positive for postnasal drip and rhinorrhea  Negative for ear pain, sneezing, sore throat and trouble swallowing  Respiratory: Positive for shortness of breath  Cardiovascular: Negative  Negative for chest pain  Gastrointestinal: Negative  Genitourinary: Negative  Musculoskeletal: Positive for myalgias  Skin: Negative  Allergic/Immunologic: Negative  Neurological: Positive for headaches  Psychiatric/Behavioral: Negative  Smoking history: He reports that he quit smoking about 2 years ago  His smoking use included cigarettes  He started smoking about 49 years ago  He smoked 0 00 packs per day for 47 00 years   He has never used smokeless tobacco     The following portions of the patient's history were reviewed and updated as appropriate: allergies, current medications, past family history, past medical history, past social history, past surgical history and problem list     Immunization History   Administered Date(s) Administered    COVID-19 PFIZER VACCINE 0 3 ML IM 04/15/2021, 05/08/2021, 02/04/2022    Influenza, recombinant, quadrivalent,injectable, preservative free 10/05/2020    Pneumococcal Conjugate 13-Valent 10/05/2020     Current Outpatient Medications   Medication Sig Dispense Refill    albuterol (2 5 mg/3 mL) 0 083 % nebulizer solution INHALE 1 VIAL BY NEBULIZATION EVERY 6 (SIX) HOURS AS NEEDED FOR WHEEZING OR SHORTNESS OF BREATH 375 mL 2    albuterol (Ventolin HFA) 90 mcg/act inhaler Inhale 2 puffs every 6 (six) hours as needed for wheezing 18 g 3    amLODIPine (NORVASC) 2 5 mg tablet TAKE 1 TABLET (2 5 MG TOTAL) BY MOUTH DAILY AFTER DINNER      amoxicillin (AMOXIL) 500 mg capsule TAKE 1 CAPSULE BY MOUTH 3 TIMES A DAY UNTIL FINISHED      Anoro Ellipta 62 5-25 MCG/INH inhaler INHALE 1 PUFF DAILY 60 blister 3    budesonide (Pulmicort Flexhaler) 180 MCG/ACT inhaler Inhale 2 puffs 2 (two) times a day 1 each 3    chlorhexidine (PERIDEX) 0 12 % solution SWISH AND SPIT 15CC ONCE A DAY OR AS DIRECTED      Diclofenac Sodium (VOLTAREN) 1 % Apply 2 g topically 4 (four) times a day 100 g 0    donepezil (Aricept) 10 mg tablet Take 1 tablet (10 mg total) by mouth daily at bedtime To start after finishing Aricept 5 mg once a day for 1 month   30 tablet 5    DULoxetine (CYMBALTA) 60 mg delayed release capsule Take 1 capsule (60 mg total) by mouth every morning 90 capsule 0    gabapentin (NEURONTIN) 100 mg capsule Take 1 capsule (100 mg total) by mouth 3 (three) times a day 90 capsule 2    ibuprofen (MOTRIN) 800 mg tablet TAKE ONE TABLET BY MOUTH EVERY 8 HRS AS NEEDED FOR PAIN      ipratropium-albuterol (DUO-NEB) 0 5-2 5 mg/3 mL nebulizer solution Take 3 mL by nebulization 4 (four) times a day 75 mL 5    Multiple Vitamins-Minerals (MULTIVITAMIN ADULT PO) Take 1 tablet by mouth daily      naproxen sodium (ANAPROX) 550 mg tablet Take 1 tablet (550 mg total) by mouth 2 (two) times a day as needed for mild pain or headaches 90 tablet 1    rOPINIRole (REQUIP) 3 mg tablet Take 1 tablet (3 mg total) by mouth daily at bedtime 90 tablet 0    sildenafil (VIAGRA) 50 MG tablet Take 1 tablet (50 mg total) by mouth daily as needed for erectile dysfunction 10 tablet 0    tiZANidine (ZANAFLEX) 2 mg tablet TAKE 1 TABLET (2 MG TOTAL) BY MOUTH DAILY AT BEDTIME 90 tablet 3     No current facility-administered medications for this visit  Allergies: Tetracycline    Objective:  Vitals:    07/18/22 1422   BP: 120/74   Pulse: 76   Temp: 97 6 °F (36 4 °C)   SpO2: 95%   Weight: 117 kg (257 lb)   Height: 5' 8" (1 727 m)   Oxygen Therapy  SpO2: 95 %    Wt Readings from Last 3 Encounters:   07/18/22 117 kg (257 lb)   06/23/22 117 kg (257 lb)   04/20/22 116 kg (256 lb)     Body mass index is 39 08 kg/m²  Physical Exam  Vitals reviewed  Constitutional:       General: He is not in acute distress  Appearance: Normal appearance  He is obese  He is not ill-appearing  HENT:      Head: Normocephalic and atraumatic  Mouth/Throat:      Pharynx: Oropharynx is clear  Eyes:      Conjunctiva/sclera: Conjunctivae normal    Cardiovascular:      Rate and Rhythm: Normal rate and regular rhythm  Pulmonary:      Effort: Pulmonary effort is normal  No respiratory distress  Breath sounds: Normal breath sounds  No decreased breath sounds, wheezing, rhonchi or rales  Abdominal:      General: Abdomen is flat  There is no distension  Musculoskeletal:         General: Normal range of motion  Right lower leg: No edema  Left lower leg: No edema  Skin:     General: Skin is warm and dry  Neurological:      Mental Status: He is alert and oriented to person, place, and time     Psychiatric:         Mood and Affect: Mood normal          Behavior: Behavior normal          Lab Review:   Lab Results   Component Value Date    K 3 6 07/18/2022     (H) 07/18/2022    CO2 23 07/18/2022    BUN 15 07/18/2022    CREATININE 1 43 (H) 07/18/2022    CALCIUM 8 9 07/18/2022     Lab Results   Component Value Date    WBC 8 57 07/18/2022    HGB 15 4 07/18/2022    HCT 48 5 07/18/2022    MCV 89 07/18/2022  2022       Diagnostics:  I have personally reviewed pertinent reports  and I have personally reviewed pertinent films in PACS  Reviewed recent PFT and CT scan  Office Spirometry Results:     ESS:    Complete PFT with post Bronchodilator and Six Minute walk    Result Date: 2022  Narrative: Pulmonary Function with a 6 minute walk Test Report Lauren Velasquez 61 y o  male MRN: [id number] Date of Testin2022 Requesting Physician:  Katiana García Reason for Testing:  Shortness of breath Reference set for interpretation: NHANES III Procedure: The patient was taken to pulmonary function testing laboratory  The patient demonstrated good effort and cooperation  The results of this test meet ATS standards for acceptability and repeatability  , although the ats standards for the end of the test was not met  Data set appears appropriate for interpretation  Post bronchodilator testing performed after the administration of 2 5mg albuterol in 3cc normal saline administered via nebulizer per bronchodilator protocol  Results: FEV1/FVC Ratio:  77 Forced Vital Capacity:  3 08 L, 72 % predicted FEV1:  2 37 L, 72 % predicted After administration of bronchodilator FEV1:  2 39 L, 73% predicted with no appreciable response to the bronchodilator Lung volumes by  nitrogen wash out : Total Lung Capacity  57 % predicted Residual volume 28 % predicted DLCO corrected for patients hemoglobin level:  52 % Patient had a 6 minute walk test prior to the walk the blood pressure was 144/72 mmHg heart rate 111 beats per minute saturating 94% on room air with a dyspnea Jimbo scale of 2/10 On ambulation on room air oxygen saturation remained between 91-93 %, heart rate was between 119-132 beats per minute patient could walk a total distance of 198 m in 6 minutes with 5 stops for shortness of breath and once for hip pain    Dyspnea Jimbo scale at the end of the test was 7/10 Interpretation: Patient had a full lung function testing with spirometry lung volumes and DLCO and a 6 minute walk test  Patient gave a good effort  Results meet the ats standards for acceptability and rib repeat ability, although the ats standards for the end of the test was not met data set appears appropriate for interpretation  The flow volume curve demonstrates a restrictive pattern  Spirometry demonstrates a restrictive airflow limitation  The lung volumes are moderately decreased  The DLCO is moderately decreased  Patient could walk a total distance of 198 m in 6 minutes without any evidence of exercise desaturation   Jasmin Funk MD [facility]   Answers for HPI/ROS submitted by the patient on 7/11/2022  Chronicity: recurrent  When did you first notice your symptoms?: more than 1 year ago  How often do your symptoms occur?: daily  Since you first noticed this problem, how has it changed?: unchanged  Do you have shortness of breath that occurs with effort or exertion?: Yes  Do you have ear congestion?: No  Do you have heartburn?: No  Do you have fatigue?: Yes  Do you have nasal congestion?: No  Do you have shortness of breath when lying flat?: No  Do you have shortness of breath when you wake up?: Yes  Do you have sweats?: No  Have you experienced weight loss?: No  Which of the following makes your symptoms worse?: emotional stress, exercise, minimal activity, pollen

## 2022-07-25 ENCOUNTER — CONSULT (OUTPATIENT)
Dept: NEPHROLOGY | Facility: CLINIC | Age: 67
End: 2022-07-25
Payer: COMMERCIAL

## 2022-07-25 VITALS
RESPIRATION RATE: 16 BRPM | HEIGHT: 68 IN | BODY MASS INDEX: 39.19 KG/M2 | WEIGHT: 258.6 LBS | HEART RATE: 87 BPM | TEMPERATURE: 97.3 F | SYSTOLIC BLOOD PRESSURE: 120 MMHG | DIASTOLIC BLOOD PRESSURE: 70 MMHG | OXYGEN SATURATION: 94 %

## 2022-07-25 DIAGNOSIS — Z86.16 HISTORY OF COVID-19: ICD-10-CM

## 2022-07-25 DIAGNOSIS — N18.9 CHRONIC KIDNEY DISEASE-MINERAL AND BONE DISORDER: Primary | ICD-10-CM

## 2022-07-25 DIAGNOSIS — J84.9 INTERSTITIAL LUNG DISEASE (HCC): ICD-10-CM

## 2022-07-25 DIAGNOSIS — M89.9 CHRONIC KIDNEY DISEASE-MINERAL AND BONE DISORDER: Primary | ICD-10-CM

## 2022-07-25 DIAGNOSIS — E83.9 CHRONIC KIDNEY DISEASE-MINERAL AND BONE DISORDER: Primary | ICD-10-CM

## 2022-07-25 DIAGNOSIS — N18.31 STAGE 3A CHRONIC KIDNEY DISEASE (HCC): ICD-10-CM

## 2022-07-25 DIAGNOSIS — I51.89 GRADE I DIASTOLIC DYSFUNCTION: ICD-10-CM

## 2022-07-25 PROCEDURE — 99244 OFF/OP CNSLTJ NEW/EST MOD 40: CPT | Performed by: INTERNAL MEDICINE

## 2022-07-25 NOTE — ASSESSMENT & PLAN NOTE
Lab Results   Component Value Date    EGFR 51 07/18/2022    EGFR 46 04/13/2022    EGFR 55 04/06/2021    CREATININE 1 43 (H) 07/18/2022    CREATININE 1 55 (H) 04/13/2022    CREATININE 1 36 (H) 04/06/2021   Will check PTH and phosphorus all good vitamin-D as part of the CKD management

## 2022-07-25 NOTE — ASSESSMENT & PLAN NOTE
Lab Results   Component Value Date    EGFR 51 07/18/2022    EGFR 46 04/13/2022    EGFR 55 04/06/2021    CREATININE 1 43 (H) 07/18/2022    CREATININE 1 55 (H) 04/13/2022    CREATININE 1 36 (H) 04/06/2021   Patient does seem to a stage 3 kidney disease with GFR of 51  It was maybe related to the hypertension or it could be acute kidney injury with COVID-19 which is not recovering    Pathophysiology of kidney disease discussed at length with the patient  Importance of hydration and avoiding nephrotoxic medication discussed with him    Asymptomatic and progressive no history of kidney disease also discussed with

## 2022-07-25 NOTE — PROGRESS NOTES
NEPHROLOGY OFFICE CONSULT  Bonnie Roth 61 y o  male MRN: [id number]    Encounter: [id number] 7/25/2022    REASON FOR VISIT: Bonnie Roth is a 61 y o male who was referred by Jonathan Andrade MD for evaluation of Chronic Kidney Disease and Consult    HPI:    Jennifer Cruz came in today for evaluation and management of CKD  [de-identified] years gentleman who does not have much significant medical history except borderline high blood pressure     Apparently he was admitted in hospital in 2020 with COVID-19 infection and had quite a long course and recovered     He still has a problem with the breathing and has some interstitial lung disease because of that     He denies any other acute complaint     He does have some joint pain and neuropathy and take quite a bit of pain killers for that     No chest pain no palpitation    Denies any urinary complaint      REVIEW OF SYSTEMS:    Review of Systems   Constitutional: Negative for activity change and fatigue  HENT: Negative for congestion and ear discharge  Eyes: Negative for photophobia and pain  Respiratory: Negative for apnea and choking  Cardiovascular: Negative for chest pain and palpitations  Gastrointestinal: Negative for abdominal distention and blood in stool  Endocrine: Negative for heat intolerance and polyphagia  Genitourinary: Negative for flank pain and urgency  Musculoskeletal: Positive for arthralgias  Negative for neck pain and neck stiffness  Skin: Negative for color change and wound  Allergic/Immunologic: Negative for food allergies and immunocompromised state  Neurological: Negative for seizures and facial asymmetry  Hematological: Negative for adenopathy  Does not bruise/bleed easily  Psychiatric/Behavioral: Negative for self-injury and suicidal ideas           PAST MEDICAL HISTORY:  Past Medical History:   Diagnosis Date    Chronic kidney disease     COVID-19 03/2020    PTSD (post-traumatic stress disorder)        PAST SURGICAL HISTORY:  Past Surgical History:   Procedure Laterality Date    FL INJECTION RIGHT HIP (NON ARTHROGRAM)  2020    FL INJECTION RIGHT HIP (NON ARTHROGRAM)  2021    FL INJECTION RIGHT HIP (NON ARTHROGRAM)  2021    FL INJECTION RIGHT HIP (NON ARTHROGRAM)  2022    NO PAST SURGERIES         SOCIAL HISTORY:  Social History     Substance and Sexual Activity   Alcohol Use Not Currently     Social History     Substance and Sexual Activity   Drug Use Never     Social History     Tobacco Use   Smoking Status Former Smoker    Packs/day: 0 00    Years: 47 00    Pack years: 0 00    Types: Cigarettes    Start date:     Quit date: 2020    Years since quittin 3   Smokeless Tobacco Never Used       FAMILY HISTORY:  Family History   Problem Relation Age of Onset    Heart disease Mother     Sudden death Father     No Known Problems Son     No Known Problems Daughter     No Known Problems Maternal Grandmother     No Known Problems Maternal Grandfather     No Known Problems Paternal Grandmother     No Known Problems Paternal Grandfather     No Known Problems Daughter     Kidney disease Brother     Cancer Brother        MEDICATIONS:    Current Outpatient Medications:     albuterol (2 5 mg/3 mL) 0 083 % nebulizer solution, INHALE 1 VIAL BY NEBULIZATION EVERY 6 (SIX) HOURS AS NEEDED FOR WHEEZING OR SHORTNESS OF BREATH, Disp: 375 mL, Rfl: 2    albuterol (Ventolin HFA) 90 mcg/act inhaler, Inhale 2 puffs every 6 (six) hours as needed for wheezing, Disp: 18 g, Rfl: 3    amLODIPine (NORVASC) 2 5 mg tablet, TAKE 1 TABLET (2 5 MG TOTAL) BY MOUTH DAILY AFTER DINNER, Disp: , Rfl:     Anoro Ellipta 62 5-25 MCG/INH inhaler, INHALE 1 PUFF DAILY, Disp: 60 blister, Rfl: 3    budesonide (Pulmicort Flexhaler) 180 MCG/ACT inhaler, Inhale 2 puffs 2 (two) times a day, Disp: 1 each, Rfl: 3    chlorhexidine (PERIDEX) 0 12 % solution, SWISH AND SPIT 15CC ONCE A DAY OR AS DIRECTED, Disp: , Rfl:   Diclofenac Sodium (VOLTAREN) 1 %, Apply 2 g topically 4 (four) times a day, Disp: 100 g, Rfl: 0    donepezil (Aricept) 10 mg tablet, Take 1 tablet (10 mg total) by mouth daily at bedtime To start after finishing Aricept 5 mg once a day for 1 month , Disp: 30 tablet, Rfl: 5    DULoxetine (CYMBALTA) 60 mg delayed release capsule, Take 1 capsule (60 mg total) by mouth every morning, Disp: 90 capsule, Rfl: 0    gabapentin (NEURONTIN) 100 mg capsule, Take 1 capsule (100 mg total) by mouth 3 (three) times a day, Disp: 90 capsule, Rfl: 2    ibuprofen (MOTRIN) 800 mg tablet, TAKE ONE TABLET BY MOUTH EVERY 8 HRS AS NEEDED FOR PAIN, Disp: , Rfl:     ipratropium-albuterol (DUO-NEB) 0 5-2 5 mg/3 mL nebulizer solution, Take 3 mL by nebulization 4 (four) times a day, Disp: 75 mL, Rfl: 5    Multiple Vitamins-Minerals (MULTIVITAMIN ADULT PO), Take 1 tablet by mouth daily, Disp: , Rfl:     naproxen sodium (ANAPROX) 550 mg tablet, Take 1 tablet (550 mg total) by mouth 2 (two) times a day as needed for mild pain or headaches, Disp: 90 tablet, Rfl: 1    rOPINIRole (REQUIP) 3 mg tablet, Take 1 tablet (3 mg total) by mouth daily at bedtime, Disp: 90 tablet, Rfl: 0    sildenafil (VIAGRA) 50 MG tablet, Take 1 tablet (50 mg total) by mouth daily as needed for erectile dysfunction, Disp: 10 tablet, Rfl: 0    tiZANidine (ZANAFLEX) 2 mg tablet, TAKE 1 TABLET (2 MG TOTAL) BY MOUTH DAILY AT BEDTIME, Disp: 90 tablet, Rfl: 3    amoxicillin (AMOXIL) 500 mg capsule, TAKE 1 CAPSULE BY MOUTH 3 TIMES A DAY UNTIL FINISHED (Patient not taking: No sig reported), Disp: , Rfl:     PHYSICAL EXAM:  Vitals:    07/25/22 0940   BP: 120/70   BP Location: Right arm   Patient Position: Sitting   Pulse: 87   Resp: 16   Temp: (!) 97 3 °F (36 3 °C)   TempSrc: Temporal   SpO2: 94%   Weight: 117 kg (258 lb 9 6 oz)   Height: 5' 8" (1 727 m)     Body mass index is 39 32 kg/m²  Physical Exam  Constitutional:       General: He is not in acute distress  Appearance: He is well-developed  He is obese  HENT:      Head: Normocephalic  Eyes:      General: No scleral icterus  Conjunctiva/sclera: Conjunctivae normal    Neck:      Vascular: No JVD  Cardiovascular:      Rate and Rhythm: Normal rate  Heart sounds: Normal heart sounds  Pulmonary:      Effort: Pulmonary effort is normal       Breath sounds: Normal breath sounds  No wheezing  Abdominal:      General: Bowel sounds are normal       Palpations: Abdomen is soft  Tenderness: There is no abdominal tenderness  Musculoskeletal:         General: No swelling  Normal range of motion  Cervical back: Neck supple  Skin:     General: Skin is warm  Findings: No rash  Neurological:      General: No focal deficit present  Mental Status: He is alert and oriented to person, place, and time     Psychiatric:         Behavior: Behavior normal          LAB RESULTS:  Results for orders placed or performed in visit on 18/50/18   Basic metabolic panel   Result Value Ref Range    Sodium 139 135 - 147 mmol/L    Potassium 3 6 3 5 - 5 3 mmol/L    Chloride 111 (H) 96 - 108 mmol/L    CO2 23 21 - 32 mmol/L    ANION GAP 5 4 - 13 mmol/L    BUN 15 5 - 25 mg/dL    Creatinine 1 43 (H) 0 60 - 1 30 mg/dL    Glucose, Fasting 94 65 - 99 mg/dL    Calcium 8 9 8 3 - 10 1 mg/dL    eGFR 51 ml/min/1 73sq m   CBC and differential   Result Value Ref Range    WBC 8 57 4 31 - 10 16 Thousand/uL    RBC 5 46 3 88 - 5 62 Million/uL    Hemoglobin 15 4 12 0 - 17 0 g/dL    Hematocrit 48 5 36 5 - 49 3 %    MCV 89 82 - 98 fL    MCH 28 2 26 8 - 34 3 pg    MCHC 31 8 31 4 - 37 4 g/dL    RDW 15 2 (H) 11 6 - 15 1 %    MPV 10 7 8 9 - 12 7 fL    Platelets 784 510 - 447 Thousands/uL    nRBC 0 /100 WBCs    Neutrophils Relative 64 43 - 75 %    Immat GRANS % 1 0 - 2 %    Lymphocytes Relative 20 14 - 44 %    Monocytes Relative 10 4 - 12 %    Eosinophils Relative 4 0 - 6 %    Basophils Relative 1 0 - 1 %    Neutrophils Absolute 5 48 1 85 - 7 62 Thousands/µL    Immature Grans Absolute 0 09 0 00 - 0 20 Thousand/uL    Lymphocytes Absolute 1 70 0 60 - 4 47 Thousands/µL    Monocytes Absolute 0 84 0 17 - 1 22 Thousand/µL    Eosinophils Absolute 0 38 0 00 - 0 61 Thousand/µL    Basophils Absolute 0 08 0 00 - 0 10 Thousands/µL   Phosphorus   Result Value Ref Range    Phosphorus 2 7 2 3 - 4 1 mg/dL   PTH, intact   Result Value Ref Range    PTH 65 2 18 4 - 80 1 pg/mL   Vitamin D 25 hydroxy   Result Value Ref Range    Vit D, 25-Hydroxy 39 2 30 0 - 100 0 ng/mL       ASSESSMENT and PLAN:    Stage 3a chronic kidney disease (HCC)  Lab Results   Component Value Date    EGFR 51 07/18/2022    EGFR 46 04/13/2022    EGFR 55 04/06/2021    CREATININE 1 43 (H) 07/18/2022    CREATININE 1 55 (H) 04/13/2022    CREATININE 1 36 (H) 04/06/2021   Patient does seem to a stage 3 kidney disease with GFR of 51  It was maybe related to the hypertension or it could be acute kidney injury with COVID-19 which is not recovering    Pathophysiology of kidney disease discussed at length with the patient  Importance of hydration and avoiding nephrotoxic medication discussed with him  Asymptomatic and progressive no history of kidney disease also discussed with    Chronic kidney disease-mineral and bone disorder  Lab Results   Component Value Date    EGFR 51 07/18/2022    EGFR 46 04/13/2022    EGFR 55 04/06/2021    CREATININE 1 43 (H) 07/18/2022    CREATININE 1 55 (H) 04/13/2022    CREATININE 1 36 (H) 04/06/2021   Will check PTH and phosphorus all good vitamin-D as part of the CKD management    Interstitial lung disease ([address] )  Likely related to COVID-19 infection  On multiple nebulizer and being monitored by primary doctor    History of COVID-19  It seems kidney function was normal before COVID-19 infection that this may be related to COVID-19 infection and sepsis related to that    Everything discussed at length with the patient    Advised to stop all nonsteroidal pain killer and advised hydration  Advised to lose weight also    I will check the blood test and urine along with kidney ultrasound in 3 months and I will see him back at that    Thank you very much for the consult it is in all monitor the patient with you  Portions of the record may have been created with voice recognition software  Occasional wrong word or "sound a like" substitutions may have occurred due to the inherent limitations of voice recognition software  Read the chart carefully and recognize, using context, where substitutions have occurred  If you have any questions, please contact the dictating provider

## 2022-07-25 NOTE — ASSESSMENT & PLAN NOTE
It seems kidney function was normal before COVID-19 infection that this may be related to COVID-19 infection and sepsis related to that

## 2022-07-28 ENCOUNTER — OFFICE VISIT (OUTPATIENT)
Dept: NEUROLOGY | Facility: CLINIC | Age: 67
End: 2022-07-28
Payer: COMMERCIAL

## 2022-07-28 ENCOUNTER — TELEPHONE (OUTPATIENT)
Dept: INTERNAL MEDICINE CLINIC | Facility: CLINIC | Age: 67
End: 2022-07-28

## 2022-07-28 VITALS
TEMPERATURE: 99.7 F | OXYGEN SATURATION: 97 % | HEART RATE: 104 BPM | SYSTOLIC BLOOD PRESSURE: 130 MMHG | BODY MASS INDEX: 39.32 KG/M2 | HEIGHT: 68 IN | DIASTOLIC BLOOD PRESSURE: 80 MMHG

## 2022-07-28 DIAGNOSIS — Z86.16 HISTORY OF COVID-19: ICD-10-CM

## 2022-07-28 DIAGNOSIS — G44.229 CHRONIC TENSION-TYPE HEADACHE, NOT INTRACTABLE: ICD-10-CM

## 2022-07-28 DIAGNOSIS — R41.3 MEMORY DIFFICULTY: Primary | ICD-10-CM

## 2022-07-28 PROCEDURE — 99215 OFFICE O/P EST HI 40 MIN: CPT | Performed by: PSYCHIATRY & NEUROLOGY

## 2022-07-28 NOTE — PROGRESS NOTES
Dominguez Hilton is a 61 y o  male  Chief Complaint   Patient presents with    Memory difficulty       Assessment:  1  Memory difficulty    2  Chronic tension-type headache, not intractable    3  History of COVID-19        Plan:  Continue with Neurontin 100 mg 3 times a day  Continue with mentally stimulating exercise  Follow-up in 6 months  Discussion:  Patient has mild cognitive impairment with a [place] of 20/30 same as last time, he is on Aricept 10 mg once a day and is tolerating it well I have advised him to continue with same and was advised to continue with mentally stimulating exercises and to take multivitamin every day  Also was advised to continue with Neurontin 100 mg 3 times a day he is not keen to increase it to avoid migraine triggers to keep his blood pressure cholesterol and sugar under control to take fall and safety precautions to go to the hospital if has any worsening symptoms and call me otherwise to see me back in 6 months and follow up with his other physicians  Subjective:    HPI   Patient is here in follow-up for his headaches, and memory difficulty, since his last visit he is about the same his memory is a about the same is able to manages ADLs and his finances no issues with his driving his headaches are under control Neurontin seems to be helping him, denies any vision difficulty no speech difficulties appetite is good weight has been stable he continues to follow-up with his pulmonologist, no other complaints      Vitals:    07/28/22 1417   BP: 130/80   BP Location: Right arm   Patient Position: Sitting   Cuff Size: Adult   Pulse: 104   Temp: 99 7 °F (37 6 °C)   TempSrc: Probe   SpO2: 97%   Height: 5' 8" (1 727 m)       Current Medications    Current Outpatient Medications:     albuterol (2 5 mg/3 mL) 0 083 % nebulizer solution, INHALE 1 VIAL BY NEBULIZATION EVERY 6 (SIX) HOURS AS NEEDED FOR WHEEZING OR SHORTNESS OF BREATH, Disp: 375 mL, Rfl: 2    albuterol (Ventolin HFA) 90 mcg/act inhaler, Inhale 2 puffs every 6 (six) hours as needed for wheezing, Disp: 18 g, Rfl: 3    amLODIPine (NORVASC) 2 5 mg tablet, TAKE 1 TABLET (2 5 MG TOTAL) BY MOUTH DAILY AFTER DINNER, Disp: , Rfl:     amoxicillin (AMOXIL) 500 mg capsule, TAKE 1 CAPSULE BY MOUTH 3 TIMES A DAY UNTIL FINISHED (Patient not taking: No sig reported), Disp: , Rfl:     Anoro Ellipta 62 5-25 MCG/INH inhaler, INHALE 1 PUFF DAILY, Disp: 60 blister, Rfl: 3    budesonide (Pulmicort Flexhaler) 180 MCG/ACT inhaler, Inhale 2 puffs 2 (two) times a day, Disp: 1 each, Rfl: 3    chlorhexidine (PERIDEX) 0 12 % solution, SWISH AND SPIT 15CC ONCE A DAY OR AS DIRECTED, Disp: , Rfl:     Diclofenac Sodium (VOLTAREN) 1 %, Apply 2 g topically 4 (four) times a day, Disp: 100 g, Rfl: 0    donepezil (Aricept) 10 mg tablet, Take 1 tablet (10 mg total) by mouth daily at bedtime To start after finishing Aricept 5 mg once a day for 1 month , Disp: 30 tablet, Rfl: 5    DULoxetine (CYMBALTA) 60 mg delayed release capsule, Take 1 capsule (60 mg total) by mouth every morning, Disp: 90 capsule, Rfl: 0    gabapentin (NEURONTIN) 100 mg capsule, Take 1 capsule (100 mg total) by mouth 3 (three) times a day, Disp: 90 capsule, Rfl: 2    ibuprofen (MOTRIN) 800 mg tablet, TAKE ONE TABLET BY MOUTH EVERY 8 HRS AS NEEDED FOR PAIN, Disp: , Rfl:     ipratropium-albuterol (DUO-NEB) 0 5-2 5 mg/3 mL nebulizer solution, Take 3 mL by nebulization 4 (four) times a day, Disp: 75 mL, Rfl: 5    Multiple Vitamins-Minerals (MULTIVITAMIN ADULT PO), Take 1 tablet by mouth daily, Disp: , Rfl:     naproxen sodium (ANAPROX) 550 mg tablet, Take 1 tablet (550 mg total) by mouth 2 (two) times a day as needed for mild pain or headaches, Disp: 90 tablet, Rfl: 1    rOPINIRole (REQUIP) 3 mg tablet, Take 1 tablet (3 mg total) by mouth daily at bedtime, Disp: 90 tablet, Rfl: 0    sildenafil (VIAGRA) 50 MG tablet, Take 1 tablet (50 mg total) by mouth daily as needed for erectile dysfunction, Disp: 10 tablet, Rfl: 0    tiZANidine (ZANAFLEX) 2 mg tablet, TAKE 1 TABLET (2 MG TOTAL) BY MOUTH DAILY AT BEDTIME, Disp: 90 tablet, Rfl: 3      Allergies  Tetracycline    Past Medical History  Past Medical History:   Diagnosis Date    Chronic kidney disease     COVID-19 03/2020    PTSD (post-traumatic stress disorder)          Past Surgical History:  Past Surgical History:   Procedure Laterality Date    FL INJECTION RIGHT HIP (NON ARTHROGRAM)  11/30/2020    FL INJECTION RIGHT HIP (NON ARTHROGRAM)  8/4/2021    FL INJECTION RIGHT HIP (NON ARTHROGRAM)  12/27/2021    FL INJECTION RIGHT HIP (NON ARTHROGRAM)  4/25/2022    NO PAST SURGERIES           Family History:  Family History   Problem Relation Age of Onset    Heart disease Mother     Sudden death Father     No Known Problems Son     No Known Problems Daughter     No Known Problems Maternal Grandmother     No Known Problems Maternal Grandfather     No Known Problems Paternal Grandmother     No Known Problems Paternal Grandfather     No Known Problems Daughter     Kidney disease Brother     Cancer Brother        Social History:   reports that he quit smoking about 2 years ago  His smoking use included cigarettes  He started smoking about 49 years ago  He smoked 0 00 packs per day for 47 00 years  He has never used smokeless tobacco  He reports previous alcohol use  He reports that he does not use drugs  I have reviewed the past medical history, surgical history, social and family history, current medications, allergies vitals, review of systems, and updated this information as appropriate today  Objective:    Physical Exam    Neurological Exam    GENERAL:  Cooperative in no acute distress  Well-developed and well-nourished    HEAD and NECK   Head is atraumatic normocephalic with no lesions or masses  Neck is supple with full range of motion    CARDIOVASCULAR  Carotid Arteries-no carotid bruits      NEUROLOGIC:  Mental Status-the patient is awake alert and oriented without aphasia or apraxia, Holley is 20/30  Cranial Nerves: Visual fields are full to confrontation    Extraocular movements are full without nystagmus  Pupils are 2-1/2 mm and reactive  Face is symmetrical to light touch  Movements of facial expression move symmetrically  Hearing is normal to finger rub bilaterally  Soft palate lifts symmetrically  Shoulder shrug is symmetrical  Tongue is midline without atrophy  Motor: No drift is noted on arm extension  Strength is full in the upper and lower extremities with normal bulk and tone  Gait is unremarkable          ROS:  Review of Systems   Constitutional: Negative  Negative for appetite change and fever  HENT: Negative  Negative for hearing loss, tinnitus, trouble swallowing and voice change  Eyes: Negative  Negative for photophobia and pain  Respiratory: Positive for shortness of breath  Cardiovascular: Negative  Negative for palpitations  Gastrointestinal: Negative  Negative for nausea and vomiting  Endocrine: Negative  Negative for cold intolerance  Genitourinary: Negative  Negative for dysuria, frequency and urgency  Musculoskeletal: Positive for back pain and neck pain  Negative for myalgias  Skin: Negative  Negative for rash  Neurological: Positive for tremors and headaches  Negative for dizziness, seizures, syncope, facial asymmetry, speech difficulty, weakness, light-headedness and numbness  Hematological: Negative  Does not bruise/bleed easily  Psychiatric/Behavioral: Positive for confusion and sleep disturbance  Negative for hallucinations

## 2022-08-10 DIAGNOSIS — J84.9 INTERSTITIAL LUNG DISEASE (HCC): ICD-10-CM

## 2022-08-10 DIAGNOSIS — Z86.16 HISTORY OF COVID-19: ICD-10-CM

## 2022-08-10 DIAGNOSIS — R41.3 MEMORY DIFFICULTY: ICD-10-CM

## 2022-08-10 RX ORDER — DONEPEZIL HYDROCHLORIDE 10 MG/1
TABLET, FILM COATED ORAL
Qty: 90 TABLET | Refills: 1 | Status: SHIPPED | OUTPATIENT
Start: 2022-08-10

## 2022-08-19 ENCOUNTER — TELEPHONE (OUTPATIENT)
Dept: OTHER | Facility: OTHER | Age: 67
End: 2022-08-19

## 2022-08-19 NOTE — TELEPHONE ENCOUNTER
Patient would like to review his coverage information  He has checked with the insurance company, and there have been no changes 
Ran cedric through   [employer]  Still not active as 8/19
stated

## 2022-08-24 ENCOUNTER — TELEPHONE (OUTPATIENT)
Dept: GASTROENTEROLOGY | Facility: CLINIC | Age: 67
End: 2022-08-24

## 2022-08-24 NOTE — TELEPHONE ENCOUNTER
[facility] patient - Called [facility] for insurance verification (ID# [id number]) Eff 10/15/2020, TERMED 7/31/2022  Deleted from account    Thx

## 2022-09-01 DIAGNOSIS — F43.10 PTSD (POST-TRAUMATIC STRESS DISORDER): ICD-10-CM

## 2022-09-01 DIAGNOSIS — F32.A DEPRESSION, UNSPECIFIED DEPRESSION TYPE: ICD-10-CM

## 2022-09-01 RX ORDER — DULOXETIN HYDROCHLORIDE 60 MG/1
60 CAPSULE, DELAYED RELEASE ORAL EVERY MORNING
Qty: 90 CAPSULE | Refills: 0 | Status: CANCELLED | OUTPATIENT
Start: 2022-09-01

## 2022-09-13 ENCOUNTER — TELEMEDICINE (OUTPATIENT)
Dept: BEHAVIORAL/MENTAL HEALTH CLINIC | Facility: CLINIC | Age: 67
End: 2022-09-13
Payer: MEDICARE

## 2022-09-13 DIAGNOSIS — F43.10 PTSD (POST-TRAUMATIC STRESS DISORDER): Primary | ICD-10-CM

## 2022-09-13 DIAGNOSIS — F32.A DEPRESSION, UNSPECIFIED DEPRESSION TYPE: ICD-10-CM

## 2022-09-13 DIAGNOSIS — F33.2 MAJOR DEPRESSIVE DISORDER, RECURRENT SEVERE WITHOUT PSYCHOTIC FEATURES (HCC): ICD-10-CM

## 2022-09-13 DIAGNOSIS — R41.3 MEMORY DIFFICULTY: ICD-10-CM

## 2022-09-13 DIAGNOSIS — Z63.4 EXPECTED BEREAVEMENT DUE TO LIFE EVENT: ICD-10-CM

## 2022-09-13 PROCEDURE — 90834 PSYTX W PT 45 MINUTES: CPT | Performed by: SOCIAL WORKER

## 2022-09-13 SDOH — SOCIAL STABILITY - SOCIAL INSECURITY: DISSAPEARANCE AND DEATH OF FAMILY MEMBER: Z63.4

## 2022-09-13 NOTE — BH TREATMENT PLAN
Joanie Caruso                                    Treatment Plan Tracking: The plan update was due 8/25/2022  However the last appointment the client had was 07/06/2022  Today 09/13/2022 was the first time back since 07/06/2022 so we did it today  1955       Date of Initial Treatment Plan: 05/06/2020  Date of Current Treatment Plan: 09/13/22    Treatment Plan Number update    Strengths/Personal Resources for Self Care: loves his family, kind and generous    Diagnosis:   1  PTSD (post-traumatic stress disorder)     2  Major depressive disorder, recurrent severe without psychotic features ([facility] [address] )     3  Memory difficulty     4  Depression, unspecified depression type     5  Expected bereavement due to life event         Area of Needs: Please see below      Long Term Goal 1: I need to more effectively manage my depression and my anxiety    Target Date: 03/06/2023  Completion Date: TBD         Short Term Objectives for Goal 1: mindfulness, CBT and SFT    Long Term Goal 2: I need to get my sleep hygiene in order    Target Date: 03/06/2023  Completion Date: TBD    Short Term Objectives for Goal 2: I will see a medication provider         Long Term Goal # 3: I need to learn to cope about my financial situation     Target Date: 03/06/2023  Completion Date: TBD    Short Term Objectives for Goal 3: I will discuss coping strategies in my sessions    GOAL 1: Modality: Individual 2x per month   Completion Date tbd    GOAL 2: Modality: Individual 2x per month   Completion Date tbd     GOAL 3: Modality: Individual 2x per month   Completion Date tbd      [address]: Diagnosis and Treatment Plan explained to Sue Tan relates understanding diagnosis and is agreeable to Treatment Plan  Client Comments : Please share your thoughts, feelings, need and/or experiences regarding your treatment plan: My therapist and I will work as a team to help me progress on my goals

## 2022-09-13 NOTE — PSYCH
Virtual Regular Visit    Verification of patient location:    Patient is located in the following state in which I hold an active license [place]      Assessment/Plan:    Problem List Items Addressed This Visit    None         Goals addressed in session: Goal 1 and Goal 2          Reason for visit is   Chief Complaint   Patient presents with    Virtual Regular Visit        Encounter provider Dom Rosa    Provider located at [address] [place] [id number] [phone]      Recent Visits  No visits were found meeting these conditions  Showing recent visits within past 7 days and meeting all other requirements  Future Appointments  No visits were found meeting these conditions  Showing future appointments within next 150 days and meeting all other requirements       The patient was identified by name and date of birth  Jennifer Merino was informed that this is a telemedicine visit and that the visit is being conducted throughEpic Embedded and patient was informed this is a secure, HIPAA-complaint platform  He agrees to proceed     My office door was closed  No one else was in the room  He acknowledged consent and understanding of privacy and security of the video platform  The patient has agreed to participate and understands they can discontinue the visit at any time  Patient is aware this is a billable service  Subjective  Jennifer Merino is a 74 y o  male          HPI     Past Medical History:   Diagnosis Date    Chronic kidney disease     COVID-19 03/2020    PTSD (post-traumatic stress disorder)        Past Surgical History:   Procedure Laterality Date    FL INJECTION RIGHT HIP (NON ARTHROGRAM)  11/30/2020    FL INJECTION RIGHT HIP (NON ARTHROGRAM)  8/4/2021    FL INJECTION RIGHT HIP (NON ARTHROGRAM)  12/27/2021    FL INJECTION RIGHT HIP (NON ARTHROGRAM)  4/25/2022    NO PAST SURGERIES Current Outpatient Medications   Medication Sig Dispense Refill    albuterol (2 5 mg/3 mL) 0 083 % nebulizer solution INHALE 1 VIAL BY NEBULIZATION EVERY 6 (SIX) HOURS AS NEEDED FOR WHEEZING OR SHORTNESS OF BREATH 375 mL 2    albuterol (Ventolin HFA) 90 mcg/act inhaler Inhale 2 puffs every 6 (six) hours as needed for wheezing 18 g 3    amLODIPine (NORVASC) 2 5 mg tablet TAKE 1 TABLET (2 5 MG TOTAL) BY MOUTH DAILY AFTER DINNER      amoxicillin (AMOXIL) 500 mg capsule TAKE 1 CAPSULE BY MOUTH 3 TIMES A DAY UNTIL FINISHED      Anoro Ellipta 62 5-25 MCG/INH inhaler INHALE 1 PUFF DAILY 60 blister 3    budesonide (Pulmicort Flexhaler) 180 MCG/ACT inhaler Inhale 2 puffs 2 (two) times a day 1 each 3    chlorhexidine (PERIDEX) 0 12 % solution SWISH AND SPIT 15CC ONCE A DAY OR AS DIRECTED      Diclofenac Sodium (VOLTAREN) 1 % Apply 2 g topically 4 (four) times a day 100 g 0    donepezil (ARICEPT) 10 mg tablet TAKE 1 TABLET BY MOUTH DAILY AT BEDTIME TO START AFTER FINISHING ARICEPT 5 MG ONCE A DAY FOR 1 MONTH   90 tablet 1    DULoxetine (CYMBALTA) 60 mg delayed release capsule Take 1 capsule (60 mg total) by mouth every morning 90 capsule 0    gabapentin (NEURONTIN) 100 mg capsule Take 1 capsule (100 mg total) by mouth 3 (three) times a day 90 capsule 2    ibuprofen (MOTRIN) 800 mg tablet TAKE ONE TABLET BY MOUTH EVERY 8 HRS AS NEEDED FOR PAIN (Patient not taking: Reported on 7/28/2022)      ipratropium-albuterol (DUO-NEB) 0 5-2 5 mg/3 mL nebulizer solution Take 3 mL by nebulization 4 (four) times a day 75 mL 5    Multiple Vitamins-Minerals (MULTIVITAMIN ADULT PO) Take 1 tablet by mouth daily      naproxen sodium (ANAPROX) 550 mg tablet Take 1 tablet (550 mg total) by mouth 2 (two) times a day as needed for mild pain or headaches 90 tablet 1    rOPINIRole (REQUIP) 3 mg tablet Take 1 tablet (3 mg total) by mouth daily at bedtime 90 tablet 0    sildenafil (VIAGRA) 50 MG tablet Take 1 tablet (50 mg total) by mouth daily as needed for erectile dysfunction 10 tablet 0    tiZANidine (ZANAFLEX) 2 mg tablet TAKE 1 TABLET (2 MG TOTAL) BY MOUTH DAILY AT BEDTIME 90 tablet 3     No current facility-administered medications for this visit  Allergies   Allergen Reactions    Tetracycline Rash       Review of Systems Data: Virtually met with Roger Solorzano today  He was sick today and did not want to come in to person  He still has what he calls brain fog from having covid  He is depressed, anxious has insomnia, headaches, and memory issues  He is increasingly irritable  He has multiple health issues and he is dealing with many financial issues  His employer and workman's comp continues to give him a very difficult time and he is going thru financial hardships  We discussed mindfulness, CBT , and solution focused therapy strategies  Assessment: Roger Solorzano denies suicidal/homicidal ideation, plan or intent  However he is depressed and anxious over his health, his lingering effects from covid and his financial issues related to having covid and trying to deal with workman's comp  We continue to work on mindfulness, CBT and solution focused therapy strategies  Plan: Will refer him for psychiatric medication management  Video Exam    There were no vitals filed for this visit      Physical Exam N/A    I spent 45 minutes directly with the patient during this visit

## 2022-09-14 ENCOUNTER — TELEPHONE (OUTPATIENT)
Dept: PSYCHIATRY | Facility: CLINIC | Age: 67
End: 2022-09-14

## 2022-09-28 ENCOUNTER — TELEPHONE (OUTPATIENT)
Dept: PSYCHIATRY | Facility: CLINIC | Age: 67
End: 2022-09-28

## 2022-09-30 ENCOUNTER — SOCIAL WORK (OUTPATIENT)
Dept: BEHAVIORAL/MENTAL HEALTH CLINIC | Facility: CLINIC | Age: 67
End: 2022-09-30
Payer: MEDICARE

## 2022-09-30 DIAGNOSIS — F32.A DEPRESSION, UNSPECIFIED DEPRESSION TYPE: ICD-10-CM

## 2022-09-30 DIAGNOSIS — F43.10 PTSD (POST-TRAUMATIC STRESS DISORDER): Primary | ICD-10-CM

## 2022-09-30 DIAGNOSIS — F43.11 ACUTE POST-TRAUMATIC STRESS DISORDER: ICD-10-CM

## 2022-09-30 DIAGNOSIS — R41.3 MEMORY DIFFICULTY: ICD-10-CM

## 2022-09-30 PROCEDURE — 90834 PSYTX W PT 45 MINUTES: CPT | Performed by: SOCIAL WORKER

## 2022-10-03 ENCOUNTER — HOSPITAL ENCOUNTER (OUTPATIENT)
Dept: ULTRASOUND IMAGING | Facility: HOSPITAL | Age: 67
Discharge: HOME | End: 2022-10-03
Attending: INTERNAL MEDICINE
Payer: MEDICARE

## 2022-10-03 DIAGNOSIS — E83.9 CHRONIC KIDNEY DISEASE-MINERAL AND BONE DISORDER: ICD-10-CM

## 2022-10-03 DIAGNOSIS — M89.9 CHRONIC KIDNEY DISEASE-MINERAL AND BONE DISORDER: ICD-10-CM

## 2022-10-03 DIAGNOSIS — N18.9 CHRONIC KIDNEY DISEASE-MINERAL AND BONE DISORDER: ICD-10-CM

## 2022-10-03 DIAGNOSIS — N18.31 STAGE 3A CHRONIC KIDNEY DISEASE (HCC): ICD-10-CM

## 2022-10-03 PROCEDURE — 76775 US EXAM ABDO BACK WALL LIM: CPT

## 2022-10-10 ENCOUNTER — TELEPHONE (OUTPATIENT)
Dept: PSYCHIATRY | Facility: CLINIC | Age: 67
End: 2022-10-10

## 2022-10-10 NOTE — TELEPHONE ENCOUNTER
Writer spoke to pt to cx the appt on 10/19/22 with Tracy Mix due to provider is on vacation, follow-up is 11/15/22, Thank you

## 2022-10-11 ENCOUNTER — TELEMEDICINE (OUTPATIENT)
Dept: PSYCHIATRY | Facility: CLINIC | Age: 67
End: 2022-10-11
Payer: MEDICARE

## 2022-10-11 DIAGNOSIS — F43.10 PTSD (POST-TRAUMATIC STRESS DISORDER): ICD-10-CM

## 2022-10-11 DIAGNOSIS — F32.A DEPRESSION, UNSPECIFIED DEPRESSION TYPE: ICD-10-CM

## 2022-10-11 DIAGNOSIS — G44.52 NEW DAILY PERSISTENT HEADACHE: ICD-10-CM

## 2022-10-11 DIAGNOSIS — G25.81 RESTLESS LEGS: ICD-10-CM

## 2022-10-11 PROBLEM — Z11.59 NEED FOR HEPATITIS C SCREENING TEST: Status: RESOLVED | Noted: 2022-04-13 | Resolved: 2022-10-11

## 2022-10-11 PROBLEM — Z12.12 ENCOUNTER FOR COLORECTAL CANCER SCREENING: Status: RESOLVED | Noted: 2022-04-13 | Resolved: 2022-10-11

## 2022-10-11 PROBLEM — Z12.11 ENCOUNTER FOR COLORECTAL CANCER SCREENING: Status: RESOLVED | Noted: 2022-04-13 | Resolved: 2022-10-11

## 2022-10-11 PROCEDURE — 99213 OFFICE O/P EST LOW 20 MIN: CPT | Performed by: PHYSICIAN ASSISTANT

## 2022-10-11 RX ORDER — ROPINIROLE 3 MG/1
3 TABLET, FILM COATED ORAL
Qty: 90 TABLET | Refills: 0 | Status: SHIPPED | OUTPATIENT
Start: 2022-10-11

## 2022-10-11 RX ORDER — GABAPENTIN 100 MG/1
100 CAPSULE ORAL 3 TIMES DAILY
Qty: 90 CAPSULE | Refills: 2 | Status: SHIPPED | OUTPATIENT
Start: 2022-10-11

## 2022-10-11 RX ORDER — DULOXETIN HYDROCHLORIDE 60 MG/1
60 CAPSULE, DELAYED RELEASE ORAL EVERY MORNING
Qty: 90 CAPSULE | Refills: 0 | Status: SHIPPED | OUTPATIENT
Start: 2022-10-11

## 2022-10-11 NOTE — PSYCH
Virtual Regular Visit    Verification of patient location:    Patient is located in the following state in which I hold an active license [place]                 Reason for visit is   Chief Complaint   Patient presents with   • Virtual Regular Visit        Encounter provider Alexa Horne PA-C    Provider located at  [address] [place] [address]  [address] [facility] [address] [phone]      Recent Visits  No visits were found meeting these conditions  Showing recent visits within past 7 days and meeting all other requirements  Future Appointments  No visits were found meeting these conditions  Showing future appointments within next 150 days and meeting all other requirements       The patient was identified by name and date of birth  Jennifer Merino was informed that this is a telemedicine visit and that the visit is being conducted throughEpic Embedded and patient was informed this is a secure, HIPAA-complaint platform  He agrees to proceed     My office door was closed  Pt informed Pa-S in room  He acknowledged consent and understanding of privacy and security of the video platform  The patient has agreed to participate and understands they can discontinue the visit at any time  Patient is aware this is a billable service  MEDICATION MANAGEMENT NOTE        [address]  [facility] [address] [address]  [phone]        Name and Date of Birth:  Jennifer Angelique 74 y o  1955    Date of Visit: October 11, 2022/seen with Elva Galicia with pt's permission    SUBJECTIVE:      Ev Ahumada seen by [facility] 7/13 at which time meds unchanged  Ev Ahumada continues to see Tomas Vinson for ind therapy  Has seen neuro since last visit here and this note reviewed  Neuro continued aricept which Ev Ahumada takes at night  Today reports ongoing flashbacks with identifiable triggers and sleep problems with nightmares at times  Jose Luis discussed EMDR in previous visits but Misti Velázquez not inclined to this as he would have to come into the office  Review of Systems   Constitutional: Negative for activity change  HENT:        Snoring   Musculoskeletal: Positive for arthralgias and myalgias  Psychiatric/Behavioral: Positive for sleep disturbance  Psychiatric History     This office since 5/7/20  No IP or suicide attempts  Past med trial- zoloft  Trauma/Loss History       ARDS secondary to covid- intubated x 10 days, April 2020  Lost brother and coworkers to [employer]  Has been too ill to return to work  Social History        Lives with wife Claudy Perez -  >40 yrs  2 daugthers and a son  Worked at [employer] x 40 yrs -              OBJECTIVE:     MENTAL STATUS EXAM  Appearance:  age appropriate, dressed casually   Behavior:  Pleasant & cooperative   Speech:  Normal volume, regular rate and rhythm   Mood:  mildly low    Affect:  mood congruent   Language: intact and appropriate for age, education, and intellect   Thought Process:  goal directed   Associations: intact associations   Thought Content:  no overt delusions   Perceptual Disturbances: no auditory or visual hallcunations   Risk Potential / Abnormal Thoughts: Suicidal ideation - None  Homicidal ideation - None  Potential for aggression - No       Consciousness:  Alert & Awake   Sensorium:  Grossly oriented   Attention: attention span and concentration are age appropriate       Fund of Knowledge:  Memory: awareness of current events: yes  recent and remote memory grossly intact   Insight:  good   Judgment: good       Lab Review: I have reviewed all pertinent labs  Lab Results   Component Value Date    HGBA1C 5 6 04/13/2022     Lab Results   Component Value Date    CHOLESTEROL 158 04/13/2022     Lab Results   Component Value Date    HDL 37 (L) 04/13/2022     Lab Results   Component Value Date    TRIG 120 04/13/2022       Lab Results   Component Value Date    SODIUM 139 07/18/2022    K 3 6 07/18/2022     (H) 07/18/2022    CO2 23 07/18/2022    AGAP 5 07/18/2022    BUN 15 07/18/2022    CREATININE 1 43 (H) 07/18/2022    GLUC 164 (H) 04/12/2020    GLUF 94 07/18/2022    CALCIUM 8 9 07/18/2022    AST 43 04/13/2022    ALT 60 04/13/2022    ALKPHOS 88 04/13/2022    TP 7 5 04/13/2022    TBILI 0 60 04/13/2022    EGFR 51 07/18/2022     Lab Results   Component Value Date    WBC 8 57 07/18/2022    HGB 15 4 07/18/2022    HCT 48 5 07/18/2022    MCV 89 07/18/2022     07/18/2022       Lab Results   Component Value Date    PRV5CGNGXBZM 1 890 04/13/2022           ASSESSMENT & PLAN          Diagnoses and all orders for this visit:    PTSD (post-traumatic stress disorder)  -     DULoxetine (CYMBALTA) 60 mg delayed release capsule; Take 1 capsule (60 mg total) by mouth every morning    Depression, unspecified depression type  -     DULoxetine (CYMBALTA) 60 mg delayed release capsule; Take 1 capsule (60 mg total) by mouth every morning    New daily persistent headache  -     gabapentin (NEURONTIN) 100 mg capsule; Take 1 capsule (100 mg total) by mouth 3 (three) times a day    Restless legs  -     rOPINIRole (REQUIP) 3 mg tablet;  Take 1 tablet (3 mg total) by mouth daily at bedtime        Current Outpatient Medications   Medication Sig Dispense Refill   • DULoxetine (CYMBALTA) 60 mg delayed release capsule Take 1 capsule (60 mg total) by mouth every morning 90 capsule 0   • gabapentin (NEURONTIN) 100 mg capsule Take 1 capsule (100 mg total) by mouth 3 (three) times a day 90 capsule 2   • rOPINIRole (REQUIP) 3 mg tablet Take 1 tablet (3 mg total) by mouth daily at bedtime 90 tablet 0   • albuterol (2 5 mg/3 mL) 0 083 % nebulizer solution INHALE 1 VIAL BY NEBULIZATION EVERY 6 (SIX) HOURS AS NEEDED FOR WHEEZING OR SHORTNESS OF BREATH 375 mL 2   • albuterol (Ventolin HFA) 90 mcg/act inhaler Inhale 2 puffs every 6 (six) hours as needed for wheezing 18 g 3   • amLODIPine (NORVASC) 2 5 mg tablet TAKE 1 TABLET (2 5 MG TOTAL) BY MOUTH DAILY AFTER DINNER     • amoxicillin (AMOXIL) 500 mg capsule TAKE 1 CAPSULE BY MOUTH 3 TIMES A DAY UNTIL FINISHED     • Anoro Ellipta 62 5-25 MCG/INH inhaler INHALE 1 PUFF DAILY 60 blister 3   • chlorhexidine (PERIDEX) 0 12 % solution SWISH AND SPIT 15CC ONCE A DAY OR AS DIRECTED     • Diclofenac Sodium (VOLTAREN) 1 % Apply 2 g topically 4 (four) times a day 100 g 0   • donepezil (ARICEPT) 10 mg tablet TAKE 1 TABLET BY MOUTH DAILY AT BEDTIME TO START AFTER FINISHING ARICEPT 5 MG ONCE A DAY FOR 1 MONTH  90 tablet 1   • ibuprofen (MOTRIN) 800 mg tablet TAKE ONE TABLET BY MOUTH EVERY 8 HRS AS NEEDED FOR PAIN (Patient not taking: Reported on 7/28/2022)     • ipratropium-albuterol (DUO-NEB) 0 5-2 5 mg/3 mL nebulizer solution Take 3 mL by nebulization 4 (four) times a day 75 mL 5   • Multiple Vitamins-Minerals (MULTIVITAMIN ADULT PO) Take 1 tablet by mouth daily     • naproxen sodium (ANAPROX) 550 mg tablet Take 1 tablet (550 mg total) by mouth 2 (two) times a day as needed for mild pain or headaches 90 tablet 1   • Pulmicort Flexhaler 180 MCG/ACT inhaler INHALE 2 PUFFS TWICE A DAY 3 each 2   • sildenafil (VIAGRA) 50 MG tablet Take 1 tablet (50 mg total) by mouth daily as needed for erectile dysfunction 10 tablet 0   • tiZANidine (ZANAFLEX) 2 mg tablet TAKE 1 TABLET (2 MG TOTAL) BY MOUTH DAILY AT BEDTIME 90 tablet 3     No current facility-administered medications for this visit  Plan:        Recommended moving aricept to morning to see if this changes insomnia  Jose Luis reluctant to order sleep study as this may very well cause exacerbation of PTSD symptoms  Devon's therapist has referred to Dr Severa Mart and Jose Luis unclear why this referral was made  Jose Luis has message in to Nara Tapia    For now will cont cymbalta 60 mg/d, Requip 3 mg q bedtime, neurontin 100 mg tid  Reviewed risks, benefits, side effects of medications, including no medication  Patient understands and agrees to treatment plan  F/u PaC 2 mths, sooner prn        Patient has been informed of 24 hours and weekend coverage for urgent situations accessed by calling the main clinic phone number       Dirk Mcclellan PA-C

## 2022-10-12 ENCOUNTER — OFFICE VISIT (OUTPATIENT)
Dept: GASTROENTEROLOGY | Facility: CLINIC | Age: 67
End: 2022-10-12
Payer: MEDICARE

## 2022-10-12 VITALS
DIASTOLIC BLOOD PRESSURE: 64 MMHG | OXYGEN SATURATION: 94 % | HEART RATE: 90 BPM | SYSTOLIC BLOOD PRESSURE: 110 MMHG | BODY MASS INDEX: 38.19 KG/M2 | WEIGHT: 252 LBS | HEIGHT: 68 IN

## 2022-10-12 DIAGNOSIS — R19.5 POSITIVE COLORECTAL CANCER SCREENING USING COLOGUARD TEST: Primary | ICD-10-CM

## 2022-10-12 PROCEDURE — 99203 OFFICE O/P NEW LOW 30 MIN: CPT | Performed by: INTERNAL MEDICINE

## 2022-10-12 NOTE — PATIENT INSTRUCTIONS
Scheduled date of colonoscopy (as of today):12/29/22  Physician performing colonoscopy:Socrates  Location of colonoscopy:[facility]  Bowel prep reviewed with patient:Joce/Miralax  Instructions reviewed with patient by:Dino thornton  Clearances:  none

## 2022-10-12 NOTE — LETTER
October 14, 2022     Kristi Greenfield MD  [address] [address]    Patient: Zuleyka Valdivia   YOB: 1955   Date of Visit: 10/12/2022       Dear Dr Italia Stafford: Thank you for referring Zuleyka Valdivia to me for evaluation  Below are my notes for this consultation  If you have questions, please do not hesitate to call me  I look forward to following your patient along with you  Sincerely,        Chery Anthony DO        CC: No Recipients  [facility]ett[facility]  10/12/2022  5:08 PM  Signed  [facility] - Outpatient Consultation  Zuleyka Valdivia 74 y o  male MRN: [id number]  Encounter: [id number]          ASSESSMENT AND PLAN:      1  Positive colorectal cancer screening using Cologuard test  - Colonoscopy; Future    ______________________________________________________________________    HPI:  Kia Rico is a 82-year-old male who was referred to the office for positive Cologuard test   He has never undergone colonoscopy in the past   There is no family history for colon cancer or for colon polyp  He denies abdominal pain, nausea, vomiting, diarrhea, constipation, rectal bleeding, bloating, gaseousness, heartburn, dysphagia, odynophagia  REVIEW OF SYSTEMS:    CONSTITUTIONAL: Denies any fever, chills, rigors, and weight loss  HEENT: No earache or tinnitus  Denies hearing loss or visual disturbances  CARDIOVASCULAR: No chest pain or palpitations  RESPIRATORY: Denies any cough, hemoptysis, shortness of breath or dyspnea on exertion  GASTROINTESTINAL: As noted in the History of Present Illness  GENITOURINARY: No problems with urination  Denies any hematuria or dysuria  NEUROLOGIC: No dizziness or vertigo, denies headaches  MUSCULOSKELETAL: Denies any muscle or joint pain  SKIN: Denies skin rashes or itching  ENDOCRINE: Denies excessive thirst  Denies intolerance to heat or cold  PSYCHOSOCIAL: Denies depression or anxiety   Denies any recent memory loss         Historical Information   Past Medical History:   Diagnosis Date   • Chronic kidney disease    • COVID-19 2020   • PTSD (post-traumatic stress disorder)      Past Surgical History:   Procedure Laterality Date   • FL INJECTION RIGHT HIP (NON ARTHROGRAM)  2020   • FL INJECTION RIGHT HIP (NON ARTHROGRAM)  2021   • FL INJECTION RIGHT HIP (NON ARTHROGRAM)  2021   • FL INJECTION RIGHT HIP (NON ARTHROGRAM)  2022   • NO PAST SURGERIES       Social History   Social History     Substance and Sexual Activity   Alcohol Use Not Currently     Social History     Substance and Sexual Activity   Drug Use Never     Social History     Tobacco Use   Smoking Status Former Smoker   • Packs/day: 0 00   • Years: 47 00   • Pack years: 0 00   • Types: Cigarettes   • Start date:    • Quit date: 2020   • Years since quittin 5   Smokeless Tobacco Never Used     Family History   Problem Relation Age of Onset   • Heart disease Mother    • Sudden death Father    • No Known Problems Son    • No Known Problems Daughter    • No Known Problems Maternal Grandmother    • No Known Problems Maternal Grandfather    • No Known Problems Paternal Grandmother    • No Known Problems Paternal Grandfather    • No Known Problems Daughter    • Kidney disease Brother    • Cancer Brother        Meds/Allergies       Current Outpatient Medications:   •  albuterol (2 5 mg/3 mL) 0 083 % nebulizer solution  •  albuterol (Ventolin HFA) 90 mcg/act inhaler  •  amLODIPine (NORVASC) 2 5 mg tablet  •  amoxicillin (AMOXIL) 500 mg capsule  •  Anoro Ellipta 62 5-25 MCG/INH inhaler  •  chlorhexidine (PERIDEX) 0 12 % solution  •  Diclofenac Sodium (VOLTAREN) 1 %  •  donepezil (ARICEPT) 10 mg tablet  •  DULoxetine (CYMBALTA) 60 mg delayed release capsule  •  gabapentin (NEURONTIN) 100 mg capsule  •  ibuprofen (MOTRIN) 800 mg tablet  •  ipratropium-albuterol (DUO-NEB) 0 5-2 5 mg/3 mL nebulizer solution  •  Multiple Vitamins-Minerals (MULTIVITAMIN ADULT PO)  •  naproxen sodium (ANAPROX) 550 mg tablet  •  Pulmicort Flexhaler 180 MCG/ACT inhaler  •  rOPINIRole (REQUIP) 3 mg tablet  •  sildenafil (VIAGRA) 50 MG tablet  •  tiZANidine (ZANAFLEX) 2 mg tablet    Allergies   Allergen Reactions   • Tetracycline Rash           Objective     Blood pressure 110/64, pulse 90, height 5' 8" (1 727 m), weight 114 kg (252 lb), SpO2 94 %  Body mass index is 38 32 kg/m²  PHYSICAL EXAM:      General Appearance:   Alert, cooperative, no distress   HEENT:   Normocephalic, atraumatic, anicteric      Neck:  Supple, symmetrical, trachea midline   Lungs:   Clear to auscultation bilaterally; no rales, rhonchi or wheezing; respirations unlabored    Heart[de-identified]   Regular rate and rhythm; no murmur, rub, or gallop  Abdomen:   Soft, non-tender, non-distended; normal bowel sounds; no masses, no organomegaly    Genitalia:   Deferred    Rectal:   Deferred    Extremities:  No cyanosis, clubbing or edema    Pulses:  2+ and symmetric    Skin:  No jaundice, rashes, or lesions    Lymph nodes:  No palpable cervical lymphadenopathy        Lab Results:   No visits with results within 1 Day(s) from this visit     Latest known visit with results is:   Appointment on 07/18/2022   Component Date Value   • Sodium 07/18/2022 139    • Potassium 07/18/2022 3 6    • Chloride 07/18/2022 111 (A)   • CO2 07/18/2022 23    • ANION GAP 07/18/2022 5    • BUN 07/18/2022 15    • Creatinine 07/18/2022 1 43 (A)   • Glucose, Fasting 07/18/2022 94    • Calcium 07/18/2022 8 9    • eGFR 07/18/2022 51    • WBC 07/18/2022 8 57    • RBC 07/18/2022 5 46    • Hemoglobin 07/18/2022 15 4    • Hematocrit 07/18/2022 48 5    • MCV 07/18/2022 89    • MCH 07/18/2022 28 2    • MCHC 07/18/2022 31 8    • RDW 07/18/2022 15 2 (A)   • MPV 07/18/2022 10 7    • Platelets 79/46/7975 221    • nRBC 07/18/2022 0    • Neutrophils Relative 07/18/2022 64    • Immat GRANS % 07/18/2022 1    • Lymphocytes Relative 07/18/2022 20    • Monocytes Relative 07/18/2022 10    • Eosinophils Relative 07/18/2022 4    • Basophils Relative 07/18/2022 1    • Neutrophils Absolute 07/18/2022 5 48    • Immature Grans Absolute 07/18/2022 0 09    • Lymphocytes Absolute 07/18/2022 1 70    • Monocytes Absolute 07/18/2022 0 84    • Eosinophils Absolute 07/18/2022 0 38    • Basophils Absolute 07/18/2022 0 08    • Phosphorus 07/18/2022 2 7    • PTH 07/18/2022 65 2    • Vit D, 25-Hydroxy 07/18/2022 39 2          Radiology Results:   US kidney and bladder    Result Date: 10/6/2022  Narrative: RENAL ULTRASOUND INDICATION:   N18 31: Chronic kidney disease, stage 3a N18 9: Chronic kidney disease, unspecified E83 9: Disorder of mineral metabolism, unspecified M89 9: Disorder of bone, unspecified  COMPARISON: None TECHNIQUE:   Ultrasound of the retroperitoneum was performed with a curvilinear transducer utilizing volumetric sweeps and still imaging techniques  FINDINGS: KIDNEYS: Symmetric and normal size  Right kidney:  10 5 x 6 1 x 4 9 cm  Volume 164 5 mL Left kidney:  11 2 x 6 3 x 4 6 cm  Volume 169 7 mL Right kidney Normal echogenicity and contour  No mass is identified  Upper pole cyst measures up to 2 3 cm  No hydronephrosis  No shadowing calculi  No perinephric fluid collections  Left kidney Normal echogenicity and contour  No mass is identified  No hydronephrosis  No shadowing calculi  No perinephric fluid collections  URETERS: Nonvisualized  BLADDER: Normally distended  No focal thickening or mass lesions  Bilateral ureteral jets not detected  Impression: Right upper pole renal cyst   Otherwise, no acute abnormality   Workstation performed: [id number]

## 2022-10-12 NOTE — PROGRESS NOTES
[facility] - Outpatient Consultation  Rosmery Arango 74 y o  male MRN: [id number]  Encounter: [id number]          ASSESSMENT AND PLAN:      1  Positive colorectal cancer screening using Cologuard test  - Colonoscopy; Future    ______________________________________________________________________    HPI:  Samra Nieto is a 44-year-old male who was referred to the office for positive Cologuard test   He has never undergone colonoscopy in the past   There is no family history for colon cancer or for colon polyp  He denies abdominal pain, nausea, vomiting, diarrhea, constipation, rectal bleeding, bloating, gaseousness, heartburn, dysphagia, odynophagia  REVIEW OF SYSTEMS:    CONSTITUTIONAL: Denies any fever, chills, rigors, and weight loss  HEENT: No earache or tinnitus  Denies hearing loss or visual disturbances  CARDIOVASCULAR: No chest pain or palpitations  RESPIRATORY: Denies any cough, hemoptysis, shortness of breath or dyspnea on exertion  GASTROINTESTINAL: As noted in the History of Present Illness  GENITOURINARY: No problems with urination  Denies any hematuria or dysuria  NEUROLOGIC: No dizziness or vertigo, denies headaches  MUSCULOSKELETAL: Denies any muscle or joint pain  SKIN: Denies skin rashes or itching  ENDOCRINE: Denies excessive thirst  Denies intolerance to heat or cold  PSYCHOSOCIAL: Denies depression or anxiety  Denies any recent memory loss         Historical Information   Past Medical History:   Diagnosis Date   • Chronic kidney disease    • COVID-19 03/2020   • PTSD (post-traumatic stress disorder)      Past Surgical History:   Procedure Laterality Date   • FL INJECTION RIGHT HIP (NON ARTHROGRAM)  11/30/2020   • FL INJECTION RIGHT HIP (NON ARTHROGRAM)  8/4/2021   • FL INJECTION RIGHT HIP (NON ARTHROGRAM)  12/27/2021   • FL INJECTION RIGHT HIP (NON ARTHROGRAM)  4/25/2022   • NO PAST SURGERIES       Social History   Social History     Substance and Sexual Activity   Alcohol Use Not Currently     Social History     Substance and Sexual Activity   Drug Use Never     Social History     Tobacco Use   Smoking Status Former Smoker   • Packs/day: 0 00   • Years: 47 00   • Pack years: 0 00   • Types: Cigarettes   • Start date: 80   • Quit date: 2020   • Years since quittin 5   Smokeless Tobacco Never Used     Family History   Problem Relation Age of Onset   • Heart disease Mother    • Sudden death Father    • No Known Problems Son    • No Known Problems Daughter    • No Known Problems Maternal Grandmother    • No Known Problems Maternal Grandfather    • No Known Problems Paternal Grandmother    • No Known Problems Paternal Grandfather    • No Known Problems Daughter    • Kidney disease Brother    • Cancer Brother        Meds/Allergies       Current Outpatient Medications:   •  albuterol (2 5 mg/3 mL) 0 083 % nebulizer solution  •  albuterol (Ventolin HFA) 90 mcg/act inhaler  •  amLODIPine (NORVASC) 2 5 mg tablet  •  amoxicillin (AMOXIL) 500 mg capsule  •  Anoro Ellipta 62 5-25 MCG/INH inhaler  •  chlorhexidine (PERIDEX) 0 12 % solution  •  Diclofenac Sodium (VOLTAREN) 1 %  •  donepezil (ARICEPT) 10 mg tablet  •  DULoxetine (CYMBALTA) 60 mg delayed release capsule  •  gabapentin (NEURONTIN) 100 mg capsule  •  ibuprofen (MOTRIN) 800 mg tablet  •  ipratropium-albuterol (DUO-NEB) 0 5-2 5 mg/3 mL nebulizer solution  •  Multiple Vitamins-Minerals (MULTIVITAMIN ADULT PO)  •  naproxen sodium (ANAPROX) 550 mg tablet  •  Pulmicort Flexhaler 180 MCG/ACT inhaler  •  rOPINIRole (REQUIP) 3 mg tablet  •  sildenafil (VIAGRA) 50 MG tablet  •  tiZANidine (ZANAFLEX) 2 mg tablet    Allergies   Allergen Reactions   • Tetracycline Rash           Objective     Blood pressure 110/64, pulse 90, height 5' 8" (1 727 m), weight 114 kg (252 lb), SpO2 94 %  Body mass index is 38 32 kg/m²          PHYSICAL EXAM:      General Appearance:   Alert, cooperative, no distress   HEENT:   Normocephalic, atraumatic, anicteric      Neck:  Supple, symmetrical, trachea midline   Lungs:   Clear to auscultation bilaterally; no rales, rhonchi or wheezing; respirations unlabored    Heart[de-identified]   Regular rate and rhythm; no murmur, rub, or gallop  Abdomen:   Soft, non-tender, non-distended; normal bowel sounds; no masses, no organomegaly    Genitalia:   Deferred    Rectal:   Deferred    Extremities:  No cyanosis, clubbing or edema    Pulses:  2+ and symmetric    Skin:  No jaundice, rashes, or lesions    Lymph nodes:  No palpable cervical lymphadenopathy        Lab Results:   No visits with results within 1 Day(s) from this visit     Latest known visit with results is:   Appointment on 07/18/2022   Component Date Value   • Sodium 07/18/2022 139    • Potassium 07/18/2022 3 6    • Chloride 07/18/2022 111 (A)   • CO2 07/18/2022 23    • ANION GAP 07/18/2022 5    • BUN 07/18/2022 15    • Creatinine 07/18/2022 1 43 (A)   • Glucose, Fasting 07/18/2022 94    • Calcium 07/18/2022 8 9    • eGFR 07/18/2022 51    • WBC 07/18/2022 8 57    • RBC 07/18/2022 5 46    • Hemoglobin 07/18/2022 15 4    • Hematocrit 07/18/2022 48 5    • MCV 07/18/2022 89    • MCH 07/18/2022 28 2    • MCHC 07/18/2022 31 8    • RDW 07/18/2022 15 2 (A)   • MPV 07/18/2022 10 7    • Platelets 61/98/6878 221    • nRBC 07/18/2022 0    • Neutrophils Relative 07/18/2022 64    • Immat GRANS % 07/18/2022 1    • Lymphocytes Relative 07/18/2022 20    • Monocytes Relative 07/18/2022 10    • Eosinophils Relative 07/18/2022 4    • Basophils Relative 07/18/2022 1    • Neutrophils Absolute 07/18/2022 5 48    • Immature Grans Absolute 07/18/2022 0 09    • Lymphocytes Absolute 07/18/2022 1 70    • Monocytes Absolute 07/18/2022 0 84    • Eosinophils Absolute 07/18/2022 0 38    • Basophils Absolute 07/18/2022 0 08    • Phosphorus 07/18/2022 2 7    • PTH 07/18/2022 65 2    • Vit D, 25-Hydroxy 07/18/2022 39 2          Radiology Results:   US kidney and bladder    Result Date: 10/6/2022  Narrative: RENAL ULTRASOUND INDICATION:   N18 31: Chronic kidney disease, stage 3a N18 9: Chronic kidney disease, unspecified E83 9: Disorder of mineral metabolism, unspecified M89 9: Disorder of bone, unspecified  COMPARISON: None TECHNIQUE:   Ultrasound of the retroperitoneum was performed with a curvilinear transducer utilizing volumetric sweeps and still imaging techniques  FINDINGS: KIDNEYS: Symmetric and normal size  Right kidney:  10 5 x 6 1 x 4 9 cm  Volume 164 5 mL Left kidney:  11 2 x 6 3 x 4 6 cm  Volume 169 7 mL Right kidney Normal echogenicity and contour  No mass is identified  Upper pole cyst measures up to 2 3 cm  No hydronephrosis  No shadowing calculi  No perinephric fluid collections  Left kidney Normal echogenicity and contour  No mass is identified  No hydronephrosis  No shadowing calculi  No perinephric fluid collections  URETERS: Nonvisualized  BLADDER: Normally distended  No focal thickening or mass lesions  Bilateral ureteral jets not detected  Impression: Right upper pole renal cyst   Otherwise, no acute abnormality   Workstation performed: [id number]

## 2022-10-15 DIAGNOSIS — R06.02 SHORTNESS OF BREATH: ICD-10-CM

## 2022-10-17 ENCOUNTER — TELEPHONE (OUTPATIENT)
Dept: NEPHROLOGY | Facility: CLINIC | Age: 67
End: 2022-10-17

## 2022-10-17 RX ORDER — ALBUTEROL SULFATE 90 UG/1
AEROSOL, METERED RESPIRATORY (INHALATION)
Qty: 18 G | Refills: 3 | Status: SHIPPED | OUTPATIENT
Start: 2022-10-17

## 2022-10-17 NOTE — TELEPHONE ENCOUNTER
Called spoke with patient advised patient to get labs done prior to 10/25 Appt, Patient understood and is okay with it

## 2022-10-18 ENCOUNTER — TELEPHONE (OUTPATIENT)
Dept: PSYCHIATRY | Facility: CLINIC | Age: 67
End: 2022-10-18

## 2022-10-18 NOTE — TELEPHONE ENCOUNTER
Pt called in regards to his appt with Dr Tyree Starr  Pt was supposed to have two appts with her on 11/30/2022 and 12/1/2022 but both appts were cancelled  Pt said that he received an automated call yesterday and he confirmed the appt   Pt requested a call back    Pt was referred by Dr Cynthia Fatima    Phone # [phone]

## 2022-10-20 ENCOUNTER — APPOINTMENT (OUTPATIENT)
Dept: LAB | Facility: HOSPITAL | Age: 67
End: 2022-10-20
Payer: MEDICARE

## 2022-10-20 DIAGNOSIS — E83.9 CHRONIC KIDNEY DISEASE-MINERAL AND BONE DISORDER: ICD-10-CM

## 2022-10-20 DIAGNOSIS — N18.31 STAGE 3A CHRONIC KIDNEY DISEASE (HCC): ICD-10-CM

## 2022-10-20 DIAGNOSIS — M89.9 CHRONIC KIDNEY DISEASE-MINERAL AND BONE DISORDER: ICD-10-CM

## 2022-10-20 DIAGNOSIS — N18.9 CHRONIC KIDNEY DISEASE-MINERAL AND BONE DISORDER: ICD-10-CM

## 2022-10-20 LAB
25(OH)D3 SERPL-MCNC: 29.7 NG/ML (ref 30–100)
ANION GAP SERPL CALCULATED.3IONS-SCNC: 10 MMOL/L (ref 4–13)
BASOPHILS # BLD AUTO: 0.08 THOUSANDS/ÂΜL (ref 0–0.1)
BASOPHILS NFR BLD AUTO: 1 % (ref 0–1)
BILIRUB UR QL STRIP: NEGATIVE
BUN SERPL-MCNC: 15 MG/DL (ref 5–25)
CALCIUM SERPL-MCNC: 9.1 MG/DL (ref 8.3–10.1)
CHLORIDE SERPL-SCNC: 108 MMOL/L (ref 96–108)
CLARITY UR: CLEAR
CO2 SERPL-SCNC: 24 MMOL/L (ref 21–32)
COLOR UR: NORMAL
CREAT SERPL-MCNC: 1.65 MG/DL (ref 0.6–1.3)
CREAT UR-MCNC: 177 MG/DL
EOSINOPHIL # BLD AUTO: 0.35 THOUSAND/ÂΜL (ref 0–0.61)
EOSINOPHIL NFR BLD AUTO: 4 % (ref 0–6)
ERYTHROCYTE [DISTWIDTH] IN BLOOD BY AUTOMATED COUNT: 14.9 % (ref 11.6–15.1)
GFR SERPL CREATININE-BSD FRML MDRD: 42 ML/MIN/1.73SQ M
GLUCOSE SERPL-MCNC: 116 MG/DL (ref 65–140)
GLUCOSE UR STRIP-MCNC: NEGATIVE MG/DL
HCT VFR BLD AUTO: 50.1 % (ref 36.5–49.3)
HGB BLD-MCNC: 16.3 G/DL (ref 12–17)
HGB UR QL STRIP.AUTO: NEGATIVE
IMM GRANULOCYTES # BLD AUTO: 0.08 THOUSAND/UL (ref 0–0.2)
IMM GRANULOCYTES NFR BLD AUTO: 1 % (ref 0–2)
KETONES UR STRIP-MCNC: NEGATIVE MG/DL
LEUKOCYTE ESTERASE UR QL STRIP: NEGATIVE
LYMPHOCYTES # BLD AUTO: 1.44 THOUSANDS/ÂΜL (ref 0.6–4.47)
LYMPHOCYTES NFR BLD AUTO: 15 % (ref 14–44)
MCH RBC QN AUTO: 28.9 PG (ref 26.8–34.3)
MCHC RBC AUTO-ENTMCNC: 32.5 G/DL (ref 31.4–37.4)
MCV RBC AUTO: 89 FL (ref 82–98)
MONOCYTES # BLD AUTO: 0.66 THOUSAND/ÂΜL (ref 0.17–1.22)
MONOCYTES NFR BLD AUTO: 7 % (ref 4–12)
NEUTROPHILS # BLD AUTO: 6.86 THOUSANDS/ÂΜL (ref 1.85–7.62)
NEUTS SEG NFR BLD AUTO: 72 % (ref 43–75)
NITRITE UR QL STRIP: NEGATIVE
NRBC BLD AUTO-RTO: 0 /100 WBCS
PH UR STRIP.AUTO: 5.5 [PH]
PHOSPHATE SERPL-MCNC: 2.3 MG/DL (ref 2.3–4.1)
PLATELET # BLD AUTO: 217 THOUSANDS/UL (ref 149–390)
PMV BLD AUTO: 10.6 FL (ref 8.9–12.7)
POTASSIUM SERPL-SCNC: 3.7 MMOL/L (ref 3.5–5.3)
PROT UR STRIP-MCNC: NEGATIVE MG/DL
PROT UR-MCNC: 12 MG/DL
PROT/CREAT UR: 0.07 MG/G{CREAT} (ref 0–0.1)
PTH-INTACT SERPL-MCNC: 39.7 PG/ML (ref 18.4–80.1)
RBC # BLD AUTO: 5.64 MILLION/UL (ref 3.88–5.62)
SODIUM SERPL-SCNC: 142 MMOL/L (ref 135–147)
SP GR UR STRIP.AUTO: 1.02 (ref 1–1.03)
UROBILINOGEN UR STRIP-ACNC: <2 MG/DL
WBC # BLD AUTO: 9.47 THOUSAND/UL (ref 4.31–10.16)

## 2022-10-20 PROCEDURE — 36415 COLL VENOUS BLD VENIPUNCTURE: CPT

## 2022-10-20 PROCEDURE — 80048 BASIC METABOLIC PNL TOTAL CA: CPT

## 2022-10-20 PROCEDURE — 84100 ASSAY OF PHOSPHORUS: CPT

## 2022-10-20 PROCEDURE — 82306 VITAMIN D 25 HYDROXY: CPT

## 2022-10-20 PROCEDURE — 85025 COMPLETE CBC W/AUTO DIFF WBC: CPT

## 2022-10-20 PROCEDURE — 83970 ASSAY OF PARATHORMONE: CPT

## 2022-10-20 PROCEDURE — 82570 ASSAY OF URINE CREATININE: CPT

## 2022-10-20 PROCEDURE — 81003 URINALYSIS AUTO W/O SCOPE: CPT

## 2022-10-20 PROCEDURE — 84156 ASSAY OF PROTEIN URINE: CPT

## 2022-10-24 ENCOUNTER — TELEPHONE (OUTPATIENT)
Dept: NEPHROLOGY | Facility: CLINIC | Age: 67
End: 2022-10-24

## 2022-10-25 ENCOUNTER — OFFICE VISIT (OUTPATIENT)
Dept: NEPHROLOGY | Facility: CLINIC | Age: 67
End: 2022-10-25
Payer: MEDICARE

## 2022-10-25 VITALS
HEART RATE: 88 BPM | DIASTOLIC BLOOD PRESSURE: 70 MMHG | OXYGEN SATURATION: 93 % | SYSTOLIC BLOOD PRESSURE: 140 MMHG | TEMPERATURE: 97.5 F | HEIGHT: 68 IN | WEIGHT: 256.8 LBS | RESPIRATION RATE: 16 BRPM | BODY MASS INDEX: 38.92 KG/M2

## 2022-10-25 DIAGNOSIS — M89.9 CHRONIC KIDNEY DISEASE-MINERAL AND BONE DISORDER: ICD-10-CM

## 2022-10-25 DIAGNOSIS — E66.01 OBESITY, MORBID (HCC): ICD-10-CM

## 2022-10-25 DIAGNOSIS — N18.9 CHRONIC KIDNEY DISEASE-MINERAL AND BONE DISORDER: ICD-10-CM

## 2022-10-25 DIAGNOSIS — R41.3 MEMORY DIFFICULTY: ICD-10-CM

## 2022-10-25 DIAGNOSIS — N18.31 STAGE 3A CHRONIC KIDNEY DISEASE (HCC): Primary | ICD-10-CM

## 2022-10-25 DIAGNOSIS — E83.9 CHRONIC KIDNEY DISEASE-MINERAL AND BONE DISORDER: ICD-10-CM

## 2022-10-25 PROCEDURE — 99213 OFFICE O/P EST LOW 20 MIN: CPT | Performed by: INTERNAL MEDICINE

## 2022-10-25 NOTE — ASSESSMENT & PLAN NOTE
Lab Results   Component Value Date    EGFR 42 10/20/2022    EGFR 51 07/18/2022    EGFR 46 04/13/2022    CREATININE 1 65 (H) 10/20/2022    CREATININE 1 43 (H) 07/18/2022    CREATININE 1 55 (H) 04/13/2022   Patient renal function is stable overall with some fluctuation based on volume status  Advised hydration and avoid any nephrotoxic pain killer at this point     Asymptomatic and progressive nature of kidney disease discussed with the patient    Will continue to monitor

## 2022-10-25 NOTE — PROGRESS NOTES
NEPHROLOGY OFFICE FOLLOW UP  Ani Puentes 74 y o  male MRN: [id number]    Encounter: [id number] 10/25/2022    REASON FOR VISIT: Ani Puentes is a 74 y o  male who is here on 10/25/2022 for Chronic Kidney Disease and Follow-up    HPI:    Finesse Kirby came in today for follow-up of CKD  [de-identified] gentleman with problem with the hip pain who is also morbidly obese came for follow-up of CKD     Other than pain in the he he is doing well  Trying to lose weight     Patient had a COVID infection in couple of years ago requiring prolonged hospitalization and has kidney problems since then    Denies any other acute complaint     No chest pain no palpitation or shortness of breath     No nausea no vomiting      REVIEW OF SYSTEMS:    Review of Systems   Constitutional: Negative for activity change and fatigue  HENT: Negative for congestion and ear discharge  Eyes: Negative for photophobia and pain  Respiratory: Negative for apnea and choking  Cardiovascular: Negative for chest pain and palpitations  Gastrointestinal: Negative for abdominal distention and blood in stool  Endocrine: Negative for heat intolerance and polyphagia  Genitourinary: Negative for flank pain and urgency  Musculoskeletal: Positive for arthralgias  Negative for neck pain and neck stiffness  Skin: Negative for color change and wound  Allergic/Immunologic: Negative for food allergies and immunocompromised state  Neurological: Negative for seizures and facial asymmetry  Hematological: Negative for adenopathy  Does not bruise/bleed easily  Psychiatric/Behavioral: Negative for self-injury and suicidal ideas           PAST MEDICAL HISTORY:  Past Medical History:   Diagnosis Date   • Chronic kidney disease    • COVID-19 03/2020   • PTSD (post-traumatic stress disorder)        PAST SURGICAL HISTORY:  Past Surgical History:   Procedure Laterality Date   • FL INJECTION RIGHT HIP (NON ARTHROGRAM)  11/30/2020   • FL INJECTION RIGHT HIP (NON ARTHROGRAM)  2021   • FL INJECTION RIGHT HIP (NON ARTHROGRAM)  2021   • FL INJECTION RIGHT HIP (NON ARTHROGRAM)  2022   • NO PAST SURGERIES         SOCIAL HISTORY:  Social History     Substance and Sexual Activity   Alcohol Use Not Currently     Social History     Substance and Sexual Activity   Drug Use Never     Social History     Tobacco Use   Smoking Status Former Smoker   • Packs/day: 0 00   • Years: 47 00   • Pack years: 0 00   • Types: Cigarettes   • Start date: 80   • Quit date: 2020   • Years since quittin 5   Smokeless Tobacco Never Used       FAMILY HISTORY:  Family History   Problem Relation Age of Onset   • Heart disease Mother    • Sudden death Father    • No Known Problems Son    • No Known Problems Daughter    • No Known Problems Maternal Grandmother    • No Known Problems Maternal Grandfather    • No Known Problems Paternal Grandmother    • No Known Problems Paternal Grandfather    • No Known Problems Daughter    • Kidney disease Brother    • Cancer Brother        MEDICATIONS:    Current Outpatient Medications:   •  albuterol (2 5 mg/3 mL) 0 083 % nebulizer solution, INHALE 1 VIAL BY NEBULIZATION EVERY 6 (SIX) HOURS AS NEEDED FOR WHEEZING OR SHORTNESS OF BREATH, Disp: 375 mL, Rfl: 2  •  albuterol (PROVENTIL HFA,VENTOLIN HFA) 90 mcg/act inhaler, INHALE 2 PUFFS EVERY 6 HOURS AS NEEDED FOR WHEEZING, Disp: 18 g, Rfl: 3  •  amLODIPine (NORVASC) 2 5 mg tablet, TAKE 1 TABLET (2 5 MG TOTAL) BY MOUTH DAILY AFTER DINNER, Disp: , Rfl:   •  amoxicillin (AMOXIL) 500 mg capsule, TAKE 1 CAPSULE BY MOUTH 3 TIMES A DAY UNTIL FINISHED, Disp: , Rfl:   •  Anoro Ellipta 62 5-25 MCG/INH inhaler, INHALE 1 PUFF DAILY, Disp: 60 blister, Rfl: 3  •  chlorhexidine (PERIDEX) 0 12 % solution, SWISH AND SPIT 15CC ONCE A DAY OR AS DIRECTED, Disp: , Rfl:   •  Diclofenac Sodium (VOLTAREN) 1 %, Apply 2 g topically 4 (four) times a day, Disp: 100 g, Rfl: 0  •  donepezil (ARICEPT) 10 mg tablet, TAKE 1 TABLET BY MOUTH DAILY AT BEDTIME TO START AFTER FINISHING ARICEPT 5 MG ONCE A DAY FOR 1 MONTH , Disp: 90 tablet, Rfl: 1  •  DULoxetine (CYMBALTA) 60 mg delayed release capsule, Take 1 capsule (60 mg total) by mouth every morning, Disp: 90 capsule, Rfl: 0  •  gabapentin (NEURONTIN) 100 mg capsule, Take 1 capsule (100 mg total) by mouth 3 (three) times a day, Disp: 90 capsule, Rfl: 2  •  ipratropium-albuterol (DUO-NEB) 0 5-2 5 mg/3 mL nebulizer solution, Take 3 mL by nebulization 4 (four) times a day, Disp: 75 mL, Rfl: 5  •  Multiple Vitamins-Minerals (MULTIVITAMIN ADULT PO), Take 1 tablet by mouth daily, Disp: , Rfl:   •  Pulmicort Flexhaler 180 MCG/ACT inhaler, INHALE 2 PUFFS TWICE A DAY, Disp: 3 each, Rfl: 2  •  rOPINIRole (REQUIP) 3 mg tablet, Take 1 tablet (3 mg total) by mouth daily at bedtime, Disp: 90 tablet, Rfl: 0  •  sildenafil (VIAGRA) 50 MG tablet, Take 1 tablet (50 mg total) by mouth daily as needed for erectile dysfunction, Disp: 10 tablet, Rfl: 0  •  tiZANidine (ZANAFLEX) 2 mg tablet, TAKE 1 TABLET (2 MG TOTAL) BY MOUTH DAILY AT BEDTIME, Disp: 90 tablet, Rfl: 3  •  ibuprofen (MOTRIN) 800 mg tablet, , Disp: , Rfl:   •  naproxen sodium (ANAPROX) 550 mg tablet, Take 1 tablet (550 mg total) by mouth 2 (two) times a day as needed for mild pain or headaches (Patient not taking: Reported on 10/25/2022), Disp: 90 tablet, Rfl: 1    PHYSICAL EXAM:  Vitals:    10/25/22 1616   BP: 140/70   BP Location: Right arm   Patient Position: Sitting   Pulse: 88   Resp: 16   Temp: 97 5 °F (36 4 °C)   TempSrc: Temporal   SpO2: 93%   Weight: 116 kg (256 lb 12 8 oz)   Height: 5' 8" (1 727 m)     Body mass index is 39 05 kg/m²  Physical Exam  Constitutional:       General: He is not in acute distress  Appearance: He is well-developed  He is obese  HENT:      Head: Normocephalic  Eyes:      General: No scleral icterus  Conjunctiva/sclera: Conjunctivae normal    Neck:      Vascular: No JVD     Cardiovascular: Rate and Rhythm: Normal rate  Heart sounds: Normal heart sounds  Pulmonary:      Effort: Pulmonary effort is normal       Breath sounds: No wheezing  Abdominal:      Palpations: Abdomen is soft  Tenderness: There is no abdominal tenderness  Musculoskeletal:         General: Normal range of motion  Cervical back: Neck supple  Skin:     General: Skin is warm  Findings: No rash  Neurological:      Mental Status: He is alert and oriented to person, place, and time     Psychiatric:         Behavior: Behavior normal          LAB RESULTS:  Results for orders placed or performed in visit on 56/36/52   Basic metabolic panel   Result Value Ref Range    Sodium 142 135 - 147 mmol/L    Potassium 3 7 3 5 - 5 3 mmol/L    Chloride 108 96 - 108 mmol/L    CO2 24 21 - 32 mmol/L    ANION GAP 10 4 - 13 mmol/L    BUN 15 5 - 25 mg/dL    Creatinine 1 65 (H) 0 60 - 1 30 mg/dL    Glucose 116 65 - 140 mg/dL    Calcium 9 1 8 3 - 10 1 mg/dL    eGFR 42 ml/min/1 73sq m   CBC and differential   Result Value Ref Range    WBC 9 47 4 31 - 10 16 Thousand/uL    RBC 5 64 (H) 3 88 - 5 62 Million/uL    Hemoglobin 16 3 12 0 - 17 0 g/dL    Hematocrit 50 1 (H) 36 5 - 49 3 %    MCV 89 82 - 98 fL    MCH 28 9 26 8 - 34 3 pg    MCHC 32 5 31 4 - 37 4 g/dL    RDW 14 9 11 6 - 15 1 %    MPV 10 6 8 9 - 12 7 fL    Platelets 007 482 - 975 Thousands/uL    nRBC 0 /100 WBCs    Neutrophils Relative 72 43 - 75 %    Immat GRANS % 1 0 - 2 %    Lymphocytes Relative 15 14 - 44 %    Monocytes Relative 7 4 - 12 %    Eosinophils Relative 4 0 - 6 %    Basophils Relative 1 0 - 1 %    Neutrophils Absolute 6 86 1 85 - 7 62 Thousands/µL    Immature Grans Absolute 0 08 0 00 - 0 20 Thousand/uL    Lymphocytes Absolute 1 44 0 60 - 4 47 Thousands/µL    Monocytes Absolute 0 66 0 17 - 1 22 Thousand/µL    Eosinophils Absolute 0 35 0 00 - 0 61 Thousand/µL    Basophils Absolute 0 08 0 00 - 0 10 Thousands/µL   Phosphorus   Result Value Ref Range    Phosphorus 2 3 2 3 - 4 1 mg/dL   Protein / creatinine ratio, urine   Result Value Ref Range    Creatinine, Ur 177 0 mg/dL    Protein Urine Random 12 mg/dL    Prot/Creat Ratio, Ur 0 07 0 00 - 0 10   PTH, intact   Result Value Ref Range    PTH 39 7 18 4 - 80 1 pg/mL   UA (URINE) with reflex to Scope   Result Value Ref Range    Color, UA Light Yellow     Clarity, UA Clear     Specific Gravity, UA 1 020 1 003 - 1 030    pH, UA 5 5 4 5, 5 0, 5 5, 6 0, 6 5, 7 0, 7 5, 8 0    Leukocytes, UA Negative Negative    Nitrite, UA Negative Negative    Protein, UA Negative Negative mg/dl    Glucose, UA Negative Negative mg/dl    Ketones, UA Negative Negative mg/dl    Urobilinogen, UA <2 0 <2 0 mg/dl mg/dl    Bilirubin, UA Negative Negative    Occult Blood, UA Negative Negative   Vitamin D 25 hydroxy   Result Value Ref Range    Vit D, 25-Hydroxy 29 7 (L) 30 0 - 100 0 ng/mL       ASSESSMENT and PLAN:      Stage 3a chronic kidney disease (HCC)  Lab Results   Component Value Date    EGFR 42 10/20/2022    EGFR 51 07/18/2022    EGFR 46 04/13/2022    CREATININE 1 65 (H) 10/20/2022    CREATININE 1 43 (H) 07/18/2022    CREATININE 1 55 (H) 04/13/2022   Patient renal function is stable overall with some fluctuation based on volume status  Advised hydration and avoid any nephrotoxic pain killer at this point     Asymptomatic and progressive nature of kidney disease discussed with the patient  Will continue to monitor    Chronic kidney disease-mineral and bone disorder  Lab Results   Component Value Date    EGFR 42 10/20/2022    EGFR 51 07/18/2022    EGFR 46 04/13/2022    CREATININE 1 65 (H) 10/20/2022    CREATININE 1 43 (H) 07/18/2022    CREATININE 1 55 (H) 04/13/2022   Patient PTH and phosphorus along with vitamin-D are within acceptable range and will continue to monitor    Obesity, morbid ([address] )  Patient is morbidly obese which is definitely contributing to overall his medical problem    Advised to lose weight and discussed that with him      Everything discussed at length with the patient  I will see him back in 6 months  Will get blood and urine test before that visit        Portions of the record may have been created with voice recognition software  Occasional wrong word or "sound a like" substitutions may have occurred due to the inherent limitations of voice recognition software  Read the chart carefully and recognize, using context, where substitutions have occurred  If you have any questions, please contact the dictating provider

## 2022-10-25 NOTE — ASSESSMENT & PLAN NOTE
Patient is morbidly obese which is definitely contributing to overall his medical problem    Advised to lose weight and discussed that with him

## 2022-10-25 NOTE — ASSESSMENT & PLAN NOTE
Lab Results   Component Value Date    EGFR 42 10/20/2022    EGFR 51 07/18/2022    EGFR 46 04/13/2022    CREATININE 1 65 (H) 10/20/2022    CREATININE 1 43 (H) 07/18/2022    CREATININE 1 55 (H) 04/13/2022   Patient PTH and phosphorus along with vitamin-D are within acceptable range and will continue to monitor

## 2022-10-31 ENCOUNTER — OFFICE VISIT (OUTPATIENT)
Dept: PULMONOLOGY | Facility: CLINIC | Age: 67
End: 2022-10-31

## 2022-10-31 VITALS
BODY MASS INDEX: 38.49 KG/M2 | HEART RATE: 98 BPM | WEIGHT: 254 LBS | OXYGEN SATURATION: 94 % | RESPIRATION RATE: 16 BRPM | SYSTOLIC BLOOD PRESSURE: 134 MMHG | TEMPERATURE: 98.7 F | HEIGHT: 68 IN | DIASTOLIC BLOOD PRESSURE: 80 MMHG

## 2022-10-31 DIAGNOSIS — R06.02 SOB (SHORTNESS OF BREATH): Primary | ICD-10-CM

## 2022-10-31 DIAGNOSIS — G47.33 OSA (OBSTRUCTIVE SLEEP APNEA): ICD-10-CM

## 2022-10-31 DIAGNOSIS — J98.4 RESTRICTIVE LUNG DISEASE: ICD-10-CM

## 2022-10-31 DIAGNOSIS — J44.9 CHRONIC OBSTRUCTIVE PULMONARY DISEASE, UNSPECIFIED COPD TYPE (HCC): ICD-10-CM

## 2022-11-01 ENCOUNTER — TELEPHONE (OUTPATIENT)
Dept: SLEEP CENTER | Facility: CLINIC | Age: 67
End: 2022-11-01

## 2022-11-01 NOTE — PROGRESS NOTES
Assessment/Plan:   Diagnoses and all orders for this visit:    SOB (shortness of breath)    Restrictive lung disease    Chronic obstructive pulmonary disease, unspecified COPD type ([address] )    ANTHONY (obstructive sleep apnea)  -     Home Study; Future    Patient is here today for follow-up  Continues to have dyspnea on exertion similar to prior  Suspect multifactorial related to restrictive lung disease from prior COVID, likely some element of COPD  Also significantly deconditioned, has put on weight over time, limited significantly by chronic hip pain  He continues with Anoro, Flovent, albuterol as needed  Has been trying to use the albuterol prior to exercise and does note some improvement  His wife has some concerns with his sleep, witnessed apneic episodes and snoring  Will order him a home sleep study  If any evidence of obstructive sleep apnea can order an auto CPAP vs Inspire device, also spoke about weight loss helping with ANTHONY  Will follow-up with us in 3 months or sooner if necessary  Return in about 3 months (around 1/31/2023)  All questions are answered to the patient's satisfaction and understanding  He verbalizes understanding  He is encouraged to call with any further questions or concerns  Portions of the record may have been created with voice recognition software  Occasional wrong word or "sound a like" substitutions may have occurred due to the inherent limitations of voice recognition software  Read the chart carefully and recognize, using context, where substitutions have occurred      Electronically Signed by Tj Austin PA-C    ______________________________________________________________________    Chief Complaint:   Chief Complaint   Patient presents with   • Follow-up       Patient ID: Nan Lance is a 74 y o  y o  male has a past medical history of Chronic kidney disease, COVID-19 (03/2020), Hypertension (4/02/2020), Pneumonia (3/32/2020), and PTSD (post-traumatic stress disorder)  10/31/2022  Patient presents today for follow-up visit  Patient is a 77-year-old male former smoker who worked as an  at [employer] with history of COVID-19 infection in April 2020 requiring intubation, treated with Plaquenil and Zithromax as well as tocilzumab  He did require oxygen upon discharge but has since been titrated off O2      He is here today for follow-up  He is overall stable with his breathing  He has tried using his albuterol more often and does note some improvement in his dyspnea on exertion  He is still limited with his hip pain  His wife is also noting apneic episodes during the night and snoring  Patient himself does not note any significant nighttime awakenings or daytime sleepiness  Review of Systems   Constitutional: Negative  HENT: Negative  Respiratory: Positive for shortness of breath  Cardiovascular: Negative  Gastrointestinal: Negative  Genitourinary: Negative  Musculoskeletal: Positive for arthralgias and gait problem  Skin: Negative  Allergic/Immunologic: Negative  Psychiatric/Behavioral: Negative  Smoking history: He reports that he quit smoking about 2 years ago  His smoking use included cigarettes  He started smoking about 49 years ago  He has a 60 00 pack-year smoking history   He has never used smokeless tobacco     The following portions of the patient's history were reviewed and updated as appropriate: allergies, current medications, past family history, past medical history, past social history, past surgical history and problem list     Immunization History   Administered Date(s) Administered   • COVID-19 PFIZER VACCINE 0 3 ML IM 04/15/2021, 05/08/2021, 02/04/2022   • Influenza, recombinant, quadrivalent,injectable, preservative free 10/05/2020   • Pneumococcal Conjugate 13-Valent 10/05/2020     Current Outpatient Medications   Medication Sig Dispense Refill   • albuterol (2 5 mg/3 mL) 0  083 % nebulizer solution INHALE 1 VIAL BY NEBULIZATION EVERY 6 (SIX) HOURS AS NEEDED FOR WHEEZING OR SHORTNESS OF BREATH 375 mL 2   • albuterol (PROVENTIL HFA,VENTOLIN HFA) 90 mcg/act inhaler INHALE 2 PUFFS EVERY 6 HOURS AS NEEDED FOR WHEEZING 18 g 3   • amLODIPine (NORVASC) 2 5 mg tablet TAKE 1 TABLET (2 5 MG TOTAL) BY MOUTH DAILY AFTER DINNER     • amoxicillin (AMOXIL) 500 mg capsule TAKE 1 CAPSULE BY MOUTH 3 TIMES A DAY UNTIL FINISHED     • Anoro Ellipta 62 5-25 MCG/INH inhaler INHALE 1 PUFF DAILY 60 blister 3   • chlorhexidine (PERIDEX) 0 12 % solution SWISH AND SPIT 15CC ONCE A DAY OR AS DIRECTED     • Diclofenac Sodium (VOLTAREN) 1 % Apply 2 g topically 4 (four) times a day 100 g 0   • donepezil (ARICEPT) 10 mg tablet TAKE 1 TABLET BY MOUTH DAILY AT BEDTIME TO START AFTER FINISHING ARICEPT 5 MG ONCE A DAY FOR 1 MONTH   90 tablet 1   • DULoxetine (CYMBALTA) 60 mg delayed release capsule Take 1 capsule (60 mg total) by mouth every morning 90 capsule 0   • gabapentin (NEURONTIN) 100 mg capsule Take 1 capsule (100 mg total) by mouth 3 (three) times a day 90 capsule 2   • ipratropium-albuterol (DUO-NEB) 0 5-2 5 mg/3 mL nebulizer solution Take 3 mL by nebulization 4 (four) times a day 75 mL 5   • Multiple Vitamins-Minerals (MULTIVITAMIN ADULT PO) Take 1 tablet by mouth daily     • Pulmicort Flexhaler 180 MCG/ACT inhaler INHALE 2 PUFFS TWICE A DAY 3 each 2   • rOPINIRole (REQUIP) 3 mg tablet Take 1 tablet (3 mg total) by mouth daily at bedtime 90 tablet 0   • sildenafil (VIAGRA) 50 MG tablet Take 1 tablet (50 mg total) by mouth daily as needed for erectile dysfunction 10 tablet 0   • tiZANidine (ZANAFLEX) 2 mg tablet TAKE 1 TABLET (2 MG TOTAL) BY MOUTH DAILY AT BEDTIME 90 tablet 3   • ibuprofen (MOTRIN) 800 mg tablet  (Patient not taking: No sig reported)     • naproxen sodium (ANAPROX) 550 mg tablet Take 1 tablet (550 mg total) by mouth 2 (two) times a day as needed for mild pain or headaches (Patient not taking: No sig reported) 90 tablet 1     No current facility-administered medications for this visit  Allergies: Tetracycline    Objective:  Vitals:    10/31/22 1426 10/31/22 1429   BP: 134/80    BP Location: Right arm    Patient Position: Sitting    Cuff Size: Large    Pulse: 98    Resp: 16    Temp: 98 7 °F (37 1 °C)    TempSrc: Tympanic    SpO2: 94% 94%   Weight: 115 kg (254 lb)    Height: 5' 8" (1 727 m)    Oxygen Therapy  SpO2: 94 %  Oxygen Therapy: None (Room air)    Wt Readings from Last 3 Encounters:   10/31/22 115 kg (254 lb)   10/25/22 116 kg (256 lb 12 8 oz)   10/12/22 114 kg (252 lb)     Body mass index is 38 62 kg/m²  Physical Exam  Vitals reviewed  Constitutional:       General: He is not in acute distress  Appearance: Normal appearance  He is obese  He is not ill-appearing  HENT:      Head: Normocephalic and atraumatic  Mouth/Throat:      Pharynx: Oropharynx is clear  Eyes:      Conjunctiva/sclera: Conjunctivae normal    Cardiovascular:      Rate and Rhythm: Normal rate and regular rhythm  Pulmonary:      Effort: Pulmonary effort is normal  No respiratory distress  Breath sounds: Normal breath sounds  No decreased breath sounds, wheezing, rhonchi or rales  Abdominal:      General: Abdomen is flat  There is no distension  Musculoskeletal:         General: Normal range of motion  Cervical back: Normal range of motion  Right lower leg: No edema  Left lower leg: No edema  Skin:     General: Skin is warm and dry  Neurological:      Mental Status: He is alert and oriented to person, place, and time     Psychiatric:         Mood and Affect: Mood normal          Behavior: Behavior normal          Lab Review:   Lab Results   Component Value Date    K 3 7 10/20/2022     10/20/2022    CO2 24 10/20/2022    BUN 15 10/20/2022    CREATININE 1 65 (H) 10/20/2022    CALCIUM 9 1 10/20/2022     Lab Results   Component Value Date    WBC 9 47 10/20/2022    HGB 16 3 10/20/2022    HCT 50 1 (H) 10/20/2022    MCV 89 10/20/2022     10/20/2022       Diagnostics:  I have personally reviewed pertinent reports      Reviewed prior imaging and PFT  Office Spirometry Results:     ESS:

## 2022-11-01 NOTE — TELEPHONE ENCOUNTER
----- Message from Geovanna Mathew MD sent at 11/1/2022 10:14 AM EDT -----  Approved    ----- Message -----  From: Mila Maradiaga  Sent: 11/1/2022   8:39 AM EDT  To: [facility] Provider    This Home sleep study needs approval      If approved please sign and return to clerical pool  If denied please include reasons why  Also provide alternative testing if warranted  Please sign and return to clerical pool

## 2022-11-09 ENCOUNTER — TELEPHONE (OUTPATIENT)
Dept: INTERNAL MEDICINE CLINIC | Facility: CLINIC | Age: 67
End: 2022-11-09

## 2022-11-09 NOTE — TELEPHONE ENCOUNTER
PATIENT DROPPED OFF FORM TO BE FILLED OUT-NEEDS BY 11/11/22    PLEASE CALL WHEN COMPLETE    DOCTOR'S PROGRESS REPORT    FORM IN LUND'S BASKET

## 2022-11-14 NOTE — TELEPHONE ENCOUNTER
Pt checking to see if form is completed by Dr Mona Bailey  Pt needs it by today     We'll have to check on the form

## 2022-11-15 ENCOUNTER — TELEPHONE (OUTPATIENT)
Dept: PSYCHIATRY | Facility: CLINIC | Age: 67
End: 2022-11-15

## 2022-11-15 NOTE — TELEPHONE ENCOUNTER
PT  CALLED   BACK  LOOKING  FOR  HIS  FORM  ABOUT HIS  WORKMAN  COMP     NEEDS  TO  GO  TO  [facility]OURT  TOMORROW    REALLY  NEEDS  THE  PAPER   TODAY

## 2022-11-15 NOTE — TELEPHONE ENCOUNTER
Pt said that when he went into [facility], he had a message requesting his worker's compensation docs

## 2022-11-15 NOTE — TELEPHONE ENCOUNTER
I called pt back to let him know that Dr Rollo Sever doesn't have the form completed yet    He said he needs it by tomorrow morning  He'll check in the morning

## 2022-11-15 NOTE — TELEPHONE ENCOUNTER
Patient is calling regarding cancelling an appointment  Date/Time: 11/16/2022 at 3:00 p m  Reason: Don't have docs requested    Patient was rescheduled: YES [x] NO []  If yes, when was Patient reschedule for:  1/11/2023 at 2:00 p m      Patient requesting call back to reschedule: YES [] NO [x]

## 2022-11-16 ENCOUNTER — TELEPHONE (OUTPATIENT)
Dept: PSYCHIATRY | Facility: CLINIC | Age: 67
End: 2022-11-16

## 2022-11-16 NOTE — TELEPHONE ENCOUNTER
Pt called in to make sure he had a new appointment scheduled from his cancelled appointment from 11/15  Writer notified him to always call into the office if he has questions in regards to the questionnaire he completes on mychart before his appointments and to not cancel his appointments  Writer confirmed with patient that there was a rescheduled appointment with provider for 1/11  Writer stated to pt that pt will be added to providers wait list to possibly get an earlier appointment

## 2022-11-18 ENCOUNTER — SOCIAL WORK (OUTPATIENT)
Dept: BEHAVIORAL/MENTAL HEALTH CLINIC | Facility: CLINIC | Age: 67
End: 2022-11-18

## 2022-11-18 DIAGNOSIS — R41.3 MEMORY DIFFICULTY: ICD-10-CM

## 2022-11-18 DIAGNOSIS — F43.11 ACUTE POST-TRAUMATIC STRESS DISORDER: ICD-10-CM

## 2022-11-18 DIAGNOSIS — Z63.4 EXPECTED BEREAVEMENT DUE TO LIFE EVENT: ICD-10-CM

## 2022-11-18 DIAGNOSIS — F43.10 PTSD (POST-TRAUMATIC STRESS DISORDER): Primary | ICD-10-CM

## 2022-11-18 DIAGNOSIS — F33.2 MAJOR DEPRESSIVE DISORDER, RECURRENT SEVERE WITHOUT PSYCHOTIC FEATURES (HCC): ICD-10-CM

## 2022-11-18 SDOH — SOCIAL STABILITY - SOCIAL INSECURITY: DISSAPEARANCE AND DEATH OF FAMILY MEMBER: Z63.4

## 2022-11-30 ENCOUNTER — TELEPHONE (OUTPATIENT)
Dept: PSYCHIATRY | Facility: CLINIC | Age: 67
End: 2022-11-30

## 2022-11-30 NOTE — TELEPHONE ENCOUNTER
Writer call pt off [facility] waitlist to offer an appt 11/30 @ 3pm, 12/1 @ 11am, 12/2 9 am

## 2022-12-02 DIAGNOSIS — Z86.16 HISTORY OF COVID-19: ICD-10-CM

## 2022-12-02 DIAGNOSIS — J84.9 INTERSTITIAL LUNG DISEASE (HCC): ICD-10-CM

## 2022-12-02 RX ORDER — UMECLIDINIUM BROMIDE AND VILANTEROL TRIFENATATE 62.5; 25 UG/1; UG/1
POWDER RESPIRATORY (INHALATION)
Qty: 60 EACH | Refills: 3 | Status: SHIPPED | OUTPATIENT
Start: 2022-12-02

## 2022-12-07 ENCOUNTER — TELEPHONE (OUTPATIENT)
Dept: PSYCHIATRY | Facility: CLINIC | Age: 67
End: 2022-12-07

## 2022-12-14 ENCOUNTER — TELEMEDICINE (OUTPATIENT)
Dept: PSYCHIATRY | Facility: CLINIC | Age: 67
End: 2022-12-14

## 2022-12-14 DIAGNOSIS — F32.A DEPRESSION, UNSPECIFIED DEPRESSION TYPE: ICD-10-CM

## 2022-12-14 DIAGNOSIS — F43.10 PTSD (POST-TRAUMATIC STRESS DISORDER): ICD-10-CM

## 2022-12-14 DIAGNOSIS — G25.81 RESTLESS LEGS: ICD-10-CM

## 2022-12-14 DIAGNOSIS — G44.52 NEW DAILY PERSISTENT HEADACHE: ICD-10-CM

## 2022-12-14 RX ORDER — DULOXETIN HYDROCHLORIDE 60 MG/1
60 CAPSULE, DELAYED RELEASE ORAL EVERY MORNING
Qty: 90 CAPSULE | Refills: 0 | Status: SHIPPED | OUTPATIENT
Start: 2022-12-14

## 2022-12-14 RX ORDER — ROPINIROLE 3 MG/1
3 TABLET, FILM COATED ORAL
Qty: 90 TABLET | Refills: 0 | Status: SHIPPED | OUTPATIENT
Start: 2022-12-14

## 2022-12-14 RX ORDER — GABAPENTIN 100 MG/1
100 CAPSULE ORAL 3 TIMES DAILY
Qty: 90 CAPSULE | Refills: 2 | Status: SHIPPED | OUTPATIENT
Start: 2022-12-14

## 2022-12-14 NOTE — PSYCH
Virtual Regular Visit    Verification of patient location:    Patient is located in the following state in which I hold an active license [place]             Reason for visit is   Chief Complaint   Patient presents with   • Virtual Regular Visit        Encounter provider Zeyad Hamm PA-C    Provider located at  [address] [place] [address]  [address] [facility] [address] [phone]      Recent Visits  No visits were found meeting these conditions  Showing recent visits within past 7 days and meeting all other requirements  Future Appointments  No visits were found meeting these conditions  Showing future appointments within next 150 days and meeting all other requirements       The patient was identified by name and date of birth  Aldo Patient was informed that this is a telemedicine visit and that the visit is being conducted throughth [employer]  He agrees to proceed     My office door was closed  The patient was notified the following individuals were present in the room CORRINE Thakkar  He acknowledged consent and understanding of privacy and security of the video platform  The patient has agreed to participate and understands they can discontinue the visit at any time  Patient is aware this is a billable service  Visit Time    Visit Start Time: 1100   Visit Stop Time: 4710  Total Visit Duration: 21 minutes         MEDICATION MANAGEMENT NOTE        ST  [address]  [facility] [address] [address] [phone]        Name and Date of Birth:  Aldo Patient 74 y o  1955    Date of Visit: December 14, 2022    SUBJECTIVE:      Indio Chela seen by [place] 10/11 at which time meds unchanged  Has been f/u with Cammie Louis for ind therapy    Reports precipitated PTSD symptoms in certain contexts - medical offices- unable to go with his wife to her medical appt and unable to do in hospital sleep study  Sleep is variable  Some nights he moves a lot- twitching, kicking- other nights not so much  Mood is low secondary to continued physical limitations and finances  Talked about his job as  at [employer] for over 30 yrs; all the things he saw there and did; all the friends he made  Reflection saddens him that he is not remembered for what he did and very few people keep in touch with him, inc his younger brother  Denies SI  Review of Systems   Musculoskeletal: Positive for arthralgias and myalgias  Psychiatric/Behavioral: Positive for sleep disturbance  Psychiatric History     This office since 5/7/20  No IP or suicide attempts  Past med trial- zoloft  Trauma/Loss History       ARDS secondary to covid- intubated x 10 days, April 2020  Lost brother and coworkers to [employer]  Has been too ill to return to work  Social History        Lives with wife Jolynn Valle -  >40 yrs  2 daugthers and a son  Worked at [employer] x 40 yrs -              OBJECTIVE:     MENTAL STATUS EXAM  Appearance:  age appropriate   Behavior:  Pleasant & cooperative   Speech:  Normal volume, regular rate and rhythm   Mood:  low   Affect:  mood congruent   Language: intact and appropriate for age, education, and intellect   Thought Process:  goal directed   Associations: intact associations   Thought Content:  normal and appropriate   Perceptual Disturbances: no auditory or visual hallcunations   Risk Potential / Abnormal Thoughts: Suicidal ideation - None  Homicidal ideation - None  Potential for aggression - No       Consciousness:  Alert & Awake   Sensorium:  Grossly oriented   Attention: attention span and concentration are age appropriate       Fund of Knowledge:  Memory: awareness of current events: yes  recent and remote memory grossly intact   Insight:  good   Judgment: good       Lab Review: I have reviewed all pertinent labs  Lab Results   Component Value Date    HGBA1C 5 6 04/13/2022     Lab Results   Component Value Date    CHOLESTEROL 158 04/13/2022     Lab Results   Component Value Date    HDL 37 (L) 04/13/2022     Lab Results   Component Value Date    TRIG 120 04/13/2022       Lab Results   Component Value Date    SODIUM 142 10/20/2022    K 3 7 10/20/2022     10/20/2022    CO2 24 10/20/2022    AGAP 10 10/20/2022    BUN 15 10/20/2022    CREATININE 1 65 (H) 10/20/2022    GLUC 116 10/20/2022    GLUF 94 07/18/2022    CALCIUM 9 1 10/20/2022    AST 43 04/13/2022    ALT 60 04/13/2022    ALKPHOS 88 04/13/2022    TP 7 5 04/13/2022    TBILI 0 60 04/13/2022    EGFR 42 10/20/2022     Lab Results   Component Value Date    WBC 9 47 10/20/2022    HGB 16 3 10/20/2022    HCT 50 1 (H) 10/20/2022    MCV 89 10/20/2022     10/20/2022       Lab Results   Component Value Date    GZD7ZCVJXORC 1 890 04/13/2022           ASSESSMENT & PLAN      Depression  PTSD  Restless legs      Current Outpatient Medications   Medication Sig Dispense Refill   • DULoxetine (CYMBALTA) 60 mg delayed release capsule Take 1 capsule (60 mg total) by mouth every morning 90 capsule 0   • gabapentin (NEURONTIN) 100 mg capsule Take 1 capsule (100 mg total) by mouth 3 (three) times a day 90 capsule 2   • rOPINIRole (REQUIP) 3 mg tablet Take 1 tablet (3 mg total) by mouth daily at bedtime 90 tablet 0   • albuterol (2 5 mg/3 mL) 0 083 % nebulizer solution INHALE 1 VIAL BY NEBULIZATION EVERY 6 (SIX) HOURS AS NEEDED FOR WHEEZING OR SHORTNESS OF BREATH 375 mL 2   • albuterol (PROVENTIL HFA,VENTOLIN HFA) 90 mcg/act inhaler INHALE 2 PUFFS EVERY 6 HOURS AS NEEDED FOR WHEEZING 18 g 3   • amLODIPine (NORVASC) 2 5 mg tablet TAKE 1 TABLET (2 5 MG TOTAL) BY MOUTH DAILY AFTER DINNER     • amoxicillin (AMOXIL) 500 mg capsule TAKE 1 CAPSULE BY MOUTH 3 TIMES A DAY UNTIL FINISHED     • Anoro Ellipta 62 5-25 MCG/ACT inhaler INHALE 1 PUFF DAILY 60 each 3   • chlorhexidine (PERIDEX) 0 12 % solution SWISH AND SPIT 15CC ONCE A DAY OR AS DIRECTED     • Diclofenac Sodium (VOLTAREN) 1 % Apply 2 g topically 4 (four) times a day 100 g 0   • donepezil (ARICEPT) 10 mg tablet TAKE 1 TABLET BY MOUTH DAILY AT BEDTIME TO START AFTER FINISHING ARICEPT 5 MG ONCE A DAY FOR 1 MONTH  90 tablet 1   • ibuprofen (MOTRIN) 800 mg tablet  (Patient not taking: No sig reported)     • ipratropium-albuterol (DUO-NEB) 0 5-2 5 mg/3 mL nebulizer solution Take 3 mL by nebulization 4 (four) times a day 75 mL 5   • Multiple Vitamins-Minerals (MULTIVITAMIN ADULT PO) Take 1 tablet by mouth daily     • naproxen sodium (ANAPROX) 550 mg tablet Take 1 tablet (550 mg total) by mouth 2 (two) times a day as needed for mild pain or headaches (Patient not taking: No sig reported) 90 tablet 1   • Pulmicort Flexhaler 180 MCG/ACT inhaler INHALE 2 PUFFS TWICE A DAY 3 each 2   • sildenafil (VIAGRA) 50 MG tablet Take 1 tablet (50 mg total) by mouth daily as needed for erectile dysfunction 10 tablet 0   • tiZANidine (ZANAFLEX) 2 mg tablet TAKE 1 TABLET (2 MG TOTAL) BY MOUTH DAILY AT BEDTIME 90 tablet 3     No current facility-administered medications for this visit  Plan:         Stable  Cont present meds: cymbalta 60 mg/d- would not recommend increasing secondary to renal functions; requip 3 mg q bedtime, neurontin 100 mg tid  Recommended he discuss movements at night with neuro at his appt in Jan     Reviewed risks, benefits, side effects of medications, including no medication  Patient understands and agrees to treatment plan  F/u ADAM 2 mths, sooner prn ,  In person as it has been over a year since in person visit  Patient has been informed of 24 hours and weekend coverage for urgent situations accessed by calling the main clinic phone number       Octavio Restrepo PA-C

## 2022-12-15 ENCOUNTER — RA CDI HCC (OUTPATIENT)
Dept: OTHER | Facility: HOSPITAL | Age: 67
End: 2022-12-15

## 2022-12-15 NOTE — PROGRESS NOTES
F32 1, J47 9  [facility]  coding opportunities          Chart Reviewed number of suggestions sent to Provider: 2     Patients Insurance     Medicare Insurance: Medicare

## 2022-12-22 ENCOUNTER — OFFICE VISIT (OUTPATIENT)
Dept: INTERNAL MEDICINE CLINIC | Facility: CLINIC | Age: 67
End: 2022-12-22

## 2022-12-22 VITALS
SYSTOLIC BLOOD PRESSURE: 140 MMHG | WEIGHT: 250 LBS | TEMPERATURE: 88 F | OXYGEN SATURATION: 99 % | BODY MASS INDEX: 37.89 KG/M2 | RESPIRATION RATE: 20 BRPM | DIASTOLIC BLOOD PRESSURE: 80 MMHG | HEIGHT: 68 IN | HEART RATE: 90 BPM

## 2022-12-22 DIAGNOSIS — F33.9 DEPRESSION, RECURRENT (HCC): ICD-10-CM

## 2022-12-22 DIAGNOSIS — Z00.00 WELCOME TO MEDICARE PREVENTIVE VISIT: Primary | ICD-10-CM

## 2022-12-22 DIAGNOSIS — Z23 ENCOUNTER FOR IMMUNIZATION: ICD-10-CM

## 2022-12-22 NOTE — PATIENT INSTRUCTIONS
Will come to Medicare done  The patient will continue to follow with the multiple specialty providers  See me again in August

## 2022-12-22 NOTE — PROGRESS NOTES
Assessment and Plan:     Problem List Items Addressed This Visit    None  Visit Diagnoses     Welcome to Medicare preventive visit    -  Primary    Relevant Orders    POCT ECG (Completed)    Encounter for immunization        Relevant Orders    Pneumococcal Conjugate Vaccine 20-valent (PCV20) (Completed)    influenza vaccine, high-dose, PF 0 7 mL (FLUZONE HIGH-DOSE) (Completed)    Depression, recurrent ([facility] [address] )            BMI Counseling: Body mass index is 38 01 kg/m²  The BMI is above normal  Nutrition recommendations include decreasing portion sizes and moderation in carbohydrate intake  Rationale for BMI follow-up plan is due to patient being overweight or obese  Preventive health issues were discussed with patient, and age appropriate screening tests were ordered as noted in patient's After Visit Summary  Personalized health advice and appropriate referrals for health education or preventive services given if needed, as noted in patient's After Visit Summary  History of Present Illness:     Patient presents for a Medicare Wellness Visit    Follow-up for this 73-year-old who is principal problem is long COVI D  What I have done is documented this and also done his usual primary care  Jose Jang He did a Cologuard a couple of months ago and it was abnormal   Colonoscopy is scheduled for next week  otherwise, complaint of erectile dysfunction  He has no cardiac history and is not on a nitrate so will offer a trial of Viagra     From a prior note:    "  Long COVID  Covid 19 illness: the patient works for [email or web address] [address]  He presented to the hospital April 2nd 2020 with respiratory failure and was intubated for covid 19 bronchopneumonia  He was on the ventilator for 10 days      Multiple comorbidities ensued including acute kidney injury, but, after being treated with multiple regimes of anti-inflammatory drugs and also cefepime time is 1 week for presumptive superimposed bacterial pneumonia, he recovered and was extubated and was sent home on a tapering dose of prednisone  Complication of care was development of pressure ulcers of the nares and extremities secondary to proning  This at least is bettetr      lingering affects of this episode include  Shortness of breath on exertion  This is fairly severe; he finds himself breathing heavily even after a fairly low-grade of activity  He is unable to do his job  Daily headache  Headache felt frontally and temporally  Evaluated by Neurology and given gabapentin  He is taking a fairly low dose of 100 mg t i d  with modest improvement  Symptoms of anxiety and depression; I believe he has PTSD   Cognitive dysfunction:  Apparently did poorly on a [place] cognitive assessment   Taste sense has recovered but the patient still has a partial loss of sense of smell  Right hip pain:  It has been explained to him that this may be due to the fact that he required a lot of passive rolling  Slight tremor of toes      He has since been seen by Orthopedics, by Neurology, and by Pulmonary   CT scanning as demonstrated  Pulmonary bronchiectasis   echocardiogram: Grade 1 diastolic dysfunction, mild mitral regurgitation  EKG has right bundle branch block unchanged from prior repeated today        He is acting a lot like the [employer]  syndrome people        The following portions of the patient's history were reviewed and updated as appropriate:   He has a past medical history of COVID-19 (03/2020) and PTSD (post-traumatic stress disorder)  ,  does not have any pertinent problems on file  ,   has a past surgical history that includes No past surgeries and FL injection right hip (non arthrogram) (11/30/2020)  ,  family history includes Heart disease in his mother; Kidney disease in his brother; Sudden death in his father  ,   reports that he quit smoking about 2 years ago  His smoking use included cigarettes   He has a 60 00 pack-year smoking history  He has never used smokeless tobacco  He reports previous alcohol use  He reports that he does not use drugs  ,  is allergic to tetracycline   "     Patient Care Team:  Humberto Barnes MD as PCP - General (Internal Medicine)  Humberto Barnes MD as PCP - [address] [place], [place] [id number]  [facility]  Michael Hernandez MD (Pulmonology)  Gilma Vallejo MD (Pain Medicine)  Mikel Curiel MD (Neurology)  Alberto Iqbal MD (Orthopedic Surgery)     Review of Systems:     Review of Systems   Constitutional: Positive for fatigue  Respiratory: Positive for shortness of breath  Musculoskeletal: Positive for arthralgias  Neurological:        Fine tremor of the toes  Psychiatric/Behavioral: Positive for confusion and decreased concentration  The patient is nervous/anxious           Some short-term memory issues are reported        Problem List:     Patient Active Problem List   Diagnosis   • Elevated brain natriuretic peptide (BNP) level   • PTSD (post-traumatic stress disorder)   • Interstitial lung disease (HCC)   • Acute post-traumatic stress disorder   • Expected bereavement due to life event   • Chronic tension-type headache, not intractable   • Abnormal electrocardiogram   • Hip pain   • Grade I diastolic dysfunction   • SOB (shortness of breath)   • Memory difficulty   • Numbness and tingling   • COVID-19 virus infection   • Occupational exposure in workplace   • Right bundle branch block (RBBB) on electrocardiogram (ECG)   • Depression   • Other headache syndrome   • History of COVID-19   • COVID-19 long hauler   • Screening for HIV (human immunodeficiency virus)   • Encounter for screening for lung cancer   • Personal history of nicotine dependence   • Restrictive lung disease   • Stage 3a chronic kidney disease ([facility] [address] )   • Chronic kidney disease-mineral and bone disorder   • Obesity, morbid ([facility] [address] )      Past Medical and Surgical History:     Past Medical History: Diagnosis Date   • Chronic kidney disease    • COVID-19 2020   • Hypertension 2020   • Pneumonia 3/32/2020   • PTSD (post-traumatic stress disorder)      Past Surgical History:   Procedure Laterality Date   • FL INJECTION RIGHT HIP (NON ARTHROGRAM)  2020   • FL INJECTION RIGHT HIP (NON ARTHROGRAM)  2021   • FL INJECTION RIGHT HIP (NON ARTHROGRAM)  2021   • FL INJECTION RIGHT HIP (NON ARTHROGRAM)  2022   • NO PAST SURGERIES        Family History:     Family History   Problem Relation Age of Onset   • Heart disease Mother    • Coronary artery disease Mother    • Heart failure Mother    • Sudden death Father    • No Known Problems Son    • No Known Problems Daughter    • No Known Problems Maternal Grandmother    • No Known Problems Maternal Grandfather    • No Known Problems Paternal Grandmother    • No Known Problems Paternal Grandfather    • No Known Problems Daughter    • Kidney disease Brother    • Cancer Brother       Social History:     Social History     Socioeconomic History   • Marital status: /Civil Union     Spouse name: None   • Number of children: None   • Years of education: None   • Highest education level: None   Occupational History   • None   Tobacco Use   • Smoking status: Former     Packs/day: 2 00     Years: 30 00     Pack years: 60 00     Types: Cigarettes     Start date: 82     Quit date: 2020     Years since quittin 7   • Smokeless tobacco: Never   Vaping Use   • Vaping Use: Former   • Quit date: 2020   • Substances: Nicotine, Flavoring   Substance and Sexual Activity   • Alcohol use: Not Currently   • Drug use: Never   • Sexual activity: Yes     Partners: Female     Birth control/protection: None   Other Topics Concern   • None   Social History Narrative   • None     Social Determinants of Health     Financial Resource Strain: Low Risk    • Difficulty of Paying Living Expenses: Not hard at all   Food Insecurity: Not on file   Transportation Needs: No Transportation Needs   • Lack of Transportation (Medical): No   • Lack of Transportation (Non-Medical): No   Physical Activity: Not on file   Stress: Not on file   Social Connections: Not on file   Intimate Partner Violence: Not on file   Housing Stability: Not on file      Medications and Allergies:     Current Outpatient Medications   Medication Sig Dispense Refill   • albuterol (2 5 mg/3 mL) 0 083 % nebulizer solution INHALE 1 VIAL BY NEBULIZATION EVERY 6 (SIX) HOURS AS NEEDED FOR WHEEZING OR SHORTNESS OF BREATH 375 mL 2   • albuterol (PROVENTIL HFA,VENTOLIN HFA) 90 mcg/act inhaler INHALE 2 PUFFS EVERY 6 HOURS AS NEEDED FOR WHEEZING 18 g 3   • amLODIPine (NORVASC) 2 5 mg tablet TAKE 1 TABLET (2 5 MG TOTAL) BY MOUTH DAILY AFTER DINNER     • Anoro Ellipta 62 5-25 MCG/ACT inhaler INHALE 1 PUFF DAILY 60 each 3   • chlorhexidine (PERIDEX) 0 12 % solution SWISH AND SPIT 15CC ONCE A DAY OR AS DIRECTED     • Diclofenac Sodium (VOLTAREN) 1 % Apply 2 g topically 4 (four) times a day 100 g 0   • donepezil (ARICEPT) 10 mg tablet TAKE 1 TABLET BY MOUTH DAILY AT BEDTIME TO START AFTER FINISHING ARICEPT 5 MG ONCE A DAY FOR 1 MONTH   90 tablet 1   • amoxicillin (AMOXIL) 500 mg capsule TAKE 1 CAPSULE BY MOUTH 3 TIMES A DAY UNTIL FINISHED     • DULoxetine (CYMBALTA) 60 mg delayed release capsule Take 1 capsule (60 mg total) by mouth every morning 90 capsule 0   • gabapentin (NEURONTIN) 100 mg capsule Take 1 capsule (100 mg total) by mouth 3 (three) times a day 90 capsule 2   • ipratropium-albuterol (DUO-NEB) 0 5-2 5 mg/3 mL nebulizer solution Take 3 mL by nebulization 4 (four) times a day 75 mL 5   • Multiple Vitamins-Minerals (MULTIVITAMIN ADULT PO) Take 1 tablet by mouth daily     • naproxen sodium (ANAPROX) 550 mg tablet Take 1 tablet (550 mg total) by mouth 2 (two) times a day as needed for mild pain or headaches (Patient not taking: No sig reported) 90 tablet 1   • Pulmicort Flexhaler 180 MCG/ACT inhaler INHALE 2 PUFFS TWICE A DAY 3 each 2   • rOPINIRole (REQUIP) 3 mg tablet Take 1 tablet (3 mg total) by mouth daily at bedtime 90 tablet 0   • sildenafil (VIAGRA) 50 MG tablet Take 1 tablet (50 mg total) by mouth daily as needed for erectile dysfunction 10 tablet 0   • tiZANidine (ZANAFLEX) 2 mg tablet TAKE 1 TABLET (2 MG TOTAL) BY MOUTH DAILY AT BEDTIME 90 tablet 3     No current facility-administered medications for this visit  Allergies   Allergen Reactions   • Tetracycline Rash      Immunizations:     Immunization History   Administered Date(s) Administered   • COVID-19 PFIZER VACCINE 0 3 ML IM 04/15/2021, 05/08/2021, 02/04/2022   • Influenza, high dose seasonal 0 7 mL 12/22/2022   • Influenza, recombinant, quadrivalent,injectable, preservative free 10/05/2020   • Pneumococcal Conjugate 13-Valent 10/05/2020   • Pneumococcal Conjugate Vaccine 20-valent (Pcv20), Polysace 12/22/2022      Health Maintenance:         Topic Date Due   • Lung Cancer Screening  05/21/2023   • Colorectal Cancer Screening  06/27/2025   • HIV Screening  Completed   • Hepatitis C Screening  Completed         Topic Date Due   • Hepatitis B Vaccine (1 of 3 - 3-dose series) Never done   • COVID-19 Vaccine (4 - Booster for Martinez Peter series) 04/01/2022      Medicare Screening Tests and Risk Assessments:     Reyna Arceo is here for his Welcome to Medicare visit  Health Risk Assessment:   Patient rates overall health as poor  Patient feels that their physical health rating is same  Patient is dissatisfied with their life  Eyesight was rated as same  Hearing was rated as same  Patient feels that their emotional and mental health rating is same  Patients states they are often angry  Patient states they are never, rarely unusually tired/fatigued  Pain experienced in the last 7 days has been none  Patient states that he has experienced no weight loss or gain in last 6 months  Depression Screening:   PHQ-9 Score: 5      Fall Risk Screening:    In the past year, patient has experienced: no history of falling in past year      Home Safety:  Patient has trouble with stairs inside or outside of their home  Patient has working smoke alarms and has working carbon monoxide detector  Home safety hazards include: none  Nutrition:   Current diet is Regular  Medications:   Patient is not currently taking any over-the-counter supplements  Patient is not able to manage medications  Activities of Daily Living (ADLs)/Instrumental Activities of Daily Living (IADLs):   Walk and transfer into and out of bed and chair?: Yes  Dress and groom yourself?: Yes    Bathe or shower yourself?: Yes    Feed yourself? Yes  Do your laundry/housekeeping?: Yes  Manage your money, pay your bills and track your expenses?: Yes  Make your own meals?: Yes    Do your own shopping?: Yes    Previous Hospitalizations:   Any hospitalizations or ED visits within the last 12 months?: No      Advance Care Planning:   Living will: No    Advanced directive: No      Cognitive Screening:   Provider or family/friend/caregiver concerned regarding cognition?: No    PREVENTIVE SCREENINGS      Cardiovascular Screening:    General: Screening Current      Diabetes Screening:     General: Screening Current      Colorectal Cancer Screening:     General: Screening Current      Prostate Cancer Screening:    General: Screening Current      Abdominal Aortic Aneurysm (AAA) Screening:    Risk factors include: age between 73-69 yo and tobacco use        Lung Cancer Screening:     General: Screening Current      Hepatitis C Screening:    General: Screening Current    Screening, Brief Intervention, and Referral to Treatment (SBIRT)    Screening  Typical number of drinks in a day: 0  Typical number of drinks in a week: 0  Interpretation: Low risk drinking behavior      AUDIT-C Screenin) How often did you have a drink containing alcohol in the past year? never  2) How many drinks did you have on a typical day when you were drinking in the past year? 0  3) How often did you have 6 or more drinks on one occasion in the past year? never    AUDIT-C Score: 0  Interpretation: Score 0-3 (male): Negative screen for alcohol misuse    Single Item Drug Screening:  How often have you used an illegal drug (including marijuana) or a prescription medication for non-medical reasons in the past year? never    Single Item Drug Screen Score: 0  Interpretation: Negative screen for possible drug use disorder    Vision Screening    Right eye Left eye Both eyes   Without correction 20/25 20/70 20/25   With correction           Physical Exam:     /80 (BP Location: Left arm, Patient Position: Sitting, Cuff Size: Standard)   Pulse 90   Temp (!) 88 °F (31 1 °C) (Temporal)   Resp 20   Ht 5' 8" (1 727 m)   Wt 113 kg (250 lb)   SpO2 99%   BMI 38 01 kg/m²     Physical Exam  Constitutional:       Appearance: He is obese  Comments: Overweight male patient who appears to be the stated age   Cardiovascular:      Rate and Rhythm: Normal rate and regular rhythm  Pulmonary:      Effort: Pulmonary effort is normal       Breath sounds: Normal breath sounds  Abdominal:      Comments: Umbilical hernia 2 cm across 12 o'clock position  Large pannus precludes any assessment of organ size  Musculoskeletal:         General: Normal range of motion  Right lower leg: No edema  Left lower leg: No edema  Skin:     General: Skin is warm  Neurological:      General: No focal deficit present     Psychiatric:         Mood and Affect: Mood normal           Sonal Campbell MD

## 2022-12-24 DIAGNOSIS — R41.3 MEMORY DIFFICULTY: ICD-10-CM

## 2022-12-27 RX ORDER — DONEPEZIL HYDROCHLORIDE 10 MG/1
TABLET, FILM COATED ORAL
Qty: 90 TABLET | Refills: 1 | Status: SHIPPED | OUTPATIENT
Start: 2022-12-27

## 2022-12-28 ENCOUNTER — PREP FOR PROCEDURE (OUTPATIENT)
Dept: GASTROENTEROLOGY | Facility: CLINIC | Age: 67
End: 2022-12-28

## 2022-12-28 ENCOUNTER — TELEPHONE (OUTPATIENT)
Dept: GASTROENTEROLOGY | Facility: HOSPITAL | Age: 67
End: 2022-12-28

## 2022-12-28 DIAGNOSIS — R19.5 POSITIVE COLORECTAL CANCER SCREENING USING COLOGUARD TEST: Primary | ICD-10-CM

## 2022-12-28 NOTE — TELEPHONE ENCOUNTER
Endoscopy dept canceled patients procedure  due to him eating today  I rescheduled pt to 3/28/23 with Dr Amberly Goldberg at [facility]

## 2022-12-31 DIAGNOSIS — R06.02 SHORTNESS OF BREATH: ICD-10-CM

## 2023-01-02 DIAGNOSIS — M25.551 ACUTE RIGHT HIP PAIN: ICD-10-CM

## 2023-01-03 RX ORDER — ALBUTEROL SULFATE 90 UG/1
AEROSOL, METERED RESPIRATORY (INHALATION)
Qty: 18 G | Refills: 3 | Status: SHIPPED | OUTPATIENT
Start: 2023-01-03

## 2023-01-03 RX ORDER — TIZANIDINE 2 MG/1
2 TABLET ORAL
Qty: 90 TABLET | Refills: 0 | Status: SHIPPED | OUTPATIENT
Start: 2023-01-03

## 2023-01-04 ENCOUNTER — TELEPHONE (OUTPATIENT)
Dept: PSYCHIATRY | Facility: CLINIC | Age: 68
End: 2023-01-04

## 2023-01-04 NOTE — TELEPHONE ENCOUNTER
Writer left a voice message to inform the appt with Juan Durand on 1/11/23 will be cx due to provider is out of the office, pt was offered an opening tomorrow 1/5/23 at 11 am or next follow-up 2/1/23, Thank you

## 2023-01-30 ENCOUNTER — TELEPHONE (OUTPATIENT)
Dept: PSYCHIATRY | Facility: CLINIC | Age: 68
End: 2023-01-30

## 2023-01-30 ENCOUNTER — OFFICE VISIT (OUTPATIENT)
Dept: NEUROLOGY | Facility: CLINIC | Age: 68
End: 2023-01-30

## 2023-01-30 VITALS
RESPIRATION RATE: 18 BRPM | HEIGHT: 68 IN | SYSTOLIC BLOOD PRESSURE: 128 MMHG | DIASTOLIC BLOOD PRESSURE: 71 MMHG | HEART RATE: 71 BPM | BODY MASS INDEX: 37.89 KG/M2 | WEIGHT: 250 LBS | OXYGEN SATURATION: 98 %

## 2023-01-30 DIAGNOSIS — G44.89 OTHER HEADACHE SYNDROME: ICD-10-CM

## 2023-01-30 DIAGNOSIS — R41.3 MEMORY DIFFICULTY: Primary | ICD-10-CM

## 2023-01-30 DIAGNOSIS — Z86.16 HISTORY OF COVID-19: ICD-10-CM

## 2023-01-30 NOTE — TELEPHONE ENCOUNTER
Pt called to confirm his appt on 2/1/2023 at 3:00 p m  Pt stated that he will be there and wants to let provider know

## 2023-01-30 NOTE — PROGRESS NOTES
Kathie Sutton is a 74 y o  male  Chief Complaint   Patient presents with   • Memory Loss       Assessment:  1  Memory difficulty    2  Other headache syndrome    3  History of COVID-19        Plan:  Continue with Neurontin 100 mg 3 times a day  Continue with mentally stimulating exercises  Follow-up in 6 months  Discussion:  Patient has mild cognitive impairment with a MoCA of 21/30, his MoCA is about the same as last time it was 20/30, advised him to continue with Aricept 10 mg once a day he is tolerating it well also was advised to take a multivitamin every day and to continue with mentally stimulating exercises  I had requested for sed rate and C-reactive protein at the last visit which she did not do I have advised him to get the blood work done he is currently not having any temporal artery tenderness, he is on gabapentin 100 mg 1 tablet 3 times a day and seems to be tolerating it well have advised him to avoid migraine triggers which we discussed in detail also was advised to keep his blood pressure cholesterol and sugar under control, to take fall and safety precautions, to go to the hospital if has any worsening symptoms and call me otherwise to see me back in 6 months and follow-up with the other physicians      Subjective:    HPI   Patient is here in follow-up for headaches and memory difficulty, and since last visit he is about the same according to the patient his memory has been stable and he is tolerating the Aricept well at 10 mg a day he is able to manage his ADLs his wife does the finances and he does very little of driving, he has not had any seizures no confusion he does not drink alcohol does not smoke he still continues to have headaches 3 times a week mostly in the back of the head sometimes it radiates to the whole head associated with photophobia and phonophobia his restless leg symptoms seems to be under control no seizures his appetite is good weight has been stable he is in follow-up with his pulmonologist regarding his history of interstitial disease and COVID appetite is good weight has been stable no other complaints      Vitals:    01/30/23 1358   BP: 128/71   BP Location: Right arm   Patient Position: Sitting   Cuff Size: Standard   Pulse: 71   Resp: 18   SpO2: 98%   Weight: 113 kg (250 lb)   Height: 5' 8" (1 727 m)       Current Medications    Current Outpatient Medications:   •  albuterol (2 5 mg/3 mL) 0 083 % nebulizer solution, INHALE 1 VIAL BY NEBULIZATION EVERY 6 (SIX) HOURS AS NEEDED FOR WHEEZING OR SHORTNESS OF BREATH, Disp: 375 mL, Rfl: 2  •  albuterol (PROVENTIL HFA,VENTOLIN HFA) 90 mcg/act inhaler, TAKE 2 PUFFS BY MOUTH EVERY 6 HOURS AS NEEDED FOR WHEEZE, Disp: 18 g, Rfl: 3  •  amLODIPine (NORVASC) 2 5 mg tablet, TAKE 1 TABLET (2 5 MG TOTAL) BY MOUTH DAILY AFTER DINNER, Disp: , Rfl:   •  amoxicillin (AMOXIL) 500 mg capsule, TAKE 1 CAPSULE BY MOUTH 3 TIMES A DAY UNTIL FINISHED, Disp: , Rfl:   •  Anoro Ellipta 62 5-25 MCG/ACT inhaler, INHALE 1 PUFF DAILY, Disp: 60 each, Rfl: 3  •  chlorhexidine (PERIDEX) 0 12 % solution, SWISH AND SPIT 15CC ONCE A DAY OR AS DIRECTED, Disp: , Rfl:   •  Diclofenac Sodium (VOLTAREN) 1 %, Apply 2 g topically 4 (four) times a day, Disp: 100 g, Rfl: 0  •  donepezil (ARICEPT) 10 mg tablet, TAKE 1 TABLET DAILY AT BEDTIME TO START AFTER FINISHING ARICEPT 5 MG ONCE A DAY FOR 1 MONTH , Disp: 90 tablet, Rfl: 1  •  DULoxetine (CYMBALTA) 60 mg delayed release capsule, Take 1 capsule (60 mg total) by mouth every morning, Disp: 90 capsule, Rfl: 0  •  gabapentin (NEURONTIN) 100 mg capsule, Take 1 capsule (100 mg total) by mouth 3 (three) times a day, Disp: 90 capsule, Rfl: 2  •  ipratropium-albuterol (DUO-NEB) 0 5-2 5 mg/3 mL nebulizer solution, Take 3 mL by nebulization 4 (four) times a day, Disp: 75 mL, Rfl: 5  •  Multiple Vitamins-Minerals (MULTIVITAMIN ADULT PO), Take 1 tablet by mouth daily, Disp: , Rfl:   •  naproxen sodium (ANAPROX) 550 mg tablet, Take 1 tablet (550 mg total) by mouth 2 (two) times a day as needed for mild pain or headaches, Disp: 90 tablet, Rfl: 1  •  Pulmicort Flexhaler 180 MCG/ACT inhaler, INHALE 2 PUFFS TWICE A DAY, Disp: 3 each, Rfl: 2  •  rOPINIRole (REQUIP) 3 mg tablet, Take 1 tablet (3 mg total) by mouth daily at bedtime, Disp: 90 tablet, Rfl: 0  •  sildenafil (VIAGRA) 50 MG tablet, Take 1 tablet (50 mg total) by mouth daily as needed for erectile dysfunction, Disp: 10 tablet, Rfl: 0  •  tiZANidine (ZANAFLEX) 2 mg tablet, Take 1 tablet (2 mg total) by mouth daily at bedtime, Disp: 90 tablet, Rfl: 0      Allergies  Tetracycline    Past Medical History  Past Medical History:   Diagnosis Date   • Chronic kidney disease    • COVID-19 03/2020   • Hypertension 4/02/2020   • Pneumonia 3/32/2020   • PTSD (post-traumatic stress disorder)          Past Surgical History:  Past Surgical History:   Procedure Laterality Date   • FL INJECTION RIGHT HIP (NON ARTHROGRAM)  11/30/2020   • FL INJECTION RIGHT HIP (NON ARTHROGRAM)  8/4/2021   • FL INJECTION RIGHT HIP (NON ARTHROGRAM)  12/27/2021   • FL INJECTION RIGHT HIP (NON ARTHROGRAM)  4/25/2022   • NO PAST SURGERIES           Family History:  Family History   Problem Relation Age of Onset   • Heart disease Mother    • Coronary artery disease Mother    • Heart failure Mother    • Sudden death Father    • No Known Problems Son    • No Known Problems Daughter    • No Known Problems Maternal Grandmother    • No Known Problems Maternal Grandfather    • No Known Problems Paternal Grandmother    • No Known Problems Paternal Grandfather    • No Known Problems Daughter    • Kidney disease Brother    • Cancer Brother        Social History:   reports that he quit smoking about 2 years ago  His smoking use included cigarettes  He started smoking about 50 years ago  He has a 60 00 pack-year smoking history  He has never used smokeless tobacco  He reports that he does not currently use alcohol   He reports that he does not use drugs  I have reviewed the past medical history, surgical history, social and family history, current medications, allergies vitals, review of systems, and updated this information as appropriate today  Objective:    Physical Exam    Neurological Exam     GENERAL:  Cooperative in no acute distress  Well-developed and well-nourished     HEAD and NECK   Head is atraumatic normocephalic with no lesions or masses  Neck is supple with full range of motion     CARDIOVASCULAR  Carotid Arteries-no carotid bruits  NEUROLOGIC:  Mental Status-the patient is awake alert and oriented without aphasia or apraxia, MoCA is 21/30  Cranial Nerves: Visual fields are full to confrontation  Extraocular movements are full without nystagmus  Pupils are 2-1/2 mm and reactive  Face is symmetrical to light touch  Movements of facial expression move symmetrically  Hearing is normal to finger rub bilaterally  Soft palate lifts symmetrically  Shoulder shrug is symmetrical  Tongue is midline without atrophy  Motor: No drift is noted on arm extension  Strength is full in the upper and lower extremities with normal bulk and tone  Gait is unremarkable  No cervical spine tenderness, no temporal artery tenderness  ROS:  Review of Systems   Constitutional: Negative  Negative for appetite change and fever  HENT: Negative  Negative for hearing loss, tinnitus, trouble swallowing and voice change  Eyes: Negative  Negative for photophobia, pain and visual disturbance  Respiratory: Negative  Negative for shortness of breath  Cardiovascular: Negative  Negative for palpitations  Gastrointestinal: Negative  Negative for nausea and vomiting  Endocrine: Negative  Negative for cold intolerance  Genitourinary: Negative  Negative for dysuria, frequency and urgency  Musculoskeletal: Negative  Negative for gait problem, myalgias and neck pain  Skin: Negative  Negative for rash     Allergic/Immunologic: Negative  Neurological: Negative  Negative for dizziness, tremors, seizures, syncope, facial asymmetry, speech difficulty, weakness, light-headedness, numbness and headaches  Hematological: Negative  Does not bruise/bleed easily  Psychiatric/Behavioral: Positive for confusion  Negative for hallucinations and sleep disturbance

## 2023-02-01 ENCOUNTER — SOCIAL WORK (OUTPATIENT)
Dept: BEHAVIORAL/MENTAL HEALTH CLINIC | Facility: CLINIC | Age: 68
End: 2023-02-01

## 2023-02-01 DIAGNOSIS — F43.11 ACUTE POST-TRAUMATIC STRESS DISORDER: ICD-10-CM

## 2023-02-01 DIAGNOSIS — R41.3 MEMORY DIFFICULTY: Primary | ICD-10-CM

## 2023-02-01 DIAGNOSIS — F33.2 MAJOR DEPRESSIVE DISORDER, RECURRENT SEVERE WITHOUT PSYCHOTIC FEATURES (HCC): ICD-10-CM

## 2023-02-01 NOTE — PSYCH
Behavioral Health Psychotherapy Progress Note    Psychotherapy Provided: Individual Psychotherapy     1  Memory difficulty        2  Acute post-traumatic stress disorder        3  Major depressive disorder, recurrent severe without psychotic features ([facility] [address] )            Goals addressed in session: Goal 1, Goal 2 and Goal 3      DATA: Catie Banegas arrived for his appointment  He recently found out his own daughter has been cheating on her  of 21 years with 12 different people  Catie Banegas loves his son in law and his daughter placed a PFA against him for no reason  The son in law could lose his  status, his high paying job and his son's college opportunities that [employer] would help with  Catie Banegas is very distressed with his daughter who he sees as ruining her entire family  This has increased his depression, his anxiety and his sleep  During this session, this clinician used the following therapeutic modalities: Client-centered Therapy, Cognitive Behavioral Therapy, Mindfulness-based Strategies and Supportive Psychotherapy    Substance Abuse was not addressed during this session  If the client is diagnosed with a co-occurring substance use disorder, please indicate any changes in the frequency or amount of use: n/a  Stage of change for addressing substance use diagnoses: No substance use/Not applicable    ASSESSMENT:  Henrik Arriola presents with a angry and depressed mood  his affect is angry and upset, which is congruent, with his mood and the content of the session  The client has made progress on their goals  However he recently found out his daughter has been cheating at least 12 times or more on a son in law Catie Banegas has the most respect for  Henrik Arriola presents with a none risk of suicide, none risk of self-harm, and none risk of harm to others  For any risk assessment that surpasses a "low" rating, a safety plan must be developed      A safety plan was indicated: no  If yes, describe in detail n/a    PLAN: Between sessions, Edy Mendoza will use mindfulness and CBT  At the next session, the therapist will use Client-centered Therapy, Cognitive Behavioral Therapy, Mindfulness-based Strategies and Supportive Psychotherapy to address any depression or anxiety he may have       Behavioral Health Treatment Plan and Discharge Planning: Edy Mendoza is aware of and agrees to continue to work on their treatment plan  They have identified and are working toward their discharge goals   yes    Visit start and stop times:    02/01/23  Start Time: 0310  Stop Time: 0400  Total Visit Time: 50 minutes

## 2023-02-06 ENCOUNTER — APPOINTMENT (OUTPATIENT)
Dept: LAB | Facility: CLINIC | Age: 68
End: 2023-02-06

## 2023-02-06 DIAGNOSIS — G44.89 OTHER HEADACHE SYNDROME: ICD-10-CM

## 2023-02-06 DIAGNOSIS — R41.3 MEMORY DIFFICULTY: ICD-10-CM

## 2023-02-07 LAB
CRP SERPL QL: 4 MG/L
ERYTHROCYTE [SEDIMENTATION RATE] IN BLOOD: 20 MM/HOUR (ref 0–19)
FOLATE SERPL-MCNC: 9 NG/ML (ref 3.1–17.5)
VIT B12 SERPL-MCNC: 431 PG/ML (ref 100–900)

## 2023-03-02 ENCOUNTER — SOCIAL WORK (OUTPATIENT)
Dept: BEHAVIORAL/MENTAL HEALTH CLINIC | Facility: CLINIC | Age: 68
End: 2023-03-02

## 2023-03-02 DIAGNOSIS — F33.2 MAJOR DEPRESSIVE DISORDER, RECURRENT SEVERE WITHOUT PSYCHOTIC FEATURES (HCC): ICD-10-CM

## 2023-03-02 DIAGNOSIS — R41.3 MEMORY DIFFICULTY: Primary | ICD-10-CM

## 2023-03-02 DIAGNOSIS — F43.11 ACUTE POST-TRAUMATIC STRESS DISORDER: ICD-10-CM

## 2023-03-02 DIAGNOSIS — F43.10 PTSD (POST-TRAUMATIC STRESS DISORDER): ICD-10-CM

## 2023-03-02 NOTE — PSYCH
Behavioral Health Psychotherapy Progress Note    Psychotherapy Provided: Individual Psychotherapy     1  Memory difficulty        2  PTSD (post-traumatic stress disorder)        3  Acute post-traumatic stress disorder        4  Major depressive disorder, recurrent severe without psychotic features ([address] )            Goals addressed in session: Goal 1, Goal 2 and Goal 3      DATA: Az Villanueva is dealing with his daughter who cheated on his favorite son in law  He also is still dealing with long term effects from a case of covid where he almost   We discussed his concern of vivid dreams and issues with sexual side effects  He will speak on my encouragement with his medication provider  Az Villanueva benefits from the support of therapy  During this session, this clinician used the following therapeutic modalities: Client-centered Therapy, Cognitive Behavioral Therapy, Mindfulness-based Strategies and Supportive Psychotherapy    Substance Abuse was not addressed during this session  If the client is diagnosed with a co-occurring substance use disorder, please indicate any changes in the frequency or amount of use: n/a  Stage of change for addressing substance use diagnoses: No substance use/Not applicable    ASSESSMENT:  Esvin Russo presents with a Depressed mood  his affect is depressed  which is congruent, with his mood and the content of the session  The client has and has not made progress on their goals  Az Villanueva is still greatly affected by the fact his daughter cheated on his son in law who he loves  He still has long term issues from having severe covid  Esvin Russo presents with a none risk of suicide, none risk of self-harm, and none risk of harm to others  For any risk assessment that surpasses a "low" rating, a safety plan must be developed  A safety plan was indicated: no  If yes, describe in detail n/a    PLAN: Between sessions, Esvin Russo will use mindfulness and CBT   At the next session, the therapist will use Client-centered Therapy, Cognitive Behavioral Therapy, Mindfulness-based Strategies and Supportive Psychotherapy to address symptoms as they arise       Behavioral Health Treatment Plan and Discharge Planning: Sea Her is aware of and agrees to continue to work on their treatment plan  They have identified and are working toward their discharge goals   yes    Visit start and stop times:    03/02/23  Start Time: 1510  Stop Time: 1600  Total Visit Time: 50 minutes

## 2023-03-10 DIAGNOSIS — M25.551 ACUTE RIGHT HIP PAIN: ICD-10-CM

## 2023-03-11 RX ORDER — TIZANIDINE 2 MG/1
TABLET ORAL
Qty: 90 TABLET | Refills: 1 | Status: SHIPPED | OUTPATIENT
Start: 2023-03-11

## 2023-03-25 DIAGNOSIS — J47.9 BRONCHIECTASIS WITHOUT COMPLICATION (HCC): ICD-10-CM

## 2023-03-25 DIAGNOSIS — J84.9 INTERSTITIAL LUNG DISEASE (HCC): ICD-10-CM

## 2023-03-25 DIAGNOSIS — Z86.16 HISTORY OF COVID-19: ICD-10-CM

## 2023-03-25 RX ORDER — UMECLIDINIUM BROMIDE AND VILANTEROL TRIFENATATE 62.5; 25 UG/1; UG/1
POWDER RESPIRATORY (INHALATION)
Qty: 60 EACH | Refills: 3 | Status: SHIPPED | OUTPATIENT
Start: 2023-03-25

## 2023-03-27 RX ORDER — BUDESONIDE 180 UG/1
AEROSOL, POWDER RESPIRATORY (INHALATION)
Qty: 1 EACH | Refills: 2 | Status: SHIPPED | OUTPATIENT
Start: 2023-03-27

## 2023-03-28 ENCOUNTER — ANESTHESIA (OUTPATIENT)
Dept: GASTROENTEROLOGY | Facility: HOSPITAL | Age: 68
End: 2023-03-28

## 2023-03-28 ENCOUNTER — ANESTHESIA EVENT (OUTPATIENT)
Dept: GASTROENTEROLOGY | Facility: HOSPITAL | Age: 68
End: 2023-03-28

## 2023-03-28 ENCOUNTER — HOSPITAL ENCOUNTER (OUTPATIENT)
Dept: GASTROENTEROLOGY | Facility: HOSPITAL | Age: 68
Setting detail: OUTPATIENT SURGERY
Discharge: HOME | End: 2023-03-28
Attending: INTERNAL MEDICINE

## 2023-03-28 VITALS
TEMPERATURE: 97.5 F | WEIGHT: 254.4 LBS | RESPIRATION RATE: 16 BRPM | HEART RATE: 79 BPM | HEIGHT: 68 IN | OXYGEN SATURATION: 93 % | BODY MASS INDEX: 38.55 KG/M2 | DIASTOLIC BLOOD PRESSURE: 69 MMHG | SYSTOLIC BLOOD PRESSURE: 112 MMHG

## 2023-03-28 DIAGNOSIS — R19.5 POSITIVE COLORECTAL CANCER SCREENING USING COLOGUARD TEST: ICD-10-CM

## 2023-03-28 PROBLEM — J44.9 CHRONIC OBSTRUCTIVE PULMONARY DISEASE (HCC): Status: ACTIVE | Noted: 2023-03-28

## 2023-03-28 RX ORDER — LIDOCAINE HYDROCHLORIDE 10 MG/ML
INJECTION, SOLUTION EPIDURAL; INFILTRATION; INTRACAUDAL; PERINEURAL AS NEEDED
Status: DISCONTINUED | OUTPATIENT
Start: 2023-03-28 | End: 2023-03-28

## 2023-03-28 RX ORDER — PROPOFOL 10 MG/ML
INJECTION, EMULSION INTRAVENOUS AS NEEDED
Status: DISCONTINUED | OUTPATIENT
Start: 2023-03-28 | End: 2023-03-28

## 2023-03-28 RX ORDER — SODIUM CHLORIDE, SODIUM LACTATE, POTASSIUM CHLORIDE, CALCIUM CHLORIDE 600; 310; 30; 20 MG/100ML; MG/100ML; MG/100ML; MG/100ML
INJECTION, SOLUTION INTRAVENOUS CONTINUOUS PRN
Status: DISCONTINUED | OUTPATIENT
Start: 2023-03-28 | End: 2023-03-28

## 2023-03-28 RX ADMIN — PROPOFOL 50 MG: 10 INJECTION, EMULSION INTRAVENOUS at 07:38

## 2023-03-28 RX ADMIN — PROPOFOL 150 MG: 10 INJECTION, EMULSION INTRAVENOUS at 07:32

## 2023-03-28 RX ADMIN — LIDOCAINE HYDROCHLORIDE 5 ML: 10 INJECTION, SOLUTION EPIDURAL; INFILTRATION; INTRACAUDAL; PERINEURAL at 07:31

## 2023-03-28 RX ADMIN — SODIUM CHLORIDE, SODIUM LACTATE, POTASSIUM CHLORIDE, AND CALCIUM CHLORIDE: .6; .31; .03; .02 INJECTION, SOLUTION INTRAVENOUS at 07:15

## 2023-03-28 NOTE — H&P
"History and Physical -  [facility]  Debby Pagan 74 y o  male MRN: [id number]      HPI: Debby Pagan is a 74y o  year old male who presents for evaluation of positive Cologuard test      REVIEW OF SYSTEMS: Per the HPI, and otherwise unremarkable  Historical Information   Past Medical History:   Diagnosis Date   • Chronic kidney disease    • COVID-19 2020   • Hypertension 2020   • Pneumonia 3/32/2020   • PTSD (post-traumatic stress disorder)      Past Surgical History:   Procedure Laterality Date   • FL INJECTION RIGHT HIP (NON ARTHROGRAM)  2020   • FL INJECTION RIGHT HIP (NON ARTHROGRAM)  2021   • FL INJECTION RIGHT HIP (NON ARTHROGRAM)  2021   • FL INJECTION RIGHT HIP (NON ARTHROGRAM)  2022   • NO PAST SURGERIES       Social History   Social History     Substance and Sexual Activity   Alcohol Use Not Currently     Social History     Substance and Sexual Activity   Drug Use Never     Social History     Tobacco Use   Smoking Status Former   • Packs/day: 2 00   • Years: 30 00   • Pack years: 60 00   • Types: Cigarettes   • Start date: 82   • Quit date: 2020   • Years since quittin 9   Smokeless Tobacco Never     Family History   Problem Relation Age of Onset   • Heart disease Mother    • Coronary artery disease Mother    • Heart failure Mother    • Sudden death Father    • No Known Problems Son    • No Known Problems Daughter    • No Known Problems Maternal Grandmother    • No Known Problems Maternal Grandfather    • No Known Problems Paternal Grandmother    • No Known Problems Paternal Grandfather    • No Known Problems Daughter    • Kidney disease Brother    • Cancer Brother        Meds/Allergies     (Not in a hospital admission)      Allergies   Allergen Reactions   • Tetracycline Rash       Objective     Blood pressure 126/61, pulse 84, temperature 98 °F (36 7 °C), temperature source Temporal, resp   rate 16, height 5' 8\" (1 727 m), weight 115 kg " (254 lb 6 4 oz), SpO2 96 %  PHYSICAL EXAM    Gen: NAD  CV: RRR  CHEST: Clear  ABD: soft, NT/ND  EXT: no edema      ASSESSMENT/PLAN:  This is a 74y o  year old male here for colonoscopy, and he is stable and optimized for his procedure

## 2023-03-28 NOTE — ANESTHESIA PREPROCEDURE EVALUATION
Procedure:  COLONOSCOPY    Relevant Problems   ANESTHESIA (within normal limits)   (-) History of anesthesia complications      CARDIO   (+) WALTERS (dyspnea on exertion)   (-) Chest pain      /RENAL   (+) Stage 3a chronic kidney disease (HCC)      NEURO/PSYCH   (+) Acute post-traumatic stress disorder   (+) Chronic tension-type headache, not intractable   (+) Depression   (+) Numbness and tingling   (+) Personal history of nicotine dependence      PULMONARY   (+) Chronic obstructive pulmonary disease (HCC)   (+) WALTERS (dyspnea on exertion)   (-) URI (upper respiratory infection)      Respiratory   (+) Interstitial lung disease ([address] )      Other   (+) COVID-19 long hauler   (+) Obesity, morbid ([facility] [address] )      NST 2021:  IMPRESSIONS: Normal study after pharmacologic vasodilation  There was image artifact, without diagnostic evidence for perfusion abnormality  (Fixed inferior and anterior defects were noted that both resolved with prone imaging ) Left  ventricular systolic function was normal   Physical Exam    Airway    Mallampati score: II  TM Distance: >3 FB  Neck ROM: full     Dental   upper dentures,     Cardiovascular      Pulmonary      Other Findings        Anesthesia Plan  ASA Score- 3     Anesthesia Type- IV sedation with anesthesia with ASA Monitors  Additional Monitors:   Airway Plan:           Plan Factors-Exercise tolerance (METS): <4 METS  Chart reviewed  EKG reviewed  Existing labs reviewed  Patient summary reviewed  Induction- intravenous  Postoperative Plan-     Informed Consent- Anesthetic plan and risks discussed with patient  I personally reviewed this patient with the CRNA  Discussed and agreed on the Anesthesia Plan with the CRNA  Duke Kilpatrick

## 2023-03-28 NOTE — ANESTHESIA POSTPROCEDURE EVALUATION
Post-Op Assessment Note    CV Status:  Stable    Pain management: adequate     Mental Status:  Alert and awake   Hydration Status:  Euvolemic   PONV Controlled:  Controlled   Airway Patency:  Patent      Post Op Vitals Reviewed: Yes      Staff: CRNA         No notable events documented      /66 (03/28/23 0802)    Temp 97 5 °F (36 4 °C) (03/28/23 0802)    Pulse 76 (03/28/23 0802)   Resp 16 (03/28/23 0802)    SpO2 93 % (03/28/23 0802)

## 2023-03-30 ENCOUNTER — TELEPHONE (OUTPATIENT)
Dept: PSYCHIATRY | Facility: CLINIC | Age: 68
End: 2023-03-30

## 2023-03-30 NOTE — TELEPHONE ENCOUNTER
Patient called in requesting a call back from provider  Writer asked what the message was regarding and that he had a scheduled appointment on 4/3 and if his message could wait until his appointment or if it was an emergency  Patient stated it was personal and that is could wait until his appointment

## 2023-04-03 ENCOUNTER — SOCIAL WORK (OUTPATIENT)
Dept: BEHAVIORAL/MENTAL HEALTH CLINIC | Facility: CLINIC | Age: 68
End: 2023-04-03

## 2023-04-03 DIAGNOSIS — F43.11 ACUTE POST-TRAUMATIC STRESS DISORDER: ICD-10-CM

## 2023-04-03 DIAGNOSIS — R41.3 MEMORY DIFFICULTY: Primary | ICD-10-CM

## 2023-04-03 DIAGNOSIS — F33.2 MAJOR DEPRESSIVE DISORDER, RECURRENT SEVERE WITHOUT PSYCHOTIC FEATURES (HCC): ICD-10-CM

## 2023-04-03 DIAGNOSIS — F43.10 PTSD (POST-TRAUMATIC STRESS DISORDER): ICD-10-CM

## 2023-04-03 NOTE — PSYCH
"Behavioral Health Psychotherapy Progress Note    Psychotherapy Provided: Individual Psychotherapy     1  Memory difficulty        2  Major depressive disorder, recurrent severe without psychotic features ([facility] [address] )        3  PTSD (post-traumatic stress disorder)        4  Acute post-traumatic stress disorder            Goals addressed in session: Goal 1 and Goal 2     DATA: Trang Cottrell discussed his daughter who is cheating on his son in law he likes  He discussed his elderly father in law who is not doing well   They just found out his wife's sister and her  are alcoholics  His younger brother doesn't talk to Trang Cottrell still has problems with his sleep  During this session, this clinician used the following therapeutic modalities: Client-centered Therapy, Cognitive Behavioral Therapy, Mindfulness-based Strategies and Supportive Psychotherapy    Substance Abuse was not addressed during this session  If the client is diagnosed with a co-occurring substance use disorder, please indicate any changes in the frequency or amount of use: n/a  Stage of change for addressing substance use diagnoses: No substance use/Not applicable    ASSESSMENT:  Laci Mehta presents with a Anxious and Depressed mood  his affect is anxious and depressedwhich is congruent, with his mood and the content of the session  The client has made progress on their goals  Laci Mehta presents with a none risk of suicide, none risk of self-harm, and none risk of harm to others  For any risk assessment that surpasses a \"low\" rating, a safety plan must be developed  A safety plan was indicated: no  If yes, describe in detail n/a    PLAN: Between sessions, Laci Mehta will use mindfulness and CBT  At the next session, the therapist will use Client-centered Therapy, Cognitive Behavioral Therapy, Mindfulness-based Strategies and Supportive Psychotherapy to address issues and symptoms as they may arise        Behavioral Health Treatment " Plan and Discharge Planning: Manuela Ashley is aware of and agrees to continue to work on their treatment plan  They have identified and are working toward their discharge goals   yes    Visit start and stop times:    04/03/23  Start Time: 1510  Stop Time: 1600  Total Visit Time: 50 minutes

## 2023-04-04 ENCOUNTER — TELEPHONE (OUTPATIENT)
Age: 68
End: 2023-04-04

## 2023-04-04 ENCOUNTER — OFFICE VISIT (OUTPATIENT)
Dept: PSYCHIATRY | Facility: CLINIC | Age: 68
End: 2023-04-04

## 2023-04-04 VITALS — SYSTOLIC BLOOD PRESSURE: 111 MMHG | HEART RATE: 91 BPM | DIASTOLIC BLOOD PRESSURE: 72 MMHG

## 2023-04-04 DIAGNOSIS — F32.A DEPRESSION, UNSPECIFIED DEPRESSION TYPE: Primary | ICD-10-CM

## 2023-04-04 DIAGNOSIS — G44.52 NEW DAILY PERSISTENT HEADACHE: ICD-10-CM

## 2023-04-04 DIAGNOSIS — F43.10 PTSD (POST-TRAUMATIC STRESS DISORDER): ICD-10-CM

## 2023-04-04 RX ORDER — GABAPENTIN 100 MG/1
100 CAPSULE ORAL 3 TIMES DAILY
Qty: 90 CAPSULE | Refills: 2 | Status: SHIPPED | OUTPATIENT
Start: 2023-04-04

## 2023-04-04 NOTE — TELEPHONE ENCOUNTER
I spoke to DESTIN royal at [facility] I gave her your message ok and she also want you to know that caden is taking norvasc and b/p today is 111/72 she is asking is it still ok for him to start on prazosin

## 2023-04-05 RX ORDER — PRAZOSIN HYDROCHLORIDE 1 MG/1
1 CAPSULE ORAL
Qty: 30 CAPSULE | Refills: 0 | Status: SHIPPED | OUTPATIENT
Start: 2023-04-05

## 2023-04-05 NOTE — TELEPHONE ENCOUNTER
S/w Pt reviewed recommendation from [facility]  pt agrees with plan and will call if B/P starts to run low

## 2023-04-05 NOTE — TELEPHONE ENCOUNTER
Yes, but monitor the blood pressure  Prazosin may drop it a bit    If so, the next step would be to cut the Norvasc back
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
## 2023-04-06 ENCOUNTER — TELEPHONE (OUTPATIENT)
Dept: GASTROENTEROLOGY | Facility: CLINIC | Age: 68
End: 2023-04-06

## 2023-04-06 NOTE — TELEPHONE ENCOUNTER
Called and spoke with patient in regards to his biopsy results  As per Dr Yoni Robb, 3 the polyps were precancerous adenomas and completely removed   The final polyp was benign  Due  for repeat colonoscopy in 3 years  Pt voiced understanding

## 2023-04-06 NOTE — TELEPHONE ENCOUNTER
----- Message from Pancho Arthur DO sent at 4/6/2023  6:58 AM EDT -----  Please call the patient with the polyp results  3 the polyps were precancerous adenomas and completely removed  The final polyp was benign    The patient due for repeat colonoscopy in 3 years

## 2023-04-25 ENCOUNTER — TELEPHONE (OUTPATIENT)
Dept: NEPHROLOGY | Facility: CLINIC | Age: 68
End: 2023-04-25

## 2023-04-26 ENCOUNTER — OFFICE VISIT (OUTPATIENT)
Dept: NEPHROLOGY | Facility: CLINIC | Age: 68
End: 2023-04-26

## 2023-04-26 VITALS
HEIGHT: 68 IN | HEART RATE: 90 BPM | BODY MASS INDEX: 39.22 KG/M2 | DIASTOLIC BLOOD PRESSURE: 80 MMHG | WEIGHT: 258.8 LBS | TEMPERATURE: 98.5 F | SYSTOLIC BLOOD PRESSURE: 130 MMHG | RESPIRATION RATE: 16 BRPM | OXYGEN SATURATION: 98 %

## 2023-04-26 DIAGNOSIS — E66.01 OBESITY, MORBID (HCC): ICD-10-CM

## 2023-04-26 DIAGNOSIS — N18.9 CHRONIC KIDNEY DISEASE-MINERAL AND BONE DISORDER: ICD-10-CM

## 2023-04-26 DIAGNOSIS — E83.9 CHRONIC KIDNEY DISEASE-MINERAL AND BONE DISORDER: ICD-10-CM

## 2023-04-26 DIAGNOSIS — N18.31 STAGE 3A CHRONIC KIDNEY DISEASE (HCC): Primary | ICD-10-CM

## 2023-04-26 DIAGNOSIS — I51.89 GRADE I DIASTOLIC DYSFUNCTION: ICD-10-CM

## 2023-04-26 DIAGNOSIS — M89.9 CHRONIC KIDNEY DISEASE-MINERAL AND BONE DISORDER: ICD-10-CM

## 2023-04-26 NOTE — ASSESSMENT & PLAN NOTE
Lab Results   Component Value Date    EGFR 42 10/20/2022    EGFR 51 07/18/2022    EGFR 46 04/13/2022    CREATININE 1 65 (H) 10/20/2022    CREATININE 1 43 (H) 07/18/2022    CREATININE 1 55 (H) 04/13/2022   PTH and phosphorus along with vitamin D are within acceptable range and will continue to monitor

## 2023-04-26 NOTE — PROGRESS NOTES
NEPHROLOGY OFFICE FOLLOW UP  Ridge Loja 74 y o  male MRN: [id number]    Encounter: [id number] 4/26/2023    REASON FOR VISIT: Ridge Loja is a 74 y o  male who is here on 4/26/2023 for Chronic Kidney Disease and Follow-up    HPI:    Catia Tamanna came in today for follow-up of CKD  Patient 74 gentleman overall doing well    Denies any acute complaint    No chest pain no palpitation or shortness of breath    No nausea no vomiting    Patient did not have blood work done    Denies any urinary complaint      REVIEW OF SYSTEMS:    Review of Systems   Constitutional: Negative for activity change and fatigue  HENT: Negative for congestion and ear discharge  Eyes: Negative for photophobia and pain  Respiratory: Negative for apnea and choking  Cardiovascular: Negative for chest pain and palpitations  Gastrointestinal: Negative for abdominal distention and blood in stool  Endocrine: Negative for heat intolerance and polyphagia  Genitourinary: Negative for flank pain and urgency  Musculoskeletal: Negative for neck pain and neck stiffness  Skin: Negative for color change and wound  Allergic/Immunologic: Negative for food allergies and immunocompromised state  Neurological: Negative for seizures and facial asymmetry  Hematological: Negative for adenopathy  Does not bruise/bleed easily  Psychiatric/Behavioral: Negative for self-injury and suicidal ideas           PAST MEDICAL HISTORY:  Past Medical History:   Diagnosis Date   • Chronic kidney disease    • COVID-19 03/2020   • Hypertension 4/02/2020   • Liver disease    • Pneumonia 3/32/2020   • PTSD (post-traumatic stress disorder)        PAST SURGICAL HISTORY:  Past Surgical History:   Procedure Laterality Date   • FL INJECTION RIGHT HIP (NON ARTHROGRAM)  11/30/2020   • FL INJECTION RIGHT HIP (NON ARTHROGRAM)  8/4/2021   • FL INJECTION RIGHT HIP (NON ARTHROGRAM)  12/27/2021   • FL INJECTION RIGHT HIP (NON ARTHROGRAM)  4/25/2022   • NO PAST SURGERIES         SOCIAL HISTORY:  Social History     Substance and Sexual Activity   Alcohol Use Not Currently     Social History     Substance and Sexual Activity   Drug Use Never     Social History     Tobacco Use   Smoking Status Former   • Packs/day: 2 00   • Years: 30 00   • Pack years: 60 00   • Types: Cigarettes   • Start date: 82   • Quit date: 04/2020   • Years since quitting: 3 0   Smokeless Tobacco Never       FAMILY HISTORY:  Family History   Problem Relation Age of Onset   • Heart disease Mother    • Coronary artery disease Mother    • Heart failure Mother    • Sudden death Father    • No Known Problems Son    • No Known Problems Daughter    • No Known Problems Maternal Grandmother    • No Known Problems Maternal Grandfather    • No Known Problems Paternal Grandmother    • No Known Problems Paternal Grandfather    • No Known Problems Daughter    • Kidney disease Brother    • Cancer Brother        MEDICATIONS:    Current Outpatient Medications:   •  albuterol (2 5 mg/3 mL) 0 083 % nebulizer solution, INHALE 1 VIAL BY NEBULIZATION EVERY 6 (SIX) HOURS AS NEEDED FOR WHEEZING OR SHORTNESS OF BREATH, Disp: 375 mL, Rfl: 2  •  amLODIPine (NORVASC) 2 5 mg tablet, TAKE 1 TABLET (2 5 MG TOTAL) BY MOUTH DAILY AFTER DINNER, Disp: , Rfl:   •  donepezil (ARICEPT) 10 mg tablet, TAKE 1 TABLET DAILY AT BEDTIME TO START AFTER FINISHING ARICEPT 5 MG ONCE A DAY FOR 1 MONTH , Disp: 90 tablet, Rfl: 1  •  DULoxetine (CYMBALTA) 60 mg delayed release capsule, TAKE 1 CAPSULE BY MOUTH EVERY MORNING, Disp: 90 capsule, Rfl: 0  •  gabapentin (NEURONTIN) 100 mg capsule, Take 1 capsule (100 mg total) by mouth 3 (three) times a day, Disp: 90 capsule, Rfl: 2  •  ipratropium-albuterol (DUO-NEB) 0 5-2 5 mg/3 mL nebulizer solution, Take 3 mL by nebulization 4 (four) times a day, Disp: 75 mL, Rfl: 5  •  Multiple Vitamins-Minerals (MULTIVITAMIN ADULT PO), Take 1 tablet by mouth daily, Disp: , Rfl:   •  prazosin (MINIPRESS) 1 mg capsule, "Take 1 capsule (1 mg total) by mouth daily at bedtime, Disp: 30 capsule, Rfl: 0  •  Pulmicort Flexhaler 180 MCG/ACT inhaler, TAKE 2 PUFFS BY MOUTH TWICE A DAY, Disp: 1 each, Rfl: 2  •  rOPINIRole (REQUIP) 3 mg tablet, TAKE 1 TABLET (3 MG TOTAL) BY MOUTH DAILY AT BEDTIME, Disp: 90 tablet, Rfl: 0  •  sildenafil (VIAGRA) 50 MG tablet, Take 1 tablet (50 mg total) by mouth daily as needed for erectile dysfunction, Disp: 10 tablet, Rfl: 0  •  tiZANidine (ZANAFLEX) 2 mg tablet, TAKE 1 TABLET BY MOUTH DAILY AT BEDTIME, Disp: 90 tablet, Rfl: 1  •  Ventolin  (90 Base) MCG/ACT inhaler, Inhale 2 puffs every 6 (six) hours as needed for wheezing, Disp: 18 g, Rfl: 3    PHYSICAL EXAM:  Vitals:    04/26/23 1257   BP: 130/80   BP Location: Right arm   Patient Position: Sitting   Pulse: 90   Resp: 16   Temp: 98 5 °F (36 9 °C)   TempSrc: Temporal   SpO2: 98%   Weight: 117 kg (258 lb 12 8 oz)   Height: 5' 8\" (1 727 m)     Body mass index is 39 35 kg/m²  Physical Exam  Constitutional:       General: He is not in acute distress  Appearance: He is well-developed  HENT:      Head: Normocephalic  Eyes:      General: No scleral icterus  Conjunctiva/sclera: Conjunctivae normal    Neck:      Vascular: No JVD  Cardiovascular:      Rate and Rhythm: Normal rate  Heart sounds: Normal heart sounds  Pulmonary:      Effort: Pulmonary effort is normal       Breath sounds: No wheezing  Abdominal:      Palpations: Abdomen is soft  Tenderness: There is no abdominal tenderness  Musculoskeletal:         General: Normal range of motion  Cervical back: Neck supple  Skin:     General: Skin is warm  Findings: No rash  Neurological:      Mental Status: He is alert and oriented to person, place, and time     Psychiatric:         Behavior: Behavior normal          LAB RESULTS:  Results for orders placed or performed during the hospital encounter of 03/28/23   Tissue Exam   Result Value Ref Range    Case Report   " Surgical Pathology Report                         Case: [id number]                                   Authorizing Provider:  Shantel Ardon DO          Collected:           03/28/2023 0745              Ordering Location:      [facility]       Received:            03/28/2023 [facility]                                                             Pathologist:           Dorian Valle MD                                                         Specimens:   A) - Polyp, Colorectal, cecum                                                                       B) - Polyp, Colorectal, ascending                                                                   C) - Polyp, Colorectal, transverse                                                                  D) - Polyp, Colorectal, sigmoid                                                            Final Diagnosis       A  Colon, cecum, polyp:     - Tubular adenoma, fragments      - No high grade dysplasia or carcinoma identified  B   Colon, ascending, polyp:     - Tubular adenoma  - No high grade dysplasia or carcinoma identified  C   Colon, transverse, polyp:     - Tubular adenoma, fragments      - No high grade dysplasia or carcinoma identified  D   Colon, sigmoid, polyp:     - Fecal material only  Additional Information       All reported additional testing was performed with appropriately reactive controls  These tests were developed and their performance characteristics determined by [facility] or appropriate performing facility, though some tests may be performed on tissues which have not been validated for performance characteristics (such as staining performed on alcohol exposed cell blocks and decalcified tissues)  Results should be interpreted with caution and in the context of the patients’ clinical condition   These tests may not be cleared or approved by the [place]  "Food and Drug Administration, though the FDA has determined that such clearance or approval is not necessary  These tests are used for clinical purposes and they should not be regarded as investigational or for research  This laboratory has been approved by CLIA 88, designated as a high-complexity laboratory and is qualified to perform these tests     - Interpretation performed at [facility], [address] [place], [place] [address]      Synoptic Checklist          COLON/RECTUM POLYP FORM - GI - All Specimens          :    Adenoma(s)      Gross Description       A  The specimen is received in formalin, labeled with the patient's name and hospital number, and is designated \" polyp cecum\"  The specimen consists of 3 tan soft tissue fragments ranging from 0 3 to 0 4 cm in greatest dimension  Entirely submitted  One screened cassette  B  The specimen is received in formalin, labeled with the patient's name and hospital number, and is designated \" polyp colorectal ascending\"  The specimen consists of 1 tan soft tissue fragment measuring 0 4 cm in greatest dimension  Bisected and entirely submitted in one screen cassette  C  The specimen is received in formalin, labeled with the patient's name and hospital number, and is designated \" polyp colorectal transverse\"  The specimen consists of 2 tan soft tissue fragments measuring 0 6 and 0 8 cm in greatest dimension  The fragments are serially sectioned and entirely submitted in one screen cassette  D  The specimen is received in formalin, labeled with the patient's name and hospital number, and is designated \" polyp sigmoid\"  The specimen consists of 2 tan-white mucinous tissue fragments measuring 0 6 and 0 7 cm in greatest dimension  Entirely submitted  One screened cassette  Note: The estimated total formalin fixation time based upon information provided by the submitting clinician and the standard processing schedule is under 72 hours   Arcadio        " "Clinical Information Cold bx polypectomy        ASSESSMENT and PLAN:      Stage 3a chronic kidney disease ([facility] [address] )  Lab Results   Component Value Date    EGFR 42 10/20/2022    EGFR 51 07/18/2022    EGFR 46 04/13/2022    CREATININE 1 65 (H) 10/20/2022    CREATININE 1 43 (H) 07/18/2022    CREATININE 1 55 (H) 04/13/2022   No lab work today as patient did not do it  Advised to get the blood work done for monitoring of the CKD    Asymptomatic and progressive nature of kidney disease discussed with the patient advise hydration and avoiding nephrotoxic medication    Chronic kidney disease-mineral and bone disorder  Lab Results   Component Value Date    EGFR 42 10/20/2022    EGFR 51 07/18/2022    EGFR 46 04/13/2022    CREATININE 1 65 (H) 10/20/2022    CREATININE 1 43 (H) 07/18/2022    CREATININE 1 55 (H) 04/13/2022   PTH and phosphorus along with vitamin D are within acceptable range and will continue to monitor    Ob[facility], morbid ([facility] )  Need to reduce weight and he is well aware of it    Grade I diastolic dysfunction  Patient seems to be asymptomatic  We will continue to monitor      Everything discussed with the patient  I will see him back in 6 months  We will get blood test now and in 6 months        Portions of the record may have been created with voice recognition software  Occasional wrong word or \"sound a like\" substitutions may have occurred due to the inherent limitations of voice recognition software  Read the chart carefully and recognize, using context, where substitutions have occurred  If you have any questions, please contact the dictating provider     "

## 2023-04-26 NOTE — ASSESSMENT & PLAN NOTE
Lab Results   Component Value Date    EGFR 42 10/20/2022    EGFR 51 07/18/2022    EGFR 46 04/13/2022    CREATININE 1 65 (H) 10/20/2022    CREATININE 1 43 (H) 07/18/2022    CREATININE 1 55 (H) 04/13/2022   No lab work today as patient did not do it    Advised to get the blood work done for monitoring of the CKD    Asymptomatic and progressive nature of kidney disease discussed with the patient advise hydration and avoiding nephrotoxic medication

## 2023-04-27 NOTE — PROGRESS NOTES
Patient was contacted to discuss shortness of breath and respiratory status  [place] John needs a follow-up appt to review the results of his PFT  His walk test was done yesterday as well and shows that he did not desaturate throughout 6 minutes  He will need a discontinue order for the O2 in order to stop billing from [facility]  RT to inform [facility]  King John remains tobacco free and was encouraged to continue his efforts  He was reminded to begin doing some light exercises for strength and stamina  He has a sore hip that is bothersome, and is awaiting a call from the Pain and Spine Surgery Coordinator to schedule an injection  His daughter Jennifer Gregg is the contact for appointments  This message will be routed to PCP and Pulmonary for follow-up  [x] [facility]  [address]  [place]: [phone]   F: [phone]    Physical Therapy Daily Treatment Note    Date:  2023  Patient Name:  Sonido Rosales    :  1958  MRN: [id number]  Physician: Kaye Quiñonez MD           Insurance: [facility]. Auth required after 30 visits. Iontophoresis and e-stim not covered  Medical Diagnosis: M25.561 (ICD-10-CM) - Right knee pain, unspecified chronicity          Rehab Codes: M25.561, R53.1, R26.9  Onset Date: DOS 22                     Next 's appt: PRN  Visit# / total visits:    Cancels/No Shows:     Subjective: States increased pain this whole week. States no increased pain after Monday's appointment. Pain:  [x] Yes  [] No Location: (L) > (R)Hip/buttock; R medal knee down medial proximal tibia, posterior knee    Pain Rating: (0-10 scale) 7-9/10  Pain altered Tx:  [x] No  [] Yes  Action:      Objective:   [facility] Exercise Log  Aquatic,  Program- Phase 1    Date of Eval:   23                             Primary PT:  Leny Bills, PT      Date 23   Visit # 12/12 13/20 14/20 15/20 16/20   Walk F/L/R 2 Laps 2 Laps 2 Laps 2 Laps 2 Laps   Marching 10x Add Lap 2 Laps 2 Laps 2 Laps   Squats 10x3\" 15x 15x 15x3\" 15x3\"   Step-Ups F/L Low 10x F Tall 15x Tall 15x Tall 15x Tall 15x   Step Down F/L        Heel-toe raises 10x 15x 15x 15x 15x   SLR F/L/R 10x 15x 15x 15x Steamboats  15x   HS curl 10x 15x 15x 15x 15x   Hip/Knee Flex/Ext 10x 15x 15x 15x 15x   F/L Lunges     Tall 10x           Kickboard Ex.  Small Small Small Small Small   Iso Abd. 10x5\" 10x5\" 10x5\" 10x5\" 10x5\"   Push-pull  10x 10x 10x 10x   Paddling                UE Format:        Horiz Abd/Add        IR/ER (wipers)        Alt Flex/Ext        Alt Press Down        Abd/Add                Deep Water: 1 Noodle 1 Noodle 1 Noodle 1 Noodle 1 Noodle   Hang 5' 5'

## 2023-04-28 DIAGNOSIS — F43.10 PTSD (POST-TRAUMATIC STRESS DISORDER): ICD-10-CM

## 2023-05-01 ENCOUNTER — SOCIAL WORK (OUTPATIENT)
Dept: BEHAVIORAL/MENTAL HEALTH CLINIC | Facility: CLINIC | Age: 68
End: 2023-05-01

## 2023-05-01 DIAGNOSIS — F43.11 ACUTE POST-TRAUMATIC STRESS DISORDER: ICD-10-CM

## 2023-05-01 DIAGNOSIS — F33.2 MAJOR DEPRESSIVE DISORDER, RECURRENT SEVERE WITHOUT PSYCHOTIC FEATURES (HCC): ICD-10-CM

## 2023-05-01 DIAGNOSIS — F43.10 PTSD (POST-TRAUMATIC STRESS DISORDER): ICD-10-CM

## 2023-05-01 DIAGNOSIS — R41.3 MEMORY DIFFICULTY: Primary | ICD-10-CM

## 2023-05-01 NOTE — PSYCH
"Behavioral Health Psychotherapy Progress Note    Psychotherapy Provided: Individual Psychotherapy     1  Memory difficulty        2  Acute post-traumatic stress disorder        3  Major depressive disorder, recurrent severe without psychotic features ([address] )        4  PTSD (post-traumatic stress disorder)            Goals addressed in session: Goal 1 and Goal 2     DATA: Katja Haney discussed his daughter who cheated on his son in law won't talk to Katja Haney because she feels Katja Haney took sides  Katja Haney is dealing with multiple health issues due to covid  He is very depressed and anxious over his family situation  He denies suicidality  He still has sleep issues  He discussed all the fall out with what his daughter did  I provided support along with the following modalities  During this session, this clinician used the following therapeutic modalities: Client-centered Therapy, Cognitive Behavioral Therapy, Mindfulness-based Strategies and Supportive Psychotherapy    Substance Abuse was not addressed during this session  If the client is diagnosed with a co-occurring substance use disorder, please indicate any changes in the frequency or amount of use: n/a  Stage of change for addressing substance use diagnoses: No substance use/Not applicable    ASSESSMENT:  Drea Caro presents with a Anxious and Depressed mood  his affect is anxious and depressed  , which is congruent, with his mood and the content of the session  The client has made progress on their goals  However family stressors are causing more issues  Drea Caro presents with a none risk of suicide, none risk of self-harm, and none risk of harm to others  For any risk assessment that surpasses a \"low\" rating, a safety plan must be developed  A safety plan was indicated: no  If yes, describe in detail n/a    PLAN: Between sessions, Drea Caro will use mindfulness and CBT   At the next session, the therapist will use Client-centered Therapy, Cognitive " Behavioral Therapy, Mindfulness-based Strategies and Supportive Psychotherapy to address issues and symptoms as they may arise       Behavioral Health Treatment Plan and Discharge Planning: Corolla Silvano is aware of and agrees to continue to work on their treatment plan  They have identified and are working toward their discharge goals   yes    Visit start and stop times:    05/01/23  Start Time: 1510  Stop Time: 1600  Total Visit Time: 50 minutes

## 2023-05-02 ENCOUNTER — APPOINTMENT (OUTPATIENT)
Dept: LAB | Facility: HOSPITAL | Age: 68
End: 2023-05-02
Attending: INTERNAL MEDICINE

## 2023-05-02 ENCOUNTER — TELEPHONE (OUTPATIENT)
Dept: PSYCHIATRY | Facility: CLINIC | Age: 68
End: 2023-05-02

## 2023-05-02 DIAGNOSIS — N18.31 STAGE 3A CHRONIC KIDNEY DISEASE (HCC): ICD-10-CM

## 2023-05-02 DIAGNOSIS — E83.9 CHRONIC KIDNEY DISEASE-MINERAL AND BONE DISORDER: ICD-10-CM

## 2023-05-02 DIAGNOSIS — N18.9 CHRONIC KIDNEY DISEASE-MINERAL AND BONE DISORDER: ICD-10-CM

## 2023-05-02 DIAGNOSIS — M89.9 CHRONIC KIDNEY DISEASE-MINERAL AND BONE DISORDER: ICD-10-CM

## 2023-05-02 LAB
25(OH)D3 SERPL-MCNC: 30.7 NG/ML (ref 30–100)
ANION GAP SERPL CALCULATED.3IONS-SCNC: 7 MMOL/L (ref 4–13)
BASOPHILS # BLD AUTO: 0.08 THOUSANDS/ΜL (ref 0–0.1)
BASOPHILS NFR BLD AUTO: 1 % (ref 0–1)
BILIRUB UR QL STRIP: NEGATIVE
BUN SERPL-MCNC: 16 MG/DL (ref 5–25)
CALCIUM SERPL-MCNC: 9.4 MG/DL (ref 8.4–10.2)
CHLORIDE SERPL-SCNC: 108 MMOL/L (ref 96–108)
CLARITY UR: CLEAR
CO2 SERPL-SCNC: 24 MMOL/L (ref 21–32)
COLOR UR: NORMAL
CREAT SERPL-MCNC: 1.22 MG/DL (ref 0.6–1.3)
CREAT UR-MCNC: 118.3 MG/DL
EOSINOPHIL # BLD AUTO: 0.46 THOUSAND/ΜL (ref 0–0.61)
EOSINOPHIL NFR BLD AUTO: 5 % (ref 0–6)
ERYTHROCYTE [DISTWIDTH] IN BLOOD BY AUTOMATED COUNT: 14.9 % (ref 11.6–15.1)
GFR SERPL CREATININE-BSD FRML MDRD: 61 ML/MIN/1.73SQ M
GLUCOSE P FAST SERPL-MCNC: 101 MG/DL (ref 65–99)
GLUCOSE UR STRIP-MCNC: NEGATIVE MG/DL
HCT VFR BLD AUTO: 47.5 % (ref 36.5–49.3)
HGB BLD-MCNC: 15.5 G/DL (ref 12–17)
HGB UR QL STRIP.AUTO: NEGATIVE
IMM GRANULOCYTES # BLD AUTO: 0.09 THOUSAND/UL (ref 0–0.2)
IMM GRANULOCYTES NFR BLD AUTO: 1 % (ref 0–2)
KETONES UR STRIP-MCNC: NEGATIVE MG/DL
LEUKOCYTE ESTERASE UR QL STRIP: NEGATIVE
LYMPHOCYTES # BLD AUTO: 1.69 THOUSANDS/ΜL (ref 0.6–4.47)
LYMPHOCYTES NFR BLD AUTO: 19 % (ref 14–44)
MCH RBC QN AUTO: 28.9 PG (ref 26.8–34.3)
MCHC RBC AUTO-ENTMCNC: 32.6 G/DL (ref 31.4–37.4)
MCV RBC AUTO: 89 FL (ref 82–98)
MONOCYTES # BLD AUTO: 0.83 THOUSAND/ΜL (ref 0.17–1.22)
MONOCYTES NFR BLD AUTO: 9 % (ref 4–12)
NEUTROPHILS # BLD AUTO: 5.67 THOUSANDS/ΜL (ref 1.85–7.62)
NEUTS SEG NFR BLD AUTO: 65 % (ref 43–75)
NITRITE UR QL STRIP: NEGATIVE
NRBC BLD AUTO-RTO: 0 /100 WBCS
PH UR STRIP.AUTO: 5 [PH]
PHOSPHATE SERPL-MCNC: 2.8 MG/DL (ref 2.3–4.1)
PLATELET # BLD AUTO: 216 THOUSANDS/UL (ref 149–390)
PMV BLD AUTO: 10.9 FL (ref 8.9–12.7)
POTASSIUM SERPL-SCNC: 3.8 MMOL/L (ref 3.5–5.3)
PROT UR STRIP-MCNC: NEGATIVE MG/DL
PROT UR-MCNC: 8 MG/DL
PROT/CREAT UR: 0.07 MG/G{CREAT} (ref 0–0.1)
PTH-INTACT SERPL-MCNC: 44.8 PG/ML (ref 18.4–80.1)
RBC # BLD AUTO: 5.36 MILLION/UL (ref 3.88–5.62)
SODIUM SERPL-SCNC: 139 MMOL/L (ref 135–147)
SP GR UR STRIP.AUTO: 1.02 (ref 1–1.03)
UROBILINOGEN UR STRIP-ACNC: <2 MG/DL
WBC # BLD AUTO: 8.82 THOUSAND/UL (ref 4.31–10.16)

## 2023-05-02 NOTE — TELEPHONE ENCOUNTER
Writer left a voice message to inform patient that their 6/01 appointment has been cancelled and provided office number to call for rescheduling  Please schedule in next available slot  Thank you

## 2023-05-05 NOTE — TELEPHONE ENCOUNTER
Patient contacted the office to schedule a follow up visit with provider   Patient is now scheduled for 7/3/2023  at 3pm

## 2023-05-08 ENCOUNTER — TELEMEDICINE (OUTPATIENT)
Dept: PSYCHIATRY | Facility: CLINIC | Age: 68
End: 2023-05-08

## 2023-05-08 DIAGNOSIS — F43.10 PTSD (POST-TRAUMATIC STRESS DISORDER): Primary | ICD-10-CM

## 2023-05-08 DIAGNOSIS — F32.A DEPRESSION, UNSPECIFIED DEPRESSION TYPE: ICD-10-CM

## 2023-05-08 RX ORDER — PRAZOSIN HYDROCHLORIDE 1 MG/1
1 CAPSULE ORAL
Qty: 90 CAPSULE | Refills: 0 | Status: SHIPPED | OUTPATIENT
Start: 2023-05-08

## 2023-05-08 RX ORDER — DULOXETIN HYDROCHLORIDE 60 MG/1
60 CAPSULE, DELAYED RELEASE ORAL EVERY MORNING
Qty: 90 CAPSULE | Refills: 0 | Status: SHIPPED | OUTPATIENT
Start: 2023-05-08
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
## 2023-05-09 ENCOUNTER — OFFICE VISIT (OUTPATIENT)
Age: 68
End: 2023-05-09

## 2023-05-09 VITALS
SYSTOLIC BLOOD PRESSURE: 140 MMHG | TEMPERATURE: 98.6 F | WEIGHT: 260 LBS | OXYGEN SATURATION: 93 % | HEART RATE: 77 BPM | HEIGHT: 68 IN | BODY MASS INDEX: 39.4 KG/M2 | DIASTOLIC BLOOD PRESSURE: 72 MMHG

## 2023-05-09 DIAGNOSIS — F17.211 CIGARETTE NICOTINE DEPENDENCE IN REMISSION: ICD-10-CM

## 2023-05-09 DIAGNOSIS — J43.2 CENTRILOBULAR EMPHYSEMA (HCC): ICD-10-CM

## 2023-05-09 DIAGNOSIS — R06.09 DOE (DYSPNEA ON EXERTION): Primary | ICD-10-CM

## 2023-05-09 DIAGNOSIS — U09.9 COVID-19 LONG HAULER: ICD-10-CM

## 2023-05-09 NOTE — PROGRESS NOTES
"Assessment/Plan:   Diagnoses and all orders for this visit:    WALTERS (dyspnea on exertion)    Centrilobular emphysema ([address] )    COVID-19 long hauler    Cigarette nicotine dependence in remission  -     CT lung screening program; Future    Other orders  -     umeclidinium-vilanterol 62 5-25 mcg/actuation inhaler; Inhale 1 puff daily    Patient is here today for follow-up  Overall stable with his breathing, has chronic dyspnea on exertion which is likely multifactorial related to his COPD, history of COVID, deconditioning  He continues with his Anoro daily, albuterol as needed  He is due this month for a CT lung screening which will be ordered  Most recent PFT in June 2022 showed restriction with moderately decreased DLCO  He will follow-up with us in about 6 months or sooner if necessary  Return in about 6 months (around 11/9/2023)  All questions are answered to the patient's satisfaction and understanding  He verbalizes understanding  He is encouraged to call with any further questions or concerns  Portions of the record may have been created with voice recognition software  Occasional wrong word or \"sound a like\" substitutions may have occurred due to the inherent limitations of voice recognition software  Read the chart carefully and recognize, using context, where substitutions have occurred  Electronically Signed by Madeline Mccauley PA-C    ______________________________________________________________________    Chief Complaint:   Chief Complaint   Patient presents with   • Follow-up       Patient ID: Patel Sanches is a 74 y o  y o  male has a past medical history of Chronic kidney disease, COVID-19 (03/2020), Hypertension (4/02/2020), Liver disease, Pneumonia (3/32/2020), and PTSD (post-traumatic stress disorder)  5/9/2023  Patient presents today for follow-up visit    Patient is a 74-year-old male former smoker who worked as an  at [employer] with history of COVID-19 " infection in April 2020 requiring intubation, treated with Plaquenil and Zithromax as well as tocilzumab  He did require oxygen upon discharge but has since been titrated off O2      He is here today for follow-up  He continues to remain stable with his breathing, using the Anoro daily, albuterol as needed  He continues to have chronic dyspnea on exertion which has been stable for some time  Also continues to have bilateral hip pain that sometimes does limit his movement  primary symptoms  Associated symptoms include myalgias  Pertinent negatives include no chest pain, fever, headaches or sore throat  Review of Systems   Constitutional: Negative  Negative for appetite change and fever  HENT: Negative  Negative for ear pain, postnasal drip, rhinorrhea, sneezing, sore throat and trouble swallowing  Respiratory: Positive for shortness of breath  Cardiovascular: Negative  Negative for chest pain  Gastrointestinal: Negative  Genitourinary: Negative  Musculoskeletal: Positive for myalgias  Skin: Negative  Allergic/Immunologic: Negative  Neurological: Negative  Negative for headaches  Psychiatric/Behavioral: Negative  Smoking history: He reports that he quit smoking about 3 years ago  His smoking use included cigarettes  He started smoking about 50 years ago  He has a 60 00 pack-year smoking history   He has never used smokeless tobacco     The following portions of the patient's history were reviewed and updated as appropriate: allergies, current medications, past family history, past medical history, past social history, past surgical history and problem list     Immunization History   Administered Date(s) Administered   • COVID-19 PFIZER VACCINE 0 3 ML IM 04/15/2021, 05/08/2021, 02/04/2022   • Influenza, high dose seasonal 0 7 mL 12/22/2022   • Influenza, recombinant, quadrivalent,injectable, preservative free 10/05/2020   • Pneumococcal Conjugate 13-Valent 10/05/2020 "  • Pneumococcal Conjugate Vaccine 20-valent (Pcv20), Polysace 12/22/2022     Current Outpatient Medications   Medication Sig Dispense Refill   • albuterol (2 5 mg/3 mL) 0 083 % nebulizer solution INHALE 1 VIAL BY NEBULIZATION EVERY 6 (SIX) HOURS AS NEEDED FOR WHEEZING OR SHORTNESS OF BREATH 375 mL 2   • amLODIPine (NORVASC) 2 5 mg tablet TAKE 1 TABLET (2 5 MG TOTAL) BY MOUTH DAILY AFTER DINNER     • donepezil (ARICEPT) 10 mg tablet TAKE 1 TABLET DAILY AT BEDTIME TO START AFTER FINISHING ARICEPT 5 MG ONCE A DAY FOR 1 MONTH  90 tablet 1   • DULoxetine (CYMBALTA) 60 mg delayed release capsule Take 1 capsule (60 mg total) by mouth every morning 90 capsule 0   • gabapentin (NEURONTIN) 100 mg capsule Take 1 capsule (100 mg total) by mouth 3 (three) times a day 90 capsule 2   • ipratropium-albuterol (DUO-NEB) 0 5-2 5 mg/3 mL nebulizer solution Take 3 mL by nebulization 4 (four) times a day 75 mL 5   • Multiple Vitamins-Minerals (MULTIVITAMIN ADULT PO) Take 1 tablet by mouth daily     • prazosin (MINIPRESS) 1 mg capsule Take 1 capsule (1 mg total) by mouth daily at bedtime 90 capsule 0   • rOPINIRole (REQUIP) 3 mg tablet TAKE 1 TABLET (3 MG TOTAL) BY MOUTH DAILY AT BEDTIME 90 tablet 0   • sildenafil (VIAGRA) 50 MG tablet Take 1 tablet (50 mg total) by mouth daily as needed for erectile dysfunction 10 tablet 0   • tiZANidine (ZANAFLEX) 2 mg tablet TAKE 1 TABLET BY MOUTH DAILY AT BEDTIME 90 tablet 1   • umeclidinium-vilanterol 62 5-25 mcg/actuation inhaler Inhale 1 puff daily     • Ventolin  (90 Base) MCG/ACT inhaler Inhale 2 puffs every 6 (six) hours as needed for wheezing 18 g 3     No current facility-administered medications for this visit  Allergies: Tetracycline    Objective:  Vitals:    05/09/23 1343   BP: 140/72   Pulse: 77   Temp: 98 6 °F (37 °C)   SpO2: 93%   Weight: 118 kg (260 lb)   Height: 5' 8\" (1 727 m)   Oxygen Therapy  SpO2: 93 %      Wt Readings from Last 3 Encounters:   05/09/23 118 kg (260 lb) " 04/26/23 117 kg (258 lb 12 8 oz)   03/28/23 115 kg (254 lb 6 4 oz)     Body mass index is 39 53 kg/m²  Physical Exam  Vitals reviewed  Constitutional:       General: He is not in acute distress  Appearance: Normal appearance  He is obese  He is not ill-appearing  HENT:      Head: Normocephalic and atraumatic  Mouth/Throat:      Pharynx: Oropharynx is clear  Eyes:      Conjunctiva/sclera: Conjunctivae normal    Cardiovascular:      Rate and Rhythm: Normal rate and regular rhythm  Pulmonary:      Effort: Pulmonary effort is normal  No respiratory distress  Breath sounds: Normal breath sounds  No decreased breath sounds, wheezing, rhonchi or rales  Abdominal:      General: Abdomen is flat  There is no distension  Musculoskeletal:         General: Normal range of motion  Cervical back: Normal range of motion  Right lower leg: No edema  Left lower leg: No edema  Skin:     General: Skin is warm and dry  Neurological:      Mental Status: He is alert and oriented to person, place, and time  Psychiatric:         Mood and Affect: Mood normal          Behavior: Behavior normal          Lab Review:   Lab Results   Component Value Date    K 3 8 05/02/2023     05/02/2023    CO2 24 05/02/2023    BUN 16 05/02/2023    CREATININE 1 22 05/02/2023    CALCIUM 9 4 05/02/2023     Lab Results   Component Value Date    WBC 8 82 05/02/2023    HGB 15 5 05/02/2023    HCT 47 5 05/02/2023    MCV 89 05/02/2023     05/02/2023       Diagnostics:  I have personally reviewed pertinent reports  Reviewed prior CT scan and PFT  Office Spirometry Results:     ESS: Total score: 0  No results found    Answers for HPI/ROS submitted by the patient on 5/2/2023  Chronicity: chronic  When did you first notice your symptoms?: more than 1 year ago  How often do your symptoms occur?: 2 to 4 times per day  Since you first noticed this problem, how has it changed?: unchanged  Do you have shortness of breath that occurs with effort or exertion?: Yes  Do you have ear congestion?: No  Do you have heartburn?: No  Do you have fatigue?: No  Do you have nasal congestion?: Yes  Do you have shortness of breath when lying flat?: No  Do you have shortness of breath when you wake up?: Yes  Do you have sweats?: No  Have you experienced weight loss?: No  Which of the following makes your symptoms worse?: any activity, change in weather, climbing stairs, emotional stress, exercise, exposure to smoke, minimal activity, pollen  Which of the following makes your symptoms better?: ipratropium, rest

## 2023-05-14 DIAGNOSIS — R41.3 MEMORY DIFFICULTY: ICD-10-CM

## 2023-05-15 RX ORDER — DONEPEZIL HYDROCHLORIDE 10 MG/1
TABLET, FILM COATED ORAL
Qty: 90 TABLET | Refills: 1 | Status: SHIPPED | OUTPATIENT
Start: 2023-05-15

## 2023-05-19 ENCOUNTER — TELEPHONE (OUTPATIENT)
Dept: PULMONOLOGY | Facility: CLINIC | Age: 68
End: 2023-05-19

## 2023-05-19 DIAGNOSIS — R06.02 SHORTNESS OF BREATH: ICD-10-CM

## 2023-05-19 DIAGNOSIS — G44.52 NEW DAILY PERSISTENT HEADACHE: ICD-10-CM

## 2023-05-19 DIAGNOSIS — J43.2 CENTRILOBULAR EMPHYSEMA (HCC): Primary | ICD-10-CM

## 2023-05-19 NOTE — TELEPHONE ENCOUNTER
Patient lvm asking for the doctor to send a script for his Anoro inhaler to the [facility] pharmacy  Please advise

## 2023-05-20 DIAGNOSIS — M25.551 RIGHT HIP PAIN: ICD-10-CM

## 2023-05-20 DIAGNOSIS — M25.552 LEFT HIP PAIN: Primary | ICD-10-CM

## 2023-05-22 RX ORDER — ALBUTEROL SULFATE 90 UG/1
AEROSOL, METERED RESPIRATORY (INHALATION)
Qty: 18 G | Refills: 3 | Status: SHIPPED | OUTPATIENT
Start: 2023-05-22

## 2023-05-23 ENCOUNTER — TELEPHONE (OUTPATIENT)
Age: 68
End: 2023-05-23

## 2023-05-23 ENCOUNTER — APPOINTMENT (OUTPATIENT)
Dept: RADIOLOGY | Facility: CLINIC | Age: 68
End: 2023-05-23

## 2023-05-23 ENCOUNTER — OFFICE VISIT (OUTPATIENT)
Dept: OBGYN CLINIC | Facility: CLINIC | Age: 68
End: 2023-05-23

## 2023-05-23 VITALS
HEIGHT: 68 IN | SYSTOLIC BLOOD PRESSURE: 129 MMHG | WEIGHT: 254.6 LBS | BODY MASS INDEX: 38.58 KG/M2 | DIASTOLIC BLOOD PRESSURE: 78 MMHG | HEART RATE: 84 BPM

## 2023-05-23 DIAGNOSIS — M25.552 LEFT HIP PAIN: ICD-10-CM

## 2023-05-23 DIAGNOSIS — M16.11 PRIMARY OSTEOARTHRITIS OF RIGHT HIP: Primary | ICD-10-CM

## 2023-05-23 DIAGNOSIS — M16.12 PRIMARY OSTEOARTHRITIS OF ONE HIP, LEFT: ICD-10-CM

## 2023-05-23 DIAGNOSIS — M25.551 RIGHT HIP PAIN: ICD-10-CM

## 2023-05-23 NOTE — PROGRESS NOTES
Orthopaedics Office Visit - Established Patient Visit    ASSESSMENT/PLAN:    Assessment:   Bilateral hip osteoarthritis   R>L    Plan:   · X-rays were performed in the office and reviewed   · May continue with FL guided intra-articular CSI's as long as they continue to be beneficial for him   · Briefly discussed a total hip arthroplasty with the understanding he would be unable to undergo a hip replacement for aprox  3 months after intra-articular CSI's    · A referral was provided to Dr Harvey Smith for bilateral FL guided intra-articular CSI's   · Follow up in 3 months time for re-evaluation     To Do Next Visit:  Re-evaluation     _____________________________________________________  CHIEF COMPLAINT:  Chief Complaint   Patient presents with   • Right Hip - Follow-up         SUBJECTIVE:  Barbara Zapata is a 74 y o  male who presents to the office for a follow up regarding his right hip  He underwent a repeat FL guided right hip intra-articular CSI on 4/25/22, which was beneficial for him  He notes that the CSI's provide him at least 2 months of pain relief, if not longer  This was a works compensation case  He notes pain to his right groin  Pain has been ongoing for a while but notes he has some family stuff going on  He notes groin pain is also started on the left  He is taking Tylenol for pain control       PAST MEDICAL HISTORY:  Past Medical History:   Diagnosis Date   • Chronic kidney disease    • COVID-19 03/2020   • Hypertension 4/02/2020   • Liver disease    • Pneumonia 3/32/2020   • PTSD (post-traumatic stress disorder)        PAST SURGICAL HISTORY:  Past Surgical History:   Procedure Laterality Date   • FL INJECTION RIGHT HIP (NON ARTHROGRAM)  11/30/2020   • FL INJECTION RIGHT HIP (NON ARTHROGRAM)  8/4/2021   • FL INJECTION RIGHT HIP (NON ARTHROGRAM)  12/27/2021   • FL INJECTION RIGHT HIP (NON ARTHROGRAM)  4/25/2022   • NO PAST SURGERIES         FAMILY HISTORY:  Family History   Problem Relation Age of Onset • Heart disease Mother    • Coronary artery disease Mother    • Heart failure Mother    • Sudden death Father    • No Known Problems Son    • No Known Problems Daughter    • No Known Problems Maternal Grandmother    • No Known Problems Maternal Grandfather    • No Known Problems Paternal Grandmother    • No Known Problems Paternal Grandfather    • No Known Problems Daughter    • Kidney disease Brother    • Cancer Brother        SOCIAL HISTORY:  Social History     Tobacco Use   • Smoking status: Former     Packs/day: 2 00     Years: 30 00     Pack years: 60 00     Types: Cigarettes     Start date: 82     Quit date: 04/2020     Years since quitting: 3 1   • Smokeless tobacco: Never   Vaping Use   • Vaping Use: Former   • Quit date: 4/1/2020   • Substances: Nicotine, Flavoring   Substance Use Topics   • Alcohol use: Not Currently   • Drug use: Never       MEDICATIONS:    Current Outpatient Medications:   •  albuterol (2 5 mg/3 mL) 0 083 % nebulizer solution, INHALE 1 VIAL BY NEBULIZATION EVERY 6 (SIX) HOURS AS NEEDED FOR WHEEZING OR SHORTNESS OF BREATH, Disp: 375 mL, Rfl: 2  •  amLODIPine (NORVASC) 2 5 mg tablet, TAKE 1 TABLET (2 5 MG TOTAL) BY MOUTH DAILY AFTER DINNER, Disp: , Rfl:   •  donepezil (ARICEPT) 10 mg tablet, TAKE 1 TABLET DAILY AT BEDTIME TO START AFTER FINISHING ARICEPT 5 MG ONCE A DAY FOR 1 MONTH , Disp: 90 tablet, Rfl: 1  •  DULoxetine (CYMBALTA) 60 mg delayed release capsule, Take 1 capsule (60 mg total) by mouth every morning, Disp: 90 capsule, Rfl: 0  •  gabapentin (NEURONTIN) 100 mg capsule, Take 1 capsule (100 mg total) by mouth 3 (three) times a day, Disp: 90 capsule, Rfl: 2  •  ipratropium-albuterol (DUO-NEB) 0 5-2 5 mg/3 mL nebulizer solution, Take 3 mL by nebulization 4 (four) times a day, Disp: 75 mL, Rfl: 5  •  Multiple Vitamins-Minerals (MULTIVITAMIN ADULT PO), Take 1 tablet by mouth daily, Disp: , Rfl:   •  prazosin (MINIPRESS) 1 mg capsule, Take 1 capsule (1 mg total) by mouth daily at "bedtime, Disp: 90 capsule, Rfl: 0  •  rOPINIRole (REQUIP) 3 mg tablet, TAKE 1 TABLET (3 MG TOTAL) BY MOUTH DAILY AT BEDTIME, Disp: 90 tablet, Rfl: 0  •  sildenafil (VIAGRA) 50 MG tablet, Take 1 tablet (50 mg total) by mouth daily as needed for erectile dysfunction, Disp: 10 tablet, Rfl: 0  •  tiZANidine (ZANAFLEX) 2 mg tablet, TAKE 1 TABLET BY MOUTH DAILY AT BEDTIME, Disp: 90 tablet, Rfl: 1  •  umeclidinium-vilanterol 62 5-25 mcg/actuation inhaler, Inhale 1 puff daily, Disp: 60 blister, Rfl: 5  •  Ventolin  (90 Base) MCG/ACT inhaler, INHALE 2 PUFFS EVERY 6 HOURS AS NEEDED FOR WHEEZING, Disp: 18 g, Rfl: 3    ALLERGIES:  Allergies   Allergen Reactions   • Tetracycline Rash       REVIEW OF SYSTEMS:  MSK: as noted in HPI  Neuro: WNL  Pertinent items are otherwise noted in HPI  A comprehensive review of systems was otherwise negative  LABS:  HgA1c:   Lab Results   Component Value Date    HGBA1C 5 6 04/13/2022     BMP:   Lab Results   Component Value Date    CALCIUM 9 4 05/02/2023    K 3 8 05/02/2023    CO2 24 05/02/2023     05/02/2023    BUN 16 05/02/2023    CREATININE 1 22 05/02/2023     CBC: No components found for: CBC    _____________________________________________________  PHYSICAL EXAMINATION:  Vital signs: /78   Pulse 84   Ht 5' 8\" (1 727 m)   Wt 115 kg (254 lb 9 6 oz)   BMI 38 71 kg/m²   General: No acute distress, awake and alert  Psychiatric: Mood and affect appear appropriate  HEENT: Trachea Midline, No torticollis, no apparent facial trauma  Cardiovascular: No audible murmurs;  Extremities appear perfused  Pulmonary: No audible wheezing or stridor  Skin: No open lesions; see further details (if any) below    MUSCULOSKELETAL EXAMINATION:    Extremities: Bilateral hip     No erythema, ecchymosis or edema  Pain with internal rotation   Pain with hip flexion   Hip flexion strength 5/5  Ambulates with a cane   Extremity appears warm and well perfused " _____________________________________________________  STUDIES REVIEWED:  I personally reviewed the images and interpretation is as follows:  X-rays of bilateral hips demonstrate bilateral hip osteoarthritis  No acute fracture or dislocation         PROCEDURES PERFORMED:  Procedures    Scribe Attestation    I,:  Harrison Martines am acting as a scribe while in the presence of the attending physician :       I,:  Karri Ayers MD personally performed the services described in this documentation    as scribed in my presence :

## 2023-05-23 NOTE — H&P (VIEW-ONLY)
Orthopaedics Office Visit - Established Patient Visit    ASSESSMENT/PLAN:    Assessment:   Bilateral hip osteoarthritis   R>L    Plan:   · X-rays were performed in the office and reviewed   · May continue with FL guided intra-articular CSI's as long as they continue to be beneficial for him   · Briefly discussed a total hip arthroplasty with the understanding he would be unable to undergo a hip replacement for aprox  3 months after intra-articular CSI's    · A referral was provided to Dr Izabella Mcfarlane for bilateral FL guided intra-articular CSI's   · Follow up in 3 months time for re-evaluation     To Do Next Visit:  Re-evaluation     _____________________________________________________  CHIEF COMPLAINT:  Chief Complaint   Patient presents with   • Right Hip - Follow-up         SUBJECTIVE:  Emily Desai is a 74 y o  male who presents to the office for a follow up regarding his right hip  He underwent a repeat FL guided right hip intra-articular CSI on 4/25/22, which was beneficial for him  He notes that the CSI's provide him at least 2 months of pain relief, if not longer  This was a works compensation case  He notes pain to his right groin  Pain has been ongoing for a while but notes he has some family stuff going on  He notes groin pain is also started on the left  He is taking Tylenol for pain control       PAST MEDICAL HISTORY:  Past Medical History:   Diagnosis Date   • Chronic kidney disease    • COVID-19 03/2020   • Hypertension 4/02/2020   • Liver disease    • Pneumonia 3/32/2020   • PTSD (post-traumatic stress disorder)        PAST SURGICAL HISTORY:  Past Surgical History:   Procedure Laterality Date   • FL INJECTION RIGHT HIP (NON ARTHROGRAM)  11/30/2020   • FL INJECTION RIGHT HIP (NON ARTHROGRAM)  8/4/2021   • FL INJECTION RIGHT HIP (NON ARTHROGRAM)  12/27/2021   • FL INJECTION RIGHT HIP (NON ARTHROGRAM)  4/25/2022   • NO PAST SURGERIES         FAMILY HISTORY:  Family History   Problem Relation Age of Onset • Heart disease Mother    • Coronary artery disease Mother    • Heart failure Mother    • Sudden death Father    • No Known Problems Son    • No Known Problems Daughter    • No Known Problems Maternal Grandmother    • No Known Problems Maternal Grandfather    • No Known Problems Paternal Grandmother    • No Known Problems Paternal Grandfather    • No Known Problems Daughter    • Kidney disease Brother    • Cancer Brother        SOCIAL HISTORY:  Social History     Tobacco Use   • Smoking status: Former     Packs/day: 2 00     Years: 30 00     Pack years: 60 00     Types: Cigarettes     Start date: 82     Quit date: 04/2020     Years since quitting: 3 1   • Smokeless tobacco: Never   Vaping Use   • Vaping Use: Former   • Quit date: 4/1/2020   • Substances: Nicotine, Flavoring   Substance Use Topics   • Alcohol use: Not Currently   • Drug use: Never       MEDICATIONS:    Current Outpatient Medications:   •  albuterol (2 5 mg/3 mL) 0 083 % nebulizer solution, INHALE 1 VIAL BY NEBULIZATION EVERY 6 (SIX) HOURS AS NEEDED FOR WHEEZING OR SHORTNESS OF BREATH, Disp: 375 mL, Rfl: 2  •  amLODIPine (NORVASC) 2 5 mg tablet, TAKE 1 TABLET (2 5 MG TOTAL) BY MOUTH DAILY AFTER DINNER, Disp: , Rfl:   •  donepezil (ARICEPT) 10 mg tablet, TAKE 1 TABLET DAILY AT BEDTIME TO START AFTER FINISHING ARICEPT 5 MG ONCE A DAY FOR 1 MONTH , Disp: 90 tablet, Rfl: 1  •  DULoxetine (CYMBALTA) 60 mg delayed release capsule, Take 1 capsule (60 mg total) by mouth every morning, Disp: 90 capsule, Rfl: 0  •  gabapentin (NEURONTIN) 100 mg capsule, Take 1 capsule (100 mg total) by mouth 3 (three) times a day, Disp: 90 capsule, Rfl: 2  •  ipratropium-albuterol (DUO-NEB) 0 5-2 5 mg/3 mL nebulizer solution, Take 3 mL by nebulization 4 (four) times a day, Disp: 75 mL, Rfl: 5  •  Multiple Vitamins-Minerals (MULTIVITAMIN ADULT PO), Take 1 tablet by mouth daily, Disp: , Rfl:   •  prazosin (MINIPRESS) 1 mg capsule, Take 1 capsule (1 mg total) by mouth daily at "bedtime, Disp: 90 capsule, Rfl: 0  •  rOPINIRole (REQUIP) 3 mg tablet, TAKE 1 TABLET (3 MG TOTAL) BY MOUTH DAILY AT BEDTIME, Disp: 90 tablet, Rfl: 0  •  sildenafil (VIAGRA) 50 MG tablet, Take 1 tablet (50 mg total) by mouth daily as needed for erectile dysfunction, Disp: 10 tablet, Rfl: 0  •  tiZANidine (ZANAFLEX) 2 mg tablet, TAKE 1 TABLET BY MOUTH DAILY AT BEDTIME, Disp: 90 tablet, Rfl: 1  •  umeclidinium-vilanterol 62 5-25 mcg/actuation inhaler, Inhale 1 puff daily, Disp: 60 blister, Rfl: 5  •  Ventolin  (90 Base) MCG/ACT inhaler, INHALE 2 PUFFS EVERY 6 HOURS AS NEEDED FOR WHEEZING, Disp: 18 g, Rfl: 3    ALLERGIES:  Allergies   Allergen Reactions   • Tetracycline Rash       REVIEW OF SYSTEMS:  MSK: as noted in HPI  Neuro: WNL  Pertinent items are otherwise noted in HPI  A comprehensive review of systems was otherwise negative  LABS:  HgA1c:   Lab Results   Component Value Date    HGBA1C 5 6 04/13/2022     BMP:   Lab Results   Component Value Date    CALCIUM 9 4 05/02/2023    K 3 8 05/02/2023    CO2 24 05/02/2023     05/02/2023    BUN 16 05/02/2023    CREATININE 1 22 05/02/2023     CBC: No components found for: CBC    _____________________________________________________  PHYSICAL EXAMINATION:  Vital signs: /78   Pulse 84   Ht 5' 8\" (1 727 m)   Wt 115 kg (254 lb 9 6 oz)   BMI 38 71 kg/m²   General: No acute distress, awake and alert  Psychiatric: Mood and affect appear appropriate  HEENT: Trachea Midline, No torticollis, no apparent facial trauma  Cardiovascular: No audible murmurs;  Extremities appear perfused  Pulmonary: No audible wheezing or stridor  Skin: No open lesions; see further details (if any) below    MUSCULOSKELETAL EXAMINATION:    Extremities: Bilateral hip     No erythema, ecchymosis or edema  Pain with internal rotation   Pain with hip flexion   Hip flexion strength 5/5  Ambulates with a cane   Extremity appears warm and well perfused " _____________________________________________________  STUDIES REVIEWED:  I personally reviewed the images and interpretation is as follows:  X-rays of bilateral hips demonstrate bilateral hip osteoarthritis  No acute fracture or dislocation         PROCEDURES PERFORMED:  Procedures    Scribe Attestation    I,:  Cee Montilla am acting as a scribe while in the presence of the attending physician :       I,:  Joseph Matthew MD personally performed the services described in this documentation    as scribed in my presence :

## 2023-05-23 NOTE — TELEPHONE ENCOUNTER
Patient dropped off doctor's progress report for workman's compensation    In black bin in reception    Call when ready for

## 2023-05-31 ENCOUNTER — TELEPHONE (OUTPATIENT)
Dept: PAIN MEDICINE | Facility: CLINIC | Age: 68
End: 2023-05-31

## 2023-05-31 NOTE — TELEPHONE ENCOUNTER
Caller: Jelena Chandra    Doctor: Sonia Barton     Reason for call: patient calling to schedule hip injections      Call back#: [phone]

## 2023-06-01 NOTE — TELEPHONE ENCOUNTER
Ok to schedule -- if he is ok with splitting the dose in half for the hip then we can schedule bilateral hip injections  If he wants the full dose of steroid in each him, I would schedule one hip at a time 2 weeks apart  All follow ups will be through Dr [facility]

## 2023-06-01 NOTE — TELEPHONE ENCOUNTER
Ok to schedule Bilateral FL guided intra-articular hip CSI's per Dr George Prudent with you? Pt saw Dr Jose Juan Bundy once for office visit 10/23/20

## 2023-06-06 NOTE — TELEPHONE ENCOUNTER
Caller: patient     Doctor: Leeann Santana    Reason for call: pt requesting to schedule procedure   I informed pt his voicemail is full      Call back#: [phone]

## 2023-06-07 NOTE — TELEPHONE ENCOUNTER
Caller: Jose Alejandro Sandoval PT    Doctor/Office: Dr Daniel Barrios     Call regarding :  Schedule an hip inj     Call was transferred to: DR NORMAN GRANADO [facility]

## 2023-06-08 ENCOUNTER — TELEPHONE (OUTPATIENT)
Age: 68
End: 2023-06-08

## 2023-06-15 ENCOUNTER — TELEPHONE (OUTPATIENT)
Dept: OBGYN CLINIC | Facility: HOSPITAL | Age: 68
End: 2023-06-15

## 2023-06-15 NOTE — TELEPHONE ENCOUNTER
Caller: patient    Doctor/Office: [facility]    Call regarding :  SPA     Call was transferred to: [facility]

## 2023-06-21 ENCOUNTER — HOSPITAL ENCOUNTER (OUTPATIENT)
Dept: RADIOLOGY | Facility: CLINIC | Age: 68
Discharge: HOME | End: 2023-06-21
Payer: MEDICARE

## 2023-06-21 VITALS
DIASTOLIC BLOOD PRESSURE: 81 MMHG | HEART RATE: 80 BPM | TEMPERATURE: 98.3 F | RESPIRATION RATE: 20 BRPM | OXYGEN SATURATION: 92 % | SYSTOLIC BLOOD PRESSURE: 138 MMHG

## 2023-06-21 DIAGNOSIS — M16.0 PRIMARY OSTEOARTHRITIS OF BOTH HIPS: ICD-10-CM

## 2023-06-21 PROCEDURE — 77002 NEEDLE LOCALIZATION BY XRAY: CPT

## 2023-06-21 PROCEDURE — 20610 DRAIN/INJ JOINT/BURSA W/O US: CPT | Performed by: ANESTHESIOLOGY

## 2023-06-21 PROCEDURE — 77002 NEEDLE LOCALIZATION BY XRAY: CPT | Performed by: ANESTHESIOLOGY

## 2023-06-21 RX ORDER — METHYLPREDNISOLONE ACETATE 40 MG/ML
80 INJECTION, SUSPENSION INTRA-ARTICULAR; INTRALESIONAL; INTRAMUSCULAR; PARENTERAL; SOFT TISSUE ONCE
Status: COMPLETED | OUTPATIENT
Start: 2023-06-21 | End: 2023-06-21

## 2023-06-21 RX ORDER — LIDOCAINE HYDROCHLORIDE 10 MG/ML
5 INJECTION, SOLUTION EPIDURAL; INFILTRATION; INTRACAUDAL; PERINEURAL ONCE
Status: DISCONTINUED | OUTPATIENT
Start: 2023-06-21 | End: 2023-06-25 | Stop reason: HOSPADM

## 2023-06-21 RX ORDER — BUPIVACAINE HCL/PF 2.5 MG/ML
5 VIAL (ML) INJECTION ONCE
Status: COMPLETED | OUTPATIENT
Start: 2023-06-21 | End: 2023-06-21

## 2023-06-21 RX ADMIN — IOHEXOL 6 ML: 300 INJECTION, SOLUTION INTRAVENOUS at 14:39

## 2023-06-21 RX ADMIN — Medication 8 ML: at 14:39

## 2023-06-21 RX ADMIN — METHYLPREDNISOLONE ACETATE 80 MG: 40 INJECTION, SUSPENSION INTRA-ARTICULAR; INTRALESIONAL; INTRAMUSCULAR; SOFT TISSUE at 14:39

## 2023-06-21 NOTE — INTERVAL H&P NOTE
Update: (This section must be completed if the H&P was completed greater than 24 hrs to procedure or admission)    H&P reviewed  After examining the patient, I find no changed to the H&P since it had been written  Patient re-evaluated   Accept as history and physical     Jacek Hammond MD/June 21, 2023/2:29 PM

## 2023-06-21 NOTE — DISCHARGE INSTR - LAB
Steroid Joint Injection   WHAT YOU NEED TO KNOW:   A steroid joint injection is a procedure to inject steroid medicine into a joint  Steroid medicine decreases pain and inflammation  The injection may also contain an anesthetic (numbing medicine) to decrease pain  It may be done to treat conditions such as arthritis, gout, or carpal tunnel syndrome  The injections may be given in your knee, ankle, shoulder, elbow, wrist, ankle or sacroiliac joint  Do not apply heat to any area that is numb  If you have discomfort or soreness at the injection site, you may apply ice today, 20 minutes on and 20 minutes off  Tomorrow you may use ice or warm, moist heat  Do not apply ice or heat directly to the skin  You may have an increase or change in the discomfort for 36-48 hours after your treatment  Apply ice and continue with any pain medicine you have been prescribed  Do not do anything strenuous today  You may shower, but no tub baths or hot tubs today  You may resume your normal activities tomorrow, but do not “overdo it”  Resume normal activities slowly when you are feeling better  If you experience redness, drainage or swelling at the injection site, or if you develop a fever above 100 degrees, please call [facility] at [phone] or go to the Emergency Room  Continue to take all routine medicines prescribed by your primary care physician unless otherwise instructed by our staff  Most blood thinners should be started again according to your regularly scheduled dosing  If you have any questions, please give our office a call  As no general anesthesia was used in today's procedure, you should not experience any side effects related to anesthesia  If you are diabetic, the steroids used in today's injection may temporarily increase your blood sugar levels after the first few days after your injection   Please keep a close eye on your sugars and alert the doctor who manages your diabetes if your sugars are significantly high from your baseline or you are symptomatic  If you have a problem specifically related to your procedure, please call our office at [phone]  Problems not related to your procedure should be directed to your primary care physician

## 2023-06-22 ENCOUNTER — HOSPITAL ENCOUNTER (OUTPATIENT)
Dept: CT IMAGING | Facility: HOSPITAL | Age: 68
End: 2023-06-22
Payer: MEDICARE

## 2023-06-22 DIAGNOSIS — F17.211 CIGARETTE NICOTINE DEPENDENCE IN REMISSION: ICD-10-CM

## 2023-06-22 PROCEDURE — 71271 CT THORAX LUNG CANCER SCR C-: CPT

## 2023-06-30 ENCOUNTER — TELEPHONE (OUTPATIENT)
Age: 68
End: 2023-06-30

## 2023-06-30 NOTE — TELEPHONE ENCOUNTER
----- Message from Jose Briggs PA-C sent at 6/30/2023 11:02 AM EDT -----  Please let patient know his CT scan shows stable findings, no new or suspicious findings  He can continue with annual CT lung screening    Thanks

## 2023-07-03 ENCOUNTER — SOCIAL WORK (OUTPATIENT)
Dept: BEHAVIORAL/MENTAL HEALTH CLINIC | Facility: CLINIC | Age: 68
End: 2023-07-03
Payer: MEDICARE

## 2023-07-03 DIAGNOSIS — R41.3 MEMORY DIFFICULTY: Primary | ICD-10-CM

## 2023-07-03 DIAGNOSIS — F43.10 PTSD (POST-TRAUMATIC STRESS DISORDER): ICD-10-CM

## 2023-07-03 DIAGNOSIS — F33.2 MAJOR DEPRESSIVE DISORDER, RECURRENT SEVERE WITHOUT PSYCHOTIC FEATURES (HCC): ICD-10-CM

## 2023-07-03 DIAGNOSIS — Z63.4 EXPECTED BEREAVEMENT DUE TO LIFE EVENT: ICD-10-CM

## 2023-07-03 PROCEDURE — 90834 PSYTX W PT 45 MINUTES: CPT | Performed by: SOCIAL WORKER

## 2023-07-03 SDOH — SOCIAL STABILITY - SOCIAL INSECURITY: DISSAPEARANCE AND DEATH OF FAMILY MEMBER: Z63.4

## 2023-07-03 NOTE — PSYCH
Behavioral Health Psychotherapy Progress Note    Psychotherapy Provided: Individual Psychotherapy     1. Memory difficulty        2. PTSD (post-traumatic stress disorder)        3. Expected bereavement due to life event        4. Major depressive disorder, recurrent severe without psychotic features ([address])            Goals addressed in session: Goal 1 and Goal 2     DATA: Price Saenz shared overall his depression and his anxiety are better. However he is still dealing with a daughter who doesn't talk to him or his wife and who is cheating on a favorite son in law. However Price Saenz is drawing boundaries and realizes he cant control this and in the scheme of things it is not his issue. We continue to work on mindfulness, CBT and strategies to cope. During this session, this clinician used the following therapeutic modalities: Client-centered Therapy, Cognitive Behavioral Therapy, Mindfulness-based Strategies and Supportive Psychotherapy    Substance Abuse was not addressed during this session. If the client is diagnosed with a co-occurring substance use disorder, please indicate any changes in the frequency or amount of use: n/a. Stage of change for addressing substance use diagnoses: No substance use/Not applicable    ASSESSMENT:  Nelly Beth presents with a Euthymic/ normal mood. his affect is Normal range and intensity, which is congruent, with his mood and the content of the session. The client has made progress on their goals. Nelly Beth presents with a none risk of suicide, none risk of self-harm, and none risk of harm to others. For any risk assessment that surpasses a "low" rating, a safety plan must be developed. A safety plan was indicated: no  If yes, describe in detail n/a    PLAN: Between sessions, Nelly Beth will use mindfulness and CBT.  At the next session, the therapist will use Client-centered Therapy, Cognitive Behavioral Therapy, Mindfulness-based Strategies and Supportive Psychotherapy to address issues and symptoms as they may arise. .    Behavioral Health Treatment Plan and Discharge Planning: Cecilia Luke is aware of and agrees to continue to work on their treatment plan. They have identified and are working toward their discharge goals.  yes    Visit start and stop times:    07/03/23  Start Time: 1510  Stop Time: 1600  Total Visit Time: 50 minutes

## 2023-07-05 ENCOUNTER — TELEPHONE (OUTPATIENT)
Dept: PSYCHIATRY | Facility: CLINIC | Age: 68
End: 2023-07-05

## 2023-07-21 DIAGNOSIS — M25.551 ACUTE RIGHT HIP PAIN: ICD-10-CM

## 2023-07-24 DIAGNOSIS — M25.551 ACUTE RIGHT HIP PAIN: ICD-10-CM

## 2023-07-24 RX ORDER — TIZANIDINE 2 MG/1
2 TABLET ORAL
Qty: 90 TABLET | Refills: 0 | Status: CANCELLED | OUTPATIENT
Start: 2023-07-24

## 2023-07-24 RX ORDER — TIZANIDINE 2 MG/1
2 TABLET ORAL
Qty: 90 TABLET | Refills: 1 | Status: SHIPPED | OUTPATIENT
Start: 2023-07-24

## 2023-07-26 DIAGNOSIS — G44.52 NEW DAILY PERSISTENT HEADACHE: ICD-10-CM

## 2023-07-26 RX ORDER — GABAPENTIN 100 MG/1
100 CAPSULE ORAL 3 TIMES DAILY
Qty: 90 CAPSULE | Refills: 2 | Status: SHIPPED | OUTPATIENT
Start: 2023-07-26 | End: 2023-08-08 | Stop reason: SDUPTHER

## 2023-07-27 RX ORDER — PRAZOSIN HYDROCHLORIDE 1 MG/1
1 CAPSULE ORAL
Qty: 90 CAPSULE | Refills: 1 | OUTPATIENT
Start: 2023-07-27

## 2023-07-30 ENCOUNTER — RA CDI HCC (OUTPATIENT)
Dept: OTHER | Facility: HOSPITAL | Age: 68
End: 2023-07-30

## 2023-07-30 NOTE — PROGRESS NOTES
J47.9  [address] [facility] coding opportunities          Chart Reviewed number of suggestions sent to Provider: 1     Patients Insurance     Medicare Insurance: [employer]

## 2023-08-01 ENCOUNTER — OFFICE VISIT (OUTPATIENT)
Dept: NEUROLOGY | Facility: CLINIC | Age: 68
End: 2023-08-01
Payer: OTHER MISCELLANEOUS

## 2023-08-01 VITALS
SYSTOLIC BLOOD PRESSURE: 120 MMHG | WEIGHT: 254 LBS | HEART RATE: 85 BPM | HEIGHT: 68 IN | BODY MASS INDEX: 38.49 KG/M2 | DIASTOLIC BLOOD PRESSURE: 60 MMHG

## 2023-08-01 DIAGNOSIS — G44.89 OTHER HEADACHE SYNDROME: ICD-10-CM

## 2023-08-01 DIAGNOSIS — F43.10 PTSD (POST-TRAUMATIC STRESS DISORDER): ICD-10-CM

## 2023-08-01 DIAGNOSIS — R41.3 MEMORY DIFFICULTY: Primary | ICD-10-CM

## 2023-08-01 DIAGNOSIS — Z86.16 HISTORY OF COVID-19: ICD-10-CM

## 2023-08-01 PROCEDURE — 99214 OFFICE O/P EST MOD 30 MIN: CPT | Performed by: PSYCHIATRY & NEUROLOGY

## 2023-08-01 RX ORDER — DONEPEZIL HYDROCHLORIDE 10 MG/1
10 TABLET, FILM COATED ORAL
Qty: 90 TABLET | Refills: 1 | Status: SHIPPED | OUTPATIENT
Start: 2023-08-01

## 2023-08-01 NOTE — PROGRESS NOTES
Roxine Fothergill is a 74 y.o. male. Chief Complaint   Patient presents with   • Memory Loss       Assessment:  1. Memory difficulty    2. History of COVID-19    3. Other headache syndrome    4. PTSD (post-traumatic stress disorder)        Plan:  MRI of the brain with neuro quant. Cognitive behavioral therapy. Continue with mentally stimulating exercises. To discuss with the family physician and increase Neurontin to help with the headaches. Safety precautions. Follow-up in 6 months    Discussion:  Patient has mild cognitive impairment with a MoCA of 19/30, his last Gosper was 21/30 and prior to that was 20/30, his MRI scan of the brain in 2021 was unremarkable we will repeat it with neuro quant to see if there is having any change, he is on Aricept 10 mg a day and is tolerating it well advised him to continue with same and continue with multivitamin a day    Differential diagnosis of headache discussed with the patient most likely migraine headaches his last sed rate was 20 and he does not have any temporal artery tenderness he is on Neurontin 100 mg 3 times a day and is tolerating it well this is being managed by his family physician have advised him to discuss with them and see if they can increase his Neurontin to 400 mg a day. If he has any issues he can call me for the medication also was advised to avoid migraine triggers which we discussed in detail including foods to avoid to keep himself well-hydrated to keep his blood pressure cholesterol and sugar under control to take fall and safety precautions preferably avoid driving to go to the hospital if has any worsening symptoms and call me otherwise to see me back in 6 months or sooner if needed and follow-up with his other physicians.     Subjective:    HPI   Patient is here in follow-up for headaches and memory difficulty, since his last visit according to the patient he is about the same he still continues to have memory issues mostly short-term memory issues he is on Aricept 10 mg a day and is tolerating it well he is able to manage his ADLs his wife does the finances he does very little driving he has not had any seizures no confusion he does not drink any alcohol does not smoke, he continues to have headache 2-3 times a week mostly in the back of the head radiating to the whole head associated with photophobia phonophobia, he also has restless leg symptoms and is in follow-up with the sleep specialist and is going for a sleep study to rule out a REM related disorder denies any snoring his appetite is good weight has been stable, no other complaints.     Vitals:    08/01/23 1329   BP: 120/60   BP Location: Right arm   Patient Position: Sitting   Cuff Size: Large   Pulse: 85   Weight: 115 kg (254 lb)   Height: 5' 8" (1.727 m)       Current Medications    Current Outpatient Medications:   •  albuterol (2.5 mg/3 mL) 0.083 % nebulizer solution, INHALE 1 VIAL BY NEBULIZATION EVERY 6 (SIX) HOURS AS NEEDED FOR WHEEZING OR SHORTNESS OF BREATH, Disp: 375 mL, Rfl: 2  •  amLODIPine (NORVASC) 2.5 mg tablet, TAKE 1 TABLET (2.5 MG TOTAL) BY MOUTH DAILY AFTER DINNER, Disp: , Rfl:   •  donepezil (ARICEPT) 10 mg tablet, TAKE 1 TABLET DAILY AT BEDTIME TO START AFTER FINISHING ARICEPT 5 MG ONCE A DAY FOR 1 MONTH., Disp: 90 tablet, Rfl: 1  •  DULoxetine (CYMBALTA) 60 mg delayed release capsule, Take 1 capsule (60 mg total) by mouth every morning, Disp: 90 capsule, Rfl: 0  •  gabapentin (NEURONTIN) 100 mg capsule, Take 1 capsule (100 mg total) by mouth 3 (three) times a day, Disp: 90 capsule, Rfl: 2  •  ipratropium-albuterol (DUO-NEB) 0.5-2.5 mg/3 mL nebulizer solution, Take 3 mL by nebulization 4 (four) times a day, Disp: 75 mL, Rfl: 5  •  Multiple Vitamins-Minerals (MULTIVITAMIN ADULT PO), Take 1 tablet by mouth daily, Disp: , Rfl:   •  prazosin (MINIPRESS) 1 mg capsule, TAKE 1 CAPSULE (1 MG TOTAL) BY MOUTH DAILY AT BEDTIME, Disp: 90 capsule, Rfl: 0  •  rOPINIRole (REQUIP) 3 mg tablet, TAKE 1 TABLET (3 MG TOTAL) BY MOUTH DAILY AT BEDTIME, Disp: 90 tablet, Rfl: 0  •  sildenafil (VIAGRA) 50 MG tablet, Take 1 tablet (50 mg total) by mouth daily as needed for erectile dysfunction, Disp: 10 tablet, Rfl: 0  •  umeclidinium-vilanterol 62.5-25 mcg/actuation inhaler, Inhale 1 puff daily, Disp: 60 blister, Rfl: 5  •  Ventolin  (90 Base) MCG/ACT inhaler, INHALE 2 PUFFS EVERY 6 HOURS AS NEEDED FOR WHEEZING, Disp: 18 g, Rfl: 3  •  tiZANidine (ZANAFLEX) 2 mg tablet, TAKE 1 TABLET BY MOUTH EVERYDAY AT BEDTIME (Patient not taking: Reported on 8/1/2023), Disp: 90 tablet, Rfl: 1      Allergies  Tetracycline    Past Medical History  Past Medical History:   Diagnosis Date   • Chronic kidney disease    • COVID-19 03/2020   • Hypertension 4/02/2020   • Liver disease    • Pneumonia 3/32/2020   • PTSD (post-traumatic stress disorder)          Past Surgical History:  Past Surgical History:   Procedure Laterality Date   • FL INJECTION RIGHT HIP (NON ARTHROGRAM)  11/30/2020   • FL INJECTION RIGHT HIP (NON ARTHROGRAM)  8/4/2021   • FL INJECTION RIGHT HIP (NON ARTHROGRAM)  12/27/2021   • FL INJECTION RIGHT HIP (NON ARTHROGRAM)  4/25/2022   • NO PAST SURGERIES           Family History:  Family History   Problem Relation Age of Onset   • Heart disease Mother    • Coronary artery disease Mother    • Heart failure Mother    • Sudden death Father    • No Known Problems Son    • No Known Problems Daughter    • No Known Problems Maternal Grandmother    • No Known Problems Maternal Grandfather    • No Known Problems Paternal Grandmother    • No Known Problems Paternal Grandfather    • No Known Problems Daughter    • Kidney disease Brother    • Cancer Brother        Social History:   reports that he quit smoking about 3 years ago. His smoking use included cigarettes. He started smoking about 50 years ago. He has a 60.00 pack-year smoking history.  He has never used smokeless tobacco. He reports that he does not currently use alcohol. He reports that he does not use drugs. I have reviewed the past medical history, surgical history, social and family history, current medications, allergies vitals, review of systems, and updated this information as appropriate today. Objective:    Physical Exam    Neurological Exam     GENERAL:  Cooperative in no acute distress. Well-developed and well-nourished     HEAD and NECK   Head is atraumatic normocephalic with no lesions or masses. Neck is supple with full range of motion     CARDIOVASCULAR  Carotid Arteries-no carotid bruits. NEUROLOGIC:  Mental Status-the patient is awake alert and oriented without aphasia or apraxia, MoCA is 19/30  Cranial Nerves: Visual fields are full to confrontation. Extraocular movements are full without nystagmus. Pupils are 2-1/2 mm and reactive. Face is symmetrical to light touch. Movements of facial expression move symmetrically. Hearing is normal to finger rub bilaterally. Soft palate lifts symmetrically. Shoulder shrug is symmetrical. Tongue is midline without atrophy. Motor: No drift is noted on arm extension. Strength is full in the upper and lower extremities with normal bulk and tone. Gait is unremarkable. No temporal artery tenderness no cervical spine tenderness    ROS:  Review of Systems   Constitutional: Negative for appetite change, fatigue and fever. HENT: Negative. Negative for hearing loss, tinnitus, trouble swallowing and voice change. Eyes: Negative. Negative for photophobia, pain and visual disturbance. Respiratory: Negative. Negative for shortness of breath. Cardiovascular: Negative. Negative for palpitations. Gastrointestinal: Negative. Negative for nausea and vomiting. Endocrine: Negative. Negative for cold intolerance. Genitourinary: Negative. Negative for dysuria, frequency and urgency. Musculoskeletal: Negative for back pain, gait problem, myalgias and neck pain. Skin: Negative. Negative for rash. Allergic/Immunologic: Negative. Neurological: Negative. Negative for dizziness, tremors, seizures, syncope, facial asymmetry, speech difficulty, weakness, light-headedness, numbness and headaches. Hematological: Negative. Does not bruise/bleed easily. Psychiatric/Behavioral: Positive for confusion. Negative for hallucinations and sleep disturbance.

## 2023-08-08 ENCOUNTER — APPOINTMENT (OUTPATIENT)
Age: 68
End: 2023-08-08
Payer: MEDICARE

## 2023-08-08 ENCOUNTER — TELEMEDICINE (OUTPATIENT)
Dept: PSYCHIATRY | Facility: CLINIC | Age: 68
End: 2023-08-08
Payer: MEDICARE

## 2023-08-08 ENCOUNTER — OFFICE VISIT (OUTPATIENT)
Age: 68
End: 2023-08-08
Payer: MEDICARE

## 2023-08-08 VITALS
WEIGHT: 266.2 LBS | DIASTOLIC BLOOD PRESSURE: 62 MMHG | OXYGEN SATURATION: 100 % | HEIGHT: 68 IN | BODY MASS INDEX: 40.35 KG/M2 | HEART RATE: 98 BPM | RESPIRATION RATE: 18 BRPM | SYSTOLIC BLOOD PRESSURE: 117 MMHG | TEMPERATURE: 97.8 F

## 2023-08-08 DIAGNOSIS — G25.81 RESTLESS LEGS: ICD-10-CM

## 2023-08-08 DIAGNOSIS — G44.52 NEW DAILY PERSISTENT HEADACHE: ICD-10-CM

## 2023-08-08 DIAGNOSIS — Z12.5 PROSTATE CANCER SCREENING: ICD-10-CM

## 2023-08-08 DIAGNOSIS — F43.10 PTSD (POST-TRAUMATIC STRESS DISORDER): ICD-10-CM

## 2023-08-08 DIAGNOSIS — J84.9 INTERSTITIAL LUNG DISEASE (HCC): Primary | ICD-10-CM

## 2023-08-08 DIAGNOSIS — F32.A DEPRESSION, UNSPECIFIED DEPRESSION TYPE: Primary | ICD-10-CM

## 2023-08-08 PROCEDURE — 36415 COLL VENOUS BLD VENIPUNCTURE: CPT

## 2023-08-08 PROCEDURE — 99213 OFFICE O/P EST LOW 20 MIN: CPT | Performed by: PHYSICIAN ASSISTANT

## 2023-08-08 PROCEDURE — 99213 OFFICE O/P EST LOW 20 MIN: CPT | Performed by: INTERNAL MEDICINE

## 2023-08-08 PROCEDURE — G0103 PSA SCREENING: HCPCS

## 2023-08-08 RX ORDER — GABAPENTIN 100 MG/1
CAPSULE ORAL
Qty: 120 CAPSULE | Refills: 2 | Status: SHIPPED | OUTPATIENT
Start: 2023-08-08

## 2023-08-08 RX ORDER — PRAZOSIN HYDROCHLORIDE 1 MG/1
1 CAPSULE ORAL
Qty: 90 CAPSULE | Refills: 0 | Status: SHIPPED | OUTPATIENT
Start: 2023-08-08

## 2023-08-08 RX ORDER — DULOXETIN HYDROCHLORIDE 60 MG/1
60 CAPSULE, DELAYED RELEASE ORAL EVERY MORNING
Qty: 90 CAPSULE | Refills: 0 | Status: SHIPPED | OUTPATIENT
Start: 2023-08-08

## 2023-08-08 RX ORDER — ROPINIROLE 3 MG/1
3 TABLET, FILM COATED ORAL
Qty: 90 TABLET | Refills: 0 | Status: SHIPPED | OUTPATIENT
Start: 2023-08-08

## 2023-08-08 NOTE — PROGRESS NOTES
Assessment/Plan:       Diagnoses and all orders for this visit:    Interstitial lung disease ([facility])    Prostate cancer screening  -     PSA, Total Screen; Future                Subjective:      Patient ID: Shawanda Dave is a 74 y.o. male. Follow-up for this 44-year-old who is principal problem is long COVI D. What I have done is documented this and also done his usual primary care. Mariea Show He did a Cologuard a couple of months ago and it was abnormal.  Colonoscopy was done and no malignancy was found      His major complaint continues to be shortness of breath; again felt to have chronic lung disease/interstitial fibrosis as a consequence of COVID. Continues to follow with pulmonary    He does need a PSA. Complaint of erectile dysfunction. He has no cardiac history and is not on a nitrate so will offer a trial of Viagra     From a prior note:    "  Long COVID  Covid 19 illness: the patient works for [address] [place] [address] We. He presented to the hospital April 2nd 2020 with respiratory failure and was intubated for covid 19 bronchopneumonia. He was on the ventilator for 10 days. Multiple comorbidities ensued including acute kidney injury, but, after being treated with multiple regimes of anti-inflammatory drugs and also cefepime time is 1 week for presumptive superimposed bacterial pneumonia, he recovered and was extubated and was sent home on a tapering dose of prednisone. Complication of care was development of pressure ulcers of the nares and extremities secondary to proning. This at least is bettetr      lingering affects of this episode include  Shortness of breath on exertion. This is fairly severe; he finds himself breathing heavily even after a fairly low-grade of activity. He is unable to do his job  Daily headache. Headache felt frontally and temporally. Evaluated by Neurology and given gabapentin.   He is taking a fairly low dose of 100 mg t.i.d. with modest improvement. Symptoms of anxiety and depression; I believe he has PTSD   Cognitive dysfunction:  Apparently did poorly on a [facility] cognitive assessment   Taste sense has recovered but the patient still has a partial loss of sense of smell. Right hip pain:  It has been explained to him that this may be due to the fact that he required a lot of passive rolling. Slight tremor of toes.     He has since been seen by Orthopedics, by Neurology, and by Pulmonary   CT scanning as demonstrated  Pulmonary bronchiectasis   echocardiogram: Grade 1 diastolic dysfunction, mild mitral regurgitation. EKG has right bundle branch block unchanged from prior repeated today.       He is acting a lot like the [employer] people        The following portions of the patient's history were reviewed and updated as appropriate:   He has a past medical history of COVID-19 (03/2020) and PTSD (post-traumatic stress disorder). ,  does not have any pertinent problems on file. ,   has a past surgical history that includes No past surgeries and FL injection right hip (non arthrogram) (11/30/2020). ,  family history includes Heart disease in his mother; Kidney disease in his brother; Sudden death in his father. ,   reports that he quit smoking about 2 years ago. His smoking use included cigarettes. He has a 60.00 pack-year smoking history. He has never used smokeless tobacco. He reports previous alcohol use. He reports that he does not use drugs. ,  is allergic to tetracycline. ."      The following portions of the patient's history were reviewed and updated as appropriate:   He has a past medical history of Chronic kidney disease, COVID-19 (03/2020), Hypertension (4/02/2020), Liver disease, Pneumonia (3/32/2020), and PTSD (post-traumatic stress disorder). ,  does not have any pertinent problems on file. ,   has a past surgical history that includes No past surgeries; FL injection right hip (non arthrogram) (11/30/2020);  FL injection right hip (non arthrogram) (8/4/2021); FL injection right hip (non arthrogram) (12/27/2021); and FL injection right hip (non arthrogram) (4/25/2022). ,  family history includes Cancer in his brother; Coronary artery disease in his mother; Heart disease in his mother; Heart failure in his mother; Kidney disease in his brother; No Known Problems in his daughter, daughter, maternal grandfather, maternal grandmother, paternal grandfather, paternal grandmother, and son; Sudden death in his father. ,   reports that he quit smoking about 3 years ago. His smoking use included cigarettes. He started smoking about 50 years ago. He has a 60.00 pack-year smoking history. He has never used smokeless tobacco. He reports that he does not currently use alcohol. He reports that he does not use drugs. ,  is allergic to tetracycline. .  Current Outpatient Medications   Medication Sig Dispense Refill   • albuterol (2.5 mg/3 mL) 0.083 % nebulizer solution INHALE 1 VIAL BY NEBULIZATION EVERY 6 (SIX) HOURS AS NEEDED FOR WHEEZING OR SHORTNESS OF BREATH 375 mL 2   • amLODIPine (NORVASC) 2.5 mg tablet TAKE 1 TABLET (2.5 MG TOTAL) BY MOUTH DAILY AFTER DINNER     • donepezil (ARICEPT) 10 mg tablet Take 1 tablet (10 mg total) by mouth daily at bedtime 90 tablet 1   • DULoxetine (CYMBALTA) 60 mg delayed release capsule Take 1 capsule (60 mg total) by mouth every morning 90 capsule 0   • gabapentin (NEURONTIN) 100 mg capsule Take 1 capsule (100 mg total) by mouth 3 (three) times a day 90 capsule 2   • ipratropium-albuterol (DUO-NEB) 0.5-2.5 mg/3 mL nebulizer solution Take 3 mL by nebulization 4 (four) times a day 75 mL 5   • Multiple Vitamins-Minerals (MULTIVITAMIN ADULT PO) Take 1 tablet by mouth daily     • prazosin (MINIPRESS) 1 mg capsule TAKE 1 CAPSULE (1 MG TOTAL) BY MOUTH DAILY AT BEDTIME 90 capsule 0   • rOPINIRole (REQUIP) 3 mg tablet TAKE 1 TABLET (3 MG TOTAL) BY MOUTH DAILY AT BEDTIME 90 tablet 0   • sildenafil (VIAGRA) 50 MG tablet Take 1 tablet (50 mg total) by mouth daily as needed for erectile dysfunction 10 tablet 0   • umeclidinium-vilanterol 62.5-25 mcg/actuation inhaler Inhale 1 puff daily 60 blister 5   • Ventolin  (90 Base) MCG/ACT inhaler INHALE 2 PUFFS EVERY 6 HOURS AS NEEDED FOR WHEEZING 18 g 3   • tiZANidine (ZANAFLEX) 2 mg tablet TAKE 1 TABLET BY MOUTH EVERYDAY AT BEDTIME (Patient not taking: Reported on 8/1/2023) 90 tablet 1     No current facility-administered medications for this visit. Review of Systems   Constitutional: Positive for fatigue. Respiratory: Positive for shortness of breath. Musculoskeletal: Positive for arthralgias. Objective:  Vitals:    08/08/23 1019   BP: 117/62   Pulse: 98   Resp: 18   Temp: 97.8 °F (36.6 °C)   SpO2: 100%      Physical Exam  Constitutional:       Appearance: He is obese. Cardiovascular:      Rate and Rhythm: Normal rate. Pulmonary:      Effort: Pulmonary effort is normal.      Breath sounds: Decreased air movement present. Decreased breath sounds present. Comments: Globally diminished breath sounds without rales, rhonchi, no wheeze. Abdominal:      General: Abdomen is flat. Comments: Large pannus precludes any assessment of organ size. Musculoskeletal:      Comments: Osteoarthrosis deformities   Neurological:      General: No focal deficit present. Mental Status: He is alert.    Psychiatric:         Mood and Affect: Mood normal.           Patient Instructions   Major requirement is to continue to follow with pulmonary    Do a PSA
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
## 2023-08-09 LAB — PSA SERPL-MCNC: 0.3 NG/ML (ref 0–4)

## 2023-08-11 ENCOUNTER — TELEPHONE (OUTPATIENT)
Age: 68
End: 2023-08-11

## 2023-08-14 ENCOUNTER — TELEPHONE (OUTPATIENT)
Dept: PSYCHIATRY | Facility: CLINIC | Age: 68
End: 2023-08-14

## 2023-08-14 NOTE — TELEPHONE ENCOUNTER
Writer contacted patient to reschedule his cancelled 8/28 appointment due to provider being out of office.  Patient has been scheduled for [email or web address], 10?31@1pm and 11/17@1pm.

## 2023-08-15 ENCOUNTER — TELEPHONE (OUTPATIENT)
Dept: NEUROLOGY | Facility: CLINIC | Age: 68
End: 2023-08-15

## 2023-08-15 NOTE — TELEPHONE ENCOUNTER
Speech therapy called in asking for a signed order for ST from Dr. Kaylene Calvo due to it beings a  case. Order also needs to state a start date and how often per week pt needs to be seen.   Fax is [phone]

## 2023-08-16 ENCOUNTER — HOSPITAL ENCOUNTER (OUTPATIENT)
Dept: MRI IMAGING | Facility: HOSPITAL | Age: 68
Discharge: HOME | End: 2023-08-16
Attending: PSYCHIATRY & NEUROLOGY
Payer: MEDICARE

## 2023-08-16 DIAGNOSIS — R41.3 MEMORY DIFFICULTY: ICD-10-CM

## 2023-08-16 PROCEDURE — 70551 MRI BRAIN STEM W/O DYE: CPT

## 2023-08-16 PROCEDURE — G1004 CDSM NDSC: HCPCS

## 2023-08-17 RX ORDER — GABAPENTIN 100 MG/1
CAPSULE ORAL
Qty: 90 CAPSULE | Refills: 2 | OUTPATIENT
Start: 2023-08-17

## 2023-08-22 ENCOUNTER — OFFICE VISIT (OUTPATIENT)
Dept: OBGYN CLINIC | Facility: CLINIC | Age: 68
End: 2023-08-22
Payer: MEDICARE

## 2023-08-22 VITALS
HEIGHT: 68 IN | HEART RATE: 83 BPM | WEIGHT: 266.3 LBS | DIASTOLIC BLOOD PRESSURE: 77 MMHG | SYSTOLIC BLOOD PRESSURE: 131 MMHG | BODY MASS INDEX: 40.36 KG/M2

## 2023-08-22 DIAGNOSIS — M16.12 PRIMARY OSTEOARTHRITIS OF ONE HIP, LEFT: ICD-10-CM

## 2023-08-22 DIAGNOSIS — M16.11 PRIMARY OSTEOARTHRITIS OF RIGHT HIP: Primary | ICD-10-CM

## 2023-08-22 PROCEDURE — 99213 OFFICE O/P EST LOW 20 MIN: CPT | Performed by: ORTHOPAEDIC SURGERY

## 2023-08-22 NOTE — PROGRESS NOTES
Orthopaedics Office Visit - Established Patient Visit    ASSESSMENT/PLAN:    Assessment:   Bilateral hip osteoarthritis   R>L    Improvement with intra-articular CSI's     Plan:   · Preston Barfield is doing well, he continues to see benefit from intra-articular CSI's   · A referral was provided to Dr. Savage Sandoval for repeat bilateral hip intra-articular CSI's, to be performed around 9/21/23  · He may call the office for an updated referral if needed   · I will see him back in 6 months time with updated bilateral hip x-rays     To Do Next Visit:  X-ray bilateral hip     _____________________________________________________  CHIEF COMPLAINT:  Chief Complaint   Patient presents with   • Right Hip - Follow-up   • Left Hip - Follow-up         SUBJECTIVE:  Edwina Jacobs is a 74 y.o. male who presents to the office for a follow up regarding bilateral hip osteoarthritis. He underwent bilateral hip intra-articular CSI's with Dr. Savage Sandoval on 6/21/23. He notes that the CSI's do provide him with approx. 80 percent of pain relief. He notes intermittent pain to both hips. He will take Tylenol as needed for pain control.      PAST MEDICAL HISTORY:  Past Medical History:   Diagnosis Date   • Chronic kidney disease    • COVID-19 03/2020   • Hypertension 4/02/2020   • Liver disease    • Pneumonia 3/32/2020   • PTSD (post-traumatic stress disorder)        PAST SURGICAL HISTORY:  Past Surgical History:   Procedure Laterality Date   • FL INJECTION RIGHT HIP (NON ARTHROGRAM)  11/30/2020   • FL INJECTION RIGHT HIP (NON ARTHROGRAM)  8/4/2021   • FL INJECTION RIGHT HIP (NON ARTHROGRAM)  12/27/2021   • FL INJECTION RIGHT HIP (NON ARTHROGRAM)  4/25/2022   • NO PAST SURGERIES         FAMILY HISTORY:  Family History   Problem Relation Age of Onset   • Heart disease Mother    • Coronary artery disease Mother    • Heart failure Mother    • Sudden death Father    • No Known Problems Son    • No Known Problems Daughter    • No Known Problems Maternal Grandmother • No Known Problems Maternal Grandfather    • No Known Problems Paternal Grandmother    • No Known Problems Paternal Grandfather    • No Known Problems Daughter    • Kidney disease Brother    • Cancer Brother        SOCIAL HISTORY:  Social History     Tobacco Use   • Smoking status: Former     Packs/day: 2.00     Years: 30.00     Total pack years: 60.00     Types: Cigarettes     Start date: 82     Quit date: 04/2020     Years since quitting: 3.3   • Smokeless tobacco: Never   Vaping Use   • Vaping Use: Former   • Quit date: 4/1/2020   • Substances: Nicotine, Flavoring   Substance Use Topics   • Alcohol use: Not Currently   • Drug use: Never       MEDICATIONS:    Current Outpatient Medications:   •  albuterol (2.5 mg/3 mL) 0.083 % nebulizer solution, INHALE 1 VIAL BY NEBULIZATION EVERY 6 (SIX) HOURS AS NEEDED FOR WHEEZING OR SHORTNESS OF BREATH, Disp: 375 mL, Rfl: 2  •  amLODIPine (NORVASC) 2.5 mg tablet, TAKE 1 TABLET (2.5 MG TOTAL) BY MOUTH DAILY AFTER DINNER, Disp: , Rfl:   •  donepezil (ARICEPT) 10 mg tablet, Take 1 tablet (10 mg total) by mouth daily at bedtime, Disp: 90 tablet, Rfl: 1  •  DULoxetine (CYMBALTA) 60 mg delayed release capsule, Take 1 capsule (60 mg total) by mouth every morning, Disp: 90 capsule, Rfl: 0  •  gabapentin (NEURONTIN) 100 mg capsule, One po bid and 2 po q bedtime, Disp: 120 capsule, Rfl: 2  •  ipratropium-albuterol (DUO-NEB) 0.5-2.5 mg/3 mL nebulizer solution, Take 3 mL by nebulization 4 (four) times a day, Disp: 75 mL, Rfl: 5  •  Multiple Vitamins-Minerals (MULTIVITAMIN ADULT PO), Take 1 tablet by mouth daily, Disp: , Rfl:   •  prazosin (MINIPRESS) 1 mg capsule, Take 1 capsule (1 mg total) by mouth daily at bedtime, Disp: 90 capsule, Rfl: 0  •  rOPINIRole (REQUIP) 3 mg tablet, Take 1 tablet (3 mg total) by mouth daily at bedtime, Disp: 90 tablet, Rfl: 0  •  sildenafil (VIAGRA) 50 MG tablet, Take 1 tablet (50 mg total) by mouth daily as needed for erectile dysfunction, Disp: 10 tablet, Rfl: 0  •  umeclidinium-vilanterol 62.5-25 mcg/actuation inhaler, Inhale 1 puff daily, Disp: 60 blister, Rfl: 5  •  Ventolin  (90 Base) MCG/ACT inhaler, INHALE 2 PUFFS EVERY 6 HOURS AS NEEDED FOR WHEEZING, Disp: 18 g, Rfl: 3  •  tiZANidine (ZANAFLEX) 2 mg tablet, TAKE 1 TABLET BY MOUTH EVERYDAY AT BEDTIME (Patient not taking: Reported on 8/1/2023), Disp: 90 tablet, Rfl: 1    ALLERGIES:  Allergies   Allergen Reactions   • Tetracycline Rash       REVIEW OF SYSTEMS:  MSK: as noted in HPI  Neuro: WNL  Pertinent items are otherwise noted in HPI. A comprehensive review of systems was otherwise negative. LABS:  HgA1c:   Lab Results   Component Value Date    HGBA1C 5.6 04/13/2022     BMP:   Lab Results   Component Value Date    CALCIUM 9.4 05/02/2023    K 3.8 05/02/2023    CO2 24 05/02/2023     05/02/2023    BUN 16 05/02/2023    CREATININE 1.22 05/02/2023     CBC: No components found for: "CBC"    _____________________________________________________  PHYSICAL EXAMINATION:  Vital signs: /77   Pulse 83   Ht 5' 8" (1.727 m)   Wt 121 kg (266 lb 4.8 oz)   BMI 40.49 kg/m²   General: No acute distress, awake and alert  Psychiatric: Mood and affect appear appropriate  HEENT: Trachea Midline, No torticollis, no apparent facial trauma  Cardiovascular: No audible murmurs; Extremities appear perfused  Pulmonary: No audible wheezing or stridor  Skin: No open lesions; see further details (if any) below    MUSCULOSKELETAL EXAMINATION:    Extremities: Bilateral hip     No erythema, ecchymosis or edema  Pain with internal rotation   No pain with external rotation   Hip flexion strength 5/5  Ambulates without assistance   Extremity appears warm and well perfused     _____________________________________________________  STUDIES REVIEWED:  I personally reviewed the images and interpretation is as follows:   No new imaging to review       PROCEDURES PERFORMED:  Procedures    Scribe Attestation    I,: Ninoska Alexander am acting as a scribe while in the presence of the attending physician.:       I,:  Maddison Kern MD personally performed the services described in this documentation    as scribed in my presence.:

## 2023-08-25 ENCOUNTER — EVALUATION (OUTPATIENT)
Age: 68
End: 2023-08-25
Payer: MEDICARE

## 2023-08-25 DIAGNOSIS — R41.3 MEMORY DIFFICULTY: ICD-10-CM

## 2023-08-25 DIAGNOSIS — R48.8 OTHER SYMBOLIC DYSFUNCTIONS: Primary | ICD-10-CM

## 2023-08-25 DIAGNOSIS — Z86.16 HISTORY OF COVID-19: ICD-10-CM

## 2023-08-25 PROCEDURE — 96125 COGNITIVE TEST BY HC PRO: CPT

## 2023-08-25 NOTE — PROGRESS NOTES
Speech-Language Pathology Initial Evaluation    Today's date: 2023   Patient’s name: Jose Armando Renteria  : 1955  MRN: [id number]  Safety measures: SOB, HTN  Referring provider: Diego Hardy MD    Encounter Diagnosis     ICD-10-CM    1. Other symbolic dysfunctions  A81.8       2. Memory difficulty  R41. 3 Ambulatory Referral to Speech Therapy        Visit tracking:  -Referring provider: Epic  -Billing guidelines: CMS  -Visit #1/10   -Insurance: Medicare (worker's comp)  -RE due 10th visit    Subjective comments: Patient reports he suffered from COVID-19 in 2020. From hospitalization, patient was traumatized from the experience. He still remembers events from the hospital that "haunt" him. Due to trauma (patient is diagnosed with PTSD from event), patient has difficulties sleeping. Patient reports short-term memory is hard, he cannot remember faces, and when his kids leave he forgets what they look like. Patient reports he forgets why he is walking into a room. Patient reports he will be in the middle of a conversation and forget what they are talking about. Patient reports he tends to forget names, conversations, and information after a period of time. Patient reports his wife is managing his finances. Patient reports word-finding difficulties and cannot think of the word. Patient reports he stumbles on his words often since COVID-19 in . How did the patient hear about us? Physician    Patient's goal(s): "to be able to talk to my family without forgetting what I want"    Reason for referral: Change in cognitive status and Difficulty producing fluent speech  Prior functional status: Communication effective and appropriate in all situations  Clinically complex situations: Long-haul COVID-19    History: Patient is a 74 y.o. male who was referred to outpatient skilled Speech Therapy services for a cognitive-linguistic evaluation. Patient has medical hx of long COVID.  Patient works for [employer]. Patient went to the hospital on April 2nd 2020 with respiratory failure & intubated with bronchopneumonia. On ventilator for 10 days. Co-morbidities: acute kidney injury. 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 Quit smoking 2 years ago. PMH: chronic kidney disease, COVID-19, hypertension, liver disease, pneumonia from COVID,  PTSD. From neurology visit assessing cognition:    "Patient has mild cognitive impairment with a MoCA of 19/30, his last [place] was 21/30 and prior to that was 20/30, his MRI scan of the brain in 2021 was unremarkable we will repeat it with neuro quant to see if there is having any change, he is on Aricept 10 mg a day and is tolerating it well advised him to continue with same and continue with multivitamin a day    Patient is here in follow-up for headaches and memory difficulty, since his last visit according to the patient he is about the same he still continues to have memory issues mostly short-term memory issues he is on Aricept 10 mg a day and is tolerating it well he is able to manage his ADLs his wife does the finances he does very little driving he has not had any seizures no confusion he does not drink any alcohol does not smoke, he continues to have headache 2-3 times a week mostly in the back of the head radiating to the whole head associated with photophobia phonophobia, he also has restless leg symptoms and is in follow-up with the sleep specialist and is going for a sleep study to rule out a REM related disorder denies any snoring his appetite is good weight has been stable, no other complaints."    Patient has a scheduled  MRI on 8/16/2023:  "IMPRESSION:     No acute intracranial abnormality.     Similar mild chronic microangiopathy.     NeuroQuant analysis was performed: Normal study;  Does not support neurodegeneration."    Hearing: [facility] for testing  Vision: Decreased (glasses distance)    Home environment/lifestyle: lives with wife  Highest level of education: 8th grade  Vocational status: Hospital (worker's comp)    Mental status: Alert  Behavior status: Cooperative  Patient reported symptoms of: depression and anxiety (seeking psychologist)  Communication modalities: Verbal  Rehabilitation prognosis: Good rehab potential to reach the established goals    Assessments  The Repeatable Battery for the Assessment of Neuropsychological Status (RBANS) is a brief, individually-administered assessment which measures attention, language, visuospatial/constructional abilities, and immediate & delayed memory. The RBANS is intended for use with adolescents to adults, ages 15 to 80 years. The following results were obtained during the administration of the assessment. Form: A    Cognitive Domain/Subtest: Index Score: Percentile Rank: Classification:   IMMEDIATE MEMORY 73 4%ile Borderline        1. List Learning (19/40)        2. Story Memory (14/24)       VISUOSPATIAL/  CONSTRUCTIONAL 72 3%ile Borderline        3. Figure Copy (16/20)        4. Line Orientation (11/20)       LANGUAGE 82 12%ile Low Average        5. Picture Naming (10/10)        6. Semantic Fluency (11/40)       ATTENTION 72 3%ile Borderline        7. Digit Span (8/16)        8. Coding (25/89)       DELAYED MEMORY 64 1%ile Extremely Low        9. List Recall (0/10)        10. List Recognition (15/20)        11. Story Recall (5/12)        12. Figure Recall (10/20)         Sum of Index Scores:  363   Total Scale:  65   Percentile: 1%ile   Classification: Extremely Low       *Patient named 11 concrete category members (fruits and vegetables) in 60 sec (norm=15+). -- BELOW AVERAGE     Goals    Short Term Goals    Patient will be educated on word finding strategies (i.e., circumlocution) for improved generative naming and verbal expression skills.     Patient will complete complex auditory attention processing tasks (e.g., sentence unscramble, ranking numbers/words, etc.) to improve auditory comprehenision with 80% accuracy, to be achieved in 4-6 weeks. Patient will facilitate planning by completing thought organization tasks (e.g., sequencing, deduction puzzles, etc.) with 80% accuracy to facilitate increased processing skills, to be achieved in 4-6 weeks. Patient will complete auditory immediate and short term memory tasks to 80% accuracy to facilitate increased ability to retell narratives and recall information within functional living environment, to be achieved in 4-6 weeks. To target mental manipulation and working memory, patient will participate in word finding activity (i.e., anagrams) with 80% accuracy, to be achieved in 4-6 weeks. Patient will answer questions regarding story read aloud with 80% accuracy to facilitate improved auditory comprehension and recall, to be achieved in 4-6 weeks. Patient will complete reading comprehension tasks (e.g., answering questions, following complex written directions, etc.) to 80% accuracy to facilitate carryover of comprehension of functional reading materials, to be achieved in 4-6 weeks. Patient will demonstrate divided attention by responding to multiple tasks or details within tasks at the same time with min cues in a distracting environment with 80% accuracy, to be achieved in 4-6 weeks. Patient will complete concrete and abstract categorization tasks to 80% accuracy to facilitate improved generative naming skills and word-finding, to be achieved in 4-6 weeks. Patient will generate sentences and short paragraphs (e.g., sentence generation given words to incorporate, description of pictures, etc.) with fewer than 3 errors in grammar, organization and content with 80% accuracy to facilitate increased carryover of skills into functional living environment, to be achieved in 4-6 weeks.     Patient will be educated on the use of internal and external memory aids and compensatory strategies with 80% accuracy to facilitate increased recall of routine, personal information, and recent events, to be achieved in 4-6 weeks. Long Term goals  Patient will complete higher-level expressive language tasks (e.g., word definitions, idioms, synonym/antonyms, etc) with 80% accuracy to improve functional communication skills by discharge. Patient will improve ability to facilitate cognitive function and communication skills including use of compensatory strategies in a variety of functional living tasks to improve quality of like and to maximize level of independence. Impressions/Recommendations    Impressions:   -Patient presents with moderate cognitive-linguistic deficts c/b in immediate/delayed memory, word-finding,  processing, retelling narratives, and auditory comprehension. Clinician is in agreement with neurologist's recent diagnosis of MCI. Patient completed the RBANs which is a standardized cognitive-linguistic assessment. Patient scored "Borderline" in Immediate Memory and Visuospaital sections. Patient scored "Low Average" in Language sections. Patient scored "Extremely Low" in Delayed Memory section. Patient reported an increase in anxiety and depression following COVID-19, which may be playing an additional role in his cognitive-linguistic dysfunction from [address] [place] [address]. Recommended f/u with behavioral therapy and patient reported he is currently being seen by one due to his diagnosed PTSD. Patient is motivated to target goals in POC and complete cognitive-lingustic stimulating tasks in skilled therapy.  Patient would benefit from outpatient skilled Speech Therapy services to target increased functional recall, auditory comprehension, increased word-finding, memory aids/strategies education, successful completion of daily tasks, follow directions within functional activities, support positive communication interactions with both familiar and unfamiliar listeners, promote safety and facilitate overall improved quality of life.     Recommendations:  -Patient would benefit from outpatient skilled Speech Therapy services: Cognitive-linguistic therapy    -Frequency: 1-2x weekly  -Duration: 10-12 weeks    -Intervention certification from: 9/80/0861  -Intervention certification to: 07/84/0122    -Intervention comments:   45 mins of standard cog testing (RBANS)  45 mins of scoring/doc/write-up of standard cog test

## 2023-08-28 NOTE — PROGRESS NOTES
Daily Speech Treatment Note    Today's date: 2023  Patient’s name: Ines Alvarez  : 1955  MRN: [id number]  Safety measures: SOB, HTN  Referring provider: Germán Calloway MD    No diagnosis found.   Visit tracking:  -Referring provider: Epic  -Billing guidelines: CMS  -Visit #2/10   -Insurance: Medicare (worker's comp)  -RE due 10th visit     Subjective/Behavioral:  -***    Objective/Assessment:  {ST note:63092}    Short-term goals:  Patient will be educated on word finding strategies (i.e., circumlocution) for improved generative naming and verbal expression skills.     Patient will complete complex auditory attention processing tasks (e.g., sentence unscramble, ranking numbers/words, etc.) to improve auditory comprehenision with 80% accuracy, to be achieved in 4-6 weeks.     Patient will facilitate planning by completing thought organization tasks (e.g., sequencing, deduction puzzles, etc.) with 80% accuracy to facilitate increased processing skills, to be achieved in 4-6 weeks.     Patient will complete auditory immediate and short term memory tasks to 80% accuracy to facilitate increased ability to retell narratives and recall information within functional living environment, to be achieved in 4-6 weeks.     To target mental manipulation and working memory, patient will participate in word finding activity (i.e., anagrams) with 80% accuracy, to be achieved in 4-6 weeks.     Patient will answer questions regarding story read aloud with 80% accuracy to facilitate improved auditory comprehension and recall, to be achieved in 4-6 weeks.     Patient will complete reading comprehension tasks (e.g., answering questions, following complex written directions, etc.) to 80% accuracy to facilitate carryover of comprehension of functional reading materials, to be achieved in 4-6 weeks.     Patient will demonstrate divided attention by responding to multiple tasks or details within tasks at the same time with min cues in a distracting environment with 80% accuracy, to be achieved in 4-6 weeks.     Patient will complete concrete and abstract categorization tasks to 80% accuracy to facilitate improved generative naming skills and word-finding, to be achieved in 4-6 weeks.     Patient will generate sentences and short paragraphs (e.g., sentence generation given words to incorporate, description of pictures, etc.) with fewer than 3 errors in grammar, organization and content with 80% accuracy to facilitate increased carryover of skills into functional living environment, to be achieved in 4-6 weeks.     Patient will be educated on the use of internal and external memory aids and compensatory strategies with 80% accuracy to facilitate increased recall of routine, personal information, and recent events, to be achieved in 4-6 weeks.       Plan:  {RECOMMENDATIONS:73024}

## 2023-08-31 ENCOUNTER — APPOINTMENT (OUTPATIENT)
Age: 68
End: 2023-08-31
Payer: MEDICARE

## 2023-09-09 DIAGNOSIS — F43.10 PTSD (POST-TRAUMATIC STRESS DISORDER): ICD-10-CM

## 2023-09-09 DIAGNOSIS — F32.A DEPRESSION, UNSPECIFIED DEPRESSION TYPE: ICD-10-CM

## 2023-09-09 DIAGNOSIS — R06.02 SHORTNESS OF BREATH: ICD-10-CM

## 2023-09-11 RX ORDER — ALBUTEROL SULFATE 90 UG/1
AEROSOL, METERED RESPIRATORY (INHALATION)
Qty: 18 G | Refills: 3 | Status: SHIPPED | OUTPATIENT
Start: 2023-09-11

## 2023-09-26 RX ORDER — DULOXETIN HYDROCHLORIDE 60 MG/1
60 CAPSULE, DELAYED RELEASE ORAL EVERY MORNING
Qty: 90 CAPSULE | Refills: 0 | Status: SHIPPED | OUTPATIENT
Start: 2023-09-26

## 2023-10-10 ENCOUNTER — TELEPHONE (OUTPATIENT)
Dept: PSYCHIATRY | Facility: CLINIC | Age: 68
End: 2023-10-10

## 2023-10-10 NOTE — TELEPHONE ENCOUNTER
Patient contacted the office to schedule a follow up visit with provider. Patient is now scheduled for 10/12  at 1pm in office.

## 2023-10-11 ENCOUNTER — TELEPHONE (OUTPATIENT)
Age: 68
End: 2023-10-11

## 2023-10-11 DIAGNOSIS — M16.12 PRIMARY OSTEOARTHRITIS OF LEFT HIP: Primary | ICD-10-CM

## 2023-10-11 DIAGNOSIS — M16.11 PRIMARY OSTEOARTHRITIS OF RIGHT HIP: ICD-10-CM

## 2023-10-11 NOTE — TELEPHONE ENCOUNTER
Caller: patient    Doctor/Office: Adelina    Call regarding :  Injection     Call was transferred to: [facility]

## 2023-10-11 NOTE — TELEPHONE ENCOUNTER
Caller: Devon Aly     Doctor: Dr Wolf     Reason for call: Patient calling stating Per Dr Sahu to schedule another HIP injection please advise.    Call back#: [phone]

## 2023-10-11 NOTE — TELEPHONE ENCOUNTER
Patient scheduled for FL guided bilateral hip intra-articular CSI's. All Pre-Procedure instructions were reviewed. Message was sent to [address] [place] [address] [address] team to start Auth process.

## 2023-10-12 ENCOUNTER — SOCIAL WORK (OUTPATIENT)
Dept: BEHAVIORAL/MENTAL HEALTH CLINIC | Facility: CLINIC | Age: 68
End: 2023-10-12
Payer: MEDICARE

## 2023-10-12 DIAGNOSIS — M16.11 PRIMARY OSTEOARTHRITIS OF RIGHT HIP: Primary | ICD-10-CM

## 2023-10-12 DIAGNOSIS — F43.10 PTSD (POST-TRAUMATIC STRESS DISORDER): Primary | ICD-10-CM

## 2023-10-12 DIAGNOSIS — F33.2 MAJOR DEPRESSIVE DISORDER, RECURRENT SEVERE WITHOUT PSYCHOTIC FEATURES (HCC): ICD-10-CM

## 2023-10-12 DIAGNOSIS — F43.11 ACUTE POST-TRAUMATIC STRESS DISORDER: ICD-10-CM

## 2023-10-12 DIAGNOSIS — R41.3 MEMORY DIFFICULTY: ICD-10-CM

## 2023-10-12 DIAGNOSIS — M16.12 PRIMARY OSTEOARTHRITIS OF LEFT HIP: Primary | ICD-10-CM

## 2023-10-12 PROCEDURE — 90834 PSYTX W PT 45 MINUTES: CPT | Performed by: SOCIAL WORKER

## 2023-10-12 NOTE — BH TREATMENT PLAN
Outpatient Behavioral Health Psychotherapy Treatment Plan        Treatment Plan Tracking: The client's treatment plan update is due on 8/25/2023. However he was last here on 7/3/2023 and today 10/12/23 was the first time back since 7/3 so we did it today. Joseph Pace  1955     Date of Initial Psychotherapy Assessment: 05/06/2020   Date of Current Treatment Plan: 10/12/23  Treatment Plan Target Date 04/05/2024  Treatment Plan Expiration Date: 04/05/2024    Diagnosis:   1. PTSD (post-traumatic stress disorder)        2. Major depressive disorder, recurrent severe without psychotic features ([address])        3. Acute post-traumatic stress disorder        4. Memory difficulty            Area(s) of Need:Please see below    Long Term Goal 1 (in the client's own words): I still need to effectively manage my depression and anxiety. Stage of Change: Action    Target Date for completion: 04/05/2024     Anticipated therapeutic modalities mindfulness and CBT     People identified to complete this goal: myself with the help of my therapist      Objective 1: (identify the means of measuring success in meeting the objective): If and when my symptoms arise they will be managed and remain at a minimal level. Objective 2: (identify the means of measuring success in meeting the objective): n/a      Long Term Goal 2 (in the client's own words): I need to manage my anger and frustration better. Stage of Change: Action    Target Date for completion: 04/05/2024     Anticipated therapeutic modalities: anger management strategies and distress tolerance. People identified to complete this goal: myself with the help of my therapist.       Objective 1: (identify the means of measuring success in meeting the objective): I will have better anger management and I will not take it out on the wrong people like my wife.        Objective 2: (identify the means of measuring success in meeting the objective): n/a     Long Term Goal 3 (in the client's own words): I will draw effective boundaries with my daughter who is a problem. Stage of Change: Action    Target Date for completion: 04/05/2024     Anticipated therapeutic modalities: Mindfulness and CBT     People identified to complete this goal: Myself with the help of my daughter      Objective 1: (identify the means of measuring success in meeting the objective): I will not let my daughters inappropriate behavior affect my moods      Objective 2: (identify the means of measuring success in meeting the objective): I am currently under the care of a [facility] psychiatric provider: yes    My [facility] psychiatric provider is: Mike Guthrie    I am currently taking psychiatric medications: Yes, as prescribed    I feel that I will be ready for discharge from mental health care when I reach the following (measurable goal/objective): When I reach the progress I am comfortable with on my set goals    For children and adults who have a legal guardian:   Has there been any change to custody orders and/or guardianship status? NA. If yes, attach updated documentation. I have created my Crisis Plan and have been offered a copy of this plan    [address]: Diagnosis and Treatment Plan explained to Debby Gurmeet acknowledges an understanding of their diagnosis. Doyle Najjar agrees to this treatment plan.     I have been offered a copy of this Treatment Plan. yes

## 2023-10-12 NOTE — PSYCH
Behavioral Health Psychotherapy Progress Note    Psychotherapy Provided: Individual Psychotherapy     1. PTSD (post-traumatic stress disorder)        2. Major depressive disorder, recurrent severe without psychotic features ([address])        3. Acute post-traumatic stress disorder        4. Memory difficulty            Goals addressed in session: Goal 1 and Goal 2     DATA: Yessi Blanco arrived for his session. He discussed how well his son is doing at a new job where they recruited him and how pleased they are with his son. He and his wife also have a good relationship with one of their daughters. However the issue that is exacerbating Galileo's depression and anxiety is the fact his daughter who was his special girl when she was growing up will not talk to Yessi Blanco or his wife. His daughter cheated on her  and Yessi Blanco took the side of his son in law however it turns out the son in law kept telling her even your parents are on my side. The daughter is now with a second man she is cheating with and the son in law actually is considering taking her back. Yessi Blanco admits the fact she has alienated herself from the family has Yessi Blanco in his words barking at his wife. We worked on distress tolerance and strategies to help him realize his wife is not the person he should be directing his frustration at. Yessi Blanco is also very frustrated with current world events. I provided support, encouragement, distress tolerance strategies along with strategies to cope in general and overall. During this session, this clinician used the following therapeutic modalities: Client-centered Therapy, Cognitive Behavioral Therapy, Mindfulness-based Strategies, and Supportive Psychotherapy    Substance Abuse was not addressed during this session. If the client is diagnosed with a co-occurring substance use disorder, please indicate any changes in the frequency or amount of use: n/a.  Stage of change for addressing substance use diagnoses: No substance use/Not applicable    ASSESSMENT:  Norlin Bence presents with a Anxious and Depressed mood. his affect is anxious and depressed, which is congruent, with his mood and the content of the session. The client has made progress on their goals. hOWEVER HE STILL HAS MAJOR SETBACKS. Norlin Bence presents with a none risk of suicide, none risk of self-harm, and none risk of harm to others. For any risk assessment that surpasses a "low" rating, a safety plan must be developed. A safety plan was indicated: no  If yes, describe in detail n/a    PLAN: Between sessions, Norlin Bence will use mindfulness and CBT. Patrick Medrano At the next session, the therapist will use Client-centered Therapy, Cognitive Behavioral Therapy, Mindfulness-based Strategies, and Supportive Psychotherapy to address issues and symptoms as they may arise. .    Behavioral Health Treatment Plan and Discharge Planning: Norlin Bence is aware of and agrees to continue to work on their treatment plan. They have identified and are working toward their discharge goals.  yes    Visit start and stop times:    10/12/23  Start Time: 1310  Stop Time: 1400  Total Visit Time: 50 minutes

## 2023-10-17 ENCOUNTER — TELEPHONE (OUTPATIENT)
Dept: NEPHROLOGY | Facility: CLINIC | Age: 68
End: 2023-10-17

## 2023-10-17 NOTE — TELEPHONE ENCOUNTER
Patient called stating that he has a 6 month nephrology follow up appointment with Dr. Corry Bernard on Monday 10/30/2023. Patient is asking if new lab orders can be placed in the system for him. If any questions please call the patient @ [phone] to advise the patient about his lab orders.

## 2023-10-18 DIAGNOSIS — N18.31 STAGE 3A CHRONIC KIDNEY DISEASE (HCC): Primary | ICD-10-CM

## 2023-10-18 NOTE — PROGRESS NOTES
I called and left a message on machine for patient stating that his new lab orders are in the system for him and if he had any further questions to give the office a call back.

## 2023-10-19 ENCOUNTER — HOSPITAL ENCOUNTER (OUTPATIENT)
Dept: RADIOLOGY | Facility: CLINIC | Age: 68
End: 2023-10-19
Payer: MEDICARE

## 2023-10-19 VITALS
SYSTOLIC BLOOD PRESSURE: 123 MMHG | DIASTOLIC BLOOD PRESSURE: 64 MMHG | HEART RATE: 79 BPM | RESPIRATION RATE: 18 BRPM | TEMPERATURE: 97.7 F | OXYGEN SATURATION: 92 %

## 2023-10-19 DIAGNOSIS — M16.12 PRIMARY OSTEOARTHRITIS OF LEFT HIP: ICD-10-CM

## 2023-10-19 PROCEDURE — 20610 DRAIN/INJ JOINT/BURSA W/O US: CPT | Performed by: STUDENT IN AN ORGANIZED HEALTH CARE EDUCATION/TRAINING PROGRAM

## 2023-10-19 PROCEDURE — 77002 NEEDLE LOCALIZATION BY XRAY: CPT | Performed by: STUDENT IN AN ORGANIZED HEALTH CARE EDUCATION/TRAINING PROGRAM

## 2023-10-19 PROCEDURE — 77002 NEEDLE LOCALIZATION BY XRAY: CPT

## 2023-10-19 RX ORDER — BUPIVACAINE HCL/PF 2.5 MG/ML
4 VIAL (ML) INJECTION ONCE
Status: COMPLETED | OUTPATIENT
Start: 2023-10-19 | End: 2023-10-19

## 2023-10-19 RX ORDER — METHYLPREDNISOLONE ACETATE 80 MG/ML
80 INJECTION, SUSPENSION INTRA-ARTICULAR; INTRALESIONAL; INTRAMUSCULAR; PARENTERAL; SOFT TISSUE ONCE
Status: COMPLETED | OUTPATIENT
Start: 2023-10-19 | End: 2023-10-19

## 2023-10-19 RX ADMIN — IOHEXOL 1 ML: 300 INJECTION, SOLUTION INTRAVENOUS at 09:07

## 2023-10-19 RX ADMIN — METHYLPREDNISOLONE ACETATE 80 MG: 80 INJECTION, SUSPENSION INTRA-ARTICULAR; INTRALESIONAL; INTRAMUSCULAR; PARENTERAL; SOFT TISSUE at 09:07

## 2023-10-19 RX ADMIN — Medication 4 ML: at 09:07

## 2023-10-19 NOTE — H&P
History of Present Illness:  The patient is a 79 y.o. male who presents with complaints of left hip pain    Past Medical History:   Diagnosis Date    Chronic kidney disease     COVID-19 03/2020    Hypertension 4/02/2020    Liver disease     Pneumonia 3/32/2020    PTSD (post-traumatic stress disorder)        Past Surgical History:   Procedure Laterality Date    FL INJECTION RIGHT HIP (NON ARTHROGRAM)  11/30/2020    FL INJECTION RIGHT HIP (NON ARTHROGRAM)  8/4/2021    FL INJECTION RIGHT HIP (NON ARTHROGRAM)  12/27/2021    FL INJECTION RIGHT HIP (NON ARTHROGRAM)  4/25/2022    NO PAST SURGERIES           Current Outpatient Medications:     albuterol (2.5 mg/3 mL) 0.083 % nebulizer solution, INHALE 1 VIAL BY NEBULIZATION EVERY 6 (SIX) HOURS AS NEEDED FOR WHEEZING OR SHORTNESS OF BREATH, Disp: 375 mL, Rfl: 2    amLODIPine (NORVASC) 2.5 mg tablet, TAKE 1 TABLET (2.5 MG TOTAL) BY MOUTH DAILY AFTER DINNER, Disp: , Rfl:     donepezil (ARICEPT) 10 mg tablet, Take 1 tablet (10 mg total) by mouth daily at bedtime, Disp: 90 tablet, Rfl: 1    DULoxetine (CYMBALTA) 60 mg delayed release capsule, TAKE 1 CAPSULE BY MOUTH EVERY MORNING, Disp: 90 capsule, Rfl: 0    gabapentin (NEURONTIN) 100 mg capsule, One po bid and 2 po q bedtime, Disp: 120 capsule, Rfl: 2    ipratropium-albuterol (DUO-NEB) 0.5-2.5 mg/3 mL nebulizer solution, Take 3 mL by nebulization 4 (four) times a day, Disp: 75 mL, Rfl: 5    Multiple Vitamins-Minerals (MULTIVITAMIN ADULT PO), Take 1 tablet by mouth daily, Disp: , Rfl:     prazosin (MINIPRESS) 1 mg capsule, Take 1 capsule (1 mg total) by mouth daily at bedtime, Disp: 90 capsule, Rfl: 0    rOPINIRole (REQUIP) 3 mg tablet, Take 1 tablet (3 mg total) by mouth daily at bedtime, Disp: 90 tablet, Rfl: 0    sildenafil (VIAGRA) 50 MG tablet, Take 1 tablet (50 mg total) by mouth daily as needed for erectile dysfunction, Disp: 10 tablet, Rfl: 0    tiZANidine (ZANAFLEX) 2 mg tablet, TAKE 1 TABLET BY MOUTH EVERYDAY AT BEDTIME (Patient not taking: Reported on 8/1/2023), Disp: 90 tablet, Rfl: 1    umeclidinium-vilanterol 62.5-25 mcg/actuation inhaler, Inhale 1 puff daily, Disp: 60 blister, Rfl: 5    Ventolin  (90 Base) MCG/ACT inhaler, TAKE 2 PUFFS BY MOUTH EVERY 6 HOURS AS NEEDED FOR WHEEZE, Disp: 18 g, Rfl: 3    Allergies   Allergen Reactions    Tetracycline Rash       Physical Exam: There were no vitals filed for this visit. General: Awake, Alert, Oriented x 3, Mood and affect appropriate  Respiratory: Respirations even and unlabored  Cardiovascular: Peripheral pulses intact; no edema  Musculoskeletal Exam: pain with left hip flexion    ASA Score: 3    Patient/Chart Verification  Patient ID Verified: Verbal  ID Band Applied: No  Consents Confirmed: Procedural, To be obtained in the Pre-Procedure area  H&P( within 30 days) Verified: To be obtained in the Pre-Procedure area  Allergies Reviewed: Yes  Anticoag/NSAID held?: No  Currently on antibiotics?: No    Assessment:   1. Primary osteoarthritis of left hip        Plan: FL guided left hip intra-articular CSI's.

## 2023-10-19 NOTE — DISCHARGE INSTR - LAB
Do not apply heat to any area that is numb. If you have discomfort or soreness at the injection site, you may apply ice today, 20 minutes on and 20 minutes off. Tomorrow you may use ice or warm, moist heat. Do not apply ice or heat directly to the skin. If you experience severe shortness of breath, go to the Emergency Room. You may have numbness for several hours from the local anesthetic. Please use caution and common sense, especially with weight-bearing activities. You may have an increase or change in the discomfort for 36-48 hours after your treatment. Apply ice and continue with any pain medicine you have been prescribed. Do not do anything strenuous today. You may shower, but no tub baths or hot tubs today. You may resume your normal activities tomorrow, but do not “overdo it”. Resume normal activities slowly when you are feeling better. If you experience redness, drainage or swelling at the injection site, or if you develop a fever above 100 degrees, please call [facility] at [phone] or go to the Emergency Room. Continue to take all routine medicines prescribed by your primary care physician unless otherwise instructed by our staff. Most blood thinners should be started again according to your regularly scheduled dosing. If you have any questions, please give our office a call. As no general anesthesia was used in today's procedure, you should not experience any side effects related to anesthesia. If you have a problem specifically related to your procedure, please call our office at [phone]. Problems not related to your procedure should be directed to your primary care physician.

## 2023-10-23 ENCOUNTER — TELEPHONE (OUTPATIENT)
Dept: NEPHROLOGY | Facility: CLINIC | Age: 68
End: 2023-10-23

## 2023-10-27 ENCOUNTER — APPOINTMENT (OUTPATIENT)
Dept: LAB | Facility: HOSPITAL | Age: 68
End: 2023-10-27
Payer: MEDICARE

## 2023-10-27 ENCOUNTER — TELEPHONE (OUTPATIENT)
Dept: NEPHROLOGY | Facility: CLINIC | Age: 68
End: 2023-10-27

## 2023-10-27 DIAGNOSIS — N18.31 STAGE 3A CHRONIC KIDNEY DISEASE (HCC): ICD-10-CM

## 2023-10-27 LAB
25(OH)D3 SERPL-MCNC: 46.3 NG/ML (ref 30–100)
ANION GAP SERPL CALCULATED.3IONS-SCNC: 7 MMOL/L
BACTERIA UR QL AUTO: NORMAL /HPF
BASOPHILS # BLD AUTO: 0.05 THOUSANDS/ÂΜL (ref 0–0.1)
BASOPHILS NFR BLD AUTO: 1 % (ref 0–1)
BILIRUB UR QL STRIP: NEGATIVE
BUN SERPL-MCNC: 19 MG/DL (ref 5–25)
CALCIUM SERPL-MCNC: 9.3 MG/DL (ref 8.4–10.2)
CHLORIDE SERPL-SCNC: 105 MMOL/L (ref 96–108)
CLARITY UR: CLEAR
CO2 SERPL-SCNC: 24 MMOL/L (ref 21–32)
COLOR UR: NORMAL
CREAT SERPL-MCNC: 1.39 MG/DL (ref 0.6–1.3)
CREAT UR-MCNC: 104.7 MG/DL
EOSINOPHIL # BLD AUTO: 0.35 THOUSAND/ÂΜL (ref 0–0.61)
EOSINOPHIL NFR BLD AUTO: 3 % (ref 0–6)
ERYTHROCYTE [DISTWIDTH] IN BLOOD BY AUTOMATED COUNT: 14.7 % (ref 11.6–15.1)
GFR SERPL CREATININE-BSD FRML MDRD: 52 ML/MIN/1.73SQ M
GLUCOSE P FAST SERPL-MCNC: 92 MG/DL (ref 65–99)
GLUCOSE UR STRIP-MCNC: NEGATIVE MG/DL
HCT VFR BLD AUTO: 51.3 % (ref 36.5–49.3)
HGB BLD-MCNC: 16.8 G/DL (ref 12–17)
HGB UR QL STRIP.AUTO: NEGATIVE
IMM GRANULOCYTES # BLD AUTO: 0.12 THOUSAND/UL (ref 0–0.2)
IMM GRANULOCYTES NFR BLD AUTO: 1 % (ref 0–2)
KETONES UR STRIP-MCNC: NEGATIVE MG/DL
LEUKOCYTE ESTERASE UR QL STRIP: NEGATIVE
LYMPHOCYTES # BLD AUTO: 1.6 THOUSANDS/ÂΜL (ref 0.6–4.47)
LYMPHOCYTES NFR BLD AUTO: 16 % (ref 14–44)
MCH RBC QN AUTO: 28.8 PG (ref 26.8–34.3)
MCHC RBC AUTO-ENTMCNC: 32.7 G/DL (ref 31.4–37.4)
MCV RBC AUTO: 88 FL (ref 82–98)
MONOCYTES # BLD AUTO: 0.97 THOUSAND/ÂΜL (ref 0.17–1.22)
MONOCYTES NFR BLD AUTO: 10 % (ref 4–12)
NEUTROPHILS # BLD AUTO: 7.06 THOUSANDS/ÂΜL (ref 1.85–7.62)
NEUTS SEG NFR BLD AUTO: 69 % (ref 43–75)
NITRITE UR QL STRIP: NEGATIVE
NON-SQ EPI CELLS URNS QL MICRO: NORMAL /HPF
NRBC BLD AUTO-RTO: 0 /100 WBCS
PH UR STRIP.AUTO: 5 [PH]
PHOSPHATE SERPL-MCNC: 2.9 MG/DL (ref 2.3–4.1)
PLATELET # BLD AUTO: 214 THOUSANDS/UL (ref 149–390)
PMV BLD AUTO: 10.4 FL (ref 8.9–12.7)
POTASSIUM SERPL-SCNC: 4.3 MMOL/L (ref 3.5–5.3)
PROT UR STRIP-MCNC: NEGATIVE MG/DL
PROT UR-MCNC: 9 MG/DL
PROT/CREAT UR: 0.09 MG/G{CREAT} (ref 0–0.1)
PTH-INTACT SERPL-MCNC: 65.6 PG/ML (ref 12–88)
RBC # BLD AUTO: 5.84 MILLION/UL (ref 3.88–5.62)
RBC #/AREA URNS AUTO: NORMAL /HPF
SODIUM SERPL-SCNC: 136 MMOL/L (ref 135–147)
SP GR UR STRIP.AUTO: 1.02 (ref 1–1.03)
UROBILINOGEN UR STRIP-ACNC: <2 MG/DL
WBC # BLD AUTO: 10.15 THOUSAND/UL (ref 4.31–10.16)
WBC #/AREA URNS AUTO: NORMAL /HPF

## 2023-10-27 PROCEDURE — 83970 ASSAY OF PARATHORMONE: CPT

## 2023-10-27 PROCEDURE — 80048 BASIC METABOLIC PNL TOTAL CA: CPT

## 2023-10-27 PROCEDURE — 36415 COLL VENOUS BLD VENIPUNCTURE: CPT

## 2023-10-27 PROCEDURE — 85025 COMPLETE CBC W/AUTO DIFF WBC: CPT

## 2023-10-27 PROCEDURE — 84100 ASSAY OF PHOSPHORUS: CPT

## 2023-10-27 PROCEDURE — 82306 VITAMIN D 25 HYDROXY: CPT

## 2023-10-29 DIAGNOSIS — G44.52 NEW DAILY PERSISTENT HEADACHE: ICD-10-CM

## 2023-10-30 ENCOUNTER — OFFICE VISIT (OUTPATIENT)
Dept: NEPHROLOGY | Facility: CLINIC | Age: 68
End: 2023-10-30
Payer: MEDICARE

## 2023-10-30 VITALS
TEMPERATURE: 98.1 F | HEIGHT: 68 IN | DIASTOLIC BLOOD PRESSURE: 70 MMHG | SYSTOLIC BLOOD PRESSURE: 110 MMHG | OXYGEN SATURATION: 99 % | BODY MASS INDEX: 39.83 KG/M2 | RESPIRATION RATE: 18 BRPM | HEART RATE: 95 BPM | WEIGHT: 262.8 LBS

## 2023-10-30 DIAGNOSIS — M89.9 CHRONIC KIDNEY DISEASE-MINERAL AND BONE DISORDER: ICD-10-CM

## 2023-10-30 DIAGNOSIS — J84.9 INTERSTITIAL LUNG DISEASE (HCC): ICD-10-CM

## 2023-10-30 DIAGNOSIS — E66.01 OBESITY, MORBID (HCC): ICD-10-CM

## 2023-10-30 DIAGNOSIS — N18.9 CHRONIC KIDNEY DISEASE-MINERAL AND BONE DISORDER: ICD-10-CM

## 2023-10-30 DIAGNOSIS — N18.31 STAGE 3A CHRONIC KIDNEY DISEASE (HCC): Primary | ICD-10-CM

## 2023-10-30 DIAGNOSIS — E83.9 CHRONIC KIDNEY DISEASE-MINERAL AND BONE DISORDER: ICD-10-CM

## 2023-10-30 DIAGNOSIS — M16.11 PRIMARY OSTEOARTHRITIS OF RIGHT HIP: ICD-10-CM

## 2023-10-30 PROCEDURE — 99214 OFFICE O/P EST MOD 30 MIN: CPT | Performed by: INTERNAL MEDICINE

## 2023-10-30 RX ORDER — GABAPENTIN 100 MG/1
CAPSULE ORAL
Qty: 120 CAPSULE | Refills: 0 | Status: SHIPPED | OUTPATIENT
Start: 2023-10-30

## 2023-10-30 NOTE — PROGRESS NOTES
NEPHROLOGY OFFICE FOLLOW UP  Christine Shah 79 y.o. male MRN: [id number]    Encounter: [id number] 10/30/2023    REASON FOR VISIT: Christine Shah is a 79 y.o. male who is here on 10/30/2023 for Chronic Kidney Disease and Follow-up  . HPI:    Leanne Tavarez came in today for follow-up of CKD. 80-year-old gentleman who is doing well overall    Does have COPD and does get short of breath off and on and being monitored by pulmonologist    Denies any other complaint    No chest pain no palpitation or shortness of breath    No nausea no vomiting    Denies any urinary complaint        REVIEW OF SYSTEMS:    Review of Systems   Constitutional:  Negative for activity change and fatigue. HENT:  Negative for congestion and ear discharge. Eyes:  Negative for photophobia and pain. Respiratory:  Negative for apnea and choking. Cardiovascular:  Negative for chest pain and palpitations. Gastrointestinal:  Negative for abdominal distention and blood in stool. Endocrine: Negative for heat intolerance and polyphagia. Genitourinary:  Negative for flank pain and urgency. Musculoskeletal:  Negative for neck pain and neck stiffness. Skin:  Negative for color change and wound. Allergic/Immunologic: Negative for food allergies and immunocompromised state. Neurological:  Negative for seizures and facial asymmetry. Hematological:  Negative for adenopathy. Does not bruise/bleed easily. Psychiatric/Behavioral:  Negative for self-injury and suicidal ideas.           PAST MEDICAL HISTORY:  Past Medical History:   Diagnosis Date    Chronic kidney disease     COVID-19 03/2020    Hypertension 4/02/2020    Liver disease     Pneumonia 3/32/2020    PTSD (post-traumatic stress disorder)        PAST SURGICAL HISTORY:  Past Surgical History:   Procedure Laterality Date    FL INJECTION RIGHT HIP (NON ARTHROGRAM)  11/30/2020    FL INJECTION RIGHT HIP (NON ARTHROGRAM)  8/4/2021    FL INJECTION RIGHT HIP (NON ARTHROGRAM)  12/27/2021 FL INJECTION RIGHT HIP (NON ARTHROGRAM)  4/25/2022    NO PAST SURGERIES         SOCIAL HISTORY:  Social History     Substance and Sexual Activity   Alcohol Use Never     Social History     Substance and Sexual Activity   Drug Use Never     Social History     Tobacco Use   Smoking Status Former    Packs/day: 2.00    Years: 30.00    Total pack years: 60.00    Types: Cigarettes    Start date: 82    Quit date: 04/2020    Years since quitting: 3.5    Passive exposure: Past   Smokeless Tobacco Never       FAMILY HISTORY:  Family History   Problem Relation Age of Onset    Heart disease Mother     Coronary artery disease Mother     Heart failure Mother     Sudden death Father     No Known Problems Son     No Known Problems Daughter     No Known Problems Maternal Grandmother     No Known Problems Maternal Grandfather     No Known Problems Paternal Grandmother     No Known Problems Paternal Grandfather     No Known Problems Daughter     Kidney disease Brother     Cancer Brother        MEDICATIONS:    Current Outpatient Medications:     albuterol (2.5 mg/3 mL) 0.083 % nebulizer solution, INHALE 1 VIAL BY NEBULIZATION EVERY 6 (SIX) HOURS AS NEEDED FOR WHEEZING OR SHORTNESS OF BREATH, Disp: 375 mL, Rfl: 2    amLODIPine (NORVASC) 2.5 mg tablet, TAKE 1 TABLET (2.5 MG TOTAL) BY MOUTH DAILY AFTER DINNER, Disp: , Rfl:     donepezil (ARICEPT) 10 mg tablet, Take 1 tablet (10 mg total) by mouth daily at bedtime, Disp: 90 tablet, Rfl: 1    DULoxetine (CYMBALTA) 60 mg delayed release capsule, TAKE 1 CAPSULE BY MOUTH EVERY MORNING, Disp: 90 capsule, Rfl: 0    gabapentin (NEURONTIN) 100 mg capsule, TAKE 1 CAPSULE TWICE DAILY AND TAKE 2 CAPSULES AT BEDTIME (Patient taking differently: Take 100 mg by mouth 4 (four) times a day TAKE 1 CAPSULES IN THE MORNING 1 CAPSULES AFTERNOON  2 CAPSULES AT BEDTIME), Disp: 120 capsule, Rfl: 0    ipratropium-albuterol (DUO-NEB) 0.5-2.5 mg/3 mL nebulizer solution, Take 3 mL by nebulization 4 (four) times a day, Disp: 75 mL, Rfl: 5    Multiple Vitamins-Minerals (MULTIVITAMIN ADULT PO), Take 1 tablet by mouth daily, Disp: , Rfl:     prazosin (MINIPRESS) 1 mg capsule, Take 1 capsule (1 mg total) by mouth daily at bedtime, Disp: 90 capsule, Rfl: 0    rOPINIRole (REQUIP) 3 mg tablet, Take 1 tablet (3 mg total) by mouth daily at bedtime, Disp: 90 tablet, Rfl: 0    sildenafil (VIAGRA) 50 MG tablet, Take 1 tablet (50 mg total) by mouth daily as needed for erectile dysfunction, Disp: 10 tablet, Rfl: 0    umeclidinium-vilanterol 62.5-25 mcg/actuation inhaler, Inhale 1 puff daily, Disp: 60 blister, Rfl: 5    Ventolin  (90 Base) MCG/ACT inhaler, TAKE 2 PUFFS BY MOUTH EVERY 6 HOURS AS NEEDED FOR WHEEZE, Disp: 18 g, Rfl: 3    PHYSICAL EXAM:  Vitals:    10/30/23 1256   BP: 110/70   BP Location: Right arm   Patient Position: Sitting   Pulse: 95   Resp: 18   Temp: 98.1 °F (36.7 °C)   TempSrc: Temporal   SpO2: 99%   Weight: 119 kg (262 lb 12.8 oz)   Height: 5' 8" (1.727 m)     Body mass index is 39.96 kg/m². Physical Exam  Constitutional:       General: He is not in acute distress. Appearance: He is well-developed. He is obese. HENT:      Head: Normocephalic. Mouth/Throat:      Mouth: Mucous membranes are moist.   Eyes:      General: No scleral icterus. Conjunctiva/sclera: Conjunctivae normal.   Neck:      Vascular: No JVD. Cardiovascular:      Rate and Rhythm: Normal rate. Heart sounds: Normal heart sounds. Pulmonary:      Effort: Pulmonary effort is normal.      Breath sounds: Wheezing present. Abdominal:      Palpations: Abdomen is soft. Tenderness: There is no abdominal tenderness. Musculoskeletal:         General: Normal range of motion. Cervical back: Neck supple. Skin:     General: Skin is warm. Findings: No rash. Neurological:      Mental Status: He is alert and oriented to person, place, and time.    Psychiatric:         Behavior: Behavior normal.         LAB RESULTS:  Results for orders placed or performed in visit on 77/50/81   Basic metabolic panel   Result Value Ref Range    Sodium 136 135 - 147 mmol/L    Potassium 4.3 3.5 - 5.3 mmol/L    Chloride 105 96 - 108 mmol/L    CO2 24 21 - 32 mmol/L    ANION GAP 7 mmol/L    BUN 19 5 - 25 mg/dL    Creatinine 1.39 (H) 0.60 - 1.30 mg/dL    Glucose, Fasting 92 65 - 99 mg/dL    Calcium 9.3 8.4 - 10.2 mg/dL    eGFR 52 ml/min/1.73sq m   CBC and differential   Result Value Ref Range    WBC 10.15 4.31 - 10.16 Thousand/uL    RBC 5.84 (H) 3.88 - 5.62 Million/uL    Hemoglobin 16.8 12.0 - 17.0 g/dL    Hematocrit 51.3 (H) 36.5 - 49.3 %    MCV 88 82 - 98 fL    MCH 28.8 26.8 - 34.3 pg    MCHC 32.7 31.4 - 37.4 g/dL    RDW 14.7 11.6 - 15.1 %    MPV 10.4 8.9 - 12.7 fL    Platelets 822 834 - 832 Thousands/uL    nRBC 0 /100 WBCs    Neutrophils Relative 69 43 - 75 %    Immat GRANS % 1 0 - 2 %    Lymphocytes Relative 16 14 - 44 %    Monocytes Relative 10 4 - 12 %    Eosinophils Relative 3 0 - 6 %    Basophils Relative 1 0 - 1 %    Neutrophils Absolute 7.06 1.85 - 7.62 Thousands/µL    Immature Grans Absolute 0.12 0.00 - 0.20 Thousand/uL    Lymphocytes Absolute 1.60 0.60 - 4.47 Thousands/µL    Monocytes Absolute 0.97 0.17 - 1.22 Thousand/µL    Eosinophils Absolute 0.35 0.00 - 0.61 Thousand/µL    Basophils Absolute 0.05 0.00 - 0.10 Thousands/µL   Phosphorus   Result Value Ref Range    Phosphorus 2.9 2.3 - 4.1 mg/dL   PTH, intact   Result Value Ref Range    PTH 65.6 12.0 - 88.0 pg/mL   Vitamin D 25 hydroxy   Result Value Ref Range    Vit D, 25-Hydroxy 46.3 30.0 - 100.0 ng/mL       ASSESSMENT and PLAN:      Stage 3a chronic kidney disease (HCC)  Lab Results   Component Value Date    EGFR 52 10/27/2023    EGFR 61 05/02/2023    EGFR 42 10/20/2022    CREATININE 1.39 (H) 10/27/2023    CREATININE 1.22 05/02/2023    CREATININE 1.65 (H) 10/20/2022   Kidney function seems to quite stable.   Advise hydration and avoiding nephrotoxic medication    Chronic kidney disease-mineral and bone disorder  Lab Results   Component Value Date    EGFR 52 10/27/2023    EGFR 61 05/02/2023    EGFR 42 10/20/2022    CREATININE 1.39 (H) 10/27/2023    CREATININE 1.22 05/02/2023    CREATININE 1.65 (H) 10/20/2022   PTH and phosphorus along with vitamin D are within acceptable range and will continue to monitor    Obesity, morbid ([address])  Need to reduce weight and he is well aware of it    Primary osteoarthritis of right hip  Overall seems to be stable. Advised to avoid any nephrotoxic painkiller        Everything discussed with patient at length. I will see him back in 6 months. We will get blood and urine test before that visit      Portions of the record may have been created with voice recognition software. Occasional wrong word or "sound a like" substitutions may have occurred due to the inherent limitations of voice recognition software. Read the chart carefully and recognize, using context, where substitutions have occurred. If you have any questions, please contact the dictating provider.

## 2023-10-30 NOTE — ASSESSMENT & PLAN NOTE
Lab Results   Component Value Date    EGFR 52 10/27/2023    EGFR 61 05/02/2023    EGFR 42 10/20/2022    CREATININE 1.39 (H) 10/27/2023    CREATININE 1.22 05/02/2023    CREATININE 1.65 (H) 10/20/2022   PTH and phosphorus along with vitamin D are within acceptable range and will continue to monitor

## 2023-10-30 NOTE — ASSESSMENT & PLAN NOTE
Lab Results   Component Value Date    EGFR 52 10/27/2023    EGFR 61 05/02/2023    EGFR 42 10/20/2022    CREATININE 1.39 (H) 10/27/2023    CREATININE 1.22 05/02/2023    CREATININE 1.65 (H) 10/20/2022   Kidney function seems to quite stable.   Advise hydration and avoiding nephrotoxic medication

## 2023-10-31 ENCOUNTER — SOCIAL WORK (OUTPATIENT)
Dept: BEHAVIORAL/MENTAL HEALTH CLINIC | Facility: CLINIC | Age: 68
End: 2023-10-31
Payer: MEDICARE

## 2023-10-31 DIAGNOSIS — F43.10 PTSD (POST-TRAUMATIC STRESS DISORDER): Primary | ICD-10-CM

## 2023-10-31 DIAGNOSIS — Z63.4 EXPECTED BEREAVEMENT DUE TO LIFE EVENT: ICD-10-CM

## 2023-10-31 DIAGNOSIS — F43.11 ACUTE POST-TRAUMATIC STRESS DISORDER: ICD-10-CM

## 2023-10-31 DIAGNOSIS — R41.3 MEMORY DIFFICULTY: ICD-10-CM

## 2023-10-31 DIAGNOSIS — F33.2 MAJOR DEPRESSIVE DISORDER, RECURRENT SEVERE WITHOUT PSYCHOTIC FEATURES (HCC): ICD-10-CM

## 2023-10-31 PROCEDURE — 90834 PSYTX W PT 45 MINUTES: CPT | Performed by: SOCIAL WORKER

## 2023-10-31 SDOH — SOCIAL STABILITY - SOCIAL INSECURITY: DISSAPEARANCE AND DEATH OF FAMILY MEMBER: Z63.4

## 2023-10-31 NOTE — PSYCH
Behavioral Health Psychotherapy Progress Note    Psychotherapy Provided: Individual Psychotherapy     Depression and anxiety    Goals addressed in session: Goal 1, Goal 2, and Goal 3      DATA: Miladis Bernal discussed his wife is upset about finances. They are also dealing with the fact Miladis Bernal is having sexual difficulty. We discussed talking to his MD. He has cardiac, lung and kidney issues due to his experience with covid. Their one daughter who cheated on her  doesn't talk to Miladis Bernal and his wife. I offered to have his wife come in for sessions. I provided support, encouragement and strategies to cope. During this session, this clinician used the following therapeutic modalities: Client-centered Therapy, Cognitive Behavioral Therapy, Mindfulness-based Strategies, and Supportive Psychotherapy    Substance Abuse was not addressed during this session. If the client is diagnosed with a co-occurring substance use disorder, please indicate any changes in the frequency or amount of use: n/a. Stage of change for addressing substance use diagnoses: No substance use/Not applicable    ASSESSMENT:  Gab Leyva presents with a Anxious and Depressed mood. his affect is anxious and depressed which is congruent, with his mood and the content of the session. The client has made progress on their goals. Gab Leyva presents with a none risk of suicide, none risk of self-harm, and none risk of harm to others. For any risk assessment that surpasses a "low" rating, a safety plan must be developed. A safety plan was indicated: yes  If yes, describe in detail n/a    PLAN: Between sessions, Gab Leyva will use mindfulness and CBT. At the next session, the therapist will use Client-centered Therapy, Cognitive Behavioral Therapy, Mindfulness-based Strategies, and Supportive Psychotherapy to address issues and symptoms as they may arise. .    Behavioral Health Treatment Plan and Discharge Planning: Gab Leyva is aware of and agrees to continue to work on their treatment plan. They have identified and are working toward their discharge goals.  yes    Visit start and stop times:1:10pm till 2:00pm    10/31/23

## 2023-11-05 DIAGNOSIS — R41.3 MEMORY DIFFICULTY: ICD-10-CM

## 2023-11-06 RX ORDER — DONEPEZIL HYDROCHLORIDE 10 MG/1
10 TABLET, FILM COATED ORAL
Qty: 90 TABLET | Refills: 1 | Status: SHIPPED | OUTPATIENT
Start: 2023-11-06

## 2023-11-07 ENCOUNTER — HOSPITAL ENCOUNTER (OUTPATIENT)
Dept: RADIOLOGY | Facility: CLINIC | Age: 68
Discharge: HOME | End: 2023-11-07
Admitting: STUDENT IN AN ORGANIZED HEALTH CARE EDUCATION/TRAINING PROGRAM
Payer: OTHER MISCELLANEOUS

## 2023-11-07 VITALS
DIASTOLIC BLOOD PRESSURE: 68 MMHG | HEART RATE: 57 BPM | RESPIRATION RATE: 18 BRPM | SYSTOLIC BLOOD PRESSURE: 117 MMHG | OXYGEN SATURATION: 91 % | TEMPERATURE: 96.9 F

## 2023-11-07 DIAGNOSIS — M16.12 PRIMARY OSTEOARTHRITIS OF LEFT HIP: ICD-10-CM

## 2023-11-07 DIAGNOSIS — M16.11 PRIMARY OSTEOARTHRITIS OF RIGHT HIP: ICD-10-CM

## 2023-11-07 PROCEDURE — 20610 DRAIN/INJ JOINT/BURSA W/O US: CPT | Performed by: STUDENT IN AN ORGANIZED HEALTH CARE EDUCATION/TRAINING PROGRAM

## 2023-11-07 PROCEDURE — 77002 NEEDLE LOCALIZATION BY XRAY: CPT | Performed by: STUDENT IN AN ORGANIZED HEALTH CARE EDUCATION/TRAINING PROGRAM

## 2023-11-07 PROCEDURE — 77002 NEEDLE LOCALIZATION BY XRAY: CPT

## 2023-11-07 RX ORDER — BUPIVACAINE HCL/PF 2.5 MG/ML
4 VIAL (ML) INJECTION ONCE
Status: COMPLETED | OUTPATIENT
Start: 2023-11-07 | End: 2023-11-07

## 2023-11-07 RX ORDER — METHYLPREDNISOLONE ACETATE 80 MG/ML
80 INJECTION, SUSPENSION INTRA-ARTICULAR; INTRALESIONAL; INTRAMUSCULAR; PARENTERAL; SOFT TISSUE ONCE
Status: COMPLETED | OUTPATIENT
Start: 2023-11-07 | End: 2023-11-07

## 2023-11-07 RX ADMIN — IOHEXOL 1 ML: 300 INJECTION, SOLUTION INTRAVENOUS at 14:11

## 2023-11-07 RX ADMIN — Medication 4 ML: at 14:11

## 2023-11-07 RX ADMIN — METHYLPREDNISOLONE ACETATE 80 MG: 80 INJECTION, SUSPENSION INTRA-ARTICULAR; INTRALESIONAL; INTRAMUSCULAR; PARENTERAL; SOFT TISSUE at 14:11

## 2023-11-07 NOTE — DISCHARGE INSTR - LAB

## 2023-11-07 NOTE — H&P
History of Present Illness:  The patient is a 79 y.o. male who presents with complaints of right hip pain    Past Medical History:   Diagnosis Date    Chronic kidney disease     COVID-19 03/2020    Hypertension 4/02/2020    Liver disease     Pneumonia 3/32/2020    PTSD (post-traumatic stress disorder)        Past Surgical History:   Procedure Laterality Date    FL INJECTION RIGHT HIP (NON ARTHROGRAM)  11/30/2020    FL INJECTION RIGHT HIP (NON ARTHROGRAM)  8/4/2021    FL INJECTION RIGHT HIP (NON ARTHROGRAM)  12/27/2021    FL INJECTION RIGHT HIP (NON ARTHROGRAM)  4/25/2022    NO PAST SURGERIES           Current Outpatient Medications:     albuterol (2.5 mg/3 mL) 0.083 % nebulizer solution, INHALE 1 VIAL BY NEBULIZATION EVERY 6 (SIX) HOURS AS NEEDED FOR WHEEZING OR SHORTNESS OF BREATH, Disp: 375 mL, Rfl: 2    amLODIPine (NORVASC) 2.5 mg tablet, TAKE 1 TABLET (2.5 MG TOTAL) BY MOUTH DAILY AFTER DINNER, Disp: , Rfl:     donepezil (ARICEPT) 10 mg tablet, TAKE 1 TABLET BY MOUTH DAILY AT BEDTIME, Disp: 90 tablet, Rfl: 1    DULoxetine (CYMBALTA) 60 mg delayed release capsule, TAKE 1 CAPSULE BY MOUTH EVERY MORNING, Disp: 90 capsule, Rfl: 0    gabapentin (NEURONTIN) 100 mg capsule, TAKE 1 CAPSULE TWICE DAILY AND TAKE 2 CAPSULES AT BEDTIME (Patient taking differently: Take 100 mg by mouth 4 (four) times a day TAKE 1 CAPSULES IN THE MORNING 1 CAPSULES AFTERNOON  2 CAPSULES AT BEDTIME), Disp: 120 capsule, Rfl: 0    ipratropium-albuterol (DUO-NEB) 0.5-2.5 mg/3 mL nebulizer solution, Take 3 mL by nebulization 4 (four) times a day, Disp: 75 mL, Rfl: 5    Multiple Vitamins-Minerals (MULTIVITAMIN ADULT PO), Take 1 tablet by mouth daily, Disp: , Rfl:     prazosin (MINIPRESS) 1 mg capsule, Take 1 capsule (1 mg total) by mouth daily at bedtime, Disp: 90 capsule, Rfl: 0    rOPINIRole (REQUIP) 3 mg tablet, Take 1 tablet (3 mg total) by mouth daily at bedtime, Disp: 90 tablet, Rfl: 0    sildenafil (VIAGRA) 50 MG tablet, Take 1 tablet (50 mg total) by mouth daily as needed for erectile dysfunction, Disp: 10 tablet, Rfl: 0    umeclidinium-vilanterol 62.5-25 mcg/actuation inhaler, Inhale 1 puff daily, Disp: 60 blister, Rfl: 5    Ventolin  (90 Base) MCG/ACT inhaler, TAKE 2 PUFFS BY MOUTH EVERY 6 HOURS AS NEEDED FOR WHEEZE, Disp: 18 g, Rfl: 3    Current Facility-Administered Medications:     bupivacaine (PF) (MARCAINE) 0.25 % injection 4 mL, 4 mL, Intra-articular, Once, Dorina Crandall MD    iohexol (OMNIPAQUE) 300 mg/mL injection 1 mL, 1 mL, Intra-articular, Once, Dorina Crandall MD    methylPREDNISolone acetate (DEPO-MEDROL) injection 80 mg, 80 mg, Intra-articular, Once, Dorina Crandall MD    Allergies   Allergen Reactions    Tetracycline Rash       Physical Exam: There were no vitals filed for this visit. General: Awake, Alert, Oriented x 3, Mood and affect appropriate  Respiratory: Respirations even and unlabored  Cardiovascular: Peripheral pulses intact; no edema  Musculoskeletal Exam: pain with right hip flexion    ASA Score: 3    Patient/Chart Verification  Patient ID Verified: Verbal  ID Band Applied: No  Consents Confirmed: Procedural, To be obtained in the Pre-Procedure area  H&P( within 30 days) Verified: Yes  Interval H&P(within 24 hr) Complete (required for Outpatients and Surgery Admit only): Yes  Allergies Reviewed: Yes  Anticoag/NSAID held?: NA  Currently on antibiotics?: No    Assessment:   1. Primary osteoarthritis of right hip    2. Primary osteoarthritis of left hip        Plan: FL guided bilateral hip intra-articular CSI's.

## 2023-11-08 ENCOUNTER — TELEMEDICINE (OUTPATIENT)
Dept: PSYCHIATRY | Facility: CLINIC | Age: 68
End: 2023-11-08
Payer: COMMERCIAL

## 2023-11-08 DIAGNOSIS — F32.A DEPRESSION, UNSPECIFIED DEPRESSION TYPE: Primary | ICD-10-CM

## 2023-11-08 DIAGNOSIS — F43.10 PTSD (POST-TRAUMATIC STRESS DISORDER): ICD-10-CM

## 2023-11-08 DIAGNOSIS — G44.52 NEW DAILY PERSISTENT HEADACHE: ICD-10-CM

## 2023-11-08 DIAGNOSIS — G25.81 RESTLESS LEGS: ICD-10-CM

## 2023-11-08 PROCEDURE — 99213 OFFICE O/P EST LOW 20 MIN: CPT | Performed by: PHYSICIAN ASSISTANT

## 2023-11-09 ENCOUNTER — OFFICE VISIT (OUTPATIENT)
Age: 68
End: 2023-11-09
Payer: MEDICARE

## 2023-11-09 VITALS
HEIGHT: 68 IN | OXYGEN SATURATION: 94 % | TEMPERATURE: 98.1 F | SYSTOLIC BLOOD PRESSURE: 124 MMHG | BODY MASS INDEX: 40.13 KG/M2 | WEIGHT: 264.8 LBS | DIASTOLIC BLOOD PRESSURE: 80 MMHG | HEART RATE: 99 BPM

## 2023-11-09 DIAGNOSIS — E66.01 MORBID OBESITY (HCC): ICD-10-CM

## 2023-11-09 DIAGNOSIS — R06.02 SHORTNESS OF BREATH: ICD-10-CM

## 2023-11-09 DIAGNOSIS — R06.09 DOE (DYSPNEA ON EXERTION): ICD-10-CM

## 2023-11-09 DIAGNOSIS — G47.8 OTHER SLEEP DISORDERS: ICD-10-CM

## 2023-11-09 DIAGNOSIS — J47.9 BRONCHIECTASIS WITHOUT COMPLICATION (HCC): ICD-10-CM

## 2023-11-09 DIAGNOSIS — F17.211 CIGARETTE NICOTINE DEPENDENCE IN REMISSION: ICD-10-CM

## 2023-11-09 DIAGNOSIS — U09.9 COVID-19 LONG HAULER: ICD-10-CM

## 2023-11-09 DIAGNOSIS — R06.81 WITNESSED APNEIC SPELLS: ICD-10-CM

## 2023-11-09 DIAGNOSIS — J43.2 CENTRILOBULAR EMPHYSEMA (HCC): ICD-10-CM

## 2023-11-09 DIAGNOSIS — R06.83 SNORING: Primary | ICD-10-CM

## 2023-11-09 PROCEDURE — 99214 OFFICE O/P EST MOD 30 MIN: CPT | Performed by: INTERNAL MEDICINE

## 2023-11-09 NOTE — PROGRESS NOTES
Assessment/Plan:   Diagnoses and all orders for this visit:    Snoring  -     Home Study; Future    Witnessed apneic spells  -     Home Study; Future    Morbid obesity ([address])  -     Home Study; Future    Other sleep disorders  -     Home Study; Future    Shortness of breath    WALTERS (dyspnea on exertion)    COVID-19 long hauler    Centrilobular emphysema (HCC)    Bronchiectasis without complication (HCC)    Cigarette nicotine dependence in remission  -     CT lung screening program; Future    PFTs in 2022 with restrictive limitation, moderately reduced lung volumes and moderately decreased DLCO and 6-minute walk test did not require any need for oxygen  Continues with Anoro 1 puff daily  Continue with albuterol 2 puffs 4 times daily as needed  Lung cancer screening CAT scan in June 2023 has been normal with mild emphysematous, no suspicious lung nodules, would need repeat lung cancer screening June 2024  Patient has signs and symptoms of obstructive sleep apnea. Etiology pathogenesis of obstructive sleep apnea discussed in detail  Consequences of untreated sleep apnea discussed with excessive daytime sleepiness, increased risk for myocardial infarction stroke atrial fibrillation discussed  Testing procedure was discussed  He will have the home sleep study done and follow-up if there is evidence of abnormal nocturnal breathing he will undergo a CPAP titration study/auto CPAP for maximal benefit  Cautioned against driving when sleepy. Recommend weight loss  Follow-up after the home sleep study. No follow-ups on file. All questions are answered to the patient's satisfaction and understanding. He verbalizes understanding. He is encouraged to call with any further questions or concerns. Portions of the record may have been created with voice recognition software. Occasional wrong word or "sound a like" substitutions may have occurred due to the inherent limitations of voice recognition software.   Read the chart carefully and recognize, using context, where substitutions have occurred. Electronically Signed by Radha Cuadra MD    ______________________________________________________________________    Chief Complaint:   Chief Complaint   Patient presents with    Follow-up       Patient ID: Shawnee Hussein is a 79 y.o. y.o. male has a past medical history of Chronic kidney disease, COVID-19 (03/2020), Hypertension (4/02/2020), Liver disease, Pneumonia (3/32/2020), and PTSD (post-traumatic stress disorder). 11/9/2023  Patient presents today for follow-up visit. Patient is a 74-year-old male former smoker who worked as an  at [employer] with history of COVID-19 infection in April 2020 requiring intubation, treated with Plaquenil and Zithromax as well as tocilzumab. He did require oxygen upon discharge but has since been titrated off O2. He is here today for follow-up. He continues to remain stable with his breathing, using the Anoro daily, albuterol as needed. He continues to have chronic dyspnea on exertion which has been stable for some time. Also continues to have bilateral hip pain that sometimes does limit his movement. He was also ordered for a home sleep study given the symptoms about a year ago but he did not get it done he states he still has significant symptoms with regards to that he states. General  Associated symptoms include fatigue and headaches. Pertinent negatives include no chest pain, fever, myalgias or sore throat. Review of Systems   Constitutional:  Positive for fatigue and unexpected weight change (states has gained significant weight in the past 3 to 4 years. ). Negative for appetite change and fever. HENT: Negative. Negative for ear pain, postnasal drip, rhinorrhea, sneezing, sore throat and trouble swallowing. Eyes: Negative. Respiratory:  Positive for shortness of breath.          Snoring, witnessed apneic spells no history of choking or gasping for air.   Cardiovascular: Negative. Negative for chest pain. Gastrointestinal: Negative. Endocrine: Negative. Genitourinary: Negative. Musculoskeletal: Negative. Negative for myalgias. Allergic/Immunologic: Negative. Neurological:  Positive for headaches. Hematological: Negative. Psychiatric/Behavioral: Negative. Smoking history: He reports that he quit smoking about 3 years ago. His smoking use included cigarettes. He started smoking about 50 years ago. He has a 60.00 pack-year smoking history. He has been exposed to tobacco smoke.  He has never used smokeless tobacco.    The following portions of the patient's history were reviewed and updated as appropriate: allergies, current medications, past family history, past medical history, past social history, past surgical history, and problem list.    Immunization History   Administered Date(s) Administered    COVID-19 PFIZER VACCINE 0.3 ML IM 04/15/2021, 05/08/2021, 02/04/2022    INFLUENZA 12/22/2022    Influenza, high dose seasonal 0.7 mL 12/22/2022    Influenza, recombinant, quadrivalent,injectable, preservative free 10/05/2020    Pneumococcal Conjugate 13-Valent 10/05/2020    Pneumococcal Conjugate Vaccine 20-valent (Pcv20), Polysace 12/22/2022     Current Outpatient Medications   Medication Sig Dispense Refill    albuterol (2.5 mg/3 mL) 0.083 % nebulizer solution INHALE 1 VIAL BY NEBULIZATION EVERY 6 (SIX) HOURS AS NEEDED FOR WHEEZING OR SHORTNESS OF BREATH 375 mL 2    amLODIPine (NORVASC) 2.5 mg tablet TAKE 1 TABLET (2.5 MG TOTAL) BY MOUTH DAILY AFTER DINNER      donepezil (ARICEPT) 10 mg tablet TAKE 1 TABLET BY MOUTH DAILY AT BEDTIME 90 tablet 1    DULoxetine (CYMBALTA) 60 mg delayed release capsule TAKE 1 CAPSULE BY MOUTH EVERY MORNING 90 capsule 0    gabapentin (NEURONTIN) 100 mg capsule TAKE 1 CAPSULE TWICE DAILY AND TAKE 2 CAPSULES AT BEDTIME (Patient taking differently: Take 100 mg by mouth 4 (four) times a day TAKE 1 CAPSULES IN THE MORNING 1 CAPSULES AFTERNOON  2 CAPSULES AT BEDTIME) 120 capsule 0    ipratropium-albuterol (DUO-NEB) 0.5-2.5 mg/3 mL nebulizer solution Take 3 mL by nebulization 4 (four) times a day 75 mL 5    Multiple Vitamins-Minerals (MULTIVITAMIN ADULT PO) Take 1 tablet by mouth daily      prazosin (MINIPRESS) 1 mg capsule Take 1 capsule (1 mg total) by mouth daily at bedtime 90 capsule 0    rOPINIRole (REQUIP) 3 mg tablet Take 1 tablet (3 mg total) by mouth daily at bedtime 90 tablet 0    sildenafil (VIAGRA) 50 MG tablet Take 1 tablet (50 mg total) by mouth daily as needed for erectile dysfunction 10 tablet 0    umeclidinium-vilanterol 62.5-25 mcg/actuation inhaler Inhale 1 puff daily 60 blister 5    Ventolin  (90 Base) MCG/ACT inhaler TAKE 2 PUFFS BY MOUTH EVERY 6 HOURS AS NEEDED FOR WHEEZE 18 g 3     No current facility-administered medications for this visit. Allergies: Tetracycline    Objective:  Vitals:    11/09/23 1150   BP: 124/80   Pulse: 99   Temp: 98.1 °F (36.7 °C)   SpO2: 94%   Weight: 120 kg (264 lb 12.8 oz)   Height: 5' 8" (1.727 m)   Oxygen Therapy  SpO2: 94 %  . Wt Readings from Last 3 Encounters:   11/09/23 120 kg (264 lb 12.8 oz)   10/30/23 119 kg (262 lb 12.8 oz)   08/22/23 121 kg (266 lb 4.8 oz)     Body mass index is 40.26 kg/m². Physical Exam  Constitutional:       Appearance: He is well-developed. HENT:      Head: Normocephalic and atraumatic. Eyes:      Pupils: Pupils are equal, round, and reactive to light. Neck:      Comments: Short and wide neck  Cardiovascular:      Rate and Rhythm: Normal rate and regular rhythm. Heart sounds: Normal heart sounds. Pulmonary:      Effort: Pulmonary effort is normal.      Breath sounds: Normal breath sounds. Musculoskeletal:         General: Normal range of motion. Cervical back: Normal range of motion and neck supple. Skin:     General: Skin is warm and dry.    Neurological:      Mental Status: He is alert and oriented to person, place, and time. Psychiatric:         Behavior: Behavior normal.             Diagnostics:  I have personally reviewed pertinent films in PACS  Reviewed CT lung screening from June 2023 with mild emphysematous changes no suspicious lung nodules  ESS: Total score: 6  FL spine and pain procedure    Result Date: 11/7/2023  Narrative: PROCEDURE NOTE PATIENT NAME:  Monica Murphy MEDICAL RECORD NUMBER:  [id number] YOB: 1955 DATE OF PROCEDURE:  11/07/23 PROCEDURE: Right sided intra-articular hip injection with steroid and local anesthetic under fluoroscopic guidance. ATTENDING PHYSICIAN: Juan Allen M.D. PRE-PROCEDURE DIAGNOSIS: Degenerative joint disease of the hip POST-PROCEDURE DIAGNOSIS: Degenerative joint disease of the hip ANESTHESIA: Local ESTIMATED BLOOD LOSS: Minimal COMPLICATIONS: None CONSENT: Today's procedure, its potential benefits as well as its risks and potential side effects were reviewed. Discussed risks of the procedure include bleeding, infection, nerve irritation or damage, reactions to medications administered, failure of the pain to improve, and exacerbation of the pain. These risks were explained to the patient, who verbalized understanding and who wished to proceed. Informed consent was signed. DESCRIPTION OF PROCEDURE: After written informed consent was obtained, the patient was taken to the fluoroscopy suite and placed in the supine position. Anatomical landmarks were identified by way of fluoroscopy in the AP view. The hip was internally rotated and the patient verbalized comfort in this position. The skin overlying the needle insertion site was prepped using antiseptic and draped in the usual sterile fashion. Strict aseptic technique was utilized throughout the procedure. The skin and subcutaneous tissues at the needle entry site were infiltrated with 1 mL of 1% preservative-free lidocaine using a 25 gauge 1-1/2 inch needle.  A 22 gauge 3-1/2- inch needle was then incrementally advanced under fluoroscopic guidance starting at the intertrochanteric line. The needle was advanced medially and cephalad until os was contacted at the femoral neck. Proper needle placement was confirmed with the aid of fluoroscopy in the AP view. After negative aspiration for heme, 1 mL Omnipaque 300mg/ml was injected which delineated the joint capsule under fluoroscopy in the AP view. After negative aspiration was again confirmed, a mixture of 1 mL of 80mg/mL Depo-Medrol and 4 mL of preservative-free 0.25% bupivacaine was slowly injected. All needles were removed with tip intact. Hemostasis was maintained. There were no apparent paresthesias, lower extremity weakness or complications. The area was cleaned and a Band-Aid was placed as necessary. The patient tolerated the procedure well. After a period of observation, the patient was noted to be hemodynamically stable and neurovascularly intact following the procedure as prior to the procedure, and was ultimately discharged to home with supervision in good condition. The patient was provided discharge instructions and instructed that we would call in approximately 7 days to obtain an update. FL spine and pain procedure    Result Date: 10/19/2023  Narrative: PROCEDURE NOTE PATIENT NAME:  Ludy Troy MEDICAL RECORD NUMBER:  [id number] YOB: 1955 DATE OF PROCEDURE:  10/19/23 PROCEDURE: Left sided intra-articular hip injection with steroid and local anesthetic under fluoroscopic guidance. ATTENDING PHYSICIAN: Rosanna Elizalde M.D. PRE-PROCEDURE DIAGNOSIS: Degenerative joint disease of the hip POST-PROCEDURE DIAGNOSIS: Degenerative joint disease of the hip ANESTHESIA: Local ESTIMATED BLOOD LOSS: Minimal COMPLICATIONS: None CONSENT: Today's procedure, its potential benefits as well as its risks and potential side effects were reviewed.  Discussed risks of the procedure include bleeding, infection, nerve irritation or damage, reactions to medications administered, failure of the pain to improve, and exacerbation of the pain. These risks were explained to the patient, who verbalized understanding and who wished to proceed. Informed consent was signed. DESCRIPTION OF PROCEDURE: After written informed consent was obtained, the patient was taken to the fluoroscopy suite and placed in the supine position. Anatomical landmarks were identified by way of fluoroscopy in the AP view. The hip was internally rotated and the patient verbalized comfort in this position. The skin overlying the needle insertion site was prepped using antiseptic and draped in the usual sterile fashion. Strict aseptic technique was utilized throughout the procedure. The skin and subcutaneous tissues at the needle entry site were infiltrated with 1 mL of 1% preservative-free lidocaine using a 25 gauge 1-1/2 inch needle. A 22 gauge 3-1/2- inch needle was then incrementally advanced under fluoroscopic guidance starting at the intertrochanteric line. The needle was advanced medially and cephalad until os was contacted at the femoral neck. Proper needle placement was confirmed with the aid of fluoroscopy in the AP view. After negative aspiration for heme, 1 mL Omnipaque 300mg/ml was injected which delineated the joint capsule under fluoroscopy in the AP view. After negative aspiration was again confirmed, a mixture of 1 mL of 80mg/mL Depo-Medrol and 4 mL of preservative-free 0.25% bupivacaine was slowly injected. All needles were removed with tip intact. Hemostasis was maintained. There were no apparent paresthesias, lower extremity weakness or complications. The area was cleaned and a Band-Aid was placed as necessary. The patient tolerated the procedure well. After a period of observation, the patient was noted to be hemodynamically stable and neurovascularly intact following the procedure as prior to the procedure, and was ultimately discharged to home with supervision in good condition. The patient was provided discharge instructions and instructed that we would call in approximately 7 days to obtain an update.    Answers submitted by the patient for this visit:  Pulmonology Questionnaire (Submitted on 11/6/2023)  Chief Complaint: Primary symptoms  Do you have difficulty breathing?: Yes  Do you have shortness of breath?: Yes  Chronicity: chronic  When did you first notice your symptoms?: more than 1 year ago  How often do your symptoms occur?: daily  Since you first noticed this problem, how has it changed?: unchanged  Have you had a change in appetite?: No  Do you have chest pain?: No  Do you have shortness of breath that occurs with effort or exertion?: Yes  Do you have ear congestion?: No  Do you have ear pain?: No  Do you have a fever?: No  Do you have headaches?: Yes  Do you have heartburn?: No  Do you have fatigue?: No  Do you have muscle pain?: No  Do you have nasal congestion?: No  Do you have shortness of breath when lying flat?: No  Do you have shortness of breath when you wake up?: Yes  Do you have post-nasal drip?: No  Do you have a runny nose?: No  Do you have sneezing?: No  Do you have a sore throat?: No  Do you have sweats?: No  Do you have trouble swallowing?: No  Have you experienced weight loss?: No  Which of the following makes your symptoms worse?: change in weather, climbing stairs, emotional stress, exercise, pollen, strenuous activity  Which of the following makes your symptoms better?: nothing

## 2023-11-13 ENCOUNTER — TELEPHONE (OUTPATIENT)
Dept: PSYCHIATRY | Facility: CLINIC | Age: 68
End: 2023-11-13

## 2023-11-13 NOTE — TELEPHONE ENCOUNTER
Patient is calling regarding cancelling an appointment. Date/Time: 11/17/23 at 1:00 pm.    Reason: The patient has an important dentist appt on 11/17/23.      Patient was rescheduled: YES [x] NO []  If yes, when was Patient reschedule for: 2/6/24    Patient requesting call back to reschedule: YES [] NO [x]    The appointment placed on the cancellation list.

## 2023-11-14 RX ORDER — ROPINIROLE 3 MG/1
3 TABLET, FILM COATED ORAL
Qty: 90 TABLET | Refills: 0 | Status: SHIPPED | OUTPATIENT
Start: 2023-11-14

## 2023-11-14 RX ORDER — PRAZOSIN HYDROCHLORIDE 1 MG/1
1 CAPSULE ORAL
Qty: 90 CAPSULE | Refills: 0 | Status: SHIPPED | OUTPATIENT
Start: 2023-11-14

## 2023-11-14 RX ORDER — GABAPENTIN 100 MG/1
CAPSULE ORAL
COMMUNITY
Start: 2023-11-14

## 2023-11-14 RX ORDER — GABAPENTIN 100 MG/1
CAPSULE ORAL
Qty: 120 CAPSULE | Refills: 2 | Status: SHIPPED | OUTPATIENT
Start: 2023-11-14 | End: 2023-11-14 | Stop reason: SDUPTHER

## 2023-11-14 NOTE — PSYCH
Virtual Regular Visit    Verification of patient location:    Patient is located at Home in the following state in which I hold an active license [place]         Reason for visit is   Chief Complaint   Patient presents with    Virtual Regular Visit          Encounter provider Sari Dodge PA-C    Provider located at  [address] [facility]  [address] [place] [id number] [phone]      Recent Visits  Date Type Provider Dept   11/08/23 Telemedicine Sari Dodge PA-C [address] recent visits within past 7 days and meeting all other requirements  Future Appointments  No visits were found meeting these conditions. Showing future appointments within next 150 days and meeting all other requirements       The patient was identified by name and date of birth. Jude Chapman was informed that this is a telemedicine visit and [facility]. He agrees to proceed. .  My office door was closed. The patient was notified the following individuals were present in the room Elva Leach . He acknowledged consent and understanding of privacy and security of the video platform. The patient has agreed to participate and understands they can discontinue the visit at any time. Patient is aware this is a billable service. Visit Time    Visit Start Time: 7696  Visit Stop Time: 8050  Total Visit Duration:  25 minutes    MEDICATION MANAGEMENT NOTE        [address] [facility] [address] [facility] [address]  [address] [place] [id number] [phone]        Name and Date of Birth:  Jude Chapman 79 y.o. 1955    Date of Visit: Nov, 8, 2023    SUBJECTIVE:      Farzad Lopez seen by [facility] 8/8 at which time meds unchanged. Farzad Lopez c/o increased appetite and weight gain.   Still has occassional nightmares and continues to be only able to tolerate low level of stimulation. Sometimes takes naps during the day. Is staying up late, watching the news, especially when he can't sleep and feeling "too emotional". Can get tearful and "feel like a burden". No SI. Is eating at night if he is up. Continues to grieve the loss of his ability to work- "like taking away your hands" and activities limited by shortness of breath and low stamina. Juan Luis Carter does continue to enjoy his birds and keeps in touch with many people in [address] [place] [address]. Enjoys being a resource and is very knowledgeable. Review of Systems   Constitutional:  Positive for appetite change and unexpected weight change. Respiratory:  Positive for shortness of breath. Chronic- sees pulm   Psychiatric/Behavioral:  Positive for sleep disturbance. Psychiatric History     This office since 5/7/20. No IP or suicide attempts. Past med trial- zoloft. Trauma/Loss History       ARDS secondary to covid- intubated x 10 days, April 2020. Lost brother and coworkers to [employer]. Has been too ill to return to work. Social History        Lives with wife Lisa Kumar -  >40 yrs. 2 daugthers and a son. Worked at [employer] x 40 yrs -.       OBJECTIVE:     MENTAL STATUS EXAM  Appearance:  age appropriate, dressed casually   Behavior:  Pleasant & cooperative   Speech:  Normal volume, regular rate and rhythm   Mood:  Low at baseline, brightens considerable when he is talking about his birds   Affect:  mood congruent   Language: intact and appropriate for age, education, and intellect   Thought Process:  goal directed   Associations: intact associations   Thought Content:  normal and appropriate   Perceptual Disturbances: no auditory or visual hallcunations   Risk Potential / Abnormal Thoughts: Suicidal ideation - None  Homicidal ideation - None  Potential for aggression - No       Consciousness:  Alert & Awake Sensorium:  Grossly oriented   Attention: attention span and concentration are age appropriate       Fund of Knowledge:  Memory: awareness of current events: yes  recent and remote memory grossly intact   Insight:  good   Judgment: good     Lab Review: I have reviewed all pertinent labs        Lab Results   Component Value Date    SODIUM 136 10/27/2023    K 4.3 10/27/2023     10/27/2023    CO2 24 10/27/2023    AGAP 7 10/27/2023    BUN 19 10/27/2023    CREATININE 1.39 (H) 10/27/2023    GLUC 116 10/20/2022    GLUF 92 10/27/2023    CALCIUM 9.3 10/27/2023    AST 43 04/13/2022    ALT 60 04/13/2022    ALKPHOS 88 04/13/2022    TP 7.5 04/13/2022    TBILI 0.60 04/13/2022    EGFR 52 10/27/2023     Lab Results   Component Value Date    WBC 10.15 10/27/2023    HGB 16.8 10/27/2023    HCT 51.3 (H) 10/27/2023    MCV 88 10/27/2023     10/27/2023     Lab Results   Component Value Date    SSFVHZVH66 431 02/06/2023     Lab Results   Component Value Date    FOLATE 9.0 02/06/2023 8/16/23: MRI Brain- mild microangiographic changes.      ASSESSMENT & PLAN          Depression  PTSD    Current Outpatient Medications   Medication Sig Dispense Refill    gabapentin (NEURONTIN) 100 mg capsule One po qam and 2 po q bedtime      prazosin (MINIPRESS) 1 mg capsule Take 1 capsule (1 mg total) by mouth daily at bedtime 90 capsule 0    rOPINIRole (REQUIP) 3 mg tablet Take 1 tablet (3 mg total) by mouth daily at bedtime 90 tablet 0    albuterol (2.5 mg/3 mL) 0.083 % nebulizer solution INHALE 1 VIAL BY NEBULIZATION EVERY 6 (SIX) HOURS AS NEEDED FOR WHEEZING OR SHORTNESS OF BREATH 375 mL 2    amLODIPine (NORVASC) 2.5 mg tablet TAKE 1 TABLET (2.5 MG TOTAL) BY MOUTH DAILY AFTER DINNER      donepezil (ARICEPT) 10 mg tablet TAKE 1 TABLET BY MOUTH DAILY AT BEDTIME 90 tablet 1    DULoxetine (CYMBALTA) 60 mg delayed release capsule TAKE 1 CAPSULE BY MOUTH EVERY MORNING 90 capsule 0    ipratropium-albuterol (DUO-NEB) 0.5-2.5 mg/3 mL nebulizer solution Take 3 mL by nebulization 4 (four) times a day 75 mL 5    Multiple Vitamins-Minerals (MULTIVITAMIN ADULT PO) Take 1 tablet by mouth daily      sildenafil (VIAGRA) 50 MG tablet Take 1 tablet (50 mg total) by mouth daily as needed for erectile dysfunction 10 tablet 0    umeclidinium-vilanterol 62.5-25 mcg/actuation inhaler Inhale 1 puff daily 60 blister 5    Ventolin  (90 Base) MCG/ACT inhaler TAKE 2 PUFFS BY MOUTH EVERY 6 HOURS AS NEEDED FOR WHEEZE 18 g 3     No current facility-administered medications for this visit. Plan:        Decrease neurontin secondary to weight gain- 100/200 mg. Cont prazosin 1 mg q bedtime, cymbalta 60 mg/d and requip 3 mg q bedtime    Reviewed risks, benefits, side effects of medications, including no medication. Patient understands and agrees to treatment plan. F/u Jose Luis 6 weeks, sooner prn  Cont f.u Tisha Mckeonh for ind therapy       Patient has been informed of 24 hours and weekend coverage for urgent situations accessed by calling the main clinic phone number.      Luis M Kimble PA-C

## 2023-11-28 DIAGNOSIS — R06.02 SHORTNESS OF BREATH: ICD-10-CM

## 2023-11-28 DIAGNOSIS — G25.81 RESTLESS LEGS: ICD-10-CM

## 2023-11-28 RX ORDER — ALBUTEROL SULFATE 90 UG/1
AEROSOL, METERED RESPIRATORY (INHALATION)
Qty: 18 G | Refills: 3 | Status: SHIPPED | OUTPATIENT
Start: 2023-11-28

## 2023-12-01 RX ORDER — ROPINIROLE 3 MG/1
3 TABLET, FILM COATED ORAL
Qty: 90 TABLET | Refills: 2 | Status: SHIPPED | OUTPATIENT
Start: 2023-12-01

## 2023-12-04 ENCOUNTER — OFFICE VISIT (OUTPATIENT)
Age: 68
End: 2023-12-04
Payer: COMMERCIAL

## 2023-12-04 VITALS
BODY MASS INDEX: 41.62 KG/M2 | HEIGHT: 68 IN | OXYGEN SATURATION: 93 % | SYSTOLIC BLOOD PRESSURE: 108 MMHG | TEMPERATURE: 97.1 F | RESPIRATION RATE: 18 BRPM | HEART RATE: 91 BPM | DIASTOLIC BLOOD PRESSURE: 70 MMHG | WEIGHT: 274.6 LBS

## 2023-12-04 DIAGNOSIS — F43.10 PTSD (POST-TRAUMATIC STRESS DISORDER): ICD-10-CM

## 2023-12-04 DIAGNOSIS — E66.01 OBESITY, MORBID (HCC): ICD-10-CM

## 2023-12-04 DIAGNOSIS — N18.31 STAGE 3A CHRONIC KIDNEY DISEASE (HCC): ICD-10-CM

## 2023-12-04 DIAGNOSIS — Z76.89 ENCOUNTER FOR WEIGHT MANAGEMENT: ICD-10-CM

## 2023-12-04 DIAGNOSIS — M79.89 LEG SWELLING: ICD-10-CM

## 2023-12-04 DIAGNOSIS — J84.9 INTERSTITIAL LUNG DISEASE (HCC): ICD-10-CM

## 2023-12-04 DIAGNOSIS — Z23 ENCOUNTER FOR IMMUNIZATION: Primary | ICD-10-CM

## 2023-12-04 PROBLEM — U07.1 COVID-19 VIRUS INFECTION: Status: RESOLVED | Noted: 2020-09-10 | Resolved: 2023-12-04

## 2023-12-04 PROBLEM — M25.559 HIP PAIN: Status: RESOLVED | Noted: 2020-05-18 | Resolved: 2023-12-04

## 2023-12-04 PROBLEM — Z12.2 ENCOUNTER FOR SCREENING FOR LUNG CANCER: Status: RESOLVED | Noted: 2022-04-13 | Resolved: 2023-12-04

## 2023-12-04 PROBLEM — R94.31 ABNORMAL ELECTROCARDIOGRAM: Status: RESOLVED | Noted: 2020-05-07 | Resolved: 2023-12-04

## 2023-12-04 PROBLEM — F43.11 ACUTE POST-TRAUMATIC STRESS DISORDER: Status: RESOLVED | Noted: 2020-05-06 | Resolved: 2023-12-04

## 2023-12-04 PROBLEM — Z86.16 HISTORY OF COVID-19: Status: RESOLVED | Noted: 2021-09-27 | Resolved: 2023-12-04

## 2023-12-04 PROBLEM — Z11.4 SCREENING FOR HIV (HUMAN IMMUNODEFICIENCY VIRUS): Status: RESOLVED | Noted: 2022-04-13 | Resolved: 2023-12-04

## 2023-12-04 PROCEDURE — 99214 OFFICE O/P EST MOD 30 MIN: CPT | Performed by: FAMILY MEDICINE

## 2023-12-04 PROCEDURE — G0008 ADMIN INFLUENZA VIRUS VAC: HCPCS | Performed by: FAMILY MEDICINE

## 2023-12-04 PROCEDURE — 90662 IIV NO PRSV INCREASED AG IM: CPT | Performed by: FAMILY MEDICINE

## 2023-12-04 NOTE — PATIENT INSTRUCTIONS
Schedule the CT lung screening and home sleep study. Follow-up with blood work 1 week prior to the next appointment with me. It has to be fasting. I recommend a low-carb diet, look into intermittent fasting. Start 14 hours with a goal of doing 18-hour of intermittent fasting.

## 2023-12-04 NOTE — PROGRESS NOTES
[address]    Name: Monica Murphy      YOB: 1955      MRN: [id number]  Encounter Provider: Isabella Donahue MD      Encounter Date: 12/04/23        Encounter Dept: [address]     1. Encounter for immunization  -     influenza vaccine, high-dose, PF 0.7 mL (FLUZONE HIGH-DOSE)  -     influenza vaccine, high-dose, PF 0.5 mL    2. Stage 3a chronic kidney disease [facility])  Assessment & Plan:  Lab Results   Component Value Date    EGFR 52 10/27/2023    EGFR 61 05/02/2023    EGFR 42 10/20/2022    CREATININE 1.39 (H) 10/27/2023    CREATININE 1.22 05/02/2023    CREATININE 1.65 (H) 10/20/2022     Follows nephrology        3. Obesity, morbid ([address])    4. PTSD (post-traumatic stress disorder)  Assessment & Plan:  Currently on prazosin 1 mg at bedtime and Cymbalta 60 mg early morning. As well as donezepil 10 mg at bedtime. 5. Interstitial lung disease [facility])  Assessment & Plan:  [facility]  pulmonology. Discussed setting up follow-up appointment. Currently due for home study as well as CT lung screening. Patient may need higher dose of controller inhaler. Discussed inhaler usage as well as monitoring frequency to discussed with pulmonologist at next appointment. Follow-up with labs and return in 6 weeks. 6. Encounter for weight management  Assessment & Plan:  Discussed importance of improving weight which will also improve other medical conditions. Follow-up with labs return in 6 weeks for further management. 7. Leg swelling  Assessment & Plan:  Bilateral leg swelling currently on amlodipine 2.5 mg daily. Discontinued amlodipine altogether. Blood pressures have been good for the past couple visits. Monitor leg swelling at next point. BMI Counseling: Body mass index is 41.75 kg/m².  The BMI is above normal. Nutrition recommendations include decreasing portion sizes, encouraging healthy choices of fruits and vegetables, decreasing fast food intake, consuming healthier snacks, limiting drinks that contain sugar, moderation in carbohydrate intake, increasing intake of lean protein, reducing intake of saturated and trans fat and reducing intake of cholesterol. Exercise recommendations include moderate physical activity 150 minutes/week and strength training exercises. Rationale for BMI follow-up plan is due to patient being overweight or obese. Pertinent labs were evaluated and discussed with patient today. Follow-Up Plans   Return in about 6 weeks (around 1/15/2024). _______________________________________________________________________  Reason For Visit    Establish Care (And you have a workmans comp paper also leg is a little swollen)      Subjective   Jose Armando Renteria is a 79 y.o. with  has a past medical history of Chronic kidney disease, COVID-19, Hypertension, Liver disease, Pneumonia, and PTSD (post-traumatic stress disorder). Today patient reports about chronic disease. HPI      Review of Systems   Constitutional:  Negative for chills, fever and unexpected weight change. HENT:  Negative for congestion, rhinorrhea and sore throat. Eyes:  Negative for visual disturbance. Respiratory:  Negative for chest tightness, shortness of breath and wheezing. Cardiovascular:  Negative for chest pain. Gastrointestinal:  Negative for abdominal pain, constipation, diarrhea, nausea and vomiting. Endocrine: Negative for polyuria. Genitourinary:  Negative for dysuria and hematuria. Skin:  Negative for rash. Neurological:  Negative for dizziness, weakness, light-headedness and headaches. Psychiatric/Behavioral:  Negative for confusion.           Current Medication List     Current Outpatient Medications:     albuterol (2.5 mg/3 mL) 0.083 % nebulizer solution, INHALE 1 VIAL BY NEBULIZATION EVERY 6 (SIX) HOURS AS NEEDED FOR WHEEZING OR SHORTNESS OF BREATH, Disp: 375 mL, Rfl: 2    donepezil (ARICEPT) 10 mg tablet, TAKE 1 TABLET BY MOUTH DAILY AT BEDTIME, Disp: 90 tablet, Rfl: 1    DULoxetine (CYMBALTA) 60 mg delayed release capsule, TAKE 1 CAPSULE BY MOUTH EVERY MORNING, Disp: 90 capsule, Rfl: 0    gabapentin (NEURONTIN) 100 mg capsule, One po qam and 2 po q bedtime, Disp: , Rfl:     Multiple Vitamins-Minerals (MULTIVITAMIN ADULT PO), Take 1 tablet by mouth daily, Disp: , Rfl:     prazosin (MINIPRESS) 1 mg capsule, Take 1 capsule (1 mg total) by mouth daily at bedtime, Disp: 90 capsule, Rfl: 0    rOPINIRole (REQUIP) 3 mg tablet, TAKE 1 TABLET (3 MG TOTAL) BY MOUTH DAILY AT BEDTIME, Disp: 90 tablet, Rfl: 2    sildenafil (VIAGRA) 50 MG tablet, Take 1 tablet (50 mg total) by mouth daily as needed for erectile dysfunction, Disp: 10 tablet, Rfl: 0    umeclidinium-vilanterol 62.5-25 mcg/actuation inhaler, Inhale 1 puff daily, Disp: 60 blister, Rfl: 5    Ventolin  (90 Base) MCG/ACT inhaler, INHALE 2 PUFFS BY MOUTH EVERY 6 HOURS AS NEEDED FOR WHEEZE, Disp: 18 g, Rfl: 3      Objective     /70 (BP Location: Left arm, Patient Position: Sitting, Cuff Size: Large)   Pulse 91   Temp (!) 97.1 °F (36.2 °C) (Tympanic)   Resp 18   Ht 5' 8" (1.727 m)   Wt 125 kg (274 lb 9.6 oz)   SpO2 93%   BMI 41.75 kg/m²      Physical Exam  Vitals reviewed. Constitutional:       General: He is not in acute distress. Appearance: Normal appearance. He is not ill-appearing, toxic-appearing or diaphoretic. HENT:      Head: Normocephalic and atraumatic. Right Ear: External ear normal.      Left Ear: External ear normal.      Nose: Nose normal.      Mouth/Throat:      Mouth: Mucous membranes are moist.   Eyes:      General: No scleral icterus. Right eye: No discharge. Left eye: No discharge. Extraocular Movements: Extraocular movements intact.       Conjunctiva/sclera: Conjunctivae normal.   Cardiovascular: Rate and Rhythm: Normal rate and regular rhythm. Pulses: Normal pulses. Heart sounds: Normal heart sounds. Pulmonary:      Effort: Pulmonary effort is normal. No respiratory distress. Abdominal:      Palpations: Abdomen is soft. Tenderness: There is no abdominal tenderness. Musculoskeletal:         General: No swelling. Normal range of motion. Cervical back: Normal range of motion. Skin:     General: Skin is warm and dry. Neurological:      General: No focal deficit present. Mental Status: He is alert and oriented to person, place, and time. Psychiatric:         Mood and Affect: Mood normal.         Behavior: Behavior normal.         Thought Content:  Thought content normal.           Gilma Duncan MD  Family Medicine Physician   [facility]

## 2023-12-04 NOTE — ASSESSMENT & PLAN NOTE
[address]  pulmonology. Discussed setting up follow-up appointment. Currently due for home study as well as CT lung screening. Patient may need higher dose of controller inhaler. Discussed inhaler usage as well as monitoring frequency to discussed with pulmonologist at next appointment. Follow-up with labs and return in 6 weeks.

## 2023-12-04 NOTE — ASSESSMENT & PLAN NOTE
Bilateral leg swelling currently on amlodipine 2.5 mg daily. Discontinued amlodipine altogether. Blood pressures have been good for the past couple visits. Monitor leg swelling at next point.

## 2023-12-04 NOTE — ASSESSMENT & PLAN NOTE
Currently on prazosin 1 mg at bedtime and Cymbalta 60 mg early morning. As well as donezepil 10 mg at bedtime.

## 2023-12-04 NOTE — ASSESSMENT & PLAN NOTE
Lab Results   Component Value Date    EGFR 52 10/27/2023    EGFR 61 05/02/2023    EGFR 42 10/20/2022    CREATININE 1.39 (H) 10/27/2023    CREATININE 1.22 05/02/2023    CREATININE 1.65 (H) 10/20/2022     Follows nephrology

## 2023-12-04 NOTE — ASSESSMENT & PLAN NOTE
Discussed importance of improving weight which will also improve other medical conditions. Follow-up with labs return in 6 weeks for further management.

## 2023-12-11 ENCOUNTER — TELEPHONE (OUTPATIENT)
Age: 68
End: 2023-12-11

## 2023-12-11 NOTE — TELEPHONE ENCOUNTER
Left message the Worker comp form/doctors progress report was completed by Kristen.   I made a copy to scan and put the original in reception Kristen folder for

## 2023-12-12 ENCOUNTER — SOCIAL WORK (OUTPATIENT)
Dept: BEHAVIORAL/MENTAL HEALTH CLINIC | Facility: CLINIC | Age: 68
End: 2023-12-12
Payer: MEDICARE

## 2023-12-12 DIAGNOSIS — F33.2 MAJOR DEPRESSIVE DISORDER, RECURRENT SEVERE WITHOUT PSYCHOTIC FEATURES (HCC): ICD-10-CM

## 2023-12-12 DIAGNOSIS — R41.3 MEMORY DIFFICULTY: ICD-10-CM

## 2023-12-12 DIAGNOSIS — F43.11 ACUTE POST-TRAUMATIC STRESS DISORDER: ICD-10-CM

## 2023-12-12 DIAGNOSIS — F43.10 PTSD (POST-TRAUMATIC STRESS DISORDER): Primary | ICD-10-CM

## 2023-12-12 PROCEDURE — 90834 PSYTX W PT 45 MINUTES: CPT | Performed by: SOCIAL WORKER

## 2023-12-12 NOTE — PSYCH
Behavioral Health Psychotherapy Progress Note    Psychotherapy Provided: Individual Psychotherapy     1. PTSD (post-traumatic stress disorder)        2. Acute post-traumatic stress disorder        3. Memory difficulty        4. Major depressive disorder, recurrent severe without psychotic features ([address])            Goals addressed in session: Goal 1, Goal 2, and Goal 3      DATA: Edilma Hughes arrived for his session. He discussed how his younger brother's cancer returned. Edilma Hughes talked about his own health issues. He then discussed his one daughters divorce and his recent interactions with his soon to be ex son in law. He discussed the tensions between him and his wife. He discussed his concerns and he felt the conversation with her went well. However he discussed her OCD and her at times inappropriate comments to him. We discussed strategies to deal with her and to cope. He discussed how his wife does everything for a sick dad and none of her siblings help. I provided support, encouragement and strategies to cope. During this session, this clinician used the following therapeutic modalities: Client-centered Therapy, Cognitive Behavioral Therapy, Mindfulness-based Strategies, and Supportive Psychotherapy    Substance Abuse was not addressed during this session. If the client is diagnosed with a co-occurring substance use disorder, please indicate any changes in the frequency or amount of use: n/a. Stage of change for addressing substance use diagnoses: No substance use/Not applicable    ASSESSMENT:  Sunni Saldana presents with a Anxious mood. his affect is anxious which is congruent, with his mood and the content of the session. The client has made progress on their goals. Sunni Saldana presents with a none risk of suicide, none risk of self-harm, and none risk of harm to others. For any risk assessment that surpasses a "low" rating, a safety plan must be developed.     A safety plan was indicated: no  If yes, describe in detail n/a    PLAN: Between sessions, Pamela Roberts will use mindfulness and CBT. At the next session, the therapist will use Client-centered Therapy, Cognitive Behavioral Therapy, Mindfulness-based Strategies, and Supportive Psychotherapy to address issues and symptoms as they may arise. .    Behavioral Health Treatment Plan and Discharge Planning: Pamela Roberts is aware of and agrees to continue to work on their treatment plan. They have identified and are working toward their discharge goals.  yes    Visit start and stop times:    12/12/23  Start Time: 1310  Stop Time: 1400  Total Visit Time: 50 minutes

## 2023-12-14 DIAGNOSIS — J43.2 CENTRILOBULAR EMPHYSEMA (HCC): ICD-10-CM

## 2023-12-14 RX ORDER — UMECLIDINIUM BROMIDE AND VILANTEROL TRIFENATATE 62.5; 25 UG/1; UG/1
1 POWDER RESPIRATORY (INHALATION) DAILY
Qty: 60 EACH | Refills: 5 | Status: SHIPPED | OUTPATIENT
Start: 2023-12-14

## 2023-12-18 ENCOUNTER — TELEMEDICINE (OUTPATIENT)
Dept: PSYCHIATRY | Facility: CLINIC | Age: 68
End: 2023-12-18
Payer: MEDICARE

## 2023-12-18 DIAGNOSIS — F43.10 PTSD (POST-TRAUMATIC STRESS DISORDER): ICD-10-CM

## 2023-12-18 DIAGNOSIS — F32.A DEPRESSION, UNSPECIFIED DEPRESSION TYPE: Primary | ICD-10-CM

## 2023-12-18 PROCEDURE — G2012 BRIEF CHECK IN BY MD/QHP: HCPCS | Performed by: PHYSICIAN ASSISTANT

## 2023-12-18 RX ORDER — DULOXETIN HYDROCHLORIDE 60 MG/1
60 CAPSULE, DELAYED RELEASE ORAL EVERY MORNING
Qty: 90 CAPSULE | Refills: 0 | Status: SHIPPED | OUTPATIENT
Start: 2023-12-18

## 2023-12-18 RX ORDER — GABAPENTIN 100 MG/1
CAPSULE ORAL
Qty: 90 CAPSULE | Refills: 2 | Status: SHIPPED | OUTPATIENT
Start: 2023-12-18

## 2023-12-18 NOTE — PSYCH
Virtual Brief Visit    This Visit is being completed by telephone. The Patient is located at Home and in the following state in which I hold an active license [place]    The patient was identified by name and date of birth. Devon Aly was informed that this is a telemedicine visit and that the visit is being conducted through Telephone.  My office door was closed. No one else was in the room.  He acknowledged consent and understanding of privacy and security of the video platform. The patient has agreed to participate and understands they can discontinue the visit at any time.    Patient is aware this is a billable service.   Jose Luis 's virtual not working.  Visit done by audio only.         Recent Visits  No visits were found meeting these conditions.  Showing recent visits within past 7 days and meeting all other requirements  Today's Visits  Date Type Provider Dept   12/18/23 Telemedicine Abril Jones PA-C  [facility]   Showing today's visits and meeting all other requirements  Future Appointments  No visits were found meeting these conditions.  Showing future appointments within next 150 days and meeting all other requirements     MEDICATION MANAGEMENT NOTE        [facility]   [facility]  [address] [facility] [address]  [phone]        Name and Date of Birth:  Devon Aly 68 y.o. 1955    Date of Visit: December 18, 2023    SUBJECTIVE:      Devon seen by Jose Luis 11/8 at which time neurontin decreased from 400 mg/d to 300 mg/d secondary to weight gain. Devon notes no change.  Has seen pulmonology and is scheduled for home sleep study for suspected sleep apnea.  Devon reports getting very tired late in morning and in the afternoon. Prazosin continues to be effective with nightmares which have lessened considerably.  Devon uses caution with position change and  has had no problems with it.       Devon says he is not looking forward to the holidays - strained relationship with daughter and has difficulty tolerating a lot of people at the house at once.  Continues to get solace from his birds and working with [employer].     Review of Systems   Constitutional:  Positive for fatigue.   Neurological:  Positive for numbness. Negative for syncope.        Tingling lower ext (feet)   Psychiatric/Behavioral:  Positive for sleep disturbance.        Psychiatric History     This office since 5/7/20.  No IP or suicide attempts.  Past med trial- zoloft.      Trauma/Loss History       ARDS secondary to covid- intubated x 10 days, April 2020. Lost brother and coworkers to covid.  Has been too ill to return to work.      Social History        Lives with wife Kailyn -  >40 yrs. 2 daugthers and a son.  Worked at [employer] x 37 yrs -.      OBJECTIVE:   audio only- speech clear and coherent.  Thought processes and content intact. No SI/HI.     Lab Review: I have reviewed all pertinent labs        Lab Results   Component Value Date    SODIUM 136 10/27/2023    K 4.3 10/27/2023     10/27/2023    CO2 24 10/27/2023    AGAP 7 10/27/2023    BUN 19 10/27/2023    CREATININE 1.39 (H) 10/27/2023    GLUC 116 10/20/2022    GLUF 92 10/27/2023    CALCIUM 9.3 10/27/2023    AST 43 04/13/2022    ALT 60 04/13/2022    ALKPHOS 88 04/13/2022    TP 7.5 04/13/2022    TBILI 0.60 04/13/2022    EGFR 52 10/27/2023     Lab Results   Component Value Date    WBC 10.15 10/27/2023    HGB 16.8 10/27/2023    HCT 51.3 (H) 10/27/2023    MCV 88 10/27/2023     10/27/2023     Lab Results   Component Value Date    SBMIILHN21 431 02/06/2023     Lab Results   Component Value Date    FOLATE 9.0 02/06/2023       ASSESSMENT & PLAN          Diagnoses and all orders for this visit:    Depression, unspecified depression type  -     DULoxetine (CYMBALTA) 60 mg delayed release capsule; Take 1  capsule (60 mg total) by mouth every morning    PTSD (post-traumatic stress disorder)  -     DULoxetine (CYMBALTA) 60 mg delayed release capsule; Take 1 capsule (60 mg total) by mouth every morning  -     gabapentin (NEURONTIN) 100 mg capsule; One po qam and 2 po q bedtime      Current Outpatient Medications   Medication Sig Dispense Refill    DULoxetine (CYMBALTA) 60 mg delayed release capsule Take 1 capsule (60 mg total) by mouth every morning 90 capsule 0    gabapentin (NEURONTIN) 100 mg capsule One po qam and 2 po q bedtime 90 capsule 2    albuterol (2.5 mg/3 mL) 0.083 % nebulizer solution INHALE 1 VIAL BY NEBULIZATION EVERY 6 (SIX) HOURS AS NEEDED FOR WHEEZING OR SHORTNESS OF BREATH 375 mL 2    Anoro Ellipta 62.5-25 MCG/ACT inhaler INHALE 1 PUFF DAILY 60 each 5    donepezil (ARICEPT) 10 mg tablet TAKE 1 TABLET BY MOUTH DAILY AT BEDTIME 90 tablet 1    Multiple Vitamins-Minerals (MULTIVITAMIN ADULT PO) Take 1 tablet by mouth daily      prazosin (MINIPRESS) 1 mg capsule Take 1 capsule (1 mg total) by mouth daily at bedtime 90 capsule 0    rOPINIRole (REQUIP) 3 mg tablet TAKE 1 TABLET (3 MG TOTAL) BY MOUTH DAILY AT BEDTIME 90 tablet 2    sildenafil (VIAGRA) 50 MG tablet Take 1 tablet (50 mg total) by mouth daily as needed for erectile dysfunction 10 tablet 0    Ventolin  (90 Base) MCG/ACT inhaler INHALE 2 PUFFS BY MOUTH EVERY 6 HOURS AS NEEDED FOR WHEEZE 18 g 3     No current facility-administered medications for this visit.                  Plan:       Stable.  Will leave meds unchanged for now.  Encouraged to f/u with sleep study.  Cont cymbalta 60 mg/d, neurontin 100/200 mg, prazosin 1 mg q bedtime, requip 3 mg q bedtime.     Reviewed risks, benefits, side effects of medications, including no medication.  Patient understands and agrees to treatment plan.   Cont f/u with Gasper Garzon for ind therapy  F/u PA-C 4 weeks, sooner prn         Patient has been informed of 24 hours and weekend coverage for urgent  situations accessed by calling the main clinic phone number.     Abril Jones PA-C

## 2023-12-28 ENCOUNTER — TELEPHONE (OUTPATIENT)
Dept: NEPHROLOGY | Facility: CLINIC | Age: 68
End: 2023-12-28

## 2024-01-03 ENCOUNTER — SOCIAL WORK (OUTPATIENT)
Dept: BEHAVIORAL/MENTAL HEALTH CLINIC | Facility: CLINIC | Age: 69
End: 2024-01-03
Payer: MEDICARE

## 2024-01-03 DIAGNOSIS — R41.3 MEMORY DIFFICULTY: ICD-10-CM

## 2024-01-03 DIAGNOSIS — F43.10 PTSD (POST-TRAUMATIC STRESS DISORDER): Primary | ICD-10-CM

## 2024-01-03 DIAGNOSIS — F33.2 MAJOR DEPRESSIVE DISORDER, RECURRENT SEVERE WITHOUT PSYCHOTIC FEATURES (HCC): ICD-10-CM

## 2024-01-03 PROCEDURE — 90834 PSYTX W PT 45 MINUTES: CPT | Performed by: SOCIAL WORKER

## 2024-01-03 NOTE — PSYCH
"Behavioral Health Psychotherapy Progress Note    Psychotherapy Provided: Individual Psychotherapy     1. PTSD (post-traumatic stress disorder)        2. 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 Memory difficulty            Goals addressed in session: Goal 1, Goal 2, and Goal 3      DATA: Galileo discussed issues with his adult son and one of his adult daughter and the drama they caused over the holidays. He and his wife also got into issues and he discussed the issues that have been happening between the two of them. He discussed how this affects his depression and his anxiety. The family issues and now his marital issues affect his anger and his frustration. He continues to deal with 2 difficult adult children. I provided support, encouragement and strategies to cope.   During this session, this clinician used the following therapeutic modalities: Client-centered Therapy, Cognitive Behavioral Therapy, Mindfulness-based Strategies, and Supportive Psychotherapy    Substance Abuse was not addressed during this session. If the client is diagnosed with a co-occurring substance use disorder, please indicate any changes in the frequency or amount of use: n/a. Stage of change for addressing substance use diagnoses: No substance use/Not applicable    ASSESSMENT:  Devon Aly presents with a Anxious and Depressed mood.     his affect is anxious and depressed, which is congruent, with his mood and the content of the session. The client has made progress on their goals.     Devon Aly presents with a none risk of suicide, none risk of self-harm, and none risk of harm to others.    For any risk assessment that surpasses a \"low\" rating, a safety plan must be developed.    A safety plan was indicated: no  If yes, describe in detail n/a    PLAN: Between sessions, Devon Aly will use mindfulness and CBT. At the next session, the therapist will use Client-centered Therapy, " Cognitive Behavioral Therapy, Mindfulness-based Strategies, and Supportive Psychotherapy to address issues and symptoms as they may arise..    Behavioral Health Treatment Plan and Discharge Planning: Devon Jermain is aware of and agrees to continue to work on their treatment plan. They have identified and are working toward their discharge goals. yes    Visit start and stop times:    01/03/24  Start Time: 1310  Stop Time: 1400  Total Visit Time: 50 minutes

## 2024-01-08 ENCOUNTER — RA CDI HCC (OUTPATIENT)
Dept: OTHER | Facility: HOSPITAL | Age: 69
End: 2024-01-08

## 2024-01-08 NOTE — PROGRESS NOTES
J47.9  HCC coding opportunities          Chart Reviewed number of suggestions sent to Provider: 1     Patients Insurance     Medicare Insurance: Medicare

## 2024-01-12 ENCOUNTER — TELEPHONE (OUTPATIENT)
Dept: PSYCHIATRY | Facility: CLINIC | Age: 69
End: 2024-01-12

## 2024-01-12 ENCOUNTER — TELEPHONE (OUTPATIENT)
Age: 69
End: 2024-01-12

## 2024-01-12 NOTE — TELEPHONE ENCOUNTER
Caller: patient     Doctor: Adelina    Reason for call: Patient is requesting an updated work status letter. Please call the patient when this is completed. He would like to pick it up at the [place] office.    Call back#: [phone]

## 2024-01-12 NOTE — TELEPHONE ENCOUNTER
Patient is calling because they need a letter stating their condition. You can please contact the patient for further explanation.

## 2024-01-15 ENCOUNTER — OFFICE VISIT (OUTPATIENT)
Age: 69
End: 2024-01-15
Payer: MEDICARE

## 2024-01-15 VITALS
WEIGHT: 275 LBS | DIASTOLIC BLOOD PRESSURE: 64 MMHG | TEMPERATURE: 97.5 F | SYSTOLIC BLOOD PRESSURE: 100 MMHG | HEART RATE: 104 BPM | HEIGHT: 68 IN | OXYGEN SATURATION: 94 % | BODY MASS INDEX: 41.68 KG/M2

## 2024-01-15 DIAGNOSIS — R73.9 ELEVATED BLOOD SUGAR: ICD-10-CM

## 2024-01-15 DIAGNOSIS — R06.09 DOE (DYSPNEA ON EXERTION): ICD-10-CM

## 2024-01-15 DIAGNOSIS — E66.01 OBESITY, MORBID (HCC): ICD-10-CM

## 2024-01-15 DIAGNOSIS — Z13.6 ENCOUNTER FOR LIPID SCREENING FOR CARDIOVASCULAR DISEASE: ICD-10-CM

## 2024-01-15 DIAGNOSIS — R06.02 SOB (SHORTNESS OF BREATH) ON EXERTION: ICD-10-CM

## 2024-01-15 DIAGNOSIS — J84.9 INTERSTITIAL LUNG DISEASE (HCC): ICD-10-CM

## 2024-01-15 DIAGNOSIS — Z13.220 ENCOUNTER FOR LIPID SCREENING FOR CARDIOVASCULAR DISEASE: ICD-10-CM

## 2024-01-15 DIAGNOSIS — J43.2 CENTRILOBULAR EMPHYSEMA (HCC): ICD-10-CM

## 2024-01-15 DIAGNOSIS — Z00.00 MEDICARE ANNUAL WELLNESS VISIT, SUBSEQUENT: Primary | ICD-10-CM

## 2024-01-15 DIAGNOSIS — N18.31 STAGE 3A CHRONIC KIDNEY DISEASE (HCC): ICD-10-CM

## 2024-01-15 DIAGNOSIS — F33.9 DEPRESSION, RECURRENT (HCC): ICD-10-CM

## 2024-01-15 DIAGNOSIS — I51.89 GRADE I DIASTOLIC DYSFUNCTION: ICD-10-CM

## 2024-01-15 PROCEDURE — 99214 OFFICE O/P EST MOD 30 MIN: CPT | Performed by: FAMILY MEDICINE

## 2024-01-15 PROCEDURE — G0438 PPPS, INITIAL VISIT: HCPCS | Performed by: FAMILY MEDICINE

## 2024-01-15 RX ORDER — TIZANIDINE 2 MG/1
2 TABLET ORAL
COMMUNITY
Start: 2023-12-14

## 2024-01-15 NOTE — PATIENT INSTRUCTIONS
Medicare Preventive Visit Patient Instructions  Thank you for completing your Welcome to Medicare Visit or Medicare Annual Wellness Visit today. Your next wellness visit will be due in one year (1/15/2025).  The screening/preventive services that you may require over the next 5-10 years are detailed below. Some tests may not apply to you based off risk factors and/or age. Screening tests ordered at today's visit but not completed yet may show as past due. Also, please note that scanned in results may not display below.  Preventive Screenings:  Service Recommendations Previous Testing/Comments   Colorectal Cancer Screening  Colonoscopy    Fecal Occult Blood Test (FOBT)/Fecal Immunochemical Test (FIT)  Fecal DNA/Cologuard Test  Flexible Sigmoidoscopy Age: 45-75 years old   Colonoscopy: every 10 years (May be performed more frequently if at higher risk)  OR  FOBT/FIT: every 1 year  OR  Cologuard: every 3 years  OR  Sigmoidoscopy: every 5 years  Screening may be recommended earlier than age 45 if at higher risk for colorectal cancer. Also, an individualized decision between you and your healthcare provider will decide whether screening between the ages of 76-85 would be appropriate. Colonoscopy: 03/28/2023  FOBT/FIT: Not on file  Cologuard: 06/27/2022  Sigmoidoscopy: Not on file    Screening Current     Prostate Cancer Screening Individualized decision between patient and health care provider in men between ages of 55-69   Medicare will cover every 12 months beginning on the day after your 50th birthday PSA: 0.30 ng/mL     Screening Current     Hepatitis C Screening Once for adults born between 1945 and 1965  More frequently in patients at high risk for Hepatitis C Hep C Antibody: 04/13/2022    Screening Current   Diabetes Screening 1-2 times per year if you're at risk for diabetes or have pre-diabetes Fasting glucose: 92 mg/dL (10/27/2023)  A1C: 5.6 % (4/13/2022)  Screening Current   Cholesterol Screening Once every 5  years if you don't have a lipid disorder. May order more often based on risk factors. Lipid panel: 04/13/2022  Screening Current      Other Preventive Screenings Covered by Medicare:  Abdominal Aortic Aneurysm (AAA) Screening: covered once if your at risk. You're considered to be at risk if you have a family history of AAA or a male between the age of 65-75 who smoking at least 100 cigarettes in your lifetime.  Lung Cancer Screening: covers low dose CT scan once per year if you meet all of the following conditions: (1) Age 55-77; (2) No signs or symptoms of lung cancer; (3) Current smoker or have quit smoking within the last 15 years; (4) You have a tobacco smoking history of at least 20 pack years (packs per day x number of years you smoked); (5) You get a written order from a healthcare provider.  Glaucoma Screening: covered annually if you're considered high risk: (1) You have diabetes OR (2) Family history of glaucoma OR (3)  aged 50 and older OR (4)  American aged 65 and older  Osteoporosis Screening: covered every 2 years if you meet one of the following conditions: (1) Have a vertebral abnormality; (2) On glucocorticoid therapy for more than 3 months; (3) Have primary hyperparathyroidism; (4) On osteoporosis medications and need to assess response to drug therapy.  HIV Screening: covered annually if you're between the age of 15-65. Also covered annually if you are younger than 15 and older than 65 with risk factors for HIV infection. For pregnant patients, it is covered up to 3 times per pregnancy.    Immunizations:  Immunization Recommendations   Influenza Vaccine Annual influenza vaccination during flu season is recommended for all persons aged >= 6 months who do not have contraindications   Pneumococcal Vaccine   * Pneumococcal conjugate vaccine = PCV13 (Prevnar 13), PCV15 (Vaxneuvance), PCV20 (Prevnar 20)  * Pneumococcal polysaccharide vaccine = PPSV23 (Pneumovax) Adults 19-63 yo  with certain risk factors or if 65+ yo  If never received any pneumonia vaccine: recommend Prevnar 20 (PCV20)  Give PCV20 if previously received 1 dose of PCV13 or PPSV23   Hepatitis B Vaccine 3 dose series if at intermediate or high risk (ex: diabetes, end stage renal disease, liver disease)   Respiratory syncytial virus (RSV) Vaccine - COVERED BY MEDICARE PART D  * RSVPreF3 (Arexvy) CDC recommends that adults 60 years of age and older may receive a single dose of RSV vaccine using shared clinical decision-making (SCDM)   Tetanus (Td) Vaccine - COST NOT COVERED BY MEDICARE PART B Following completion of primary series, a booster dose should be given every 10 years to maintain immunity against tetanus. Td may also be given as tetanus wound prophylaxis.   Tdap Vaccine - COST NOT COVERED BY MEDICARE PART B Recommended at least once for all adults. For pregnant patients, recommended with each pregnancy.   Shingles Vaccine (Shingrix) - COST NOT COVERED BY MEDICARE PART B  2 shot series recommended in those 19 years and older who have or will have weakened immune systems or those 50 years and older     Health Maintenance Due:      Topic Date Due   • Colorectal Cancer Screening  03/27/2026   • Hepatitis C Screening  Completed   • Lung Cancer Screening  Discontinued     Immunizations Due:      Topic Date Due   • Hepatitis A Vaccine (1 of 2 - Risk 2-dose series) Never done   • COVID-19 Vaccine (4 - 2023-24 season) 09/01/2023     Advance Directives   What are advance directives?  Advance directives are legal documents that state your wishes and plans for medical care. These plans are made ahead of time in case you lose your ability to make decisions for yourself. Advance directives can apply to any medical decision, such as the treatments you want, and if you want to donate organs.   What are the types of advance directives?  There are many types of advance directives, and each state has rules about how to use them. You may  choose a combination of any of the following:  Living will:  This is a written record of the treatment you want. You can also choose which treatments you do not want, which to limit, and which to stop at a certain time. This includes surgery, medicine, IV fluid, and tube feedings.   Durable power of  for healthcare (DPAHC):  This is a written record that states who you want to make healthcare choices for you when you are unable to make them for yourself. This person, called a proxy, is usually a family member or a friend. You may choose more than 1 proxy.  Do not resuscitate (DNR) order:  A DNR order is used in case your heart stops beating or you stop breathing. It is a request not to have certain forms of treatment, such as CPR. A DNR order may be included in other types of advance directives.  Medical directive:  This covers the care that you want if you are in a coma, near death, or unable to make decisions for yourself. You can list the treatments you want for each condition. Treatment may include pain medicine, surgery, blood transfusions, dialysis, IV or tube feedings, and a ventilator (breathing machine).  Values history:  This document has questions about your views, beliefs, and how you feel and think about life. This information can help others choose the care that you would choose.  Why are advance directives important?  An advance directive helps you control your care. Although spoken wishes may be used, it is better to have your wishes written down. Spoken wishes can be misunderstood, or not followed. Treatments may be given even if you do not want them. An advance directive may make it easier for your family to make difficult choices about your care.   Fall Prevention    Fall prevention  includes ways to make your home and other areas safer. It also includes ways you can move more carefully to prevent a fall. Health conditions that cause changes in your blood pressure, vision, or muscle  strength and coordination may increase your risk for falls. Medicines may also increase your risk for falls if they make you dizzy, weak, or sleepy.   Fall prevention tips:   Stand or sit up slowly.    Use assistive devices as directed.    Wear shoes that fit well and have soles that .    Wear a personal alarm.    Stay active.    Manage your medical conditions.    Home Safety Tips:  Add items to prevent falls in the bathroom.    Keep paths clear.    Install bright lights in your home.    Keep items you use often on shelves within reach.    Paint or place reflective tape on the edges of your stairs.    Weight Management   Why it is important to manage your weight:  Being overweight increases your risk of health conditions such as heart disease, high blood pressure, type 2 diabetes, and certain types of cancer. It can also increase your risk for osteoarthritis, sleep apnea, and other respiratory problems. Aim for a slow, steady weight loss. Even a small amount of weight loss can lower your risk of health problems.  How to lose weight safely:  A safe and healthy way to lose weight is to eat fewer calories and get regular exercise. You can lose up about 1 pound a week by decreasing the number of calories you eat by 500 calories each day.   Healthy meal plan for weight management:  A healthy meal plan includes a variety of foods, contains fewer calories, and helps you stay healthy. A healthy meal plan includes the following:  Eat whole-grain foods more often.  A healthy meal plan should contain fiber. Fiber is the part of grains, fruits, and vegetables that is not broken down by your body. Whole-grain foods are healthy and provide extra fiber in your diet. Some examples of whole-grain foods are whole-wheat breads and pastas, oatmeal, brown rice, and bulgur.  Eat a variety of vegetables every day.  Include dark, leafy greens such as spinach, kale, renny greens, and mustard greens. Eat yellow and orange vegetables  such as carrots, sweet potatoes, and winter squash.   Eat a variety of fruits every day.  Choose fresh or canned fruit (canned in its own juice or light syrup) instead of juice. Fruit juice has very little or no fiber.  Eat low-fat dairy foods.  Drink fat-free (skim) milk or 1% milk. Eat fat-free yogurt and low-fat cottage cheese. Try low-fat cheeses such as mozzarella and other reduced-fat cheeses.  Choose meat and other protein foods that are low in fat.  Choose beans or other legumes such as split peas or lentils. Choose fish, skinless poultry (chicken or turkey), or lean cuts of red meat (beef or pork). Before you cook meat or poultry, cut off any visible fat.   Use less fat and oil.  Try baking foods instead of frying them. Add less fat, such as margarine, sour cream, regular salad dressing and mayonnaise to foods. Eat fewer high-fat foods. Some examples of high-fat foods include french fries, doughnuts, ice cream, and cakes.  Eat fewer sweets.  Limit foods and drinks that are high in sugar. This includes candy, cookies, regular soda, and sweetened drinks.  Exercise:  Exercise at least 30 minutes per day on most days of the week. Some examples of exercise include walking, biking, dancing, and swimming. You can also fit in more physical activity by taking the stairs instead of the elevator or parking farther away from stores. Ask your healthcare provider about the best exercise plan for you.      © Copyright [employer] 2018 Information is for End User's use only and may not be sold, redistributed or otherwise used for commercial purposes. All illustrations and images included in CareNotes® are the copyrighted property of [employer] or [employer]

## 2024-01-15 NOTE — ASSESSMENT & PLAN NOTE
Follows pulmonology.      Home study scheduled for end of this month  CT of lung cancer screening ordered, discussed with patient to schedule

## 2024-01-15 NOTE — ASSESSMENT & PLAN NOTE
Persistent shortness of breath on exertion, following pulmonology.  Discussed with patient about further evaluating with an echocardiogram to rule out cardiac etiology     Referral also placed for cardiology with history of grade 1 diastolic dysfunction, and persistently worsening dyspnea on exertion.  Patient also requesting letter for work that is similar to the FMLA.  Patient reports FMLA forms are no longer accepted her work, discussed with patient that he needs to bring in the form that will work these.  In the meantime I can only give a letter for short period.    Placed a referral for functional capacity evaluation by PT/OT  Return in 4 weeks.

## 2024-01-15 NOTE — ASSESSMENT & PLAN NOTE
Lab Results   Component Value Date    EGFR 52 10/27/2023    EGFR 61 05/02/2023    EGFR 42 10/20/2022    CREATININE 1.39 (H) 10/27/2023    CREATININE 1.22 05/02/2023    CREATININE 1.65 (H) 10/20/2022     Lab Results   Component Value Date    EGFR 52 10/27/2023    EGFR 61 05/02/2023    EGFR 42 10/20/2022    CREATININE 1.39 (H) 10/27/2023    CREATININE 1.22 05/02/2023    CREATININE 1.65 (H) 10/20/2022     Follows nephrology

## 2024-01-15 NOTE — PROGRESS NOTES
Assessment and Plan:     Problem List Items Addressed This Visit          Respiratory    Interstitial lung disease (HCC)     Follows pulmonology.      Home study scheduled for end of this month  CT of lung cancer screening ordered, discussed with patient to schedule           Chronic obstructive pulmonary disease (HCC)     Refer to interstitial lung disease problems list.            Genitourinary    Stage 3a chronic kidney disease (HCC)     Lab Results   Component Value Date    EGFR 52 10/27/2023    EGFR 61 05/02/2023    EGFR 42 10/20/2022    CREATININE 1.39 (H) 10/27/2023    CREATININE 1.22 05/02/2023    CREATININE 1.65 (H) 10/20/2022     Lab Results   Component Value Date    EGFR 52 10/27/2023    EGFR 61 05/02/2023    EGFR 42 10/20/2022    CREATININE 1.39 (H) 10/27/2023    CREATININE 1.22 05/02/2023    CREATININE 1.65 (H) 10/20/2022     Follows nephrology           Relevant Orders    TSH, 3rd generation with Free T4 reflex       Other    Grade I diastolic dysfunction     Persistent shortness of breath on exertion, following pulmonology.  Discussed with patient about further evaluating with an echocardiogram to rule out cardiac etiology     Referral also placed for cardiology with history of grade 1 diastolic dysfunction, and persistently worsening dyspnea on exertion.  Patient also requesting letter for work that is similar to the FMLA.  Patient reports FMLA forms are no longer accepted her work, discussed with patient that he needs to bring in the form that will work these.  In the meantime I can only give a letter for short period.    Placed a referral for functional capacity evaluation by PT/OT  Return in 4 weeks.         Relevant Orders    Ambulatory Referral to Cardiology    WALTERS (dyspnea on exertion)    Relevant Orders    Ambulatory Referral to Cardiology    Depression, recurrent (HCC)    Relevant Orders    TSH, 3rd generation with Free T4 reflex    Obesity, morbid (HCC)     Other Visit Diagnoses        Medicare annual wellness visit, subsequent    -  Primary    SOB (shortness of breath) on exertion        Relevant Orders    Echo complete w/ contrast if indicated    Ambulatory Referral to Cardiology    Ambulatory Referral to PT/OT Functional Capacity Evaluation    Encounter for lipid screening for cardiovascular disease        Relevant Orders    Lipid Panel with Direct LDL reflex    Elevated blood sugar        Relevant Orders    Hemoglobin A1C            Depression Screening and Follow-up Plan: Patient's depression screening was positive with a PHQ-9 score of 14. Patient assessed for underlying major depression. Brief counseling provided and recommend additional follow-up/re-evaluation next office visit.       Preventive health issues were discussed with patient, and age appropriate screening tests were ordered as noted in patient's After Visit Summary.  Personalized health advice and appropriate referrals for health education or preventive services given if needed, as noted in patient's After Visit Summary.     History of Present Illness:     Patient presents for a Medicare Wellness Visit    HPI   Patient Care Team:  Primitivo Rios MD as PCP - General (Family Medicine)  Duy Hughes MD as PCP - [facility]  [facility]  Sera Garcia MD (Pulmonology)  Danny Zurita MD (Pain Medicine)  Jasper Daniel MD (Neurology)  Tray Sahu MD (Orthopedic Surgery)  Steven Pritchett MD (Nephrology)     Review of Systems:     Review of Systems   Constitutional:  Negative for chills, fever and unexpected weight change.   HENT:  Negative for congestion, rhinorrhea and sore throat.    Eyes:  Negative for visual disturbance.   Respiratory:  Positive for shortness of breath. Negative for chest tightness and wheezing.    Cardiovascular:  Negative for chest pain.   Gastrointestinal:  Negative for abdominal pain, constipation, diarrhea, nausea and vomiting.   Endocrine:  Negative for polyuria.   Genitourinary:  Negative for dysuria and hematuria.   Skin:  Negative for rash.   Neurological:  Negative for dizziness, weakness, light-headedness and headaches.   Psychiatric/Behavioral:  Negative for confusion.         Problem List:     Patient Active Problem List   Diagnosis    Elevated brain natriuretic peptide (BNP) level    PTSD (post-traumatic stress disorder)    Interstitial lung disease (HCC)    Expected bereavement due to life event    Chronic tension-type headache, not intractable    Grade I diastolic dysfunction    WALTERS (dyspnea on exertion)    Memory difficulty    Numbness and tingling    Occupational exposure in workplace    Right bundle branch block (RBBB) on electrocardiogram (ECG)    Depression, recurrent (HCC)    Other headache syndrome    COVID-19 long hauler    Personal history of nicotine dependence    Restrictive lung disease    Stage 3a chronic kidney disease (HCC)    Chronic kidney disease-mineral and bone disorder    Obesity, morbid (HCC)    Chronic obstructive pulmonary disease (HCC)    Primary osteoarthritis of right hip    Primary osteoarthritis of one hip, left    Encounter for weight management    Leg swelling      Past Medical and Surgical History:     Past Medical History:   Diagnosis Date    Chronic kidney disease     COVID-19 03/2020    Hypertension 4/02/2020    Liver disease     Pneumonia 3/32/2020    PTSD (post-traumatic stress disorder)      Past Surgical History:   Procedure Laterality Date    FL INJECTION RIGHT HIP (NON ARTHROGRAM)  11/30/2020    FL INJECTION RIGHT HIP (NON ARTHROGRAM)  8/4/2021    FL INJECTION RIGHT HIP (NON ARTHROGRAM)  12/27/2021    FL INJECTION RIGHT HIP (NON ARTHROGRAM)  4/25/2022    NO PAST SURGERIES        Family History:     Family History   Problem Relation Age of Onset    Heart disease Mother     Coronary artery disease Mother     Heart failure Mother     Sudden death Father     No Known Problems Son     No Known Problems  Daughter     No Known Problems Maternal Grandmother     No Known Problems Maternal Grandfather     No Known Problems Paternal Grandmother     No Known Problems Paternal Grandfather     No Known Problems Daughter     Kidney disease Brother     Cancer Brother       Social History:     Social History     Socioeconomic History    Marital status: /Civil Union     Spouse name: None    Number of children: None    Years of education: None    Highest education level: None   Occupational History    None   Tobacco Use    Smoking status: Former     Current packs/day: 0.00     Average packs/day: 2.0 packs/day for 47.2 years (94.5 ttl pk-yrs)     Types: Cigarettes     Start date: 1973     Quit date: 04/2020     Years since quitting: 3.7     Passive exposure: Past    Smokeless tobacco: Never   Vaping Use    Vaping status: Former    Quit date: 4/1/2020    Substances: Nicotine, Flavoring   Substance and Sexual Activity    Alcohol use: Never    Drug use: Never    Sexual activity: Yes     Partners: Female     Birth control/protection: None   Other Topics Concern    None   Social History Narrative    None     Social Determinants of Health     Financial Resource Strain: High Risk (1/15/2024)    Overall Financial Resource Strain (CARDIA)     Difficulty of Paying Living Expenses: Hard   Food Insecurity: Not on file   Transportation Needs: No Transportation Needs (1/15/2024)    PRAPARE - Transportation     Lack of Transportation (Medical): No     Lack of Transportation (Non-Medical): No   Physical Activity: Inactive (4/20/2021)    Exercise Vital Sign     Days of Exercise per Week: 0 days     Minutes of Exercise per Session: 0 min   Stress: Stress Concern Present (4/20/2021)    [place] [employer] - Occupational Stress Questionnaire     Feeling of Stress : Very much   Social Connections: Not on file   Intimate Partner Violence: Not on file   Housing Stability: Not on file      Medications and Allergies:      Current Outpatient Medications   Medication Sig Dispense Refill    albuterol (2.5 mg/3 mL) 0.083 % nebulizer solution INHALE 1 VIAL BY NEBULIZATION EVERY 6 (SIX) HOURS AS NEEDED FOR WHEEZING OR SHORTNESS OF BREATH 375 mL 2    Anoro Ellipta 62.5-25 MCG/ACT inhaler INHALE 1 PUFF DAILY 60 each 5    donepezil (ARICEPT) 10 mg tablet TAKE 1 TABLET BY MOUTH DAILY AT BEDTIME 90 tablet 1    DULoxetine (CYMBALTA) 60 mg delayed release capsule Take 1 capsule (60 mg total) by mouth every morning 90 capsule 0    gabapentin (NEURONTIN) 100 mg capsule One po qam and 2 po q bedtime 90 capsule 2    Multiple Vitamins-Minerals (MULTIVITAMIN ADULT PO) Take 1 tablet by mouth daily      prazosin (MINIPRESS) 1 mg capsule Take 1 capsule (1 mg total) by mouth daily at bedtime 90 capsule 0    rOPINIRole (REQUIP) 3 mg tablet TAKE 1 TABLET (3 MG TOTAL) BY MOUTH DAILY AT BEDTIME 90 tablet 2    sildenafil (VIAGRA) 50 MG tablet Take 1 tablet (50 mg total) by mouth daily as needed for erectile dysfunction 10 tablet 0    tiZANidine (ZANAFLEX) 2 mg tablet Take 2 mg by mouth daily at bedtime      Ventolin  (90 Base) MCG/ACT inhaler INHALE 2 PUFFS BY MOUTH EVERY 6 HOURS AS NEEDED FOR WHEEZE 18 g 3     No current facility-administered medications for this visit.     Allergies   Allergen Reactions    Tetracycline Rash      Immunizations:     Immunization History   Administered Date(s) Administered    COVID-19 PFIZER VACCINE 0.3 ML IM 04/15/2021, 05/08/2021, 02/04/2022    INFLUENZA 12/22/2022, 12/04/2023    Influenza, high dose seasonal 0.7 mL 12/22/2022, 12/04/2023    Influenza, recombinant, quadrivalent,injectable, preservative free 10/05/2020    Pneumococcal Conjugate 13-Valent 10/05/2020    Pneumococcal Conjugate Vaccine 20-valent (Pcv20), Polysace 12/22/2022      Health Maintenance:         Topic Date Due    Colorectal Cancer Screening  03/27/2026    Hepatitis C Screening  Completed    Lung Cancer Screening  Discontinued         Topic  Date Due    Hepatitis A Vaccine (1 of 2 - Risk 2-dose series) Never done    COVID-19 Vaccine (4 - 2023-24 season) 09/01/2023      Medicare Screening Tests and Risk Assessments:     Devon is here for his Subsequent Wellness visit.     Health Risk Assessment:   Patient rates overall health as poor. Patient feels that their physical health rating is same. Patient is satisfied with their life. Eyesight was rated as slightly worse. Hearing was rated as same. Patient feels that their emotional and mental health rating is same. Patients states they are always angry. Patient states they are sometimes unusually tired/fatigued. Pain experienced in the last 7 days has been some. Patient's pain rating has been 7/10. Patient states that he has experienced weight loss or gain in last 6 months.     Depression Screening:   PHQ-9 Score: 14      Fall Risk Screening:   In the past year, patient has experienced: history of falling in past year    Number of falls: 1  Injured during fall?: No    Feels unsteady when standing or walking?: No    Worried about falling?: No      Home Safety:  Patient has trouble with stairs inside or outside of their home. Patient has working smoke alarms and has working carbon monoxide detector. Home safety hazards include: none.     Nutrition:   Current diet is Regular.     Medications:   Patient is not currently taking any over-the-counter supplements. Patient is able to manage medications.     Activities of Daily Living (ADLs)/Instrumental Activities of Daily Living (IADLs):   Walk and transfer into and out of bed and chair?: Yes  Dress and groom yourself?: Yes    Bathe or shower yourself?: Yes    Feed yourself? Yes  Do your laundry/housekeeping?: Yes  Manage your money, pay your bills and track your expenses?: Yes  Make your own meals?: Yes    Do your own shopping?: Yes    Previous Hospitalizations:   Any hospitalizations or ED visits within the last 12 months?: No      Advance Care Planning:   Living  "will: Yes    Advanced directive: Yes      PREVENTIVE SCREENINGS      Cardiovascular Screening:    General: Screening Current      Diabetes Screening:     General: Screening Current      Colorectal Cancer Screening:     General: Screening Current      Prostate Cancer Screening:    General: Screening Current      Osteoporosis Screening:    General: Screening Not Indicated      Abdominal Aortic Aneurysm (AAA) Screening:    Risk factors include: age between 65-74 yo and tobacco use        Lung Cancer Screening:     General: Screening Current      Hepatitis C Screening:    General: Screening Current    Screening, Brief Intervention, and Referral to Treatment (SBIRT)    Screening  Typical number of drinks in a day: 0  Typical number of drinks in a week: 0  Interpretation: Low risk drinking behavior.    Single Item Drug Screening:  How often have you used an illegal drug (including marijuana) or a prescription medication for non-medical reasons in the past year? never    Single Item Drug Screen Score: 0  Interpretation: Negative screen for possible drug use disorder    Other Counseling Topics:   Sunscreen and regular weightbearing exercise.     No results found.     Physical Exam:     /64 (BP Location: Left arm, Patient Position: Sitting, Cuff Size: Large)   Pulse 104   Temp 97.5 °F (36.4 °C) (Tympanic)   Ht 5' 8\" (1.727 m)   Wt 125 kg (275 lb)   SpO2 94%   BMI 41.81 kg/m²     Physical Exam  Vitals reviewed.   Constitutional:       General: He is not in acute distress.     Appearance: Normal appearance. He is not ill-appearing, toxic-appearing or diaphoretic.   HENT:      Head: Normocephalic and atraumatic.      Right Ear: External ear normal.      Left Ear: External ear normal.      Nose: Nose normal.      Mouth/Throat:      Mouth: Mucous membranes are moist.   Eyes:      General: No scleral icterus.        Right eye: No discharge.         Left eye: No discharge.      Extraocular Movements: Extraocular " movements intact.      Conjunctiva/sclera: Conjunctivae normal.   Cardiovascular:      Rate and Rhythm: Normal rate and regular rhythm.      Pulses: Normal pulses.      Heart sounds: Normal heart sounds.   Pulmonary:      Effort: Pulmonary effort is normal. No respiratory distress.      Breath sounds: Normal breath sounds.   Abdominal:      Palpations: Abdomen is soft.      Tenderness: There is no abdominal tenderness.   Musculoskeletal:         General: No swelling. Normal range of motion.      Cervical back: Normal range of motion.   Skin:     General: Skin is warm and dry.   Neurological:      General: No focal deficit present.      Mental Status: He is alert and oriented to person, place, and time.   Psychiatric:         Mood and Affect: Mood normal.         Behavior: Behavior normal.         Thought Content: Thought content normal.          Primitivo Rios MD

## 2024-01-17 ENCOUNTER — TELEMEDICINE (OUTPATIENT)
Dept: PSYCHIATRY | Facility: CLINIC | Age: 69
End: 2024-01-17
Payer: MEDICARE

## 2024-01-17 DIAGNOSIS — F33.9 DEPRESSION, RECURRENT (HCC): Primary | ICD-10-CM

## 2024-01-17 DIAGNOSIS — F43.10 PTSD (POST-TRAUMATIC STRESS DISORDER): ICD-10-CM

## 2024-01-17 PROCEDURE — 99213 OFFICE O/P EST LOW 20 MIN: CPT | Performed by: PHYSICIAN ASSISTANT

## 2024-01-17 RX ORDER — PRAZOSIN HYDROCHLORIDE 1 MG/1
1 CAPSULE ORAL
Qty: 90 CAPSULE | Refills: 0 | Status: SHIPPED | OUTPATIENT
Start: 2024-01-17

## 2024-01-22 NOTE — PSYCH
"  Virtual Regular Visit    Verification of patient location:    Patient is located at Home in the following state in which I hold an active license [place]      Reason for visit is   Chief Complaint   Patient presents with    Virtual Regular Visit          Encounter provider Abril Jones PA-C    Provider located at  [place] [address] [place] [id number]  [phone]      Recent Visits  Date Type Provider Dept   01/17/24 Telemedicine Abril Jones PA-C [facility]   01/15/24 Office Visit Primitivo Rios MD [facility]   Showing recent visits within past 7 days and meeting all other requirements  Future Appointments  No visits were found meeting these conditions.  Showing future appointments within next 150 days and meeting all other requirements       The patient was identified by name and date of birth. Devon Aly was informed that this is a telemedicine visit and that the visit is being conducted throughthe Epic Embedded platform. He agrees to proceed..  My office door was closed. No one else was in the room.  He acknowledged consent and understanding of privacy and security of the video platform. The patient has agreed to participate and understands they can discontinue the visit at any time.    Patient is aware this is a billable service.     .MEDICATION MANAGEMENT NOTE        [facility] [place] [address] [place] [id number]  [phone]    This note was not shared with the patient due to this is a psychotherapy note      Name and Date of Birth:  Devon Aly 68 y.o. 1955    Date of Visit: Jan 17, 2024    SUBJECTIVE:     Devon seen by Jose Luis 12/18 at which time meds unchanged.  Devon notes nightmares \"from time to time\".  Still with restless legs at night " "despite requip at max dose -has sleep study scheduled for this month.  Reports drop in mood in the evenings  as well as ruminating thoughts.  Feels \"useless\" because he can no longer support his family in the way he was accustomed to.  Feels like his life as been \"on hold\" since covid.   Wants to isolate and gets anxious with a lot of environmental stimuli (grandkids in the house), when he hears bad news and when he sees his bills.  Anxiety accompanied by easily upset, shortness of breath and ct discomfort. Says he no  longer looks forward to his birthday.  Feels better in the morning, demario when he wakes up to sunshine on his face.  Mood tends to lower in bad weather. Denies SI.     Review of Systems   Neurological:  Negative for dizziness and syncope.   Psychiatric/Behavioral:  Positive for sleep disturbance.         Restless legs     Psychiatric History     This office since 5/7/20.  No IP or suicide attempts.  Past med trial- zoloft.      Trauma/Loss History       ARDS secondary to covid- intubated x 10 days, April 2020. Lost brother and coworkers to covid.  Has been too ill to return to work.      Social History        Lives with wife Kailyn -  >40 yrs. 2 daugthers and a son.  Worked at [employer] x 37 yrs -.  Hobby: birding      OBJECTIVE:     MENTAL STATUS EXAM  Appearance:  age appropriate, dressed casually   Behavior:  Pleasant & cooperative   Speech:  Normal volume, regular rate and rhythm   Mood:  anxious   Affect:  mood congruent   Language: intact and appropriate for age, education, and intellect   Thought Process:  goal directed   Associations: intact associations   Thought Content:  negative ruminations   Perceptual Disturbances: no auditory or visual hallcunations   Risk Potential / Abnormal Thoughts: Suicidal ideation - None  Homicidal ideation - None  Potential for aggression - No       Consciousness:  Alert & Awake   Sensorium:  Grossly oriented   Attention: attention " span and concentration are age appropriate       Fund of Knowledge:  Memory: awareness of current events: yes  recent and remote memory grossly intact   Insight:  intact   Judgment: intact     Lab Review: I have reviewed all pertinent labs      Lab Results   Component Value Date    SODIUM 136 10/27/2023    K 4.3 10/27/2023     10/27/2023    CO2 24 10/27/2023    AGAP 7 10/27/2023    BUN 19 10/27/2023    CREATININE 1.39 (H) 10/27/2023    GLUC 116 10/20/2022    GLUF 92 10/27/2023    CALCIUM 9.3 10/27/2023    AST 43 04/13/2022    ALT 60 04/13/2022    ALKPHOS 88 04/13/2022    TP 7.5 04/13/2022    TBILI 0.60 04/13/2022    EGFR 52 10/27/2023     Lab Results   Component Value Date    WBC 10.15 10/27/2023    HGB 16.8 10/27/2023    HCT 51.3 (H) 10/27/2023    MCV 88 10/27/2023     10/27/2023     Lab Results   Component Value Date    YKDHXVIC57 431 02/06/2023     Lab Results   Component Value Date    FOLATE 9.0 02/06/2023         ASSESSMENT & PLAN          Diagnoses and all orders for this visit:    Depression, recurrent (HCC)    PTSD (post-traumatic stress disorder)  -     prazosin (MINIPRESS) 1 mg capsule; Take 1 capsule (1 mg total) by mouth daily at bedtime      Current Outpatient Medications   Medication Sig Dispense Refill    prazosin (MINIPRESS) 1 mg capsule Take 1 capsule (1 mg total) by mouth daily at bedtime 90 capsule 0    albuterol (2.5 mg/3 mL) 0.083 % nebulizer solution INHALE 1 VIAL BY NEBULIZATION EVERY 6 (SIX) HOURS AS NEEDED FOR WHEEZING OR SHORTNESS OF BREATH 375 mL 2    Anoro Ellipta 62.5-25 MCG/ACT inhaler INHALE 1 PUFF DAILY 60 each 5    donepezil (ARICEPT) 10 mg tablet TAKE 1 TABLET BY MOUTH DAILY AT BEDTIME 90 tablet 1    DULoxetine (CYMBALTA) 60 mg delayed release capsule Take 1 capsule (60 mg total) by mouth every morning 90 capsule 0    gabapentin (NEURONTIN) 100 mg capsule One po qam and 2 po q bedtime 90 capsule 2    Multiple Vitamins-Minerals (MULTIVITAMIN ADULT PO) Take 1 tablet by  mouth daily      rOPINIRole (REQUIP) 3 mg tablet TAKE 1 TABLET (3 MG TOTAL) BY MOUTH DAILY AT BEDTIME 90 tablet 2    sildenafil (VIAGRA) 50 MG tablet Take 1 tablet (50 mg total) by mouth daily as needed for erectile dysfunction 10 tablet 0    tiZANidine (ZANAFLEX) 2 mg tablet Take 2 mg by mouth daily at bedtime      Ventolin  (90 Base) MCG/ACT inhaler INHALE 2 PUFFS BY MOUTH EVERY 6 HOURS AS NEEDED FOR WHEEZE 18 g 3     No current facility-administered medications for this visit.                Plan:       Cont f/u with Tray Garzon for ind therapy and with sleep medicine this month.  Continues to f/u with medical providers regularly.  For now cont meds unchanged-  cymbalta 60 mg/d, requip 3 mg q bedtime, neurontin 100/200 mg, prazosin 1 mg q bedtime.     Reviewed risks, benefits, side effects of medications, including no medication.  Patient understands and agrees to treatment plan.   F/u [facility] 2 mths, sooner prn         Patient has been informed of 24 hours and weekend coverage for urgent situations accessed by calling the main clinic phone number.     Abril Jones PA-C

## 2024-01-25 ENCOUNTER — TELEPHONE (OUTPATIENT)
Age: 69
End: 2024-01-25

## 2024-01-25 DIAGNOSIS — Z02.71 DISABILITY EXAMINATION: Primary | ICD-10-CM

## 2024-01-25 NOTE — TELEPHONE ENCOUNTER
Pt was seen by Blanca and discussed a letter for his job.  Pt would like a letter from Dr. Hughes put a note in his chart back in June 2022 stating he is disabled from his profession.   Call pt back when done [phone]

## 2024-01-26 ENCOUNTER — TELEPHONE (OUTPATIENT)
Dept: RADIOLOGY | Facility: CLINIC | Age: 69
End: 2024-01-26

## 2024-01-26 DIAGNOSIS — M16.0 PRIMARY OSTEOARTHRITIS OF BOTH HIPS: Primary | ICD-10-CM

## 2024-01-26 NOTE — TELEPHONE ENCOUNTER
Called pt to scheduled procedures with Dr. Wolf.  The pt is not diabetic and he does not take blood thinners.  All instructions were reviewed with the patient.

## 2024-01-29 ENCOUNTER — OFFICE VISIT (OUTPATIENT)
Age: 69
End: 2024-01-29
Payer: MEDICARE

## 2024-01-29 VITALS
HEART RATE: 101 BPM | HEIGHT: 68 IN | RESPIRATION RATE: 18 BRPM | DIASTOLIC BLOOD PRESSURE: 79 MMHG | OXYGEN SATURATION: 94 % | BODY MASS INDEX: 41.68 KG/M2 | WEIGHT: 275 LBS | SYSTOLIC BLOOD PRESSURE: 110 MMHG

## 2024-01-29 DIAGNOSIS — R06.83 SNORING: ICD-10-CM

## 2024-01-29 DIAGNOSIS — E66.01 MORBID OBESITY (HCC): ICD-10-CM

## 2024-01-29 DIAGNOSIS — F17.211 CIGARETTE NICOTINE DEPENDENCE IN REMISSION: ICD-10-CM

## 2024-01-29 DIAGNOSIS — R06.09 DOE (DYSPNEA ON EXERTION): ICD-10-CM

## 2024-01-29 DIAGNOSIS — J98.4 RESTRICTIVE LUNG DISEASE: ICD-10-CM

## 2024-01-29 DIAGNOSIS — U09.9 COVID-19 LONG HAULER: Primary | ICD-10-CM

## 2024-01-29 DIAGNOSIS — E66.01 OBESITY, MORBID (HCC): ICD-10-CM

## 2024-01-29 PROCEDURE — 99214 OFFICE O/P EST MOD 30 MIN: CPT

## 2024-01-29 NOTE — ASSESSMENT & PLAN NOTE
-Likely multifactorial secondary to deconditioning, obesity, restrictive lung disease, emphysema, and likely untreated ANTHONY  -PFTs in 2022 with restrictive pattern and moderately decreased lung volumes and diffusion capacity.  Normal 6-minute walk test    Plan:  -Encouraged patient to complete echocardiogram scheduled in 2 weeks to evaluate for any underlying cardiac etiology for his symptoms.  If normal, consider repeating PFTs to evaluate for any lung function changes  -Continue Anoro Ellipta  -Encouraged weight loss  -Complete sleep study to evaluate for ANTHONY and if positive treat with CPAP

## 2024-01-29 NOTE — LETTER
January 29, 2024     Patient: Devon Aly  YOB: 1955  Date of Visit: 1/29/2024      To Whom it May Concern:    Devon Aly is under my professional care. Devon was seen in my office on 1/29/2024. Devon. He is disabled from his profession.    If you have any questions or concerns, please don't hesitate to call.         Sincerely,          ERIC Douglas        CC: No Recipients

## 2024-01-29 NOTE — PROGRESS NOTES
Pulmonary Follow Up Note  Devon Aly 68 y.o. male MRN: [id number]  1/29/2024    Assessment:    Snoring  -High suspicion for ANTHONY.  Home sleep study scheduled for [facility].  Will follow-up with those results.  If positive, consider CPAP titration study/auto CPAP    Morbid obesity (HCC)  -Discussed lifestyle modifications and encouraged weight loss    WALTERS (dyspnea on exertion)  -Likely multifactorial secondary to deconditioning, obesity, restrictive lung disease, emphysema, and likely untreated ANTHONY  -PFTs in 2022 with restrictive pattern and moderately decreased lung volumes and diffusion capacity.  Normal 6-minute walk test    Plan:  -Encouraged patient to complete echocardiogram scheduled in 2 weeks to evaluate for any underlying cardiac etiology for his symptoms.  If normal, consider repeating PFTs to evaluate for any lung function changes  -Continue Anoro Ellipta  -Encouraged weight loss  -Complete sleep study to evaluate for ANTHONY and if positive treat with CPAP    Restrictive lung disease  -Likely secondary to severe COVID-19 infection in 2020 as well as obesity  -Continue Anoro Ellipta 1 puff daily and albuterol every 6 hours as needed    Cigarette nicotine dependence in remission  -Former 95-pack-year smoking history, quit 3 years ago  -Due for yearly CT lung cancer screening June which is already scheduled  -Prior CT chest with no suspicious lung nodules      Plan:    Diagnoses and all orders for this visit:    COVID-19 long hauler    Snoring    Morbid obesity (HCC)    Restrictive lung disease    Obesity, morbid (HCC)    WALTERS (dyspnea on exertion)    Cigarette nicotine dependence in remission      Return in about 3 months (around 4/29/2024).      History of Present Illness     Chief Complaint:   Chief Complaint   Patient presents with    Follow-up       Patient ID: Devon is a 68 y.o. y.o. male has a past medical history of Chronic kidney disease, COVID-19 (03/2020), Hypertension (4/02/2020), Liver disease,  Pneumonia (3/32/2020), and PTSD (post-traumatic stress disorder).      1/29/2024  HPI: Devon Aly is a 68 y.o. male with a past medical history including but not limited to emphysema, former 95-pack-year smoking history quit 3 years ago, ANTHONY, COVID-19 long-hauler, restrictive lung disease, hypertension, and obesity.  He is here today for a follow-up visit.  He is accompanied by his wife.  He is also requesting a letter for disability today.  He used to work as an  in the hospital.  He was previously seen in the office November.  He has chronic WALTERS and has been maintained on Anoro.    Patient reports no changes in his breathing.  Still gets short of breath easily with minimal activities and also experiences wheezing.  Symptoms usually resolve with rest and does not need to use his rescue inhaler.  Has occasional cough with mucus in the mornings.  Uses his albuterol nebulizer approximately once weekly.    Patient's wife reports snoring at night and restless legs.  Patient is already on Requip for this.  He has a home sleep study which is planned for tonight.  Patient also experiences daytime sleepiness and fatigue.  He has occasional chest pain for which his PCP ordered an echocardiogram, and occasional lower extremity swelling.        Review of Systems   Constitutional:  Negative for activity change, appetite change, chills, fever and unexpected weight change.   HENT:  Positive for postnasal drip and rhinorrhea. Negative for congestion, ear pain, sneezing, sore throat and trouble swallowing.    Respiratory:  Positive for shortness of breath and wheezing. Negative for cough and chest tightness.    Cardiovascular:  Negative for chest pain, palpitations and leg swelling.   Allergic/Immunologic: Negative.        Historical Information   Past Medical History:   Diagnosis Date    Chronic kidney disease     COVID-19 03/2020    Hypertension 4/02/2020    Liver disease     Pneumonia 3/32/2020    PTSD  (post-traumatic stress disorder)      Past Surgical History:   Procedure Laterality Date    FL INJECTION RIGHT HIP (NON ARTHROGRAM)  11/30/2020    FL INJECTION RIGHT HIP (NON ARTHROGRAM)  8/4/2021    FL INJECTION RIGHT HIP (NON ARTHROGRAM)  12/27/2021    FL INJECTION RIGHT HIP (NON ARTHROGRAM)  4/25/2022    NO PAST SURGERIES       Family History   Problem Relation Age of Onset    Heart disease Mother     Coronary artery disease Mother     Heart failure Mother     Sudden death Father     No Known Problems Son     No Known Problems Daughter     No Known Problems Maternal Grandmother     No Known Problems Maternal Grandfather     No Known Problems Paternal Grandmother     No Known Problems Paternal Grandfather     No Known Problems Daughter     Kidney disease Brother     Cancer Brother        Smoking history: He reports that he quit smoking about 3 years ago. His smoking use included cigarettes. He started smoking about 51 years ago. He has a 94.5 pack-year smoking history. He has been exposed to tobacco smoke. He has never used smokeless tobacco.    Occupational History:     Immunization History   Administered Date(s) Administered    COVID-19 PFIZER VACCINE 0.3 ML IM 04/15/2021, 05/08/2021, 02/04/2022    INFLUENZA 12/22/2022, 12/04/2023    Influenza, high dose seasonal 0.7 mL 12/22/2022, 12/04/2023    Influenza, recombinant, quadrivalent,injectable, preservative free 10/05/2020    Pneumococcal Conjugate 13-Valent 10/05/2020    Pneumococcal Conjugate Vaccine 20-valent (Pcv20), Polysace 12/22/2022       Meds/Allergies     Current Outpatient Medications:     albuterol (2.5 mg/3 mL) 0.083 % nebulizer solution, INHALE 1 VIAL BY NEBULIZATION EVERY 6 (SIX) HOURS AS NEEDED FOR WHEEZING OR SHORTNESS OF BREATH, Disp: 375 mL, Rfl: 2    Anoro Ellipta 62.5-25 MCG/ACT inhaler, INHALE 1 PUFF DAILY, Disp: 60 each, Rfl: 5    donepezil (ARICEPT) 10 mg tablet, TAKE 1 TABLET BY MOUTH DAILY AT BEDTIME, Disp: 90 tablet, Rfl: 1     "DULoxetine (CYMBALTA) 60 mg delayed release capsule, Take 1 capsule (60 mg total) by mouth every morning, Disp: 90 capsule, Rfl: 0    gabapentin (NEURONTIN) 100 mg capsule, One po qam and 2 po q bedtime, Disp: 90 capsule, Rfl: 2    Multiple Vitamins-Minerals (MULTIVITAMIN ADULT PO), Take 1 tablet by mouth daily, Disp: , Rfl:     prazosin (MINIPRESS) 1 mg capsule, Take 1 capsule (1 mg total) by mouth daily at bedtime, Disp: 90 capsule, Rfl: 0    rOPINIRole (REQUIP) 3 mg tablet, TAKE 1 TABLET (3 MG TOTAL) BY MOUTH DAILY AT BEDTIME, Disp: 90 tablet, Rfl: 2    sildenafil (VIAGRA) 50 MG tablet, Take 1 tablet (50 mg total) by mouth daily as needed for erectile dysfunction, Disp: 10 tablet, Rfl: 0    tiZANidine (ZANAFLEX) 2 mg tablet, Take 2 mg by mouth daily at bedtime, Disp: , Rfl:     Ventolin  (90 Base) MCG/ACT inhaler, INHALE 2 PUFFS BY MOUTH EVERY 6 HOURS AS NEEDED FOR WHEEZE, Disp: 18 g, Rfl: 3  Allergies:   Allergies   Allergen Reactions    Tetracycline Rash         Vitals:  Vitals:    01/29/24 0811 01/29/24 0812   BP: 110/79    BP Location: Right arm    Patient Position: Sitting    Cuff Size: Large    Pulse: 101    Resp: 18    SpO2: 91% 94%   Weight: 125 kg (275 lb)    Height: 5' 8\" (1.727 m)    Oxygen Therapy  SpO2: 94 %  Oxygen Therapy: None (Room air)  .  Wt Readings from Last 3 Encounters:   01/29/24 125 kg (275 lb)   01/15/24 125 kg (275 lb)   12/04/23 125 kg (274 lb 9.6 oz)     Body mass index is 41.81 kg/m².    Physical Exam  Vitals and nursing note reviewed.   Constitutional:       General: He is not in acute distress.     Appearance: Normal appearance. He is well-developed. He is obese.   Cardiovascular:      Rate and Rhythm: Normal rate and regular rhythm.      Heart sounds: Normal heart sounds. No murmur heard.  Pulmonary:      Effort: Pulmonary effort is normal. No respiratory distress.      Breath sounds: Normal breath sounds. No decreased breath sounds, wheezing, rhonchi or rales. " "  Musculoskeletal:         General: No swelling.      Right lower leg: No edema.      Left lower leg: No edema.   Psychiatric:         Mood and Affect: Mood and affect normal.         Behavior: Behavior normal. Behavior is cooperative.           Labs: I have personally reviewed pertinent lab results.  Lab Results   Component Value Date    WBC 10.15 10/27/2023    HGB 16.8 10/27/2023    HCT 51.3 (H) 10/27/2023    MCV 88 10/27/2023     10/27/2023     Lab Results   Component Value Date    CALCIUM 9.3 10/27/2023    K 4.3 10/27/2023    CO2 24 10/27/2023     10/27/2023    BUN 19 10/27/2023    CREATININE 1.39 (H) 10/27/2023     No results found for: \"IGE\"  Lab Results   Component Value Date    ALT 60 04/13/2022    AST 43 04/13/2022    ALKPHOS 88 04/13/2022       Imaging and other studies: I have personally reviewed pertinent reports and I have personally reviewed pertinent films in PACS     CT lung screening 6/22/2023  Mild emphysema.  No suspicious pulmonary nodules.    Pulmonary function testing:     Pulmonary Functions Testing Results: 6/22/2022    FEV1/FVC ratio 77%    FEV1 72% predicted  FVC 72% predicted  (-) response to bronchodilators  TLC 57 % predicted  RV 28 % predicted  DLCO corrected for hemoglobin 52 % predicted    Impression:   Flow-volume curve demonstrates restrictive pattern.  Spirometry demonstrates a restrictive airflow limitation.  Lung volumes are moderately decreased.  DLCO is moderately decreased.    6-minute walk  Patient to walk a total distance of 198 m in 6 minutes without any evidence of exercise desaturation         ESS:    No results found.        Answers submitted by the patient for this visit:  Pulmonology Questionnaire (Submitted on 1/26/2024)  Chief Complaint: Primary symptoms  Do you have difficulty breathing?: Yes  Chronicity: chronic  When did you first notice your symptoms?: more than 1 year ago  How often do your symptoms occur?: daily  Since you first noticed this " problem, how has it changed?: unchanged  Do you have shortness of breath that occurs with effort or exertion?: Yes  Do you have ear congestion?: No  Do you have heartburn?: No  Do you have fatigue?: Yes  Do you have nasal congestion?: No  Do you have shortness of breath when lying flat?: Yes  Do you have shortness of breath when you wake up?: Yes  Do you have sweats?: No  Have you experienced weight loss?: No  Which of the following makes your symptoms worse?: nothing, climbing stairs, emotional stress, exercise  Which of the following makes your symptoms better?: nothing, OTC inhaler  Risk factors for lung disease: occupational exposure

## 2024-01-29 NOTE — TELEPHONE ENCOUNTER
I discussed with patient at time of our appointment that patient will have to be evaluated by PT/OT for functional capacity evaluation.  This is the new protocol.  Since that he is seeing pulmonology and the symptoms are due to COVID based on the previous notes, he can contact pulmonology to ask for a note.      In the meantime I would also recommend following up with PT/OT for functional capacity evaluation.    Thank you

## 2024-01-29 NOTE — ASSESSMENT & PLAN NOTE
-Former 95-pack-year smoking history, quit 3 years ago  -Due for yearly CT lung cancer screening June which is already scheduled  -Prior CT chest with no suspicious lung nodules

## 2024-01-29 NOTE — ASSESSMENT & PLAN NOTE
-Likely secondary to severe COVID-19 infection in 2020 as well as obesity  -Continue Anoro Ellipta 1 puff daily and albuterol every 6 hours as needed

## 2024-01-29 NOTE — ASSESSMENT & PLAN NOTE
-High suspicion for ANTHONY.  Home sleep study scheduled for tonight.  Will follow-up with those results.  If positive, consider CPAP titration study/auto CPAP

## 2024-01-30 ENCOUNTER — TELEPHONE (OUTPATIENT)
Age: 69
End: 2024-01-30

## 2024-01-30 NOTE — TELEPHONE ENCOUNTER
Pt needs letter - ASAP - letter must say due to his medical condition from having COVID he can no longer work.    Pt needs to bring letter to  tomorrow    Please call pt

## 2024-01-31 NOTE — TELEPHONE ENCOUNTER
I spoke with Pt at length and discussed recommendations From .Pt does not recall conversation and will reach out to pulmonary to discuss letter stating he is unable to work due to disability from covid.

## 2024-02-01 DIAGNOSIS — M62.838 MUSCLE SPASM: Primary | ICD-10-CM

## 2024-02-01 RX ORDER — TIZANIDINE 2 MG/1
2 TABLET ORAL
Qty: 30 TABLET | Refills: 0 | Status: SHIPPED | OUTPATIENT
Start: 2024-02-01 | End: 2024-03-02

## 2024-02-01 NOTE — TELEPHONE ENCOUNTER
I had placed a functional capacity test at our appointment when I saw him.  It is already in.    I just double checked, the order is active.    Thank you

## 2024-02-06 ENCOUNTER — SOCIAL WORK (OUTPATIENT)
Dept: BEHAVIORAL/MENTAL HEALTH CLINIC | Facility: CLINIC | Age: 69
End: 2024-02-06
Payer: MEDICARE

## 2024-02-06 DIAGNOSIS — R41.3 MEMORY DIFFICULTY: ICD-10-CM

## 2024-02-06 DIAGNOSIS — F43.10 PTSD (POST-TRAUMATIC STRESS DISORDER): Primary | ICD-10-CM

## 2024-02-06 DIAGNOSIS — F33.2 MAJOR DEPRESSIVE DISORDER, RECURRENT SEVERE WITHOUT PSYCHOTIC FEATURES (HCC): ICD-10-CM

## 2024-02-06 DIAGNOSIS — F43.11 ACUTE POST-TRAUMATIC STRESS DISORDER: ICD-10-CM

## 2024-02-06 PROCEDURE — 90834 PSYTX W PT 45 MINUTES: CPT | Performed by: SOCIAL WORKER

## 2024-02-06 NOTE — PSYCH
Behavioral Health Psychotherapy Progress Note    Psychotherapy Provided: Individual Psychotherapy     1. PTSD (post-traumatic stress disorder)        2. 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 Acute post-traumatic stress disorder        4. Memory difficulty            Goals addressed in session: Goal 1, Goal 2, and Goal 3      DATA: Galileo has all kinds of medical issues due to a very serious case of covid. He just had a court hearing for disability and a further hearing is scheduled for May to determine the final outcome. Galileo has breathing issues and he shared all of his organs were affected. He also has joint issues. He discussed some tensions with his son who got involved in an issue between Galileo and his daughter. Yet his son who makes a lot of money is living with a female partner who uses meth. Galileo also believes two of his son's so called kids may not be his son's. I provided support, encouragement and strategies to cope.   During this session, this clinician used the following therapeutic modalities: Client-centered Therapy, Cognitive Behavioral Therapy, Mindfulness-based Strategies, and Supportive Psychotherapy    Substance Abuse was not addressed during this session. If the client is diagnosed with a co-occurring substance use disorder, please indicate any changes in the frequency or amount of use: n/a. Stage of change for addressing substance use diagnoses: No substance use/Not applicable    ASSESSMENT:  Devon Aly presents with a Anxious and Depressed mood.     his affect is anxious and depressed, which is congruent, with his mood and the content of the session. The client has made progress on their goals.However his symptoms fluctuate due to his poor health, the issue with disability and family issues.      Devon Aly presents with a none risk of suicide, none risk of self-harm, and none risk of harm to others.    For any risk assessment that  "surpasses a \"low\" rating, a safety plan must be developed.    A safety plan was indicated: no  If yes, describe in detail n/a    PLAN: Between sessions, Devon Aly will use mindfulness and CBT. At the next session, the therapist will use Client-centered Therapy, Cognitive Behavioral Therapy, Mindfulness-based Strategies, and Supportive Psychotherapy to address issues as they arise..    Behavioral Health Treatment Plan and Discharge Planning: Devon Aly is aware of and agrees to continue to work on their treatment plan. They have identified and are working toward their discharge goals. yes    Visit start and stop times:    02/06/24  Start Time: 1110  Stop Time: 1200  Total Visit Time: 50 minutes  "

## 2024-02-08 ENCOUNTER — CONSULT (OUTPATIENT)
Dept: CARDIOLOGY CLINIC | Facility: CLINIC | Age: 69
End: 2024-02-08
Payer: MEDICARE

## 2024-02-08 VITALS
SYSTOLIC BLOOD PRESSURE: 110 MMHG | OXYGEN SATURATION: 95 % | BODY MASS INDEX: 41.07 KG/M2 | WEIGHT: 271 LBS | HEIGHT: 68 IN | RESPIRATION RATE: 16 BRPM | DIASTOLIC BLOOD PRESSURE: 68 MMHG | HEART RATE: 92 BPM

## 2024-02-08 DIAGNOSIS — R06.02 SOB (SHORTNESS OF BREATH) ON EXERTION: Primary | ICD-10-CM

## 2024-02-08 DIAGNOSIS — I45.10 RIGHT BUNDLE BRANCH BLOCK (RBBB) ON ELECTROCARDIOGRAM (ECG): ICD-10-CM

## 2024-02-08 DIAGNOSIS — Z87.891 PERSONAL HISTORY OF NICOTINE DEPENDENCE: ICD-10-CM

## 2024-02-08 DIAGNOSIS — N18.31 STAGE 3A CHRONIC KIDNEY DISEASE (HCC): ICD-10-CM

## 2024-02-08 DIAGNOSIS — E66.01 MORBID OBESITY (HCC): ICD-10-CM

## 2024-02-08 DIAGNOSIS — M79.89 LEG SWELLING: ICD-10-CM

## 2024-02-08 PROCEDURE — 93000 ELECTROCARDIOGRAM COMPLETE: CPT | Performed by: INTERNAL MEDICINE

## 2024-02-08 PROCEDURE — 99204 OFFICE O/P NEW MOD 45 MIN: CPT | Performed by: INTERNAL MEDICINE

## 2024-02-08 NOTE — PROGRESS NOTES
CARDIOLOGY OFFICE VISIT  [facility]  [address] [address]  Tel: [phone]      NAME: Devon Aly  AGE: 68 y.o.  SEX: male  : 1955  MRN: [id number]      Chief Complaint:  Chief Complaint   Patient presents with    New Patient Visit   Dyspnea on exertion    Assessment and Plan:    1. SOB (shortness of breath) on exertion  Likely  multifactorial in the setting of COPD/interstitial lung disease, obesity, possible undiagnosed sleep apnea.  Echo has been ordered by another provider and is pending.  Sleep study has been ordered by another provider and is pending.    2.  Lower extremity edema  Recommend decreasing salt intake, keeping legs elevated while sleeping, and staying hydrated.  Will evaluate further with echocardiogram.    3. Obesity  Weight loss recommended    4.  History of smoking  Patient states he quit smoking in 2020.    5. CKD    Please return for follow-up as needed.     History of Present Illness:     Devon Aly is a 68 y.o. male with past medical history of snoring sleep study pending, morbid obesity, restrictive lung disease, COPD, history of smoking, depression, CKD who presents for dyspnea on exertion.  States this has been going on since having COVID in 2020.  States he gets short of breath with any amount of exertion. He states his symptoms improve with rest and with inhaler/nebulizer treatment.  He states he does snore and move a lot in his sleep.  Sleep study pending.  He endorses PND and denies orthopnea.   He has additionally noted intermittent lower extremity edema.  States they will occur anytime of day, is not specific to the end of the day.  He notices them when he initially stands up.  He does note some unintentional weight gain, however states his weight has been more stable recently.  He states he has not been watching his diet closely and is unable to exercise due to how  severe the shortness of breath is.    He does endorse some chest discomfort that is not exertional in nature.  He states it is not a pressure sensation, rather a discomfort which he believes is due to to abdominal gas/bloating.  States this will last for several hours and are not relieved with rest.  He denies palpitations, lightheadedness, syncope.      Prior Cardiac Work-Up:  CT chest 2/2021:  HEART/GREAT VESSELS:  Cardiomegaly. Large pulmonary artery may indicate pulmonary arterial hypertension.   Lexiscan 1/2021: Normal study after pharmacologic vasodilation. There was image artifact, without diagnostic evidence for perfusion abnormality. (Fixed inferior and anterior defects were noted that both resolved with prone imaging.) Left ventricular systolic function was normal  Echo 6/2020:   LEFT VENTRICLE:  Systolic function was normal. Ejection fraction was estimated to be 60 %.  There were no regional wall motion abnormalities.  Wall thickness was mildly increased.  There was mild concentric hypertrophy.  Doppler parameters were consistent with abnormal left ventricular relaxation (grade 1 diastolic dysfunction).     RIGHT VENTRICLE:  The size was normal.  Systolic function was normal.     MITRAL VALVE:  There was mild regurgitation.     TRICUSPID VALVE:  There was trace regurgitation.  Pulmonary artery systolic pressure was within the normal range.    Family Cardiac History: Mother heart disease and stroke/TIA hx.   Social History: Quit smoking 2020. No alcohol or other drug use per patient.       Labs: 10/2023: K4.3 creat 1.39 GFR 52 hemoglobin 16.8 platelets 214, A1c 4/2022 5.6, lipid profile 4/2022: Cholesterol 158  HDL 37 LDL 97    Review of Systems:   Review of Systems   Constitutional:  Positive for activity change and fatigue. Negative for diaphoresis and fever.   HENT:  Negative for ear pain and sore throat.    Eyes:  Negative for pain and redness.   Respiratory:  Positive for shortness of breath.  "Negative for cough.    Cardiovascular:  Positive for chest pain and leg swelling. Negative for palpitations.   Gastrointestinal:  Negative for abdominal pain, nausea and vomiting.   Genitourinary:  Negative for dysuria.   Skin:  Negative for color change and rash.   Neurological:  Negative for dizziness, syncope and light-headedness.   Hematological:  Does not bruise/bleed easily.   Psychiatric/Behavioral:  Negative for agitation and behavioral problems.    All other systems reviewed and are negative.        Vitals:  Vitals:    02/08/24 1628   BP: 110/68   BP Location: Left arm   Patient Position: Sitting   Cuff Size: Standard   Pulse: 92   Resp: 16   SpO2: 95%   Weight: 123 kg (271 lb)   Height: 5' 8\" (1.727 m)        Body mass index is 41.21 kg/m².    Weight (last 2 days)       Date/Time Weight    02/08/24 1628 123 (271)              Physical Exam:   GEN: Alert and oriented x 3, in no acute distress.  Well appearing and well nourished. Obese body habitus.   HEENT: Sclera anicteric, conjunctivae pink, mucous membranes moist. Oropharynx clear.   NECK: Supple, no carotid bruits, no significant JVD. Trachea midline, no thyromegaly.   HEART: Regular rhythm, normal S1 and S2, no murmurs, clicks, gallops or rubs. PMI nondisplaced, no thrills.   LUNGS: Clear to auscultation bilaterally; no wheezes, rales, or rhonchi. No increased work of breathing or signs of respiratory distress.   ABDOMEN: Soft, nontender, nondistended.   EXTREMITIES: Trace pitting edema to B/L LE. Skin warm and well perfused, no clubbing or cyanosis.  NEURO: No focal findings. Normal speech. Mood and affect normal.   SKIN: Normal without suspicious lesions on exposed skin.      EKG Reviewed Personally:/8/2024: Sinus rhythm.  Right bundle branch block.  T wave inversions in leads III, V1.      Past Medical History:  Past Medical History:   Diagnosis Date    Chronic kidney disease     COVID-19 03/2020    Hypertension 4/02/2020    Liver disease     " Pneumonia 3/32/2020    PTSD (post-traumatic stress disorder)          Past Surgical History:  Past Surgical History:   Procedure Laterality Date    FL INJECTION RIGHT HIP (NON ARTHROGRAM)  11/30/2020    FL INJECTION RIGHT HIP (NON ARTHROGRAM)  8/4/2021    FL INJECTION RIGHT HIP (NON ARTHROGRAM)  12/27/2021    FL INJECTION RIGHT HIP (NON ARTHROGRAM)  4/25/2022    NO PAST SURGERIES           Family History:  Family History   Problem Relation Age of Onset    Heart disease Mother     Coronary artery disease Mother     Heart failure Mother     Sudden death Father     No Known Problems Son     No Known Problems Daughter     No Known Problems Maternal Grandmother     No Known Problems Maternal Grandfather     No Known Problems Paternal Grandmother     No Known Problems Paternal Grandfather     No Known Problems Daughter     Kidney disease Brother     Cancer Brother          Social History:  Social History     Socioeconomic History    Marital status: /Civil Union     Spouse name: None    Number of children: None    Years of education: None    Highest education level: None   Occupational History    None   Tobacco Use    Smoking status: Former     Current packs/day: 0.00     Average packs/day: 2.0 packs/day for 47.2 years (94.5 ttl pk-yrs)     Types: Cigarettes     Start date: 1/1/1973     Quit date: 04/2020     Years since quitting: 3.8     Passive exposure: Past    Smokeless tobacco: Never   Vaping Use    Vaping status: Former    Quit date: 4/1/2020    Substances: Nicotine, Flavoring   Substance and Sexual Activity    Alcohol use: Not Currently    Drug use: Never    Sexual activity: Yes     Partners: Female     Birth control/protection: None   Other Topics Concern    None   Social History Narrative    None     Social Determinants of Health     Financial Resource Strain: High Risk (1/15/2024)    Overall Financial Resource Strain (CARDIA)     Difficulty of Paying Living Expenses: Hard   Food Insecurity: Not on file    Transportation Needs: No Transportation Needs (1/15/2024)    PRAPARE - Transportation     Lack of Transportation (Medical): No     Lack of Transportation (Non-Medical): No   Physical Activity: Inactive (4/20/2021)    Exercise Vital Sign     Days of Exercise per Week: 0 days     Minutes of Exercise per Session: 0 min   Stress: Stress Concern Present (4/20/2021)    [facility] of Occupational Health - Occupational Stress Questionnaire     Feeling of Stress : Very much   Social Connections: Not on file   Intimate Partner Violence: Not on file   Housing Stability: Not on file         Active Problems:  Patient Active Problem List   Diagnosis    Elevated brain natriuretic peptide (BNP) level    PTSD (post-traumatic stress disorder)    Interstitial lung disease (HCC)    Expected bereavement due to life event    Chronic tension-type headache, not intractable    Grade I diastolic dysfunction    WALTERS (dyspnea on exertion)    Memory difficulty    Numbness and tingling    Occupational exposure in workplace    Right bundle branch block (RBBB) on electrocardiogram (ECG)    Depression, recurrent (HCC)    Other headache syndrome    COVID-19 long hauler    Personal history of nicotine dependence    Restrictive lung disease    Stage 3a chronic kidney disease (HCC)    Chronic kidney disease-mineral and bone disorder    Morbid obesity (HCC)    Chronic obstructive pulmonary disease (HCC)    Primary osteoarthritis of right hip    Primary osteoarthritis of one hip, left    Encounter for weight management    Leg swelling    Snoring    Cigarette nicotine dependence in remission    SOB (shortness of breath) on exertion         The following portions of the patient's history were reviewed and updated as appropriate: past medical history, past surgical history, past family history,  past social history, current medications, allergies and problem list.        Laboratory Results:  CBC with diff:   Lab Results   Component Value Date    WBC  "10.15 10/27/2023    RBC 5.84 (H) 10/27/2023    HGB 16.8 10/27/2023    HCT 51.3 (H) 10/27/2023    MCV 88 10/27/2023    MCH 28.8 10/27/2023    RDW 14.7 10/27/2023     10/27/2023       CMP:  Lab Results   Component Value Date    CREATININE 1.39 (H) 10/27/2023    BUN 19 10/27/2023    K 4.3 10/27/2023     10/27/2023    CO2 24 10/27/2023    ALKPHOS 88 2022    ALT 60 2022    AST 43 2022       Lab Results   Component Value Date    HGBA1C 5.6 2022    MG 2.9 (H) 2020    PHOS 2.9 10/27/2023       Lab Results   Component Value Date    TROPONINI <0.02 04/10/2020    TROPONINI 0.02 2020    TROPONINI 0.05 (H) 2020       Lipid Profile:   No results found for: \"CHOL\"  Lab Results   Component Value Date    HDL 37 (L) 2022    HDL 41 2021     Lab Results   Component Value Date    LDLCALC 97 2022    LDLCALC 132 (H) 2021     Lab Results   Component Value Date    TRIG 120 2022    TRIG 113 2021       Results for orders placed during the hospital encounter of 21    NM myocardial perfusion spect (rx stress and/or rest)    [address] [address] [phone]    Rest/Stress Gated SPECT Myocardial Perfusion Imaging After Regadenoson    Patient: SHAMA BARRIOS  MR number: [id number]  Account number: [id number]  : 1955  Age: 65 years  Gender: Male  Status: Outpatient  Location: [facility]  Height: 68 in  Weight: 239 lb  BP: 124/ 74 mmHg    Allergies: TETRACYCLINE    Diagnosis: R06.02 - Shortness of breath, R94.31 - Abnormal electrocardiogram [ECG] [EKG]    Primary Physician:  Duy Hughes MD  Referring Physician:  Duy Hughes MD  Technician:  Justin Li BS, BA, AART(N)  Group:  [facility]  Other:  Kyle Trinidad MS, CCT  Interpreting Physician:  Sabino Busch MD    INDICATIONS: Coronary artery disease.    HISTORY: The patient is a " 65 year old  male. Chest pain status: no chest pain. Other symptoms: dyspnea. Coronary artery disease risk factors: dyslipidemia, hypertension, and smoking (quit 3/20). Cardiovascular history: none  significant. Co-morbidity: history of lung disease and obesity. PTSD, Covid 19- 3/20. Medications: a calcium channel blocker. Previous test results: abnormal ECG.    PHYSICAL EXAM: Baseline physical exam screening: normal.    REST ECG: Normal sinus rhythm. The ECG showed right bundle branch block.    PROCEDURE: The study was performed in the [facility]. A regadenoson infusion pharmacologic stress test was performed. Gated SPECT myocardial perfusion imaging was performed after stress and at rest. Systolic blood  pressure was 124 mmHg, at the start of the study. Diastolic blood pressure was 74 mmHg, at the start of the study. The heart rate was 75 bpm, at the start of the study.  Regadenoson protocol:  HR bpm SBP mmHg DBP mmHg Symptoms Rhythm/conduct  Baseline 75 124 74 none NSR, rare PAC's, RBBB  Immediate 100 88 56 mild dyspnea NSR, no ectopy, RBBB  1 min 85 -- -- none NSR, no ectopy, RBBB  2 min 90 124 56 none NSR, no ectopy, RBBB    STRESS SUMMARY: Duration of pharmacologic stress was 3 min. Maximal heart rate during stress was 100 bpm. The rate-pressure product for the peak heart rate and blood pressure was 8800. There was no chest pain during stress. The stress test  was terminated due to protocol completion. There were no stress arrhythmias or conduction abnormalities. The stress ECG was non-diagnostic.    ISOTOPE ADMINISTRATION:  Resting isotope administration Stress isotope administration  Agent Tetrofosmin Tetrofosmin  Dose 15.94 mCi 48.9 mCi  Date 01/08/2021 01/08/2021  Injection-image interval 30 min 45 min    The radiopharmaceutical was injected at the peak effect of pharmacologic stress.    MYOCARDIAL PERFUSION IMAGING:  The image quality was good. Rotating projection images  reveal mild patient motion and mild subdiaphragmatic activity. Left ventricular size was normal. The TID ratio was 1.12.    PERFUSION DEFECTS:  -  There was a small to moderate, fixed myocardial perfusion defect of the anterior wall. This defect resolved with prone imaging.  -  There was a small to moderate, moderately severe, fixed myocardial perfusion defect of the inferior wall. This defect resolved with prone imaging.    GATED SPECT:  The calculated left ventricular ejection fraction was 54 %. Left ventricular ejection fraction was within normal limits by visual estimate. There was no diagnostic evidence for left ventricular regional abnormality.    SUMMARY:  -  Rest ECG: Normal sinus rhythm. The ECG showed right bundle branch block.  -  Stress results: There was no chest pain during stress.  -  ECG conclusions: The stress ECG was non-diagnostic.  -  Perfusion imaging: There was a small to moderate, fixed myocardial perfusion defect of the anterior wall. This defect resolved with prone imaging. There was a small to moderate, moderately severe, fixed myocardial perfusion defect of  the inferior wall. This defect resolved with prone imaging.  -  Gated SPECT: The calculated left ventricular ejection fraction was 54 %. Left ventricular ejection fraction was within normal limits by visual estimate. There was no diagnostic evidence for left ventricular regional abnormality.    IMPRESSIONS: Normal study after pharmacologic vasodilation. There was image artifact, without diagnostic evidence for perfusion abnormality. (Fixed inferior and anterior defects were noted that both resolved with prone imaging.) Left  ventricular systolic function was normal.    Prepared and signed by    Sabino Busch MD  Signed 01/08/2021 16:11:11      Medications:    Current Outpatient Medications:     albuterol (2.5 mg/3 mL) 0.083 % nebulizer solution, INHALE 1 VIAL BY NEBULIZATION EVERY 6 (SIX) HOURS AS NEEDED FOR WHEEZING OR SHORTNESS  OF BREATH, Disp: 375 mL, Rfl: 2    Anoro Ellipta 62.5-25 MCG/ACT inhaler, INHALE 1 PUFF DAILY, Disp: 60 each, Rfl: 5    donepezil (ARICEPT) 10 mg tablet, TAKE 1 TABLET BY MOUTH DAILY AT BEDTIME, Disp: 90 tablet, Rfl: 1    DULoxetine (CYMBALTA) 60 mg delayed release capsule, Take 1 capsule (60 mg total) by mouth every morning, Disp: 90 capsule, Rfl: 0    gabapentin (NEURONTIN) 100 mg capsule, One po qam and 2 po q bedtime, Disp: 90 capsule, Rfl: 2    Multiple Vitamins-Minerals (MULTIVITAMIN ADULT PO), Take 1 tablet by mouth daily, Disp: , Rfl:     prazosin (MINIPRESS) 1 mg capsule, Take 1 capsule (1 mg total) by mouth daily at bedtime, Disp: 90 capsule, Rfl: 0    rOPINIRole (REQUIP) 3 mg tablet, TAKE 1 TABLET (3 MG TOTAL) BY MOUTH DAILY AT BEDTIME, Disp: 90 tablet, Rfl: 2    sildenafil (VIAGRA) 50 MG tablet, Take 1 tablet (50 mg total) by mouth daily as needed for erectile dysfunction, Disp: 10 tablet, Rfl: 0    tiZANidine (ZANAFLEX) 2 mg tablet, Take 1 tablet (2 mg total) by mouth daily at bedtime as needed for muscle spasms, Disp: 30 tablet, Rfl: 0    Ventolin  (90 Base) MCG/ACT inhaler, INHALE 2 PUFFS BY MOUTH EVERY 6 HOURS AS NEEDED FOR WHEEZE, Disp: 18 g, Rfl: 3      Allergies:  Allergies   Allergen Reactions    Tetracycline Rash         1. SOB (shortness of breath) on exertion  Ambulatory Referral to Cardiology      2. Stage 3a chronic kidney disease (HCC)        3. Right bundle branch block (RBBB) on electrocardiogram (ECG)  POCT ECG      4. Personal history of nicotine dependence        5. Leg swelling        6. Morbid obesity (HCC)            Recommend aggressive risk factor modification and therapeutic lifestyle changes.  Low-salt, low-calorie, low-fat, low-cholesterol diet with regular exercise and to optimize weight.  I will defer the ordering and monitoring of necessity lab studies to you, but I am available and happy to review and manage any of the data at your request in the future.    Discussed  concepts of atherosclerosis, including signs and symptoms of cardiac disease.    Previous studies were reviewed.    Safety measures were reviewed.  Questions were entertained and answered.  Patient was advised to report any problems requiring medical attention.    Follow-up with PCP and appropriate specialist and lab work as discussed.    Return for follow up visit as scheduled or earlier, if needed.  Thank you for allowing me to participate in the care and evaluation of your patient.  Should you have any questions, please feel free to contact me.    Danna Simon PA-C  2/8/2024,9:11 PM

## 2024-02-17 DIAGNOSIS — M16.11 PRIMARY OSTEOARTHRITIS OF RIGHT HIP: Primary | ICD-10-CM

## 2024-02-17 DIAGNOSIS — M16.12 PRIMARY OSTEOARTHRITIS OF ONE HIP, LEFT: ICD-10-CM

## 2024-02-20 ENCOUNTER — OFFICE VISIT (OUTPATIENT)
Dept: OBGYN CLINIC | Facility: CLINIC | Age: 69
End: 2024-02-20
Payer: MEDICARE

## 2024-02-20 ENCOUNTER — APPOINTMENT (OUTPATIENT)
Dept: RADIOLOGY | Facility: CLINIC | Age: 69
End: 2024-02-20
Payer: MEDICARE

## 2024-02-20 ENCOUNTER — RA CDI HCC (OUTPATIENT)
Dept: OTHER | Facility: HOSPITAL | Age: 69
End: 2024-02-20

## 2024-02-20 VITALS
HEIGHT: 68 IN | BODY MASS INDEX: 42.04 KG/M2 | DIASTOLIC BLOOD PRESSURE: 73 MMHG | SYSTOLIC BLOOD PRESSURE: 126 MMHG | WEIGHT: 277.4 LBS | HEART RATE: 89 BPM

## 2024-02-20 DIAGNOSIS — M16.12 PRIMARY OSTEOARTHRITIS OF ONE HIP, LEFT: ICD-10-CM

## 2024-02-20 DIAGNOSIS — M16.11 PRIMARY OSTEOARTHRITIS OF RIGHT HIP: ICD-10-CM

## 2024-02-20 DIAGNOSIS — E66.01 MORBID OBESITY (HCC): Primary | ICD-10-CM

## 2024-02-20 PROCEDURE — 99213 OFFICE O/P EST LOW 20 MIN: CPT | Performed by: ORTHOPAEDIC SURGERY

## 2024-02-20 PROCEDURE — 73522 X-RAY EXAM HIPS BI 3-4 VIEWS: CPT

## 2024-02-20 NOTE — PROGRESS NOTES
Orthopaedics Office Visit - Established Patient Visit    ASSESSMENT/PLAN:    Assessment:   Bilateral hip osteoarthritis   R>L     Improvement with intra-articular CSI's     Plan:   Updated x-rays were performed and reviewed, progression of bilateral hip osteoarthritis when compared to previous 2020 x-rays   He is scheduled to undergo repeat bilateral hip intra-articular CSI's on 2/28/24 and 3/14/24   A referral was provided to weight management, we discussed BMI must be 40 or below in order to undergo a total hip arthroplasty  Follow up in 3 months time      To Do Next Visit:  Re-evaluation     _____________________________________________________  CHIEF COMPLAINT:  Chief Complaint   Patient presents with    Right Hip - Follow-up    Left Hip - Follow-up         SUBJECTIVE:  Devon Aly is a 68 y.o. male who presents to the office for a follow up regarding bilateral hip osteoarthritis. He underwent CSI's around October/November, which was beneficial for him. He notes the CSI's are generally beneficial for approx. 3 months before groin pain returns. He notes weight gain due to SOB on exertion. 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        PAST MEDICAL HISTORY:  Past Medical History:   Diagnosis Date    Chronic kidney disease     COVID-19 03/2020    Hypertension 4/02/2020    Liver disease     Pneumonia 3/32/2020    PTSD (post-traumatic stress disorder)        PAST SURGICAL HISTORY:  Past Surgical History:   Procedure Laterality Date    FL INJECTION RIGHT HIP (NON ARTHROGRAM)  11/30/2020    FL INJECTION RIGHT HIP (NON ARTHROGRAM)  8/4/2021    FL INJECTION RIGHT HIP (NON ARTHROGRAM)  12/27/2021    FL INJECTION RIGHT HIP (NON ARTHROGRAM)  4/25/2022    NO PAST SURGERIES         FAMILY HISTORY:  Family History   Problem Relation Age of Onset    Heart disease Mother     Coronary artery disease Mother     Heart failure Mother     Sudden death Father     No Known Problems Son     No Known Problems Daughter      No Known Problems Maternal Grandmother     No Known Problems Maternal Grandfather     No Known Problems Paternal Grandmother     No Known Problems Paternal Grandfather     No Known Problems Daughter     Kidney disease Brother     Cancer Brother        SOCIAL HISTORY:  Social History     Tobacco Use    Smoking status: Former     Current packs/day: 0.00     Average packs/day: 2.0 packs/day for 47.2 years (94.5 ttl pk-yrs)     Types: Cigarettes     Start date: 1/1/1973     Quit date: 04/2020     Years since quitting: 3.8     Passive exposure: Past    Smokeless tobacco: Never   Vaping Use    Vaping status: Former    Quit date: 4/1/2020    Substances: Nicotine, Flavoring   Substance Use Topics    Alcohol use: Not Currently    Drug use: Never       MEDICATIONS:    Current Outpatient Medications:     albuterol (2.5 mg/3 mL) 0.083 % nebulizer solution, INHALE 1 VIAL BY NEBULIZATION EVERY 6 (SIX) HOURS AS NEEDED FOR WHEEZING OR SHORTNESS OF BREATH, Disp: 375 mL, Rfl: 2    Anoro Ellipta 62.5-25 MCG/ACT inhaler, INHALE 1 PUFF DAILY, Disp: 60 each, Rfl: 5    donepezil (ARICEPT) 10 mg tablet, TAKE 1 TABLET BY MOUTH DAILY AT BEDTIME, Disp: 90 tablet, Rfl: 1    DULoxetine (CYMBALTA) 60 mg delayed release capsule, Take 1 capsule (60 mg total) by mouth every morning, Disp: 90 capsule, Rfl: 0    gabapentin (NEURONTIN) 100 mg capsule, One po qam and 2 po q bedtime, Disp: 90 capsule, Rfl: 2    Multiple Vitamins-Minerals (MULTIVITAMIN ADULT PO), Take 1 tablet by mouth daily, Disp: , Rfl:     prazosin (MINIPRESS) 1 mg capsule, Take 1 capsule (1 mg total) by mouth daily at bedtime, Disp: 90 capsule, Rfl: 0    rOPINIRole (REQUIP) 3 mg tablet, TAKE 1 TABLET (3 MG TOTAL) BY MOUTH DAILY AT BEDTIME, Disp: 90 tablet, Rfl: 2    sildenafil (VIAGRA) 50 MG tablet, Take 1 tablet (50 mg total) by mouth daily as needed for erectile dysfunction, Disp: 10 tablet, Rfl: 0    tiZANidine (ZANAFLEX) 2 mg tablet, Take 1 tablet (2 mg total) by mouth daily at  "bedtime as needed for muscle spasms, Disp: 30 tablet, Rfl: 0    Ventolin  (90 Base) MCG/ACT inhaler, INHALE 2 PUFFS BY MOUTH EVERY 6 HOURS AS NEEDED FOR WHEEZE, Disp: 18 g, Rfl: 3    ALLERGIES:  Allergies   Allergen Reactions    Tetracycline Rash       REVIEW OF SYSTEMS:  MSK: as noted in HPI  Neuro: WNL's  Pertinent items are otherwise noted in HPI.  A comprehensive review of systems was otherwise negative.    LABS:  HgA1c:   Lab Results   Component Value Date    HGBA1C 5.6 04/13/2022     BMP:   Lab Results   Component Value Date    CALCIUM 9.3 10/27/2023    K 4.3 10/27/2023    CO2 24 10/27/2023     10/27/2023    BUN 19 10/27/2023    CREATININE 1.39 (H) 10/27/2023     CBC: No components found for: \"CBC\"    _____________________________________________________  PHYSICAL EXAMINATION:  Vital signs: /73   Pulse 89   Ht 5' 8\" (1.727 m)   Wt 126 kg (277 lb 6.4 oz)   BMI 42.18 kg/m²   General: No acute distress, awake and alert  Psychiatric: Mood and affect appear appropriate  HEENT: Trachea Midline, No torticollis, no apparent facial trauma  Cardiovascular: No audible murmurs; Extremities appear perfused  Pulmonary: No audible wheezing or stridor  Skin: No open lesions; see further details (if any) below    MUSCULOSKELETAL EXAMINATION:    Extremities:  Bilateral hip   No erythema, ecchymosis or edema  Pain with hip flexion   Pain with internal and external rotation   Ambulates without assistance   Extremities appears warm and well perfused     _____________________________________________________  STUDIES REVIEWED:  I personally reviewed the images and interpretation is as follows:  X-rays of bilateral hips demonstrate progression of bilateral hip osteoarthritis when compared to 2020 x-rays.        PROCEDURES PERFORMED:  Procedures    Scribe Attestation      I,:  Allie Rivera am acting as a scribe while in the presence of the attending physician.:       I,:  Tray Sahu MD personally " performed the services described in this documentation    as scribed in my presence.:              None Done

## 2024-02-22 ENCOUNTER — TELEPHONE (OUTPATIENT)
Age: 69
End: 2024-02-22

## 2024-02-22 NOTE — TELEPHONE ENCOUNTER
I'm calling about. I know it will be a Nosebleed. I've been calling for a couple of days and I just wanted to know what should I do? My phone number is [phone]. Thank you.

## 2024-02-22 NOTE — TELEPHONE ENCOUNTER
Pt been bleeding from left nostril for about 5 days wants to know if he stop any medication. Please advice.

## 2024-02-23 ENCOUNTER — TELEPHONE (OUTPATIENT)
Dept: OBGYN CLINIC | Facility: HOSPITAL | Age: 69
End: 2024-02-23

## 2024-02-23 NOTE — TELEPHONE ENCOUNTER
LM for pt in regards to forms, unable to complete forms for permanent disability. Pt should schedule with PCP for these forms.

## 2024-02-25 DIAGNOSIS — F43.10 PTSD (POST-TRAUMATIC STRESS DISORDER): ICD-10-CM

## 2024-02-26 ENCOUNTER — OFFICE VISIT (OUTPATIENT)
Dept: NEUROLOGY | Facility: CLINIC | Age: 69
End: 2024-02-26
Payer: MEDICARE

## 2024-02-26 VITALS
HEIGHT: 68 IN | OXYGEN SATURATION: 95 % | HEART RATE: 83 BPM | DIASTOLIC BLOOD PRESSURE: 74 MMHG | BODY MASS INDEX: 42.18 KG/M2 | SYSTOLIC BLOOD PRESSURE: 150 MMHG

## 2024-02-26 DIAGNOSIS — F43.10 PTSD (POST-TRAUMATIC STRESS DISORDER): ICD-10-CM

## 2024-02-26 DIAGNOSIS — R41.3 MEMORY DIFFICULTY: Primary | ICD-10-CM

## 2024-02-26 DIAGNOSIS — G44.89 OTHER HEADACHE SYNDROME: ICD-10-CM

## 2024-02-26 PROCEDURE — 99214 OFFICE O/P EST MOD 30 MIN: CPT | Performed by: PSYCHIATRY & NEUROLOGY

## 2024-02-26 NOTE — PROGRESS NOTES
Devon Aly is a 68 y.o. male.   Chief Complaint   Patient presents with    Memory difficulty    Headache       Assessment:  1. Memory difficulty    2. Other headache syndrome    3. PTSD (post-traumatic stress disorder)         Plan:  Patient's MRI scan of the brain with neuro quant from 8/16/2023 results were reviewed with him, there was no acute intracranial abnormality neuro quant did not show evidence of any neurodegeneration there was mild chronic angiopathy, patient's memory difficulty is multifactorial as per his last neuropsych testing results about 2 years back I have advised the patient to repeat a neuropsych testing his current [place] 17/30, he is on Aricept 10 mg once a day and is tolerating it well I advised him to continue with the multivitamin.    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  I did offer him that he can very seen at the [facility] for a second opinion he would does not want to do that.  He has some paperwork for his disability for which she is going to have that family physician fill it out.  Also was advised to follow-up with a psychiatrist regarding his mood issues.    Patient's headaches sound more like migraine headaches and tension headaches currently he is on Neurontin 100 mg 3 times a day and seems to be doing good we will wait for the sleep study and if he does have sleep apnea he may benefit from CPAP machine that should help with the headaches to in the past his sed rate was 20 and 2/6/2023 C-reactive protein was 4.0 and vitamin B12 and folic acid were normal.  He was advised to  "avoid migraine triggers which we discussed in detail, to keep himself well-hydrated avoid stress avoid alcohol, to go to the hospital if has any worsening symptoms and call me otherwise to see me back in 6 months or sooner if needed and follow-up with the other physicians.      Subjective:  Patient is here in follow-up for headaches and memory difficulty, since his last visit according to the patient he is having slightly worse memory difficulty mostly for short-term and recognizing faces no history of seizures he still has maybe 1 or 2 headaches a week mostly in the frontal head region associated with photophobia phonophobia he does feel that the Neurontin is helping him he is going for a sleep study to rule out sleep apnea he is tolerating the Aricept 10 mg a day well and is not having any side effects he denies any alcohol he is a neck smoker he lives with his wife and does short distances driving without any issues his wife takes care of the finances he is currently retired, no vision difficulty no speech difficulty he still has some issues with his breathing and is in follow-up with the sleep specialist regarding his sleep apnea and his breathing issues, he also has not follow-up with a psychiatrist regarding his mood issues currently is not depressed but at times he does feel depressed no suicidal or homicidal thoughts no other complaints.         Vitals:    02/26/24 0923   BP: 150/74   BP Location: Left arm   Patient Position: Sitting   Cuff Size: Large   Pulse: 83   SpO2: 95%   Height: 5' 8\" (1.727 m)       Current Medications    Current Outpatient Medications:     albuterol (2.5 mg/3 mL) 0.083 % nebulizer solution, INHALE 1 VIAL BY NEBULIZATION EVERY 6 (SIX) HOURS AS NEEDED FOR WHEEZING OR SHORTNESS OF BREATH, Disp: 375 mL, Rfl: 2    Anoro Ellipta 62.5-25 MCG/ACT inhaler, INHALE 1 PUFF DAILY, Disp: 60 each, Rfl: 5    donepezil (ARICEPT) 10 mg tablet, TAKE 1 TABLET BY MOUTH DAILY AT BEDTIME, Disp: 90 tablet, " Rfl: 1    DULoxetine (CYMBALTA) 60 mg delayed release capsule, Take 1 capsule (60 mg total) by mouth every morning, Disp: 90 capsule, Rfl: 0    gabapentin (NEURONTIN) 100 mg capsule, One po qam and 2 po q bedtime, Disp: 90 capsule, Rfl: 2    Multiple Vitamins-Minerals (MULTIVITAMIN ADULT PO), Take 1 tablet by mouth daily, Disp: , Rfl:     prazosin (MINIPRESS) 1 mg capsule, Take 1 capsule (1 mg total) by mouth daily at bedtime, Disp: 90 capsule, Rfl: 0    rOPINIRole (REQUIP) 3 mg tablet, TAKE 1 TABLET (3 MG TOTAL) BY MOUTH DAILY AT BEDTIME, Disp: 90 tablet, Rfl: 2    sildenafil (VIAGRA) 50 MG tablet, Take 1 tablet (50 mg total) by mouth daily as needed for erectile dysfunction, Disp: 10 tablet, Rfl: 0    tiZANidine (ZANAFLEX) 2 mg tablet, Take 1 tablet (2 mg total) by mouth daily at bedtime as needed for muscle spasms, Disp: 30 tablet, Rfl: 0    Ventolin  (90 Base) MCG/ACT inhaler, INHALE 2 PUFFS BY MOUTH EVERY 6 HOURS AS NEEDED FOR WHEEZE, Disp: 18 g, Rfl: 3      Allergies  Tetracycline    Past Medical History  Past Medical History:   Diagnosis Date    Chronic kidney disease     COVID-19 03/2020    Hypertension 4/02/2020    Liver disease     Pneumonia 3/32/2020    PTSD (post-traumatic stress disorder)          Past Surgical History:  Past Surgical History:   Procedure Laterality Date    FL INJECTION RIGHT HIP (NON ARTHROGRAM)  11/30/2020    FL INJECTION RIGHT HIP (NON ARTHROGRAM)  8/4/2021    FL INJECTION RIGHT HIP (NON ARTHROGRAM)  12/27/2021    FL INJECTION RIGHT HIP (NON ARTHROGRAM)  4/25/2022    NO PAST SURGERIES           Family History:  Family History   Problem Relation Age of Onset    Heart disease Mother     Coronary artery disease Mother     Heart failure Mother     Sudden death Father     No Known Problems Son     No Known Problems Daughter     No Known Problems Maternal Grandmother     No Known Problems Maternal Grandfather     No Known Problems Paternal Grandmother     No Known Problems Paternal  Grandfather     No Known Problems Daughter     Kidney disease Brother     Cancer Brother        Social History:   reports that he quit smoking about 3 years ago. His smoking use included cigarettes. He started smoking about 51 years ago. He has a 94.5 pack-year smoking history. He has been exposed to tobacco smoke. He has never used smokeless tobacco. He reports that he does not currently use alcohol. He reports that he does not use drugs.    I have reviewed the past medical history, surgical history, social and family history, current medications, allergies vitals, review of systems, and updated this information as appropriate today.   Objective:    Physical Exam    Neurological Exam     GENERAL:  Cooperative in no acute distress. Well-developed and well-nourished     HEAD and NECK   Head is atraumatic normocephalic with no lesions or masses. Neck is supple with full range of motion     CARDIOVASCULAR  Carotid Arteries-no carotid bruits.     NEUROLOGIC:  Mental Status-the patient is awake alert and oriented without aphasia or apraxia, initial MoCA was 14/30 repeat 1 was 17/30  Cranial Nerves: Visual fields are full to confrontation.  Visual acuity is 20/20 with hand-held chart extraocular movements are full without nystagmus. Pupils are 2-1/2 mm and reactive. Face is symmetrical to light touch. Movements of facial expression move symmetrically. Hearing is normal to finger rub bilaterally. Soft palate lifts symmetrically. Shoulder shrug is symmetrical. Tongue is midline without atrophy.  Motor: No drift is noted on arm extension. Strength is full in the upper and lower extremities with normal bulk and tone.  Coordination: Finger to nose testing is performed accurately. Romberg is negative. Gait reveals a normal base with symmetrical arm swing.  Reflexes:   2+ and symmetrical  No temporal artery tenderness, no cervical spine tenderness.    ROS:  Review of Systems   Constitutional:  Negative for appetite change,  fatigue and fever.   HENT: Negative.  Negative for hearing loss, tinnitus, trouble swallowing and voice change.    Eyes: Negative.  Negative for photophobia, pain and visual disturbance.   Respiratory: Negative.  Negative for shortness of breath.    Cardiovascular: Negative.  Negative for palpitations.   Gastrointestinal: Negative.  Negative for nausea and vomiting.   Endocrine: Negative.  Negative for cold intolerance.   Genitourinary: Negative.  Negative for dysuria, frequency and urgency.   Musculoskeletal:  Negative for back pain, gait problem, myalgias, neck pain and neck stiffness.   Skin: Negative.  Negative for rash.   Allergic/Immunologic: Negative.    Neurological:  Positive for headaches. Negative for dizziness, tremors, seizures, syncope, facial asymmetry, speech difficulty, weakness, light-headedness and numbness.   Hematological: Negative.  Does not bruise/bleed easily.   Psychiatric/Behavioral:  Positive for confusion. Negative for hallucinations and sleep disturbance.

## 2024-02-27 ENCOUNTER — TELEPHONE (OUTPATIENT)
Dept: RADIOLOGY | Facility: CLINIC | Age: 69
End: 2024-02-27

## 2024-02-27 ENCOUNTER — OFFICE VISIT (OUTPATIENT)
Age: 69
End: 2024-02-27
Payer: OTHER MISCELLANEOUS

## 2024-02-27 ENCOUNTER — TELEPHONE (OUTPATIENT)
Age: 69
End: 2024-02-27

## 2024-02-27 VITALS
TEMPERATURE: 97.1 F | SYSTOLIC BLOOD PRESSURE: 130 MMHG | RESPIRATION RATE: 18 BRPM | DIASTOLIC BLOOD PRESSURE: 70 MMHG | BODY MASS INDEX: 41.68 KG/M2 | HEART RATE: 80 BPM | OXYGEN SATURATION: 96 % | WEIGHT: 275 LBS | HEIGHT: 68 IN

## 2024-02-27 DIAGNOSIS — N18.31 STAGE 3A CHRONIC KIDNEY DISEASE (HCC): ICD-10-CM

## 2024-02-27 DIAGNOSIS — R06.02 SOB (SHORTNESS OF BREATH) ON EXERTION: Primary | ICD-10-CM

## 2024-02-27 DIAGNOSIS — E66.01 MORBID OBESITY (HCC): ICD-10-CM

## 2024-02-27 DIAGNOSIS — N52.9 ERECTILE DYSFUNCTION, UNSPECIFIED ERECTILE DYSFUNCTION TYPE: ICD-10-CM

## 2024-02-27 DIAGNOSIS — R06.83 SNORING: ICD-10-CM

## 2024-02-27 PROCEDURE — 99214 OFFICE O/P EST MOD 30 MIN: CPT | Performed by: FAMILY MEDICINE

## 2024-02-27 RX ORDER — SILDENAFIL 50 MG/1
50 TABLET, FILM COATED ORAL DAILY PRN
Qty: 10 TABLET | Refills: 0 | Status: SHIPPED | OUTPATIENT
Start: 2024-02-27

## 2024-02-27 RX ORDER — SILDENAFIL 50 MG/1
50 TABLET, FILM COATED ORAL DAILY PRN
Qty: 10 TABLET | Refills: 0 | Status: SHIPPED | OUTPATIENT
Start: 2024-02-27 | End: 2024-02-27

## 2024-02-27 NOTE — PROGRESS NOTES
[facility]  [address] [place]    Name: Devon Aly      YOB: 1955      MRN: [id number]  Encounter Provider: Primitivo Rios MD      Encounter Date: 02/27/24      Encounter Dept: [facility]    Assessment & Plan     1. SOB (shortness of breath) on exertion  Assessment & Plan:  Follow-up with echocardiogram as placed previously.  Patient requested for disability form completion, discussed with patient, as previously relayed information, that patient will have to get functional capacity evaluation with PT/OT  Also recommended to discuss with pulmonology considering the disability is secondary to lung etiology.      2. Stage 3a chronic kidney disease (HCC)  Assessment & Plan:  Lab Results   Component Value Date    EGFR 52 10/27/2023    EGFR 61 05/02/2023    EGFR 42 10/20/2022    CREATININE 1.39 (H) 10/27/2023    CREATININE 1.22 05/02/2023    CREATININE 1.65 (H) 10/20/2022     Lab Results   Component Value Date    EGFR 52 10/27/2023    EGFR 61 05/02/2023    EGFR 42 10/20/2022    CREATININE 1.39 (H) 10/27/2023    CREATININE 1.22 05/02/2023    CREATININE 1.65 (H) 10/20/2022     Follows nephrology  Has upcoming labs due.      3. Snoring  Assessment & Plan:  Has sleep study tomorrow  Discussed importance of weight loss and improving lifestyle      4. Morbid obesity (HCC)  Assessment & Plan:  Discussed importance of improving lifestyle      5. Erectile dysfunction, unspecified erectile dysfunction type  -     sildenafil (VIAGRA) 50 MG tablet; Take 1 tablet (50 mg total) by mouth daily as needed for erectile dysfunction                 Follow-Up Plans   Return in about 6 weeks (around 4/9/2024).      Subjective   Devon Aly is a 68 y.o. with  has a past medical history of Chronic kidney disease, COVID-19 (03/2020), Hypertension (4/02/2020), Liver disease, Pneumonia (3/32/2020), and PTSD  "(post-traumatic stress disorder).        Requested for completion for disability due to shortness of breath on exertion.  Patient was previously referred to functional capacity evaluation and also recommended to discuss with pulmonology considering the disability is secondary to lung etiology.          Review of Systems      Current Medication List     Current Outpatient Medications:     albuterol (2.5 mg/3 mL) 0.083 % nebulizer solution, INHALE 1 VIAL BY NEBULIZATION EVERY 6 (SIX) HOURS AS NEEDED FOR WHEEZING OR SHORTNESS OF BREATH, Disp: 375 mL, Rfl: 2    Anoro Ellipta 62.5-25 MCG/ACT inhaler, INHALE 1 PUFF DAILY, Disp: 60 each, Rfl: 5    donepezil (ARICEPT) 10 mg tablet, TAKE 1 TABLET BY MOUTH DAILY AT BEDTIME, Disp: 90 tablet, Rfl: 1    DULoxetine (CYMBALTA) 60 mg delayed release capsule, Take 1 capsule (60 mg total) by mouth every morning, Disp: 90 capsule, Rfl: 0    gabapentin (NEURONTIN) 100 mg capsule, One po qam and 2 po q bedtime, Disp: 90 capsule, Rfl: 2    Multiple Vitamins-Minerals (MULTIVITAMIN ADULT PO), Take 1 tablet by mouth daily, Disp: , Rfl:     prazosin (MINIPRESS) 1 mg capsule, Take 1 capsule (1 mg total) by mouth daily at bedtime, Disp: 90 capsule, Rfl: 0    rOPINIRole (REQUIP) 3 mg tablet, TAKE 1 TABLET (3 MG TOTAL) BY MOUTH DAILY AT BEDTIME, Disp: 90 tablet, Rfl: 2    sildenafil (VIAGRA) 50 MG tablet, Take 1 tablet (50 mg total) by mouth daily as needed for erectile dysfunction, Disp: 10 tablet, Rfl: 0    tiZANidine (ZANAFLEX) 2 mg tablet, Take 1 tablet (2 mg total) by mouth daily at bedtime as needed for muscle spasms, Disp: 30 tablet, Rfl: 0    Ventolin  (90 Base) MCG/ACT inhaler, INHALE 2 PUFFS BY MOUTH EVERY 6 HOURS AS NEEDED FOR WHEEZE, Disp: 18 g, Rfl: 3      Objective     /70 (BP Location: Left arm, Patient Position: Sitting, Cuff Size: Large)   Pulse 80   Temp (!) 97.1 °F (36.2 °C) (Tympanic)   Resp 18   Ht 5' 8\" (1.727 m)   Wt 125 kg (275 lb)   SpO2 96%   BMI " 41.81 kg/m²      Physical Exam  Vitals reviewed.   Constitutional:       General: He is not in acute distress.     Appearance: Normal appearance. He is not ill-appearing, toxic-appearing or diaphoretic.   HENT:      Head: Normocephalic and atraumatic.      Right Ear: External ear normal.      Left Ear: External ear normal.      Nose: Nose normal.      Mouth/Throat:      Mouth: Mucous membranes are moist.   Eyes:      General: No scleral icterus.        Right eye: No discharge.         Left eye: No discharge.      Conjunctiva/sclera: Conjunctivae normal.   Cardiovascular:      Rate and Rhythm: Normal rate.   Pulmonary:      Effort: Pulmonary effort is normal. No respiratory distress.   Skin:     General: Skin is warm.   Neurological:      General: No focal deficit present.      Mental Status: He is alert and oriented to person, place, and time.   Psychiatric:         Mood and Affect: Mood normal.         Behavior: Behavior normal.         Thought Content: Thought content normal.           Primitivo Rios MD  Family Medicine Physician   [facility] [place]

## 2024-02-27 NOTE — TELEPHONE ENCOUNTER
Pt presented in person to drop off workers comp pw to be filled out by dr tony jimenez was hand given the pw to complete   dr jimenez stated pt will be called as a fu if needed to be rescheduled for sooner appt than the 4/2024 with luanne calvo    pt was made aware and understood that he will be contacted when pw completed for

## 2024-02-27 NOTE — ASSESSMENT & PLAN NOTE
Lab Results   Component Value Date    EGFR 52 10/27/2023    EGFR 61 05/02/2023    EGFR 42 10/20/2022    CREATININE 1.39 (H) 10/27/2023    CREATININE 1.22 05/02/2023    CREATININE 1.65 (H) 10/20/2022     Lab Results   Component Value Date    EGFR 52 10/27/2023    EGFR 61 05/02/2023    EGFR 42 10/20/2022    CREATININE 1.39 (H) 10/27/2023    CREATININE 1.22 05/02/2023    CREATININE 1.65 (H) 10/20/2022     Follows nephrology  Has upcoming labs due.

## 2024-02-27 NOTE — ASSESSMENT & PLAN NOTE
Follow-up with echocardiogram as placed previously.  Patient requested for disability form completion, discussed with patient, as previously relayed information, that patient will have to get functional capacity evaluation with PT/OT  Also recommended to discuss with pulmonology considering the disability is secondary to lung etiology.

## 2024-02-27 NOTE — TELEPHONE ENCOUNTER
----- Message from Jorge Wolf MD sent at 2/27/2024  8:33 AM EST -----  Good morning,    Patient is scheduled for 2:30pm injection tomorrow. Can we see if this patient would be willing to come in sometime in the morning, or earlier in the afternoon?  I have a doctor's appointment tomorrow afternoon in [place] at 3:40pm. Thank you!    Jorge     See phone note 6/21/2022.

## 2024-02-27 NOTE — TELEPHONE ENCOUNTER
Caller: caden    Doctor: dr gaspar    Reason for call: pt returned nurses call to reschedule his procedure    Call back#: [phone]

## 2024-02-27 NOTE — ASSESSMENT & PLAN NOTE
Discussed importance of improving weight which will also improve other medical conditions.    Previously recommended following up with blood work and returning in 6 weeks.  Blood work not done yet.  Return after completion of blood work.

## 2024-02-27 NOTE — TELEPHONE ENCOUNTER
Called ptELISA to call back to reschedule his appt for 2/27/24 with Dr. Wolf to an earlier time in the day.

## 2024-02-28 ENCOUNTER — HOSPITAL ENCOUNTER (OUTPATIENT)
Dept: SLEEP CENTER | Facility: CLINIC | Age: 69
Discharge: HOME | End: 2024-02-28
Payer: OTHER MISCELLANEOUS

## 2024-02-28 ENCOUNTER — HOSPITAL ENCOUNTER (OUTPATIENT)
Dept: RADIOLOGY | Facility: CLINIC | Age: 69
Discharge: HOME | End: 2024-02-28
Payer: OTHER MISCELLANEOUS

## 2024-02-28 VITALS
HEART RATE: 84 BPM | TEMPERATURE: 97.7 F | SYSTOLIC BLOOD PRESSURE: 122 MMHG | DIASTOLIC BLOOD PRESSURE: 70 MMHG | RESPIRATION RATE: 18 BRPM | OXYGEN SATURATION: 92 %

## 2024-02-28 DIAGNOSIS — M16.0 PRIMARY OSTEOARTHRITIS OF BOTH HIPS: ICD-10-CM

## 2024-02-28 DIAGNOSIS — G47.33 OSA (OBSTRUCTIVE SLEEP APNEA): ICD-10-CM

## 2024-02-28 PROCEDURE — 77002 NEEDLE LOCALIZATION BY XRAY: CPT | Performed by: STUDENT IN AN ORGANIZED HEALTH CARE EDUCATION/TRAINING PROGRAM

## 2024-02-28 PROCEDURE — G0399 HOME SLEEP TEST/TYPE 3 PORTA: HCPCS

## 2024-02-28 PROCEDURE — 20610 DRAIN/INJ JOINT/BURSA W/O US: CPT | Performed by: STUDENT IN AN ORGANIZED HEALTH CARE EDUCATION/TRAINING PROGRAM

## 2024-02-28 PROCEDURE — 77002 NEEDLE LOCALIZATION BY XRAY: CPT

## 2024-02-28 RX ORDER — ROPIVACAINE HYDROCHLORIDE 2 MG/ML
4 INJECTION, SOLUTION EPIDURAL; INFILTRATION; PERINEURAL ONCE
Status: COMPLETED | OUTPATIENT
Start: 2024-02-28 | End: 2024-02-28

## 2024-02-28 RX ORDER — METHYLPREDNISOLONE ACETATE 80 MG/ML
80 INJECTION, SUSPENSION INTRA-ARTICULAR; INTRALESIONAL; INTRAMUSCULAR; PARENTERAL; SOFT TISSUE ONCE
Status: COMPLETED | OUTPATIENT
Start: 2024-02-28 | End: 2024-02-28

## 2024-02-28 RX ORDER — BUPIVACAINE HCL/PF 2.5 MG/ML
4 VIAL (ML) INJECTION ONCE
Status: DISCONTINUED | OUTPATIENT
Start: 2024-02-28 | End: 2024-02-28

## 2024-02-28 RX ADMIN — METHYLPREDNISOLONE ACETATE 80 MG: 80 INJECTION, SUSPENSION INTRA-ARTICULAR; INTRALESIONAL; INTRAMUSCULAR; PARENTERAL; SOFT TISSUE at 11:01

## 2024-02-28 RX ADMIN — ROPIVACAINE HYDROCHLORIDE 4 ML: 2 INJECTION, SOLUTION EPIDURAL; INFILTRATION; PERINEURAL at 11:01

## 2024-02-28 RX ADMIN — IOHEXOL 1 ML: 300 INJECTION, SOLUTION INTRAVENOUS at 11:01

## 2024-02-28 NOTE — H&P
History of Present Illness: The patient is a 68 y.o. male who presents with complaints of left hip pain    Past Medical History:   Diagnosis Date    Chronic kidney disease     COVID-19 03/2020    Hypertension 4/02/2020    Liver disease     Pneumonia 3/32/2020    PTSD (post-traumatic stress disorder)        Past Surgical History:   Procedure Laterality Date    FL INJECTION RIGHT HIP (NON ARTHROGRAM)  11/30/2020    FL INJECTION RIGHT HIP (NON ARTHROGRAM)  8/4/2021    FL INJECTION RIGHT HIP (NON ARTHROGRAM)  12/27/2021    FL INJECTION RIGHT HIP (NON ARTHROGRAM)  4/25/2022    NO PAST SURGERIES           Current Outpatient Medications:     albuterol (2.5 mg/3 mL) 0.083 % nebulizer solution, INHALE 1 VIAL BY NEBULIZATION EVERY 6 (SIX) HOURS AS NEEDED FOR WHEEZING OR SHORTNESS OF BREATH, Disp: 375 mL, Rfl: 2    Anoro Ellipta 62.5-25 MCG/ACT inhaler, INHALE 1 PUFF DAILY, Disp: 60 each, Rfl: 5    donepezil (ARICEPT) 10 mg tablet, TAKE 1 TABLET BY MOUTH DAILY AT BEDTIME, Disp: 90 tablet, Rfl: 1    DULoxetine (CYMBALTA) 60 mg delayed release capsule, Take 1 capsule (60 mg total) by mouth every morning, Disp: 90 capsule, Rfl: 0    gabapentin (NEURONTIN) 100 mg capsule, One po qam and 2 po q bedtime, Disp: 90 capsule, Rfl: 2    Multiple Vitamins-Minerals (MULTIVITAMIN ADULT PO), Take 1 tablet by mouth daily, Disp: , Rfl:     prazosin (MINIPRESS) 1 mg capsule, Take 1 capsule (1 mg total) by mouth daily at bedtime, Disp: 90 capsule, Rfl: 0    rOPINIRole (REQUIP) 3 mg tablet, TAKE 1 TABLET (3 MG TOTAL) BY MOUTH DAILY AT BEDTIME, Disp: 90 tablet, Rfl: 2    sildenafil (VIAGRA) 50 MG tablet, Take 1 tablet (50 mg total) by mouth daily as needed for erectile dysfunction, Disp: 10 tablet, Rfl: 0    tiZANidine (ZANAFLEX) 2 mg tablet, Take 1 tablet (2 mg total) by mouth daily at bedtime as needed for muscle spasms, Disp: 30 tablet, Rfl: 0    Ventolin  (90 Base) MCG/ACT inhaler, INHALE 2 PUFFS BY MOUTH EVERY 6 HOURS AS NEEDED FOR  WHEEZE, Disp: 18 g, Rfl: 3    Allergies   Allergen Reactions    Tetracycline Rash       Physical Exam: There were no vitals filed for this visit.  General: Awake, Alert, Oriented x 3, Mood and affect appropriate  Respiratory: Respirations even and unlabored  Cardiovascular: Peripheral pulses intact; no edema  Musculoskeletal Exam: pain with left hip flexion    ASA Score: 3         Assessment:   1. Primary osteoarthritis of both hips        Plan: left hip injection

## 2024-02-28 NOTE — DISCHARGE INSTR - LAB

## 2024-02-29 NOTE — PROGRESS NOTES
Home Sleep Study Documentation    HOME STUDY DEVICE: Noxturnal no                                           Elsa G3 yes      Pre-Sleep Home Study:    Set-up and instructions performed by: Justin Lawson    Technician performed demonstration for Patient: yes    Return demonstration performed by Patient: yes    Written instructions provided to Patient: yes    Patient signed consent form: yes        Post-Sleep Home Study:    Additional comments by Patient: None    Home Sleep Study Failed:no:    Failure reason: N/A    Reported or Detected: N/A    Scored by: JAYJAY Turner

## 2024-03-01 PROBLEM — G47.33 OSA (OBSTRUCTIVE SLEEP APNEA): Status: ACTIVE | Noted: 2024-03-01

## 2024-03-04 ENCOUNTER — TELEMEDICINE (OUTPATIENT)
Dept: PSYCHIATRY | Facility: CLINIC | Age: 69
End: 2024-03-04
Payer: MEDICARE

## 2024-03-04 DIAGNOSIS — F43.10 PTSD (POST-TRAUMATIC STRESS DISORDER): ICD-10-CM

## 2024-03-04 DIAGNOSIS — G25.81 RESTLESS LEGS: ICD-10-CM

## 2024-03-04 DIAGNOSIS — F32.A DEPRESSION, UNSPECIFIED DEPRESSION TYPE: Primary | ICD-10-CM

## 2024-03-04 PROCEDURE — 99214 OFFICE O/P EST MOD 30 MIN: CPT | Performed by: PHYSICIAN ASSISTANT

## 2024-03-04 RX ORDER — PRAZOSIN HYDROCHLORIDE 1 MG/1
1 CAPSULE ORAL
Qty: 90 CAPSULE | Refills: 0 | Status: SHIPPED | OUTPATIENT
Start: 2024-03-04

## 2024-03-04 RX ORDER — PRAZOSIN HYDROCHLORIDE 1 MG/1
1 CAPSULE ORAL
Qty: 90 CAPSULE | Refills: 0 | OUTPATIENT
Start: 2024-03-04

## 2024-03-04 RX ORDER — DULOXETIN HYDROCHLORIDE 60 MG/1
60 CAPSULE, DELAYED RELEASE ORAL EVERY MORNING
Qty: 90 CAPSULE | Refills: 0 | Status: SHIPPED | OUTPATIENT
Start: 2024-03-04

## 2024-03-04 RX ORDER — GABAPENTIN 100 MG/1
CAPSULE ORAL
Qty: 90 CAPSULE | Refills: 2 | Status: SHIPPED | OUTPATIENT
Start: 2024-03-04

## 2024-03-04 RX ORDER — ROPINIROLE 3 MG/1
3 TABLET, FILM COATED ORAL
Qty: 90 TABLET | Refills: 2 | Status: SHIPPED | OUTPATIENT
Start: 2024-03-04

## 2024-03-04 NOTE — PSYCH
Virtual Regular Visit    Verification of patient location:    Patient is located at Other in the following state in which I hold an active license [place]             Reason for visit is   Chief Complaint   Patient presents with    Virtual Regular Visit          Encounter provider Abril Jones PA-C    Provider located at  [place] [address] [place] [id number]  [phone]      Recent Visits  Date Type Provider Dept   02/27/24 Office Visit Primitivo Rios MD [facility]   Showing recent visits within past 7 days and meeting all other requirements  Today's Visits  Date Type Provider Dept   03/04/24 Telemedicine Abril Jones PA-C [facility]   Showing today's visits and meeting all other requirements  Future Appointments  No visits were found meeting these conditions.  Showing future appointments within next 150 days and meeting all other requirements       The patient was identified by name and date of birth. Devon Aly was informed that this is a telemedicine visit and that the visit is being conducted throughthe Epic Embedded platform. He agrees to proceed..  My office door was closed. No one else was in the room.  He acknowledged consent and understanding of privacy and security of the video platform. The patient has agreed to participate and understands they can discontinue the visit at any time.    Patient is aware this is a billable service.     MEDICATION MANAGEMENT NOTE        [facility]   PSYCHIATRIC [place] [address] [facility] [id number]  [phone]    This note was not shared with the patient due to this is a psychotherapy note      Name and Date of Birth:  Devon Aly 68 y.o. 1955    Date of Visit: March 4, 2024    SUBJECTIVE:     Devon seen by Jose Luis 1/17 at which  time meds unchanged.  Devon has since been seen by neuro and sleep medicine.  He is awaiting results of sleep study but ANTHONY suspected.   Neuro is recommending repeat neuropsych testing as Devon's MOCA was 17.      Today Devon is seen from his father-in-law's home.  He is there with his wife and daughter as his father in law is terminally ill.  Devon reports isolating at times, made worse by strained relationship with son and one daughter. Has good relationship with oldest daughter.  Devon continues to enjoy [employer]- has been breeding his doves- and communicates frequently with [employer]. Continues to struggle accepting his now chronic physical limitations.  Denies SI. Feels very supported by his wife and daughter.  Has started doing a 5000 piece puzzle.         Devon says currently nightmares and flashbacks are minimal.  Still gets overactivated in certain medical settings and with exposure to stimuli like bright lights and on reminders of trauma (e.g., passing the hospital)- this results in muscle tension and recurrent thoughts of the past.     Review of Systems   Respiratory:  Positive for shortness of breath.         Beth on exertion   Neurological:         Restless lower ext     Psychiatric History     This office since 5/7/20.  No IP or suicide attempts.  Past med trial- zoloft.      Trauma/Loss History       ARDS secondary to covid- intubated x 10 days, April 2020. Lost brother and coworkers to covid.  Has been too ill to return to work.      Social History        Lives with wife Kailyn -  >40 yrs. 2 daugthers and a son.  Worked at [employer] x 37 yrs -.  Hobby: birding      OBJECTIVE:     MENTAL STATUS EXAM  Appearance:  age appropriate   Behavior:  Pleasant & cooperative   Speech:  Normal volume, regular rate and rhythm   Mood:  tense   Affect:  mood congruent   Language: intact and appropriate for age, education, and intellect   Thought Process:  goal directed    Associations: intact associations   Thought Content:  negative ruminations   Perceptual Disturbances: no auditory or visual hallcunations   Risk Potential / Abnormal Thoughts: Suicidal ideation - None  Homicidal ideation - None  Potential for aggression - No       Consciousness:  Alert & Awake   Sensorium:  Grossly oriented   Attention: Appropriate during interview- not tested- has been re-referred to neuropsych       Fund of Knowledge:  Memory: awareness of current events: yes  Intact to hx and interview, not formally tested   Insight:  good   Judgment: good     Lab Review: I have reviewed all pertinent labs          Lab Results   Component Value Date    SODIUM 136 10/27/2023    K 4.3 10/27/2023     10/27/2023    CO2 24 10/27/2023    AGAP 7 10/27/2023    BUN 19 10/27/2023    CREATININE 1.39 (H) 10/27/2023    GLUC 116 10/20/2022    GLUF 92 10/27/2023    CALCIUM 9.3 10/27/2023    AST 43 04/13/2022    ALT 60 04/13/2022    ALKPHOS 88 04/13/2022    TP 7.5 04/13/2022    TBILI 0.60 04/13/2022    EGFR 52 10/27/2023     Lab Results   Component Value Date    WBC 10.15 10/27/2023    HGB 16.8 10/27/2023    HCT 51.3 (H) 10/27/2023    MCV 88 10/27/2023     10/27/2023           ASSESSMENT & PLAN          Diagnoses and all orders for this visit:    Depression, unspecified depression type  -     DULoxetine (CYMBALTA) 60 mg delayed release capsule; Take 1 capsule (60 mg total) by mouth every morning    PTSD (post-traumatic stress disorder)  -     DULoxetine (CYMBALTA) 60 mg delayed release capsule; Take 1 capsule (60 mg total) by mouth every morning  -     gabapentin (NEURONTIN) 100 mg capsule; One po qam and 2 po q bedtime  -     prazosin (MINIPRESS) 1 mg capsule; Take 1 capsule (1 mg total) by mouth daily at bedtime    Restless legs  -     rOPINIRole (REQUIP) 3 mg tablet; Take 1 tablet (3 mg total) by mouth daily at bedtime      Current Outpatient Medications   Medication Sig Dispense Refill    DULoxetine (CYMBALTA)  60 mg delayed release capsule Take 1 capsule (60 mg total) by mouth every morning 90 capsule 0    gabapentin (NEURONTIN) 100 mg capsule One po qam and 2 po q bedtime 90 capsule 2    prazosin (MINIPRESS) 1 mg capsule Take 1 capsule (1 mg total) by mouth daily at bedtime 90 capsule 0    rOPINIRole (REQUIP) 3 mg tablet Take 1 tablet (3 mg total) by mouth daily at bedtime 90 tablet 2    albuterol (2.5 mg/3 mL) 0.083 % nebulizer solution INHALE 1 VIAL BY NEBULIZATION EVERY 6 (SIX) HOURS AS NEEDED FOR WHEEZING OR SHORTNESS OF BREATH 375 mL 2    Anoro Ellipta 62.5-25 MCG/ACT inhaler INHALE 1 PUFF DAILY 60 each 5    donepezil (ARICEPT) 10 mg tablet TAKE 1 TABLET BY MOUTH DAILY AT BEDTIME 90 tablet 1    Multiple Vitamins-Minerals (MULTIVITAMIN ADULT PO) Take 1 tablet by mouth daily      sildenafil (VIAGRA) 50 MG tablet Take 1 tablet (50 mg total) by mouth daily as needed for erectile dysfunction 10 tablet 0    tiZANidine (ZANAFLEX) 2 mg tablet Take 1 tablet (2 mg total) by mouth daily at bedtime as needed for muscle spasms 30 tablet 0    Ventolin  (90 Base) MCG/ACT inhaler INHALE 2 PUFFS BY MOUTH EVERY 6 HOURS AS NEEDED FOR WHEEZE 18 g 3     No current facility-administered medications for this visit.                Plan:        Stable psychiatrically.  Cont f/u with sleep med and neuro as well as Tray Garzon for ind therapy.  Cont prazosin 1 mg q bedtime, neurontin 100 /200 mg, and requip 3 mg q bedtime, cymbalta 60 mg/d.     Reviewed risks, benefits, side effects of medications, including no medication.  Patient understands and agrees to treatment plan.   F/u Pa-C 4/30/24 (day after pulm appt), sooner prn. Call Pa-[facility] sooner if sleep med has any specicif recommendations for psychiatry.          Patient has been informed of 24 hours and weekend coverage for urgent situations accessed by calling the main clinic phone number.     Abril Jones PA-C

## 2024-03-05 ENCOUNTER — HOSPITAL ENCOUNTER (OUTPATIENT)
Dept: NON INVASIVE DIAGNOSTICS | Facility: CLINIC | Age: 69
Discharge: HOME | End: 2024-03-05
Payer: MEDICARE

## 2024-03-05 VITALS
SYSTOLIC BLOOD PRESSURE: 122 MMHG | WEIGHT: 275 LBS | HEART RATE: 86 BPM | DIASTOLIC BLOOD PRESSURE: 70 MMHG | HEIGHT: 68 IN | BODY MASS INDEX: 41.68 KG/M2

## 2024-03-05 DIAGNOSIS — R06.02 SOB (SHORTNESS OF BREATH) ON EXERTION: ICD-10-CM

## 2024-03-05 LAB
AORTIC ROOT: 3.4 CM
APICAL FOUR CHAMBER EJECTION FRACTION: 55 %
ASCENDING AORTA: 3.5 CM
BSA FOR ECHO PROCEDURE: 2.34 M2
E WAVE DECELERATION TIME: 221 MS
E/A RATIO: 0.67
FRACTIONAL SHORTENING: 35 (ref 28–44)
INTERVENTRICULAR SEPTUM IN DIASTOLE (PARASTERNAL SHORT AXIS VIEW): 1.2 CM
INTERVENTRICULAR SEPTUM: 1.2 CM (ref 0.6–1.1)
IVC: 24 MM
LAAS-AP2: 10.1 CM2
LAAS-AP4: 11.5 CM2
LEFT ATRIUM SIZE: 4.7 CM
LEFT ATRIUM VOLUME (MOD BIPLANE): 19 ML
LEFT ATRIUM VOLUME INDEX (MOD BIPLANE): 8.1 ML/M2
LEFT INTERNAL DIMENSION IN SYSTOLE: 3.6 CM (ref 2.1–4)
LEFT VENTRICULAR INTERNAL DIMENSION IN DIASTOLE: 5.5 CM (ref 3.5–6)
LEFT VENTRICULAR POSTERIOR WALL IN END DIASTOLE: 1.2 CM
LEFT VENTRICULAR STROKE VOLUME: 95 ML
LVSV (TEICH): 95 ML
MV E'TISSUE VEL-SEP: 3 CM/S
MV PEAK A VEL: 0.75 M/S
MV PEAK E VEL: 50 CM/S
RIGHT ATRIUM AREA SYSTOLE A4C: 16.4 CM2
RIGHT VENTRICLE ID DIMENSION: 4.3 CM
SL CV LEFT ATRIUM LENGTH A2C: 4.7 CM
SL CV LV EF: 60
SL CV PED ECHO LEFT VENTRICLE DIASTOLIC VOLUME (MOD BIPLANE) 2D: 148 ML
SL CV PED ECHO LEFT VENTRICLE SYSTOLIC VOLUME (MOD BIPLANE) 2D: 53 ML
TR MAX PG: 18 MMHG
TR PEAK VELOCITY: 2.1 M/S
TRICUSPID ANNULAR PLANE SYSTOLIC EXCURSION: 1.6 CM
TRICUSPID VALVE PEAK REGURGITATION VELOCITY: 2.12 M/S

## 2024-03-05 PROCEDURE — 93306 TTE W/DOPPLER COMPLETE: CPT

## 2024-03-05 PROCEDURE — 95806 SLEEP STUDY UNATT&RESP EFFT: CPT | Performed by: INTERNAL MEDICINE

## 2024-03-05 PROCEDURE — 93306 TTE W/DOPPLER COMPLETE: CPT | Performed by: INTERNAL MEDICINE

## 2024-03-06 ENCOUNTER — TELEPHONE (OUTPATIENT)
Dept: OBGYN CLINIC | Facility: CLINIC | Age: 69
End: 2024-03-06

## 2024-03-06 NOTE — TELEPHONE ENCOUNTER
I called Mr. Aly and explained to him that his primary care doctor needs to fill out his disability paperwork because his disability is permanent.  He said he would  the paperwork.

## 2024-03-11 ENCOUNTER — TELEPHONE (OUTPATIENT)
Dept: PSYCHIATRY | Facility: CLINIC | Age: 69
End: 2024-03-11

## 2024-03-11 ENCOUNTER — TELEPHONE (OUTPATIENT)
Age: 69
End: 2024-03-11

## 2024-03-11 ENCOUNTER — TELEPHONE (OUTPATIENT)
Dept: NEUROLOGY | Facility: CLINIC | Age: 69
End: 2024-03-11

## 2024-03-11 NOTE — TELEPHONE ENCOUNTER
The patient LVM at the office, requesting a call back. The writer LVM for the patient to contact the office for assistance.

## 2024-03-11 NOTE — TELEPHONE ENCOUNTER
Received a call from Ludivina she works at [facility] physical therapy. She needs a form filled out for referral of OT because this is [place] comp case. The form can be found in the website: [place]cb.[place].gov. The form is doctors progress, C-42. It needs to say patient name, social security, Dr Daniel signature, ICD-notes and how many visits?If there are any questions, call Ludivina at:     [phone]  Fax: [phone]

## 2024-03-11 NOTE — TELEPHONE ENCOUNTER
Lm for pt -- notified pt, will cancel injection, asked for return call back when he is able to r/s, considering his broken leg

## 2024-03-11 NOTE — TELEPHONE ENCOUNTER
Caller: Patient     Doctor: Luciano     Reason for call: Calling to cancel upcoming procedure and reschedule. In hospital due to broken leg     Call back#: [phone]

## 2024-03-11 NOTE — TELEPHONE ENCOUNTER
Patient is calling regarding cancelling an appointment.    Date/Time: 3/13/2024 at 1pm    Reason: Patient is in the hospital    Patient was rescheduled: YES [] NO [x]  If yes, when was Patient reschedule for: n/a      Patient requesting call back to reschedule: YES [] NO [x]

## 2024-03-14 ENCOUNTER — HOSPITAL ENCOUNTER (OUTPATIENT)
Dept: RADIOLOGY | Facility: CLINIC | Age: 69
Discharge: HOME | End: 2024-03-14

## 2024-03-21 ENCOUNTER — TELEPHONE (OUTPATIENT)
Age: 69
End: 2024-03-21

## 2024-03-21 ENCOUNTER — TELEPHONE (OUTPATIENT)
Dept: PSYCHIATRY | Facility: CLINIC | Age: 69
End: 2024-03-21

## 2024-03-21 DIAGNOSIS — R06.02 SHORTNESS OF BREATH: ICD-10-CM

## 2024-03-21 RX ORDER — ALBUTEROL SULFATE 90 UG/1
2 AEROSOL, METERED RESPIRATORY (INHALATION) EVERY 6 HOURS PRN
Qty: 18 G | Refills: 5 | Status: SHIPPED | OUTPATIENT
Start: 2024-03-21

## 2024-03-21 NOTE — TELEPHONE ENCOUNTER
Patient called the office and asked to switch his appt with Tray Garzon to virtual for 4/15/2024 due to he broke his leg and can't drive, Writer was able to assist with his.

## 2024-03-21 NOTE — TELEPHONE ENCOUNTER
Patient called asking if there has been any update on his Workers Comp Forms he had brought in to the office to have  complete for him. He was advised by the front end staff they would get this information to the Doctor to have completed and he hasn't heard back since. He is hoping to have a return call today to know if this paperwork can be completed or if there are any issues. Please advise

## 2024-03-27 NOTE — TELEPHONE ENCOUNTER
Patient called again asking about the status of his paperwork. He would like to come by the office tomorrow to pick it up. Please advise.

## 2024-04-01 ENCOUNTER — TELEPHONE (OUTPATIENT)
Dept: NEUROLOGY | Facility: CLINIC | Age: 69
End: 2024-04-01

## 2024-04-10 ENCOUNTER — TELEPHONE (OUTPATIENT)
Dept: NEPHROLOGY | Facility: CLINIC | Age: 69
End: 2024-04-10

## 2024-04-15 ENCOUNTER — TELEMEDICINE (OUTPATIENT)
Dept: BEHAVIORAL/MENTAL HEALTH CLINIC | Facility: CLINIC | Age: 69
End: 2024-04-15

## 2024-04-15 ENCOUNTER — TELEPHONE (OUTPATIENT)
Age: 69
End: 2024-04-15

## 2024-04-15 DIAGNOSIS — R41.3 MEMORY DIFFICULTY: ICD-10-CM

## 2024-04-15 DIAGNOSIS — F33.2 MAJOR DEPRESSIVE DISORDER, RECURRENT SEVERE WITHOUT PSYCHOTIC FEATURES (HCC): ICD-10-CM

## 2024-04-15 DIAGNOSIS — Z63.4 EXPECTED BEREAVEMENT DUE TO LIFE EVENT: ICD-10-CM

## 2024-04-15 DIAGNOSIS — F43.10 PTSD (POST-TRAUMATIC STRESS DISORDER): Primary | ICD-10-CM

## 2024-04-15 DIAGNOSIS — F43.11 ACUTE POST-TRAUMATIC STRESS DISORDER: ICD-10-CM

## 2024-04-15 SDOH — SOCIAL STABILITY - SOCIAL INSECURITY: DISSAPEARANCE AND DEATH OF FAMILY MEMBER: Z63.4

## 2024-04-15 NOTE — PSYCH
Behavioral Health Psychotherapy Progress Note    Psychotherapy Provided: Individual Psychotherapy     1. PTSD (post-traumatic stress disorder)        2. 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 Acute post-traumatic stress disorder        4. Memory difficulty        5. Expected bereavement due to life event            Goals addressed in session: Goal 1 and Goal 2     DATA: Galileo just recently broke his leg and in addition his disability has cut his amount stating he did not have enough documentation. He has multiple health issues and he has serious financial issues. This all affects his depression and his anxiety along with his anger and frustration. He feels he does not catch a break. His wife is dealing with her dad who has dementia and prostate cancer. I provided support, encouragement and strategies to cope. He discussed his adult children but two are not very close. He does not want to bother them.   During this session, this clinician used the following therapeutic modalities: Client-centered Therapy, Cognitive Behavioral Therapy, Mindfulness-based Strategies, and Supportive Psychotherapy    Substance Abuse was not addressed during this session. If the client is diagnosed with a co-occurring substance use disorder, please indicate any changes in the frequency or amount of use: n/a. Stage of change for addressing substance use diagnoses: No substance use/Not applicable    ASSESSMENT:  Devon Aly presents with a Anxious and Depressed mood.     his affect is anxious and depressed which is congruent, with his mood and the content of the session. The client has made progress on their goals.However he shared he keeps having things happen to him. He shared they are also dealing with his father in law who is 87 and who has dementia and prostate cancer.      Devon Aly presents with a none risk of suicide, none risk of self-harm, and none risk of harm to  "others.    For any risk assessment that surpasses a \"low\" rating, a safety plan must be developed.    A safety plan was indicated: no  If yes, describe in detail n/a    PLAN: Between sessions, Devon Aly will use mindfulness and CBT. At the next session, the therapist will use Client-centered Therapy, Cognitive Behavioral Therapy, Mindfulness-based Strategies, and Supportive Psychotherapy to address issues and symptoms as they may arise. .    Behavioral Health Treatment Plan and Discharge Planning: Devon Aly is aware of and agrees to continue to work on their treatment plan. They have identified and are working toward their discharge goals. yes    Visit start and stop times:    04/15/24  Start Time: 1300  Stop Time: 1400  Total Visit Time: 60 minutes  "

## 2024-04-15 NOTE — TELEPHONE ENCOUNTER
WANTS  TO  KNOW  IF  HIS  FORM   HAS  BEEN  SIGN  YET  FOR  WORKMAN COMP    SAID  HE  CALLED  LAST  WEEK  AND  WE  WERE  WORKING  ON  IT     WOULD  LIKE  TO  PICK   IT  UP  THIS  WEEK  IF  POSSIBLE

## 2024-04-15 NOTE — BH TREATMENT PLAN
Outpatient Behavioral Health Psychotherapy Treatment Plan   Treatment Plan Tracking: He was last here 2/6/2024.His update was due 4/5. However we updated it today 4/15 since this was the first time back since 2/6/2024.     Devon Aly  1955     Date of Initial Psychotherapy Assessment: 05/06/2020  Date of Current Treatment Plan: 04/15/24  Treatment Plan Target Date: 10/10/2024  Treatment Plan Expiration Date: 10/10/2024    Diagnosis:   1. PTSD (post-traumatic stress disorder)        2. 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 Acute post-traumatic stress disorder        4. Memory difficulty        5. Expected bereavement due to life event            Area(s) of Need: Please see below    Long Term Goal 1 (in the client's own words): I still need to manage my anxiety and my depression    Stage of Change: Action    Target Date for completion: 10/10/2024     Anticipated therapeutic modalities: mindfulness, CBT     People identified to complete this goal: Galileo with the help of his therapist      Objective 1: (identify the means of measuring success in meeting the objective): When my symptoms arise I will be able to manage them more effectively.       Objective 2: (identify the means of measuring success in meeting the objective): n/a      Long Term Goal 2 (in the client's own words): I need to more effectively manage my anger and my frustration.     Stage of Change: Action    Target Date for completion: 10/10/2024     Anticipated therapeutic modalities: mindfulness and CBT. Anger management and distress tolerance     People identified to complete this goal: Galileo with the help of his therapist      Objective 1: (identify the means of measuring success in meeting the objective): When I become angry and frustrated I will be able to manage the symptoms more effectively.       Objective 2: (identify the means of measuring success in meeting the objective):      Long Term  Goal 3 (in the client's own words):     Stage of Change:     Target Date for completion:      Anticipated therapeutic modalities:      People identified to complete this goal:       Objective 1: (identify the means of measuring success in meeting the objective):       Objective 2: (identify the means of measuring success in meeting the objective):      I am currently under the care of a [facility] psychiatric provider: yes    My [facility] psychiatric provider is: KEIKO Jones    I am currently taking psychiatric medications: Yes, as prescribed    I feel that I will be ready for discharge from mental health care when I reach the following (measurable goal/objective): When I make the progress I am comfortable with.     For children and adults who have a legal guardian:   Has there been any change to custody orders and/or guardianship status? NA. If yes, attach updated documentation.    I have created my Crisis Plan and have been offered a copy of this plan    Behavioral Health Treatment Plan [facility]: Diagnosis and Treatment Plan explained to Devon Aly acknowledges an understanding of their diagnosis. Devon Aly agrees to this treatment plan.    I have been offered a copy of this Treatment Plan. yes

## 2024-04-16 NOTE — TELEPHONE ENCOUNTER
Called and left a message for the patient, to see if the if the patient needs the paperwork sent before the 4/29 because he already has an appointment with [facility] at which time the paperwork can be filled, if he wants it before then to give us a call so that we can make an appointment and fill up the paperwork.

## 2024-04-22 ENCOUNTER — OFFICE VISIT (OUTPATIENT)
Age: 69
End: 2024-04-22
Payer: OTHER MISCELLANEOUS

## 2024-04-22 VITALS
BODY MASS INDEX: 40.92 KG/M2 | RESPIRATION RATE: 16 BRPM | DIASTOLIC BLOOD PRESSURE: 76 MMHG | HEART RATE: 101 BPM | OXYGEN SATURATION: 96 % | SYSTOLIC BLOOD PRESSURE: 126 MMHG | HEIGHT: 68 IN | WEIGHT: 270 LBS

## 2024-04-22 DIAGNOSIS — R06.02 SOB (SHORTNESS OF BREATH) ON EXERTION: ICD-10-CM

## 2024-04-22 DIAGNOSIS — U09.9 COVID-19 LONG HAULER: Primary | ICD-10-CM

## 2024-04-22 DIAGNOSIS — E66.01 MORBID OBESITY (HCC): ICD-10-CM

## 2024-04-22 DIAGNOSIS — J96.01 ACUTE RESPIRATORY FAILURE WITH HYPOXEMIA (HCC): ICD-10-CM

## 2024-04-22 DIAGNOSIS — G47.33 OSA (OBSTRUCTIVE SLEEP APNEA): ICD-10-CM

## 2024-04-22 DIAGNOSIS — F17.211 CIGARETTE NICOTINE DEPENDENCE IN REMISSION: ICD-10-CM

## 2024-04-22 DIAGNOSIS — J98.4 RESTRICTIVE LUNG DISEASE: ICD-10-CM

## 2024-04-22 PROCEDURE — 99214 OFFICE O/P EST MOD 30 MIN: CPT

## 2024-04-22 NOTE — ASSESSMENT & PLAN NOTE
-During his recent hospitalization for recent RLE fracture repair, he was noted to be slightly hypoxemic and was discharged with home oxygen  -When patient is ambulatory again, repeat walk test to determine continued need

## 2024-04-22 NOTE — ASSESSMENT & PLAN NOTE
-Likely secondary to his restrictive lung disease in addition to his morbid obesity and deconditioning  -Encouraged weight loss.  Continue inhalers as mentioned above  -Follow-up in 3 months or sooner if needed

## 2024-04-22 NOTE — ASSESSMENT & PLAN NOTE
-PFTs in 6/22/2022 with moderately reduced lung volumes and diffusion capacity.  Restrictive pattern on spirometry  -Likely secondary to severe COVID-19 infection in 2020 as well as obesity  -Continue on oral Ellipta 1 puff daily and albuterol every 6 hours as needed  -Encouraged weight loss  -Follow-up in 3 months or sooner if needed

## 2024-04-22 NOTE — ASSESSMENT & PLAN NOTE
-Approximately 95-pack-year smoking history, quit 3 years ago  -CT lung cancer screening scheduled for June. Prior CT with no suspicious lung nodules

## 2024-04-22 NOTE — PROGRESS NOTES
Pulmonary Follow Up Note  Devon Aly 68 y.o. male MRN: [id number]  4/22/2024    Assessment:    Restrictive lung disease  -PFTs in 6/22/2022 with moderately reduced lung volumes and diffusion capacity.  Restrictive pattern on spirometry  -Likely secondary to severe COVID-19 infection in 2020 as well as obesity  -Continue on oral Ellipta 1 puff daily and albuterol every 6 hours as needed  -Encouraged weight loss  -Follow-up in 3 months or sooner if needed    ANTHONY (obstructive sleep apnea)  -Reviewed recent home sleep study performed on 3/1/2024.  Showed moderate ANTHONY with an AHI of 16.9 with events related hypoxemia  -Discussed treatment of ANTHONY with CPAP.  He is agreeable.  Orders placed  -Will follow-up with patient in 60-90 days for compliance review.    Cigarette nicotine dependence in remission  -Approximately 95-pack-year smoking history, quit 3 years ago  -CT lung cancer screening scheduled for June.  Prior CT with no suspicious lung nodules    SOB (shortness of breath) on exertion  -Likely secondary to his restrictive lung disease in addition to his morbid obesity and deconditioning  -Encouraged weight loss.  Continue inhalers as mentioned above  -Follow-up in 3 months or sooner if needed    Acute respiratory failure with hypoxemia (HCC)  -During his recent hospitalization for recent RLE fracture repair, he was noted to be slightly hypoxemic and was discharged with home oxygen  -When patient is ambulatory again, repeat walk test to determine continued need        Plan:    Diagnoses and all orders for this visit:    COVID-19 long hauler    Restrictive lung disease    Morbid obesity (HCC)    Cigarette nicotine dependence in remission    ANTHONY (obstructive sleep apnea)  -     CPAP [employer] DME    SOB (shortness of breath) on exertion    Acute respiratory failure with hypoxemia (HCC)      Return in about 3 months (around 7/22/2024).      History of Present Illness     Chief Complaint:   Chief Complaint   Patient  presents with    Follow-up       Patient ID: Devon is a 68 y.o. y.o. male has a past medical history of Chronic kidney disease, COVID-19 (03/2020), Hypertension (4/02/2020), Liver disease, Pneumonia (3/32/2020), and PTSD (post-traumatic stress disorder).      4/22/2024  HPI: Devon Aly is a 68 y.o. male with a past medical history including but not limited to emphysema, former 95-pack-year smoking history quit 3 years ago, ANTHONY, COVID-19 long-hauler, restrictive lung disease, hypertension, and obesity.  He is here today for a follow-up visit.  He is accompanied by his wife.  He is in a wheelchair today as he recently fell and fractured his tibia/fibula and had surgery 11 days ago to repair at [facility].  During his recent hospitalization for recent RLE fracture repair, he was noted to be slightly hypoxemic and was discharged with home oxygen.  Patient is requesting Worker's Comp. paperwork.  This is being completed by Dr. Garcia today.     Patient has been using his Anoro inhaler daily as prescribed.  Usually requiring rescue inhaler 1-2 times daily.  He denies any wheezing, chest pain/chest tightness, or cough.  He is wearing 2 L supplemental oxygen continuously.  He had a recent home sleep study which showed moderate ANTHONY.        Review of Systems   Constitutional:  Negative for activity change, appetite change, chills, fever and unexpected weight change.   HENT:  Negative for congestion, ear pain, postnasal drip, rhinorrhea, sneezing, sore throat and trouble swallowing.    Respiratory:  Positive for shortness of breath. Negative for cough, chest tightness and wheezing.    Cardiovascular:  Negative for chest pain, palpitations and leg swelling.   Allergic/Immunologic: Negative.        Historical Information   Past Medical History:   Diagnosis Date    Chronic kidney disease     COVID-19 03/2020    Hypertension 4/02/2020    Liver disease     Pneumonia 3/32/2020    PTSD (post-traumatic stress disorder)      Past  Surgical History:   Procedure Laterality Date    FL INJECTION RIGHT HIP (NON ARTHROGRAM)  11/30/2020    FL INJECTION RIGHT HIP (NON ARTHROGRAM)  8/4/2021    FL INJECTION RIGHT HIP (NON ARTHROGRAM)  12/27/2021    FL INJECTION RIGHT HIP (NON ARTHROGRAM)  4/25/2022    NO PAST SURGERIES       Family History   Problem Relation Age of Onset    Heart disease Mother     Coronary artery disease Mother     Heart failure Mother     Sudden death Father     No Known Problems Son     No Known Problems Daughter     No Known Problems Maternal Grandmother     No Known Problems Maternal Grandfather     No Known Problems Paternal Grandmother     No Known Problems Paternal Grandfather     No Known Problems Daughter     Kidney disease Brother     Cancer Brother        Smoking history: He reports that he quit smoking about 4 years ago. His smoking use included cigarettes. He started smoking about 51 years ago. He has a 94.5 pack-year smoking history. He has been exposed to tobacco smoke. He has never used smokeless tobacco.    Occupational History:     Immunization History   Administered Date(s) Administered    COVID-19 PFIZER VACCINE 0.3 ML IM 04/15/2021, 05/08/2021, 02/04/2022    INFLUENZA 09/25/2021, 12/22/2022, 12/04/2023    Influenza, high dose seasonal 0.7 mL 12/22/2022, 12/04/2023    Influenza, recombinant, quadrivalent,injectable, preservative free 10/05/2020    Pneumococcal Conjugate 13-Valent 10/05/2020    Pneumococcal Conjugate Vaccine 20-valent (Pcv20), Polysace 12/22/2022       Meds/Allergies     Current Outpatient Medications:     albuterol (2.5 mg/3 mL) 0.083 % nebulizer solution, INHALE 1 VIAL BY NEBULIZATION EVERY 6 (SIX) HOURS AS NEEDED FOR WHEEZING OR SHORTNESS OF BREATH, Disp: 375 mL, Rfl: 2    Anoro Ellipta 62.5-25 MCG/ACT inhaler, INHALE 1 PUFF DAILY, Disp: 60 each, Rfl: 5    donepezil (ARICEPT) 10 mg tablet, TAKE 1 TABLET BY MOUTH DAILY AT BEDTIME, Disp: 90 tablet, Rfl: 1    DULoxetine (CYMBALTA) 60 mg delayed  "release capsule, Take 1 capsule (60 mg total) by mouth every morning, Disp: 90 capsule, Rfl: 0    gabapentin (NEURONTIN) 100 mg capsule, One po qam and 2 po q bedtime, Disp: 90 capsule, Rfl: 2    Multiple Vitamins-Minerals (MULTIVITAMIN ADULT PO), Take 1 tablet by mouth daily, Disp: , Rfl:     prazosin (MINIPRESS) 1 mg capsule, Take 1 capsule (1 mg total) by mouth daily at bedtime, Disp: 90 capsule, Rfl: 0    rOPINIRole (REQUIP) 3 mg tablet, Take 1 tablet (3 mg total) by mouth daily at bedtime, Disp: 90 tablet, Rfl: 2    sildenafil (VIAGRA) 50 MG tablet, Take 1 tablet (50 mg total) by mouth daily as needed for erectile dysfunction, Disp: 10 tablet, Rfl: 0    Ventolin  (90 Base) MCG/ACT inhaler, INHALE 2 PUFFS BY MOUTH EVERY 6 HOURS AS NEEDED FOR WHEEZING, Disp: 18 g, Rfl: 5    tiZANidine (ZANAFLEX) 2 mg tablet, Take 1 tablet (2 mg total) by mouth daily at bedtime as needed for muscle spasms, Disp: 30 tablet, Rfl: 0  Allergies:   Allergies   Allergen Reactions    Tetracycline Rash         Vitals:  Vitals:    04/22/24 1255 04/22/24 1258   BP: 126/76    BP Location: Left arm    Patient Position: Sitting    Cuff Size: Large    Pulse: 101    Resp: 16    SpO2: 96% 96%   Weight: 122 kg (270 lb)    Height: 5' 8\" (1.727 m)    Oxygen Therapy  SpO2: 96 %  Oxygen Therapy: Supplemental oxygen  O2 Delivery Method: Nasal cannula  O2 Flow Rate (L/min): 2 L/min  .  Wt Readings from Last 3 Encounters:   04/22/24 122 kg (270 lb)   03/05/24 125 kg (275 lb)   02/27/24 125 kg (275 lb)     Body mass index is 41.05 kg/m².    Physical Exam  Vitals and nursing note reviewed.   Constitutional:       General: He is not in acute distress.     Appearance: Normal appearance. He is well-developed. He is morbidly obese.   Cardiovascular:      Rate and Rhythm: Normal rate and regular rhythm.      Heart sounds: Normal heart sounds. No murmur heard.  Pulmonary:      Effort: Pulmonary effort is normal. No respiratory distress.      Breath " "sounds: Normal breath sounds. No decreased breath sounds, wheezing, rhonchi or rales.   Musculoskeletal:         General: No swelling.      Right lower leg: No edema.      Left lower leg: No edema.   Psychiatric:         Mood and Affect: Mood and affect normal.         Behavior: Behavior normal. Behavior is cooperative.           Labs: I have personally reviewed pertinent lab results.  Lab Results   Component Value Date    WBC 10.15 10/27/2023    HGB 16.8 10/27/2023    HCT 51.3 (H) 10/27/2023    MCV 88 10/27/2023     10/27/2023     Lab Results   Component Value Date    CALCIUM 8.3 (L) 03/14/2024    K 4.1 03/14/2024    CO2 30 03/14/2024     03/14/2024    BUN 18 03/14/2024    CREATININE 1.28 03/14/2024     No results found for: \"IGE\"  Lab Results   Component Value Date    ALT 39 03/10/2024    AST 32 03/10/2024    ALKPHOS 78 03/10/2024       Imaging and other studies: I have personally reviewed pertinent reports and I have personally reviewed pertinent films in PACS     Home Sleep Study 3/1/24  IMPRESSION: This home sleep study demonstrates moderate obstructive sleep apnea with an AHI of 16.9 associated with events related hypoxemia  Recommend CPAP titration study.  There was significant sleep-related hypoxia with greater than 381.1 minutes of the total sleep time with oxygen saturation less than 89%  Patient will follow-up for review of the sleep study results.    CT lung screening 6/22/2023  Mild emphysema.  No suspicious pulmonary nodules.    Pulmonary function testing:     Pulmonary Functions Testing Results: 6/22/2022    FEV1/FVC ratio 77%    FEV1 72% predicted  FVC 72% predicted  (-) response to bronchodilators  TLC 57 % predicted  RV 28 % predicted  DLCO corrected for hemoglobin 52 % predicted    Impression:   Flow-volume curve demonstrates restrictive pattern.  Spirometry demonstrates a restrictive airflow limitation.  Lung volumes are moderately decreased.  DLCO is moderately " decreased.    6-minute walk  Patient to walk a total distance of 198 m in 6 minutes without any evidence of exercise desaturation         ESS:    No results found.

## 2024-04-29 ENCOUNTER — TELEPHONE (OUTPATIENT)
Dept: PSYCHIATRY | Facility: CLINIC | Age: 69
End: 2024-04-29

## 2024-04-30 ENCOUNTER — TELEMEDICINE (OUTPATIENT)
Dept: PSYCHIATRY | Facility: CLINIC | Age: 69
End: 2024-04-30
Payer: MEDICARE

## 2024-04-30 DIAGNOSIS — R41.3 MEMORY DIFFICULTY: ICD-10-CM

## 2024-04-30 DIAGNOSIS — F32.A DEPRESSION, UNSPECIFIED DEPRESSION TYPE: ICD-10-CM

## 2024-04-30 DIAGNOSIS — M62.838 MUSCLE SPASM: ICD-10-CM

## 2024-04-30 DIAGNOSIS — G25.81 RESTLESS LEGS: ICD-10-CM

## 2024-04-30 DIAGNOSIS — F43.10 PTSD (POST-TRAUMATIC STRESS DISORDER): ICD-10-CM

## 2024-04-30 PROCEDURE — 99214 OFFICE O/P EST MOD 30 MIN: CPT | Performed by: PHYSICIAN ASSISTANT

## 2024-04-30 RX ORDER — DONEPEZIL HYDROCHLORIDE 10 MG/1
10 TABLET, FILM COATED ORAL
Qty: 90 TABLET | Refills: 1 | Status: SHIPPED | OUTPATIENT
Start: 2024-04-30

## 2024-04-30 RX ORDER — ROPINIROLE 3 MG/1
3 TABLET, FILM COATED ORAL
Qty: 90 TABLET | Refills: 0 | Status: SHIPPED | OUTPATIENT
Start: 2024-04-30

## 2024-04-30 RX ORDER — DULOXETIN HYDROCHLORIDE 60 MG/1
60 CAPSULE, DELAYED RELEASE ORAL EVERY MORNING
Qty: 90 CAPSULE | Refills: 0 | Status: SHIPPED | OUTPATIENT
Start: 2024-04-30

## 2024-04-30 RX ORDER — PRAZOSIN HYDROCHLORIDE 1 MG/1
1 CAPSULE ORAL
Qty: 90 CAPSULE | Refills: 0 | Status: SHIPPED | OUTPATIENT
Start: 2024-04-30

## 2024-04-30 NOTE — PSYCH
"  Virtual Regular Visit    Verification of patient location:    Patient is located at Home in the following state in which I hold an active license [place]               Reason for visit is   Chief Complaint   Patient presents with    Virtual Regular Visit          Encounter provider Abril Jones PA-C      Recent Visits  No visits were found meeting these conditions.  Showing recent visits within past 7 days and meeting all other requirements  Today's Visits  Date Type Provider Dept   04/30/24 Telemedicine Abril Jones PA-C  [facility]   Showing today's visits and meeting all other requirements  Future Appointments  No visits were found meeting these conditions.  Showing future appointments within next 150 days and meeting all other requirements       The patient was identified by name and date of birth. Devon Aly was informed that this is a telemedicine visit and that the visit is being conducted throughthe Epic Embedded platform. He agrees to proceed..  My office door was closed. The patient was notified the following individuals were present in the room Elva Collins.  He acknowledged consent and understanding of privacy and security of the video platform. The patient has agreed to participate and understands they can discontinue the visit at any time.    Patient is aware this is a billable service.         MEDICATION MANAGEMENT NOTE        [facility]   [facility]  [address] [facility] [address] [phone]        Name and Date of Birth:  Devon Aly 68 y.o. 1955    Date of Visit: April 30, 2024    SUBJECTIVE:     Devon seen by Jose Luis 3/4 at which time meds unchanged.  Unfortunately Devon had a fall a week later and fx tibfib requiring surgery.  States he was walking in his back yard and his \"hip moved but my leg didn't\"  and he fell onto his leg " "essentially sitting on it.  Just discharged from home care and will start in office PT soon. Says his pain is pretty well-controlled- just feels \"sore\".   Has been coping with the stress of all of this.  Most difficult part is asking family members for help.  States therapy with Tray Garzon has been very helpful.  Does have fear that he will hurt is leg again and has been very cautious.  PA-C encouraged him to talk with his PT about that also. Mood has been stable.  Anxiety manageable and nightmares occurring about 3x/month- mush less since prazosin added.  Denies SI.     Review of Systems   Constitutional:  Positive for activity change.   Musculoskeletal:  Positive for arthralgias and gait problem.     Psychiatric History     This office since 5/7/20.  No IP or suicide attempts.  Past med trial- zoloft.      Trauma/Loss History       ARDS secondary to covid- intubated x 10 days, April 2020. Lost brother and coworkers to covid.  Has been too ill to return to work.      Social History        Lives with wife Kailyn -  >40 yrs. 2 daugthers and a son.  Worked at [employer] x 37 yrs -.  Hobby: birding        OBJECTIVE:     MENTAL STATUS EXAM  Appearance:  age appropriate, dressed casually   Behavior:  Pleasant & cooperative   Speech:  Normal volume, regular rate and rhythm   Mood:  Mildly low   Affect:  mood congruent   Language: intact and appropriate for age, education, and intellect   Thought Process:  goal directed   Associations: intact associations   Thought Content:  normal and appropriate   Perceptual Disturbances: no auditory or visual hallcunations   Risk Potential / Abnormal Thoughts: Suicidal ideation - None  Homicidal ideation - None  Potential for aggression - No       Consciousness:  Alert & Awake   Sensorium:  Grossly oriented   Attention: attention span and concentration are age appropriate       Fund of Knowledge:  Memory: awareness of current events: yes  recent and " remote memory grossly intact   Insight:  good   Judgment: good     Lab Review: I have reviewed all pertinent labs        Lab Results   Component Value Date    SODIUM 139 03/14/2024    K 4.1 03/14/2024     03/14/2024    CO2 30 03/14/2024    AGAP 4 03/14/2024    BUN 18 03/14/2024    CREATININE 1.28 03/14/2024    GLUC 103 (H) 03/14/2024    GLUF 92 10/27/2023    CALCIUM 8.3 (L) 03/14/2024    AST 32 03/10/2024    ALT 39 03/10/2024    ALKPHOS 78 03/10/2024    TP 6.4 03/10/2024    TBILI 0.4 03/10/2024    EGFR 62 03/14/2024     Lab Results   Component Value Date    WBC 10.15 10/27/2023    HGB 16.8 10/27/2023    HCT 51.3 (H) 10/27/2023    MCV 88 10/27/2023     10/27/2023         ASSESSMENT & PLAN          Diagnoses and all orders for this visit:    Depression, unspecified depression type  -     DULoxetine (CYMBALTA) 60 mg delayed release capsule; Take 1 capsule (60 mg total) by mouth every morning    PTSD (post-traumatic stress disorder)  -     DULoxetine (CYMBALTA) 60 mg delayed release capsule; Take 1 capsule (60 mg total) by mouth every morning  -     prazosin (MINIPRESS) 1 mg capsule; Take 1 capsule (1 mg total) by mouth daily at bedtime    Restless legs  -     rOPINIRole (REQUIP) 3 mg tablet; Take 1 tablet (3 mg total) by mouth daily at bedtime      Current Outpatient Medications   Medication Sig Dispense Refill    DULoxetine (CYMBALTA) 60 mg delayed release capsule Take 1 capsule (60 mg total) by mouth every morning 90 capsule 0    prazosin (MINIPRESS) 1 mg capsule Take 1 capsule (1 mg total) by mouth daily at bedtime 90 capsule 0    rOPINIRole (REQUIP) 3 mg tablet Take 1 tablet (3 mg total) by mouth daily at bedtime 90 tablet 0    albuterol (2.5 mg/3 mL) 0.083 % nebulizer solution INHALE 1 VIAL BY NEBULIZATION EVERY 6 (SIX) HOURS AS NEEDED FOR WHEEZING OR SHORTNESS OF BREATH 375 mL 2    Anoro Ellipta 62.5-25 MCG/ACT inhaler INHALE 1 PUFF DAILY 60 each 5    donepezil (ARICEPT) 10 mg tablet TAKE 1 TABLET BY  MOUTH EVERYDAY AT BEDTIME 90 tablet 1    gabapentin (NEURONTIN) 100 mg capsule One po qam and 2 po q bedtime 90 capsule 2    Multiple Vitamins-Minerals (MULTIVITAMIN ADULT PO) Take 1 tablet by mouth daily      sildenafil (VIAGRA) 50 MG tablet Take 1 tablet (50 mg total) by mouth daily as needed for erectile dysfunction 10 tablet 0    tiZANidine (ZANAFLEX) 2 mg tablet Take 1 tablet (2 mg total) by mouth daily at bedtime as needed for muscle spasms 30 tablet 0    Ventolin  (90 Base) MCG/ACT inhaler INHALE 2 PUFFS BY MOUTH EVERY 6 HOURS AS NEEDED FOR WHEEZING 18 g 5     No current facility-administered medications for this visit.                Plan:        Psychiatrically stable despite leg fx in March requiring surgery.   No med change- cont cymbalta 60 mg/d, prazosin 1 mg q bedtime, neurontin 100/200 mg, requip 3 mg q bedtime.   Cont f/u with Gasper Duran for ind therapy  F/u PaKATLYN 2 mths soonner prn    Reviewed risks, benefits, side effects of medications, including no medication.  Patient understands and agrees to treatment plan.          Patient has been informed of 24 hours and weekend coverage for urgent situations accessed by calling the main clinic phone number.     Abril Jones PA-C

## 2024-05-01 ENCOUNTER — TELEPHONE (OUTPATIENT)
Dept: PSYCHIATRY | Facility: CLINIC | Age: 69
End: 2024-05-01

## 2024-05-01 RX ORDER — TIZANIDINE 2 MG/1
2 TABLET ORAL
Qty: 30 TABLET | Refills: 0 | OUTPATIENT
Start: 2024-05-01 | End: 2024-05-31

## 2024-05-01 NOTE — TELEPHONE ENCOUNTER
Devon called the PCP about filling the tizandine and she does not want to refill it.    Are you going to fill?

## 2024-05-01 NOTE — TELEPHONE ENCOUNTER
Patient follows pain management, please let patient know to call pain management for refill if appropriate.

## 2024-05-01 NOTE — TELEPHONE ENCOUNTER
Re:   Zanaflex refill  Provider's message relayed in full detail    Left voice mail asking pt to return call w/any questions.

## 2024-05-02 ENCOUNTER — TELEPHONE (OUTPATIENT)
Age: 69
End: 2024-05-02

## 2024-05-02 NOTE — TELEPHONE ENCOUNTER
Pt left paperwork in bin - today - workers comp    Section D needs to be filled out    Please call pt when ready to

## 2024-05-06 ENCOUNTER — TELEMEDICINE (OUTPATIENT)
Dept: BEHAVIORAL/MENTAL HEALTH CLINIC | Facility: CLINIC | Age: 69
End: 2024-05-06
Payer: MEDICARE

## 2024-05-06 DIAGNOSIS — F43.10 PTSD (POST-TRAUMATIC STRESS DISORDER): Primary | ICD-10-CM

## 2024-05-06 DIAGNOSIS — R41.3 MEMORY DIFFICULTY: ICD-10-CM

## 2024-05-06 DIAGNOSIS — F33.2 MAJOR DEPRESSIVE DISORDER, RECURRENT SEVERE WITHOUT PSYCHOTIC FEATURES (HCC): ICD-10-CM

## 2024-05-06 PROCEDURE — 90837 PSYTX W PT 60 MINUTES: CPT | Performed by: SOCIAL WORKER

## 2024-05-06 NOTE — PSYCH
"Behavioral Health Psychotherapy Progress Note    Psychotherapy Provided: Individual Psychotherapy     Depression, anxiety    Goals addressed in session: Goal 1 and Goal 2     DATA: Galileo discussed all of his current health issues. He had broken his leg which currently has immobilized him.   During this session, this clinician used the following therapeutic modalities: Client-centered Therapy, Cognitive Behavioral Therapy, Mindfulness-based Strategies, and Supportive Psychotherapy    Substance Abuse was not addressed during this session. If the client is diagnosed with a co-occurring substance use disorder, please indicate any changes in the frequency or amount of use: n/a. Stage of change for addressing substance use diagnoses: No substance use/Not applicable    ASSESSMENT:  Devon Aly presents with a Anxious and Depressed mood.     his affect is anxious and depressed which is congruent, with his mood and the content of the session. The client has made progress on their goals.However he has ongoing health issues.      Devon Aly presents with a none risk of suicide, none risk of self-harm, and none risk of harm to others.    For any risk assessment that surpasses a \"low\" rating, a safety plan must be developed.    A safety plan was indicated: no  If yes, describe in detail n/a    PLAN: Between sessions, Devon Aly will use mindfulness and CBT. At the next session, the therapist will use Client-centered Therapy, Cognitive Behavioral Therapy, Mindfulness-based Strategies, and Supportive Psychotherapy to address issues and symptoms .    Behavioral Health Treatment Plan and Discharge Planning: Devon Aly is aware of and agrees to continue to work on their treatment plan. They have identified and are working toward their discharge goals. yes    Visit start and stop times:    05/06/24   Behavioral Health Psychotherapy Progress Note    Psychotherapy Provided: Individual Psychotherapy     Depression, " "anxiety    Goals addressed in session: Goal 1 and Goal 2     DATA: Galileo is frustrated with his PCP who he claims will not fill out any form he asks her to fill out. He is very upset about this  During this session, this clinician used the following therapeutic modalities: Client-centered Therapy, Cognitive Behavioral Therapy, Mindfulness-based Strategies, and Supportive Psychotherapy    Substance Abuse was not addressed during this session. If the client is diagnosed with a co-occurring substance use disorder, please indicate any changes in the frequency or amount of use: n/a. Stage of change for addressing substance use diagnoses: No substance use/Not applicable    ASSESSMENT:  Devon Aly presents with a Anxious and Depressed mood.     his affect is anxious and depressed which is congruent, with his mood and the content of the session. The client has made progress on their goals.     Devon Aly presents with a none risk of suicide, none risk of self-harm, and none risk of harm to others.    For any risk assessment that surpasses a \"low\" rating, a safety plan must be developed.    A safety plan was indicated: no  If yes, describe in detail n/a    PLAN: Between sessions, Devon Aly will use mindfulness and CBT. At the next session, the therapist will use Client-centered Therapy, Cognitive Behavioral Therapy, Mindfulness-based Strategies, and Supportive Psychotherapy to address issues and symptoms as they may arise. .    Behavioral Health Treatment Plan and Discharge Planning: Devon Aly is aware of and agrees to continue to work on their treatment plan. They have identified and are working toward their discharge goals. yes    Visit start and stop times:1pm till 2pm 53 plus minutes    05/06/24     "

## 2024-05-09 ENCOUNTER — TELEPHONE (OUTPATIENT)
Age: 69
End: 2024-05-09

## 2024-05-09 NOTE — TELEPHONE ENCOUNTER
Called patient to let him know his disability forms are completed and ready for  patient will come later on this afternoon to , forms will be in pulmonary folder in the

## 2024-05-21 ENCOUNTER — OFFICE VISIT (OUTPATIENT)
Dept: OBGYN CLINIC | Facility: CLINIC | Age: 69
End: 2024-05-21
Payer: MEDICARE

## 2024-05-21 VITALS
HEART RATE: 103 BPM | DIASTOLIC BLOOD PRESSURE: 83 MMHG | HEIGHT: 68 IN | BODY MASS INDEX: 41.05 KG/M2 | SYSTOLIC BLOOD PRESSURE: 152 MMHG

## 2024-05-21 DIAGNOSIS — M16.12 PRIMARY OSTEOARTHRITIS OF ONE HIP, LEFT: ICD-10-CM

## 2024-05-21 DIAGNOSIS — M16.11 PRIMARY OSTEOARTHRITIS OF RIGHT HIP: Primary | ICD-10-CM

## 2024-05-21 PROCEDURE — 99213 OFFICE O/P EST LOW 20 MIN: CPT | Performed by: ORTHOPAEDIC SURGERY

## 2024-05-21 NOTE — PROGRESS NOTES
Orthopaedics Office Visit - Established Patient Visit    ASSESSMENT/PLAN:    Assessment:   Bilateral hip osteoarthritis   R>L     Improvement with intra-articular CSI to L side, had to reschedule r side secondary to ankle fracture fixed at outside hospital    Plan:   Devon is scheduled to see weight management on 6/26/24, we again discussed BMI must be under 40 in order to undergo a total hip arthroplasty   He will re-schedule his FL guided right intra-articular CSI with Dr. Wolf, new order was provided today   Follow up in the office in 2 months time for clinical re-evaluation      To Do Next Visit:  Re-evaluation     _____________________________________________________  CHIEF COMPLAINT:  Chief Complaint   Patient presents with    Right Hip - Follow-up    Left Hip - Follow-up         SUBJECTIVE:  Devon Aly is a 68 y.o. male who presents to the office for a follow up regarding bilateral hip pain secondary to osteoarthritis. He underwent a left hip intra-articular CSI with Dr. Wolf on 2/28/24, which was beneficial for him. He states approx. 1 month ago he was out in his yard, he experienced a sharp pain to his right groin, which the patient states caused him to fall due to the pain and fracture his right tibia/fibula, for which he did undergo surgery for a LVHN. He is not currently scheduled to undergo a right hip intra-articular CSI due to his right leg fracture. He will take Tylenol for his his pain. He does have an appointment to see weight management on 6/26/24.          PAST MEDICAL HISTORY:  Past Medical History:   Diagnosis Date    Chronic kidney disease     COVID-19 03/2020    Hypertension 4/02/2020    Liver disease     Pneumonia 3/32/2020    PTSD (post-traumatic stress disorder)        PAST SURGICAL HISTORY:  Past Surgical History:   Procedure Laterality Date    FL INJECTION RIGHT HIP (NON ARTHROGRAM)  11/30/2020    FL INJECTION RIGHT HIP (NON ARTHROGRAM)  8/4/2021    FL INJECTION RIGHT HIP (NON  ARTHROGRAM)  2021    FL INJECTION RIGHT HIP (NON ARTHROGRAM)  2022    NO PAST SURGERIES         FAMILY HISTORY:  Family History   Problem Relation Age of Onset    Heart disease Mother     Coronary artery disease Mother     Heart failure Mother     Sudden death Father     No Known Problems Son     No Known Problems Daughter     No Known Problems Maternal Grandmother     No Known Problems Maternal Grandfather     No Known Problems Paternal Grandmother     No Known Problems Paternal Grandfather     No Known Problems Daughter     Kidney disease Brother     Cancer Brother        SOCIAL HISTORY:  Social History     Tobacco Use    Smoking status: Former     Current packs/day: 0.00     Average packs/day: 2.0 packs/day for 47.2 years (94.5 ttl pk-yrs)     Types: Cigarettes     Start date: 1973     Quit date: 2020     Years since quittin.1     Passive exposure: Past    Smokeless tobacco: Never   Vaping Use    Vaping status: Former    Quit date: 2020    Substances: Nicotine, Flavoring   Substance Use Topics    Alcohol use: Not Currently    Drug use: Never       MEDICATIONS:    Current Outpatient Medications:     albuterol (2.5 mg/3 mL) 0.083 % nebulizer solution, INHALE 1 VIAL BY NEBULIZATION EVERY 6 (SIX) HOURS AS NEEDED FOR WHEEZING OR SHORTNESS OF BREATH, Disp: 375 mL, Rfl: 2    Anoro Ellipta 62.5-25 MCG/ACT inhaler, INHALE 1 PUFF DAILY, Disp: 60 each, Rfl: 5    donepezil (ARICEPT) 10 mg tablet, TAKE 1 TABLET BY MOUTH EVERYDAY AT BEDTIME, Disp: 90 tablet, Rfl: 1    DULoxetine (CYMBALTA) 60 mg delayed release capsule, Take 1 capsule (60 mg total) by mouth every morning, Disp: 90 capsule, Rfl: 0    Multiple Vitamins-Minerals (MULTIVITAMIN ADULT PO), Take 1 tablet by mouth daily, Disp: , Rfl:     prazosin (MINIPRESS) 1 mg capsule, Take 1 capsule (1 mg total) by mouth daily at bedtime, Disp: 90 capsule, Rfl: 0    rOPINIRole (REQUIP) 3 mg tablet, Take 1 tablet (3 mg total) by mouth daily at bedtime,  "Disp: 90 tablet, Rfl: 0    sildenafil (VIAGRA) 50 MG tablet, Take 1 tablet (50 mg total) by mouth daily as needed for erectile dysfunction, Disp: 10 tablet, Rfl: 0    Ventolin  (90 Base) MCG/ACT inhaler, INHALE 2 PUFFS BY MOUTH EVERY 6 HOURS AS NEEDED FOR WHEEZING, Disp: 18 g, Rfl: 5    gabapentin (NEURONTIN) 100 mg capsule, One po qam and 2 po q bedtime, Disp: 90 capsule, Rfl: 2    tiZANidine (ZANAFLEX) 2 mg tablet, Take 1 tablet (2 mg total) by mouth daily at bedtime as needed for muscle spasms, Disp: 30 tablet, Rfl: 0    ALLERGIES:  Allergies   Allergen Reactions    Tetracycline Rash       REVIEW OF SYSTEMS:  MSK: as noted in HPI  Neuro: WNL's   Pertinent items are otherwise noted in HPI.  A comprehensive review of systems was otherwise negative.    LABS:  HgA1c:   Lab Results   Component Value Date    HGBA1C 5.6 04/13/2022     BMP:   Lab Results   Component Value Date    CALCIUM 8.3 (L) 03/14/2024    K 4.1 03/14/2024    CO2 30 03/14/2024     03/14/2024    BUN 18 03/14/2024    CREATININE 1.28 03/14/2024     CBC: No components found for: \"CBC\"    _____________________________________________________  PHYSICAL EXAMINATION:  Vital signs: /83   Pulse 103   Ht 5' 8\" (1.727 m)   BMI 41.05 kg/m²   General: No acute distress, awake and alert  Psychiatric: Mood and affect appear appropriate  HEENT: Trachea Midline, No torticollis, no apparent facial trauma  Cardiovascular: No audible murmurs; Extremities appear perfused  Pulmonary: No audible wheezing or stridor  Skin: No open lesions; see further details (if any) below    MUSCULOSKELETAL EXAMINATION:      Extremities:  Right hip   No erythema, ecchymosis or edema  Pain with hip flexion   Pain with internal and external rotation   Ambulates without assistance   Extremities appears warm and well perfused     _____________________________________________________  STUDIES REVIEWED:  I personally reviewed the images and interpretation is as follows:  No " new imaging to review       PROCEDURES PERFORMED:  Procedures    Scribe Attestation      I,:  Allie Rivera am acting as a scribe while in the presence of the attending physician.:       I,:  Tray Sahu MD personally performed the services described in this documentation    as scribed in my presence.:

## 2024-05-22 ENCOUNTER — PATIENT MESSAGE (OUTPATIENT)
Dept: RADIOLOGY | Facility: CLINIC | Age: 69
End: 2024-05-22

## 2024-05-22 ENCOUNTER — TELEPHONE (OUTPATIENT)
Dept: RADIOLOGY | Facility: CLINIC | Age: 69
End: 2024-05-22

## 2024-05-22 NOTE — TELEPHONE ENCOUNTER
Lm for pt -- asked for rcb if he would like to schedule injection ordered by Dr Sahu, to be done by Dr Wolf

## 2024-05-22 NOTE — TELEPHONE ENCOUNTER
Caller: gurvinder Osorio    Doctor:     Reason for call: pt would like to schedule an injection. Please Advise     Call back#: [phone]

## 2024-05-24 ENCOUNTER — OFFICE VISIT (OUTPATIENT)
Age: 69
End: 2024-05-24
Payer: MEDICARE

## 2024-05-24 VITALS
DIASTOLIC BLOOD PRESSURE: 80 MMHG | OXYGEN SATURATION: 99 % | BODY MASS INDEX: 41.68 KG/M2 | SYSTOLIC BLOOD PRESSURE: 128 MMHG | WEIGHT: 275 LBS | TEMPERATURE: 97.6 F | RESPIRATION RATE: 14 BRPM | HEIGHT: 68 IN | HEART RATE: 93 BPM

## 2024-05-24 DIAGNOSIS — G47.33 OSA (OBSTRUCTIVE SLEEP APNEA): ICD-10-CM

## 2024-05-24 DIAGNOSIS — J98.4 RESTRICTIVE LUNG DISEASE: ICD-10-CM

## 2024-05-24 DIAGNOSIS — F17.211 CIGARETTE NICOTINE DEPENDENCE IN REMISSION: ICD-10-CM

## 2024-05-24 DIAGNOSIS — E66.01 OBESITY, CLASS III, BMI 40-49.9 (MORBID OBESITY) (HCC): ICD-10-CM

## 2024-05-24 DIAGNOSIS — N18.31 STAGE 3A CHRONIC KIDNEY DISEASE (HCC): ICD-10-CM

## 2024-05-24 DIAGNOSIS — E55.9 VITAMIN D DEFICIENCY: ICD-10-CM

## 2024-05-24 DIAGNOSIS — M16.11 PRIMARY OSTEOARTHRITIS OF RIGHT HIP: ICD-10-CM

## 2024-05-24 DIAGNOSIS — Z76.89 ENCOUNTER FOR WEIGHT MANAGEMENT: Primary | ICD-10-CM

## 2024-05-24 DIAGNOSIS — R73.03 PREDIABETES: ICD-10-CM

## 2024-05-24 PROBLEM — J84.9 INTERSTITIAL LUNG DISEASE (HCC): Status: RESOLVED | Noted: 2020-04-29 | Resolved: 2024-05-24

## 2024-05-24 PROBLEM — J96.01 ACUTE RESPIRATORY FAILURE WITH HYPOXEMIA (HCC): Status: RESOLVED | Noted: 2020-04-07 | Resolved: 2024-05-24

## 2024-05-24 PROBLEM — E66.813 OBESITY, CLASS III, BMI 40-49.9 (MORBID OBESITY): Status: ACTIVE | Noted: 2022-10-25

## 2024-05-24 PROBLEM — M79.89 LEG SWELLING: Status: RESOLVED | Noted: 2023-12-04 | Resolved: 2024-05-24

## 2024-05-24 PROBLEM — J44.9 CHRONIC OBSTRUCTIVE PULMONARY DISEASE (HCC): Status: RESOLVED | Noted: 2023-03-28 | Resolved: 2024-05-24

## 2024-05-24 PROBLEM — Z87.891 PERSONAL HISTORY OF NICOTINE DEPENDENCE: Status: RESOLVED | Noted: 2022-04-13 | Resolved: 2024-05-24

## 2024-05-24 PROBLEM — R06.83 SNORING: Status: RESOLVED | Noted: 2024-01-29 | Resolved: 2024-05-24

## 2024-05-24 PROBLEM — R06.02 SOB (SHORTNESS OF BREATH) ON EXERTION: Status: RESOLVED | Noted: 2024-02-08 | Resolved: 2024-05-24

## 2024-05-24 LAB — SL AMB POCT HEMOGLOBIN AIC: 5.8 (ref ?–6.5)

## 2024-05-24 PROCEDURE — 99215 OFFICE O/P EST HI 40 MIN: CPT | Performed by: FAMILY MEDICINE

## 2024-05-24 PROCEDURE — 83036 HEMOGLOBIN GLYCOSYLATED A1C: CPT | Performed by: FAMILY MEDICINE

## 2024-05-24 RX ORDER — CHOLECALCIFEROL (VITAMIN D3) 1250 MCG
CAPSULE ORAL
Qty: 24 CAPSULE | Refills: 0 | Status: SHIPPED | OUTPATIENT
Start: 2024-05-24

## 2024-05-24 RX ORDER — METFORMIN HYDROCHLORIDE 500 MG/1
500 TABLET, EXTENDED RELEASE ORAL 2 TIMES DAILY WITH MEALS
Qty: 180 TABLET | Refills: 3 | Status: SHIPPED | OUTPATIENT
Start: 2024-05-24 | End: 2025-05-19

## 2024-05-24 NOTE — PROGRESS NOTES
[facility] PRIMARY CARE  Ambulatory visit     Name: Devon Aly   YOB: 1955   MRN: [id number]  Encounter Provider: Primitivo Rios MD    Encounter Date: 5/24/2024    ASSESSMENT & PLAN      Assessment & Plan     Weight Management/obesity, class III, BMI 40-49.9, prediabetes  Wt Readings from Last 3 Encounters:   05/24/24 125 kg (275 lb)   04/22/24 122 kg (270 lb)   03/05/24 125 kg (275 lb)   Initial weight (05/24/24): 275 lbs, Body mass index is 41.81 kg/m².  05/24/24 Body mass index is 41.81 kg/m².  TWL: - lbs  Discussed recent labs including prediabetic range A1c.   Discussed the importance of making dietary and lifestyle changes to avoid progression and the need of multiple antidiabetic meds  Started metformin extended release 500 mg daily for 1 week then increase to twice daily.    Recommend low-carb diet, limiting snacking, intermittent fasting as discussed  Recommend exercising 200-300 minutes/week, 60 minutes sessions x 4 days  Will monitor A1c in 3-6 months increments  Follow-up with labs as discussed  Return in 6 weeks    Restrictive lung disease, ANTHONY, cigarette nicotine dependence in remission  Follows pulmonology.  Recent office visit reviewed.  PFT and 6/7/2022 showing moderately reduced lung volumes and diffuse capacity, restrictive pattern on spirometry.  Likely secondary to COVID infection in 2019 as well as obesity.  Currently on Ellipta 1 puff daily and albuterol every 6 hours as needed.    Moderate ANTHONY with AHI of 16.9, patient is agreeable with CPAP machine.  Continue care with specialist  Encouraged and being managed for weight loss    Fall, subsequent, ankle fracture, primary osteoarthritis of right hip  Now status post multiple pins of right ankle.  Follows orthopedic team.  Most recent note from Ortho reviewed, patient was recommended to have BMI under 40 to undergo total hip arthroplasty.  Patient is scheduled for weight management in June.  Continue care with  specialist    Stage IIIa chronic kidney disease  Follows nephrology has upcoming appointment  Continue care with specialist.  Advised losing weight and improving nutritional intake.                DIAGNOSIS & ORDERS   1. Encounter for weight management  2. Prediabetes  -     metFORMIN (GLUCOPHAGE-XR) 500 mg 24 hr tablet; Take 1 tablet (500 mg total) by mouth 2 (two) times a day with meals  -     POCT hemoglobin A1c  3. Obesity, Class III, BMI 40-49.9 (morbid obesity) (HCC)  4. Cigarette nicotine dependence in remission  5. Vitamin D deficiency  -     Cholecalciferol (Vitamin D3) 1.25 MG (54650 UT) CAPS; 2 times a week for 12 weeks.  6. Primary osteoarthritis of right hip  7. Stage 3a chronic kidney disease (HCC)  8. ANTHONY (obstructive sleep apnea)  9. Restrictive lung disease    FOLLOW-UP PLANS   Return in about 6 weeks (around 7/5/2024) for weight management].      Current Medication List:     Current Outpatient Medications:     albuterol (2.5 mg/3 mL) 0.083 % nebulizer solution, INHALE 1 VIAL BY NEBULIZATION EVERY 6 (SIX) HOURS AS NEEDED FOR WHEEZING OR SHORTNESS OF BREATH, Disp: 375 mL, Rfl: 2    Anoro Ellipta 62.5-25 MCG/ACT inhaler, INHALE 1 PUFF DAILY, Disp: 60 each, Rfl: 5    Cholecalciferol (Vitamin D3) 1.25 MG (29248 UT) CAPS, 2 times a week for 12 weeks., Disp: 24 capsule, Rfl: 0    donepezil (ARICEPT) 10 mg tablet, TAKE 1 TABLET BY MOUTH EVERYDAY AT BEDTIME, Disp: 90 tablet, Rfl: 1    DULoxetine (CYMBALTA) 60 mg delayed release capsule, Take 1 capsule (60 mg total) by mouth every morning, Disp: 90 capsule, Rfl: 0    gabapentin (NEURONTIN) 100 mg capsule, One po qam and 2 po q bedtime, Disp: 90 capsule, Rfl: 2    metFORMIN (GLUCOPHAGE-XR) 500 mg 24 hr tablet, Take 1 tablet (500 mg total) by mouth 2 (two) times a day with meals, Disp: 180 tablet, Rfl: 3    Multiple Vitamins-Minerals (MULTIVITAMIN ADULT PO), Take 1 tablet by mouth daily, Disp: , Rfl:     prazosin (MINIPRESS) 1 mg capsule, Take 1 capsule (1 mg  "total) by mouth daily at bedtime, Disp: 90 capsule, Rfl: 0    rOPINIRole (REQUIP) 3 mg tablet, Take 1 tablet (3 mg total) by mouth daily at bedtime, Disp: 90 tablet, Rfl: 0    sildenafil (VIAGRA) 50 MG tablet, Take 1 tablet (50 mg total) by mouth daily as needed for erectile dysfunction, Disp: 10 tablet, Rfl: 0    Ventolin  (90 Base) MCG/ACT inhaler, INHALE 2 PUFFS BY MOUTH EVERY 6 HOURS AS NEEDED FOR WHEEZING, Disp: 18 g, Rfl: 5    tiZANidine (ZANAFLEX) 2 mg tablet, Take 1 tablet (2 mg total) by mouth daily at bedtime as needed for muscle spasms, Disp: 30 tablet, Rfl: 0      Subjective   History of Present Illness       Devon Aly is here for an office visit.   HPI    Review of Systems      Objective:     /80 (BP Location: Left arm, Patient Position: Sitting, Cuff Size: Large)   Pulse 93   Temp 97.6 °F (36.4 °C) (Tympanic)   Resp 14   Ht 5' 8\" (1.727 m)   Wt 125 kg (275 lb)   SpO2 99%   BMI 41.81 kg/m²      Physical Exam  Vitals reviewed.   Constitutional:       General: He is not in acute distress.     Appearance: Normal appearance. He is obese. He is not ill-appearing, toxic-appearing or diaphoretic.   HENT:      Head: Normocephalic and atraumatic.      Right Ear: External ear normal.      Left Ear: External ear normal.      Nose: No congestion or rhinorrhea.   Eyes:      General: No scleral icterus.        Right eye: No discharge.         Left eye: No discharge.      Conjunctiva/sclera: Conjunctivae normal.   Cardiovascular:      Rate and Rhythm: Normal rate.   Pulmonary:      Effort: Pulmonary effort is normal. No respiratory distress.   Neurological:      General: No focal deficit present.      Mental Status: He is alert and oriented to person, place, and time.   Psychiatric:         Mood and Affect: Mood normal.         Behavior: Behavior normal.         Thought Content: Thought content normal.           Primitivo Rios MD  Family Medicine Physician   [facility] PRIMARY CARE " [place]        Administrative Statements     I have spent a total time of >40 minutes on 05/24/24 in caring for this patient including Diagnostic results, Prognosis, Risks and benefits of tx options, Instructions for management, Patient and family education, Importance of tx compliance, Risk factor reductions, Impressions, Counseling / Coordination of care, Documenting in the medical record, Reviewing / ordering tests, medicine, procedures  , and Obtaining or reviewing history  .

## 2024-05-24 NOTE — PATIENT INSTRUCTIONS
Recommendations for bone and heart health  Take vitamin D3 50,000 units twice a week for 12 weeks to boost to high-normal levels, prescription sent to pharmacy.  If insurance does not cover or it is too expensive, you can get high-dose vitamin D3 from vitamin shops or on [employer]. Can get Vitamin D3 at 10,000 units, take 5 capsules 2 times a week for 12 weeks.   During high-dose boost of vitamin D3, take Vitamin K2 100-200 mcg daily with food. It can be purchased from vitamin [employer] or [employer].   Once completed with the high-dose course, begin maintenance dose of vitamin D3 5,000 units-vitamin K2 200 mcg combination pill. Take daily with food to maintain high-normal levels of vitamin D. The vitamin K2 will help to prevent vascular calcification, read texts below.   Take fish oil with omega-3 at dosage of 1950-4772 mg daily to reduce risk of heart disease.   Follow-up with repeat vitamin D blood work after completing the high-dose prescription course to monitor for improvement.       Why is Vitamin D important?  Adequate vitamin D levels reduces the risk of fractures and osteoporosis.  Vitamin D is involved in modulating the immune system, enhancing the body's defense against infections and supporting overall immune function.  Adequate vitamin D levels are associated with better muscle strength and function.  Deficiency may contribute to muscle weakness and an increase risk of falls in older adults.  Multiple research studies have associated low vitamin D with increased risk of autoimmune disease, cancers including breast cancer, and multiple sclerosis.  Some research also link vitamin D deficiency to increase risk of cardiovascular disease. Vitamin D also plays a role in synthesis of various hormones, including insulin.  It may play a role in insulin sensitivity and glucose metabolism. Adequate levels of vitamin D may also plays a role in supporting mental wellbeing.  In conclusion, lets bring your vitamin D levels up  to >65 to prevent many diseases and conditions!     Why is vitamin K2 important?  Vitamin K2 helps ensure that the calcium we obtain through diet/supplements is directed to the bones for proper mineralization, contributing to bone strength and density.  Vitamin K2 also helps to activate proteins that prevent calcium from depositing in the arterial walls and reducing the risk of arterial calcification. Arterial calcification leads to heart and vascular disease. Especially since we are optimizing your vitamin D levels, one of the functions for vitamin D is to absorb calcium from the gastrointestinal track. This will increase calcium levels in the body. Supplementing vitamin K2 will ensure in removing the calcium from the blood vessels. This will reduce calcification of the vessels, reducing risk of heart and vascular disease.  Let's get your calcium where it belongs, into the bones and out of the vessels with vitamin K2!    Why is fish oil with omega-3 important?  Omega-3 fatty acids and fish oil or associated with reduced risk of heart disease.  They help to improve balance of cholesterol by increasing HDL (good cholesterol) and reducing triglycerides. They also reduce blood pressure and have anti-inflammatory effects contributing to overall cardiovascular health.  Omega-threes have anti-inflammatory properties that may benefit individuals with joint conditions such as rheumatoid arthritis, and other chronic inflammatory conditions such as IBD.  They can help reduce inflammation and alleviate symptoms. Let's prevent diseases, avoid the need of medications and start your healthy journey with these supplements (in addition to improving your nutritional intake and regular exercise)!            Together as a team our goal is to practice preventative care, avoid chronic diseases, and/or avoid further progression of current chronic diseases.   Here are my recommendations as we discussed during our office  visit:    Exercise:  150 minutes of moderate intensity workout for overall general health  200-300 minutes of moderate intensity workout for weight loss.   The first 30 minutes of exercising, the body will take energy from what we ate that day. Then after that 30-minute hieu, the body will take energy from the stored fats.  Therefore, it will be more beneficial to do longer sessions and less frequently in a week to help with our weight loss journey.  I would recommend 60-minute sessions 4 times a week   Strength training 2 times a week for good bone health    Nutritional intake (diet):  Remember, it is not about finding a diet to lose weight quickly, rather adjusting your lifestyle for healthy living long-term.   When we build good habits, it does not become difficult to maintain it.  Focus on a low-carb diet.  The best diet is Mediterranean diet, which is a low-carb diet.  There are good carbs and bad carbs, minimize the bad carbs.    Bad carbs: Refined carbohydrates or simple carbohydrates that have been processed and stripped of their natural fiber and nutrients.          These carbohydrates can lead to rapid spikes in blood sugar levels and are often associated with low nutritional value. The big 4: Bread, Rice, Pasta, Potatoes  Refined grains-white bread, white rice, pasta made from refined flour.  Sugary foods and beverages: Candy, pastries, sugary cereals, soda, and other sugary drinks.  Processed snacks: Chips, cookies, sugary granola bars  Sweetened breakfast cereals: Cereals with added sugars.  Sweets and desserts: Cakes, ice cream, pies  Good carbs: These are referred to complex carbohydrates that are unprocessed or minimally processed, providing more nutrients.  Here examples of good carbs:  Whole grains: Brown rice, quinoa, oats, whole-wheat products   Legumes: Lentils, chickpeas, black beans, kidney beans  Starchy vegetables: Sweet potatoes, butternut squash  Whole fruits: Berries, apples, oranges,  bananas  Vegetables: Broccoli, spinach, kale, [place] sprouts  Nuts and seeds: Almonds, walnuts, Kwan seeds, flaxseeds.    Focus on eating whole foods.  What are whole foods?  Whole Foods refer to natural, unprocessed, or minimally processed foods that are as close to the original form as possible.  These foods are in their natural state and have undergone little to no refined or alteration.  Whole Foods are valued for their nutrient density, providing essential vitamins, minerals, fiber, and other beneficial compounds.  Examples of whole foods include:  Fruits and vegetables without added preservatives or processing.    Whole grains-unrefined grains like brown rice, quinoa, and whole-wheat, which retain their brain, germ, and endosperm.  Legumes-beans, lentils, and peas in their national unprocessed date.  Nuts and seeds-on roasted, unsalted nuts and seeds without added oils or seasonings.  Meat and fish-fresh, unprocessed meats and fish without additives or preservatives.  Dairy-unprocessed dairy products such as milk, yogurt, and cheese without added sugars or artificial ingredients.  Eggs-eggs in their natural form without additives.    Protein requirement  Calculate your protein requirement in grams by multiplying your weight in kilograms by the recommended protein intake per kilogram.  This gives you an estimate of your daily protein requirement.  Age 15-18: 0.9 grams of protein per kilogram of body weight from male gender, 0.9 g of protein per kilogram of body weight for female gender.  Age 19+: 0.8 g of protein per kilogram of body weight for both male and female gender.  If you are physically active or trying to build muscle: 1.2-2.2 g/kg  Example: if you weigh 70 kg, to calculate your protein intake per day multiply 70 by 0.8 = 56 grams per day  To convert your weight to kilograms from pounds: Take your weight in pounds and divided by 2.2046 to get your weight to kilograms.    Sodium/salt  Compare sodium  in foods like soup, bread, frozen and packaged meals, then choose the one with lower numbers.  Most Americans should limit their sodium intake to less than 2,300 mg/day.  All -Americans, people with diabetes, high blood pressure, kidney disease, should limit their sodium intake to 1,500 mg/day.    Cooking oil  Avoid the following oil for cooking: Canola, corn, vegetable, sunflower, peanut, sesame, grapeseed, flaxseed.  Even though it states it is heart healthy, however, they are significantly processed and refined.   Would recommend instead the following cooking oil: Olive oil, coconut oil, avocado oil, ghee, butter.    Intermittent fasting:   There are several benefits of intermittent fasting including weight loss, improving insulin sensitivity, improving cardiovascular health by reducing risk factors like blood pressure, cholesterol levels, and triglycerides. It also promotes brain health, longevity, reduction in inflammatory markers, improves metabolic health, and some studies even suggest intermittent fasting to be protective against certain types of cancers.     The goal of intermittent fasting is to shutdown the insulin hormone, as we discussed, which is a hormone that negatively affects our health when it is around in high levels chronically.     Start intermittent fasting for 14 hours initially, then increase to 16 hours, with a goal to reach 18 hours.  During fasting - may drink water without any additives such as sweeteners, black coffee, tea without any additives including milk.   Eat nutritious meals 2 times a day  Avoid snacking in between meals.  If you end up snacking, snack on fruits/vegetables.  Initially it might be difficult, however, over time you will notice a positive change in your energy, mood, and weight.

## 2024-05-28 ENCOUNTER — TELEMEDICINE (OUTPATIENT)
Dept: BEHAVIORAL/MENTAL HEALTH CLINIC | Facility: CLINIC | Age: 69
End: 2024-05-28
Payer: MEDICARE

## 2024-05-28 ENCOUNTER — TELEPHONE (OUTPATIENT)
Dept: PSYCHIATRY | Facility: CLINIC | Age: 69
End: 2024-05-28

## 2024-05-28 DIAGNOSIS — Z63.4 EXPECTED BEREAVEMENT DUE TO LIFE EVENT: ICD-10-CM

## 2024-05-28 DIAGNOSIS — F43.10 PTSD (POST-TRAUMATIC STRESS DISORDER): Primary | ICD-10-CM

## 2024-05-28 DIAGNOSIS — R41.3 MEMORY DIFFICULTY: ICD-10-CM

## 2024-05-28 DIAGNOSIS — F33.2 MAJOR DEPRESSIVE DISORDER, RECURRENT SEVERE WITHOUT PSYCHOTIC FEATURES (HCC): ICD-10-CM

## 2024-05-28 PROCEDURE — 90837 PSYTX W PT 60 MINUTES: CPT | Performed by: SOCIAL WORKER

## 2024-05-28 SDOH — SOCIAL STABILITY - SOCIAL INSECURITY: DISSAPEARANCE AND DEATH OF FAMILY MEMBER: Z63.4

## 2024-05-28 NOTE — PSYCH
"Behavioral Health Psychotherapy Progress Note    Psychotherapy Provided: Individual Psychotherapy     1. PTSD (post-traumatic stress disorder)        2. 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 Memory difficulty        4. Expected bereavement due to life event            Goals addressed in session: Goal 1 and Goal 2     DATA: Galileo discussed his daughter who is going thru a divorce does not talk to Galileo or his mother. He discussed how this affects his mood and both him and his wife. He discussed his brother who is dealing with his brother who has stomach cancer. He then discussed how his wife is the only sibling who does anything for her dad who has dementia. Galileo is dealing with his own health issues and his finances which stress him. I provided support, encouragement and strategies to cope.   During this session, this clinician used the following therapeutic modalities: Client-centered Therapy, Cognitive Behavioral Therapy, Mindfulness-based Strategies, and Supportive Psychotherapy    Substance Abuse was not addressed during this session. If the client is diagnosed with a co-occurring substance use disorder, please indicate any changes in the frequency or amount of use: n/a Stage of change for addressing substance use diagnoses: No substance use/Not applicable    ASSESSMENT:  Devon Aly presents with a Anxious and Depressed mood.     his affect is anxious and depressed which is congruent, with his mood and the content of the session. The client has made progress on their goals.     Devon Aly presents with a none risk of suicide, none risk of self-harm, and none risk of harm to others.    For any risk assessment that surpasses a \"low\" rating, a safety plan must be developed.    A safety plan was indicated: no  If yes, describe in detail n/a    PLAN: Between sessions, Devon Aly will use mindfulness and CBT. At the next session, the therapist will use " Client-centered Therapy, Cognitive Behavioral Therapy, Mindfulness-based Strategies, and Supportive Psychotherapy to address issues and symptoms as they may arise. .    Behavioral Health Treatment Plan and Discharge Planning: Devon Aly is aware of and agrees to continue to work on their treatment plan. They have identified and are working toward their discharge goals. yes    Visit start and stop times:    05/28/24  Start Time: 1300  Stop Time: 1400  Total Visit Time: 60 minutes    Virtual Regular Visit    Verification of patient location:    Patient is located at Home in the following state in which I hold an active license [place]      Assessment/Plan:    Problem List Items Addressed This Visit          Behavioral Health    PTSD (post-traumatic stress disorder) - Primary    Expected bereavement due to life event       Neurology/Sleep    Memory difficulty     Other Visit Diagnoses       Major depressive disorder, recurrent severe without psychotic features (HCC)                Goals addressed in session: Goal 1 and Goal 2          Reason for visit is   Chief Complaint   Patient presents with    Virtual Regular Visit          Encounter provider Tray Garzon      Recent Visits  Date Type Provider Dept   05/24/24 Office Visit Primitivo Rios MD [facility] [place]   Showing recent visits within past 7 days and meeting all other requirements  Today's Visits  Date Type Provider Dept   05/28/24 Telemedicine Tray Garzon [facility] [place]   Showing today's visits and meeting all other requirements  Future Appointments  No visits were found meeting these conditions.  Showing future appointments within next 150 days and meeting all other requirements       The patient was identified by name and date of birth. Devon Aly was informed that this is a telemedicine visit and that the visit is being conducted throughthe Epic Embedded platform. He agrees to proceed..  My office door was  closed. No one else was in the room.  He acknowledged consent and understanding of privacy and security of the video platform. The patient has agreed to participate and understands they can discontinue the visit at any time.    Patient is aware this is a billable service.     Kyle Aly is a 68 y.o. male  .      HPI     Past Medical History:   Diagnosis Date    Chronic kidney disease     COVID-19 03/2020    Hypertension 4/02/2020    Liver disease     Pneumonia 3/32/2020    PTSD (post-traumatic stress disorder)        Past Surgical History:   Procedure Laterality Date    FL INJECTION RIGHT HIP (NON ARTHROGRAM)  11/30/2020    FL INJECTION RIGHT HIP (NON ARTHROGRAM)  8/4/2021    FL INJECTION RIGHT HIP (NON ARTHROGRAM)  12/27/2021    FL INJECTION RIGHT HIP (NON ARTHROGRAM)  4/25/2022    NO PAST SURGERIES         Current Outpatient Medications   Medication Sig Dispense Refill    albuterol (2.5 mg/3 mL) 0.083 % nebulizer solution INHALE 1 VIAL BY NEBULIZATION EVERY 6 (SIX) HOURS AS NEEDED FOR WHEEZING OR SHORTNESS OF BREATH 375 mL 2    Anoro Ellipta 62.5-25 MCG/ACT inhaler INHALE 1 PUFF DAILY 60 each 5    Cholecalciferol (Vitamin D3) 1.25 MG (93556 UT) CAPS 2 times a week for 12 weeks. 24 capsule 0    donepezil (ARICEPT) 10 mg tablet TAKE 1 TABLET BY MOUTH EVERYDAY AT BEDTIME 90 tablet 1    DULoxetine (CYMBALTA) 60 mg delayed release capsule Take 1 capsule (60 mg total) by mouth every morning 90 capsule 0    gabapentin (NEURONTIN) 100 mg capsule One po qam and 2 po q bedtime 90 capsule 2    metFORMIN (GLUCOPHAGE-XR) 500 mg 24 hr tablet Take 1 tablet (500 mg total) by mouth 2 (two) times a day with meals 180 tablet 3    Multiple Vitamins-Minerals (MULTIVITAMIN ADULT PO) Take 1 tablet by mouth daily      prazosin (MINIPRESS) 1 mg capsule Take 1 capsule (1 mg total) by mouth daily at bedtime 90 capsule 0    rOPINIRole (REQUIP) 3 mg tablet Take 1 tablet (3 mg total) by mouth daily at bedtime 90 tablet 0     sildenafil (VIAGRA) 50 MG tablet Take 1 tablet (50 mg total) by mouth daily as needed for erectile dysfunction 10 tablet 0    tiZANidine (ZANAFLEX) 2 mg tablet Take 1 tablet (2 mg total) by mouth daily at bedtime as needed for muscle spasms 30 tablet 0    Ventolin  (90 Base) MCG/ACT inhaler INHALE 2 PUFFS BY MOUTH EVERY 6 HOURS AS NEEDED FOR WHEEZING 18 g 5     No current facility-administered medications for this visit.        Allergies   Allergen Reactions    Tetracycline Rash       Review of Systems    Video Exam    There were no vitals filed for this visit.    Physical Exam n/a

## 2024-05-28 NOTE — TELEPHONE ENCOUNTER
Patient contacted the office to schedule a follow up visit with provider. Patient is now scheduled for 7/2/2024  at 11am in office.     Patient contacted the office to schedule a follow up visit with provider. Patient is now scheduled for 7/23/2024  at 11am in office.     Patient contacted the office to schedule a follow up visit with provider. Patient is now scheduled for 8/13/2024  at 1pm in office.     Patient contacted the office to schedule a follow up visit with provider. Patient is now scheduled for 9/3/2024  at 11am in office.

## 2024-05-31 DIAGNOSIS — J43.2 CENTRILOBULAR EMPHYSEMA (HCC): ICD-10-CM

## 2024-05-31 DIAGNOSIS — F43.10 PTSD (POST-TRAUMATIC STRESS DISORDER): ICD-10-CM

## 2024-05-31 DIAGNOSIS — M62.838 MUSCLE SPASM: ICD-10-CM

## 2024-05-31 DIAGNOSIS — G25.81 RESTLESS LEGS: ICD-10-CM

## 2024-05-31 DIAGNOSIS — F32.A DEPRESSION, UNSPECIFIED DEPRESSION TYPE: ICD-10-CM

## 2024-05-31 RX ORDER — UMECLIDINIUM BROMIDE AND VILANTEROL TRIFENATATE 62.5; 25 UG/1; UG/1
1 POWDER RESPIRATORY (INHALATION) DAILY
Qty: 60 EACH | Refills: 5 | Status: SHIPPED | OUTPATIENT
Start: 2024-05-31

## 2024-06-03 RX ORDER — TIZANIDINE 2 MG/1
2 TABLET ORAL
Qty: 30 TABLET | Refills: 0 | Status: SHIPPED | OUTPATIENT
Start: 2024-06-03 | End: 2024-07-03

## 2024-06-03 RX ORDER — PRAZOSIN HYDROCHLORIDE 1 MG/1
1 CAPSULE ORAL
Qty: 90 CAPSULE | Refills: 0 | Status: SHIPPED | OUTPATIENT
Start: 2024-06-03

## 2024-06-03 RX ORDER — DULOXETIN HYDROCHLORIDE 60 MG/1
60 CAPSULE, DELAYED RELEASE ORAL EVERY MORNING
Qty: 90 CAPSULE | Refills: 0 | Status: SHIPPED | OUTPATIENT
Start: 2024-06-03

## 2024-06-03 RX ORDER — ROPINIROLE 3 MG/1
3 TABLET, FILM COATED ORAL
Qty: 90 TABLET | Refills: 0 | Status: SHIPPED | OUTPATIENT
Start: 2024-06-03

## 2024-06-20 ENCOUNTER — HOSPITAL ENCOUNTER (OUTPATIENT)
Dept: RADIOLOGY | Facility: CLINIC | Age: 69
Discharge: HOME | End: 2024-06-20
Payer: MEDICARE

## 2024-06-20 VITALS
RESPIRATION RATE: 20 BRPM | DIASTOLIC BLOOD PRESSURE: 77 MMHG | SYSTOLIC BLOOD PRESSURE: 125 MMHG | OXYGEN SATURATION: 92 % | HEART RATE: 88 BPM

## 2024-06-20 DIAGNOSIS — M16.11 PRIMARY OSTEOARTHRITIS OF RIGHT HIP: ICD-10-CM

## 2024-06-20 PROCEDURE — 77002 NEEDLE LOCALIZATION BY XRAY: CPT

## 2024-06-20 PROCEDURE — 77002 NEEDLE LOCALIZATION BY XRAY: CPT | Performed by: STUDENT IN AN ORGANIZED HEALTH CARE EDUCATION/TRAINING PROGRAM

## 2024-06-20 PROCEDURE — 20610 DRAIN/INJ JOINT/BURSA W/O US: CPT | Performed by: STUDENT IN AN ORGANIZED HEALTH CARE EDUCATION/TRAINING PROGRAM

## 2024-06-20 RX ORDER — ROPIVACAINE HYDROCHLORIDE 2 MG/ML
4 INJECTION, SOLUTION EPIDURAL; INFILTRATION; PERINEURAL ONCE
Status: COMPLETED | OUTPATIENT
Start: 2024-06-20 | End: 2024-06-20

## 2024-06-20 RX ORDER — METHYLPREDNISOLONE ACETATE 80 MG/ML
80 INJECTION, SUSPENSION INTRA-ARTICULAR; INTRALESIONAL; INTRAMUSCULAR; PARENTERAL; SOFT TISSUE ONCE
Status: COMPLETED | OUTPATIENT
Start: 2024-06-20 | End: 2024-06-20

## 2024-06-20 RX ADMIN — METHYLPREDNISOLONE ACETATE 80 MG: 80 INJECTION, SUSPENSION INTRA-ARTICULAR; INTRALESIONAL; INTRAMUSCULAR; PARENTERAL; SOFT TISSUE at 10:14

## 2024-06-20 RX ADMIN — IOHEXOL 1 ML: 300 INJECTION, SOLUTION INTRAVENOUS at 10:14

## 2024-06-20 RX ADMIN — ROPIVACAINE HYDROCHLORIDE 4 ML: 2 INJECTION, SOLUTION EPIDURAL; INFILTRATION; PERINEURAL at 10:14

## 2024-06-20 NOTE — DISCHARGE INSTR - LAB

## 2024-06-20 NOTE — H&P
History of Present Illness: The patient is a 68 y.o. male who presents with complaints of right hip pain    Past Medical History:   Diagnosis Date    Chronic kidney disease     COVID-19 03/2020    Hypertension 4/02/2020    Liver disease     Pneumonia 3/32/2020    PTSD (post-traumatic stress disorder)        Past Surgical History:   Procedure Laterality Date    FL INJECTION RIGHT HIP (NON ARTHROGRAM)  11/30/2020    FL INJECTION RIGHT HIP (NON ARTHROGRAM)  8/4/2021    FL INJECTION RIGHT HIP (NON ARTHROGRAM)  12/27/2021    FL INJECTION RIGHT HIP (NON ARTHROGRAM)  4/25/2022    NO PAST SURGERIES           Current Outpatient Medications:     albuterol (2.5 mg/3 mL) 0.083 % nebulizer solution, INHALE 1 VIAL BY NEBULIZATION EVERY 6 (SIX) HOURS AS NEEDED FOR WHEEZING OR SHORTNESS OF BREATH, Disp: 375 mL, Rfl: 2    Anoro Ellipta 62.5-25 MCG/ACT inhaler, INHALE 1 PUFF DAILY, Disp: 60 each, Rfl: 5    Cholecalciferol (Vitamin D3) 1.25 MG (99925 UT) CAPS, 2 times a week for 12 weeks., Disp: 24 capsule, Rfl: 0    donepezil (ARICEPT) 10 mg tablet, TAKE 1 TABLET BY MOUTH EVERYDAY AT BEDTIME, Disp: 90 tablet, Rfl: 1    DULoxetine (CYMBALTA) 60 mg delayed release capsule, TAKE 1 CAPSULE BY MOUTH EVERY MORNING, Disp: 90 capsule, Rfl: 0    gabapentin (NEURONTIN) 100 mg capsule, One po qam and 2 po q bedtime, Disp: 90 capsule, Rfl: 2    metFORMIN (GLUCOPHAGE-XR) 500 mg 24 hr tablet, Take 1 tablet (500 mg total) by mouth 2 (two) times a day with meals, Disp: 180 tablet, Rfl: 3    Multiple Vitamins-Minerals (MULTIVITAMIN ADULT PO), Take 1 tablet by mouth daily, Disp: , Rfl:     prazosin (MINIPRESS) 1 mg capsule, TAKE 1 CAPSULE (1 MG TOTAL) BY MOUTH DAILY AT BEDTIME, Disp: 90 capsule, Rfl: 0    rOPINIRole (REQUIP) 3 mg tablet, TAKE 1 TABLET (3 MG TOTAL) BY MOUTH DAILY AT BEDTIME, Disp: 90 tablet, Rfl: 0    sildenafil (VIAGRA) 50 MG tablet, Take 1 tablet (50 mg total) by mouth daily as needed for erectile dysfunction, Disp: 10 tablet, Rfl:  0    tiZANidine (ZANAFLEX) 2 mg tablet, TAKE 1 TABLET (2 MG TOTAL) BY MOUTH DAILY AT BEDTIME AS NEEDED FOR MUSCLE SPASMS, Disp: 30 tablet, Rfl: 0    Ventolin  (90 Base) MCG/ACT inhaler, INHALE 2 PUFFS BY MOUTH EVERY 6 HOURS AS NEEDED FOR WHEEZING, Disp: 18 g, Rfl: 5    Allergies   Allergen Reactions    Tetracycline Rash       Physical Exam:   Vitals:    06/20/24 0958   BP: 130/70   Pulse: 86   Resp: 20   SpO2: 92%     General: Awake, Alert, Oriented x 3, Mood and affect appropriate  Respiratory: Respirations even and unlabored  Cardiovascular: Peripheral pulses intact; no edema  Musculoskeletal Exam: pain with right hip flexion    ASA Score: 3    Patient/Chart Verification  Patient ID Verified: Verbal  ID Band Applied: No  Consents Confirmed: To be obtained in the Pre-Procedure area  H&P( within 30 days) Verified: To be obtained in the Pre-Procedure area  Interval H&P(within 24 hr) Complete (required for Outpatients and Surgery Admit only): To be obtained in the Pre-Procedure area  Allergies Reviewed: Yes  Anticoag/NSAID held?: NA  Currently on antibiotics?: No  Pregnancy denied?: NA    Assessment:   1. Primary osteoarthritis of right hip        Plan: FL guided right hip intra-articular CSI

## 2024-06-26 ENCOUNTER — HOSPITAL ENCOUNTER (OUTPATIENT)
Dept: CT IMAGING | Facility: HOSPITAL | Age: 69
Discharge: HOME | End: 2024-06-26
Attending: INTERNAL MEDICINE
Payer: OTHER MISCELLANEOUS

## 2024-06-26 ENCOUNTER — OFFICE VISIT (OUTPATIENT)
Dept: BARIATRICS | Facility: CLINIC | Age: 69
End: 2024-06-26
Payer: MEDICARE

## 2024-06-26 VITALS
SYSTOLIC BLOOD PRESSURE: 124 MMHG | WEIGHT: 275 LBS | BODY MASS INDEX: 41.68 KG/M2 | HEART RATE: 100 BPM | RESPIRATION RATE: 14 BRPM | DIASTOLIC BLOOD PRESSURE: 78 MMHG | HEIGHT: 68 IN

## 2024-06-26 DIAGNOSIS — F32.A DEPRESSION: ICD-10-CM

## 2024-06-26 DIAGNOSIS — N18.31 STAGE 3A CHRONIC KIDNEY DISEASE (HCC): ICD-10-CM

## 2024-06-26 DIAGNOSIS — M16.11 PRIMARY OSTEOARTHRITIS OF RIGHT HIP: ICD-10-CM

## 2024-06-26 DIAGNOSIS — F17.211 CIGARETTE NICOTINE DEPENDENCE IN REMISSION: ICD-10-CM

## 2024-06-26 DIAGNOSIS — E66.01 MORBID OBESITY (HCC): ICD-10-CM

## 2024-06-26 DIAGNOSIS — R73.03 PREDIABETES: Primary | ICD-10-CM

## 2024-06-26 DIAGNOSIS — I45.10 RIGHT BUNDLE BRANCH BLOCK (RBBB) ON ELECTROCARDIOGRAM (ECG): ICD-10-CM

## 2024-06-26 DIAGNOSIS — E66.01 OBESITY, CLASS III, BMI 40-49.9 (MORBID OBESITY) (HCC): ICD-10-CM

## 2024-06-26 DIAGNOSIS — M16.12 PRIMARY OSTEOARTHRITIS OF ONE HIP, LEFT: ICD-10-CM

## 2024-06-26 DIAGNOSIS — G47.33 OSA (OBSTRUCTIVE SLEEP APNEA): ICD-10-CM

## 2024-06-26 PROCEDURE — 99203 OFFICE O/P NEW LOW 30 MIN: CPT | Performed by: FAMILY MEDICINE

## 2024-06-26 PROCEDURE — 71271 CT THORAX LUNG CANCER SCR C-: CPT

## 2024-06-26 RX ORDER — NALTREXONE HYDROCHLORIDE 50 MG/1
TABLET, FILM COATED ORAL
Qty: 30 TABLET | Refills: 1 | Status: SHIPPED | OUTPATIENT
Start: 2024-06-26

## 2024-06-26 RX ORDER — BUPROPION HYDROCHLORIDE 150 MG/1
150 TABLET ORAL DAILY
Qty: 30 TABLET | Refills: 1 | Status: SHIPPED | OUTPATIENT
Start: 2024-06-26

## 2024-06-26 NOTE — PROGRESS NOTES
Assessment/Plan:  Devon was seen today for consult.    Diagnoses and all orders for this visit:    Prediabetes    Morbid obesity (HCC)  -     Ambulatory Referral to Weight Management  -     naltrexone (REVIA) 50 mg tablet; Take by mouth half tab daily for 7 days and increase to half tab twice daily . Take together with Bupropion.    Primary osteoarthritis of right hip  -     Ambulatory Referral to Weight Management    Primary osteoarthritis of one hip, left  -     Ambulatory Referral to Weight Management    Stage 3a chronic kidney disease (HCC)    Obesity, Class III, BMI 40-49.9 (morbid obesity) (HCC)  -     naltrexone (REVIA) 50 mg tablet; Take by mouth half tab daily for 7 days and increase to half tab twice daily . Take together with Bupropion.    Right bundle branch block (RBBB) on electrocardiogram (ECG)    ANTHONY (obstructive sleep apnea)    Depression  -     buPROPion (Wellbutrin XL) 150 mg 24 hr tablet; Take 1 tablet (150 mg total) by mouth daily         Obesity:   Weight not at goal   - Discussed options of [facility]-Intensive Lifestyle Intervention Program, Conservative Program, Darren-En-Y Gastric Bypass, and Vertical Sleeve Gastrectomy and the role of weight loss medications.  - Patient is interested in pursuing Conservative Program  - Initial weight loss goal of 5-10% weight loss for improved health  - Weight loss can improve patient's co-morbid conditions and/or prevent weight-related complications.  Motivational interview performed and patient noted changes to work on until next visit  Handouts provided:  Bariatric surgery information  Calorie goal handouts provided    Hydration: 64oz fluid, no sugary drinks  Goal 3 meals per day  Food log encouraged , phone conchita or paper journal  Increase physical activity by 10 minutes daily.   Return in     Subjective:   Chief Complaint   Patient presents with    Consult       Patient ID: Devon Aly  is a 68 y.o. male with excess weight/obesity here to pursue  weight management.  Previous notes and records have been reviewed.        HPI  Wt Readings from Last 20 Encounters:   06/26/24 125 kg (275 lb)   05/24/24 125 kg (275 lb)   04/22/24 122 kg (270 lb)   03/05/24 125 kg (275 lb)   02/27/24 125 kg (275 lb)   02/20/24 126 kg (277 lb 6.4 oz)   02/08/24 123 kg (271 lb)   01/29/24 125 kg (275 lb)   01/15/24 125 kg (275 lb)   12/04/23 125 kg (274 lb 9.6 oz)   11/09/23 120 kg (264 lb 12.8 oz)   10/30/23 119 kg (262 lb 12.8 oz)   08/22/23 121 kg (266 lb 4.8 oz)   08/08/23 121 kg (266 lb 3.2 oz)   08/01/23 115 kg (254 lb)   05/23/23 115 kg (254 lb 9.6 oz)   05/09/23 118 kg (260 lb)   04/26/23 117 kg (258 lb 12.8 oz)   03/28/23 115 kg (254 lb 6.4 oz)   01/30/23 113 kg (250 lb)   Initial 275lbs  Needs hip surgery and weight loss is necessary  Cannot walk a lot  Eats 3 mels a day but not always  Hydration:water soda  Alcohol: no  Smoking:no  STOP bang:cannot use a CPAP due to finances    Past Medical History:   Diagnosis Date    Chronic kidney disease     COVID-19 03/2020    Hypertension 4/02/2020    Liver disease     Pneumonia 3/32/2020    PTSD (post-traumatic stress disorder)      Past Surgical History:   Procedure Laterality Date    FL INJECTION RIGHT HIP (NON ARTHROGRAM)  11/30/2020    FL INJECTION RIGHT HIP (NON ARTHROGRAM)  8/4/2021    FL INJECTION RIGHT HIP (NON ARTHROGRAM)  12/27/2021    FL INJECTION RIGHT HIP (NON ARTHROGRAM)  4/25/2022    NO PAST SURGERIES       The following portions of the patient's history were reviewed and updated as appropriate: allergies, current medications, past family history, past medical history, past social history, past surgical history, and problem list.    ROS:  Review of Systems   Constitutional: Negative for activity change. Fatigue  HENT: Negative for trouble swallowing.    Respiratory: Negative for shortness of breath.    Cardiovascular: Negative for chest pain, edema  Gastrointestinal: Negative for abdominal pain, nausea and vomiting,  "acid reflux, constipation/diarrhea  Endocrine: negative for heat /cold intolerance  Psychiatric/Behavioral: Negative for behavioral problems including anxiety /depression  Objective:  /78 (BP Location: Left arm, Patient Position: Sitting, Cuff Size: Standard)   Pulse 100   Resp 14   Ht 5' 8\" (1.727 m)   Wt 125 kg (275 lb)   BMI 41.81 kg/m²   Constitutional: Well-developed, well-nourished and Obese Body mass index is 41.81 kg/m².. Alert, cooperative.  HEENT: No conjunctival pallor or jaundice  Pulmonary: No increased work of breathing or signs of respiratory distress.  CV: Well-perfused, Normal rate    Vascular: no peripheral edema  GI: Abdomen obese, Non-distended  MSK: no sarcopenia noted   Neuro: Oriented to person, place and time. Normal Speech  Psych: Normal affect and mood. Normal thought process, no delusions    Labs and Imaging  Recent labs and imaging have been personally reviewed.  Lab Results   Component Value Date    WBC 10.15 10/27/2023    HGB 16.8 10/27/2023    HCT 51.3 (H) 10/27/2023    MCV 88 10/27/2023     10/27/2023     Lab Results   Component Value Date    SODIUM 139 03/14/2024    K 4.1 03/14/2024     03/14/2024    CO2 30 03/14/2024    AGAP 4 03/14/2024    BUN 18 03/14/2024    CREATININE 1.28 03/14/2024    GLUC 103 (H) 03/14/2024    GLUF 92 10/27/2023    CALCIUM 8.3 (L) 03/14/2024    AST 32 03/10/2024    ALT 39 03/10/2024    ALKPHOS 78 03/10/2024    TP 6.4 03/10/2024    TBILI 0.4 03/10/2024    EGFR 62 03/14/2024     Lab Results   Component Value Date    HGBA1C 5.8 05/24/2024     Lab Results   Component Value Date    ZGG1BEBUJVWL 1.890 04/13/2022     Lab Results   Component Value Date    CHOLESTEROL 158 04/13/2022     Lab Results   Component Value Date    HDL 37 (L) 04/13/2022     Lab Results   Component Value Date    TRIG 120 04/13/2022     Lab Results   Component Value Date    LDLCALC 97 04/13/2022      "

## 2024-06-26 NOTE — PATIENT INSTRUCTIONS
Please see side effects  Wellbutrin: Most common side effects discussed with patient : dizziness, constipation, insomnia, vivid dreams, increased blood pressure, heart rate, depression/anxiety, headache, fatigue, glaucoma. Patient should call/return if he/she develops symptoms of depression/anxiety and stop the medication. Patient advised to call ER for an evaluation if thoughts of harming self/others occur. Suicidal & Crisis Lifeline at  [phone]   Patient denies Hx of seizures, MI, glaucoma , arrhythmia, homicidal /suicidal ideation .      Naltrexone: - Side effects of naltrexone discussed: nausea, vomiting, diarrhea, constipation, headache, anxiety, and confusion. Advised not to consume alcohol with naltrexone. Must be discontinued prior to surgery. Advised Naltrexone blocks the pain medicine receptors and if patient ever needs opioids then the medicine needs to be stopped.

## 2024-06-29 DIAGNOSIS — M62.838 MUSCLE SPASM: ICD-10-CM

## 2024-07-01 ENCOUNTER — TELEMEDICINE (OUTPATIENT)
Dept: PSYCHIATRY | Facility: CLINIC | Age: 69
End: 2024-07-01
Payer: MEDICARE

## 2024-07-01 DIAGNOSIS — F33.9 DEPRESSION, RECURRENT (HCC): Primary | ICD-10-CM

## 2024-07-01 DIAGNOSIS — F43.10 PTSD (POST-TRAUMATIC STRESS DISORDER): ICD-10-CM

## 2024-07-01 PROCEDURE — 99214 OFFICE O/P EST MOD 30 MIN: CPT | Performed by: PHYSICIAN ASSISTANT

## 2024-07-01 RX ORDER — GABAPENTIN 100 MG/1
CAPSULE ORAL
Qty: 90 CAPSULE | Refills: 2 | Status: SHIPPED | OUTPATIENT
Start: 2024-07-01

## 2024-07-01 RX ORDER — TIZANIDINE 2 MG/1
2 TABLET ORAL
Qty: 30 TABLET | Refills: 0 | Status: SHIPPED | OUTPATIENT
Start: 2024-07-01 | End: 2024-07-31

## 2024-07-01 NOTE — PSYCH
"  Virtual Regular Visit    Verification of patient location:    Patient is located at Home in the following state in which I hold an active license [place]         Reason for visit is   Chief Complaint   Patient presents with    Virtual Regular Visit          Encounter provider Abril Jones PA-C      Recent Visits  No visits were found meeting these conditions.  Showing recent visits within past 7 days and meeting all other requirements  Today's Visits  Date Type Provider Dept   07/01/24 Telemedicine Abril Jones PA-C  [facility]   Showing today's visits and meeting all other requirements  Future Appointments  No visits were found meeting these conditions.  Showing future appointments within next 150 days and meeting all other requirements       The patient was identified by name and date of birth. Devon Aly was informed that this is a telemedicine visit and that the visit is being conducted throughthe Epic Embedded platform. He agrees to proceed..  My office door was closed. No one else was in the room.  He acknowledged consent and understanding of privacy and security of the video platform. The patient has agreed to participate and understands they can discontinue the visit at any time.    Patient is aware this is a billable service.     MEDICATION MANAGEMENT NOTE        [facility]   [facility]  [address] [facility] [place] [id number] [phone]        Name and Date of Birth:  Devon Aly 68 y.o. 1955    Date of Visit: July 1, 2024    SUBJECTIVE:     Devon last seen by Jose Luis 4/30 at which time meds unchanged. Devon seen by bariatrics last week at which time naltrexone 50 mg and wellbutrin xl 150 mg/d started. Devon reports \"everything falling down on me\".  Continues to experience ongoing health and financial stressors since covid 19 infection requiring " ventilator in 2020 and inability to work.  States he is uptight, worries a lot and easily agitated.  Says he is easily short of breath with exertion,demario in hot,humid, steamy environments, like outside or in the hot shower.  Continues to fear falling and stays inside when it rains.  Continues to struggle with forgetfulness and searches for words.  Sees neuro on 7/31.  Neuropsych testing ordered but Devon says he is unable to afford this as well as other necessary health-sustaining things like CPAP and physical therapy.  Is finding ind therapy with Tray Garzon very beneficial and describes less family relationship stressors.  Nightmares have been minimal.      Review of Systems   Constitutional:  Positive for activity change.        Ongoing   Respiratory:  Positive for shortness of breath.         Chronic     Psychiatric History     This office since 5/7/20.  No IP or suicide attempts.  Past med trial- zoloft.      Trauma/Loss History       ARDS secondary to covid- intubated x 10 days, April 2020. Lost brother and coworkers to covid.  Has been too ill to return to work.      Social History        Lives with wife Kailyn -  >40 yrs. 2 daugthers and a son.  Worked at [employer] x 37 yrs -.  Hobby: birding      OBJECTIVE:     MENTAL STATUS EXAM  Appearance:  age appropriate, dressed casually   Behavior:  Pleasant & cooperative   Speech:  Normal volume, regular rate and rhythm   Mood:  Mildly irritable, low   Affect:  mood congruent   Language: intact and appropriate for age, education, and intellect   Thought Process:  goal directed   Associations: intact associations   Thought Content:  negative ruminations   Perceptual Disturbances: no auditory or visual hallcunations   Risk Potential / Abnormal Thoughts: Suicidal ideation - None  Homicidal ideation - None  Potential for aggression - No       Consciousness:  Alert & Awake   Sensorium:  Grossly oriented   Attention: attention span and  concentration are age appropriate       Fund of Knowledge:  Memory: awareness of current events: yes  recent and remote memory grossly intact   Insight:  good   Judgment: good     Lab Review: I have reviewed all pertinent labs  Lab Results   Component Value Date    HGBA1C 5.8 05/24/2024         Lab Results   Component Value Date    SODIUM 139 03/14/2024    K 4.1 03/14/2024     03/14/2024    CO2 30 03/14/2024    AGAP 4 03/14/2024    BUN 18 03/14/2024    CREATININE 1.28 03/14/2024    GLUC 103 (H) 03/14/2024    GLUF 92 10/27/2023    CALCIUM 8.3 (L) 03/14/2024    AST 32 03/10/2024    ALT 39 03/10/2024    ALKPHOS 78 03/10/2024    TP 6.4 03/10/2024    TBILI 0.4 03/10/2024    EGFR 62 03/14/2024     Lab Results   Component Value Date    WBC 10.15 10/27/2023    HGB 16.8 10/27/2023    HCT 51.3 (H) 10/27/2023    MCV 88 10/27/2023     10/27/2023               ASSESSMENT & PLAN          Diagnoses and all orders for this visit:    Depression, recurrent (HCC)    PTSD (post-traumatic stress disorder)  -     gabapentin (NEURONTIN) 100 mg capsule; One po qam and 2 po q bedtime      Current Outpatient Medications   Medication Sig Dispense Refill    gabapentin (NEURONTIN) 100 mg capsule One po qam and 2 po q bedtime 90 capsule 2    albuterol (2.5 mg/3 mL) 0.083 % nebulizer solution INHALE 1 VIAL BY NEBULIZATION EVERY 6 (SIX) HOURS AS NEEDED FOR WHEEZING OR SHORTNESS OF BREATH 375 mL 2    Anoro Ellipta 62.5-25 MCG/ACT inhaler INHALE 1 PUFF DAILY 60 each 5    buPROPion (Wellbutrin XL) 150 mg 24 hr tablet Take 1 tablet (150 mg total) by mouth daily 30 tablet 1    Cholecalciferol (Vitamin D3) 1.25 MG (36365 UT) CAPS 2 times a week for 12 weeks. 24 capsule 0    donepezil (ARICEPT) 10 mg tablet TAKE 1 TABLET BY MOUTH EVERYDAY AT BEDTIME 90 tablet 1    DULoxetine (CYMBALTA) 60 mg delayed release capsule TAKE 1 CAPSULE BY MOUTH EVERY MORNING 90 capsule 0    metFORMIN (GLUCOPHAGE-XR) 500 mg 24 hr tablet Take 1 tablet (500 mg total)  by mouth 2 (two) times a day with meals 180 tablet 3    Multiple Vitamins-Minerals (MULTIVITAMIN ADULT PO) Take 1 tablet by mouth daily      naltrexone (REVIA) 50 mg tablet Take by mouth half tab daily for 7 days and increase to half tab twice daily . Take together with Bupropion. 30 tablet 1    prazosin (MINIPRESS) 1 mg capsule TAKE 1 CAPSULE (1 MG TOTAL) BY MOUTH DAILY AT BEDTIME 90 capsule 0    rOPINIRole (REQUIP) 3 mg tablet TAKE 1 TABLET (3 MG TOTAL) BY MOUTH DAILY AT BEDTIME 90 tablet 0    sildenafil (VIAGRA) 50 MG tablet Take 1 tablet (50 mg total) by mouth daily as needed for erectile dysfunction 10 tablet 0    tiZANidine (ZANAFLEX) 2 mg tablet TAKE 1 TABLET (2 MG TOTAL) BY MOUTH DAILY AT BEDTIME AS NEEDED FOR MUSCLE SPASMS 30 tablet 0    Ventolin  (90 Base) MCG/ACT inhaler INHALE 2 PUFFS BY MOUTH EVERY 6 HOURS AS NEEDED FOR WHEEZING 18 g 5     No current facility-administered medications for this visit.                Plan:            Experiencing ongoing stress with health and financial obstacles.  Benefiting from ind therapy.  Sees neuro end of the month.  Started on revia and wellbutrin xl by bariatrics.  Will cont cymbalta 60 mg/d, requip 3 mg q bedtime, neurontin 100/200 mg and prazosin 1 mg q bedtime.  Reviewed risks, benefits, side effects of medications, including no medication.  Patient understands and agrees to treatment plan.   F/u Jose Luis 9/3/24, 2:30, sooner prn       Patient has been informed of 24 hours and weekend coverage for urgent situations accessed by calling the main clinic phone number.     Abril Jones PA-C

## 2024-07-02 ENCOUNTER — TELEMEDICINE (OUTPATIENT)
Dept: BEHAVIORAL/MENTAL HEALTH CLINIC | Facility: CLINIC | Age: 69
End: 2024-07-02
Payer: MEDICARE

## 2024-07-02 ENCOUNTER — TELEPHONE (OUTPATIENT)
Dept: PSYCHIATRY | Facility: CLINIC | Age: 69
End: 2024-07-02

## 2024-07-02 DIAGNOSIS — Z63.4 EXPECTED BEREAVEMENT DUE TO LIFE EVENT: ICD-10-CM

## 2024-07-02 DIAGNOSIS — F33.2 MAJOR DEPRESSIVE DISORDER, RECURRENT SEVERE WITHOUT PSYCHOTIC FEATURES (HCC): ICD-10-CM

## 2024-07-02 DIAGNOSIS — R41.3 MEMORY DIFFICULTY: ICD-10-CM

## 2024-07-02 DIAGNOSIS — F43.10 PTSD (POST-TRAUMATIC STRESS DISORDER): Primary | ICD-10-CM

## 2024-07-02 PROCEDURE — 90837 PSYTX W PT 60 MINUTES: CPT | Performed by: SOCIAL WORKER

## 2024-07-02 SDOH — SOCIAL STABILITY - SOCIAL INSECURITY: DISSAPEARANCE AND DEATH OF FAMILY MEMBER: Z63.4

## 2024-07-02 NOTE — TELEPHONE ENCOUNTER
Pt called to get his appt for 7/2/24 at 11am switched to a virtual visit due to patient's car will not start.  Writer switched appt.

## 2024-07-02 NOTE — PSYCH
Behavioral Health Psychotherapy Progress Note    Psychotherapy Provided: Individual Psychotherapy     1. PTSD (post-traumatic stress disorder)        2. 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 Memory difficulty        4. Expected bereavement due to life event            Goals addressed in session: Goal 1 and Goal 2     DATA: Galileo is still dealing legally with his employer concerning being exposed to covid at the hospital and him now being disabled. He then spent the rest of the session sharing he found out that the son in law he idolized and where he thought his own daughter was the cause of the marital breakup in actuality the entire opposite was true. The son in law was the issue and the son in law had his wife Galileo's daughter convinced they were against her and hence why she retreated. Galileo is learning from his daughter the terrible things the son in law is doing. However Galileo is less depressed and anxious and less angry and frustrated knowing that his daughter was not the cause of the problems. Galileo is sad about his brother's worsening cancer and his father in law is dying. I provided support and strategies to cope. We discussed he is re bonded with his daughter which he is happy about. He shared the skills I have provided him have been very helpful to him.   During this session, this clinician used the following therapeutic modalities: Client-centered Therapy, Cognitive Behavioral Therapy, Mindfulness-based Strategies, and Supportive Psychotherapy    Substance Abuse was not addressed during this session. If the client is diagnosed with a co-occurring substance use disorder, please indicate any changes in the frequency or amount of use: n/a. Stage of change for addressing substance use diagnoses: No substance use/Not applicable    ASSESSMENT:  Devon Aly presents with a Euthymic/ normal mood.     his affect is Normal range and intensity, which is congruent,  "with his mood and the content of the session. The client has made progress on their goals.     Devon Aly presents with a none risk of suicide, none risk of self-harm, and none risk of harm to others.    For any risk assessment that surpasses a \"low\" rating, a safety plan must be developed.    A safety plan was indicated: no  If yes, describe in detail n/a    PLAN: Between sessions, Devon Aly will use mindfulness and cbt. At the next session, the therapist will use Client-centered Therapy, Cognitive Behavioral Therapy, Mindfulness-based Strategies, and Supportive Psychotherapy to address ISSUES AND SYMPTOMS AS THEY MAY ARISE. .    Behavioral Health Treatment Plan and Discharge Planning: Devon Aly is aware of and agrees to continue to work on their treatment plan. They have identified and are working toward their discharge goals. yes    Visit start and stop times:    07/02/24  Start Time: 1100  Stop Time: 1200  Total Visit Time: 60 minutes    Virtual Regular Visit    Verification of patient location:    Patient is located at Home in the following state in which I hold an active license [place]      Assessment/Plan:    Problem List Items Addressed This Visit          Behavioral Health    PTSD (post-traumatic stress disorder) - Primary    Expected bereavement due to life event       Neurology/Sleep    Memory difficulty     Other Visit Diagnoses       Major depressive disorder, recurrent severe without psychotic features (HCC)                Goals addressed in session: Goal 1 and Goal 2          Reason for visit is   Chief Complaint   Patient presents with    Virtual Regular Visit          Encounter provider Tray Garzon      Recent Visits  No visits were found meeting these conditions.  Showing recent visits within past 7 days and meeting all other requirements  Today's Visits  Date Type Provider Dept   07/02/24 Telemedicine [facility] [place]   Showing today's visits " and meeting all other requirements  Future Appointments  No visits were found meeting these conditions.  Showing future appointments within next 150 days and meeting all other requirements       The patient was identified by name and date of birth. Devon Aly was informed that this is a telemedicine visit and that the visit is being conducted throughthe Epic Embedded platform. He agrees to proceed..  My office door was closed. No one else was in the room.  He acknowledged consent and understanding of privacy and security of the video platform. The patient has agreed to participate and understands they can discontinue the visit at any time.    Patient is aware this is a billable service.     Subjective  Devon Aly is a 68 y.o. male  .      HPI     Past Medical History:   Diagnosis Date    Chronic kidney disease     COVID-19 03/2020    Hypertension 4/02/2020    Liver disease     Pneumonia 3/32/2020    PTSD (post-traumatic stress disorder)        Past Surgical History:   Procedure Laterality Date    FL INJECTION RIGHT HIP (NON ARTHROGRAM)  11/30/2020    FL INJECTION RIGHT HIP (NON ARTHROGRAM)  8/4/2021    FL INJECTION RIGHT HIP (NON ARTHROGRAM)  12/27/2021    FL INJECTION RIGHT HIP (NON ARTHROGRAM)  4/25/2022    NO PAST SURGERIES         Current Outpatient Medications   Medication Sig Dispense Refill    albuterol (2.5 mg/3 mL) 0.083 % nebulizer solution INHALE 1 VIAL BY NEBULIZATION EVERY 6 (SIX) HOURS AS NEEDED FOR WHEEZING OR SHORTNESS OF BREATH 375 mL 2    Anoro Ellipta 62.5-25 MCG/ACT inhaler INHALE 1 PUFF DAILY 60 each 5    buPROPion (Wellbutrin XL) 150 mg 24 hr tablet Take 1 tablet (150 mg total) by mouth daily 30 tablet 1    Cholecalciferol (Vitamin D3) 1.25 MG (84110 UT) CAPS 2 times a week for 12 weeks. 24 capsule 0    donepezil (ARICEPT) 10 mg tablet TAKE 1 TABLET BY MOUTH EVERYDAY AT BEDTIME 90 tablet 1    DULoxetine (CYMBALTA) 60 mg delayed release capsule TAKE 1 CAPSULE BY MOUTH EVERY MORNING 90  capsule 0    gabapentin (NEURONTIN) 100 mg capsule One po qam and 2 po q bedtime 90 capsule 2    metFORMIN (GLUCOPHAGE-XR) 500 mg 24 hr tablet Take 1 tablet (500 mg total) by mouth 2 (two) times a day with meals 180 tablet 3    Multiple Vitamins-Minerals (MULTIVITAMIN ADULT PO) Take 1 tablet by mouth daily      naltrexone (REVIA) 50 mg tablet Take by mouth half tab daily for 7 days and increase to half tab twice daily . Take together with Bupropion. 30 tablet 1    prazosin (MINIPRESS) 1 mg capsule TAKE 1 CAPSULE (1 MG TOTAL) BY MOUTH DAILY AT BEDTIME 90 capsule 0    rOPINIRole (REQUIP) 3 mg tablet TAKE 1 TABLET (3 MG TOTAL) BY MOUTH DAILY AT BEDTIME 90 tablet 0    sildenafil (VIAGRA) 50 MG tablet Take 1 tablet (50 mg total) by mouth daily as needed for erectile dysfunction 10 tablet 0    tiZANidine (ZANAFLEX) 2 mg tablet TAKE 1 TABLET (2 MG TOTAL) BY MOUTH DAILY AT BEDTIME AS NEEDED FOR MUSCLE SPASMS 30 tablet 0    Ventolin  (90 Base) MCG/ACT inhaler INHALE 2 PUFFS BY MOUTH EVERY 6 HOURS AS NEEDED FOR WHEEZING 18 g 5     No current facility-administered medications for this visit.        Allergies   Allergen Reactions    Tetracycline Rash       Review of Systems    Video Exam    There were no vitals filed for this visit.    Physical Exam n/a

## 2024-07-08 ENCOUNTER — OFFICE VISIT (OUTPATIENT)
Age: 69
End: 2024-07-08
Payer: OTHER MISCELLANEOUS

## 2024-07-08 VITALS
WEIGHT: 275 LBS | DIASTOLIC BLOOD PRESSURE: 82 MMHG | SYSTOLIC BLOOD PRESSURE: 129 MMHG | BODY MASS INDEX: 41.68 KG/M2 | TEMPERATURE: 98.2 F | OXYGEN SATURATION: 95 % | RESPIRATION RATE: 18 BRPM | HEART RATE: 95 BPM | HEIGHT: 68 IN

## 2024-07-08 DIAGNOSIS — E66.01 OBESITY, CLASS III, BMI 40-49.9 (MORBID OBESITY) (HCC): ICD-10-CM

## 2024-07-08 DIAGNOSIS — R06.09 DOE (DYSPNEA ON EXERTION): ICD-10-CM

## 2024-07-08 DIAGNOSIS — J43.2 CENTRILOBULAR EMPHYSEMA (HCC): Primary | ICD-10-CM

## 2024-07-08 DIAGNOSIS — G47.33 OSA (OBSTRUCTIVE SLEEP APNEA): ICD-10-CM

## 2024-07-08 DIAGNOSIS — F17.211 CIGARETTE NICOTINE DEPENDENCE IN REMISSION: ICD-10-CM

## 2024-07-08 DIAGNOSIS — J98.4 RESTRICTIVE LUNG DISEASE: ICD-10-CM

## 2024-07-08 PROCEDURE — 99214 OFFICE O/P EST MOD 30 MIN: CPT

## 2024-07-08 PROCEDURE — 94618 PULMONARY STRESS TESTING: CPT

## 2024-07-08 NOTE — ASSESSMENT & PLAN NOTE
-HST earlier this year with moderate ANTHONY with MAGNOLIA 16.9  -CPAP ordered, however patient did not start due to issues with insurance coverage.  He is switching insurances and plans on starting as soon as he obtains machine  -Check compliance at his next office visit

## 2024-07-08 NOTE — ASSESSMENT & PLAN NOTE
-Encouraged patient to continue weight loss efforts and follow-up with weight management as directed

## 2024-07-08 NOTE — ASSESSMENT & PLAN NOTE
-95-pack-year smoking history, quit approximately 4 years ago  -Recent CT lung cancer screening last month with no new or suspicious lung nodules  -Continue yearly screening due June 2025.  This can be discussed and ordered at a future visit

## 2024-07-08 NOTE — ASSESSMENT & PLAN NOTE
-Multifactorial secondary to morbid obesity, deconditioning, emphysema, and restrictive lung disease.   - Will check PFTs to evaluate for change in lung function  -Encouraged patient to increase activity to improve stamina and conditioning.  Encouraged weight loss efforts

## 2024-07-08 NOTE — ASSESSMENT & PLAN NOTE
-Last PFTs performed in 6/2022 with moderately reduced lung volumes, diffusion capacity and restriction on spirometry  -Likely secondary to his previous severe COVID-19 infection and morbid obesity  -Having more shortness of breath with exertion, which I suspect is related to his decreased activity level due to his recent leg fracture and surgery earlier this year in combination with morbid obesity  -Recent CT chest on 6/26 with no infiltrate or pleural effusion.  Mild to moderate emphysematous changes  -Walk test in the office today show patient required 2 L supplemental oxygen with activity    Plan:  -Update PFTs given his worsening shortness of breath  -Encouraged patient to increase physical activity and continue to follow with weight management for weight loss  -Continue Anoro 1 puff daily and albuterol HFA/nebs every 6 hours as needed for shortness of breath or wheezing  -2 L supplemental oxygen with activity maintain SpO2 above 88%

## 2024-07-08 NOTE — ASSESSMENT & PLAN NOTE
-Mild to moderate emphysematous changes on CT imaging  -Patient carries a 95-pack-year smoking history, but quit 4 years ago  -PFTs in 2022 with no obstruction, but with restrictive pattern  -He will continue inhaler therapy as mentioned above  -At next office visit, consider referral to pulmonary rehab if patient qualifies

## 2024-07-08 NOTE — PROGRESS NOTES
Pulmonary Follow Up Note  Devon Aly 68 y.o. male MRN: [id number]  7/8/2024    Assessment:    Restrictive lung disease  -Last PFTs performed in 6/2022 with moderately reduced lung volumes, diffusion capacity and restriction on spirometry  -Likely secondary to his previous severe COVID-19 infection and morbid obesity  -Having more shortness of breath with exertion, which I suspect is related to his decreased activity level due to his recent leg fracture and surgery earlier this year in combination with morbid obesity  -Recent CT chest on 6/26 with no infiltrate or pleural effusion.  Mild to moderate emphysematous changes  -Walk test in the office today show patient required 2 L supplemental oxygen with activity    Plan:  -Update PFTs given his worsening shortness of breath  -Encouraged patient to increase physical activity and continue to follow with weight management for weight loss  -Continue Anoro 1 puff daily and albuterol HFA/nebs every 6 hours as needed for shortness of breath or wheezing  -2 L supplemental oxygen with activity maintain SpO2 above 88%    ANTHONY (obstructive sleep apnea)  -HST earlier this year with moderate ANTHONY with MAGNOLIA 16.9  -CPAP ordered, however patient did not start due to issues with insurance coverage.  He is switching insurances and plans on starting as soon as he obtains machine  -Check compliance at his next office visit    Cigarette nicotine dependence in remission  -95-pack-year smoking history, quit approximately 4 years ago  -Recent CT lung cancer screening last month with no new or suspicious lung nodules  -Continue yearly screening due June 2025.  This can be discussed and ordered at a future visit    Obesity, Class III, BMI 40-49.9 (morbid obesity) (HCC)  -Encouraged patient to continue weight loss efforts and follow-up with weight management as directed    WALTERS (dyspnea on exertion)  -Multifactorial secondary to morbid obesity, deconditioning, emphysema, and restrictive lung  disease.   - Will check PFTs to evaluate for change in lung function  -Encouraged patient to increase activity to improve stamina and conditioning.  Encouraged weight loss efforts    Centrilobular emphysema (HCC)  -Mild to moderate emphysematous changes on CT imaging  -Patient carries a 95-pack-year smoking history, but quit 4 years ago  -PFTs in 2022 with no obstruction, but with restrictive pattern  -He will continue inhaler therapy as mentioned above  -At next office visit, consider referral to pulmonary rehab if patient qualifies      Plan:    Diagnoses and all orders for this visit:    Centrilobular emphysema (HCC)    Restrictive lung disease  -     POCT Oxygen Titration  -     Complete PFT with post bronchodilator; Future  -     Home Oxygen with Portability    Cigarette nicotine dependence in remission    ANTHONY (obstructive sleep apnea)    WALTERS (dyspnea on exertion)  -     Cancel: POCT 6 minute walk  -     POCT Oxygen Titration  -     Complete PFT with post bronchodilator; Future  -     Home Oxygen with Portability    Obesity, Class III, BMI 40-49.9 (morbid obesity) (HCC)        No follow-ups on file.      History of Present Illness     Chief Complaint:   Chief Complaint   Patient presents with    Follow-up       Patient ID: Devon is a 68 y.o. y.o. male has a past medical history of Chronic kidney disease, COVID-19 (03/2020), Hypertension (4/02/2020), Liver disease, Pneumonia (3/32/2020), and PTSD (post-traumatic stress disorder).      7/8/2024  HPI: Devon Aly is a very pleasant 68 y.o. male with a past medical history including but not limited to emphysema, former 95-pack-year smoking history quit 4 years ago, ANTHONY, COVID-19 long-hauler, restrictive lung disease, hypertension, and obesity.  He is here today for a follow-up visit.  He is accompanied by his wife. He was previously seen in the office in April shortly after he had surgery for tibia/fibula fracture repair. He was requiring 2L supplemental  oxygen since surgery. Maintained on Anoro inhaler.  He was also ordered a CPAP, had moderate ANTHONY on HST.    Patient is here walking with a cane today. Patient states his activity level has significantly declined since his tibia/fibula fracture and surgery.  Gets short of breath easily with activities.  No significant wheezing, cough, or mucus.  He is using his rescue inhaler 6-8 times per day and Anoro daily.  He is following with weight management, but has not had any weight loss thus far.  He denies any chest pain or increased lower extremity swelling.  His oxygen was discontinued by [facility].  Did not start using CPAP, as current insurance not fully covering and he is looking into alternate insurance.        Review of Systems   Constitutional:  Negative for activity change, appetite change, chills, fever and unexpected weight change.   HENT:  Negative for congestion, ear pain, postnasal drip, rhinorrhea, sneezing, sore throat and trouble swallowing.    Respiratory:  Positive for shortness of breath. Negative for cough, chest tightness and wheezing.    Cardiovascular:  Negative for chest pain, palpitations and leg swelling.   Allergic/Immunologic: Negative.        Historical Information   Past Medical History:   Diagnosis Date    Chronic kidney disease     COVID-19 03/2020    Hypertension 4/02/2020    Liver disease     Pneumonia 3/32/2020    PTSD (post-traumatic stress disorder)      Past Surgical History:   Procedure Laterality Date    FL INJECTION RIGHT HIP (NON ARTHROGRAM)  11/30/2020    FL INJECTION RIGHT HIP (NON ARTHROGRAM)  8/4/2021    FL INJECTION RIGHT HIP (NON ARTHROGRAM)  12/27/2021    FL INJECTION RIGHT HIP (NON ARTHROGRAM)  4/25/2022    NO PAST SURGERIES       Family History   Problem Relation Age of Onset    Heart disease Mother     Coronary artery disease Mother     Heart failure Mother     Sudden death Father     No Known Problems Son     No Known Problems Daughter     No Known Problems Maternal  Grandmother     No Known Problems Maternal Grandfather     No Known Problems Paternal Grandmother     No Known Problems Paternal Grandfather     No Known Problems Daughter     Kidney disease Brother     Cancer Brother        Smoking history: He reports that he quit smoking about 4 years ago. His smoking use included cigarettes. He started smoking about 51 years ago. He has a 94.5 pack-year smoking history. He has been exposed to tobacco smoke. He has never used smokeless tobacco.    Occupational History:     Immunization History   Administered Date(s) Administered    COVID-19 PFIZER VACCINE 0.3 ML IM 04/15/2021, 05/08/2021, 02/04/2022    INFLUENZA 10/05/2020, 09/25/2021, 12/22/2022, 12/04/2023    Influenza, high dose seasonal 0.7 mL 12/22/2022, 12/04/2023    Influenza, recombinant, quadrivalent,injectable, preservative free 10/05/2020    Pneumococcal Conjugate 13-Valent 10/05/2020    Pneumococcal Conjugate Vaccine 20-valent (Pcv20), Polysace 12/22/2022       Meds/Allergies     Current Outpatient Medications:     albuterol (2.5 mg/3 mL) 0.083 % nebulizer solution, INHALE 1 VIAL BY NEBULIZATION EVERY 6 (SIX) HOURS AS NEEDED FOR WHEEZING OR SHORTNESS OF BREATH, Disp: 375 mL, Rfl: 2    Anoro Ellipta 62.5-25 MCG/ACT inhaler, INHALE 1 PUFF DAILY, Disp: 60 each, Rfl: 5    buPROPion (Wellbutrin XL) 150 mg 24 hr tablet, Take 1 tablet (150 mg total) by mouth daily, Disp: 30 tablet, Rfl: 1    Cholecalciferol (Vitamin D3) 1.25 MG (02276 UT) CAPS, 2 times a week for 12 weeks., Disp: 24 capsule, Rfl: 0    donepezil (ARICEPT) 10 mg tablet, TAKE 1 TABLET BY MOUTH EVERYDAY AT BEDTIME, Disp: 90 tablet, Rfl: 1    DULoxetine (CYMBALTA) 60 mg delayed release capsule, TAKE 1 CAPSULE BY MOUTH EVERY MORNING, Disp: 90 capsule, Rfl: 0    gabapentin (NEURONTIN) 100 mg capsule, One po qam and 2 po q bedtime, Disp: 90 capsule, Rfl: 2    metFORMIN (GLUCOPHAGE-XR) 500 mg 24 hr tablet, Take 1 tablet (500 mg total) by mouth 2 (two) times a day with  "meals, Disp: 180 tablet, Rfl: 3    Multiple Vitamins-Minerals (MULTIVITAMIN ADULT PO), Take 1 tablet by mouth daily, Disp: , Rfl:     naltrexone (REVIA) 50 mg tablet, Take by mouth half tab daily for 7 days and increase to half tab twice daily . Take together with Bupropion., Disp: 30 tablet, Rfl: 1    prazosin (MINIPRESS) 1 mg capsule, TAKE 1 CAPSULE (1 MG TOTAL) BY MOUTH DAILY AT BEDTIME, Disp: 90 capsule, Rfl: 0    rOPINIRole (REQUIP) 3 mg tablet, TAKE 1 TABLET (3 MG TOTAL) BY MOUTH DAILY AT BEDTIME, Disp: 90 tablet, Rfl: 0    sildenafil (VIAGRA) 50 MG tablet, Take 1 tablet (50 mg total) by mouth daily as needed for erectile dysfunction, Disp: 10 tablet, Rfl: 0    tiZANidine (ZANAFLEX) 2 mg tablet, TAKE 1 TABLET (2 MG TOTAL) BY MOUTH DAILY AT BEDTIME AS NEEDED FOR MUSCLE SPASMS, Disp: 30 tablet, Rfl: 0    Ventolin  (90 Base) MCG/ACT inhaler, INHALE 2 PUFFS BY MOUTH EVERY 6 HOURS AS NEEDED FOR WHEEZING, Disp: 18 g, Rfl: 5  Allergies:   Allergies   Allergen Reactions    Tetracycline Rash         Vitals:  Vitals:    07/08/24 1341   BP: 129/82   BP Location: Right arm   Patient Position: Sitting   Cuff Size: Large   Pulse: 95   Resp: 18   Temp: 98.2 °F (36.8 °C)   SpO2: 95%   Weight: 125 kg (275 lb)   Height: 5' 8\" (1.727 m)     Oxygen Therapy  SpO2: 95 %  .  Wt Readings from Last 3 Encounters:   07/08/24 125 kg (275 lb)   06/26/24 125 kg (275 lb)   05/24/24 125 kg (275 lb)     Body mass index is 41.81 kg/m².    Physical Exam  Vitals and nursing note reviewed.   Constitutional:       General: He is not in acute distress.     Appearance: Normal appearance. He is well-developed. He is morbidly obese.   Cardiovascular:      Rate and Rhythm: Normal rate and regular rhythm.      Heart sounds: Normal heart sounds. No murmur heard.  Pulmonary:      Effort: Pulmonary effort is normal. No respiratory distress.      Breath sounds: Examination of the right-lower field reveals rales. Examination of the left-lower field " "reveals rales. Decreased breath sounds and rales present. No wheezing or rhonchi.   Musculoskeletal:         General: No swelling.      Right lower leg: No edema.      Left lower leg: No edema.   Psychiatric:         Mood and Affect: Mood and affect normal.         Behavior: Behavior normal. Behavior is cooperative.           Labs: I have personally reviewed pertinent lab results.  Lab Results   Component Value Date    WBC 10.15 10/27/2023    HGB 16.8 10/27/2023    HCT 51.3 (H) 10/27/2023    MCV 88 10/27/2023     10/27/2023     Lab Results   Component Value Date    CALCIUM 8.3 (L) 03/14/2024    K 4.1 03/14/2024    CO2 30 03/14/2024     03/14/2024    BUN 18 03/14/2024    CREATININE 1.28 03/14/2024     No results found for: \"IGE\"  Lab Results   Component Value Date    ALT 39 03/10/2024    AST 32 03/10/2024    ALKPHOS 78 03/10/2024       Imaging and other studies: I have personally reviewed pertinent reports and I have personally reviewed pertinent films in PACS     CT lung screening 6/26/2024  There is no infiltrate or pleural effusion. There is a stable 3 mm right upper lobe perifissural nodule most compatible with an intrapulmonary lymph node.  A 2 to 3 mm left perifissural nodule is also stable. No new or suspicious pulmonary nodules. There is no tracheal or endobronchial lesion. Mild to moderate emphysematous lung changes are present     Pulmonary function testing:     Pulmonary Functions Testing Results: 6/22/2022    FEV1/FVC ratio 77%    FEV1 72% predicted  FVC 72% predicted  (-) response to bronchodilators  TLC 57 % predicted  RV 28 % predicted  DLCO corrected for hemoglobin 52 % predicted    Impression:   Flow-volume curve demonstrates restrictive pattern.  Spirometry demonstrates a restrictive airflow limitation.  Lung volumes are moderately decreased.  DLCO is moderately decreased.    6-minute walk  Patient to walk a total distance of 198 m in 6 minutes without any evidence of exercise " desaturation

## 2024-07-09 ENCOUNTER — TELEPHONE (OUTPATIENT)
Age: 69
End: 2024-07-09

## 2024-07-09 NOTE — TELEPHONE ENCOUNTER
Patient calling regarding his portable oxygen he has yet to hear back from anyone regarding this matter.     Please advise    Admitted

## 2024-07-09 NOTE — TELEPHONE ENCOUNTER
Patient called in and just got off the phone with [facility] . Patient was told they do not have his insurance information to have the office sent to them . Patient stated this should be going under his Workers Comp insurance since he was told that medicare will not cover

## 2024-07-10 LAB

## 2024-07-20 DIAGNOSIS — F32.A DEPRESSION: ICD-10-CM

## 2024-07-22 RX ORDER — BUPROPION HYDROCHLORIDE 150 MG/1
150 TABLET ORAL DAILY
Qty: 90 TABLET | Refills: 0 | Status: SHIPPED | OUTPATIENT
Start: 2024-07-22

## 2024-07-23 ENCOUNTER — OFFICE VISIT (OUTPATIENT)
Dept: OBGYN CLINIC | Facility: CLINIC | Age: 69
End: 2024-07-23

## 2024-07-23 ENCOUNTER — TELEPHONE (OUTPATIENT)
Dept: NEPHROLOGY | Facility: CLINIC | Age: 69
End: 2024-07-23

## 2024-07-23 VITALS
HEIGHT: 68 IN | HEART RATE: 94 BPM | SYSTOLIC BLOOD PRESSURE: 152 MMHG | WEIGHT: 270.6 LBS | BODY MASS INDEX: 41.01 KG/M2 | DIASTOLIC BLOOD PRESSURE: 84 MMHG

## 2024-07-23 DIAGNOSIS — N18.31 STAGE 3A CHRONIC KIDNEY DISEASE (HCC): Primary | ICD-10-CM

## 2024-07-23 DIAGNOSIS — M16.11 PRIMARY OSTEOARTHRITIS OF RIGHT HIP: ICD-10-CM

## 2024-07-23 DIAGNOSIS — M16.12 PRIMARY OSTEOARTHRITIS OF ONE HIP, LEFT: Primary | ICD-10-CM

## 2024-07-23 PROCEDURE — 99213 OFFICE O/P EST LOW 20 MIN: CPT | Performed by: ORTHOPAEDIC SURGERY

## 2024-07-23 NOTE — PROGRESS NOTES
Orthopaedics Office Visit - Established Patient Visit    ASSESSMENT/PLAN:    Assessment:   Bilateral hip osteoarthritis   R>L     Improvement with intra-articular CSI's, had to reschedule r side secondary to ankle fracture fixed at outside hospital, right side performed approx. 1 month ago     Plan:   Left hip FL guided IA CSI order with Dr. Wolf was placed  He will continue to follow up with weight management and work on weight loss  Follow up in 3 months time for clinical re-evaluation     To Do Next Visit:  Re-evaluation     _____________________________________________________  CHIEF COMPLAINT:  Chief Complaint   Patient presents with    Right Hip - Follow-up    Left Hip - Follow-up         SUBJECTIVE:  Devon Aly is a 68 y.o. male who presents to the office for a follow up regarding bilateral hip pain secondary to OA. He underwent a FL guided right hip IA CSI with Dr. Wolf on 6/20/24 and underwent a left sided injection on 2/28/24. He notes his right hip is doing well at this time. He notes pain to his left hip which started approx. 1 month ago. Devon is treating with weight management. He also notes he is now on at home Oxygen.     PAST MEDICAL HISTORY:  Past Medical History:   Diagnosis Date    Chronic kidney disease     COVID-19 03/2020    Hypertension 4/02/2020    Liver disease     Pneumonia 3/32/2020    PTSD (post-traumatic stress disorder)        PAST SURGICAL HISTORY:  Past Surgical History:   Procedure Laterality Date    FL INJECTION RIGHT HIP (NON ARTHROGRAM)  11/30/2020    FL INJECTION RIGHT HIP (NON ARTHROGRAM)  8/4/2021    FL INJECTION RIGHT HIP (NON ARTHROGRAM)  12/27/2021    FL INJECTION RIGHT HIP (NON ARTHROGRAM)  4/25/2022    NO PAST SURGERIES         FAMILY HISTORY:  Family History   Problem Relation Age of Onset    Heart disease Mother     Coronary artery disease Mother     Heart failure Mother     Sudden death Father     No Known Problems Son     No Known Problems Daughter     No  Known Problems Maternal Grandmother     No Known Problems Maternal Grandfather     No Known Problems Paternal Grandmother     No Known Problems Paternal Grandfather     No Known Problems Daughter     Kidney disease Brother     Cancer Brother        SOCIAL HISTORY:  Social History     Tobacco Use    Smoking status: Former     Current packs/day: 0.00     Average packs/day: 2.0 packs/day for 47.2 years (94.5 ttl pk-yrs)     Types: Cigarettes     Start date: 1973     Quit date: 2020     Years since quittin.3     Passive exposure: Past    Smokeless tobacco: Never   Vaping Use    Vaping status: Former    Quit date: 2020    Substances: Nicotine, Flavoring   Substance Use Topics    Alcohol use: Not Currently    Drug use: Never       MEDICATIONS:    Current Outpatient Medications:     albuterol (2.5 mg/3 mL) 0.083 % nebulizer solution, INHALE 1 VIAL BY NEBULIZATION EVERY 6 (SIX) HOURS AS NEEDED FOR WHEEZING OR SHORTNESS OF BREATH, Disp: 375 mL, Rfl: 2    Anoro Ellipta 62.5-25 MCG/ACT inhaler, INHALE 1 PUFF DAILY, Disp: 60 each, Rfl: 5    buPROPion (WELLBUTRIN XL) 150 mg 24 hr tablet, TAKE 1 TABLET BY MOUTH EVERY DAY, Disp: 90 tablet, Rfl: 0    Cholecalciferol (Vitamin D3) 1.25 MG (91175 UT) CAPS, 2 times a week for 12 weeks., Disp: 24 capsule, Rfl: 0    donepezil (ARICEPT) 10 mg tablet, TAKE 1 TABLET BY MOUTH EVERYDAY AT BEDTIME, Disp: 90 tablet, Rfl: 1    DULoxetine (CYMBALTA) 60 mg delayed release capsule, TAKE 1 CAPSULE BY MOUTH EVERY MORNING, Disp: 90 capsule, Rfl: 0    gabapentin (NEURONTIN) 100 mg capsule, One po qam and 2 po q bedtime, Disp: 90 capsule, Rfl: 2    metFORMIN (GLUCOPHAGE-XR) 500 mg 24 hr tablet, Take 1 tablet (500 mg total) by mouth 2 (two) times a day with meals, Disp: 180 tablet, Rfl: 3    Multiple Vitamins-Minerals (MULTIVITAMIN ADULT PO), Take 1 tablet by mouth daily, Disp: , Rfl:     naltrexone (REVIA) 50 mg tablet, Take by mouth half tab daily for 7 days and increase to half tab  "twice daily . Take together with Bupropion., Disp: 30 tablet, Rfl: 1    prazosin (MINIPRESS) 1 mg capsule, TAKE 1 CAPSULE (1 MG TOTAL) BY MOUTH DAILY AT BEDTIME, Disp: 90 capsule, Rfl: 0    rOPINIRole (REQUIP) 3 mg tablet, TAKE 1 TABLET (3 MG TOTAL) BY MOUTH DAILY AT BEDTIME, Disp: 90 tablet, Rfl: 0    sildenafil (VIAGRA) 50 MG tablet, Take 1 tablet (50 mg total) by mouth daily as needed for erectile dysfunction, Disp: 10 tablet, Rfl: 0    tiZANidine (ZANAFLEX) 2 mg tablet, TAKE 1 TABLET (2 MG TOTAL) BY MOUTH DAILY AT BEDTIME AS NEEDED FOR MUSCLE SPASMS, Disp: 30 tablet, Rfl: 0    Ventolin  (90 Base) MCG/ACT inhaler, INHALE 2 PUFFS BY MOUTH EVERY 6 HOURS AS NEEDED FOR WHEEZING, Disp: 18 g, Rfl: 5    ALLERGIES:  Allergies   Allergen Reactions    Tetracycline Rash       REVIEW OF SYSTEMS:  MSK: as noted in HPI  Neuro: WNL's  Pertinent items are otherwise noted in HPI.  A comprehensive review of systems was otherwise negative.    LABS:  HgA1c:   Lab Results   Component Value Date    HGBA1C 5.8 05/24/2024     BMP:   Lab Results   Component Value Date    CALCIUM 8.3 (L) 03/14/2024    K 4.1 03/14/2024    CO2 30 03/14/2024     03/14/2024    BUN 18 03/14/2024    CREATININE 1.28 03/14/2024     CBC: No components found for: \"CBC\"    _____________________________________________________  PHYSICAL EXAMINATION:  Vital signs: /84   Pulse 94   Ht 5' 8\" (1.727 m)   Wt 123 kg (270 lb 9.6 oz)   BMI 41.14 kg/m²   General: No acute distress, awake and alert  Psychiatric: Mood and affect appear appropriate  HEENT: Trachea Midline, No torticollis, no apparent facial trauma  Cardiovascular: No audible murmurs; Extremities appear perfused  Pulmonary: No audible wheezing or stridor  Skin: No open lesions; see further details (if any) below    MUSCULOSKELETAL EXAMINATION:    Extremities: Left hip   No erythema, ecchymosis or edema   Pain with hip flexion   Pain with internal and external rotation   Ambulates without " assistance   Extremity appears warm and well perfused     _____________________________________________________  STUDIES REVIEWED:  I personally reviewed the images and interpretation is as follows:  No new imaging to review       PROCEDURES PERFORMED:  Procedures    Scribe Attestation      I,:  Allie Rievra am acting as a scribe while in the presence of the attending physician.:       I,:  Tray Sahu MD personally performed the services described in this documentation    as scribed in my presence.:

## 2024-07-23 NOTE — TELEPHONE ENCOUNTER
Called left voice message to remind patient to get fasting labs done 1 week prior to 08/07/24 scheduled follow-up appointment with Dr.Umesh Shreyas MD.

## 2024-07-25 ENCOUNTER — SOCIAL WORK (OUTPATIENT)
Dept: BEHAVIORAL/MENTAL HEALTH CLINIC | Facility: CLINIC | Age: 69
End: 2024-07-25
Payer: MEDICARE

## 2024-07-25 DIAGNOSIS — R41.3 MEMORY DIFFICULTY: ICD-10-CM

## 2024-07-25 DIAGNOSIS — F33.2 MAJOR DEPRESSIVE DISORDER, RECURRENT SEVERE WITHOUT PSYCHOTIC FEATURES (HCC): ICD-10-CM

## 2024-07-25 DIAGNOSIS — F43.10 PTSD (POST-TRAUMATIC STRESS DISORDER): Primary | ICD-10-CM

## 2024-07-25 PROCEDURE — 90837 PSYTX W PT 60 MINUTES: CPT | Performed by: SOCIAL WORKER
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
## 2024-07-31 DIAGNOSIS — M62.838 MUSCLE SPASM: ICD-10-CM

## 2024-08-01 ENCOUNTER — APPOINTMENT (OUTPATIENT)
Age: 69
End: 2024-08-01
Payer: MEDICARE

## 2024-08-01 ENCOUNTER — OFFICE VISIT (OUTPATIENT)
Age: 69
End: 2024-08-01
Payer: MEDICARE

## 2024-08-01 VITALS
OXYGEN SATURATION: 93 % | DIASTOLIC BLOOD PRESSURE: 80 MMHG | HEART RATE: 109 BPM | BODY MASS INDEX: 41.28 KG/M2 | HEIGHT: 68 IN | RESPIRATION RATE: 18 BRPM | SYSTOLIC BLOOD PRESSURE: 140 MMHG | WEIGHT: 272.4 LBS | TEMPERATURE: 98.7 F

## 2024-08-01 DIAGNOSIS — E66.01 OBESITY, CLASS III, BMI 40-49.9 (MORBID OBESITY) (HCC): ICD-10-CM

## 2024-08-01 DIAGNOSIS — U09.9 COVID-19 LONG HAULER: ICD-10-CM

## 2024-08-01 DIAGNOSIS — N18.31 STAGE 3A CHRONIC KIDNEY DISEASE (HCC): ICD-10-CM

## 2024-08-01 DIAGNOSIS — J98.4 RESTRICTIVE LUNG DISEASE: ICD-10-CM

## 2024-08-01 DIAGNOSIS — R06.09 DOE (DYSPNEA ON EXERTION): ICD-10-CM

## 2024-08-01 DIAGNOSIS — Z76.89 ENCOUNTER FOR WEIGHT MANAGEMENT: Primary | ICD-10-CM

## 2024-08-01 PROBLEM — R20.2 NUMBNESS AND TINGLING: Status: RESOLVED | Noted: 2020-07-23 | Resolved: 2024-08-01

## 2024-08-01 PROBLEM — G44.89 OTHER HEADACHE SYNDROME: Status: RESOLVED | Noted: 2021-06-16 | Resolved: 2024-08-01

## 2024-08-01 PROBLEM — E55.9 VITAMIN D DEFICIENCY: Status: RESOLVED | Noted: 2024-05-24 | Resolved: 2024-08-01

## 2024-08-01 PROBLEM — R20.0 NUMBNESS AND TINGLING: Status: RESOLVED | Noted: 2020-07-23 | Resolved: 2024-08-01

## 2024-08-01 LAB
ANION GAP SERPL CALCULATED.3IONS-SCNC: 10 MMOL/L (ref 4–13)
BACTERIA UR QL AUTO: ABNORMAL /HPF
BASOPHILS # BLD AUTO: 0.08 THOUSANDS/ÂΜL (ref 0–0.1)
BASOPHILS NFR BLD AUTO: 1 % (ref 0–1)
BILIRUB UR QL STRIP: NEGATIVE
BUN SERPL-MCNC: 16 MG/DL (ref 5–25)
CALCIUM SERPL-MCNC: 9.5 MG/DL (ref 8.4–10.2)
CHLORIDE SERPL-SCNC: 107 MMOL/L (ref 96–108)
CLARITY UR: CLEAR
CO2 SERPL-SCNC: 24 MMOL/L (ref 21–32)
COLOR UR: YELLOW
CREAT SERPL-MCNC: 1.28 MG/DL (ref 0.6–1.3)
CREAT UR-MCNC: 188 MG/DL
EOSINOPHIL # BLD AUTO: 0.4 THOUSAND/ÂΜL (ref 0–0.61)
EOSINOPHIL NFR BLD AUTO: 4 % (ref 0–6)
ERYTHROCYTE [DISTWIDTH] IN BLOOD BY AUTOMATED COUNT: 16.6 % (ref 11.6–15.1)
GFR SERPL CREATININE-BSD FRML MDRD: 57 ML/MIN/1.73SQ M
GLUCOSE SERPL-MCNC: 65 MG/DL (ref 65–140)
GLUCOSE UR STRIP-MCNC: NEGATIVE MG/DL
HCT VFR BLD AUTO: 49.8 % (ref 36.5–49.3)
HGB BLD-MCNC: 15.9 G/DL (ref 12–17)
HGB UR QL STRIP.AUTO: NEGATIVE
IMM GRANULOCYTES # BLD AUTO: 0.12 THOUSAND/UL (ref 0–0.2)
IMM GRANULOCYTES NFR BLD AUTO: 1 % (ref 0–2)
KETONES UR STRIP-MCNC: NEGATIVE MG/DL
LEUKOCYTE ESTERASE UR QL STRIP: NEGATIVE
LYMPHOCYTES # BLD AUTO: 1.41 THOUSANDS/ÂΜL (ref 0.6–4.47)
LYMPHOCYTES NFR BLD AUTO: 14 % (ref 14–44)
MCH RBC QN AUTO: 27.9 PG (ref 26.8–34.3)
MCHC RBC AUTO-ENTMCNC: 31.9 G/DL (ref 31.4–37.4)
MCV RBC AUTO: 88 FL (ref 82–98)
MONOCYTES # BLD AUTO: 1.04 THOUSAND/ÂΜL (ref 0.17–1.22)
MONOCYTES NFR BLD AUTO: 10 % (ref 4–12)
NEUTROPHILS # BLD AUTO: 7.27 THOUSANDS/ÂΜL (ref 1.85–7.62)
NEUTS SEG NFR BLD AUTO: 70 % (ref 43–75)
NITRITE UR QL STRIP: NEGATIVE
NON-SQ EPI CELLS URNS QL MICRO: ABNORMAL /HPF
NRBC BLD AUTO-RTO: 0 /100 WBCS
PH UR STRIP.AUTO: 6 [PH]
PHOSPHATE SERPL-MCNC: 3.2 MG/DL (ref 2.3–4.1)
PLATELET # BLD AUTO: 242 THOUSANDS/UL (ref 149–390)
PMV BLD AUTO: 10.8 FL (ref 8.9–12.7)
POTASSIUM SERPL-SCNC: 3.8 MMOL/L (ref 3.5–5.3)
PROT UR STRIP-MCNC: ABNORMAL MG/DL
PROT UR-MCNC: 11 MG/DL
PROT/CREAT UR: 0.06 MG/G{CREAT} (ref 0–0.1)
PTH-INTACT SERPL-MCNC: 29.2 PG/ML (ref 12–88)
RBC # BLD AUTO: 5.69 MILLION/UL (ref 3.88–5.62)
RBC #/AREA URNS AUTO: ABNORMAL /HPF
SODIUM SERPL-SCNC: 141 MMOL/L (ref 135–147)
SP GR UR STRIP.AUTO: 1.02 (ref 1–1.03)
UROBILINOGEN UR STRIP-ACNC: <2 MG/DL
WBC # BLD AUTO: 10.32 THOUSAND/UL (ref 4.31–10.16)
WBC #/AREA URNS AUTO: ABNORMAL /HPF

## 2024-08-01 PROCEDURE — G2211 COMPLEX E/M VISIT ADD ON: HCPCS | Performed by: FAMILY MEDICINE

## 2024-08-01 PROCEDURE — 82306 VITAMIN D 25 HYDROXY: CPT

## 2024-08-01 PROCEDURE — 36415 COLL VENOUS BLD VENIPUNCTURE: CPT

## 2024-08-01 PROCEDURE — 99214 OFFICE O/P EST MOD 30 MIN: CPT | Performed by: FAMILY MEDICINE

## 2024-08-01 PROCEDURE — 83970 ASSAY OF PARATHORMONE: CPT

## 2024-08-01 PROCEDURE — 84156 ASSAY OF PROTEIN URINE: CPT

## 2024-08-01 PROCEDURE — 80048 BASIC METABOLIC PNL TOTAL CA: CPT

## 2024-08-01 PROCEDURE — 84100 ASSAY OF PHOSPHORUS: CPT

## 2024-08-01 PROCEDURE — 81001 URINALYSIS AUTO W/SCOPE: CPT

## 2024-08-01 PROCEDURE — 82570 ASSAY OF URINE CREATININE: CPT

## 2024-08-01 PROCEDURE — 85025 COMPLETE CBC W/AUTO DIFF WBC: CPT

## 2024-08-01 RX ORDER — TIZANIDINE 2 MG/1
2 TABLET ORAL
Qty: 30 TABLET | Refills: 0 | Status: SHIPPED | OUTPATIENT
Start: 2024-08-01 | End: 2024-08-31

## 2024-08-01 NOTE — PROGRESS NOTES
[facility] PRIMARY CARE  Ambulatory visit     Name: Devon Aly   YOB: 1955   MRN: [id number]  Encounter Provider: Primitivo Rios MD    Encounter Date: 8/1/2024    ASSESSMENT & PLAN    Assessment & Plan   Weight Management/obesity, class III, BMI 40-49.9, prediabetes  Wt Readings from Last 3 Encounters:   08/01/24 124 kg (272 lb 6.4 oz)   07/23/24 123 kg (270 lb 9.6 oz)   07/08/24 125 kg (275 lb)   Initial weight (05/24/24): 275 lbs, Body mass index is 41.81 kg/m².  05/24/24 Body mass index is 41.81 kg/m².  TWL: -3 lbs  Following weight management.  Continue care with specialist  Slowly improving weight.  Discussed importance of nutritional improvement and building good habits.    Restrictive lung disease, ANTHONY, cigarette nicotine dependence in remission  Follows pulmonology.  Patient brought in form to get filled out for work.  The form asked about functionality. Discussed with patient this would have to get completed by PT that evaluates functional capacity since the form is extensive requiring me to evaluate various maneuvers, which were quires a lot of time for a thorough evaluation that I also do not do.  I have placed referral for functional capacity to evaluate patient and would recommend patient to have pulmonology signed off since it is due to his lungs  Continue care with specialist  Encouraged patient to work on weight loss, following weight management.    Fall, subsequent, ankle fracture, primary osteoarthritis of right hip  Now status post multiple pins of right ankle.  Follows orthopedic team.  Most recent note from Ortho reviewed, patient was recommended to have BMI under 40 to undergo total hip arthroplasty.  Patient is scheduled for weight management in June.  Continue care with specialist    Stage IIIa chronic kidney disease  Following nephrology.  Continue care with specialist.    Advised losing weight and improving nutritional intake.       PTSD/recurrent  depression  Following psych  Currently on prazosin 1 mg at bedtime, duloxetine 60 mg daily, Wellbutrin 150 mg daily              DIAGNOSIS & ORDERS   1. Encounter for weight management  2. Obesity, Class III, BMI 40-49.9 (morbid obesity) ([facility])  3. COVID-19 long hauler  -     Ambulatory Referral to PT/OT Functional Capacity Evaluation; Future  4. Restrictive lung disease  -     Ambulatory Referral to PT/OT Functional Capacity Evaluation; Future  5. WALTERS (dyspnea on exertion)  -     Ambulatory Referral to PT/OT Functional Capacity Evaluation; Future  6. Stage 3a chronic kidney disease ([facility])    FOLLOW-UP PLANS   Return in about 3 months (around 11/1/2024) for Routine follow up.    Current Medication List:     Current Outpatient Medications:   •  albuterol (2.5 mg/3 mL) 0.083 % nebulizer solution, INHALE 1 VIAL BY NEBULIZATION EVERY 6 (SIX) HOURS AS NEEDED FOR WHEEZING OR SHORTNESS OF BREATH, Disp: 375 mL, Rfl: 2  •  Anoro Ellipta 62.5-25 MCG/ACT inhaler, INHALE 1 PUFF DAILY, Disp: 60 each, Rfl: 5  •  buPROPion (WELLBUTRIN XL) 150 mg 24 hr tablet, TAKE 1 TABLET BY MOUTH EVERY DAY, Disp: 90 tablet, Rfl: 0  •  donepezil (ARICEPT) 10 mg tablet, TAKE 1 TABLET BY MOUTH EVERYDAY AT BEDTIME, Disp: 90 tablet, Rfl: 1  •  DULoxetine (CYMBALTA) 60 mg delayed release capsule, TAKE 1 CAPSULE BY MOUTH EVERY MORNING, Disp: 90 capsule, Rfl: 0  •  gabapentin (NEURONTIN) 100 mg capsule, One po qam and 2 po q bedtime, Disp: 90 capsule, Rfl: 2  •  metFORMIN (GLUCOPHAGE-XR) 500 mg 24 hr tablet, Take 1 tablet (500 mg total) by mouth 2 (two) times a day with meals, Disp: 180 tablet, Rfl: 3  •  Multiple Vitamins-Minerals (MULTIVITAMIN ADULT PO), Take 1 tablet by mouth daily, Disp: , Rfl:   •  naltrexone (REVIA) 50 mg tablet, Take by mouth half tab daily for 7 days and increase to half tab twice daily . Take together with Bupropion., Disp: 30 tablet, Rfl: 1  •  prazosin (MINIPRESS) 1 mg capsule, TAKE 1 CAPSULE (1 MG TOTAL) BY MOUTH DAILY AT  "BEDTIME, Disp: 90 capsule, Rfl: 0  •  rOPINIRole (REQUIP) 3 mg tablet, TAKE 1 TABLET (3 MG TOTAL) BY MOUTH DAILY AT BEDTIME, Disp: 90 tablet, Rfl: 0  •  sildenafil (VIAGRA) 50 MG tablet, Take 1 tablet (50 mg total) by mouth daily as needed for erectile dysfunction, Disp: 10 tablet, Rfl: 0  •  tiZANidine (ZANAFLEX) 2 mg tablet, TAKE 1 TABLET (2 MG TOTAL) BY MOUTH DAILY AT BEDTIME AS NEEDED FOR MUSCLE SPASMS, Disp: 30 tablet, Rfl: 0  •  Ventolin  (90 Base) MCG/ACT inhaler, INHALE 2 PUFFS BY MOUTH EVERY 6 HOURS AS NEEDED FOR WHEEZING, Disp: 18 g, Rfl: 5  Subjective   History of Present Illness       Devon Aly is here for an office visit.   HPI    Review of Systems   Constitutional:  Negative for chills and fever.   Respiratory:  Negative for chest tightness.        Objective:     /80 (BP Location: Left arm, Patient Position: Sitting, Cuff Size: Standard)   Pulse (!) 109   Temp 98.7 °F (37.1 °C) (Tympanic)   Resp 18   Ht 5' 8\" (1.727 m)   Wt 124 kg (272 lb 6.4 oz)   SpO2 93% Comment: 2l/m via n/c  BMI 41.42 kg/m²      Physical Exam  Vitals reviewed.   Constitutional:       General: He is not in acute distress.     Appearance: Normal appearance. He is obese. He is not ill-appearing, toxic-appearing or diaphoretic.   HENT:      Head: Normocephalic and atraumatic.      Right Ear: External ear normal.      Left Ear: External ear normal.      Nose: No congestion or rhinorrhea.   Eyes:      General: No scleral icterus.        Right eye: No discharge.         Left eye: No discharge.      Conjunctiva/sclera: Conjunctivae normal.   Cardiovascular:      Rate and Rhythm: Normal rate.   Pulmonary:      Effort: Pulmonary effort is normal. No respiratory distress.   Neurological:      General: No focal deficit present.      Mental Status: He is alert and oriented to person, place, and time.   Psychiatric:         Mood and Affect: Mood normal.         Behavior: Behavior normal.         Thought Content: " Thought content normal.           Primitivo Rios MD  Family Medicine Physician   [facility] PRIMARY CARE [place]        Administrative Statements

## 2024-08-02 LAB — 25(OH)D3 SERPL-MCNC: 99.8 NG/ML (ref 30–100)

## 2024-08-07 ENCOUNTER — OFFICE VISIT (OUTPATIENT)
Dept: NEPHROLOGY | Facility: CLINIC | Age: 69
End: 2024-08-07
Payer: MEDICARE

## 2024-08-07 VITALS
SYSTOLIC BLOOD PRESSURE: 120 MMHG | TEMPERATURE: 97.5 F | DIASTOLIC BLOOD PRESSURE: 80 MMHG | WEIGHT: 270 LBS | OXYGEN SATURATION: 96 % | HEIGHT: 68 IN | HEART RATE: 92 BPM | RESPIRATION RATE: 18 BRPM | BODY MASS INDEX: 40.92 KG/M2

## 2024-08-07 DIAGNOSIS — M89.9 CHRONIC KIDNEY DISEASE-MINERAL AND BONE DISORDER: ICD-10-CM

## 2024-08-07 DIAGNOSIS — J43.2 CENTRILOBULAR EMPHYSEMA (HCC): ICD-10-CM

## 2024-08-07 DIAGNOSIS — E66.01 OBESITY, CLASS III, BMI 40-49.9 (MORBID OBESITY) (HCC): ICD-10-CM

## 2024-08-07 DIAGNOSIS — N18.9 CHRONIC KIDNEY DISEASE-MINERAL AND BONE DISORDER: ICD-10-CM

## 2024-08-07 DIAGNOSIS — E83.9 CHRONIC KIDNEY DISEASE-MINERAL AND BONE DISORDER: ICD-10-CM

## 2024-08-07 DIAGNOSIS — G47.33 OSA (OBSTRUCTIVE SLEEP APNEA): ICD-10-CM

## 2024-08-07 DIAGNOSIS — N18.31 STAGE 3A CHRONIC KIDNEY DISEASE (HCC): Primary | ICD-10-CM

## 2024-08-07 PROCEDURE — 99214 OFFICE O/P EST MOD 30 MIN: CPT | Performed by: INTERNAL MEDICINE

## 2024-08-07 NOTE — ASSESSMENT & PLAN NOTE
Lab Results   Component Value Date    EGFR 57 08/01/2024    EGFR 62 03/14/2024    EGFR 52 (L) 03/11/2024    CREATININE 1.28 08/01/2024    CREATININE 1.28 03/14/2024    CREATININE 1.47 (H) 03/11/2024   PTH and phosphorus along with vitamin D are within acceptable range and will continue to monitor

## 2024-08-07 NOTE — ASSESSMENT & PLAN NOTE
Lab Results   Component Value Date    EGFR 57 08/01/2024    EGFR 62 03/14/2024    EGFR 52 (L) 03/11/2024    CREATININE 1.28 08/01/2024    CREATININE 1.28 03/14/2024    CREATININE 1.47 (H) 03/11/2024   Renal function is stable overall.  Advise hydration and avoiding nephrotoxic medication

## 2024-08-07 NOTE — PROGRESS NOTES
NEPHROLOGY OFFICE FOLLOW UP  Devon Aly 68 y.o. male MRN: [id number]    Encounter: [id number] 8/7/2024    REASON FOR VISIT: Devon Aly is a 68 y.o. male who is here on 8/7/2024 for Chronic Kidney Disease and Follow-up  .    HPI:    Devon came in today for follow-up of CKD.  Since I saw him last he had a knee surgery done which was followed by prolonged intubation and on oxygen at this point    Patient is being closely monitored by pulmonologist    No other acute complaint    No chest pain no palpitation or shortness of breath    No nausea no vomiting        REVIEW OF SYSTEMS:    Review of Systems   Constitutional:  Negative for fatigue.   HENT:  Negative for congestion.    Eyes:  Negative for photophobia and pain.   Respiratory:  Negative for chest tightness and shortness of breath.    Cardiovascular:  Negative for chest pain and palpitations.   Gastrointestinal:  Negative for abdominal distention, abdominal pain and blood in stool.   Endocrine: Negative for polydipsia.   Genitourinary:  Negative for difficulty urinating, dysuria, flank pain, hematuria and urgency.   Musculoskeletal:  Negative for arthralgias and back pain.   Skin:  Negative for rash.   Neurological:  Negative for dizziness, light-headedness and headaches.   Hematological:  Does not bruise/bleed easily.   Psychiatric/Behavioral:  Negative for behavioral problems. The patient is not nervous/anxious.          PAST MEDICAL HISTORY:  Past Medical History:   Diagnosis Date    Chronic kidney disease     COVID-19 03/2020    Hypertension 4/02/2020    Liver disease     Pneumonia 3/32/2020    PTSD (post-traumatic stress disorder)        PAST SURGICAL HISTORY:  Past Surgical History:   Procedure Laterality Date    FL INJECTION RIGHT HIP (NON ARTHROGRAM)  11/30/2020    FL INJECTION RIGHT HIP (NON ARTHROGRAM)  8/4/2021    FL INJECTION RIGHT HIP (NON ARTHROGRAM)  12/27/2021    FL INJECTION RIGHT HIP (NON ARTHROGRAM)  4/25/2022    NO PAST SURGERIES          SOCIAL HISTORY:  Social History     Substance and Sexual Activity   Alcohol Use Not Currently     Social History     Substance and Sexual Activity   Drug Use Never     Social History     Tobacco Use   Smoking Status Former    Current packs/day: 0.00    Average packs/day: 2.0 packs/day for 47.2 years (94.5 ttl pk-yrs)    Types: Cigarettes    Start date: 1973    Quit date: 2020    Years since quittin.3    Passive exposure: Past   Smokeless Tobacco Never       FAMILY HISTORY:  Family History   Problem Relation Age of Onset    Heart disease Mother     Coronary artery disease Mother     Heart failure Mother     Sudden death Father     No Known Problems Son     No Known Problems Daughter     No Known Problems Maternal Grandmother     No Known Problems Maternal Grandfather     No Known Problems Paternal Grandmother     No Known Problems Paternal Grandfather     No Known Problems Daughter     Kidney disease Brother     Cancer Brother        MEDICATIONS:    Current Outpatient Medications:     albuterol (2.5 mg/3 mL) 0.083 % nebulizer solution, INHALE 1 VIAL BY NEBULIZATION EVERY 6 (SIX) HOURS AS NEEDED FOR WHEEZING OR SHORTNESS OF BREATH, Disp: 375 mL, Rfl: 2    Anoro Ellipta 62.5-25 MCG/ACT inhaler, INHALE 1 PUFF DAILY, Disp: 60 each, Rfl: 5    buPROPion (WELLBUTRIN XL) 150 mg 24 hr tablet, TAKE 1 TABLET BY MOUTH EVERY DAY, Disp: 90 tablet, Rfl: 0    donepezil (ARICEPT) 10 mg tablet, TAKE 1 TABLET BY MOUTH EVERYDAY AT BEDTIME, Disp: 90 tablet, Rfl: 1    DULoxetine (CYMBALTA) 60 mg delayed release capsule, TAKE 1 CAPSULE BY MOUTH EVERY MORNING, Disp: 90 capsule, Rfl: 0    gabapentin (NEURONTIN) 100 mg capsule, One po qam and 2 po q bedtime, Disp: 90 capsule, Rfl: 2    metFORMIN (GLUCOPHAGE-XR) 500 mg 24 hr tablet, Take 1 tablet (500 mg total) by mouth 2 (two) times a day with meals, Disp: 180 tablet, Rfl: 3    Multiple Vitamins-Minerals (MULTIVITAMIN ADULT PO), Take 1 tablet by mouth daily, Disp: , Rfl:      "naltrexone (REVIA) 50 mg tablet, Take by mouth half tab daily for 7 days and increase to half tab twice daily . Take together with Bupropion., Disp: 30 tablet, Rfl: 1    prazosin (MINIPRESS) 1 mg capsule, TAKE 1 CAPSULE (1 MG TOTAL) BY MOUTH DAILY AT BEDTIME, Disp: 90 capsule, Rfl: 0    rOPINIRole (REQUIP) 3 mg tablet, TAKE 1 TABLET (3 MG TOTAL) BY MOUTH DAILY AT BEDTIME, Disp: 90 tablet, Rfl: 0    sildenafil (VIAGRA) 50 MG tablet, Take 1 tablet (50 mg total) by mouth daily as needed for erectile dysfunction, Disp: 10 tablet, Rfl: 0    tiZANidine (ZANAFLEX) 2 mg tablet, TAKE 1 TABLET (2 MG TOTAL) BY MOUTH DAILY AT BEDTIME AS NEEDED FOR MUSCLE SPASMS, Disp: 30 tablet, Rfl: 0    Ventolin  (90 Base) MCG/ACT inhaler, INHALE 2 PUFFS BY MOUTH EVERY 6 HOURS AS NEEDED FOR WHEEZING, Disp: 18 g, Rfl: 5    PHYSICAL EXAM:  Vitals:    08/07/24 1438   BP: 120/80   BP Location: Right arm   Patient Position: Sitting   Pulse: 92   Resp: 18   Temp: 97.5 °F (36.4 °C)   TempSrc: Temporal   SpO2: 96%   Weight: 122 kg (270 lb)   Height: 5' 8\" (1.727 m)     Body mass index is 41.05 kg/m².    Physical Exam  Constitutional:       General: He is not in acute distress.     Appearance: He is well-developed.   HENT:      Head: Normocephalic.      Mouth/Throat:      Mouth: Mucous membranes are moist.   Eyes:      General: No scleral icterus.     Conjunctiva/sclera: Conjunctivae normal.   Neck:      Vascular: No JVD.   Cardiovascular:      Rate and Rhythm: Normal rate.      Heart sounds: Normal heart sounds.   Pulmonary:      Effort: Pulmonary effort is normal.      Breath sounds: No wheezing.   Abdominal:      Palpations: Abdomen is soft.      Tenderness: There is no abdominal tenderness.   Musculoskeletal:         General: Normal range of motion.      Cervical back: Neck supple.   Skin:     General: Skin is warm.      Findings: No rash.   Neurological:      Mental Status: He is alert and oriented to person, place, and time. "   Psychiatric:         Behavior: Behavior normal.         LAB RESULTS:  Results for orders placed or performed in visit on 08/01/24   Vitamin D 25 hydroxy   Result Value Ref Range    Vit D, 25-Hydroxy 99.8 30.0 - 100.0 ng/mL   Urinalysis with microscopic   Result Value Ref Range    Color, UA Yellow     Clarity, UA Clear     Specific Gravity, UA 1.022 1.003 - 1.030    pH, UA 6.0 4.5, 5.0, 5.5, 6.0, 6.5, 7.0, 7.5, 8.0    Leukocytes, UA Negative Negative    Nitrite, UA Negative Negative    Protein, UA Trace (A) Negative mg/dl    Glucose, UA Negative Negative mg/dl    Ketones, UA Negative Negative mg/dl    Urobilinogen, UA <2.0 <2.0 mg/dl mg/dl    Bilirubin, UA Negative Negative    Occult Blood, UA Negative Negative    RBC, UA 1-2 None Seen, 1-2 /hpf    WBC, UA 1-2 None Seen, 1-2 /hpf    Epithelial Cells None Seen None Seen, Occasional /hpf    Bacteria, UA None Seen None Seen, Occasional /hpf   PTH, intact   Result Value Ref Range    PTH 29.2 12.0 - 88.0 pg/mL   Protein / creatinine ratio, urine   Result Value Ref Range    Creatinine, Ur 188.0 Reference range not established. mg/dL    Protein Urine Random 11 Reference range not established. mg/dL    Prot/Creat Ratio, Ur 0.06 0.00 - 0.10   Phosphorus   Result Value Ref Range    Phosphorus 3.2 2.3 - 4.1 mg/dL   CBC and differential   Result Value Ref Range    WBC 10.32 (H) 4.31 - 10.16 Thousand/uL    RBC 5.69 (H) 3.88 - 5.62 Million/uL    Hemoglobin 15.9 12.0 - 17.0 g/dL    Hematocrit 49.8 (H) 36.5 - 49.3 %    MCV 88 82 - 98 fL    MCH 27.9 26.8 - 34.3 pg    MCHC 31.9 31.4 - 37.4 g/dL    RDW 16.6 (H) 11.6 - 15.1 %    MPV 10.8 8.9 - 12.7 fL    Platelets 242 149 - 390 Thousands/uL    nRBC 0 /100 WBCs    Segmented % 70 43 - 75 %    Immature Grans % 1 0 - 2 %    Lymphocytes % 14 14 - 44 %    Monocytes % 10 4 - 12 %    Eosinophils Relative 4 0 - 6 %    Basophils Relative 1 0 - 1 %    Absolute Neutrophils 7.27 1.85 - 7.62 Thousands/µL    Absolute Immature Grans 0.12 0.00 - 0.20  "Thousand/uL    Absolute Lymphocytes 1.41 0.60 - 4.47 Thousands/µL    Absolute Monocytes 1.04 0.17 - 1.22 Thousand/µL    Eosinophils Absolute 0.40 0.00 - 0.61 Thousand/µL    Basophils Absolute 0.08 0.00 - 0.10 Thousands/µL   Basic metabolic panel   Result Value Ref Range    Sodium 141 135 - 147 mmol/L    Potassium 3.8 3.5 - 5.3 mmol/L    Chloride 107 96 - 108 mmol/L    CO2 24 21 - 32 mmol/L    ANION GAP 10 4 - 13 mmol/L    BUN 16 5 - 25 mg/dL    Creatinine 1.28 0.60 - 1.30 mg/dL    Glucose 65 65 - 140 mg/dL    Calcium 9.5 8.4 - 10.2 mg/dL    eGFR 57 ml/min/1.73sq m       ASSESSMENT and PLAN:      Stage 3a chronic kidney disease (HCC)  Lab Results   Component Value Date    EGFR 57 08/01/2024    EGFR 62 03/14/2024    EGFR 52 (L) 03/11/2024    CREATININE 1.28 08/01/2024    CREATININE 1.28 03/14/2024    CREATININE 1.47 (H) 03/11/2024   Renal function is stable overall.  Advise hydration and avoiding nephrotoxic medication    Chronic kidney disease-mineral and bone disorder  Lab Results   Component Value Date    EGFR 57 08/01/2024    EGFR 62 03/14/2024    EGFR 52 (L) 03/11/2024    CREATININE 1.28 08/01/2024    CREATININE 1.28 03/14/2024    CREATININE 1.47 (H) 03/11/2024   PTH and phosphorus along with vitamin D are within acceptable range and will continue to monitor    Centrilobular emphysema (HCC)  On chronic oxygen.  Being monitored by pulmonologist    Obesity, Class III, BMI 40-49.9 (morbid obesity) (HCC)  Need to reduce weight and is well aware of it      Everything discussed at length with the patient.  I will see him back in 6 months and we will get blood and urine test before that visit        Portions of the record may have been created with voice recognition software. Occasional wrong word or \"sound a like\" substitutions may have occurred due to the inherent limitations of voice recognition software. Read the chart carefully and recognize, using context, where substitutions have occurred.If you have any " questions, please contact the dictating provider.

## 2024-08-09 ENCOUNTER — TELEPHONE (OUTPATIENT)
Dept: PSYCHIATRY | Facility: CLINIC | Age: 69
End: 2024-08-09

## 2024-08-09 NOTE — TELEPHONE ENCOUNTER
Writer contacted patient to inform him his 8/13 apt has been cancelled due to provider being out of office. Placed on cancellation list for sooner apt

## 2024-08-12 ENCOUNTER — HOSPITAL ENCOUNTER (OUTPATIENT)
Dept: PULMONOLOGY | Facility: HOSPITAL | Age: 69
Discharge: HOME | End: 2024-08-12
Payer: MEDICARE

## 2024-08-12 ENCOUNTER — TELEPHONE (OUTPATIENT)
Dept: PSYCHIATRY | Facility: CLINIC | Age: 69
End: 2024-08-12

## 2024-08-12 DIAGNOSIS — R06.09 DOE (DYSPNEA ON EXERTION): ICD-10-CM

## 2024-08-12 DIAGNOSIS — J98.4 RESTRICTIVE LUNG DISEASE: ICD-10-CM

## 2024-08-12 PROCEDURE — 94729 DIFFUSING CAPACITY: CPT

## 2024-08-12 PROCEDURE — 94060 EVALUATION OF WHEEZING: CPT | Performed by: INTERNAL MEDICINE

## 2024-08-12 PROCEDURE — 94760 N-INVAS EAR/PLS OXIMETRY 1: CPT

## 2024-08-12 PROCEDURE — 94729 DIFFUSING CAPACITY: CPT | Performed by: INTERNAL MEDICINE

## 2024-08-12 PROCEDURE — 94060 EVALUATION OF WHEEZING: CPT

## 2024-08-12 PROCEDURE — 94726 PLETHYSMOGRAPHY LUNG VOLUMES: CPT | Performed by: INTERNAL MEDICINE

## 2024-08-12 PROCEDURE — 94726 PLETHYSMOGRAPHY LUNG VOLUMES: CPT

## 2024-08-12 RX ORDER — ALBUTEROL SULFATE 0.83 MG/ML
2.5 SOLUTION RESPIRATORY (INHALATION) ONCE
Status: COMPLETED | OUTPATIENT
Start: 2024-08-12 | End: 2024-08-12

## 2024-08-12 RX ADMIN — ALBUTEROL SULFATE 2.5 MG: 2.5 SOLUTION RESPIRATORY (INHALATION) at 09:10

## 2024-08-12 NOTE — TELEPHONE ENCOUNTER
Writer contacted patient and offered same day apt but patient was unavailable to schedule and will remain on list for scheduling sooner than his next apt on 9/03.

## 2024-08-19 ENCOUNTER — TELEPHONE (OUTPATIENT)
Dept: PSYCHIATRY | Facility: CLINIC | Age: 69
End: 2024-08-19

## 2024-08-19 NOTE — TELEPHONE ENCOUNTER
Writer LVM offering sooner apt on either 8/19 or 8/20, Please schedule in available slot. Thank you

## 2024-08-22 ENCOUNTER — TELEPHONE (OUTPATIENT)
Age: 69
End: 2024-08-22

## 2024-08-22 NOTE — TELEPHONE ENCOUNTER
Caller: Devon    Doctor: SP    Reason for call: pt would like to discuss scheduling another injection     Call back#: [phone]

## 2024-08-23 NOTE — TELEPHONE ENCOUNTER
Attempted contact w/ pt. Lvmmom w/ cb# and oh.     6/20/24 Rt hip inj  2/28/24 Lt hip inj    Order in for Lt hip  and Rt hip   LOV 10/23/20 SP

## 2024-08-27 ENCOUNTER — TELEPHONE (OUTPATIENT)
Dept: PSYCHIATRY | Facility: CLINIC | Age: 69
End: 2024-08-27

## 2024-08-27 NOTE — TELEPHONE ENCOUNTER
Writer contacted patient offering him same day apt but patient is unavailable today to schedule. Scheduled next 2 follow up apts.

## 2024-08-28 DIAGNOSIS — R06.02 SHORTNESS OF BREATH: ICD-10-CM

## 2024-08-28 RX ORDER — ALBUTEROL SULFATE 90 UG/1
AEROSOL, METERED RESPIRATORY (INHALATION)
Qty: 18 G | Refills: 5 | Status: SHIPPED | OUTPATIENT
Start: 2024-08-28

## 2024-08-30 ENCOUNTER — TELEPHONE (OUTPATIENT)
Age: 69
End: 2024-08-30

## 2024-09-03 ENCOUNTER — TELEMEDICINE (OUTPATIENT)
Dept: PSYCHIATRY | Facility: CLINIC | Age: 69
End: 2024-09-03
Payer: MEDICARE

## 2024-09-03 ENCOUNTER — SOCIAL WORK (OUTPATIENT)
Dept: BEHAVIORAL/MENTAL HEALTH CLINIC | Facility: CLINIC | Age: 69
End: 2024-09-03
Payer: MEDICARE

## 2024-09-03 DIAGNOSIS — F33.9 DEPRESSION, RECURRENT (HCC): Primary | ICD-10-CM

## 2024-09-03 DIAGNOSIS — R41.3 MEMORY DIFFICULTY: ICD-10-CM

## 2024-09-03 DIAGNOSIS — F43.10 PTSD (POST-TRAUMATIC STRESS DISORDER): ICD-10-CM

## 2024-09-03 DIAGNOSIS — F33.2 MAJOR DEPRESSIVE DISORDER, RECURRENT SEVERE WITHOUT PSYCHOTIC FEATURES (HCC): ICD-10-CM

## 2024-09-03 DIAGNOSIS — G25.81 RESTLESS LEGS: ICD-10-CM

## 2024-09-03 DIAGNOSIS — F43.10 PTSD (POST-TRAUMATIC STRESS DISORDER): Primary | ICD-10-CM

## 2024-09-03 DIAGNOSIS — F32.A DEPRESSION, UNSPECIFIED DEPRESSION TYPE: ICD-10-CM

## 2024-09-03 PROCEDURE — 99213 OFFICE O/P EST LOW 20 MIN: CPT | Performed by: PHYSICIAN ASSISTANT

## 2024-09-03 PROCEDURE — 90837 PSYTX W PT 60 MINUTES: CPT | Performed by: SOCIAL WORKER

## 2024-09-03 RX ORDER — DULOXETIN HYDROCHLORIDE 60 MG/1
60 CAPSULE, DELAYED RELEASE ORAL EVERY MORNING
Qty: 90 CAPSULE | Refills: 0 | Status: SHIPPED | OUTPATIENT
Start: 2024-09-03

## 2024-09-03 RX ORDER — ROPINIROLE 3 MG/1
3 TABLET, FILM COATED ORAL
Qty: 90 TABLET | Refills: 0 | Status: SHIPPED | OUTPATIENT
Start: 2024-09-03

## 2024-09-03 RX ORDER — GABAPENTIN 100 MG/1
CAPSULE ORAL
Qty: 90 CAPSULE | Refills: 2 | Status: SHIPPED | OUTPATIENT
Start: 2024-09-03

## 2024-09-03 RX ORDER — PRAZOSIN HYDROCHLORIDE 1 MG/1
1 CAPSULE ORAL
Qty: 90 CAPSULE | Refills: 0 | Status: SHIPPED | OUTPATIENT
Start: 2024-09-03

## 2024-09-03 NOTE — PSYCH
"Behavioral Health Psychotherapy Progress Note    Psychotherapy Provided: Individual Psychotherapy     1. PTSD (post-traumatic stress disorder)        2. 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 Memory difficulty            Goals addressed in session: Goal 1 and Goal 2     DATA: Galileo shared his medical concerns and issues he recently had with one of his providers. He asked to switch providers. He then discussed the difficulties his wife has with her siblings over their  father's estate. Galileo is still dealing with the long term effects of covid. His lungs are bad and he is now on oxygen. His brother has stomach cancer and is undergoing treatment. His wife's brother has lymphoma and it may have gone into his bones. He is only 55. Galileo's brother is 64. We discussed strategies to cope with his depression, his anxiety, his anger and his frustration. He spoke about family issues. Galileo's grandaughter has demolished 2 new cars.   During this session, this clinician used the following therapeutic modalities: Client-centered Therapy, Cognitive Behavioral Therapy, Mindfulness-based Strategies, and Supportive Psychotherapy    Substance Abuse was not addressed during this session. If the client is diagnosed with a co-occurring substance use disorder, please indicate any changes in the frequency or amount of use: n/a. Stage of change for addressing substance use diagnoses: No substance use/Not applicable    ASSESSMENT:  Devon Aly presents with a Anxious and Depressed mood.     his affect is anxious and depressed which is congruent, with his mood and the content of the session. The client has made progress on their goals however his health is now adding to his depression and his anxiety.      Devon Aly presents with a none risk of suicide, none risk of self-harm, and none risk of harm to others.    For any risk assessment that surpasses a \"low\" rating, a safety plan " must be developed.    A safety plan was indicated: no  If yes, describe in detail n/a    PLAN: Between sessions, Devon Aly will use mindfulness and CBT. . At the next session, the therapist will use Client-centered Therapy, Cognitive Behavioral Therapy, Mindfulness-based Strategies, and Supportive Psychotherapy to address issues and symptoms as they may arise. .    Behavioral Health Treatment Plan and Discharge Planning: Devon Aly is aware of and agrees to continue to work on their treatment plan. They have identified and are working toward their discharge goals. yes    Visit start and stop times:    09/03/24  Start Time: 1100  Stop Time: 1200  Total Visit Time: 60 minutes

## 2024-09-04 ENCOUNTER — PATIENT MESSAGE (OUTPATIENT)
Dept: RADIOLOGY | Facility: CLINIC | Age: 69
End: 2024-09-04

## 2024-09-04 NOTE — PSYCH
Virtual Regular Visit    Verification of patient location:    Patient is located at Other in the following state in which I hold an active license [place]               Reason for visit is   Chief Complaint   Patient presents with    Virtual Regular Visit          Encounter provider Abril Jones PA-C      Recent Visits  Date Type Provider Dept   09/03/24 Telemedicine [facility]   Showing recent visits within past 7 days and meeting all other requirements  Future Appointments  No visits were found meeting these conditions.  Showing future appointments within next 150 days and meeting all other requirements       The patient was identified by name and date of birth. Devon Aly was informed that this is a telemedicine visit and that the visit is being conducted throughthe Epic Embedded platform. He agrees to proceed..  My office door was closed. The patient was notified the following individuals were present in the room CORRINE Chaidez.  He acknowledged consent and understanding of privacy and security of the video platform. The patient has agreed to participate and understands they can discontinue the visit at any time.    Patient is aware this is a billable service.         MEDICATION MANAGEMENT NOTE        [facility]   [facility]  [address] [facility] [address]  [phone]    This note was not shared with the patient due to this is a psychotherapy note      Name and Date of Birth:  Devon Aly 69 y.o. 1955    Date of Visit: Sept 3, 2024    SUBJECTIVE:     Devon seen by Foreign 7/1 at which time meds unchanged.  Devon continues to see Tray Garzon for ind therapy. Is attempting to minimize his stress and recognizes its effects on his physical health. Notes ongoing financial stress and medical issues significantly limiting his  activities.  Now is grieving the loss of his father-in-law and supporting wife in the complicated manners of his estate.  Mood is stable.  Sleep fair. Notes invol movements of toes at night.  Asked him to review this with neuro.     Review of Systems   Constitutional:  Positive for activity change.   Respiratory:  Positive for shortness of breath.         Using portable O2   Musculoskeletal:  Positive for arthralgias.   Neurological:  Negative for dizziness and syncope.        Invol movements in lowe ext     Psychiatric History     This office since 5/7/20.  No IP or suicide attempts.  Past med trial- zoloft.      Trauma/Loss History       ARDS secondary to covid- intubated x 10 days, April 2020. Lost brother and coworkers to covid.  Has been too ill to return to work.      Social History        Lives with wife Kailyn -  >40 yrs. 2 daugthers and a son.  Worked at [employer] x 37 yrs -.  Hobby: birding        OBJECTIVE:     MENTAL STATUS EXAM  Appearance:  age appropriate   Behavior:  Pleasant & cooperative   Speech:  Normal volume, regular rate and rhythm   Mood:  low   Affect:  mood congruent   Language: intact and appropriate for age, education, and intellect   Thought Process:  goal directed   Associations: intact associations   Thought Content:  normal and appropriate   Perceptual Disturbances: no auditory or visual hallcunations   Risk Potential / Abnormal Thoughts: Suicidal ideation - None  Homicidal ideation - None  Potential for aggression - No       Consciousness:  Alert & Awake   Sensorium:  Grossly oriented   Attention: attention span and concentration are age appropriate       Fund of Knowledge:  Memory: awareness of current events: yes  recent and remote memory grossly intact   Insight:  good   Judgment: good     Lab Review: I have reviewed all pertinent labs  Lab Results   Component Value Date    HGBA1C 5.8 05/24/2024         Lab Results   Component Value Date    SODIUM 141  08/01/2024    K 3.8 08/01/2024     08/01/2024    CO2 24 08/01/2024    AGAP 10 08/01/2024    BUN 16 08/01/2024    CREATININE 1.28 08/01/2024    GLUC 65 08/01/2024    GLUF 92 10/27/2023    CALCIUM 9.5 08/01/2024    AST 32 03/10/2024    ALT 39 03/10/2024    ALKPHOS 78 03/10/2024    TP 6.4 03/10/2024    TBILI 0.4 03/10/2024    EGFR 57 08/01/2024     Lab Results   Component Value Date    WBC 10.32 (H) 08/01/2024    HGB 15.9 08/01/2024    HCT 49.8 (H) 08/01/2024    MCV 88 08/01/2024     08/01/2024             ASSESSMENT & PLAN          Diagnoses and all orders for this visit:    Depression, recurrent (HCC)    PTSD (post-traumatic stress disorder)  -     DULoxetine (CYMBALTA) 60 mg delayed release capsule; Take 1 capsule (60 mg total) by mouth every morning  -     gabapentin (NEURONTIN) 100 mg capsule; One po qam and 2 po q bedtime  -     prazosin (MINIPRESS) 1 mg capsule; Take 1 capsule (1 mg total) by mouth daily at bedtime    Depression, unspecified depression type  -     DULoxetine (CYMBALTA) 60 mg delayed release capsule; Take 1 capsule (60 mg total) by mouth every morning    Restless legs  -     rOPINIRole (REQUIP) 3 mg tablet; Take 1 tablet (3 mg total) by mouth daily at bedtime      Current Outpatient Medications   Medication Sig Dispense Refill    DULoxetine (CYMBALTA) 60 mg delayed release capsule Take 1 capsule (60 mg total) by mouth every morning 90 capsule 0    gabapentin (NEURONTIN) 100 mg capsule One po qam and 2 po q bedtime 90 capsule 2    prazosin (MINIPRESS) 1 mg capsule Take 1 capsule (1 mg total) by mouth daily at bedtime 90 capsule 0    rOPINIRole (REQUIP) 3 mg tablet Take 1 tablet (3 mg total) by mouth daily at bedtime 90 tablet 0    albuterol (2.5 mg/3 mL) 0.083 % nebulizer solution INHALE 1 VIAL BY NEBULIZATION EVERY 6 (SIX) HOURS AS NEEDED FOR WHEEZING OR SHORTNESS OF BREATH 375 mL 2    Anoro Ellipta 62.5-25 MCG/ACT inhaler INHALE 1 PUFF DAILY 60 each 5    buPROPion (WELLBUTRIN XL)  150 mg 24 hr tablet TAKE 1 TABLET BY MOUTH EVERY DAY 90 tablet 0    donepezil (ARICEPT) 10 mg tablet TAKE 1 TABLET BY MOUTH EVERYDAY AT BEDTIME 90 tablet 1    metFORMIN (GLUCOPHAGE-XR) 500 mg 24 hr tablet Take 1 tablet (500 mg total) by mouth 2 (two) times a day with meals 180 tablet 3    Multiple Vitamins-Minerals (MULTIVITAMIN ADULT PO) Take 1 tablet by mouth daily      naltrexone (REVIA) 50 mg tablet Take by mouth half tab daily for 7 days and increase to half tab twice daily . Take together with Bupropion. 30 tablet 1    sildenafil (VIAGRA) 50 MG tablet Take 1 tablet (50 mg total) by mouth daily as needed for erectile dysfunction 10 tablet 0    tiZANidine (ZANAFLEX) 2 mg tablet TAKE 1 TABLET (2 MG TOTAL) BY MOUTH DAILY AT BEDTIME AS NEEDED FOR MUSCLE SPASMS 30 tablet 0    Ventolin  (90 Base) MCG/ACT inhaler TAKE 2 PUFFS BY MOUTH EVERY 6 HOURS AS NEEDED FOR WHEEZE 18 g 5     No current facility-administered medications for this visit.                Plan:            Psychiatrically stable with need for ongoing support.  No med change- cymbalta 60 mg/d, requip 3 mg q bedtime, neurontin 100/200 mg, prazosin 1 mg q bedtime.   Reviewed risks, benefits, side effects of medications, including no medication.  Patient understands and agrees to treatment plan.   F/u Jose Luis 3 mths, sooner prn      Patient has been informed of 24 hours and weekend coverage for urgent situations accessed by calling the main clinic phone number.     Abril Jones PA-C

## 2024-09-04 NOTE — PATIENT COMMUNICATION
Pt scheduled for hip injection with Dr Wolf on 9/26/24     Pt is not diabetic and no med holds are needed for hip injection     Pt given instructions review via myc message (recent injection)     Have you completed PT/HEP/Chiro in the past 6 months for dedicated area? no  If yes, how long did you complete?  What was the frequency?  Did it provide relief?  If no, reason therapy was not completed?   Previous injections

## 2024-09-09 ENCOUNTER — OFFICE VISIT (OUTPATIENT)
Age: 69
End: 2024-09-09
Payer: OTHER MISCELLANEOUS

## 2024-09-09 VITALS
BODY MASS INDEX: 40.32 KG/M2 | HEART RATE: 102 BPM | WEIGHT: 266 LBS | SYSTOLIC BLOOD PRESSURE: 120 MMHG | DIASTOLIC BLOOD PRESSURE: 68 MMHG | RESPIRATION RATE: 16 BRPM | HEIGHT: 68 IN | OXYGEN SATURATION: 94 % | TEMPERATURE: 98.4 F

## 2024-09-09 DIAGNOSIS — F17.211 CIGARETTE NICOTINE DEPENDENCE IN REMISSION: ICD-10-CM

## 2024-09-09 DIAGNOSIS — Z13.6 SCREENING FOR AAA (ABDOMINAL AORTIC ANEURYSM): ICD-10-CM

## 2024-09-09 DIAGNOSIS — M16.11 PRIMARY OSTEOARTHRITIS OF RIGHT HIP: ICD-10-CM

## 2024-09-09 DIAGNOSIS — U09.9 COVID-19 LONG HAULER: Primary | ICD-10-CM

## 2024-09-09 DIAGNOSIS — J98.4 RESTRICTIVE LUNG DISEASE: ICD-10-CM

## 2024-09-09 DIAGNOSIS — Z23 ENCOUNTER FOR IMMUNIZATION: ICD-10-CM

## 2024-09-09 DIAGNOSIS — G25.81 RESTLESS LEGS: ICD-10-CM

## 2024-09-09 PROCEDURE — 99213 OFFICE O/P EST LOW 20 MIN: CPT | Performed by: STUDENT IN AN ORGANIZED HEALTH CARE EDUCATION/TRAINING PROGRAM

## 2024-09-09 PROCEDURE — 90715 TDAP VACCINE 7 YRS/> IM: CPT

## 2024-09-09 PROCEDURE — 90472 IMMUNIZATION ADMIN EACH ADD: CPT | Performed by: STUDENT IN AN ORGANIZED HEALTH CARE EDUCATION/TRAINING PROGRAM

## 2024-09-09 PROCEDURE — 90471 IMMUNIZATION ADMIN: CPT

## 2024-09-09 NOTE — PROGRESS NOTES
Ambulatory Visit  Name: Devon Aly      : 1955      MRN: [id number]  Encounter Provider: Zhao Duff MD  Encounter Date: 2024   Encounter department: [facility] [place]    Assessment & Plan   1. COVID-19 long hauler  Lingering effects from COVID infection in 2020 including restrictive lung disease with chronic hypoxic respiratory failure and cognitive impairment. Patient is requiring functional capacity evaluation for work as he is currently disabled. He was referred to PT/OT for evaluation last month but not yet completed. Encouraged patient to make appointment to have this done.    2. Restrictive lung disease  Following with Pulmonology. Well controlled with daily inhalers. Currently on 2-4 L O2 NC.    3. Primary osteoarthritis of right hip  Awaiting right hip LEEROY but working with [facility] for weight loss.    4. Cigarette nicotine dependence in remission  -      abdominal aorta screening aaa; Future; Expected date: 2024    5. Screening for AAA (abdominal aortic aneurysm)  -      abdominal aorta screening aaa; Future; Expected date: 2024    6. Restless legs  Currently on Requip with Psychiatry. Will check iron levels for deficiency.  -     Iron Panel (Includes Ferritin, Iron Sat%, Iron, and TIBC); Future    7. Encounter for immunization  -     TDAP VACCINE GREATER THAN OR EQUAL TO 6YO IM       History of Present Illness     Devon Aly is a 70 yo M with PMH of PTSD, CKD3, OA, ANTHONY, MDD, restrictive lung disease with chronic hypoxia on 2-4L O2 NC and COVID long haul syndrome who presents today to establish care. He is awaiting both R and L LEEROY. First, he is working with weight management on losing weight to prepare for the surgery. He is currently on phetermine and naltrexone but ultimately wants to switch to subQ injectable when his insurance covers it. He is also awaiting insurance coverage to start using a CPAP machine, as he is currently just using O2 NC  at night. He is following with Pulmonology for his restrictive lung disease and currently well managed with supplemental oxygen and daily inhalers. He is following with Neurology for his history of cognitive impairment. He is disabled and requires paperwork filled out documenting his disability. He was previously referred to PT/OT for functional evaluation but he has not gone yet.        Review of Systems   Constitutional:  Negative for appetite change, chills and fever.   HENT:  Negative for congestion and sore throat.    Eyes:  Negative for visual disturbance.   Respiratory:  Positive for shortness of breath. Negative for cough.    Cardiovascular:  Negative for chest pain and leg swelling.   Gastrointestinal:  Negative for abdominal pain, constipation, diarrhea and nausea.   Genitourinary:  Negative for difficulty urinating and dysuria.   Musculoskeletal:  Positive for arthralgias. Negative for myalgias.   Skin:  Negative for rash.   Neurological:  Positive for weakness. Negative for dizziness, light-headedness and headaches.        Restlessness in limbs   Psychiatric/Behavioral:          Memory loss     Medical History Reviewed by provider this encounter:  Meds  Problems       Past Medical History   Past Medical History:   Diagnosis Date    Chronic kidney disease     COVID-19 03/2020    Hypertension 4/02/2020    Liver disease     Pneumonia 3/32/2020    PTSD (post-traumatic stress disorder)      Past Surgical History:   Procedure Laterality Date    FL INJECTION RIGHT HIP (NON ARTHROGRAM)  11/30/2020    FL INJECTION RIGHT HIP (NON ARTHROGRAM)  8/4/2021    FL INJECTION RIGHT HIP (NON ARTHROGRAM)  12/27/2021    FL INJECTION RIGHT HIP (NON ARTHROGRAM)  4/25/2022    NO PAST SURGERIES       Family History   Problem Relation Age of Onset    Heart disease Mother     Coronary artery disease Mother     Heart failure Mother     Sudden death Father     No Known Problems Son     No Known Problems Daughter     No Known  Problems Maternal Grandmother     No Known Problems Maternal Grandfather     No Known Problems Paternal Grandmother     No Known Problems Paternal Grandfather     No Known Problems Daughter     Kidney disease Brother     Cancer Brother      Current Outpatient Medications on File Prior to Visit   Medication Sig Dispense Refill    albuterol (2.5 mg/3 mL) 0.083 % nebulizer solution INHALE 1 VIAL BY NEBULIZATION EVERY 6 (SIX) HOURS AS NEEDED FOR WHEEZING OR SHORTNESS OF BREATH 375 mL 2    Anoro Ellipta 62.5-25 MCG/ACT inhaler INHALE 1 PUFF DAILY 60 each 5    buPROPion (WELLBUTRIN XL) 150 mg 24 hr tablet TAKE 1 TABLET BY MOUTH EVERY DAY 90 tablet 0    donepezil (ARICEPT) 10 mg tablet TAKE 1 TABLET BY MOUTH EVERYDAY AT BEDTIME 90 tablet 1    DULoxetine (CYMBALTA) 60 mg delayed release capsule Take 1 capsule (60 mg total) by mouth every morning 90 capsule 0    gabapentin (NEURONTIN) 100 mg capsule One po qam and 2 po q bedtime 90 capsule 2    metFORMIN (GLUCOPHAGE-XR) 500 mg 24 hr tablet Take 1 tablet (500 mg total) by mouth 2 (two) times a day with meals 180 tablet 3    Multiple Vitamins-Minerals (MULTIVITAMIN ADULT PO) Take 1 tablet by mouth daily      naltrexone (REVIA) 50 mg tablet Take by mouth half tab daily for 7 days and increase to half tab twice daily . Take together with Bupropion. 30 tablet 1    prazosin (MINIPRESS) 1 mg capsule Take 1 capsule (1 mg total) by mouth daily at bedtime 90 capsule 0    rOPINIRole (REQUIP) 3 mg tablet Take 1 tablet (3 mg total) by mouth daily at bedtime 90 tablet 0    sildenafil (VIAGRA) 50 MG tablet Take 1 tablet (50 mg total) by mouth daily as needed for erectile dysfunction 10 tablet 0    tiZANidine (ZANAFLEX) 2 mg tablet TAKE 1 TABLET (2 MG TOTAL) BY MOUTH DAILY AT BEDTIME AS NEEDED FOR MUSCLE SPASMS 30 tablet 0    Ventolin  (90 Base) MCG/ACT inhaler TAKE 2 PUFFS BY MOUTH EVERY 6 HOURS AS NEEDED FOR WHEEZE 18 g 5     No current facility-administered medications on file  prior to visit.     Allergies   Allergen Reactions    Tetracycline Rash      Current Outpatient Medications on File Prior to Visit   Medication Sig Dispense Refill    albuterol (2.5 mg/3 mL) 0.083 % nebulizer solution INHALE 1 VIAL BY NEBULIZATION EVERY 6 (SIX) HOURS AS NEEDED FOR WHEEZING OR SHORTNESS OF BREATH 375 mL 2    Anoro Ellipta 62.5-25 MCG/ACT inhaler INHALE 1 PUFF DAILY 60 each 5    buPROPion (WELLBUTRIN XL) 150 mg 24 hr tablet TAKE 1 TABLET BY MOUTH EVERY DAY 90 tablet 0    donepezil (ARICEPT) 10 mg tablet TAKE 1 TABLET BY MOUTH EVERYDAY AT BEDTIME 90 tablet 1    DULoxetine (CYMBALTA) 60 mg delayed release capsule Take 1 capsule (60 mg total) by mouth every morning 90 capsule 0    gabapentin (NEURONTIN) 100 mg capsule One po qam and 2 po q bedtime 90 capsule 2    metFORMIN (GLUCOPHAGE-XR) 500 mg 24 hr tablet Take 1 tablet (500 mg total) by mouth 2 (two) times a day with meals 180 tablet 3    Multiple Vitamins-Minerals (MULTIVITAMIN ADULT PO) Take 1 tablet by mouth daily      naltrexone (REVIA) 50 mg tablet Take by mouth half tab daily for 7 days and increase to half tab twice daily . Take together with Bupropion. 30 tablet 1    prazosin (MINIPRESS) 1 mg capsule Take 1 capsule (1 mg total) by mouth daily at bedtime 90 capsule 0    rOPINIRole (REQUIP) 3 mg tablet Take 1 tablet (3 mg total) by mouth daily at bedtime 90 tablet 0    sildenafil (VIAGRA) 50 MG tablet Take 1 tablet (50 mg total) by mouth daily as needed for erectile dysfunction 10 tablet 0    tiZANidine (ZANAFLEX) 2 mg tablet TAKE 1 TABLET (2 MG TOTAL) BY MOUTH DAILY AT BEDTIME AS NEEDED FOR MUSCLE SPASMS 30 tablet 0    Ventolin  (90 Base) MCG/ACT inhaler TAKE 2 PUFFS BY MOUTH EVERY 6 HOURS AS NEEDED FOR WHEEZE 18 g 5     No current facility-administered medications on file prior to visit.      Social History     Tobacco Use    Smoking status: Former     Current packs/day: 0.00     Average packs/day: 2.0 packs/day for 47.2 years (94.5 ttl  "pk-yrs)     Types: Cigarettes     Start date: 1973     Quit date: 2020     Years since quittin.4     Passive exposure: Past    Smokeless tobacco: Never   Vaping Use    Vaping status: Former    Quit date: 2020    Substances: Nicotine, Flavoring   Substance and Sexual Activity    Alcohol use: Not Currently    Drug use: Never    Sexual activity: Yes     Partners: Female     Birth control/protection: None     Objective     /68 (BP Location: Left arm, Patient Position: Sitting, Cuff Size: Large)   Pulse 102   Temp 98.4 °F (36.9 °C) (Tympanic)   Resp 16   Ht 5' 8\" (1.727 m)   Wt 121 kg (266 lb)   SpO2 94% Comment: 2 L  / M O2 via nasal canula  BMI 40.45 kg/m²     Physical Exam  Constitutional:       General: He is not in acute distress.  Eyes:      Conjunctiva/sclera: Conjunctivae normal.   Cardiovascular:      Rate and Rhythm: Normal rate and regular rhythm.      Heart sounds: Normal heart sounds.   Pulmonary:      Effort: Pulmonary effort is normal.      Breath sounds: Normal breath sounds.      Comments: 2L O2 NC  Abdominal:      General: There is no distension.      Tenderness: There is no abdominal tenderness.   Musculoskeletal:      Right lower leg: No edema.      Left lower leg: No edema.      Comments: Trace pitting edema BL ankles   Skin:     General: Skin is warm and dry.   Neurological:      Mental Status: He is alert.   Psychiatric:         Speech: Speech normal.         Behavior: Behavior normal. Behavior is cooperative.       Administrative Statements           "

## 2024-09-10 NOTE — TELEPHONE ENCOUNTER
Medication: alendronate (FOSAMAX) 70 MG table  passed protocol.   Last office visit date: 4/1/24  Next appointment scheduled?: No   Number of refills given: 0   Tiffany Bernardo, could you let us know when results are available for this patient? Thanks!

## 2024-09-23 LAB

## 2024-09-26 ENCOUNTER — TELEPHONE (OUTPATIENT)
Dept: PAIN MEDICINE | Facility: CLINIC | Age: 69
End: 2024-09-26

## 2024-09-26 ENCOUNTER — HOSPITAL ENCOUNTER (OUTPATIENT)
Dept: RADIOLOGY | Facility: CLINIC | Age: 69
End: 2024-09-26
Payer: MEDICARE

## 2024-09-26 ENCOUNTER — HOSPITAL ENCOUNTER (OUTPATIENT)
Dept: ULTRASOUND IMAGING | Facility: HOSPITAL | Age: 69
End: 2024-09-26
Attending: STUDENT IN AN ORGANIZED HEALTH CARE EDUCATION/TRAINING PROGRAM
Payer: MEDICARE

## 2024-09-26 VITALS
DIASTOLIC BLOOD PRESSURE: 73 MMHG | SYSTOLIC BLOOD PRESSURE: 127 MMHG | RESPIRATION RATE: 20 BRPM | HEART RATE: 94 BPM | OXYGEN SATURATION: 92 %

## 2024-09-26 DIAGNOSIS — F17.211 CIGARETTE NICOTINE DEPENDENCE IN REMISSION: ICD-10-CM

## 2024-09-26 DIAGNOSIS — M16.12 PRIMARY OSTEOARTHRITIS OF ONE HIP, LEFT: ICD-10-CM

## 2024-09-26 DIAGNOSIS — Z13.6 SCREENING FOR AAA (ABDOMINAL AORTIC ANEURYSM): ICD-10-CM

## 2024-09-26 PROCEDURE — 76706 US ABDL AORTA SCREEN AAA: CPT

## 2024-09-26 PROCEDURE — 77002 NEEDLE LOCALIZATION BY XRAY: CPT

## 2024-09-26 PROCEDURE — 77002 NEEDLE LOCALIZATION BY XRAY: CPT | Performed by: STUDENT IN AN ORGANIZED HEALTH CARE EDUCATION/TRAINING PROGRAM

## 2024-09-26 PROCEDURE — 20610 DRAIN/INJ JOINT/BURSA W/O US: CPT | Performed by: STUDENT IN AN ORGANIZED HEALTH CARE EDUCATION/TRAINING PROGRAM

## 2024-09-26 RX ORDER — METHYLPREDNISOLONE ACETATE 80 MG/ML
80 INJECTION, SUSPENSION INTRA-ARTICULAR; INTRALESIONAL; INTRAMUSCULAR; PARENTERAL; SOFT TISSUE ONCE
Status: COMPLETED | OUTPATIENT
Start: 2024-09-26 | End: 2024-09-26

## 2024-09-26 RX ORDER — ROPIVACAINE HYDROCHLORIDE 2 MG/ML
4 INJECTION, SOLUTION EPIDURAL; INFILTRATION; PERINEURAL ONCE
Status: COMPLETED | OUTPATIENT
Start: 2024-09-26 | End: 2024-09-26

## 2024-09-26 RX ADMIN — IOHEXOL 1 ML: 300 INJECTION, SOLUTION INTRAVENOUS at 14:12

## 2024-09-26 RX ADMIN — ROPIVACAINE HYDROCHLORIDE 4 ML: 2 INJECTION, SOLUTION EPIDURAL; INFILTRATION at 14:13

## 2024-09-26 RX ADMIN — METHYLPREDNISOLONE ACETATE 80 MG: 80 INJECTION, SUSPENSION INTRA-ARTICULAR; INTRALESIONAL; INTRAMUSCULAR; SOFT TISSUE at 14:13

## 2024-09-26 NOTE — H&P
History of Present Illness: The patient is a 69 y.o. male who presents with complaints of lft hip pain    Past Medical History:   Diagnosis Date    Chronic kidney disease     COVID-19 03/2020    Hypertension 4/02/2020    Liver disease     Pneumonia 3/32/2020    PTSD (post-traumatic stress disorder)        Past Surgical History:   Procedure Laterality Date    FL INJECTION RIGHT HIP (NON ARTHROGRAM)  11/30/2020    FL INJECTION RIGHT HIP (NON ARTHROGRAM)  8/4/2021    FL INJECTION RIGHT HIP (NON ARTHROGRAM)  12/27/2021    FL INJECTION RIGHT HIP (NON ARTHROGRAM)  4/25/2022    NO PAST SURGERIES           Current Outpatient Medications:     albuterol (2.5 mg/3 mL) 0.083 % nebulizer solution, INHALE 1 VIAL BY NEBULIZATION EVERY 6 (SIX) HOURS AS NEEDED FOR WHEEZING OR SHORTNESS OF BREATH, Disp: 375 mL, Rfl: 2    Anoro Ellipta 62.5-25 MCG/ACT inhaler, INHALE 1 PUFF DAILY, Disp: 60 each, Rfl: 5    buPROPion (WELLBUTRIN XL) 150 mg 24 hr tablet, TAKE 1 TABLET BY MOUTH EVERY DAY, Disp: 90 tablet, Rfl: 0    donepezil (ARICEPT) 10 mg tablet, TAKE 1 TABLET BY MOUTH EVERYDAY AT BEDTIME, Disp: 90 tablet, Rfl: 1    DULoxetine (CYMBALTA) 60 mg delayed release capsule, Take 1 capsule (60 mg total) by mouth every morning, Disp: 90 capsule, Rfl: 0    gabapentin (NEURONTIN) 100 mg capsule, One po qam and 2 po q bedtime, Disp: 90 capsule, Rfl: 2    metFORMIN (GLUCOPHAGE-XR) 500 mg 24 hr tablet, Take 1 tablet (500 mg total) by mouth 2 (two) times a day with meals, Disp: 180 tablet, Rfl: 3    Multiple Vitamins-Minerals (MULTIVITAMIN ADULT PO), Take 1 tablet by mouth daily, Disp: , Rfl:     naltrexone (REVIA) 50 mg tablet, Take by mouth half tab daily for 7 days and increase to half tab twice daily . Take together with Bupropion., Disp: 30 tablet, Rfl: 1    prazosin (MINIPRESS) 1 mg capsule, Take 1 capsule (1 mg total) by mouth daily at bedtime, Disp: 90 capsule, Rfl: 0    rOPINIRole (REQUIP) 3 mg tablet, Take 1 tablet (3 mg total) by mouth  daily at bedtime, Disp: 90 tablet, Rfl: 0    sildenafil (VIAGRA) 50 MG tablet, Take 1 tablet (50 mg total) by mouth daily as needed for erectile dysfunction, Disp: 10 tablet, Rfl: 0    tiZANidine (ZANAFLEX) 2 mg tablet, TAKE 1 TABLET (2 MG TOTAL) BY MOUTH DAILY AT BEDTIME AS NEEDED FOR MUSCLE SPASMS, Disp: 30 tablet, Rfl: 0    Ventolin  (90 Base) MCG/ACT inhaler, TAKE 2 PUFFS BY MOUTH EVERY 6 HOURS AS NEEDED FOR WHEEZE, Disp: 18 g, Rfl: 5    Allergies   Allergen Reactions    Tetracycline Rash       Physical Exam:   Vitals:    09/26/24 1358   BP: 131/74   Pulse: 103   Resp: 20   SpO2: 96%     General: Awake, Alert, Oriented x 3, Mood and affect appropriate  Respiratory: Respirations even and unlabored  Cardiovascular: Peripheral pulses intact; no edema  Musculoskeletal Exam: pain with left hip flexion    ASA Score: 3    Patient/Chart Verification  Patient ID Verified: Verbal  ID Band Applied: No  Consents Confirmed: To be obtained in the Pre-Procedure area  H&P( within 30 days) Verified: To be obtained in the Procedural area  Interval H&P(within 24 hr) Complete (required for Outpatients and Surgery Admit only): To be obtained in the Procedural area  Allergies Reviewed: Yes  Anticoag/NSAID held?: NA  Currently on antibiotics?: No  Pregnancy denied?: No    Assessment:   1. Primary osteoarthritis of one hip, left        Plan: left hip osteoarthritis

## 2024-09-26 NOTE — DISCHARGE INSTR - LAB

## 2024-09-27 ENCOUNTER — TELEMEDICINE (OUTPATIENT)
Dept: BARIATRICS | Facility: CLINIC | Age: 69
End: 2024-09-27
Payer: MEDICARE

## 2024-09-27 ENCOUNTER — TELEPHONE (OUTPATIENT)
Dept: BARIATRICS | Facility: CLINIC | Age: 69
End: 2024-09-27

## 2024-09-27 VITALS — BODY MASS INDEX: 39.71 KG/M2 | HEIGHT: 68 IN | WEIGHT: 262 LBS

## 2024-09-27 DIAGNOSIS — E66.01 CLASS 2 SEVERE OBESITY WITH BODY MASS INDEX (BMI) OF 35 TO 39.9 WITH SERIOUS COMORBIDITY (HCC): Primary | ICD-10-CM

## 2024-09-27 DIAGNOSIS — F32.A DEPRESSION: ICD-10-CM

## 2024-09-27 DIAGNOSIS — E66.812 CLASS 2 SEVERE OBESITY WITH BODY MASS INDEX (BMI) OF 35 TO 39.9 WITH SERIOUS COMORBIDITY (HCC): Primary | ICD-10-CM

## 2024-09-27 DIAGNOSIS — I45.10 RIGHT BUNDLE BRANCH BLOCK (RBBB) ON ELECTROCARDIOGRAM (ECG): ICD-10-CM

## 2024-09-27 DIAGNOSIS — I51.89 GRADE I DIASTOLIC DYSFUNCTION: ICD-10-CM

## 2024-09-27 DIAGNOSIS — G47.33 OSA (OBSTRUCTIVE SLEEP APNEA): ICD-10-CM

## 2024-09-27 PROCEDURE — G2211 COMPLEX E/M VISIT ADD ON: HCPCS | Performed by: NURSE PRACTITIONER

## 2024-09-27 PROCEDURE — 99214 OFFICE O/P EST MOD 30 MIN: CPT | Performed by: NURSE PRACTITIONER

## 2024-09-27 RX ORDER — NALTREXONE HYDROCHLORIDE 50 MG/1
TABLET, FILM COATED ORAL
Qty: 60 TABLET | Refills: 0 | Status: SHIPPED | OUTPATIENT
Start: 2024-09-27

## 2024-09-27 RX ORDER — BUPROPION HYDROCHLORIDE 150 MG/1
TABLET ORAL
Qty: 180 TABLET | Refills: 0 | Status: SHIPPED | OUTPATIENT
Start: 2024-09-27

## 2024-09-27 NOTE — PROGRESS NOTES
Assessment/Plan:     Class 2 severe obesity with body mass index (BMI) of 35 to 39.9 with serious comorbidity (HCC)  - Patient is pursuing Conservative Program and follow up visits with medical weight management provider  - Initial weight loss goal of 5-10% weight loss for improved health. Weight loss can improve patient's co-morbid conditions and/or prevent weight-related complications.  - Explained the importance of continuing lifestyle changes in addition to any anti-obesity medications.   - Labs reviewed from 5/2024 and 8/2024    General Recommendations:  Nutrition:  Eat breakfast daily.  Do not skip meals.      Food log (ie.) www.[employer].com, sparkpeople.com, [employer]com, [employer]com, etc.     Practice mindful eating.  Be sure to set aside time to eat, eat slowly, and savor your food.     Hydration:    At least 64oz of water daily.  No sugar sweetened beverages.  No juice (eat the fruit instead).     Exercise:  Studies have shown that the ideal exercise goal is somewhere between 150 to 300 minutes of moderate intensity exercise a week.  Start with exercising 10 minutes every other day and gradually increase physical activity with a goal of at least 150 minutes of moderate intensity exercise a week, divided over at least 3 days a week.  An example of this would be exercising 30 minutes a day, 5 days a week.  Resistance training can increase muscle mass and increase our resting metabolic rate.   FULL BODY resistance training is recommended 2-3 times a week.  Do not do this on consecutive days to allow for muscle recovery.     Aim for a bare minimum 5000 steps, even on days you do not exercise.     Monitoring:   Weigh yourself daily.  If this causes undue stress, then just weigh yourself once a week.  Weigh yourself the same time of the day with the same amount of clothing on.  Preferably this should be done after waking up, before you eat, and with no clothing or minimal clothing on.     Specific  Goals:  Patient lifestyle habits were reviewed and barriers to weight loss were addressed. Nutrition was discussed and patient was strongly encouraged to see the dietician to get a better idea of his nutrition requirements and how he should eat throughout the day. He is likely under consuming protein and over consuming calories. Patient is going to consider this.  Medications were discussed and patient will continue with bupropion/naltrexone. He would also like to pursue GLP-1 medications if possible. Will prescribe Wegovy as he would benefit from the weight loss options as well as the heart disease benefit.     Wegovy Instructions:    - Begin Wegovy 0.25 mg subcutaneously once a week. Dose changes may occur after 4 doses if medication is tolerated. You will be assessed prior to each dose change to make sure you are tolerating the medication well.  - Please message me when you have 2 pens left from the prescription so there are no lapses in treatment.  - If you have been off the medication for more than 14 days please contact the office as you will need to restart the titration at the starting dose again to avoid significant side effects or adverse events.  - Visit wegovy.com for further information/injection instructions.   -Please eat small frequent meals to help reduce nausea. Lemon water and saltine crackers may help with this.   - Side effects of Wegovy discussed: nausea, vomiting, diarrhea, and constipation. If you experience fever, nausea/vomiting, and pain radiating to your back this may be a sign of pancreatitis. Please go to the emergency room if this occurs.  - Patient understands the side effects of the medication and proper administration. Patient agrees with the treatment plan and all questions were answered.   -Supply concerns were discussed with patient and patient voiced understanding that they will need to call with adequate time for refills to avoid any lapses in treatment.  Patient may also have  to call around to different pharmacies to see if any pharmacy has the appropriate dose in stock.      Grade I diastolic dysfunction  Would likely benefit from GLP-1 therapy for heart disease benefit         Devon was seen today for follow-up.    Diagnoses and all orders for this visit:    Class 2 severe obesity with body mass index (BMI) of 35 to 39.9 with serious comorbidity (HCC)  -     naltrexone (REVIA) 50 mg tablet; Take by mouth half tab daily for 7 days and increase to half tab twice daily . Take together with Bupropion.  -     buPROPion (WELLBUTRIN XL) 150 mg 24 hr tablet; Take 1 tablet twice daily in the morning and evening    Depression  -     buPROPion (WELLBUTRIN XL) 150 mg 24 hr tablet; Take 1 tablet twice daily in the morning and evening    Grade I diastolic dysfunction  -     Semaglutide-Weight Management (WEGOVY) 0.25 MG/0.5ML; Inject 0.5 mL (0.25 mg total) under the skin once a week    Right bundle branch block (RBBB) on electrocardiogram (ECG)  -     Semaglutide-Weight Management (WEGOVY) 0.25 MG/0.5ML; Inject 0.5 mL (0.25 mg total) under the skin once a week    ANTHONY (obstructive sleep apnea)  -     Semaglutide-Weight Management (WEGOVY) 0.25 MG/0.5ML; Inject 0.5 mL (0.25 mg total) under the skin once a week        Total time spent reviewing chart, interviewing patient, examining patient, discussing plan, answering all questions, and documentin minutes with >50% face-to-face time with the patient.    Follow up in approximately 2 months with Non-Surgical Physician/Advanced Practitioner.      Patient ID: Devon Aly  is a 69 y.o. male with excess weight/obesity here to pursue weight management.  Patient is pursuing Conservative Program.   Most recent notes and records were reviewed.    Telemedicine consent    Patient: Devon Ayl  Provider: ERIC Son  Provider located at [facility] [address]  [address]  [facility] [address]  [phone]    The patient was identified by name and date of birth. Devon Aly was informed that this is a telemedicine visit and that the visit is being conducted through the [employer] platform. He agrees to proceed..  My office door was closed. No one else was in the room.  He acknowledged consent and understanding of privacy and security of the video platform. The patient has agreed to participate and understands they can discontinue the visit at any time. The patient is located in the [facility] which I do hold an active license.      Patient is aware this is a billable service.    HPI    Wt Readings from Last 20 Encounters:   09/27/24 119 kg (262 lb)   09/09/24 121 kg (266 lb)   08/07/24 122 kg (270 lb)   08/01/24 124 kg (272 lb 6.4 oz)   07/23/24 123 kg (270 lb 9.6 oz)   07/08/24 125 kg (275 lb)   06/26/24 125 kg (275 lb)   05/24/24 125 kg (275 lb)   04/22/24 122 kg (270 lb)   03/05/24 125 kg (275 lb)   02/27/24 125 kg (275 lb)   02/20/24 126 kg (277 lb 6.4 oz)   02/08/24 123 kg (271 lb)   01/29/24 125 kg (275 lb)   01/15/24 125 kg (275 lb)   12/04/23 125 kg (274 lb 9.6 oz)   11/09/23 120 kg (264 lb 12.8 oz)   10/30/23 119 kg (262 lb 12.8 oz)   08/22/23 121 kg (266 lb 4.8 oz)   08/08/23 121 kg (266 lb 3.2 oz)       Patient presents today to medical weight management office for follow up.  Patient was started on bupropion/naltrexone at last office visit in June.  Patient states that the medication has been helping him snack less at night. He denies any side effects to the medications. He is encouraged by his weight loss and would like to continue.   He has not changed many of his meal choices. He has not met with a dietician as he feels that he has too many doctor appointments already.   He is limited with his activity due to his joint pains.    Weight loss medication and dose: bupropion/naltrexone  Started date and weight: 275lbs in 6/2024  Current weight: 262  "lbs  Difference: -12 lbs        B- coffee - sometimes with miller and eggs and toast, often skips  L- skips OR sandwich (cold cuts or tuna)  D- rice with chicken OR steak with peas and carrots  S- popcorn, chips, twinkies    Hydration- water, soda (trying to cut down)- 2 bottles daily  Alcohol- no  Exercise- limited due to breathing        The following portions of the patient's history were reviewed and updated as appropriate: allergies, current medications, past family history, past medical history, past social history, past surgical history, and problem list.    Family History   Problem Relation Age of Onset    Heart disease Mother     Coronary artery disease Mother     Heart failure Mother     Rheum arthritis Mother     Sudden death Father     No Known Problems Son     No Known Problems Daughter     No Known Problems Maternal Grandmother     No Known Problems Maternal Grandfather     No Known Problems Paternal Grandmother     No Known Problems Paternal Grandfather     No Known Problems Daughter     Kidney disease Brother     Cancer Brother         Review of Systems   Constitutional:  Negative for fatigue.   HENT:  Negative for sore throat.    Respiratory:  Negative for cough and shortness of breath.    Cardiovascular:  Negative for chest pain, palpitations and leg swelling.   Gastrointestinal:  Negative for abdominal pain, constipation, diarrhea and nausea.   Genitourinary:  Negative for dysuria.   Musculoskeletal:  Positive for arthralgias. Negative for back pain.   Skin:  Negative for rash.   Neurological:  Negative for headaches.   Psychiatric/Behavioral:  Negative for dysphoric mood. The patient is not nervous/anxious.        Objective:  Ht 5' 8\" (1.727 m)   Wt 119 kg (262 lb)   BMI 39.84 kg/m²     Physical Exam  Vitals and nursing note reviewed.   Constitutional:       Appearance: Normal appearance. He is obese.   HENT:      Head: Normocephalic.   Pulmonary:      [place]: Pulmonary effort is normal. "   Neurological:      General: No focal deficit present.      Mental Status: He is alert and oriented to person, place, and time.   Psychiatric:         Mood and Affect: Mood normal.         Behavior: Behavior normal.         Thought Content: Thought content normal.         Judgment: Judgment normal.            Labs   Most recent labs reviewed   Lab Results   Component Value Date    SODIUM 141 08/01/2024    K 3.8 08/01/2024     08/01/2024    CO2 24 08/01/2024    AGAP 10 08/01/2024    BUN 16 08/01/2024    CREATININE 1.28 08/01/2024    GLUC 65 08/01/2024    GLUF 92 10/27/2023    CALCIUM 9.5 08/01/2024    AST 32 03/10/2024    ALT 39 03/10/2024    ALKPHOS 78 03/10/2024    TP 6.4 03/10/2024    TBILI 0.4 03/10/2024    EGFR 57 08/01/2024     Lab Results   Component Value Date    HGBA1C 5.8 05/24/2024     Lab Results   Component Value Date    QFD5LBRGISIE 1.890 04/13/2022     Lab Results   Component Value Date    CHOLESTEROL 158 04/13/2022     Lab Results   Component Value Date    HDL 37 (L) 04/13/2022     Lab Results   Component Value Date    TRIG 120 04/13/2022     Lab Results   Component Value Date    LDLCALC 97 04/13/2022

## 2024-09-28 PROBLEM — E66.812 CLASS 2 SEVERE OBESITY WITH BODY MASS INDEX (BMI) OF 35 TO 39.9 WITH SERIOUS COMORBIDITY (HCC): Status: ACTIVE | Noted: 2022-10-25

## 2024-09-28 NOTE — ASSESSMENT & PLAN NOTE
- Patient is pursuing Conservative Program and follow up visits with medical weight management provider  - Initial weight loss goal of 5-10% weight loss for improved health. Weight loss can improve patient's co-morbid conditions and/or prevent weight-related complications.  - Explained the importance of continuing lifestyle changes in addition to any anti-obesity medications.   - Labs reviewed from 5/2024 and 8/2024    General Recommendations:  Nutrition:  Eat breakfast daily.  Do not skip meals.      Food log (ie.) www.myfitnesspal.com, sparkpeople.com, loseit.com, calorieking.com, etc.     Practice mindful eating.  Be sure to set aside time to eat, eat slowly, and savor your food.     Hydration:    At least 64oz of water daily.  No sugar sweetened beverages.  No juice (eat the fruit instead).     Exercise:  Studies have shown that the ideal exercise goal is somewhere between 150 to 300 minutes of moderate intensity exercise a week.  Start with exercising 10 minutes every other day and gradually increase physical activity with a goal of at least 150 minutes of moderate intensity exercise a week, divided over at least 3 days a week.  An example of this would be exercising 30 minutes a day, 5 days a week.  Resistance training can increase muscle mass and increase our resting metabolic rate.   FULL BODY resistance training is recommended 2-3 times a week.  Do not do this on consecutive days to allow for muscle recovery.     Aim for a bare minimum 5000 steps, even on days you do not exercise.     Monitoring:   Weigh yourself daily.  If this causes undue stress, then just weigh yourself once a week.  Weigh yourself the same time of the day with the same amount of clothing on.  Preferably this should be done after waking up, before you eat, and with no clothing or minimal clothing on.     Specific Goals:  Patient lifestyle habits were reviewed and barriers to weight loss were addressed. Nutrition was discussed and  patient was strongly encouraged to see the dietician to get a better idea of his nutrition requirements and how he should eat throughout the day. He is likely under consuming protein and over consuming calories. Patient is going to consider this.  Medications were discussed and patient will continue with bupropion/naltrexone. He would also like to pursue GLP-1 medications if possible. Will prescribe Wegovy as he would benefit from the weight loss options as well as the heart disease benefit.     Wegovy Instructions:    - Begin Wegovy 0.25 mg subcutaneously once a week. Dose changes may occur after 4 doses if medication is tolerated. You will be assessed prior to each dose change to make sure you are tolerating the medication well.  - Please message me when you have 2 pens left from the prescription so there are no lapses in treatment.  - If you have been off the medication for more than 14 days please contact the office as you will need to restart the titration at the starting dose again to avoid significant side effects or adverse events.  - Visit wegovy.com for further information/injection instructions.   -Please eat small frequent meals to help reduce nausea. Lemon water and saltine crackers may help with this.   - Side effects of Wegovy discussed: nausea, vomiting, diarrhea, and constipation. If you experience fever, nausea/vomiting, and pain radiating to your back this may be a sign of pancreatitis. Please go to the emergency room if this occurs.  - Patient understands the side effects of the medication and proper administration. Patient agrees with the treatment plan and all questions were answered.   -Supply concerns were discussed with patient and patient voiced understanding that they will need to call with adequate time for refills to avoid any lapses in treatment.  Patient may also have to call around to different pharmacies to see if any pharmacy has the appropriate dose in stock.

## 2024-09-30 ENCOUNTER — TELEPHONE (OUTPATIENT)
Age: 69
End: 2024-09-30

## 2024-09-30 NOTE — TELEPHONE ENCOUNTER
----- Message from Zhao Duff MD sent at 9/30/2024  8:53 AM EDT -----  Please let patient know his US was negative for an abdominal aortic aneurysm, no need to screen again

## 2024-10-10 ENCOUNTER — SOCIAL WORK (OUTPATIENT)
Dept: BEHAVIORAL/MENTAL HEALTH CLINIC | Facility: CLINIC | Age: 69
End: 2024-10-10
Payer: OTHER MISCELLANEOUS

## 2024-10-10 ENCOUNTER — TELEPHONE (OUTPATIENT)
Dept: PAIN MEDICINE | Facility: CLINIC | Age: 69
End: 2024-10-10

## 2024-10-10 DIAGNOSIS — F33.2 MAJOR DEPRESSIVE DISORDER, RECURRENT SEVERE WITHOUT PSYCHOTIC FEATURES (HCC): ICD-10-CM

## 2024-10-10 DIAGNOSIS — F43.10 PTSD (POST-TRAUMATIC STRESS DISORDER): Primary | ICD-10-CM

## 2024-10-10 DIAGNOSIS — R41.3 MEMORY DIFFICULTY: ICD-10-CM

## 2024-10-10 PROCEDURE — 90837 PSYTX W PT 60 MINUTES: CPT | Performed by: SOCIAL WORKER

## 2024-10-10 NOTE — BH TREATMENT PLAN
Outpatient Behavioral Health Psychotherapy Treatment Plan    Devon Aly  1955     Date of Initial Psychotherapy Assessment: 05/06/2020  Date of Current Treatment Plan: 10/10/24  Treatment Plan Target Date: 04/01/2025  Treatment Plan Expiration Date: 04/01/2025    Diagnosis:   1. PTSD (post-traumatic stress disorder)        2. 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 Memory difficulty            Area(s) of Need: Please see below    Long Term Goal 1 (in the client's own words): I still need help managing my depression and my anxiety.     Stage of Change: Action    Target Date for completion: 04/01/2025     Anticipated therapeutic modalities: mindfulness,CBT     People identified to complete this goal: myself with the help of my therapist.      Objective 1: (identify the means of measuring success in meeting the objective): When the symptoms happen I will be able to keep them at a minimum.       Objective 2: (identify the means of measuring success in meeting the objective):       Long Term Goal 2 (in the client's own words): I continue to deal with the trauma of my work related illness which has caused me chronic physical and psychiatric symptoms.     Stage of Change: Action    Target Date for completion: 04/01/2025     Anticipated therapeutic modalities: Mindfulness and CBT.      People identified to complete this goal: myself with the help of my therapist.       Objective 1: (identify the means of measuring success in meeting the objective): I will learn to cope with the terrible pain I am in.       Objective 2: (identify the means of measuring success in meeting the objective):      Long Term Goal 3 (in the client's own words):     Stage of Change:     Target Date for completion:      Anticipated therapeutic modalities:        People identified to complete this goal:       Objective 1: (identify the means of measuring success in meeting the objective):        Objective 2: (identify the means of measuring success in meeting the objective):      I am currently under the care of a [facility] psychiatric provider: yes    My [facility] psychiatric provider is: MARTIR Jones    I am currently taking psychiatric medications: Yes, as prescribed    I feel that I will be ready for discharge from mental health care when I reach the following (measurable goal/objective): when I make the progress I am comfortable with.     For children and adults who have a legal guardian:   Has there been any change to custody orders and/or guardianship status? NA. If yes, attach updated documentation.    I have created my Crisis Plan and have been offered a copy of this plan    Behavioral Health Treatment Plan  Luke: Diagnosis and Treatment Plan explained to Devon Aly acknowledges an understanding of their diagnosis. Devon Aly agrees to this treatment plan.    I have been offered a copy of this Treatment Plan. yes

## 2024-10-10 NOTE — PSYCH
"Behavioral Health Psychotherapy Progress Note    Psychotherapy Provided: Individual Psychotherapy     1. PTSD (post-traumatic stress disorder)        2. 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 Memory difficulty            Goals addressed in session: Goal 1 and Goal 2     DATA: When Galileo arrived and walked from the waiting room to my office he was breathing so hard. He continues to deal with the trauma of his severe work related illness. This exacerbates his depression and his anxiety. He discussed his anger and his frustration over his health issues. He is also going thru a lot of stress. They recently lost his father in law and his wife had to handle everything while her siblings did nothing. Yet they gave her issues about the inheritance. His daughter is going thru a divorce and his son is in a custody wong where the baby's mom is doing meth. I provided support, encouragement and strategies to cope.   During this session, this clinician used the following therapeutic modalities: Client-centered Therapy, Cognitive Behavioral Therapy, Mindfulness-based Strategies, and Supportive Psychotherapy    Substance Abuse was not addressed during this session. If the client is diagnosed with a co-occurring substance use disorder, please indicate any changes in the frequency or amount of use: n/a. Stage of change for addressing substance use diagnoses: No substance use/Not applicable    ASSESSMENT:  Devon Aly presents with a Anxious and Depressed mood.     his affect is anxious and depressed, which is congruent, with his mood and the content of the session. The client has made progress on their goals.However his health problems are severe due to his work related illness.     Devon Aly presents with a none risk of suicide, none risk of self-harm, and none risk of harm to others.    For any risk assessment that surpasses a \"low\" rating, a safety plan must be " developed.    A safety plan was indicated: no  If yes, describe in detail n/a    PLAN: Between sessions, Devon Aly will use mindfulness and CBT. At the next session, the therapist will use Client-centered Therapy, Cognitive Behavioral Therapy, Mindfulness-based Strategies, and Supportive Psychotherapy to address issues and symptoms as they may arise. .    Behavioral Health Treatment Plan and Discharge Planning: Devon Aly is aware of and agrees to continue to work on their treatment plan. They have identified and are working toward their discharge goals. yes    Visit start and stop times:    10/10/24  Start Time: 1300  Stop Time: 1400  Total Visit Time: 60 minutes

## 2024-10-15 ENCOUNTER — TELEPHONE (OUTPATIENT)
Age: 69
End: 2024-10-15

## 2024-10-15 ENCOUNTER — OFFICE VISIT (OUTPATIENT)
Age: 69
End: 2024-10-15
Payer: MEDICARE

## 2024-10-15 VITALS
OXYGEN SATURATION: 93 % | WEIGHT: 264 LBS | SYSTOLIC BLOOD PRESSURE: 124 MMHG | DIASTOLIC BLOOD PRESSURE: 64 MMHG | HEART RATE: 97 BPM | BODY MASS INDEX: 40.01 KG/M2 | HEIGHT: 68 IN | TEMPERATURE: 98.2 F

## 2024-10-15 DIAGNOSIS — F17.211 CIGARETTE NICOTINE DEPENDENCE IN REMISSION: ICD-10-CM

## 2024-10-15 DIAGNOSIS — G47.33 OSA (OBSTRUCTIVE SLEEP APNEA): ICD-10-CM

## 2024-10-15 DIAGNOSIS — E66.812 CLASS 2 SEVERE OBESITY WITH BODY MASS INDEX (BMI) OF 35 TO 39.9 WITH SERIOUS COMORBIDITY (HCC): ICD-10-CM

## 2024-10-15 DIAGNOSIS — U09.9 COVID-19 LONG HAULER: ICD-10-CM

## 2024-10-15 DIAGNOSIS — J96.11 CHRONIC HYPOXEMIC RESPIRATORY FAILURE (HCC): ICD-10-CM

## 2024-10-15 DIAGNOSIS — J43.2 CENTRILOBULAR EMPHYSEMA (HCC): ICD-10-CM

## 2024-10-15 DIAGNOSIS — J84.9 INTERSTITIAL LUNG DISEASE (HCC): ICD-10-CM

## 2024-10-15 DIAGNOSIS — R06.02 SOB (SHORTNESS OF BREATH): Primary | ICD-10-CM

## 2024-10-15 DIAGNOSIS — E66.01 CLASS 2 SEVERE OBESITY WITH BODY MASS INDEX (BMI) OF 35 TO 39.9 WITH SERIOUS COMORBIDITY (HCC): ICD-10-CM

## 2024-10-15 DIAGNOSIS — F32.A DEPRESSION: ICD-10-CM

## 2024-10-15 DIAGNOSIS — E66.01 MORBID OBESITY (HCC): ICD-10-CM

## 2024-10-15 PROCEDURE — 99214 OFFICE O/P EST MOD 30 MIN: CPT | Performed by: INTERNAL MEDICINE

## 2024-10-15 PROCEDURE — G2211 COMPLEX E/M VISIT ADD ON: HCPCS | Performed by: INTERNAL MEDICINE

## 2024-10-15 RX ORDER — UMECLIDINIUM BROMIDE AND VILANTEROL TRIFENATATE 62.5; 25 UG/1; UG/1
1 POWDER RESPIRATORY (INHALATION) DAILY
Qty: 60 EACH | Refills: 5 | Status: SHIPPED | OUTPATIENT
Start: 2024-10-15

## 2024-10-15 RX ORDER — ALBUTEROL SULFATE 0.83 MG/ML
2.5 SOLUTION RESPIRATORY (INHALATION) EVERY 6 HOURS PRN
Qty: 375 ML | Refills: 2 | Status: SHIPPED | OUTPATIENT
Start: 2024-10-15

## 2024-10-15 RX ORDER — BUPROPION HYDROCHLORIDE 150 MG/1
TABLET ORAL
Qty: 180 TABLET | Refills: 0 | Status: SHIPPED | OUTPATIENT
Start: 2024-10-15 | End: 2024-10-21

## 2024-10-15 NOTE — TELEPHONE ENCOUNTER
Pt needs a refill on :    buPROPion (WELLBUTRIN XL) 150 mg 24 hr tablet [[id number]]    Order Details  Dose, Route, Frequency: As Directed   Dispense Quantity: 180 tablet Refills: 0          Sig: Take 1 tablet twice daily in the morning and evening         Start Date: 09/27/24 End Date: --   Written Date: 09/27/24 Expiration Date: 09/27/25       Associated Diagnoses: Class 2 severe obesity with body mass index (BMI) of 35 to 39.9 with serious comorbidity (HCC) [E66.01]; Depression [F32.A]       It needs to be sent to [facility] in [place]. [id number], [place] [address].

## 2024-10-15 NOTE — PROGRESS NOTES
"Assessment/Plan:   Diagnoses and all orders for this visit:    SOB (shortness of breath)    Interstitial lung disease (HCC)  -     albuterol (2.5 mg/3 mL) 0.083 % nebulizer solution; Take 3 mL (2.5 mg total) by nebulization every 6 (six) hours as needed for wheezing or shortness of breath    Centrilobular emphysema (HCC)  -     umeclidinium-vilanterol (Anoro Ellipta) 62.5-25 mcg/actuation inhaler; Inhale 1 puff daily    Chronic hypoxemic respiratory failure (HCC)    ANTHONY (obstructive sleep apnea)    Morbid obesity (HCC)    Last PFTs done in June 2022 with moderately reduced volumes diffusion capacity and restriction on the spirometry  's findings are likely secondary to his previous severe COVID-19 infection and morbid obesity  Discussed with the patient to continue with Anoro 1 puff daily  Continue with albuterol via the nebulizer 4 times daily as needed  Continue with 2 L of oxygen continuous.  He did complete the course of physical therapy that was given  Moderate obstructive sleep apnea did not get his CPAP it was ordered but he states he did not get it yet because of switching the insurance plans he states.  He will let us know once he gets the machine again discussed the need for compliance with the CPAP machine understands and verbalizes  95-pack-year smoking history quit about 4 years ago  Recent CT lung screening CAT scan with neuro knows new suspicious lung nodules  He is due for another 1 in June 2025  His paperwork for disability has been filled  Recommend weight loss  Follow-up in 6 months or earlier as needed      Return in about 6 months (around 4/15/2025).  All questions are answered to the patient's satisfaction and understanding.  He verbalizes understanding.  He is encouraged to call with any further questions or concerns.    Portions of the record may have been created with voice recognition software.  Occasional wrong word or \"sound a like\" substitutions may have occurred due to the inherent " limitations of voice recognition software.  Read the chart carefully and recognize, using context, where substitutions have occurred.    Electronically Signed by Sera Garcia MD    ______________________________________________________________________    Chief Complaint:   Chief Complaint   Patient presents with    Follow-up       Patient ID: Devon is a 69 y.o. y.o. male has a past medical history of Anxiety, Chronic kidney disease, COVID-19 (03/2020), Depression, Hypertension (4/02/2020), Liver disease, Pneumonia (3/32/2020), and PTSD (post-traumatic stress disorder).    10/15/2024  Patient presents today for follow-up visit.  Devon Aly is a very pleasant 69 y.o. male with a past medical history including but not limited to emphysema, former 95-pack-year smoking history quit 4 years ago, ANTHONY, COVID-19 long-hauler, restrictive lung disease, hypertension, and obesity.    primary symptoms  Associated symptoms include fatigue and headaches. Pertinent negatives include no chest pain, fever, myalgias or sore throat.       Review of Systems   Constitutional:  Positive for fatigue. Negative for appetite change and fever.   HENT:  Positive for postnasal drip and rhinorrhea. Negative for ear pain, sneezing, sore throat and trouble swallowing.    Eyes: Negative.    Respiratory:  Positive for shortness of breath.    Cardiovascular: Negative.  Negative for chest pain.   Gastrointestinal: Negative.    Endocrine: Negative.    Genitourinary: Negative.    Musculoskeletal:  Positive for back pain. Negative for myalgias.   Allergic/Immunologic: Negative.    Neurological:  Positive for headaches.   Psychiatric/Behavioral:  Positive for sleep disturbance.        Smoking history: He reports that he quit smoking about 4 years ago. His smoking use included cigarettes. He started smoking about 51 years ago. He has a 94.5 pack-year smoking history. He has been exposed to tobacco smoke. He has never used smokeless tobacco.    The  following portions of the patient's history were reviewed and updated as appropriate: allergies, current medications, past family history, past medical history, past social history, past surgical history, and problem list.    Immunization History   Administered Date(s) Administered    COVID-19 PFIZER VACCINE 0.3 ML IM 04/15/2021, 05/08/2021, 02/04/2022    INFLUENZA 10/05/2020, 09/25/2021, 12/22/2022, 12/04/2023    Influenza, high dose seasonal 0.7 mL 12/22/2022, 12/04/2023    Influenza, recombinant, quadrivalent,injectable, preservative free 10/05/2020    Pneumococcal Conjugate 13-Valent 10/05/2020    Pneumococcal Conjugate Vaccine 20-valent (Pcv20), Polysace 12/22/2022    Tdap 09/09/2024     Current Outpatient Medications   Medication Sig Dispense Refill    albuterol (2.5 mg/3 mL) 0.083 % nebulizer solution Take 3 mL (2.5 mg total) by nebulization every 6 (six) hours as needed for wheezing or shortness of breath 375 mL 2    buPROPion (WELLBUTRIN XL) 150 mg 24 hr tablet Take 1 tablet twice daily in the morning and evening 180 tablet 0    donepezil (ARICEPT) 10 mg tablet TAKE 1 TABLET BY MOUTH EVERYDAY AT BEDTIME 90 tablet 1    DULoxetine (CYMBALTA) 60 mg delayed release capsule Take 1 capsule (60 mg total) by mouth every morning 90 capsule 0    gabapentin (NEURONTIN) 100 mg capsule One po qam and 2 po q bedtime 90 capsule 2    metFORMIN (GLUCOPHAGE-XR) 500 mg 24 hr tablet Take 1 tablet (500 mg total) by mouth 2 (two) times a day with meals 180 tablet 3    Multiple Vitamins-Minerals (MULTIVITAMIN ADULT PO) Take 1 tablet by mouth daily      naltrexone (REVIA) 50 mg tablet Take by mouth half tab daily for 7 days and increase to half tab twice daily . Take together with Bupropion. 60 tablet 0    oxygen gas Inhale 2 L/min continuous as needed      prazosin (MINIPRESS) 1 mg capsule Take 1 capsule (1 mg total) by mouth daily at bedtime 90 capsule 0    rOPINIRole (REQUIP) 3 mg tablet Take 1 tablet (3 mg total) by mouth  "daily at bedtime 90 tablet 0    Semaglutide-Weight Management (WEGOVY) 0.25 MG/0.5ML Inject 0.5 mL (0.25 mg total) under the skin once a week 2 mL 0    sildenafil (VIAGRA) 50 MG tablet Take 1 tablet (50 mg total) by mouth daily as needed for erectile dysfunction 10 tablet 0    tiZANidine (ZANAFLEX) 2 mg tablet TAKE 1 TABLET (2 MG TOTAL) BY MOUTH DAILY AT BEDTIME AS NEEDED FOR MUSCLE SPASMS 30 tablet 0    umeclidinium-vilanterol (Anoro Ellipta) 62.5-25 mcg/actuation inhaler Inhale 1 puff daily 60 each 5    Ventolin  (90 Base) MCG/ACT inhaler TAKE 2 PUFFS BY MOUTH EVERY 6 HOURS AS NEEDED FOR WHEEZE 18 g 5     No current facility-administered medications for this visit.     Allergies: Tetracycline    Objective:  Vitals:    10/15/24 1003   BP: 124/64   Pulse: 97   Temp: 98.2 °F (36.8 °C)   SpO2: 93%   Weight: 120 kg (264 lb)   Height: 5' 8\" (1.727 m)   Oxygen Therapy  SpO2: 93 % (with 2 lts of oxygen)  .  Wt Readings from Last 3 Encounters:   10/15/24 120 kg (264 lb)   09/27/24 119 kg (262 lb)   09/09/24 121 kg (266 lb)     Body mass index is 40.14 kg/m².    Physical Exam  Constitutional:       Appearance: He is well-developed.   HENT:      Head: Normocephalic and atraumatic.   Eyes:      Pupils: Pupils are equal, round, and reactive to light.   Neck:      Comments: Short and wide neck  Cardiovascular:      Rate and Rhythm: Normal rate and regular rhythm.      Heart sounds: Normal heart sounds.   Pulmonary:      Effort: Pulmonary effort is normal.      Breath sounds: Normal breath sounds.   Musculoskeletal:         General: Normal range of motion.      Cervical back: Normal range of motion and neck supple.   Skin:     General: Skin is warm and dry.   Neurological:      Mental Status: He is alert and oriented to person, place, and time.   Psychiatric:         Behavior: Behavior normal.         Lab Review:   Appointment on 08/01/2024   Component Date Value    Vit D, 25-Hydroxy 08/01/2024 99.8     Color, UA " 08/01/2024 Yellow     Clarity, UA 08/01/2024 Clear     Specific Gravity, UA 08/01/2024 1.022     pH, UA 08/01/2024 6.0     Leukocytes, UA 08/01/2024 Negative     Nitrite, UA 08/01/2024 Negative     Protein, UA 08/01/2024 Trace (A)     Glucose, UA 08/01/2024 Negative     Ketones, UA 08/01/2024 Negative     Urobilinogen, UA 08/01/2024 <2.0     Bilirubin, UA 08/01/2024 Negative     Occult Blood, UA 08/01/2024 Negative     RBC, UA 08/01/2024 1-2     WBC, UA 08/01/2024 1-2     Epithelial Cells 08/01/2024 None Seen     Bacteria, UA 08/01/2024 None Seen     PTH 08/01/2024 29.2     Creatinine, Ur 08/01/2024 188.0     Protein Urine Random 08/01/2024 11     Prot/Creat Ratio, Ur 08/01/2024 0.06     Phosphorus 08/01/2024 3.2     WBC 08/01/2024 10.32 (H)     RBC 08/01/2024 5.69 (H)     Hemoglobin 08/01/2024 15.9     Hematocrit 08/01/2024 49.8 (H)     MCV 08/01/2024 88     MCH 08/01/2024 27.9     MCHC 08/01/2024 31.9     RDW 08/01/2024 16.6 (H)     MPV 08/01/2024 10.8     Platelets 08/01/2024 242     nRBC 08/01/2024 0     Segmented % 08/01/2024 70     Immature Grans % 08/01/2024 1     Lymphocytes % 08/01/2024 14     Monocytes % 08/01/2024 10     Eosinophils Relative 08/01/2024 4     Basophils Relative 08/01/2024 1     Absolute Neutrophils 08/01/2024 7.27     Absolute Immature Grans 08/01/2024 0.12     Absolute Lymphocytes 08/01/2024 1.41     Absolute Monocytes 08/01/2024 1.04     Eosinophils Absolute 08/01/2024 0.40     Basophils Absolute 08/01/2024 0.08     Sodium 08/01/2024 141     Potassium 08/01/2024 3.8     Chloride 08/01/2024 107     CO2 08/01/2024 24     ANION GAP 08/01/2024 10     BUN 08/01/2024 16     Creatinine 08/01/2024 1.28     Glucose 08/01/2024 65     Calcium 08/01/2024 9.5     eGFR 08/01/2024 57    Orders Only on 07/09/2024   Component Date Value    Supplier Name 07/09/2024 [employer]     Supplier Phone Number 07/09/2024 [phone]     Order Status 07/09/2024 Accepted Pt Already On  Service     Delivery Request Date 07/09/2024 07/09/2024     Date Delivered  07/09/2024 07/10/2024     Supplier Name 07/09/2024 07/10/2024     Item Description 07/09/2024 Home Oxygen Concentrator with Portability, Adult, Standard Liter Flow     Item Description 07/09/2024 Portable Oxygen Concentrator     Item Description 07/09/2024 No Portable Contents     Item Description 07/09/2024 No Conserving Device     Item Description 07/09/2024 O2 Humidifier Bottle, Standard Liter Flow    Office Visit on 05/24/2024   Component Date Value    Hemoglobin A1C 05/24/2024 5.8        Diagnostics:  Results Review Statement: No pertinent imaging studies reviewed.  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 COMPARISON: None FINDINGS: Ultrasound of the abdominal aorta was performed in longitudinal and transverse planes with a curvilinear transducer. Portions of the abdominal aorta are suboptimally visualized due to shadowing. Proximal aorta: 1.8 x 2.3 cm Mid aorta: 2.0 x 1.8 cm Distal aorta: 2.1 x 1.9 cm Right common iliac origin: 1.0 cm Left common iliac origin: 0.9 cm No periaortic collections or adenopathy detected.     Impression: No evidence for abdominal aortic aneurysm. Portions of the abdominal aorta are suboptimally visualized due to shadowing. Workstation performed: [id number]     FL spine and pain procedure    Result Date: 9/26/2024  Narrative: PROCEDURE NOTE PATIENT NAME:  Devon Aly MEDICAL RECORD NUMBER:  [id number] YOB: 1955 DATE OF PROCEDURE:  09/26/24 PROCEDURE: Left sided intra-articular hip injection with steroid and local anesthetic under fluoroscopic guidance. ATTENDING PHYSICIAN: Jorge Wolf M.D. PRE-PROCEDURE DIAGNOSIS: Degenerative joint disease of the hip POST-PROCEDURE DIAGNOSIS: Degenerative joint disease  of the hip ANESTHESIA: Local ESTIMATED BLOOD LOSS: Minimal COMPLICATIONS: None CONSENT: Today's procedure, its potential benefits as well as its risks and potential side effects were reviewed. Discussed risks of the procedure include bleeding, infection, nerve irritation or damage, reactions to medications administered, failure of the pain to improve, and exacerbation of the pain. These risks were explained to the patient, who verbalized understanding and who wished to proceed. Informed consent was signed. DESCRIPTION OF PROCEDURE: After written informed consent was obtained, the patient was taken to the fluoroscopy suite and placed in the supine position. Anatomical landmarks were identified by way of fluoroscopy in the AP view. The hip was internally rotated and the patient verbalized comfort in this position. The skin overlying the needle insertion site was prepped using antiseptic and draped in the usual sterile fashion. Strict aseptic technique was utilized throughout the procedure. The skin and subcutaneous tissues at the needle entry site were infiltrated with 1 mL of 1% preservative-free lidocaine using a 25 gauge 1-1/2 inch needle. A 22 gauge 3-1/2- inch needle was then incrementally advanced under fluoroscopic guidance starting at the intertrochanteric line. The needle was advanced medially and cephalad until os was contacted at the femoral neck. Proper needle placement was confirmed with the aid of fluoroscopy in the AP view. After negative aspiration for heme, 1 mL Omnipaque 300mg/ml was injected which delineated the joint capsule under fluoroscopy in the AP view. After negative aspiration was again confirmed, a mixture of 1 mL of 80mg/mL Depo-Medrol and 4 mL of preservative-free 0.2% ropivacaine was slowly injected. All needles were removed with tip intact. Hemostasis was maintained. There were no apparent paresthesias, lower extremity weakness or complications. The area was cleaned and a Band-Aid was  placed as necessary. The patient tolerated the procedure well. After a period of observation, the patient was noted to be hemodynamically stable and neurovascularly intact following the procedure as prior to the procedure, and was ultimately discharged to home with supervision in good condition. The patient was provided discharge instructions and instructed that we would call in approximately 7 days to obtain an update.   Answers submitted by the patient for this visit:  Pulmonology Questionnaire (Submitted on 10/9/2024)  Chief Complaint: Primary symptoms  Do you have difficulty breathing?: Yes  Chronicity: chronic  When did you first notice your symptoms?: more than 1 year ago  How often do your symptoms occur?: daily  Since you first noticed this problem, how has it changed?: unchanged  Do you have shortness of breath that occurs with effort or exertion?: Yes  Do you have ear congestion?: No  Do you have heartburn?: No  Do you have fatigue?: No  Do you have shortness of breath when lying flat?: Yes  Do you have shortness of breath when you wake up?: No  Do you have sweats?: No  Have you experienced weight loss?: Yes  Which of the following makes your symptoms worse?: any activity, climbing stairs, emotional stress, exercise, exposure to fumes, exposure to smoke, strenuous activity  Which of the following makes your symptoms better?: nothing, OTC inhaler, steroid inhaler  Risk factors for lung disease: occupational exposure, smoking/tobacco exposure

## 2024-10-16 ENCOUNTER — TELEPHONE (OUTPATIENT)
Age: 69
End: 2024-10-16

## 2024-10-17 ENCOUNTER — TELEPHONE (OUTPATIENT)
Age: 69
End: 2024-10-17

## 2024-10-17 NOTE — TELEPHONE ENCOUNTER
Reason for call:   [x] Prior Auth  [] Other:     Caller:  [] Patient  [x] Pharmacy    [id number] - [place], [place] - [address] [phone]           Ordering Provider:   [x] Speciality/Provider - Donald

## 2024-10-19 NOTE — PSYCH
Psychotherapy Provided: Individual Psychotherapy 45 minutes     Length of time in session: 45 minutes, follow up in 2 week    Depression unspecified  PTSD    Goals addressed in session: Goal 1, Goal 2 and Goal 3      Pain:      moderate to severe    9    Current suicide risk : Low     Data: Halie Storm arrived for his session  He discussed his brother doesn't keep in touch  He also discussed his wife's family who does not see her father  She is the only one who does anything for her dad but they will have their hand out  He then discussed his son needs therapy  He appreciated I got him in with Dr Patrice Hong  He continues to have serious sleep hygiene issues  Assessment: Halie Storm denies suicidal/homicidal ideation, plan or intent  However his depression and his anxiety fluctuate and he has terrible insomnia  We continue to work on mindfulness and CBT  Plan: Continue to skill build in distress tolerance  Behavioral Health Treatment Plan [facility]: Diagnosis and Treatment Plan explained to Norberto Liu relates understanding diagnosis and is agreeable to Treatment Plan   Yes
negative...

## 2024-10-21 ENCOUNTER — TELEPHONE (OUTPATIENT)
Age: 69
End: 2024-10-21

## 2024-10-21 DIAGNOSIS — E66.01 CLASS 2 SEVERE OBESITY WITH BODY MASS INDEX (BMI) OF 35 TO 39.9 WITH SERIOUS COMORBIDITY (HCC): Primary | ICD-10-CM

## 2024-10-21 DIAGNOSIS — E66.812 CLASS 2 SEVERE OBESITY WITH BODY MASS INDEX (BMI) OF 35 TO 39.9 WITH SERIOUS COMORBIDITY (HCC): Primary | ICD-10-CM

## 2024-10-21 RX ORDER — BUPROPION HYDROCHLORIDE 300 MG/1
300 TABLET ORAL DAILY
Qty: 30 TABLET | Refills: 1 | Status: SHIPPED | OUTPATIENT
Start: 2024-10-21 | End: 2024-12-20

## 2024-10-21 NOTE — TELEPHONE ENCOUNTER
Medications that are used with DME will not be covered by Medicare Part D plans. Please use [facility] to order nebulizer medications from a DME company. This process will select a DME company that participates with patients insurance and handle all processing and shipping. Retail pharmacies can not process these types of medication unless they submit it under Medicare Part B. If the pt wants to continue to use their local retail pharmacy, please call the pharmacy and have the pharmacist submit nebulizer medication under patient Medicare Part B plan. This is the quickest most efficient way to ensure the pt gets their medications in a timely manner.

## 2024-10-21 NOTE — TELEPHONE ENCOUNTER
PA for Albuterol Nebulizer Solution  APPROVED     Date(s) approved January 1, 2024 to October 21, 2025     Case #    Patient advised by          [x][facility] Message  []Phone call   []LMOM  []L/M to call office as no active Communication consent on file  []Unable to leave detailed message as VM not approved on Communication consent       Pharmacy advised by    [x]Fax  []Phone call    Approval letter scanned into Media Yes

## 2024-10-24 ENCOUNTER — TELEPHONE (OUTPATIENT)
Dept: NEPHROLOGY | Facility: CLINIC | Age: 69
End: 2024-10-24

## 2024-10-28 ENCOUNTER — TELEPHONE (OUTPATIENT)
Dept: PSYCHIATRY | Facility: CLINIC | Age: 69
End: 2024-10-28

## 2024-10-28 ENCOUNTER — DOCUMENTATION (OUTPATIENT)
Dept: BEHAVIORAL/MENTAL HEALTH CLINIC | Facility: CLINIC | Age: 69
End: 2024-10-28

## 2024-10-28 NOTE — TELEPHONE ENCOUNTER
PT came to FD to say provider never came out. Reached out to provider and provider came down to talk to the PT. PT was very irritated during check in as the WC claim should be attached to the visit. WC was missing claim address and phone number. PT was not able to provide as he said he did that and will not come back until we figure it out. Writer tried to explain but PT was quick to cut off and walked away. Refused to see provider even though it was past his appt time. Provider tried to calm the PT down but was also unsuccessful. Writer told PT he has his next f/u on 11/11 but PT said again he will not come back until its taken care of. The other FD looked through his chart but was not able to find anything recent regarding this claim.

## 2024-10-28 NOTE — PROGRESS NOTES
I did not see Galileo's name on my case list. I did not know he was here till Leigh wrote to me that he was leaving. I offered to see him since I had a slot open after his scheduled appt but he refused.

## 2024-10-29 ENCOUNTER — OFFICE VISIT (OUTPATIENT)
Dept: OBGYN CLINIC | Facility: CLINIC | Age: 69
End: 2024-10-29
Payer: MEDICARE

## 2024-10-29 VITALS
WEIGHT: 262 LBS | DIASTOLIC BLOOD PRESSURE: 75 MMHG | HEART RATE: 94 BPM | SYSTOLIC BLOOD PRESSURE: 132 MMHG | BODY MASS INDEX: 39.71 KG/M2 | HEIGHT: 68 IN

## 2024-10-29 DIAGNOSIS — M16.12 PRIMARY OSTEOARTHRITIS OF ONE HIP, LEFT: ICD-10-CM

## 2024-10-29 DIAGNOSIS — M16.11 PRIMARY OSTEOARTHRITIS OF RIGHT HIP: Primary | ICD-10-CM

## 2024-10-29 PROCEDURE — 99213 OFFICE O/P EST LOW 20 MIN: CPT | Performed by: ORTHOPAEDIC SURGERY

## 2024-10-29 NOTE — PROGRESS NOTES
Orthopaedics Office Visit - follow up Patient Visit    ASSESSMENT/PLAN:    Assessment:   Bilateral hip OA right worse than left.    Plan:   After discussion with patient at today's visit we discussed that patient may go for right hip IA CSI with spine and pain management to help with his right hip pain.  Referral was placed.  I discussed with patient that he should initiate physical therapy for bilateral hips to help with his range of motion and strengthening.  PT referral was given to patient.  Patient will continue to monitor his symptoms over the next couple of weeks.  Discussed with patient if he does not have symptomatic relief from right hip IA CSI and physical therapy we can possibly consider surgical intervention as he is continue to lose weight and is a non-smoker and nondiabetic.    To Do Next Visit:  Reevaluation bilateral hips    _____________________________________________________  CHIEF COMPLAINT:  Chief Complaint   Patient presents with    Left Hip - Follow-up         SUBJECTIVE:  Devon Aly is a 69 y.o. male who presents for follow-up evaluation of bilateral hip osteoarthritis right worse than left.  Patient was last seen 7/23/2024 where a left hip FL guided IA CSI was ordered for patient.  Patient states he was able to obtain left hip IA CSI on 9/26/2024.  Patient states he still receiving symptomatic relief from left hip CSI.  Patient states that he continues to take Tylenol and gabapentin as needed for pain control.  Patient states that he does continue to have pain within his right hip but it has been tolerable as he continues to lose weight.  Patient states he recently has seen pulmonology where they gave him a portable oxygen due to him having lung disease.      PAST MEDICAL HISTORY:  Past Medical History:   Diagnosis Date    Anxiety     Chronic kidney disease     COVID-19 03/2020    Depression     Hypertension 4/02/2020    Liver disease     Pneumonia 3/32/2020    PTSD (post-traumatic  stress disorder)        PAST SURGICAL HISTORY:  Past Surgical History:   Procedure Laterality Date    FL INJECTION RIGHT HIP (NON ARTHROGRAM)  2020    FL INJECTION RIGHT HIP (NON ARTHROGRAM)  2021    FL INJECTION RIGHT HIP (NON ARTHROGRAM)  2021    FL INJECTION RIGHT HIP (NON ARTHROGRAM)  2022    NO PAST SURGERIES         FAMILY HISTORY:  Family History   Problem Relation Age of Onset    Heart disease Mother     Coronary artery disease Mother     Heart failure Mother     Rheum arthritis Mother     Sudden death Father     No Known Problems Son     No Known Problems Daughter     No Known Problems Maternal Grandmother     No Known Problems Maternal Grandfather     No Known Problems Paternal Grandmother     No Known Problems Paternal Grandfather     No Known Problems Daughter     Kidney disease Brother     Cancer Brother        SOCIAL HISTORY:  Social History     Tobacco Use    Smoking status: Former     Current packs/day: 0.00     Average packs/day: 2.0 packs/day for 47.2 years (94.5 ttl pk-yrs)     Types: Cigarettes     Start date: 1973     Quit date: 2020     Years since quittin.5     Passive exposure: Past    Smokeless tobacco: Never   Vaping Use    Vaping status: Former    Quit date: 2020    Substances: Nicotine, Flavoring   Substance Use Topics    Alcohol use: Not Currently    Drug use: Never       MEDICATIONS:    Current Outpatient Medications:     albuterol (2.5 mg/3 mL) 0.083 % nebulizer solution, Take 3 mL (2.5 mg total) by nebulization every 6 (six) hours as needed for wheezing or shortness of breath, Disp: 375 mL, Rfl: 2    buPROPion (Wellbutrin XL) 300 mg 24 hr tablet, Take 1 tablet (300 mg total) by mouth daily, Disp: 30 tablet, Rfl: 1    donepezil (ARICEPT) 10 mg tablet, TAKE 1 TABLET BY MOUTH EVERYDAY AT BEDTIME, Disp: 90 tablet, Rfl: 1    DULoxetine (CYMBALTA) 60 mg delayed release capsule, Take 1 capsule (60 mg total) by mouth every morning, Disp: 90 capsule, Rfl:  "0    gabapentin (NEURONTIN) 100 mg capsule, One po qam and 2 po q bedtime, Disp: 90 capsule, Rfl: 2    metFORMIN (GLUCOPHAGE-XR) 500 mg 24 hr tablet, Take 1 tablet (500 mg total) by mouth 2 (two) times a day with meals, Disp: 180 tablet, Rfl: 3    Multiple Vitamins-Minerals (MULTIVITAMIN ADULT PO), Take 1 tablet by mouth daily, Disp: , Rfl:     naltrexone (REVIA) 50 mg tablet, Take by mouth half tab daily for 7 days and increase to half tab twice daily . Take together with Bupropion., Disp: 60 tablet, Rfl: 0    oxygen gas, Inhale 2 L/min continuous as needed, Disp: , Rfl:     prazosin (MINIPRESS) 1 mg capsule, Take 1 capsule (1 mg total) by mouth daily at bedtime, Disp: 90 capsule, Rfl: 0    rOPINIRole (REQUIP) 3 mg tablet, Take 1 tablet (3 mg total) by mouth daily at bedtime, Disp: 90 tablet, Rfl: 0    sildenafil (VIAGRA) 50 MG tablet, Take 1 tablet (50 mg total) by mouth daily as needed for erectile dysfunction, Disp: 10 tablet, Rfl: 0    umeclidinium-vilanterol (Anoro Ellipta) 62.5-25 mcg/actuation inhaler, Inhale 1 puff daily, Disp: 60 each, Rfl: 5    Ventolin  (90 Base) MCG/ACT inhaler, TAKE 2 PUFFS BY MOUTH EVERY 6 HOURS AS NEEDED FOR WHEEZE, Disp: 18 g, Rfl: 5    tiZANidine (ZANAFLEX) 2 mg tablet, TAKE 1 TABLET (2 MG TOTAL) BY MOUTH DAILY AT BEDTIME AS NEEDED FOR MUSCLE SPASMS, Disp: 30 tablet, Rfl: 0    ALLERGIES:  Allergies   Allergen Reactions    Tetracycline Rash       LABS:  HgA1c:   Lab Results   Component Value Date    HGBA1C 5.8 05/24/2024     BMP:   Lab Results   Component Value Date    CALCIUM 9.5 08/01/2024    K 3.8 08/01/2024    CO2 24 08/01/2024     08/01/2024    BUN 16 08/01/2024    CREATININE 1.28 08/01/2024     CBC: No components found for: \"CBC\"    _____________________________________________________  PHYSICAL EXAMINATION:  Vital signs: /75   Pulse 94   Ht 5' 8\" (1.727 m)   Wt 119 kg (262 lb)   BMI 39.84 kg/m²   General: No acute distress, awake and alert  Psychiatric: " Mood and affect appear appropriate  HEENT: Trachea Midline, No torticollis, no apparent facial trauma  Cardiovascular: No audible murmurs; Extremities appear perfused  Pulmonary: No audible wheezing or stridor  Skin: No open lesions; see further details (if any) below    MUSCULOSKELETAL EXAMINATION:  Extremities:    Bilateral Hip:  Patient sits comfortably with hips flexed to 90 degrees in no apparent distress   Skin is intact.   No erythema.   The incision is clean, dry and intact.   Internal rotation to 10  External rotation to 35  Able to resist dorsiflexion   Stincfield testing is positive  SUMAN/FADIR testing is positive  Patient ambulates without aid.         _____________________________________________________        PROCEDURES PERFORMED:  Procedures    Scribe Attestation      I,:  Keith Cheek am acting as a scribe while in the presence of the attending physician.:       I,:  Tray Sahu MD personally performed the services described in this documentation    as scribed in my presence.:

## 2024-11-04 ENCOUNTER — TELEPHONE (OUTPATIENT)
Age: 69
End: 2024-11-04

## 2024-11-04 NOTE — TELEPHONE ENCOUNTER
Patient called and stated he has had his appts cancelled and he doesn't understand why and would like to speak to someone regarding this matter

## 2024-11-06 ENCOUNTER — TELEPHONE (OUTPATIENT)
Age: 69
End: 2024-11-06

## 2024-11-06 ENCOUNTER — SOCIAL WORK (OUTPATIENT)
Dept: BEHAVIORAL/MENTAL HEALTH CLINIC | Facility: CLINIC | Age: 69
End: 2024-11-06
Payer: MEDICARE

## 2024-11-06 DIAGNOSIS — Z63.4 EXPECTED BEREAVEMENT DUE TO LIFE EVENT: ICD-10-CM

## 2024-11-06 DIAGNOSIS — F33.2 MAJOR DEPRESSIVE DISORDER, RECURRENT SEVERE WITHOUT PSYCHOTIC FEATURES (HCC): ICD-10-CM

## 2024-11-06 DIAGNOSIS — R41.3 MEMORY DIFFICULTY: ICD-10-CM

## 2024-11-06 DIAGNOSIS — F43.10 PTSD (POST-TRAUMATIC STRESS DISORDER): Primary | ICD-10-CM

## 2024-11-06 PROCEDURE — 90837 PSYTX W PT 60 MINUTES: CPT | Performed by: SOCIAL WORKER

## 2024-11-06 SDOH — SOCIAL STABILITY - SOCIAL INSECURITY: DISSAPEARANCE AND DEATH OF FAMILY MEMBER: Z63.4

## 2024-11-06 NOTE — PSYCH
"Behavioral Health Psychotherapy Progress Note    Psychotherapy Provided: Individual Psychotherapy     1. PTSD (post-traumatic stress disorder)        2. 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 Memory difficulty        4. Expected bereavement due to life event            Goals addressed in session: Goal 1 and Goal 2     DATA: Galileo discussed the various situations with his adult children. He discussed his multiple stressors including his health issues. I provided support, encouragement and strategies to cope.   During this session, this clinician used the following therapeutic modalities: Client-centered Therapy, Cognitive Behavioral Therapy, Mindfulness-based Strategies, and Supportive Psychotherapy    Substance Abuse was not addressed during this session. If the client is diagnosed with a co-occurring substance use disorder, please indicate any changes in the frequency or amount of use: n/a. Stage of change for addressing substance use diagnoses: No substance use/Not applicable    ASSESSMENT:  Devon Aly presents with a Anxious mood.     his affect is anxious, which is congruent, with his mood and the content of the session. The client has made progress on their goals.     Devon Aly presents with a none risk of suicide, none risk of self-harm, and none risk of harm to others.    For any risk assessment that surpasses a \"low\" rating, a safety plan must be developed.    A safety plan was indicated: no  If yes, describe in detail n/a    PLAN: Between sessions, Devon Aly will use mindfulness and CBT. At the next session, the therapist will use Client-centered Therapy, Cognitive Behavioral Therapy, Mindfulness-based Strategies, and Supportive Psychotherapy to address issues and symptoms as they may arise. .    Behavioral Health Treatment Plan and Discharge Planning: Devon Aly is aware of and agrees to continue to work on their treatment plan. They have " identified and are working toward their discharge goals. yes    Visit start and stop times:    11/06/24  Start Time: 1500  Stop Time: 1600  Total Visit Time: 60 minutesBehavioral Health Psychotherapy Progress Note

## 2024-11-06 NOTE — TELEPHONE ENCOUNTER
Patient contacted the office to schedule a follow up visit with provider. Patient is now scheduled for 11/6/24  at 3:00 pm in office.

## 2024-11-13 DIAGNOSIS — E66.812 CLASS 2 SEVERE OBESITY WITH BODY MASS INDEX (BMI) OF 35 TO 39.9 WITH SERIOUS COMORBIDITY (HCC): ICD-10-CM

## 2024-11-13 DIAGNOSIS — E66.01 CLASS 2 SEVERE OBESITY WITH BODY MASS INDEX (BMI) OF 35 TO 39.9 WITH SERIOUS COMORBIDITY (HCC): ICD-10-CM

## 2024-11-14 RX ORDER — BUPROPION HYDROCHLORIDE 300 MG/1
300 TABLET ORAL DAILY
Qty: 90 TABLET | Refills: 0 | Status: SHIPPED | OUTPATIENT
Start: 2024-11-14 | End: 2024-11-20

## 2024-11-19 DIAGNOSIS — E66.01 CLASS 2 SEVERE OBESITY WITH BODY MASS INDEX (BMI) OF 35 TO 39.9 WITH SERIOUS COMORBIDITY (HCC): ICD-10-CM

## 2024-11-19 DIAGNOSIS — F32.A DEPRESSION, UNSPECIFIED DEPRESSION TYPE: ICD-10-CM

## 2024-11-19 DIAGNOSIS — E66.812 CLASS 2 SEVERE OBESITY WITH BODY MASS INDEX (BMI) OF 35 TO 39.9 WITH SERIOUS COMORBIDITY (HCC): ICD-10-CM

## 2024-11-19 DIAGNOSIS — F43.10 PTSD (POST-TRAUMATIC STRESS DISORDER): ICD-10-CM

## 2024-11-20 RX ORDER — BUPROPION HYDROCHLORIDE 300 MG/1
300 TABLET ORAL DAILY
Qty: 90 TABLET | Refills: 0 | Status: SHIPPED | OUTPATIENT
Start: 2024-11-20

## 2024-11-20 RX ORDER — DULOXETIN HYDROCHLORIDE 60 MG/1
60 CAPSULE, DELAYED RELEASE ORAL EVERY MORNING
Qty: 90 CAPSULE | Refills: 0 | Status: SHIPPED | OUTPATIENT
Start: 2024-11-20

## 2024-11-20 RX ORDER — PRAZOSIN HYDROCHLORIDE 1 MG/1
1 CAPSULE ORAL
Qty: 90 CAPSULE | Refills: 0 | Status: SHIPPED | OUTPATIENT
Start: 2024-11-20

## 2024-11-20 RX ORDER — NALTREXONE HYDROCHLORIDE 50 MG/1
TABLET, FILM COATED ORAL
Qty: 60 TABLET | Refills: 0 | Status: SHIPPED | OUTPATIENT
Start: 2024-11-20

## 2024-12-02 LAB

## 2024-12-03 ENCOUNTER — TELEMEDICINE (OUTPATIENT)
Dept: PSYCHIATRY | Facility: CLINIC | Age: 69
End: 2024-12-03

## 2024-12-03 DIAGNOSIS — F43.10 PTSD (POST-TRAUMATIC STRESS DISORDER): Primary | ICD-10-CM

## 2024-12-03 PROCEDURE — NC001 PR NO CHARGE: Performed by: PHYSICIAN ASSISTANT

## 2024-12-03 NOTE — PSYCH
No Call No Show  No Charge        Devon Aly  no showed on 12/03/24  appointment.        Treatment Plan not due at this session

## 2024-12-10 ENCOUNTER — TELEPHONE (OUTPATIENT)
Dept: PSYCHIATRY | Facility: CLINIC | Age: 69
End: 2024-12-10

## 2024-12-10 ENCOUNTER — TELEPHONE (OUTPATIENT)
Age: 69
End: 2024-12-10

## 2024-12-10 NOTE — TELEPHONE ENCOUNTER
Spoke with PT to inform provider will be out of office on 12/23/24. PT will see provider at next f/u

## 2024-12-10 NOTE — TELEPHONE ENCOUNTER
Patient is calling regarding cancelling an appointment.    Date/Time: 12/10/24 @3    Reason: N/A    Patient was rescheduled: YES [x] NO []  If yes, when was Patient reschedule for: 12/23/24 @3    Patient requesting call back to reschedule: YES [] NO [x]

## 2024-12-11 ENCOUNTER — TELEPHONE (OUTPATIENT)
Age: 69
End: 2024-12-11

## 2024-12-11 NOTE — TELEPHONE ENCOUNTER
Caller: Devon    Doctor: SP    Reason for call: pt would like to discuss coming in for another office visit     Call back#: [phone]

## 2024-12-12 NOTE — TELEPHONE ENCOUNTER
Lm for pt - -asked for rcb - if wants hip injection, can schedule from Dr Ashraf's order - if wants appt, needs to be scheduled for office visit

## 2024-12-16 NOTE — TELEPHONE ENCOUNTER
Caller: gurvinder    Doctor: hubert    Reason for call: pt would like to get scheduled for an hip injection.    Call back#: [phone]

## 2024-12-19 NOTE — TELEPHONE ENCOUNTER
Caller: gurvinder     Doctor: hubert     Reason for call: pt would like to get scheduled for an hip injection.     Call back#: [phone]

## 2024-12-23 ENCOUNTER — TELEPHONE (OUTPATIENT)
Age: 69
End: 2024-12-23

## 2024-12-23 DIAGNOSIS — F43.10 PTSD (POST-TRAUMATIC STRESS DISORDER): ICD-10-CM

## 2024-12-23 RX ORDER — GABAPENTIN 100 MG/1
CAPSULE ORAL
Qty: 90 CAPSULE | Refills: 2 | Status: SHIPPED | OUTPATIENT
Start: 2024-12-23

## 2024-12-23 NOTE — TELEPHONE ENCOUNTER
Patient called to request a refill for their gabapentin advised a refill was requested on 12/23 and is pending approval. Patient verbalized understanding and is in agreement.      Pt stated that he ran out of medication, hopes refill can be sent today

## 2024-12-23 NOTE — TELEPHONE ENCOUNTER
Caller: mitzi Osorio     Doctor: Luciano     Reason for call: returning schedulers call to schedule hip injection.     Call back#: [phone]

## 2024-12-24 ENCOUNTER — TELEPHONE (OUTPATIENT)
Dept: NEUROLOGY | Facility: CLINIC | Age: 69
End: 2024-12-24

## 2024-12-26 ENCOUNTER — TELEPHONE (OUTPATIENT)
Dept: PAIN MEDICINE | Facility: CLINIC | Age: 69
End: 2024-12-26

## 2024-12-26 DIAGNOSIS — M16.12 PRIMARY OSTEOARTHRITIS OF ONE HIP, LEFT: Primary | ICD-10-CM

## 2024-12-26 NOTE — TELEPHONE ENCOUNTER
Spoke with pt about scheduling right hip injection with Dr Wolf (ordered by Dr Sahu on 10/29) -- pt reports his left hip is actually hurting worse than his right hip now, so he would like to know if he can have a left hip injection done first.  If so, will need order placed for left hip.    Tentatively scheduled both procedures for pt, as he has been waiting for quite awhile and I do not want to delay him having them done any further.

## 2024-12-27 ENCOUNTER — PATIENT MESSAGE (OUTPATIENT)
Dept: PAIN MEDICINE | Facility: CLINIC | Age: 69
End: 2024-12-27

## 2024-12-27 NOTE — PATIENT COMMUNICATION
Pt is scheduled for hip injections with Dr Wolf on 1/14/25 and 1/28/25     Pt is not diabetic and no med holds are needed for hip injection     Pt given instructions review via myc message (recent injection)     Have you completed PT/HEP/Chiro in the past 6 months for dedicated area? no  If yes, how long did you complete?  What was the frequency?  Did it provide relief?  If no, reason therapy was not completed?    Previous hip injections for pain

## 2024-12-30 DIAGNOSIS — M16.12 PRIMARY OSTEOARTHRITIS OF ONE HIP, LEFT: Primary | ICD-10-CM

## 2025-01-03 DIAGNOSIS — G25.81 RESTLESS LEGS: ICD-10-CM

## 2025-01-03 RX ORDER — ROPINIROLE 3 MG/1
3 TABLET, FILM COATED ORAL
Qty: 90 TABLET | Refills: 0 | Status: SHIPPED | OUTPATIENT
Start: 2025-01-03

## 2025-01-03 NOTE — TELEPHONE ENCOUNTER
Reason for call:   [x] Refill   [] Prior Auth  [] Other:     Office:   [] PCP/Provider -   [x] Specialty/Provider - Psychiatry     Medication: rOPINIRole (REQUIP) 3 mg tablet Take 1 tablet (3 mg total) by mouth daily at bedtime       Pharmacy: [facility]/pharmacy #[id number] - [place], [place] - [address]      Does the patient have enough for 3 days?   [x] Yes   [] No - Send as HP to POD

## 2025-01-07 ENCOUNTER — OFFICE VISIT (OUTPATIENT)
Dept: OBGYN CLINIC | Facility: CLINIC | Age: 70
End: 2025-01-07
Payer: MEDICARE

## 2025-01-07 ENCOUNTER — TELEPHONE (OUTPATIENT)
Dept: OBGYN CLINIC | Facility: CLINIC | Age: 70
End: 2025-01-07

## 2025-01-07 VITALS — HEIGHT: 68 IN | WEIGHT: 256 LBS | BODY MASS INDEX: 38.8 KG/M2

## 2025-01-07 DIAGNOSIS — M16.12 PRIMARY OSTEOARTHRITIS OF ONE HIP, LEFT: Primary | ICD-10-CM

## 2025-01-07 DIAGNOSIS — M16.11 PRIMARY OSTEOARTHRITIS OF RIGHT HIP: ICD-10-CM

## 2025-01-07 PROCEDURE — 99213 OFFICE O/P EST LOW 20 MIN: CPT | Performed by: ORTHOPAEDIC SURGERY

## 2025-01-07 NOTE — TELEPHONE ENCOUNTER
Called workers comp to find out if the claim is still open and billable was on the phone for more 20 mins someone picked up the phone told me they can't give out that information over the phone we have to send them an email then the person hung up on me

## 2025-01-07 NOTE — PROGRESS NOTES
Orthopaedics Office Visit - follow up Patient Visit    ASSESSMENT/PLAN:    Assessment:   Bilateral hip OA right worse than left.    Plan:   Patient already scheduled for bilateral IA CSI with spine and pain management. He keep these appointments  Continue home exercise programs as directed.   OTC analgesics and ice/heat as needed/directed for symptom management  Briefly discussed operative management in the form of right total hip arthroplasty. He would like to continue nonoperative management at this time. High risk secondary to pulmonary dysfunction  Follow up 3 months for reevaluation.    To Do Next Visit:  Reevaluation bilateral hips    _____________________________________________________  CHIEF COMPLAINT:  Chief Complaint   Patient presents with    Right Hip - Follow-up         SUBJECTIVE:  Devon Aly is a 69 y.o. male who presents for follow-up evaluation of bilateral hip osteoarthritis right worse than left. The patient was last seen in the office on 10/29/2024 when he was referred to spine and pain management for right hip IA CSI. He has not had his right hip CSI yet, it is scheduled for 1/28/2025. He also has left hip CSI on 1/14/2025. The patient admits pain is worse with the cold weather. He takes ibuprofen as needed for symptom management. Pain located in both groins, right > left. He ambulated without assistive device today, but admits he does use a cane sometimes.      PAST MEDICAL HISTORY:  Past Medical History:   Diagnosis Date    Anxiety     Chronic kidney disease     COVID-19 03/2020    Depression     Hypertension 4/02/2020    Liver disease     Pneumonia 3/32/2020    PTSD (post-traumatic stress disorder)        PAST SURGICAL HISTORY:  Past Surgical History:   Procedure Laterality Date    FL INJECTION RIGHT HIP (NON ARTHROGRAM)  11/30/2020    FL INJECTION RIGHT HIP (NON ARTHROGRAM)  8/4/2021    FL INJECTION RIGHT HIP (NON ARTHROGRAM)  12/27/2021    FL INJECTION RIGHT HIP (NON ARTHROGRAM)   2022    NO PAST SURGERIES         FAMILY HISTORY:  Family History   Problem Relation Age of Onset    Heart disease Mother     Coronary artery disease Mother     Heart failure Mother     Rheum arthritis Mother     Sudden death Father     No Known Problems Son     No Known Problems Daughter     No Known Problems Maternal Grandmother     No Known Problems Maternal Grandfather     No Known Problems Paternal Grandmother     No Known Problems Paternal Grandfather     No Known Problems Daughter     Kidney disease Brother     Cancer Brother        SOCIAL HISTORY:  Social History     Tobacco Use    Smoking status: Former     Current packs/day: 0.00     Average packs/day: 2.0 packs/day for 47.2 years (94.5 ttl pk-yrs)     Types: Cigarettes     Start date: 1973     Quit date: 2020     Years since quittin.7     Passive exposure: Past    Smokeless tobacco: Never   Vaping Use    Vaping status: Former    Quit date: 2020    Substances: Nicotine, Flavoring   Substance Use Topics    Alcohol use: Not Currently    Drug use: Never       MEDICATIONS:    Current Outpatient Medications:     albuterol (2.5 mg/3 mL) 0.083 % nebulizer solution, Take 3 mL (2.5 mg total) by nebulization every 6 (six) hours as needed for wheezing or shortness of breath, Disp: 375 mL, Rfl: 2    buPROPion (WELLBUTRIN XL) 300 mg 24 hr tablet, TAKE 1 TABLET BY MOUTH EVERY DAY, Disp: 90 tablet, Rfl: 0    donepezil (ARICEPT) 10 mg tablet, TAKE 1 TABLET BY MOUTH EVERYDAY AT BEDTIME, Disp: 90 tablet, Rfl: 1    DULoxetine (CYMBALTA) 60 mg delayed release capsule, TAKE 1 CAPSULE BY MOUTH EVERY MORNING, Disp: 90 capsule, Rfl: 0    gabapentin (NEURONTIN) 100 mg capsule, TAKE 1 CAPSULE EVERY MORNING AND TAKE 2 CAPSULES AT BEDTIME, Disp: 90 capsule, Rfl: 2    metFORMIN (GLUCOPHAGE-XR) 500 mg 24 hr tablet, Take 1 tablet (500 mg total) by mouth 2 (two) times a day with meals, Disp: 180 tablet, Rfl: 3    Multiple Vitamins-Minerals (MULTIVITAMIN ADULT PO),  "Take 1 tablet by mouth daily, Disp: , Rfl:     naltrexone (REVIA) 50 mg tablet, TAKE 1/2 TABLET BY MOUTH DAILY FOR 7 DAYS , THEN 1/2 TABLET TWICE A DAY (TAKE WITH BUPROPION), Disp: 60 tablet, Rfl: 0    oxygen gas, Inhale 2 L/min continuous as needed, Disp: , Rfl:     prazosin (MINIPRESS) 1 mg capsule, TAKE 1 CAPSULE (1 MG TOTAL) BY MOUTH DAILY AT BEDTIME, Disp: 90 capsule, Rfl: 0    rOPINIRole (REQUIP) 3 mg tablet, Take 1 tablet (3 mg total) by mouth daily at bedtime, Disp: 90 tablet, Rfl: 0    sildenafil (VIAGRA) 50 MG tablet, Take 1 tablet (50 mg total) by mouth daily as needed for erectile dysfunction, Disp: 10 tablet, Rfl: 0    umeclidinium-vilanterol (Anoro Ellipta) 62.5-25 mcg/actuation inhaler, Inhale 1 puff daily, Disp: 60 each, Rfl: 5    Ventolin  (90 Base) MCG/ACT inhaler, TAKE 2 PUFFS BY MOUTH EVERY 6 HOURS AS NEEDED FOR WHEEZE, Disp: 18 g, Rfl: 5    tiZANidine (ZANAFLEX) 2 mg tablet, TAKE 1 TABLET (2 MG TOTAL) BY MOUTH DAILY AT BEDTIME AS NEEDED FOR MUSCLE SPASMS, Disp: 30 tablet, Rfl: 0    ALLERGIES:  Allergies   Allergen Reactions    Tetracycline Rash       LABS:  HgA1c:   Lab Results   Component Value Date    HGBA1C 5.8 05/24/2024     BMP:   Lab Results   Component Value Date    CALCIUM 9.5 08/01/2024    K 3.8 08/01/2024    CO2 24 08/01/2024     08/01/2024    BUN 16 08/01/2024    CREATININE 1.28 08/01/2024     CBC: No components found for: \"CBC\"    _____________________________________________________  PHYSICAL EXAMINATION:  Vital signs: Ht 5' 8\" (1.727 m)   Wt 116 kg (256 lb)   BMI 38.92 kg/m²   General: No acute distress, awake and alert  Psychiatric: Mood and affect appear appropriate  HEENT: Trachea Midline, No torticollis, no apparent facial trauma  Cardiovascular: No audible murmurs; Extremities appear perfused  Pulmonary: No audible wheezing or stridor  Skin: No open lesions; see further details (if any) below    MUSCULOSKELETAL EXAMINATION:  Extremities:    Bilateral " Hip:  Patient sits comfortably with hips flexed to 90 degrees in no apparent distress   Skin is intact.   No erythema.   The incision is clean, dry and intact.   Internal rotation to 10  External rotation to 35  Able to resist dorsiflexion   Stincfield testing is positive  SUMAN/FADIR testing is positive  Patient ambulates without aid.         _____________________________________________________        PROCEDURES PERFORMED:  Procedures    Scribe Attestation      I,:  Katja Hassan am acting as a scribe while in the presence of the attending physician.:       I,:  Tray Sahu MD personally performed the services described in this documentation    as scribed in my presence.:

## 2025-01-08 ENCOUNTER — TELEPHONE (OUTPATIENT)
Age: 70
End: 2025-01-08
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
## 2025-01-09 ENCOUNTER — RA CDI HCC (OUTPATIENT)
Dept: OTHER | Facility: HOSPITAL | Age: 70
End: 2025-01-09

## 2025-01-09 NOTE — PROGRESS NOTES
J47.9  HCC coding opportunities          Chart Reviewed number of suggestions sent to Provider: 1     Patients Insurance     Medicare Insurance: [employer]age

## 2025-01-13 ENCOUNTER — OFFICE VISIT (OUTPATIENT)
Dept: NEUROLOGY | Facility: CLINIC | Age: 70
End: 2025-01-13
Payer: OTHER MISCELLANEOUS

## 2025-01-13 VITALS
SYSTOLIC BLOOD PRESSURE: 140 MMHG | DIASTOLIC BLOOD PRESSURE: 64 MMHG | HEART RATE: 93 BPM | WEIGHT: 260 LBS | BODY MASS INDEX: 39.4 KG/M2 | HEIGHT: 68 IN | OXYGEN SATURATION: 96 %

## 2025-01-13 DIAGNOSIS — F43.10 PTSD (POST-TRAUMATIC STRESS DISORDER): ICD-10-CM

## 2025-01-13 DIAGNOSIS — R41.3 MEMORY DIFFICULTY: Primary | ICD-10-CM

## 2025-01-13 DIAGNOSIS — G44.229 CHRONIC TENSION-TYPE HEADACHE, NOT INTRACTABLE: ICD-10-CM

## 2025-01-13 PROCEDURE — 99214 OFFICE O/P EST MOD 30 MIN: CPT | Performed by: PSYCHIATRY & NEUROLOGY
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
## 2025-01-13 NOTE — PROGRESS NOTES
Neurology Ambulatory Visit  Name: Devon Aly       : 1955       MRN: [id number]   Encounter Provider: Jasper Daniel MD   Encounter Date: 2025  Encounter department: [facility]      Devon Aly is a 69 y.o. male.       Assessment & Plan  Memory difficulty    Orders:    Ambulatory Referral to Occupational Therapy; Future  Patient with mild cognitive impairment with no evidence of neurodegeneration on the MRI scan of the brain, he is doing well on Aricept 10 mg a day he is unable to do the neuropsych testing secondary to the lighting in the test place and also he states that he cannot sit for long periods of time and do a test.    He was advised to go for cognitive therapy continue with Aricept 10 mg once a day we discussed about Namenda I would like to hold off on that for now as he is doing reasonably okay his MoCA is slightly better than last time it was  on the current visit is .    He was advised to continue with mentally stimulating exercises, to eat healthy nutritious diet, to take a multivitamin every day to keep his blood pressure, cholesterol, sugar under control.    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  PTSD (post-traumatic stress disorder)         Management as per psychiatry.    Subjective:  Chief Complaint   Patient presents with    memory  difficulty       HISTORY OF PRESENT ILLNESS    Patient is here in follow-up for headaches and memory difficulty, since his last visit according to the patient he is doing better his headaches are much better he does not have as many headaches as before he still continues to have some short-term memory issues, but his memory has been stable, he denies any seizures he is tolerating the Aricept 10 mg a day he is on continuous 2 L home oxygen and does follow-up with the pulmonologist, he is able to drive short distances without any issues he is able to manage his finances he lives with his wife he denies any smoking no alcohol use he feels his mood is stable he does follow-up with a psychiatrist regarding his posttraumatic stress disorder no suicidal or homicidal thoughts, he could not go for his neuropsych testing and completed since there was fluorescent lighting there and it bothered him ,no other complaints.        Prior Work-up:   MRI: Brain neuro quant without contrast from 8/16/2023 was reported as no acute intracranial abnormality, similar mild chronic microangiopathy.       Past Medical History:    Past Medical History:   Diagnosis Date    Anxiety     Chronic kidney disease     COVID-19 03/2020    Depression     Hypertension 4/02/2020    Liver disease     Pneumonia 3/32/2020    PTSD (post-traumatic stress disorder)      Past Surgical History:   Procedure Laterality Date    FL INJECTION RIGHT HIP (NON ARTHROGRAM)  11/30/2020    FL INJECTION RIGHT HIP (NON ARTHROGRAM)  8/4/2021    FL INJECTION RIGHT HIP (NON ARTHROGRAM)  12/27/2021    FL INJECTION RIGHT HIP (NON ARTHROGRAM)  4/25/2022    NO PAST SURGERIES         Family History:  Family History   Problem Relation Age of Onset    Heart disease Mother     Coronary artery disease Mother     Heart failure Mother     Rheum arthritis Mother     Sudden death Father     No Known Problems Son     No Known Problems Daughter     No Known Problems Maternal Grandmother     No  Known Problems Maternal Grandfather     No Known Problems Paternal Grandmother     No Known Problems Paternal Grandfather     No Known Problems Daughter     Kidney disease Brother     Cancer Brother        Social History:  Social History     Tobacco Use    Smoking status: Former     Current packs/day: 0.00     Average packs/day: 2.0 packs/day for 47.2 years (94.5 ttl pk-yrs)     Types: Cigarettes     Start date: 1973     Quit date: 2020     Years since quittin.7     Passive exposure: Past    Smokeless tobacco: Never   Vaping Use    Vaping status: Former    Quit date: 2020    Substances: Nicotine, Flavoring   Substance Use Topics    Alcohol use: Not Currently    Drug use: Never      Living situation:    Work:      Allergies:  Allergies   Allergen Reactions    Tetracycline Rash       Medications:    Current Outpatient Medications:     albuterol (2.5 mg/3 mL) 0.083 % nebulizer solution, Take 3 mL (2.5 mg total) by nebulization every 6 (six) hours as needed for wheezing or shortness of breath, Disp: 375 mL, Rfl: 2    buPROPion (WELLBUTRIN XL) 300 mg 24 hr tablet, TAKE 1 TABLET BY MOUTH EVERY DAY, Disp: 90 tablet, Rfl: 0    donepezil (ARICEPT) 10 mg tablet, TAKE 1 TABLET BY MOUTH EVERYDAY AT BEDTIME, Disp: 90 tablet, Rfl: 1    DULoxetine (CYMBALTA) 60 mg delayed release capsule, TAKE 1 CAPSULE BY MOUTH EVERY MORNING, Disp: 90 capsule, Rfl: 0    gabapentin (NEURONTIN) 100 mg capsule, TAKE 1 CAPSULE EVERY MORNING AND TAKE 2 CAPSULES AT BEDTIME, Disp: 90 capsule, Rfl: 2    metFORMIN (GLUCOPHAGE-XR) 500 mg 24 hr tablet, Take 1 tablet (500 mg total) by mouth 2 (two) times a day with meals, Disp: 180 tablet, Rfl: 3    Multiple Vitamins-Minerals (MULTIVITAMIN ADULT PO), Take 1 tablet by mouth daily, Disp: , Rfl:     naltrexone (REVIA) 50 mg tablet, TAKE 1/2 TABLET BY MOUTH DAILY FOR 7 DAYS , THEN 1/2 TABLET TWICE A DAY (TAKE WITH BUPROPION), Disp: 60 tablet, Rfl: 0    oxygen gas, Inhale 2 L/min continuous as  "needed, Disp: , Rfl:     prazosin (MINIPRESS) 1 mg capsule, TAKE 1 CAPSULE (1 MG TOTAL) BY MOUTH DAILY AT BEDTIME, Disp: 90 capsule, Rfl: 0    rOPINIRole (REQUIP) 3 mg tablet, Take 1 tablet (3 mg total) by mouth daily at bedtime, Disp: 90 tablet, Rfl: 0    sildenafil (VIAGRA) 50 MG tablet, Take 1 tablet (50 mg total) by mouth daily as needed for erectile dysfunction, Disp: 10 tablet, Rfl: 0    tiZANidine (ZANAFLEX) 2 mg tablet, TAKE 1 TABLET (2 MG TOTAL) BY MOUTH DAILY AT BEDTIME AS NEEDED FOR MUSCLE SPASMS, Disp: 30 tablet, Rfl: 0    umeclidinium-vilanterol (Anoro Ellipta) 62.5-25 mcg/actuation inhaler, Inhale 1 puff daily, Disp: 60 each, Rfl: 5    Ventolin  (90 Base) MCG/ACT inhaler, TAKE 2 PUFFS BY MOUTH EVERY 6 HOURS AS NEEDED FOR WHEEZE, Disp: 18 g, Rfl: 5    Objective:  /64 (BP Location: Left arm, Patient Position: Sitting, Cuff Size: Large)   Pulse 93   Ht 5' 8\" (1.727 m)   Wt 118 kg (260 lb) Comment: patient stated weight  SpO2 96%   BMI 39.53 kg/m²      Labs  I have reviewed pertinent labs:  CBC:   Lab Results   Component Value Date    WBC 10.32 (H) 08/01/2024    RBC 5.69 (H) 08/01/2024    HGB 15.9 08/01/2024    HCT 49.8 (H) 08/01/2024    MCV 88 08/01/2024     08/01/2024    MCH 27.9 08/01/2024    MCHC 31.9 08/01/2024    RDW 16.6 (H) 08/01/2024    MPV 10.8 08/01/2024    NEUTROABS 7.27 08/01/2024     CMP:   Lab Results   Component Value Date    SODIUM 141 08/01/2024    K 3.8 08/01/2024     08/01/2024    CO2 24 08/01/2024    AGAP 10 08/01/2024    BUN 16 08/01/2024    CREATININE 1.28 08/01/2024    GLUC 65 08/01/2024    GLUF 92 10/27/2023    CALCIUM 9.5 08/01/2024    AST 32 03/10/2024    ALT 39 03/10/2024    ALKPHOS 78 03/10/2024    TP 6.4 03/10/2024    ALB 4.1 03/10/2024    TBILI 0.4 03/10/2024    EGFR 57 08/01/2024     Thyroid Studies:   Lab Results   Component Value Date    EAF3KDDDQTBA 1.890 04/13/2022     Vitamin D Level   Lab Results   Component Value Date    MIAH36PJSFCU " 99.8 08/01/2024     Vitamin B12   Lab Results   Component Value Date    EVQGAXFF34 431 02/06/2023     Folate   Lab Results   Component Value Date    FOLATE 9.0 02/06/2023              General Exam  GENERAL APPEARANCE:  No distress, alert, interactive and cooperative.  CARDIOVASCULAR: Warm and well perfused  LUNGS: normal work of breathing on room air  EXTREMITIES: no peripheral edema     Neurologic Exam  MENTAL STATE:  Orientation was normal to time, place and person without aphasia or apraxia.  MoCA is 19/30    CRANIAL NERVES:  CN 2       visual fields full to confrontation.  CN 3, 4, 6  Pupils round, 4 mm in diameter, equally reactive to light. Lids symmetric; no ptosis. EOMs normal alignment, full range. No nystagmus.  CN 5  Facial sensation intact bilaterally.  CN 7  Normal and symmetric facial strength.   CN 8  Hearing intact to finger rub bilaterally.  CN 9  Palate elevates symmetrically.  CN 11  Normal strength of shoulder shrug and neck turning.  CN 12  Tongue midline, with normal bulk and strength.     MOTOR:  Motor exam was normal. Muscle bulk and tone were normal in both upper and lower extremities. Muscle strength was 5/5 in distal and proximal muscles in both upper and lower extremities. No fasciculations, tremor or other abnormal movements were present.     REFLEXES:  RIGHT UE   LEFT UE  BR:2              BR:2    Biceps:2      Biceps:2    Triceps:2     Triceps:2       RIGHT LLE   LEFT LLE    Knee:2           Knee:2    Ankle:2         Ankle:2         COORDINATION:   Coordination exam was normal. In both upper extremities, finger-nose-finger was intact without dysmetria or overshoot.      GAIT:  Gait was normal. Station was stable with a normal base. Gait was stable with a normal arm swing and speed.   ROS:  Review of Systems   Constitutional:  Negative for appetite change, fatigue and fever.   HENT: Negative.  Negative for hearing loss, tinnitus, trouble swallowing and voice change.    Eyes: Negative.   Negative for photophobia, pain and visual disturbance.   Respiratory: Negative.  Negative for shortness of breath.    Cardiovascular: Negative.  Negative for palpitations.   Gastrointestinal: Negative.  Negative for nausea and vomiting.   Endocrine: Negative.  Negative for cold intolerance.   Genitourinary: Negative.  Negative for dysuria, frequency and urgency.   Musculoskeletal:  Negative for back pain, gait problem, myalgias, neck pain and neck stiffness.   Skin: Negative.  Negative for rash.   Allergic/Immunologic: Negative.    Neurological: Negative.  Negative for dizziness, tremors, seizures, syncope, facial asymmetry, speech difficulty, weakness, light-headedness, numbness and headaches.   Hematological: Negative.  Does not bruise/bleed easily.   Psychiatric/Behavioral:  Positive for confusion. Negative for hallucinations and sleep disturbance.        I have reviewed the past medical history, surgical history, social and family history, current medications, allergies vitals, review of systems, and updated this information as appropriate today.    Administrative Statements   The total amount of time spent with the patient and on chart review and documentation was 20 minutes. Issues addressed during this clinic visit included overall management, medication counseling or monitoring, and counseling and coordination of care.         Jasper Daniel MD

## 2025-01-14 ENCOUNTER — HOSPITAL ENCOUNTER (OUTPATIENT)
Dept: RADIOLOGY | Facility: CLINIC | Age: 70
Discharge: HOME | End: 2025-01-14
Payer: MEDICARE

## 2025-01-14 VITALS
OXYGEN SATURATION: 94 % | RESPIRATION RATE: 20 BRPM | HEART RATE: 99 BPM | DIASTOLIC BLOOD PRESSURE: 85 MMHG | TEMPERATURE: 99 F | SYSTOLIC BLOOD PRESSURE: 135 MMHG

## 2025-01-14 DIAGNOSIS — M16.12 PRIMARY OSTEOARTHRITIS OF ONE HIP, LEFT: ICD-10-CM

## 2025-01-14 PROCEDURE — 77002 NEEDLE LOCALIZATION BY XRAY: CPT | Performed by: STUDENT IN AN ORGANIZED HEALTH CARE EDUCATION/TRAINING PROGRAM

## 2025-01-14 PROCEDURE — 20610 DRAIN/INJ JOINT/BURSA W/O US: CPT | Performed by: STUDENT IN AN ORGANIZED HEALTH CARE EDUCATION/TRAINING PROGRAM

## 2025-01-14 PROCEDURE — 77002 NEEDLE LOCALIZATION BY XRAY: CPT

## 2025-01-14 RX ORDER — ROPIVACAINE HYDROCHLORIDE 2 MG/ML
4 INJECTION, SOLUTION EPIDURAL; INFILTRATION; PERINEURAL ONCE
Status: COMPLETED | OUTPATIENT
Start: 2025-01-14 | End: 2025-01-14

## 2025-01-14 RX ORDER — METHYLPREDNISOLONE ACETATE 80 MG/ML
80 INJECTION, SUSPENSION INTRA-ARTICULAR; INTRALESIONAL; INTRAMUSCULAR; PARENTERAL; SOFT TISSUE ONCE
Status: COMPLETED | OUTPATIENT
Start: 2025-01-14 | End: 2025-01-14

## 2025-01-14 RX ADMIN — METHYLPREDNISOLONE ACETATE 80 MG: 80 INJECTION, SUSPENSION INTRA-ARTICULAR; INTRALESIONAL; INTRAMUSCULAR; SOFT TISSUE at 11:21

## 2025-01-14 RX ADMIN — IOHEXOL 1 ML: 300 INJECTION, SOLUTION INTRAVENOUS at 11:20

## 2025-01-14 RX ADMIN — ROPIVACAINE HYDROCHLORIDE 4 ML: 2 INJECTION, SOLUTION EPIDURAL; INFILTRATION at 11:21

## 2025-01-14 NOTE — DISCHARGE INSTR - LAB

## 2025-01-15 DIAGNOSIS — R41.3 MEMORY DIFFICULTY: ICD-10-CM

## 2025-01-16 RX ORDER — DONEPEZIL HYDROCHLORIDE 10 MG/1
10 TABLET, FILM COATED ORAL
Qty: 90 TABLET | Refills: 1 | Status: SHIPPED | OUTPATIENT
Start: 2025-01-16

## 2025-01-21 ENCOUNTER — OFFICE VISIT (OUTPATIENT)
Age: 70
End: 2025-01-21
Payer: MEDICARE

## 2025-01-21 VITALS
WEIGHT: 255.8 LBS | SYSTOLIC BLOOD PRESSURE: 134 MMHG | TEMPERATURE: 98 F | HEIGHT: 68 IN | RESPIRATION RATE: 18 BRPM | BODY MASS INDEX: 38.77 KG/M2 | HEART RATE: 99 BPM | OXYGEN SATURATION: 96 % | DIASTOLIC BLOOD PRESSURE: 84 MMHG

## 2025-01-21 DIAGNOSIS — J96.11 CHRONIC HYPOXEMIC RESPIRATORY FAILURE (HCC): ICD-10-CM

## 2025-01-21 DIAGNOSIS — Z23 ENCOUNTER FOR IMMUNIZATION: ICD-10-CM

## 2025-01-21 DIAGNOSIS — J43.2 CENTRILOBULAR EMPHYSEMA (HCC): ICD-10-CM

## 2025-01-21 DIAGNOSIS — E66.01 MORBID OBESITY (HCC): ICD-10-CM

## 2025-01-21 DIAGNOSIS — J84.9 INTERSTITIAL LUNG DISEASE (HCC): ICD-10-CM

## 2025-01-21 DIAGNOSIS — F33.9 DEPRESSION, RECURRENT (HCC): ICD-10-CM

## 2025-01-21 DIAGNOSIS — N18.31 STAGE 3A CHRONIC KIDNEY DISEASE (HCC): ICD-10-CM

## 2025-01-21 DIAGNOSIS — Z00.00 MEDICARE ANNUAL WELLNESS VISIT, SUBSEQUENT: Primary | ICD-10-CM

## 2025-01-21 DIAGNOSIS — R73.03 PREDIABETES: ICD-10-CM

## 2025-01-21 DIAGNOSIS — I25.10 CORONARY ARTERY CALCIFICATION SEEN ON CT SCAN: ICD-10-CM

## 2025-01-21 PROCEDURE — G0439 PPPS, SUBSEQ VISIT: HCPCS | Performed by: STUDENT IN AN ORGANIZED HEALTH CARE EDUCATION/TRAINING PROGRAM

## 2025-01-21 PROCEDURE — 90471 IMMUNIZATION ADMIN: CPT | Performed by: STUDENT IN AN ORGANIZED HEALTH CARE EDUCATION/TRAINING PROGRAM

## 2025-01-21 PROCEDURE — 90662 IIV NO PRSV INCREASED AG IM: CPT | Performed by: STUDENT IN AN ORGANIZED HEALTH CARE EDUCATION/TRAINING PROGRAM

## 2025-01-21 RX ORDER — ATORVASTATIN CALCIUM 20 MG/1
20 TABLET, FILM COATED ORAL DAILY
Qty: 30 TABLET | Refills: 5 | Status: SHIPPED | OUTPATIENT
Start: 2025-01-21

## 2025-01-21 NOTE — PATIENT INSTRUCTIONS
Mucinex 1200 mg twice daily for chest congestion to help thin out the phlegm    Medicare Preventive Visit Patient Instructions  Thank you for completing your Welcome to Medicare Visit or Medicare Annual Wellness Visit today. Your next wellness visit will be due in one year (1/22/2026).  The screening/preventive services that you may require over the next 5-10 years are detailed below. Some tests may not apply to you based off risk factors and/or age. Screening tests ordered at today's visit but not completed yet may show as past due. Also, please note that scanned in results may not display below.  Preventive Screenings:  Service Recommendations Previous Testing/Comments   Colorectal Cancer Screening  Colonoscopy    Fecal Occult Blood Test (FOBT)/Fecal Immunochemical Test (FIT)  Fecal DNA/Cologuard Test  Flexible Sigmoidoscopy Age: 45-75 years old   Colonoscopy: every 10 years (May be performed more frequently if at higher risk)  OR  FOBT/FIT: every 1 year  OR  Cologuard: every 3 years  OR  Sigmoidoscopy: every 5 years  Screening may be recommended earlier than age 45 if at higher risk for colorectal cancer. Also, an individualized decision between you and your healthcare provider will decide whether screening between the ages of 76-85 would be appropriate. Colonoscopy: 03/28/2023  FOBT/FIT: Not on file  Cologuard: 06/27/2022  Sigmoidoscopy: Not on file    Screening Current     Prostate Cancer Screening Individualized decision between patient and health care provider in men between ages of 55-69   Medicare will cover every 12 months beginning on the day after your 50th birthday PSA: 0.30 ng/mL     Screening Current     Hepatitis C Screening Once for adults born between 1945 and 1965  More frequently in patients at high risk for Hepatitis C Hep C Antibody: 04/13/2022    Screening Current   Diabetes Screening 1-2 times per year if you're at risk for diabetes or have pre-diabetes Fasting glucose: 92 mg/dL  (10/27/2023)  A1C: 5.8 (5/24/2024)  Screening Current   Cholesterol Screening Once every 5 years if you don't have a lipid disorder. May order more often based on risk factors. Lipid panel: 03/11/2024  Screening Current      Other Preventive Screenings Covered by Medicare:  Abdominal Aortic Aneurysm (AAA) Screening: covered once if your at risk. You're considered to be at risk if you have a family history of AAA or a male between the age of 65-75 who smoking at least 100 cigarettes in your lifetime.  Lung Cancer Screening: covers low dose CT scan once per year if you meet all of the following conditions: (1) Age 55-77; (2) No signs or symptoms of lung cancer; (3) Current smoker or have quit smoking within the last 15 years; (4) You have a tobacco smoking history of at least 20 pack years (packs per day x number of years you smoked); (5) You get a written order from a healthcare provider.  Glaucoma Screening: covered annually if you're considered high risk: (1) You have diabetes OR (2) Family history of glaucoma OR (3)  aged 50 and older OR (4)  American aged 65 and older  Osteoporosis Screening: covered every 2 years if you meet one of the following conditions: (1) Have a vertebral abnormality; (2) On glucocorticoid therapy for more than 3 months; (3) Have primary hyperparathyroidism; (4) On osteoporosis medications and need to assess response to drug therapy.  HIV Screening: covered annually if you're between the age of 15-65. Also covered annually if you are younger than 15 and older than 65 with risk factors for HIV infection. For pregnant patients, it is covered up to 3 times per pregnancy.    Immunizations:  Immunization Recommendations   Influenza Vaccine Annual influenza vaccination during flu season is recommended for all persons aged >= 6 months who do not have contraindications   Pneumococcal Vaccine   * Pneumococcal conjugate vaccine = PCV13 (Prevnar 13), PCV15 (Vaxneuvance), PCV20  (Prevnar 20)  * Pneumococcal polysaccharide vaccine = PPSV23 (Pneumovax) Adults 19-65 yo with certain risk factors or if 65+ yo  If never received any pneumonia vaccine: recommend Prevnar 20 (PCV20)  Give PCV20 if previously received 1 dose of PCV13 or PPSV23   Hepatitis B Vaccine 3 dose series if at intermediate or high risk (ex: diabetes, end stage renal disease, liver disease)   Respiratory syncytial virus (RSV) Vaccine - COVERED BY MEDICARE PART D  * RSVPreF3 (Arexvy) CDC recommends that adults 60 years of age and older may receive a single dose of RSV vaccine using shared clinical decision-making (SCDM)   Tetanus (Td) Vaccine - COST NOT COVERED BY MEDICARE PART B Following completion of primary series, a booster dose should be given every 10 years to maintain immunity against tetanus. Td may also be given as tetanus wound prophylaxis.   Tdap Vaccine - COST NOT COVERED BY MEDICARE PART B Recommended at least once for all adults. For pregnant patients, recommended with each pregnancy.   Shingles Vaccine (Shingrix) - COST NOT COVERED BY MEDICARE PART B  2 shot series recommended in those 19 years and older who have or will have weakened immune systems or those 50 years and older     Health Maintenance Due:      Topic Date Due    Lung Cancer Screening  06/26/2025    Colorectal Cancer Screening  03/27/2026    Hepatitis C Screening  Completed     Immunizations Due:      Topic Date Due    Hepatitis A Vaccine (1 of 2 - Risk 2-dose series) Never done    Influenza Vaccine (1) 09/01/2024    COVID-19 Vaccine (4 - 2024-25 season) 09/01/2024     Advance Directives   What are advance directives?  Advance directives are legal documents that state your wishes and plans for medical care. These plans are made ahead of time in case you lose your ability to make decisions for yourself. Advance directives can apply to any medical decision, such as the treatments you want, and if you want to donate organs.   What are the types of  advance directives?  There are many types of advance directives, and each state has rules about how to use them. You may choose a combination of any of the following:  Living will:  This is a written record of the treatment you want. You can also choose which treatments you do not want, which to limit, and which to stop at a certain time. This includes surgery, medicine, IV fluid, and tube feedings.   Durable power of  for healthcare (DPAHC):  This is a written record that states who you want to make healthcare choices for you when you are unable to make them for yourself. This person, called a proxy, is usually a family member or a friend. You may choose more than 1 proxy.  Do not resuscitate (DNR) order:  A DNR order is used in case your heart stops beating or you stop breathing. It is a request not to have certain forms of treatment, such as CPR. A DNR order may be included in other types of advance directives.  Medical directive:  This covers the care that you want if you are in a coma, near death, or unable to make decisions for yourself. You can list the treatments you want for each condition. Treatment may include pain medicine, surgery, blood transfusions, dialysis, IV or tube feedings, and a ventilator (breathing machine).  Values history:  This document has questions about your views, beliefs, and how you feel and think about life. This information can help others choose the care that you would choose.  Why are advance directives important?  An advance directive helps you control your care. Although spoken wishes may be used, it is better to have your wishes written down. Spoken wishes can be misunderstood, or not followed. Treatments may be given even if you do not want them. An advance directive may make it easier for your family to make difficult choices about your care.   Fall Prevention    Fall prevention  includes ways to make your home and other areas safer. It also includes ways you can  move more carefully to prevent a fall. Health conditions that cause changes in your blood pressure, vision, or muscle strength and coordination may increase your risk for falls. Medicines may also increase your risk for falls if they make you dizzy, weak, or sleepy.   Fall prevention tips:   Stand or sit up slowly.    Use assistive devices as directed.    Wear shoes that fit well and have soles that .    Wear a personal alarm.    Stay active.    Manage your medical conditions.    Home Safety Tips:  Add items to prevent falls in the bathroom.    Keep paths clear.    Install bright lights in your home.    Keep items you use often on shelves within reach.    Paint or place reflective tape on the edges of your stairs.    Weight Management   Why it is important to manage your weight:  Being overweight increases your risk of health conditions such as heart disease, high blood pressure, type 2 diabetes, and certain types of cancer. It can also increase your risk for osteoarthritis, sleep apnea, and other respiratory problems. Aim for a slow, steady weight loss. Even a small amount of weight loss can lower your risk of health problems.  How to lose weight safely:  A safe and healthy way to lose weight is to eat fewer calories and get regular exercise. You can lose up about 1 pound a week by decreasing the number of calories you eat by 500 calories each day.   Healthy meal plan for weight management:  A healthy meal plan includes a variety of foods, contains fewer calories, and helps you stay healthy. A healthy meal plan includes the following:  Eat whole-grain foods more often.  A healthy meal plan should contain fiber. Fiber is the part of grains, fruits, and vegetables that is not broken down by your body. Whole-grain foods are healthy and provide extra fiber in your diet. Some examples of whole-grain foods are whole-wheat breads and pastas, oatmeal, brown rice, and bulgur.  Eat a variety of vegetables every day.   Include dark, leafy greens such as spinach, kale, renny greens, and mustard greens. Eat yellow and orange vegetables such as carrots, sweet potatoes, and winter squash.   Eat a variety of fruits every day.  Choose fresh or canned fruit (canned in its own juice or light syrup) instead of juice. Fruit juice has very little or no fiber.  Eat low-fat dairy foods.  Drink fat-free (skim) milk or 1% milk. Eat fat-free yogurt and low-fat cottage cheese. Try low-fat cheeses such as mozzarella and other reduced-fat cheeses.  Choose meat and other protein foods that are low in fat.  Choose beans or other legumes such as split peas or lentils. Choose fish, skinless poultry (chicken or turkey), or lean cuts of red meat (beef or pork). Before you cook meat or poultry, cut off any visible fat.   Use less fat and oil.  Try baking foods instead of frying them. Add less fat, such as margarine, sour cream, regular salad dressing and mayonnaise to foods. Eat fewer high-fat foods. Some examples of high-fat foods include french fries, doughnuts, ice cream, and cakes.  Eat fewer sweets.  Limit foods and drinks that are high in sugar. This includes candy, cookies, regular soda, and sweetened drinks.  Exercise:  Exercise at least 30 minutes per day on most days of the week. Some examples of exercise include walking, biking, dancing, and swimming. You can also fit in more physical activity by taking the stairs instead of the elevator or parking farther away from stores. Ask your healthcare provider about the best exercise plan for you.      © Copyright [employer] 2018 Information is for End User's use only and may not be sold, redistributed or otherwise used for commercial purposes. All illustrations and images included in CareNotes® are the copyrighted property of [employer].D[employer] Inc[employer] or [employer]

## 2025-01-21 NOTE — ASSESSMENT & PLAN NOTE
Lab Results   Component Value Date    EGFR 57 08/01/2024    EGFR 62 03/14/2024    EGFR 52 (L) 03/11/2024    CREATININE 1.28 08/01/2024    CREATININE 1.28 03/14/2024    CREATININE 1.47 (H) 03/11/2024     Recheck kidney function. Avoid nephrotoxic agents.    Orders:    CBC and differential; Future    Comprehensive metabolic panel; Future

## 2025-01-21 NOTE — PROGRESS NOTES
Assessment and Plan:     Problem List Items Addressed This Visit       Depression, recurrent (HCC)    Stage 3a chronic kidney disease (HCC)    Relevant Orders    CBC and differential    Comprehensive metabolic panel    Centrilobular emphysema (HCC)    Chronic hypoxemic respiratory failure (HCC)     Other Visit Diagnoses         Medicare annual wellness visit, subsequent    -  Primary      Interstitial lung disease (HCC)          Coronary artery calcification seen on CT scan        Relevant Medications    atorvastatin (LIPITOR) 20 mg tablet    Other Relevant Orders    Ambulatory Referral to Cardiology    Lipid Panel with Direct LDL reflex      Morbid obesity (HCC)          Prediabetes        Relevant Orders    Hemoglobin A1C      Encounter for immunization        Relevant Orders    influenza vaccine, high-dose, PF 0.5 mL (Fluzone High Dose) (Completed)          Assessment & Plan  Medicare annual wellness visit, subsequent  Immunizations: Influenza vaccine given today; also recommended shingles and RSV vaccine - patient to consider  Labs: CBC, CMP, A1c, lipids  Screening: CT lung cancer screening due in June; colonoscopy due next year        Chronic hypoxemic respiratory failure (HCC)  On 2-4L O2 NC. In setting of ILD, COVID long haul syndrome, obesity, ANTHONY, and emphysema. Following with Pulmonology. Continue anoro ellipta and albuterol per Pulmonology.       Centrilobular emphysema (HCC)  Following with Pulmonology. Continue anoro ellipta and albuterol per Pulmonology.       Interstitial lung disease (HCC)  Following with Pulmonology. Continue anoro ellipta and albuterol per Pulmonology.       Coronary artery calcification seen on CT scan  CT lung cancer screening 6/22/2023 showed coronary artery calcifications and atherosclerotic calcifications of the aorta. He was previously recommended to start on statin while in the hospital in March but patient does not remember this.  Start atorvastatin 20 mg and assess  tolerance - likely will increase to 40 mg if tolerating okay  Check lipid panel  Refer to Cardiology for further evaluation    Orders:    Ambulatory Referral to Cardiology; Future    atorvastatin (LIPITOR) 20 mg tablet; Take 1 tablet (20 mg total) by mouth daily    Lipid Panel with Direct LDL reflex; Future    Depression, recurrent (HCC)  Following with Psychiatry. Continue gabapentin, prazosin, and cymbalta through them.         Morbid obesity (HCC)  Patient has been on wellbutrin and naltrexone for weight loss as he was considering L LEEROY for his osteoarthritis. He is not moving forward with any surgical intervention, and he said he stopped taking this. Discontinued from med list.         Stage 3a chronic kidney disease (HCC)  Lab Results   Component Value Date    EGFR 57 08/01/2024    EGFR 62 03/14/2024    EGFR 52 (L) 03/11/2024    CREATININE 1.28 08/01/2024    CREATININE 1.28 03/14/2024    CREATININE 1.47 (H) 03/11/2024     Recheck kidney function. Avoid nephrotoxic agents.    Orders:    CBC and differential; Future    Comprehensive metabolic panel; Future    Prediabetes  Recheck A1c. Patient currently on metformin 500 mg BID. If out of prediabetes range, can consider trial off of metformin.    Orders:    Hemoglobin A1C; Future    Encounter for immunization    Orders:    influenza vaccine, high-dose, PF 0.5 mL (Fluzone High Dose)             Depression Screening and Follow-up Plan: Patient was screened for depression during today's encounter. They screened negative with a PHQ-9 score of 0.  Patient assessed for underlying major depression. Brief counseling provided and recommend additional follow-up/re-evaluation next office visit.     Preventive health issues were discussed with patient, and age appropriate screening tests were ordered as noted in patient's After Visit Summary.  Personalized health advice and appropriate referrals for health education or preventive services given if needed, as noted in patient's  After Visit Summary.     History of Present Illness:     Patient presents for a Medicare Wellness Visit    Devon Aly is a 70 yo M with PMH of RLD on 2-4 L O2 NC, ANTHONY, COVID long haul syndrome, CKD and depression who presents today for AWV. He was sick with a URI a few weeks ago but doing okay now. Continues with baseline SOB. Using 2L O2 in office today.       Patient Care Team:  Zhao Duff MD as PCP - General (Internal Medicine)  Duy Hughes MD as PCP - [facility] ([facility])  [facility]  Sera Garcia MD (Pulmonology)  Danny Zurita MD (Pain Medicine)  Jasper Daniel MD (Neurology)  Tray Sahu MD (Orthopedic Surgery)  Steven Pritchett MD (Nephrology)     Review of Systems:     Review of Systems   Constitutional:  Negative for chills, fever and unexpected weight change.   HENT:  Negative for congestion, rhinorrhea and sore throat.    Eyes:  Negative for visual disturbance.   Respiratory:  Positive for shortness of breath. Negative for chest tightness and wheezing.    Cardiovascular:  Negative for chest pain.   Gastrointestinal:  Negative for abdominal pain, constipation, diarrhea, nausea and vomiting.   Endocrine: Negative for polyuria.   Genitourinary:  Negative for dysuria and hematuria.   Skin:  Negative for rash.   Neurological:  Negative for dizziness, weakness, light-headedness and headaches.   Psychiatric/Behavioral:  Negative for confusion.         Problem List:     Patient Active Problem List   Diagnosis    Elevated brain natriuretic peptide (BNP) level    PTSD (post-traumatic stress disorder)    Expected bereavement due to life event    Chronic tension-type headache, not intractable    Grade I diastolic dysfunction    Memory difficulty    Occupational exposure in workplace    Right bundle branch block (RBBB) on electrocardiogram (ECG)    Depression, recurrent (HCC)    COVID-19 long hauler    Restrictive lung disease    Stage 3a chronic  kidney disease (HCC)    Chronic kidney disease-mineral and bone disorder    Class 2 severe obesity with body mass index (BMI) of 35 to 39.9 with serious comorbidity (HCC)    Primary osteoarthritis of right hip    Primary osteoarthritis of one hip, left    Encounter for weight management    Cigarette nicotine dependence in remission    ANTHONY (obstructive sleep apnea)    WALTERS (dyspnea on exertion)    Centrilobular emphysema (HCC)    Chronic hypoxemic respiratory failure (HCC)      Past Medical and Surgical History:     Past Medical History:   Diagnosis Date    Anxiety     Chronic kidney disease     COVID-19 03/2020    Depression     Hypertension 4/02/2020    Liver disease     Pneumonia 3/32/2020    PTSD (post-traumatic stress disorder)      Past Surgical History:   Procedure Laterality Date    FL INJECTION RIGHT HIP (NON ARTHROGRAM)  11/30/2020    FL INJECTION RIGHT HIP (NON ARTHROGRAM)  8/4/2021    FL INJECTION RIGHT HIP (NON ARTHROGRAM)  12/27/2021    FL INJECTION RIGHT HIP (NON ARTHROGRAM)  4/25/2022    NO PAST SURGERIES        Family History:     Family History   Problem Relation Age of Onset    Heart disease Mother     Coronary artery disease Mother     Heart failure Mother     Rheum arthritis Mother     Sudden death Father     No Known Problems Son     No Known Problems Daughter     No Known Problems Maternal Grandmother     No Known Problems Maternal Grandfather     No Known Problems Paternal Grandmother     No Known Problems Paternal Grandfather     No Known Problems Daughter     Kidney disease Brother     Cancer Brother       Social History:     Social History     Socioeconomic History    Marital status: /Civil Union     Spouse name: None    Number of children: None    Years of education: None    Highest education level: None   Occupational History    None   Tobacco Use    Smoking status: Former     Current packs/day: 0.00     Average packs/day: 2.0 packs/day for 47.2 years (94.5 ttl pk-yrs)     Types:  Cigarettes     Start date: 1973     Quit date: 2020     Years since quittin.8     Passive exposure: Past    Smokeless tobacco: Never   Vaping Use    Vaping status: Former    Quit date: 2020    Substances: Nicotine, Flavoring   Substance and Sexual Activity    Alcohol use: Not Currently    Drug use: Never    Sexual activity: Yes     Partners: Female     Birth control/protection: None   Other Topics Concern    None   Social History Narrative    None     Social Drivers of Health     Financial Resource Strain: Low Risk  (3/11/2024)    [facility]    Overall Financial Resource Strain (CARDIA)     Difficulty of Paying Living Expenses: Not very hard   Recent Concern: Financial Resource Strain - High Risk (1/15/2024)    Overall Financial Resource Strain (CARDIA)     Difficulty of Paying Living Expenses: Hard   Food Insecurity: No Food Insecurity (2025)    Hunger Vital Sign     Worried About Running Out of Food in the Last Year: Never true     Ran Out of Food in the Last Year: Never true   Transportation Needs: No Transportation Needs (2025)    PRAPARE - Transportation     Lack of Transportation (Medical): No     Lack of Transportation (Non-Medical): No   Physical Activity: Inactive (2024)    Exercise Vital Sign     Days of Exercise per Week: 0 days     Minutes of Exercise per Session: 0 min   Stress: Stress Concern Present (2021)    [employer] - Occupational Stress Questionnaire     Feeling of Stress : Very much   Social Connections: Unknown (2024)    Received from [facility]    Social [employer]     How often do you feel lonely or isolated from those around you? (Adult - for ages 18 years and over): Not on file   Intimate Partner Violence: Not At Risk (3/11/2024)    [facility]    Humiliation, Afraid,  Rape, and Kick questionnaire     Fear of Current or Ex-Partner: No     Emotionally Abused: No     Physically Abused: No     Sexually Abused: No   Housing Stability: Low Risk  (1/21/2025)    Housing Stability Vital Sign     Unable to Pay for Housing in the Last Year: No     Number of Times Moved in the Last Year: 0     Homeless in the Last Year: No      Medications and Allergies:     Current Outpatient Medications   Medication Sig Dispense Refill    atorvastatin (LIPITOR) 20 mg tablet Take 1 tablet (20 mg total) by mouth daily 30 tablet 5    albuterol (2.5 mg/3 mL) 0.083 % nebulizer solution Take 3 mL (2.5 mg total) by nebulization every 6 (six) hours as needed for wheezing or shortness of breath 375 mL 2    donepezil (ARICEPT) 10 mg tablet TAKE 1 TABLET BY MOUTH EVERYDAY AT BEDTIME 90 tablet 1    DULoxetine (CYMBALTA) 60 mg delayed release capsule TAKE 1 CAPSULE BY MOUTH EVERY MORNING 90 capsule 0    gabapentin (NEURONTIN) 100 mg capsule TAKE 1 CAPSULE EVERY MORNING AND TAKE 2 CAPSULES AT BEDTIME 90 capsule 2    metFORMIN (GLUCOPHAGE-XR) 500 mg 24 hr tablet Take 1 tablet (500 mg total) by mouth 2 (two) times a day with meals 180 tablet 3    Multiple Vitamins-Minerals (MULTIVITAMIN ADULT PO) Take 1 tablet by mouth daily      oxygen gas Inhale 2 L/min continuous as needed      prazosin (MINIPRESS) 1 mg capsule TAKE 1 CAPSULE (1 MG TOTAL) BY MOUTH DAILY AT BEDTIME 90 capsule 0    rOPINIRole (REQUIP) 3 mg tablet Take 1 tablet (3 mg total) by mouth daily at bedtime 90 tablet 0    sildenafil (VIAGRA) 50 MG tablet Take 1 tablet (50 mg total) by mouth daily as needed for erectile dysfunction 10 tablet 0    tiZANidine (ZANAFLEX) 2 mg tablet TAKE 1 TABLET (2 MG TOTAL) BY MOUTH DAILY AT BEDTIME AS NEEDED FOR MUSCLE SPASMS 30 tablet 0    umeclidinium-vilanterol (Anoro Ellipta) 62.5-25 mcg/actuation inhaler Inhale 1 puff daily 60 each 5    Ventolin  (90 Base) MCG/ACT inhaler TAKE 2 PUFFS BY MOUTH EVERY 6 HOURS AS NEEDED  FOR WHEEZE 18 g 5     No current facility-administered medications for this visit.     Allergies   Allergen Reactions    Tetracycline Rash      Immunizations:     Immunization History   Administered Date(s) Administered    COVID-19 PFIZER VACCINE 0.3 ML IM 04/15/2021, 05/08/2021, 02/04/2022    INFLUENZA 10/05/2020, 09/25/2021, 12/22/2022, 12/04/2023    Influenza Split High Dose Preservative Free IM 01/21/2025    Influenza, high dose seasonal 0.7 mL 12/22/2022, 12/04/2023    Influenza, recombinant, quadrivalent,injectable, preservative free 10/05/2020    Pneumococcal Conjugate 13-Valent 10/05/2020    Pneumococcal Conjugate Vaccine 20-valent (Pcv20), Polysace 12/22/2022    Tdap 09/09/2024      Health Maintenance:         Topic Date Due    Lung Cancer Screening  06/26/2025    Colorectal Cancer Screening  03/27/2026    Hepatitis C Screening  Completed         Topic Date Due    Hepatitis A Vaccine (1 of 2 - Risk 2-dose series) Never done    COVID-19 Vaccine (4 - 2024-25 season) 09/01/2024      Medicare Screening Tests and Risk Assessments:     Devon is here for his Subsequent Wellness visit.     Health Risk Assessment:   Patient rates overall health as fair. Patient feels that their physical health rating is same. Patient is satisfied with their life. Eyesight was rated as slightly worse. Hearing was rated as same. Patient feels that their emotional and mental health rating is same. Patients states they are always angry. Patient states they are sometimes unusually tired/fatigued. Pain experienced in the last 7 days has been some. Patient's pain rating has been 7/10. Patient states that he has experienced weight loss or gain in last 6 months.     Depression Screening:   PHQ-9 Score: 0      Fall Risk Screening:   In the past year, patient has experienced: history of falling in past year    Number of falls: 1  Injured during fall?: No    Feels unsteady when standing or walking?: No    Worried about falling?: No      Home  Safety:  Patient has trouble with stairs inside or outside of their home. Patient has working smoke alarms and has working carbon monoxide detector. Home safety hazards include: none.     Nutrition:   Current diet is Regular.     Medications:   Patient is not currently taking any over-the-counter supplements. Patient is able to manage medications.     Activities of Daily Living (ADLs)/Instrumental Activities of Daily Living (IADLs):   Walk and transfer into and out of bed and chair?: Yes  Dress and groom yourself?: Yes    Bathe or shower yourself?: Yes    Feed yourself? Yes  Do your laundry/housekeeping?: Yes  Manage your money, pay your bills and track your expenses?: Yes  Make your own meals?: Yes    Do your own shopping?: Yes    Previous Hospitalizations:   Any hospitalizations or ED visits within the last 12 months?: No      Advance Care Planning:   Living will: Yes    Advanced directive: Yes    Advanced directive counseling given: Yes      PREVENTIVE SCREENINGS      Cardiovascular Screening:    General: Screening Current      Diabetes Screening:     General: Screening Current      Colorectal Cancer Screening:     General: Screening Current      Prostate Cancer Screening:    General: Screening Current      Osteoporosis Screening:    General: Screening Not Indicated      Abdominal Aortic Aneurysm (AAA) Screening:    Risk factors include: age between 65-74 yo and tobacco use        General: Screening Current      Lung Cancer Screening:     General: Screening Current      Hepatitis C Screening:    General: Screening Current    Screening, Brief Intervention, and Referral to Treatment (SBIRT)    Screening  Typical number of drinks in a day: 0  Typical number of drinks in a week: 0  Interpretation: Low risk drinking behavior.    Single Item Drug Screening:  How often have you used an illegal drug (including marijuana) or a prescription medication for non-medical reasons in the past year? never    Single Item Drug  "Screen Score: 0  Interpretation: Negative screen for possible drug use disorder    Other Counseling Topics:   Sunscreen and regular weightbearing exercise.     No results found.     Physical Exam:     /84 (BP Location: Right arm, Patient Position: Sitting, Cuff Size: Large)   Pulse 99   Temp 98 °F (36.7 °C) (Tympanic)   Resp 18   Ht 5' 8\" (1.727 m)   Wt 116 kg (255 lb 12.8 oz)   SpO2 96%   BMI 38.89 kg/m²     Physical Exam  Vitals reviewed.   Constitutional:       General: He is not in acute distress.     Appearance: Normal appearance. He is not ill-appearing, toxic-appearing or diaphoretic.      Interventions: Nasal cannula in place.   HENT:      Head: Normocephalic and atraumatic.      Right Ear: Tympanic membrane, ear canal and external ear normal.      Left Ear: Tympanic membrane, ear canal and external ear normal.      Nose: Nose normal.      Mouth/Throat:      Mouth: Mucous membranes are moist.      Pharynx: Oropharynx is clear.   Eyes:      General: No scleral icterus.        Right eye: No discharge.         Left eye: No discharge.      Extraocular Movements: Extraocular movements intact.      Conjunctiva/sclera: Conjunctivae normal.      Pupils: Pupils are equal, round, and reactive to light.   Neck:      Thyroid: No thyroid mass or thyroid tenderness.   Cardiovascular:      Rate and Rhythm: Normal rate and regular rhythm.      Pulses: Normal pulses.      Heart sounds: Normal heart sounds.   Pulmonary:      Effort: Pulmonary effort is normal. No respiratory distress.      Breath sounds: Examination of the right-lower field reveals rhonchi. Examination of the left-lower field reveals rhonchi. Rhonchi present.      Comments: 2L O2 NC  Abdominal:      General: There is no distension.      Palpations: Abdomen is soft.      Tenderness: There is no abdominal tenderness.   Musculoskeletal:         General: No swelling. Normal range of motion.      Cervical back: Normal range of motion and neck supple. "      Right lower leg: Edema present.      Left lower leg: Edema present.      Comments: Trace edema in bilateral lower legs   Skin:     General: Skin is warm and dry.   Neurological:      General: No focal deficit present.      Mental Status: He is alert and oriented to person, place, and time.      Sensory: Sensation is intact.      Motor: Motor function is intact.   Psychiatric:         Mood and Affect: Mood normal.         Speech: Speech normal.         Behavior: Behavior normal. Behavior is cooperative.         Thought Content: Thought content normal.          Zhao Duff MD

## 2025-01-21 NOTE — ASSESSMENT & PLAN NOTE
On 2-4L O2 NC. In setting of ILD, COVID long haul syndrome, obesity, ANTHONY, and emphysema. Following with Pulmonology. Continue anoro ellipta and albuterol per Pulmonology.

## 2025-01-23 ENCOUNTER — TELEPHONE (OUTPATIENT)
Age: 70
End: 2025-01-23

## 2025-01-23 DIAGNOSIS — J43.2 CENTRILOBULAR EMPHYSEMA (HCC): ICD-10-CM

## 2025-01-23 RX ORDER — UMECLIDINIUM BROMIDE AND VILANTEROL TRIFENATATE 62.5; 25 UG/1; UG/1
1 POWDER RESPIRATORY (INHALATION) DAILY
Qty: 60 EACH | Refills: 5 | OUTPATIENT
Start: 2025-01-23

## 2025-01-23 NOTE — TELEPHONE ENCOUNTER
PA for ANORO SUBMITTED to [employer] [facility]    via    [x]CMM-KEY: [id number]  []Surescripts-Case ID #   []Availity-Auth ID # NDC #   []Faxed to plan   []Other website   []Phone call Case ID #     [x]PA sent as URGENT    All office notes, labs and other pertaining documents and studies sent. Clinical questions answered. Awaiting determination from insurance company.     Turnaround time for your insurance to make a decision on your Prior Authorization can take 7-21 business days.

## 2025-01-24 NOTE — TELEPHONE ENCOUNTER
PA for ANORO NOT REQUIRED     Reason (screenshot if applicable)          Patient advised by          [x] MyChart Message  [] Phone call   []LMOM  []L/M to call office as no active Communication consent on file  []Unable to leave detailed message as VM not approved on Communication consent       Pharmacy advised by    [x]Fax  []Phone call

## 2025-01-27 ENCOUNTER — TELEPHONE (OUTPATIENT)
Dept: NEPHROLOGY | Facility: CLINIC | Age: 70
End: 2025-01-27

## 2025-01-27 DIAGNOSIS — N18.31 STAGE 3A CHRONIC KIDNEY DISEASE (HCC): Primary | ICD-10-CM

## 2025-01-27 NOTE — TELEPHONE ENCOUNTER
Called spoke with patient advised patient to get labs done 1 week prior to scheduled follow-up appointment with . Also advised patient as a reminder that labs / testing will need to be done in the appropriate time for scheduled appointment as this is important prior to visit. patient  verbalized understanding and is okay with it.

## 2025-01-28 ENCOUNTER — TELEPHONE (OUTPATIENT)
Age: 70
End: 2025-01-28

## 2025-01-28 ENCOUNTER — HOSPITAL ENCOUNTER (OUTPATIENT)
Dept: RADIOLOGY | Facility: CLINIC | Age: 70
Discharge: HOME | End: 2025-01-28

## 2025-01-28 NOTE — TELEPHONE ENCOUNTER
Called pharmacy to make aware that pt would like his Aricept run through worker's comp and spoke with Shubham, who states that pt has been getting it through his insurance and that Worker's comp would likely not cover it because he was getting it prior to his WC claim. Shubham also mentioned that Aricept requires a PA.    Donepezil PA submitted in M and received this immediate notification:    - Outcome  Additional Information Required  The patient currently has access to the requested medication and a Prior Authorization is not needed for the patient/medication.    Will call pharmacy tomorrow to make aware.       Purse String (Intermediate) Text: Given the location of the defect and the characteristics of the surrounding skin a purse string intermediate closure was deemed most appropriate.  Undermining was performed circumfirentially around the surgical defect.  A purse string suture was then placed and tightened.

## 2025-01-28 NOTE — TELEPHONE ENCOUNTER
Patient calling to advise any medications need to be approved thru Workers Comp insurance.  Patient will contact them and get the information as to who to contact and where to send the scripts for medication.

## 2025-01-28 NOTE — TELEPHONE ENCOUNTER
Patient called in stating that he has been needing all his medications to be processed through his worker's compensation claim. He states the Donepezil from Dr. Daniel needs to go through worker's comp so that it may be covered. His regular insurance will not pay for these medications..

## 2025-01-29 ENCOUNTER — TELEPHONE (OUTPATIENT)
Dept: PAIN MEDICINE | Facility: CLINIC | Age: 70
End: 2025-01-29

## 2025-01-29 NOTE — TELEPHONE ENCOUNTER
Pt injection for right hip on 1/28/25 had to be cancelled on day of -- was auth'd thru medicare and not w/c.    Lm for pt to r/s injection -- asked for rcb    Per chart, last auth for procedure thru w/c took approx 1 week to be returned to the office -- will need to go out at least that far.

## 2025-01-30 ENCOUNTER — SOCIAL WORK (OUTPATIENT)
Dept: BEHAVIORAL/MENTAL HEALTH CLINIC | Facility: CLINIC | Age: 70
End: 2025-01-30
Payer: OTHER MISCELLANEOUS

## 2025-01-30 DIAGNOSIS — F43.10 PTSD (POST-TRAUMATIC STRESS DISORDER): Primary | ICD-10-CM

## 2025-01-30 DIAGNOSIS — R41.3 MEMORY DIFFICULTY: ICD-10-CM

## 2025-01-30 DIAGNOSIS — F33.2 MAJOR DEPRESSIVE DISORDER, RECURRENT SEVERE WITHOUT PSYCHOTIC FEATURES (HCC): ICD-10-CM

## 2025-01-30 PROCEDURE — 90837 PSYTX W PT 60 MINUTES: CPT | Performed by: SOCIAL WORKER

## 2025-01-30 NOTE — PSYCH
"Behavioral Health Psychotherapy Progress Note    Psychotherapy Provided: Individual Psychotherapy     1. PTSD (post-traumatic stress disorder)        2. 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 Memory difficulty            Goals addressed in session: Goal 1 ,2    DATA: Galileo discussed how his depression and his anxiety are directly related to his decreasing health status from the long term effects of covid.He is now on oxygen. I provided support, encouragement and strategies to cope. He discussed many of his medical issues and how this impacts him or his family.   During this session, this clinician used the following therapeutic modalities: Client-centered Therapy, Cognitive Behavioral Therapy, Mindfulness-based Strategies, and Supportive Psychotherapy    Substance Abuse was not addressed during this session. If the client is diagnosed with a co-occurring substance use disorder, please indicate any changes in the frequency or amount of use: n/a. Stage of change for addressing substance use diagnoses: No substance use/Not applicable    ASSESSMENT:  Devon Aly presents with a Anxious and Depressed mood.     his affect is depressed and anxious, which is congruent, with his mood and the content of the session. The client has made progress on their goals.However his health is emotionally pulling him down.     Devon Aly presents with a none risk of suicide, none risk of self-harm, and none risk of harm to others.    For any risk assessment that surpasses a \"low\" rating, a safety plan must be developed.    A safety plan was indicated: yes  If yes, describe in detail n/a    PLAN: Between sessions, Devon Aly will use mindfulness and CBT. At the next session, the therapist will use Client-centered Therapy, Cognitive Behavioral Therapy, Mindfulness-based Strategies, and Supportive Psychotherapy to address issues and symptoms as they may arise. .    Behavioral " Health Treatment Plan and Discharge Planning: Devon Aly is aware of and agrees to continue to work on their treatment plan. They have identified and are working toward their discharge goals. yes    Depression Follow-up Plan Completed: Not applicable    Visit start and stop times:    01/30/25  Start Time: 1100  Stop Time: 1200  Total Visit Time: 60 minutes

## 2025-02-03 NOTE — TELEPHONE ENCOUNTER
Patient called in to let office know that all medications need to be sent over to be approved by  insurance.     Patient shared he has had to pay for previous medication and has not been able to  others because is has not been approved by  insurance.     Patient is requesting a call back once this is completed so that he can  medication.

## 2025-02-03 NOTE — TELEPHONE ENCOUNTER
Called the patient to get clarification on this matter, patient states we need to send medications though the portal. I advised the patient that I was unaware of the portal. Patient stated he was going to call his  to find out what we need to do.

## 2025-02-03 NOTE — TELEPHONE ENCOUNTER
Caller:  Patient     Doctor/Office: [facility]    Call regarding :  reschedule injection appt    Call was transferred to: [facility]

## 2025-02-05 ENCOUNTER — TELEPHONE (OUTPATIENT)
Age: 70
End: 2025-02-05

## 2025-02-05 DIAGNOSIS — F43.10 PTSD (POST-TRAUMATIC STRESS DISORDER): ICD-10-CM

## 2025-02-05 DIAGNOSIS — F32.A DEPRESSION, UNSPECIFIED DEPRESSION TYPE: ICD-10-CM

## 2025-02-05 RX ORDER — PRAZOSIN HYDROCHLORIDE 1 MG/1
1 CAPSULE ORAL
Qty: 90 CAPSULE | Refills: 0 | Status: SHIPPED | OUTPATIENT
Start: 2025-02-05

## 2025-02-05 RX ORDER — DULOXETIN HYDROCHLORIDE 60 MG/1
60 CAPSULE, DELAYED RELEASE ORAL EVERY MORNING
Qty: 90 CAPSULE | Refills: 0 | Status: SHIPPED | OUTPATIENT
Start: 2025-02-05

## 2025-02-05 NOTE — TELEPHONE ENCOUNTER
Nurse spoke with Devon and [facility] Pharmacist Jocelyne.    Per Devno - all scripts should have been processed through [employer] since 2020 and not through Medicare.     Scripts have been processed through Medicare incorrectly thus far.     Nurse spoke with pharmacist at [facility] who states a letter is needed from [employer]  stating date of injury, type of injury, Claim #, etc.     Once [facility] has this letter, they can submit any script requests to Work Comp, await  a response to OK script from Work Comp and then process the script through Work Comp.    Nurse spoke with Devon and he will call his  to get a letter and then take the letter to the pharmacy.      Devon thanked nurse for the call.

## 2025-02-05 NOTE — TELEPHONE ENCOUNTER
Patient called and stated he is having trouble getting his medications, they are supposed to be going thru workmans comp and he has been paying for them, now he has to have everything submitted thru the workmans comp portal

## 2025-02-05 NOTE — TELEPHONE ENCOUNTER
Patient is calling to notify us that his medications (two inhalers and nebulizer solution) are being billed through his Medicare when they should be going through worker's comp. Patient would like to speak to someone regarding why this is happening.    Please advise.

## 2025-02-06 ENCOUNTER — TELEPHONE (OUTPATIENT)
Dept: NEPHROLOGY | Facility: CLINIC | Age: 70
End: 2025-02-06

## 2025-02-06 NOTE — TELEPHONE ENCOUNTER
I called and left a message on machine for patient reminding him that he needs to have his fasting blood work done for his nephrology follow up appointment with Dr. DOMI Rocha on Monday 2/10/2025.

## 2025-02-07 ENCOUNTER — APPOINTMENT (OUTPATIENT)
Dept: LAB | Facility: HOSPITAL | Age: 70
End: 2025-02-07
Attending: STUDENT IN AN ORGANIZED HEALTH CARE EDUCATION/TRAINING PROGRAM
Payer: OTHER MISCELLANEOUS

## 2025-02-07 DIAGNOSIS — R73.03 PREDIABETES: ICD-10-CM

## 2025-02-07 DIAGNOSIS — N18.31 STAGE 3A CHRONIC KIDNEY DISEASE (HCC): ICD-10-CM

## 2025-02-07 DIAGNOSIS — G25.81 RESTLESS LEGS: ICD-10-CM

## 2025-02-07 DIAGNOSIS — I25.10 CORONARY ARTERY CALCIFICATION SEEN ON CT SCAN: ICD-10-CM

## 2025-02-07 LAB
25(OH)D3 SERPL-MCNC: 54.3 NG/ML (ref 30–100)
ALBUMIN SERPL BCG-MCNC: 3.9 G/DL (ref 3.5–5)
ALP SERPL-CCNC: 83 U/L (ref 34–104)
ALT SERPL W P-5'-P-CCNC: 25 U/L (ref 7–52)
ANION GAP SERPL CALCULATED.3IONS-SCNC: 6 MMOL/L (ref 4–13)
AST SERPL W P-5'-P-CCNC: 22 U/L (ref 13–39)
BACTERIA UR QL AUTO: ABNORMAL /HPF
BASOPHILS # BLD AUTO: 0.05 THOUSANDS/ΜL (ref 0–0.1)
BASOPHILS NFR BLD AUTO: 0 % (ref 0–1)
BILIRUB SERPL-MCNC: 0.62 MG/DL (ref 0.2–1)
BILIRUB UR QL STRIP: NEGATIVE
BUN SERPL-MCNC: 12 MG/DL (ref 5–25)
CALCIUM SERPL-MCNC: 9 MG/DL (ref 8.4–10.2)
CHLORIDE SERPL-SCNC: 104 MMOL/L (ref 96–108)
CHOLEST SERPL-MCNC: 119 MG/DL (ref ?–200)
CLARITY UR: CLEAR
CO2 SERPL-SCNC: 29 MMOL/L (ref 21–32)
COLOR UR: YELLOW
CREAT SERPL-MCNC: 1.2 MG/DL (ref 0.6–1.3)
CREAT UR-MCNC: 188.1 MG/DL
EOSINOPHIL # BLD AUTO: 0.26 THOUSAND/ΜL (ref 0–0.61)
EOSINOPHIL NFR BLD AUTO: 2 % (ref 0–6)
ERYTHROCYTE [DISTWIDTH] IN BLOOD BY AUTOMATED COUNT: 14.9 % (ref 11.6–15.1)
EST. AVERAGE GLUCOSE BLD GHB EST-MCNC: 128 MG/DL
FERRITIN SERPL-MCNC: 133 NG/ML (ref 24–336)
GFR SERPL CREATININE-BSD FRML MDRD: 62 ML/MIN/1.73SQ M
GLUCOSE P FAST SERPL-MCNC: 79 MG/DL (ref 65–99)
GLUCOSE UR STRIP-MCNC: NEGATIVE MG/DL
HBA1C MFR BLD: 6.1 %
HCT VFR BLD AUTO: 47.5 % (ref 36.5–49.3)
HDLC SERPL-MCNC: 36 MG/DL
HGB BLD-MCNC: 15 G/DL (ref 12–17)
HGB UR QL STRIP.AUTO: NEGATIVE
IMM GRANULOCYTES # BLD AUTO: 0.1 THOUSAND/UL (ref 0–0.2)
IMM GRANULOCYTES NFR BLD AUTO: 1 % (ref 0–2)
IRON SATN MFR SERPL: 17 % (ref 15–50)
IRON SERPL-MCNC: 48 UG/DL (ref 50–212)
KETONES UR STRIP-MCNC: NEGATIVE MG/DL
LDLC SERPL CALC-MCNC: 59 MG/DL (ref 0–100)
LEUKOCYTE ESTERASE UR QL STRIP: NEGATIVE
LYMPHOCYTES # BLD AUTO: 1.63 THOUSANDS/ΜL (ref 0.6–4.47)
LYMPHOCYTES NFR BLD AUTO: 15 % (ref 14–44)
MCH RBC QN AUTO: 28.5 PG (ref 26.8–34.3)
MCHC RBC AUTO-ENTMCNC: 31.6 G/DL (ref 31.4–37.4)
MCV RBC AUTO: 90 FL (ref 82–98)
MONOCYTES # BLD AUTO: 0.89 THOUSAND/ΜL (ref 0.17–1.22)
MONOCYTES NFR BLD AUTO: 8 % (ref 4–12)
NEUTROPHILS # BLD AUTO: 8.33 THOUSANDS/ΜL (ref 1.85–7.62)
NEUTS SEG NFR BLD AUTO: 74 % (ref 43–75)
NITRITE UR QL STRIP: NEGATIVE
NON-SQ EPI CELLS URNS QL MICRO: ABNORMAL /HPF
NRBC BLD AUTO-RTO: 0 /100 WBCS
PH UR STRIP.AUTO: 6.5 [PH]
PHOSPHATE SERPL-MCNC: 3 MG/DL (ref 2.3–4.1)
PLATELET # BLD AUTO: 316 THOUSANDS/UL (ref 149–390)
PMV BLD AUTO: 10.5 FL (ref 8.9–12.7)
POTASSIUM SERPL-SCNC: 3.6 MMOL/L (ref 3.5–5.3)
PROT SERPL-MCNC: 7 G/DL (ref 6.4–8.4)
PROT UR STRIP-MCNC: NEGATIVE MG/DL
PROT UR-MCNC: 12.3 MG/DL
PROT/CREAT UR: 0.1 MG/G{CREAT} (ref 0–0.1)
PTH-INTACT SERPL-MCNC: 50.9 PG/ML (ref 12–88)
RBC # BLD AUTO: 5.26 MILLION/UL (ref 3.88–5.62)
RBC #/AREA URNS AUTO: ABNORMAL /HPF
SODIUM SERPL-SCNC: 139 MMOL/L (ref 135–147)
SP GR UR STRIP.AUTO: 1.02 (ref 1–1.03)
TIBC SERPL-MCNC: 282.8 UG/DL (ref 250–450)
TRANSFERRIN SERPL-MCNC: 202 MG/DL (ref 203–362)
TRIGL SERPL-MCNC: 119 MG/DL (ref ?–150)
UIBC SERPL-MCNC: 235 UG/DL (ref 155–355)
UROBILINOGEN UR STRIP-ACNC: 4 MG/DL
WBC # BLD AUTO: 11.26 THOUSAND/UL (ref 4.31–10.16)
WBC #/AREA URNS AUTO: ABNORMAL /HPF

## 2025-02-07 PROCEDURE — 83970 ASSAY OF PARATHORMONE: CPT

## 2025-02-07 PROCEDURE — 82570 ASSAY OF URINE CREATININE: CPT

## 2025-02-07 PROCEDURE — 82728 ASSAY OF FERRITIN: CPT

## 2025-02-07 PROCEDURE — 80061 LIPID PANEL: CPT

## 2025-02-07 PROCEDURE — 36415 COLL VENOUS BLD VENIPUNCTURE: CPT

## 2025-02-07 PROCEDURE — 85025 COMPLETE CBC W/AUTO DIFF WBC: CPT

## 2025-02-07 PROCEDURE — 81001 URINALYSIS AUTO W/SCOPE: CPT

## 2025-02-07 PROCEDURE — 83550 IRON BINDING TEST: CPT

## 2025-02-07 PROCEDURE — 83036 HEMOGLOBIN GLYCOSYLATED A1C: CPT

## 2025-02-07 PROCEDURE — 84156 ASSAY OF PROTEIN URINE: CPT

## 2025-02-07 PROCEDURE — 84100 ASSAY OF PHOSPHORUS: CPT

## 2025-02-07 PROCEDURE — 80053 COMPREHEN METABOLIC PANEL: CPT

## 2025-02-07 PROCEDURE — 83540 ASSAY OF IRON: CPT

## 2025-02-07 PROCEDURE — 82306 VITAMIN D 25 HYDROXY: CPT

## 2025-02-07 NOTE — TELEPHONE ENCOUNTER
Caller: patient    Doctor: Dr. Sahu    Reason for call: patient would like an update on his hip injections, patient states Dr is to send auth to w/c. Tried to transfer patient to [facility] to schedule his injections but patient states Dr. Sahu needs to send information over first   Please advise     Call back#: [phone]

## 2025-02-10 ENCOUNTER — RESULTS FOLLOW-UP (OUTPATIENT)
Age: 70
End: 2025-02-10

## 2025-02-10 ENCOUNTER — OFFICE VISIT (OUTPATIENT)
Dept: NEPHROLOGY | Facility: CLINIC | Age: 70
End: 2025-02-10
Payer: MEDICARE

## 2025-02-10 VITALS
WEIGHT: 258 LBS | OXYGEN SATURATION: 98 % | BODY MASS INDEX: 39.1 KG/M2 | HEIGHT: 68 IN | TEMPERATURE: 97.2 F | RESPIRATION RATE: 16 BRPM | DIASTOLIC BLOOD PRESSURE: 70 MMHG | HEART RATE: 93 BPM | SYSTOLIC BLOOD PRESSURE: 120 MMHG

## 2025-02-10 DIAGNOSIS — N18.31 STAGE 3A CHRONIC KIDNEY DISEASE (HCC): Primary | ICD-10-CM

## 2025-02-10 DIAGNOSIS — E66.812 CLASS 2 SEVERE OBESITY WITH BODY MASS INDEX (BMI) OF 35 TO 39.9 WITH SERIOUS COMORBIDITY (HCC): ICD-10-CM

## 2025-02-10 DIAGNOSIS — E66.01 CLASS 2 SEVERE OBESITY WITH BODY MASS INDEX (BMI) OF 35 TO 39.9 WITH SERIOUS COMORBIDITY (HCC): ICD-10-CM

## 2025-02-10 DIAGNOSIS — J43.2 CENTRILOBULAR EMPHYSEMA (HCC): ICD-10-CM

## 2025-02-10 DIAGNOSIS — N18.9 CHRONIC KIDNEY DISEASE-MINERAL AND BONE DISORDER: ICD-10-CM

## 2025-02-10 DIAGNOSIS — E83.9 CHRONIC KIDNEY DISEASE-MINERAL AND BONE DISORDER: ICD-10-CM

## 2025-02-10 DIAGNOSIS — M89.9 CHRONIC KIDNEY DISEASE-MINERAL AND BONE DISORDER: ICD-10-CM

## 2025-02-10 DIAGNOSIS — E11.21 TYPE 2 DIABETES MELLITUS WITH DIABETIC NEPHROPATHY, WITHOUT LONG-TERM CURRENT USE OF INSULIN (HCC): ICD-10-CM

## 2025-02-10 PROCEDURE — 99214 OFFICE O/P EST MOD 30 MIN: CPT | Performed by: INTERNAL MEDICINE

## 2025-02-10 NOTE — TELEPHONE ENCOUNTER
----- Message from Zhao Duff MD sent at 2/10/2025  8:10 AM EST -----  Please let patient know overall labs are okay. A1c remains in prediabetic range but a little higher than previous. Recommend cutting down on high carb, sugary foods/beverages. Iron is just barely low. Can continue to monitor this. HDL was a little lo  w, can be brought up with increased activity, but I understand this is tricky with the oxygen use. WBC was a little high but it has been similarly high in the past and may be due to his underlying lung issues.

## 2025-02-10 NOTE — ASSESSMENT & PLAN NOTE
Lab Results   Component Value Date    EGFR 62 02/07/2025    EGFR 57 08/01/2024    EGFR 62 03/14/2024    CREATININE 1.20 02/07/2025    CREATININE 1.28 08/01/2024    CREATININE 1.28 03/14/2024   Quite stable.  Advise hydration and avoiding nephrotoxic medication

## 2025-02-10 NOTE — ASSESSMENT & PLAN NOTE
Lab Results   Component Value Date    EGFR 62 02/07/2025    EGFR 57 08/01/2024    EGFR 62 03/14/2024    CREATININE 1.20 02/07/2025    CREATININE 1.28 08/01/2024    CREATININE 1.28 03/14/2024     PTH and phosphorus along with vitamin D are within acceptable range and will continue to monitor

## 2025-02-10 NOTE — PROGRESS NOTES
Name: Devon Aly      : 1955      MRN: [id number]  Encounter Provider: Steven Pritchett MD  Encounter Date: 2/10/2025   Encounter department: [facility]  :  Assessment & Plan  Stage 3a chronic kidney disease (HCC)  Lab Results   Component Value Date    EGFR 62 2025    EGFR 57 2024    EGFR 62 2024    CREATININE 1.20 2025    CREATININE 1.28 2024    CREATININE 1.28 2024   Quite stable.  Advise hydration and avoiding nephrotoxic medication         Chronic kidney disease-mineral and bone disorder  Lab Results   Component Value Date    EGFR 62 2025    EGFR 57 2024    EGFR 62 2024    CREATININE 1.20 2025    CREATININE 1.28 2024    CREATININE 1.28 2024     PTH and phosphorus along with vitamin D are within acceptable range and will continue to monitor       Centrilobular emphysema (HCC)  On chronic oxygen and being monitored by pulmonologist       Class 2 severe obesity with body mass index (BMI) of 35 to 39.9 with serious comorbidity (HCC)  Need to reduce weight and he is well aware of it       Type 2 diabetes mellitus with diabetic nephropathy, without long-term current use of insulin (HCC)    Lab Results   Component Value Date    HGBA1C 6.1 (H) 2025   Very well-controlled         Everything discussed with the patient.  Kidney function is quite stable so I will see him back in 1 year and we will get blood and urine test before that visit    History of Present Illness   HPI  Devon Aly is a 69 y.o. male who presents CKD follow-up    Overall doing well    On oxygen because of COPD    No other acute complaint    No chest pain no palpitation    Denies shortness of breath    Denies any urinary complaint          Review of Systems   Constitutional:  Negative for fatigue.   HENT:  Negative for congestion.    Eyes:  Negative for photophobia and pain.   Respiratory:  Negative for chest tightness and  "shortness of breath.    Cardiovascular:  Negative for chest pain and palpitations.   Gastrointestinal:  Negative for abdominal distention, abdominal pain and blood in stool.   Endocrine: Negative for polydipsia.   Genitourinary:  Negative for difficulty urinating, dysuria, flank pain, hematuria and urgency.   Musculoskeletal:  Negative for arthralgias and back pain.   Skin:  Negative for rash.   Neurological:  Negative for dizziness, light-headedness and headaches.   Hematological:  Does not bruise/bleed easily.   Psychiatric/Behavioral:  Negative for behavioral problems. The patient is not nervous/anxious.           Objective   /70 (BP Location: Right arm, Patient Position: Sitting, Cuff Size: Large)   Pulse 93   Temp (!) 97.2 °F (36.2 °C) (Temporal)   Resp 16   Ht 5' 8\" (1.727 m)   Wt 117 kg (258 lb)   SpO2 98%   BMI 39.23 kg/m²      Physical Exam  Constitutional:       General: He is not in acute distress.     Appearance: He is well-developed. He is obese.   HENT:      Head: Normocephalic.      Mouth/Throat:      Mouth: Mucous membranes are moist.   Eyes:      General: No scleral icterus.     Conjunctiva/sclera: Conjunctivae normal.   Neck:      Vascular: No JVD.   Cardiovascular:      Rate and Rhythm: Normal rate.      Heart sounds: Normal heart sounds.   Pulmonary:      Effort: Pulmonary effort is normal.      Breath sounds: No wheezing.   Abdominal:      Palpations: Abdomen is soft.      Tenderness: There is no abdominal tenderness.   Musculoskeletal:         General: Normal range of motion.      Cervical back: Neck supple.   Skin:     General: Skin is warm.      Findings: No rash.   Neurological:      Mental Status: He is alert and oriented to person, place, and time.   Psychiatric:         Behavior: Behavior normal.         "

## 2025-02-12 NOTE — TELEPHONE ENCOUNTER
Called pharmacy re: below and spoke with Jocelyne, who verbalized an understanding and stated she will run the medication through and if it requires anything from our office, she will call. Provided Jocelyne with our office #.

## 2025-02-13 ENCOUNTER — SOCIAL WORK (OUTPATIENT)
Dept: BEHAVIORAL/MENTAL HEALTH CLINIC | Facility: CLINIC | Age: 70
End: 2025-02-13
Payer: MEDICARE

## 2025-02-13 DIAGNOSIS — F33.2 MAJOR DEPRESSIVE DISORDER, RECURRENT SEVERE WITHOUT PSYCHOTIC FEATURES (HCC): ICD-10-CM

## 2025-02-13 DIAGNOSIS — F43.10 PTSD (POST-TRAUMATIC STRESS DISORDER): Primary | ICD-10-CM

## 2025-02-13 DIAGNOSIS — R41.3 MEMORY DIFFICULTY: ICD-10-CM

## 2025-02-13 PROCEDURE — 90837 PSYTX W PT 60 MINUTES: CPT | Performed by: SOCIAL WORKER

## 2025-02-13 NOTE — PSYCH
Behavioral Health Psychotherapy Progress Note    Psychotherapy Provided: Individual Psychotherapy     1. PTSD (post-traumatic stress disorder)        2. 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 Memory difficulty            Goals addressed in session: Goal 1 and Goal 2     DATA: Galileo is quite upset in that his wife in the last year she has given their three adult children over 40,000 $ due to their requests. This is very frustrating to Galileo. Yet she questions every purchase Galileo makes. Galileo also continues to deal with deteriorating physical health issues due to long term effects of the covid he had. He has kidney issues and serious pulmonary issues. We discussed strategies for him to talk to his three adult children about asking their mother for money. Their son makes over 6 figures and still calls his mom for issues like repairs on his car and she gives him the money. I suggested Galileo and his wife go for couples but he shared she wont. I provide support, encouragement, insights boundary setting and strategies to cope.   During this session, this clinician used the following therapeutic modalities: Client-centered Therapy, Cognitive Behavioral Therapy, Mindfulness-based Strategies, and Supportive Psychotherapy    Substance Abuse was not addressed during this session. If the client is diagnosed with a co-occurring substance use disorder, please indicate any changes in the frequency or amount of use: n/a. Stage of change for addressing substance use diagnoses: No substance use/Not applicable    ASSESSMENT:  Devon Aly presents with a Anxious and Depressed mood.     his affect is anxious and depressed which is congruent, with his mood and the content of the session. The client has made progress on their goals.However new stressors in his words keep coming.      Devon Aly presents with a none risk of suicide, none risk of self-harm, and none risk of harm to  "others.    For any risk assessment that surpasses a \"low\" rating, a safety plan must be developed.    A safety plan was indicated: no  If yes, describe in detail n/a    PLAN: Between sessions, Devon Aly will use mindfulness and CBT. At the next session, the therapist will use Client-centered Therapy, Cognitive Behavioral Therapy, Mindfulness-based Strategies, and Supportive Psychotherapy to address issues and symptoms as they may arise. .    Behavioral Health Treatment Plan and Discharge Planning: Devon Aly is aware of and agrees to continue to work on their treatment plan. They have identified and are working toward their discharge goals. yes    Depression Follow-up Plan Completed: Not applicable    Visit start and stop times:    02/13/25  Start Time: 1100  Stop Time: 1200  Total Visit Time: 60 minutes  "

## 2025-02-13 NOTE — TELEPHONE ENCOUNTER
Pharmacist [facility] called to advise patient is asking for this medication (donepezil) to be run through ; pharmacist said he called  [employer] and was told the claim is over one year old so they wouldn't accept it; pharmacist was unsure why patient is requesting donepezil be paid through [employer] because his insurance has been covering.    I called patient to discuss; patient said he will have his  call [employer] as they should be covering bills including his medications but they are not and this has been an ongoing problem for him.  In the meantime, he will call the pharmacy to fill donepezil through his insurance and send [employer] the bill until resolved.      Nothing further needed at this time.

## 2025-02-14 DIAGNOSIS — I25.10 CORONARY ARTERY CALCIFICATION SEEN ON CT SCAN: ICD-10-CM

## 2025-02-14 RX ORDER — ATORVASTATIN CALCIUM 20 MG/1
20 TABLET, FILM COATED ORAL DAILY
Qty: 90 TABLET | Refills: 1 | Status: SHIPPED | OUTPATIENT
Start: 2025-02-14

## 2025-02-26 ENCOUNTER — TELEPHONE (OUTPATIENT)
Age: 70
End: 2025-02-26

## 2025-02-26 NOTE — TELEPHONE ENCOUNTER
Patient called asking to speak to [facility].  Warm transfer to [facility].

## 2025-02-27 ENCOUNTER — TELEPHONE (OUTPATIENT)
Age: 70
End: 2025-02-27

## 2025-02-27 ENCOUNTER — PATIENT MESSAGE (OUTPATIENT)
Dept: PAIN MEDICINE | Facility: CLINIC | Age: 70
End: 2025-02-27

## 2025-02-27 NOTE — PATIENT COMMUNICATION
Pt is scheduled for hip injection with Dr Wolf on 3/13/25     Pt is not diabetic and no med holds are needed for hip injection     Pt given instructions review via myc message (recent injection)     Have you completed PT/HEP/Chiro in the past 6 months for dedicated area? no  If yes, how long did you complete?  What was the frequency?  Did it provide relief?  If no, reason therapy was not completed?    Previous hip injections for pain

## 2025-02-27 NOTE — TELEPHONE ENCOUNTER
Patient called to clarify PCP advise on discontinued medications during last visit. Patient states he was told to discontinue:     Naltrexone 2mg  Bupropion HCL XL 300mg  Metformin HCL 500mg    He states he was informed by pharmacy that he has refills on these medications and got confused as he stopped taking these as per PCP advise.       He would like to know if he should stay off of these medications? Please advise.       Patient would appreciate a call back to clarify at [phone]

## 2025-02-27 NOTE — TELEPHONE ENCOUNTER
Bupropion and naltrexone were prescribed by another provider for weight loss in anticipation of orthopedic surgery. Patient told me he is no longer going for surgery and that he stopped the medication by his choice. He does not need to continue taking these medications if he does not wish to continue with medications for weight loss. I would continue metformin. I did not discontinue this medication.

## 2025-03-06 ENCOUNTER — TELEPHONE (OUTPATIENT)
Age: 70
End: 2025-03-06

## 2025-03-06 NOTE — TELEPHONE ENCOUNTER
Patient called in regard to his medications and where they need to be sent in order for his Work[employer]' Comp to cover them.   Patient stated that [employer] is requesting that the medication requests be sent to:   [email or web address]    Patient stated they have to be registered and submitted to the above website. Per note on 2/5/25, the nurse reviewed this with patient and [facility] pharmacy. [facility] was requesting a letter. Patient stated [facility] received the letter and the information they requested. Now [facility] is requesting something else from patient and making it difficult for patient to get his medications.   Writer will forward this information for review.

## 2025-03-07 NOTE — TELEPHONE ENCOUNTER
From Telephone Note of 2/5/25:  Nurse spoke with pharmacist at [facility] who states a letter is needed from [employer]  stating date of injury, type of injury, Claim #, etc.      Once [facility] has this letter, they can submit any script requests to [employer], await  a response to OK script from Work Comp and then process the script through Work Comp.     Nurse spoke with Devon and he will call his  to get a letter and then take the letter to the pharmacy.        Today 3/7/25:  Nurse spoke with pharmacist Bre at [facility] # [phone]    Per Bre, above letter was not received and is not needed.  Bre (pharmacist) requests patient is to call pharmacist and provide:  Date of injury  Employer Name  Type of Injury  Work Comp Insurance Card ID #  with BIN # and PCN #      Nurse spoke with Devon Osorio states he will call pharmacist now.

## 2025-03-12 ENCOUNTER — PATIENT MESSAGE (OUTPATIENT)
Dept: PAIN MEDICINE | Facility: CLINIC | Age: 70
End: 2025-03-12

## 2025-03-12 NOTE — PATIENT COMMUNICATION
Sent pt myc message, lm on cell, and left message for emergency contact attempting to reach pt -- upon checking auth for 3/13, right hip, discovered w/c auth left hip instead -- was advised by auth team we could try to bill work comp (without auth it is unlikely to be covered) or send it to medicare instead.  Or appt could be rescheduled, again, and right hip auth correctly obtained.  Asked for rcb or response to myc message with how pt would like to proceed.

## 2025-03-13 ENCOUNTER — TELEPHONE (OUTPATIENT)
Age: 70
End: 2025-03-13

## 2025-03-13 DIAGNOSIS — I25.10 CORONARY ARTERY CALCIFICATION SEEN ON CT SCAN: ICD-10-CM

## 2025-03-13 RX ORDER — ATORVASTATIN CALCIUM 20 MG/1
20 TABLET, FILM COATED ORAL DAILY
Qty: 90 TABLET | Refills: 1 | Status: SHIPPED | OUTPATIENT
Start: 2025-03-13

## 2025-03-13 NOTE — TELEPHONE ENCOUNTER
[facility] called and caden is stating he has to pay for his atorvastatin and he stated we have to send to his workers comp. Looked in media card in media stating he has to submit them .so he will chantal written script for his atorvastatin to submit and call when script is ready to be picked up

## 2025-03-14 ENCOUNTER — TELEMEDICINE (OUTPATIENT)
Dept: PSYCHIATRY | Facility: CLINIC | Age: 70
End: 2025-03-14
Payer: MEDICARE

## 2025-03-14 DIAGNOSIS — G25.81 RESTLESS LEGS: ICD-10-CM

## 2025-03-14 DIAGNOSIS — F33.9 DEPRESSION, RECURRENT (HCC): Primary | ICD-10-CM

## 2025-03-14 DIAGNOSIS — F43.10 PTSD (POST-TRAUMATIC STRESS DISORDER): ICD-10-CM

## 2025-03-14 PROCEDURE — 99213 OFFICE O/P EST LOW 20 MIN: CPT | Performed by: PHYSICIAN ASSISTANT

## 2025-03-17 ENCOUNTER — PATIENT MESSAGE (OUTPATIENT)
Dept: PAIN MEDICINE | Facility: CLINIC | Age: 70
End: 2025-03-17

## 2025-03-17 ENCOUNTER — TELEPHONE (OUTPATIENT)
Age: 70
End: 2025-03-17

## 2025-03-17 NOTE — PATIENT COMMUNICATION
Lm for pt and sent myc message -- appt available for tomorrow am for injection @ 8 and 840 -- asked for rcb by lunchtime today if interested.  If not, will keep appt as is and call if something else opens

## 2025-03-18 ENCOUNTER — SOCIAL WORK (OUTPATIENT)
Dept: BEHAVIORAL/MENTAL HEALTH CLINIC | Facility: CLINIC | Age: 70
End: 2025-03-18
Payer: OTHER MISCELLANEOUS

## 2025-03-18 DIAGNOSIS — F43.11 ACUTE POST-TRAUMATIC STRESS DISORDER: ICD-10-CM

## 2025-03-18 DIAGNOSIS — F33.2 MAJOR DEPRESSIVE DISORDER, RECURRENT SEVERE WITHOUT PSYCHOTIC FEATURES (HCC): ICD-10-CM

## 2025-03-18 DIAGNOSIS — F43.10 PTSD (POST-TRAUMATIC STRESS DISORDER): Primary | ICD-10-CM

## 2025-03-18 DIAGNOSIS — R41.3 MEMORY DIFFICULTY: ICD-10-CM

## 2025-03-18 PROBLEM — G25.81 RESTLESS LEGS: Status: ACTIVE | Noted: 2024-03-10

## 2025-03-18 PROCEDURE — 90837 PSYTX W PT 60 MINUTES: CPT | Performed by: SOCIAL WORKER

## 2025-03-18 NOTE — PSYCH
"Behavioral Health Psychotherapy Progress Note    Psychotherapy Provided: Individual Psychotherapy     1. PTSD (post-traumatic stress disorder)        2. 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 Memory difficulty        4. Acute post-traumatic stress disorder            Goals addressed in session: Goal 1 and Goal 2     DATA: Galileo spent the entire session discussing his dysfunctional son who has three kids with three different women. He is now dating a woman who has had 5 DUI's. The son makes 6 figures yet still asks his mom for cash. We discussed drawing boundaries. I provided support, encouragement and strategies to cope.  During this session, this clinician used the following therapeutic modalities: Client-centered Therapy, Cognitive Behavioral Therapy, Mindfulness-based Strategies, and Supportive Psychotherapy    Substance Abuse was not addressed during this session. If the client is diagnosed with a co-occurring substance use disorder, please indicate any changes in the frequency or amount of use: n/a. Stage of change for addressing substance use diagnoses: No substance use/Not applicable    ASSESSMENT:  Devon Aly presents with a Anxious mood.     his affect is anxious, which is congruent, with his mood and the content of the session. The client has made progress on their goals.     Devon Aly presents with a none risk of suicide, none risk of self-harm, and none risk of harm to others.    For any risk assessment that surpasses a \"low\" rating, a safety plan must be developed.    A safety plan was indicated: no  If yes, describe in detail n/a    PLAN: Between sessions, Devon Aly will use mindfulness and CBT At the next session, the therapist will use Client-centered Therapy, Cognitive Behavioral Therapy, Mindfulness-based Strategies, and Supportive Psychotherapy to address issues and symptoms as they may arise. .    Behavioral Health Treatment Plan and " Discharge Planning: Devon Aly is aware of and agrees to continue to work on their treatment plan. They have identified and are working toward their discharge goals. yes    Depression Follow-up Plan Completed: Not applicable    Visit start and stop times:    03/18/25  Start Time: 1500  Stop Time: 1600  Total Visit Time: 60 minutes

## 2025-03-18 NOTE — PSYCH
MEDICATION MANAGEMENT NOTE    Name: Devon Aly      : 1955      MRN: [id number]  Encounter Provider: Abril Jones PA-C  Encounter Date: 3/14/2025   Encounter department: [facility]    Insurance: Payor: MEDICARE / Plan: MEDICARE A AND B / Product Type: Medicare A & B Fee for Service /      :  Assessment & Plan  Depression, recurrent (HCC)  Cont cymbalta 60 mg/d. Cont ind therapy       PTSD (post-traumatic stress disorder)  Cont prazosin 1 mg q bedtime, neurontin 100/200 mg.  Cont ind therapy       Restless legs  Cont requip 3 mg q bedtime         Current Outpatient Medications   Medication Sig Dispense Refill    albuterol (2.5 mg/3 mL) 0.083 % nebulizer solution Take 3 mL (2.5 mg total) by nebulization every 6 (six) hours as needed for wheezing or shortness of breath 375 mL 2    atorvastatin (LIPITOR) 20 mg tablet Take 1 tablet (20 mg total) by mouth daily 90 tablet 1    donepezil (ARICEPT) 10 mg tablet TAKE 1 TABLET BY MOUTH EVERYDAY AT BEDTIME 90 tablet 1    DULoxetine (CYMBALTA) 60 mg delayed release capsule Take 1 capsule (60 mg total) by mouth every morning 90 capsule 0    gabapentin (NEURONTIN) 100 mg capsule TAKE 1 CAPSULE EVERY MORNING AND TAKE 2 CAPSULES AT BEDTIME 90 capsule 2    metFORMIN (GLUCOPHAGE-XR) 500 mg 24 hr tablet Take 1 tablet (500 mg total) by mouth 2 (two) times a day with meals 180 tablet 3    Multiple Vitamins-Minerals (MULTIVITAMIN ADULT PO) Take 1 tablet by mouth daily      oxygen gas Inhale 2 L/min continuous as needed      prazosin (MINIPRESS) 1 mg capsule Take 1 capsule (1 mg total) by mouth daily at bedtime 90 capsule 0    rOPINIRole (REQUIP) 3 mg tablet Take 1 tablet (3 mg total) by mouth daily at bedtime 90 tablet 0    sildenafil (VIAGRA) 50 MG tablet Take 1 tablet (50 mg total) by mouth daily as needed for erectile dysfunction 10 tablet 0    tiZANidine (ZANAFLEX) 2 mg tablet TAKE 1 TABLET (2 MG TOTAL) BY MOUTH DAILY AT BEDTIME AS  NEEDED FOR MUSCLE SPASMS 30 tablet 0    umeclidinium-vilanterol (Anoro Ellipta) 62.5-25 mcg/actuation inhaler Inhale 1 puff daily 60 each 5    Ventolin  (90 Base) MCG/ACT inhaler TAKE 2 PUFFS BY MOUTH EVERY 6 HOURS AS NEEDED FOR WHEEZE 18 g 5     No current facility-administered medications for this visit.         Treatment Recommendations:  No med change- trying to get meds authorized thru [facility]  Educated about diagnosis and treatment modalities. Verbalizes understanding and agreement with the treatment plan.  Discussed self monitoring of symptoms, and symptom monitoring tools.  Discussed medications and if treatment adjustment was needed or desired.  Aware of 24 hour and weekend coverage for urgent situations accessed by calling [facility] main practice number  ADAM will make f/u appt once meds have gone thru    Medications Risks/Benefits:      Risks, Benefits And Possible Side Effects Of Medications:    Risks, benefits, and possible side effects of medications explained to Devon and he (or legal representative) verbalizes understanding and agreement for treatment.      History of Present Illness     Devon last seen by Adam 9/3 at which time meds unchanged. Devon last took meds one week ago- has been unable to get authorized thru [facility]  and there has been much confusion around the process to do so. He continues to see ind therapist and these notes reviewed.  Adam and Devon spent the entire appt trying to access the med portal thru [facility].  Adam was eventually able to access.  Prior auths were sent in for cymbalta and prazosin for now; will do neurontin and requip next once we figure out how to work within the system.  ADAM encouraged Devon in the meantime to look into the possibility of obtaining Medicare Part D in case this does not go thru.       Review Of Systems: Review of systems not obtained due to patient factors.    Psychiatric History     This office since 5/7/20.   No IP or suicide attempts.  Past med trial- zoloft.      Trauma/Loss History       ARDS secondary to covid- intubated x 10 days, April 2020. Lost brother and coworkers to covid.  Has been too ill to return to work.      Social History        Lives with wife Kailyn -  >40 yrs. 2 daugthers and a son.  Worked at [employer] x 37 yrs -.  Hobby: birding    Past Medical History:   Diagnosis Date    Anxiety     Chronic kidney disease     COVID-19 03/2020    Depression     Hypertension 4/02/2020    Liver disease     Pneumonia 3/32/2020    PTSD (post-traumatic stress disorder)         Past Surgical History:   Procedure Laterality Date    FL INJECTION RIGHT HIP (NON ARTHROGRAM)  11/30/2020    FL INJECTION RIGHT HIP (NON ARTHROGRAM)  8/4/2021    FL INJECTION RIGHT HIP (NON ARTHROGRAM)  12/27/2021    FL INJECTION RIGHT HIP (NON ARTHROGRAM)  4/25/2022    NO PAST SURGERIES       Allergies:   Allergies   Allergen Reactions    Tetracycline Rash               Medical History Reviewed by provider this encounter:          Objective   There were no vitals taken for this visit.     Mental Status Evaluation:    Appearance age appropriate, casually dressed   Behavior pleasant, cooperative, calm   Speech normal rate, normal volume, normal pitch, spontaneous   Mood Mildly low   Affect mood-congruent   Thought Processes organized, goal directed   Thought Content no overt delusions   Perceptual Disturbances: none   Abnormal Thoughts  Risk Potential Suicidal ideation - None at present  Homicidal ideation - None at present  Potential for aggression - No   Orientation oriented to person, place, time/date, and situation   Memory recent and remote memory grossly intact   Consciousness alert and awake   Attention Span Concentration Span attention span and concentration are age appropriate   Intellect appears to be of average intelligence   Insight intact   Judgement intact   Muscle Strength and  Gait unable to assess  today due to virtual visit   Motor activity unable to assess today due to virtual visit   Language intact   Fund of Knowledge adequate knowledge of current events  adequate fund of knowledge regarding past history  adequate fund of knowledge regarding vocabulary      Laboratory Results: I have personally reviewed all pertinent laboratory/tests results    CBC:   Lab Results   Component Value Date    WBC 11.26 (H) 02/07/2025    RBC 5.26 02/07/2025    HGB 15.0 02/07/2025    HCT 47.5 02/07/2025    MCV 90 02/07/2025     02/07/2025    MCH 28.5 02/07/2025    MCHC 31.6 02/07/2025    RDW 14.9 02/07/2025    MPV 10.5 02/07/2025    NEUTROABS 8.33 (H) 02/07/2025    TOTANEUTABS 9.30 (H) 05/05/2020     CMP:   Lab Results   Component Value Date    SODIUM 139 02/07/2025    K 3.6 02/07/2025     02/07/2025    CO2 29 02/07/2025    AGAP 6 02/07/2025    BUN 12 02/07/2025    CREATININE 1.20 02/07/2025    GLUC 65 08/01/2024    GLUF 79 02/07/2025    CALCIUM 9.0 02/07/2025    AST 22 02/07/2025    ALT 25 02/07/2025    ALKPHOS 83 02/07/2025    TP 7.0 02/07/2025    ALB 3.9 02/07/2025    TBILI 0.62 02/07/2025    EGFR 62 02/07/2025     Lipid Profile:   Lab Results   Component Value Date    CHOLESTEROL 119 02/07/2025    HDL 36 (L) 02/07/2025    TRIG 119 02/07/2025    LDLCALC 59 02/07/2025    NONHDLC 155 04/06/2021     Hemoglobin A1C:   Lab Results   Component Value Date    HGBA1C 6.1 (H) 02/07/2025     02/07/2025         BMP:   Lab Results   Component Value Date    SODIUM 139 02/07/2025    K 3.6 02/07/2025     02/07/2025    CO2 29 02/07/2025    AGAP 6 02/07/2025    BUN 12 02/07/2025    CREATININE 1.20 02/07/2025    GLUC 65 08/01/2024    GLUF 79 02/07/2025    CALCIUM 9.0 02/07/2025    EGFR 62 02/07/2025     ECG with report: No results found for this visit on 03/14/25 (from the past 1000 hours).  Vitamin D Level   Lab Results   Component Value Date    HQMN94SDTHSU 54.3 02/07/2025         Suicide/Homicide Risk  Assessment:    Risk of Harm to Self:  Based on today's assessment, Devon presents the following risk of harm to self: minimal    Risk of Harm to Others:  Based on today's assessment, Devon presents the following risk of harm to others: none    The following interventions are recommended: Continue medication management. Continue psychotherapy.      Note Share:    This note was not shared with the patient due to reasonable likelihood of causing patient harm    Administrative Statements   Administrative Statements   Encounter provider Abril Jones PA-C    The Patient is located at Home and in the following state in which I hold an active license [place].    The patient was identified by name and date of birth. Devon Aly was informed that this is a telemedicine visit and that the visit is being conducted through the Epic Embedded platform. He agrees to proceed..  My office door was closed. No one else was in the room.  He acknowledged consent and understanding of privacy and security of the video platform. The patient has agreed to participate and understands they can discontinue the visit at any time.    I have spent a total time of 20 minutes in caring for this patient on the day of the visit/encounter including Instructions for management, Patient and family education, Risk factor reductions, Documenting in the medical record, Reviewing/placing orders in the medical record (including tests, medications, and/or procedures), and accessing [facility] medical portal , not including the time spent for establishing the audio/video connection.    Visit Time  Visit Start Time: 1530  Visit Stop Time: 1550  Total Visit Duration:  20 minutes    Abril Jones PA-C 03/18/25

## 2025-03-26 ENCOUNTER — TELEPHONE (OUTPATIENT)
Age: 70
End: 2025-03-26

## 2025-03-26 DIAGNOSIS — F43.10 PTSD (POST-TRAUMATIC STRESS DISORDER): ICD-10-CM

## 2025-03-26 NOTE — TELEPHONE ENCOUNTER
Patient calling stating he has two medications that need to go through his Worker's Comp.  Patient tried to do it himself, but was told it has to be done by the office.  Medications are listed below.    Patient states he has been without his medication for a couple of months     Ventolin  Anoro Ellipta    If any questions or problems please call patient

## 2025-03-27 ENCOUNTER — TELEPHONE (OUTPATIENT)
Dept: PSYCHIATRY | Facility: CLINIC | Age: 70
End: 2025-03-27

## 2025-03-27 ENCOUNTER — HOSPITAL ENCOUNTER (OUTPATIENT)
Dept: RADIOLOGY | Facility: CLINIC | Age: 70
End: 2025-03-27
Payer: OTHER MISCELLANEOUS

## 2025-03-27 ENCOUNTER — TELEPHONE (OUTPATIENT)
Dept: RADIOLOGY | Facility: CLINIC | Age: 70
End: 2025-03-27

## 2025-03-27 VITALS
HEART RATE: 90 BPM | OXYGEN SATURATION: 100 % | RESPIRATION RATE: 20 BRPM | DIASTOLIC BLOOD PRESSURE: 73 MMHG | TEMPERATURE: 97.4 F | SYSTOLIC BLOOD PRESSURE: 138 MMHG

## 2025-03-27 DIAGNOSIS — M16.11 PRIMARY OSTEOARTHRITIS OF RIGHT HIP: Primary | ICD-10-CM

## 2025-03-27 DIAGNOSIS — M16.11 PRIMARY OSTEOARTHRITIS OF RIGHT HIP: ICD-10-CM

## 2025-03-27 DIAGNOSIS — F43.10 PTSD (POST-TRAUMATIC STRESS DISORDER): ICD-10-CM

## 2025-03-27 PROCEDURE — 77002 NEEDLE LOCALIZATION BY XRAY: CPT

## 2025-03-27 PROCEDURE — 20610 DRAIN/INJ JOINT/BURSA W/O US: CPT | Performed by: STUDENT IN AN ORGANIZED HEALTH CARE EDUCATION/TRAINING PROGRAM

## 2025-03-27 PROCEDURE — 77002 NEEDLE LOCALIZATION BY XRAY: CPT | Performed by: STUDENT IN AN ORGANIZED HEALTH CARE EDUCATION/TRAINING PROGRAM

## 2025-03-27 RX ORDER — METHYLPREDNISOLONE ACETATE 80 MG/ML
80 INJECTION, SUSPENSION INTRA-ARTICULAR; INTRALESIONAL; INTRAMUSCULAR; PARENTERAL; SOFT TISSUE ONCE
Status: COMPLETED | OUTPATIENT
Start: 2025-03-27 | End: 2025-03-27

## 2025-03-27 RX ORDER — PRAZOSIN HYDROCHLORIDE 1 MG/1
1 CAPSULE ORAL
Qty: 90 CAPSULE | Refills: 0 | OUTPATIENT
Start: 2025-03-27

## 2025-03-27 RX ORDER — ROPIVACAINE HYDROCHLORIDE 2 MG/ML
4 INJECTION, SOLUTION EPIDURAL; INFILTRATION; PERINEURAL ONCE
Status: COMPLETED | OUTPATIENT
Start: 2025-03-27 | End: 2025-03-27

## 2025-03-27 RX ADMIN — METHYLPREDNISOLONE ACETATE 80 MG: 80 INJECTION, SUSPENSION INTRA-ARTICULAR; INTRALESIONAL; INTRAMUSCULAR; SOFT TISSUE at 11:38

## 2025-03-27 RX ADMIN — IOHEXOL 1 ML: 300 INJECTION, SOLUTION INTRAVENOUS at 11:36

## 2025-03-27 RX ADMIN — ROPIVACAINE HYDROCHLORIDE 4 ML: 2 INJECTION, SOLUTION EPIDURAL; INFILTRATION at 11:38

## 2025-03-27 NOTE — TELEPHONE ENCOUNTER
Received closed PA request via [employer] for MINIPRESS 1 mg capsule   Called [facility], and the reason why the request for PA is because someone processed via Workman's comp and not patient's insurance.   pharmacist stated there are no refills, please send new script and if PA is required via insurance he will call the office

## 2025-03-27 NOTE — DISCHARGE INSTR - LAB

## 2025-03-27 NOTE — TELEPHONE ENCOUNTER
Pt in for right hip injection.    Requesting to repeat left hip injection last done 1/14/2025.  Advised can not be repeated for 3 months.    Can you place left hip injection?  DARRICK Pt has not had a SPA ov since 10/2020.

## 2025-03-27 NOTE — H&P
History of Present Illness: The patient is a 69 y.o. male who presents with complaints of right hip pain    Past Medical History:   Diagnosis Date    Anxiety     Chronic kidney disease     COVID-19 03/2020    Depression     Hypertension 4/02/2020    Liver disease     Pneumonia 3/32/2020    PTSD (post-traumatic stress disorder)        Past Surgical History:   Procedure Laterality Date    FL INJECTION RIGHT HIP (NON ARTHROGRAM)  11/30/2020    FL INJECTION RIGHT HIP (NON ARTHROGRAM)  8/4/2021    FL INJECTION RIGHT HIP (NON ARTHROGRAM)  12/27/2021    FL INJECTION RIGHT HIP (NON ARTHROGRAM)  4/25/2022    NO PAST SURGERIES           Current Outpatient Medications:     albuterol (2.5 mg/3 mL) 0.083 % nebulizer solution, Take 3 mL (2.5 mg total) by nebulization every 6 (six) hours as needed for wheezing or shortness of breath, Disp: 375 mL, Rfl: 2    atorvastatin (LIPITOR) 20 mg tablet, Take 1 tablet (20 mg total) by mouth daily, Disp: 90 tablet, Rfl: 1    donepezil (ARICEPT) 10 mg tablet, TAKE 1 TABLET BY MOUTH EVERYDAY AT BEDTIME, Disp: 90 tablet, Rfl: 1    DULoxetine (CYMBALTA) 60 mg delayed release capsule, Take 1 capsule (60 mg total) by mouth every morning, Disp: 90 capsule, Rfl: 0    gabapentin (NEURONTIN) 100 mg capsule, TAKE 1 CAPSULE EVERY MORNING AND TAKE 2 CAPSULES AT BEDTIME, Disp: 90 capsule, Rfl: 2    metFORMIN (GLUCOPHAGE-XR) 500 mg 24 hr tablet, Take 1 tablet (500 mg total) by mouth 2 (two) times a day with meals, Disp: 180 tablet, Rfl: 3    Multiple Vitamins-Minerals (MULTIVITAMIN ADULT PO), Take 1 tablet by mouth daily, Disp: , Rfl:     oxygen gas, Inhale 2 L/min continuous as needed, Disp: , Rfl:     prazosin (MINIPRESS) 1 mg capsule, Take 1 capsule (1 mg total) by mouth daily at bedtime, Disp: 90 capsule, Rfl: 0    rOPINIRole (REQUIP) 3 mg tablet, Take 1 tablet (3 mg total) by mouth daily at bedtime, Disp: 90 tablet, Rfl: 0    sildenafil (VIAGRA) 50 MG tablet, Take 1 tablet (50 mg total) by mouth daily  as needed for erectile dysfunction, Disp: 10 tablet, Rfl: 0    tiZANidine (ZANAFLEX) 2 mg tablet, TAKE 1 TABLET (2 MG TOTAL) BY MOUTH DAILY AT BEDTIME AS NEEDED FOR MUSCLE SPASMS, Disp: 30 tablet, Rfl: 0    umeclidinium-vilanterol (Anoro Ellipta) 62.5-25 mcg/actuation inhaler, Inhale 1 puff daily, Disp: 60 each, Rfl: 5    Ventolin  (90 Base) MCG/ACT inhaler, TAKE 2 PUFFS BY MOUTH EVERY 6 HOURS AS NEEDED FOR WHEEZE, Disp: 18 g, Rfl: 5    Allergies   Allergen Reactions    Tetracycline Rash       Physical Exam:   Vitals:    03/27/25 1129   BP: 126/73   Pulse: 91   Resp: 20   Temp: (!) 97.4 °F (36.3 °C)     General: Awake, Alert, Oriented x 3, Mood and affect appropriate  Respiratory: Respirations even and unlabored  Cardiovascular: Peripheral pulses intact; no edema  Musculoskeletal Exam: pain with right hip flexion    ASA Score: 3    Patient/Chart Verification  Patient ID Verified: Verbal  ID Band Applied: No  Consents Confirmed: To be obtained in the Procedural area  H&P( within 30 days) Verified: To be obtained in the Procedural area  Interval H&P(within 24 hr) Complete (required for Outpatients and Surgery Admit only): To be obtained in the Procedural area  Allergies Reviewed: Yes  Anticoag/NSAID held?: NA  Currently on antibiotics?: No  Pregnancy denied?: NA    Assessment:   1. Primary osteoarthritis of right hip        Plan: right hip IA CSI

## 2025-03-27 NOTE — TELEPHONE ENCOUNTER
Reason for call:   [x] Prior Auth  [] Other:     Caller:  [] Patient  [x] Pharmacy  [id number] - [place], [place] - [address]     Medication: prazosin (MINIPRESS) 1 mg capsule     Dose/Frequency: Take 1 capsule (1 mg total) by mouth daily at bedtime     Quantity: 90    Ordering Provider:   [] PCP/Provider -   [x] Speciality/Provider - [facility]  Authorized By: NIKHIL GoodrichC

## 2025-03-28 DIAGNOSIS — M16.12 PRIMARY OSTEOARTHRITIS OF ONE HIP, LEFT: Primary | ICD-10-CM

## 2025-03-28 DIAGNOSIS — F43.10 PTSD (POST-TRAUMATIC STRESS DISORDER): ICD-10-CM

## 2025-03-28 RX ORDER — PRAZOSIN HYDROCHLORIDE 1 MG/1
1 CAPSULE ORAL
Qty: 90 CAPSULE | Refills: 0 | Status: SHIPPED | OUTPATIENT
Start: 2025-03-28

## 2025-03-31 NOTE — TELEPHONE ENCOUNTER
Can you please call the patient and let him know that can he wait until 4/17 or if it needs to be done before that we need to know which pharmacy does he want to go through

## 2025-04-01 ENCOUNTER — TELEPHONE (OUTPATIENT)
Dept: OBGYN CLINIC | Facility: CLINIC | Age: 70
End: 2025-04-01

## 2025-04-01 ENCOUNTER — APPOINTMENT (OUTPATIENT)
Dept: RADIOLOGY | Facility: CLINIC | Age: 70
End: 2025-04-01
Payer: MEDICARE

## 2025-04-01 ENCOUNTER — OFFICE VISIT (OUTPATIENT)
Dept: OBGYN CLINIC | Facility: CLINIC | Age: 70
End: 2025-04-01
Payer: OTHER MISCELLANEOUS

## 2025-04-01 VITALS — HEIGHT: 68 IN | BODY MASS INDEX: 39.62 KG/M2 | WEIGHT: 261.4 LBS

## 2025-04-01 DIAGNOSIS — M16.0 BILATERAL PRIMARY OSTEOARTHRITIS OF HIP: Primary | ICD-10-CM

## 2025-04-01 DIAGNOSIS — M79.671 RIGHT FOOT PAIN: ICD-10-CM

## 2025-04-01 DIAGNOSIS — E11.41 DIABETIC MONONEUROPATHY ASSOCIATED WITH TYPE 2 DIABETES MELLITUS (HCC): ICD-10-CM

## 2025-04-01 DIAGNOSIS — M20.21: ICD-10-CM

## 2025-04-01 PROCEDURE — 99214 OFFICE O/P EST MOD 30 MIN: CPT | Performed by: ORTHOPAEDIC SURGERY

## 2025-04-01 PROCEDURE — 72100 X-RAY EXAM L-S SPINE 2/3 VWS: CPT

## 2025-04-01 PROCEDURE — 73630 X-RAY EXAM OF FOOT: CPT

## 2025-04-01 NOTE — TELEPHONE ENCOUNTER
Tried to call  Izabella Crisostomo at [phone] to find out if the claim is still open and billed and to find out which body part is associate with the claim on file had to leave a message for the  provided call back number [phone]

## 2025-04-01 NOTE — ASSESSMENT & PLAN NOTE
right worse than left  Weight bearing as tolerated bilateral lower extremities  Continue conservative treatment for bilateral hip OA   Over the counter analgesics as needed / directed   Ice / heat as directed   Follow up 3 months with repeat XR     Orders:    FL spine and pain procedure; Future    Ambulatory Referral to Podiatry; Future

## 2025-04-01 NOTE — PROGRESS NOTES
Name: Devon Aly      : 1955       MRN: [id number]   Encounter Provider: Tray Sahu MD   Encounter Date: 25  Encounter department: [facility] [place]     Assessment & Plan  Bilateral primary osteoarthritis of hip  right worse than left  Weight bearing as tolerated bilateral lower extremities  Continue conservative treatment for bilateral hip OA   Over the counter analgesics as needed / directed   Ice / heat as directed   Follow up 3 months with repeat XR     Orders:    FL spine and pain procedure; Future    Ambulatory Referral to Podiatry; Future    Rigidity of 1st MTP joint, right  New undiagnosed problem with unclear prognosis  1st MTP arthritic changes and numbness in all toes which is likely related to diabetic neuropathy  Referral placed in system for podiatry for evaluation and treatment     Orders:    XR foot 3+ vw right; Future    XR spine lumbar 2 or 3 views injury; Future    Ambulatory Referral to Podiatry; Future    Diabetic mononeuropathy associated with type 2 diabetes mellitus (HCC)    Lab Results   Component Value Date    HGBA1C 6.1 (H) 2025       Orders:    Ambulatory Referral to Podiatry; Future        To Do Next Visit:  Evaluate bilateral hip pain     _____________________________________________________  CHIEF COMPLAINT:  Chief Complaint   Patient presents with    Right Hip - Follow-up     Recent injection which he states did offer relief.    Right Foot - Pain, Swelling     He wanted to mention the R foot as well. Unsure if it could be addressed today -- is an XR warranted? He states for the past 2-3 weeks, it's been causing him more discomfort. He denies any new fall, injury, etc. He notes discomfort when he puts weight on it.         SUBJECTIVE:  Devon Aly is a 69 y.o. male who presents        PAST MEDICAL HISTORY:  Past Medical History:   Diagnosis Date    ADHD (attention deficit hyperactivity disorder)     Adjustment  disorder     Anxiety     Chronic kidney disease     COVID-19 2020    Depression     Headache(784.0)     Headache, tension-type     Hypertension 2020    Liver disease     Memory loss     Neurological disorder     Pneumonia 3/32/2020    PTSD (post-traumatic stress disorder)     Sleep difficulties        PAST SURGICAL HISTORY:  Past Surgical History:   Procedure Laterality Date    FL INJECTION RIGHT HIP (NON ARTHROGRAM)  2020    FL INJECTION RIGHT HIP (NON ARTHROGRAM)  2021    FL INJECTION RIGHT HIP (NON ARTHROGRAM)  2021    FL INJECTION RIGHT HIP (NON ARTHROGRAM)  2022    NO PAST SURGERIES         FAMILY HISTORY:  Family History   Problem Relation Age of Onset    Heart disease Mother     Coronary artery disease Mother     Heart failure Mother     Rheum arthritis Mother     Arthritis Mother     Stroke Mother     Neurological problems Mother     Vascular Disease Mother     Sudden death Father     No Known Problems Son     No Known Problems Daughter     No Known Problems Maternal Grandmother     No Known Problems Maternal Grandfather     No Known Problems Paternal Grandmother     No Known Problems Paternal Grandfather     No Known Problems Daughter     Kidney disease Brother     Cancer Brother        SOCIAL HISTORY:  Social History     Tobacco Use    Smoking status: Former     Current packs/day: 0.00     Average packs/day: 2.0 packs/day for 47.2 years (94.5 ttl pk-yrs)     Types: Cigarettes     Start date: 1973     Quit date: 2020     Years since quittin.0     Passive exposure: Past    Smokeless tobacco: Never   Vaping Use    Vaping status: Former    Quit date: 2020    Substances: Nicotine, Flavoring   Substance Use Topics    Alcohol use: Not Currently    Drug use: Never       MEDICATIONS:    Current Outpatient Medications:     albuterol (2.5 mg/3 mL) 0.083 % nebulizer solution, Take 3 mL (2.5 mg total) by nebulization every 6 (six) hours as needed for wheezing or shortness of  breath, Disp: 375 mL, Rfl: 2    atorvastatin (LIPITOR) 20 mg tablet, Take 1 tablet (20 mg total) by mouth daily, Disp: 90 tablet, Rfl: 1    donepezil (ARICEPT) 10 mg tablet, TAKE 1 TABLET BY MOUTH EVERYDAY AT BEDTIME, Disp: 90 tablet, Rfl: 1    DULoxetine (CYMBALTA) 60 mg delayed release capsule, Take 1 capsule (60 mg total) by mouth every morning, Disp: 90 capsule, Rfl: 0    gabapentin (NEURONTIN) 100 mg capsule, TAKE 1 CAPSULE EVERY MORNING AND TAKE 2 CAPSULES AT BEDTIME, Disp: 90 capsule, Rfl: 2    metFORMIN (GLUCOPHAGE-XR) 500 mg 24 hr tablet, Take 1 tablet (500 mg total) by mouth 2 (two) times a day with meals, Disp: 180 tablet, Rfl: 3    Multiple Vitamins-Minerals (MULTIVITAMIN ADULT PO), Take 1 tablet by mouth daily, Disp: , Rfl:     oxygen gas, Inhale 2 L/min continuous as needed, Disp: , Rfl:     prazosin (MINIPRESS) 1 mg capsule, Take 1 capsule (1 mg total) by mouth daily at bedtime, Disp: 90 capsule, Rfl: 0    rOPINIRole (REQUIP) 3 mg tablet, Take 1 tablet (3 mg total) by mouth daily at bedtime, Disp: 90 tablet, Rfl: 0    sildenafil (VIAGRA) 50 MG tablet, Take 1 tablet (50 mg total) by mouth daily as needed for erectile dysfunction, Disp: 10 tablet, Rfl: 0    tiZANidine (ZANAFLEX) 2 mg tablet, TAKE 1 TABLET (2 MG TOTAL) BY MOUTH DAILY AT BEDTIME AS NEEDED FOR MUSCLE SPASMS, Disp: 30 tablet, Rfl: 0    umeclidinium-vilanterol (Anoro Ellipta) 62.5-25 mcg/actuation inhaler, Inhale 1 puff daily, Disp: 60 each, Rfl: 5    Ventolin  (90 Base) MCG/ACT inhaler, TAKE 2 PUFFS BY MOUTH EVERY 6 HOURS AS NEEDED FOR WHEEZE, Disp: 18 g, Rfl: 5    ALLERGIES:  Allergies   Allergen Reactions    Tetracycline Rash       LABS:  HgA1c:   Lab Results   Component Value Date    HGBA1C 6.1 (H) 02/07/2025     BMP:   Lab Results   Component Value Date    CALCIUM 9.0 02/07/2025    K 3.6 02/07/2025    CO2 29 02/07/2025     02/07/2025    BUN 12 02/07/2025    CREATININE 1.20 02/07/2025     CBC: No components found for:  "\"CBC\"    _____________________________________________________  PHYSICAL EXAMINATION:  Vital signs: Ht 5' 8\" (1.727 m)   Wt 119 kg (261 lb 6.4 oz)   BMI 39.75 kg/m²   General: No acute distress, awake and alert  Psychiatric: Mood and affect appear appropriate  HEENT: Trachea Midline, No torticollis, no apparent facial trauma  Cardiovascular: No audible murmurs; Extremities appear perfused  Pulmonary: No audible wheezing or stridor  Skin: No open lesions; see further details (if any) below      MUSCULOSKELETAL EXAMINATION:  Extremities:    RLE:  TTP over 1st MTP joint  Extremity perfused  Subjective decreased sensation in tips of toes  Extremity perfused      _____________________________________________________  STUDIES REVIEWED:  I personally reviewed the images obtained in office today and my independent interpretation is as follows:  Plain films of R foot and lumbar spine reveal degenerative changes to 1st MTP joint of foot  Lumbar spine reveals narrowing of L5/S1 levels    PROCEDURES PERFORMED:  Procedures              Po Carter PA-C - assisting  Tray Sahu MD        "

## 2025-04-02 NOTE — TELEPHONE ENCOUNTER
3rd attempt. Phone went straight to . LM to CB to inform of Dr. Garcia message. let him know that can he wait until 4/17 or if it needs to be done before that we need to know which pharmacy does he want to go through

## 2025-04-10 ENCOUNTER — TELEPHONE (OUTPATIENT)
Age: 70
End: 2025-04-10

## 2025-04-10 NOTE — TELEPHONE ENCOUNTER
Again- we do not go on the portal and we do not email rx. I again told patient I have called [facility] 2 times and ever received a call back .   If she calls back please out through to manager Negar

## 2025-04-10 NOTE — TELEPHONE ENCOUNTER
Patient called the RX Refill Line. Message is being forwarded to the office.     Patient called regarding his atorvastatin script.  He stated he was given a script but his workmans comp needs the script to be put in thru the portal.   He gave me the following info regarding the portal.  Www.[place]cb.[place].gov/onlineregistration[place]onregbegin  He stated when he went to register it , they were asking for a bunch of info about the dr that he didn't have, stating that the dr has to enter the script info    Please contact patient at [phone] with any questions

## 2025-04-11 DIAGNOSIS — G25.81 RESTLESS LEGS: ICD-10-CM

## 2025-04-11 RX ORDER — ROPINIROLE 3 MG/1
3 TABLET, FILM COATED ORAL
Qty: 90 TABLET | Refills: 0 | Status: SHIPPED | OUTPATIENT
Start: 2025-04-11

## 2025-04-14 ENCOUNTER — SOCIAL WORK (OUTPATIENT)
Dept: BEHAVIORAL/MENTAL HEALTH CLINIC | Facility: CLINIC | Age: 70
End: 2025-04-14
Payer: MEDICARE

## 2025-04-14 DIAGNOSIS — F43.10 PTSD (POST-TRAUMATIC STRESS DISORDER): Primary | ICD-10-CM

## 2025-04-14 DIAGNOSIS — F33.2 MAJOR DEPRESSIVE DISORDER, RECURRENT SEVERE WITHOUT PSYCHOTIC FEATURES (HCC): ICD-10-CM

## 2025-04-14 DIAGNOSIS — F43.10 PTSD (POST-TRAUMATIC STRESS DISORDER): ICD-10-CM

## 2025-04-14 DIAGNOSIS — R41.3 MEMORY DIFFICULTY: ICD-10-CM

## 2025-04-14 PROCEDURE — 90837 PSYTX W PT 60 MINUTES: CPT | Performed by: SOCIAL WORKER

## 2025-04-14 RX ORDER — GABAPENTIN 100 MG/1
CAPSULE ORAL
Qty: 90 CAPSULE | Refills: 2 | Status: SHIPPED | OUTPATIENT
Start: 2025-04-14

## 2025-04-14 NOTE — TELEPHONE ENCOUNTER
Reason for call:   [x] Refill   [] Prior Auth  [] Other:     Office:   [x] Specialty/Provider - psych / Karen    Medication: gabapentin    Dose/Frequency: 100mg qam + 2 caps qhs    Quantity: 90    Pharmacy: [id number] - [place], [place] - [address]     Local Pharmacy   Does the patient have enough for 3 days?   [] Yes   [x] No - Send as HP to POD    Mail Away Pharmacy   Does the patient have enough for 10 days?   [] Yes   [] No - Send as HP to POD

## 2025-04-14 NOTE — BH TREATMENT PLAN
Outpatient Behavioral Health Psychotherapy Treatment Plan   Treatment plan tracking: The update was due 04/01 and he was last here 3/18 so today 4/14 we did it since this was the first time back since 3/18.     Devon Lowryobar  1955     Date of Initial Psychotherapy Assessment: 05/06/2020  Date of Current Treatment Plan: 04/14/25  Treatment Plan Target Date: 10/09/2025  Treatment Plan Expiration Date: 10/09/2025    Diagnosis:   1. PTSD (post-traumatic stress disorder)        2. 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 Memory difficulty            Area(s) of Need: Please see below    Long Term Goal 1 (in the client's own words): I still need to more effectively manage my depression and anxiety.     Stage of Change: Action    Target Date for completion: 10/09/2025     Anticipated therapeutic modalities: mindfulness, CBT and solution focused therapy services.      People identified to complete this goal: myself with the help of my therapist.       Objective 1: (identify the means of measuring success in meeting the objective): When the symptoms arise I will be able to keep the symptoms at a minimum.       Objective 2: (identify the means of measuring success in meeting the objective):       Long Term Goal 2 (in the client's own words): I am still dealing with the physical and psychological issues due to my work related medical issue.     Stage of Change: Action    Target Date for completion: 10/09/2025     Anticipated therapeutic modalities: Mindfulness, CBT and solution focused therapy strategies.      People identified to complete this goal: myself with the help of my therapist.       Objective 1: (identify the means of measuring success in meeting the objective): I will be able cope more effectively with my work related  injury.       Objective 2: (identify the means of measuring success in meeting the objective):      Long Term Goal 3 (in the client's own words):      Stage of Change:     Target Date for completion:      Anticipated therapeutic modalities:      People identified to complete this goal:       Objective 1: (identify the means of measuring success in meeting the objective):       Objective 2: (identify the means of measuring success in meeting the objective):      I am currently under the care of a [facility] psychiatric provider: yes    My [facility] psychiatric provider is: MARTIR Jones    I am currently taking psychiatric medications: Yes, as prescribed    I feel that I will be ready for discharge from mental health care when I reach the following (measurable goal/objective): When I make the progress I am comfortable with.     For children and adults who have a legal guardian:   Has there been any change to custody orders and/or guardianship status? NA. If yes, attach updated documentation.    I have created my Crisis Plan and have been offered a copy of this plan    Behavioral Health Treatment Plan [facility]: Diagnosis and Treatment Plan explained to Devon Aly acknowledges an understanding of their diagnosis. Devon Aly agrees to this treatment plan.    I have been offered a copy of this Treatment Plan. yes

## 2025-04-14 NOTE — PSYCH
"Behavioral Health Psychotherapy Progress Note    Psychotherapy Provided: Individual Psychotherapy     1. PTSD (post-traumatic stress disorder)        2. 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 Memory difficulty            Goals addressed in session: Goal 1 and Goal 2     DATA: Galileo discussed his frustrations over his coverage giving him a difficult time about his medications. He then discussed his health issue. He then discussed issues with his adult children. He discussed his marital issues and how he believes she is overly more generous with their adult kids when he feels she should place boundaries down on them. He then discussed issues with his 25 year old grandaudaynater. This all affects Galileo's depression and anxiety. I provided support, encouragement and strategies to cope.   During this session, this clinician used the following therapeutic modalities: Client-centered Therapy, Cognitive Behavioral Therapy, Mindfulness-based Strategies, and Supportive Psychotherapy    Substance Abuse was not addressed during this session. If the client is diagnosed with a co-occurring substance use disorder, please indicate any changes in the frequency or amount of use: n/a. Stage of change for addressing substance use diagnoses: No substance use/Not applicable    ASSESSMENT:  Devon Aly presents with a Anxious and Depressed mood.     his affect is , which is congruent, with his mood and the content of the session. The client has made progress on their goals.However new issues keep arising which affect his mood.      Devon Aly presents with a none risk of suicide, none risk of self-harm, and none risk of harm to others.    For any risk assessment that surpasses a \"low\" rating, a safety plan must be developed.    A safety plan was indicated: no  If yes, describe in detail n/a    PLAN: Between sessions, Devon Aly will use mindfulness and CBT. At the next session, the " therapist will use Client-centered Therapy, Cognitive Behavioral Therapy, Mindfulness-based Strategies, and Supportive Psychotherapy to address issue and symptoms as they may arise. .    Behavioral Health Treatment Plan and Discharge Planning: Devon Jermain is aware of and agrees to continue to work on their treatment plan. They have identified and are working toward their discharge goals. yes    Depression Follow-up Plan Completed: Not applicable    Visit start and stop times:    04/14/25  Start Time: 1400  Stop Time: 1500  Total Visit Time: 60 minutes

## 2025-04-17 ENCOUNTER — OFFICE VISIT (OUTPATIENT)
Age: 70
End: 2025-04-17
Payer: MEDICARE

## 2025-04-17 VITALS
WEIGHT: 261 LBS | OXYGEN SATURATION: 94 % | HEIGHT: 68 IN | DIASTOLIC BLOOD PRESSURE: 74 MMHG | HEART RATE: 86 BPM | TEMPERATURE: 98.2 F | BODY MASS INDEX: 39.56 KG/M2 | SYSTOLIC BLOOD PRESSURE: 114 MMHG

## 2025-04-17 DIAGNOSIS — F17.211 CIGARETTE NICOTINE DEPENDENCE IN REMISSION: ICD-10-CM

## 2025-04-17 DIAGNOSIS — J96.11 CHRONIC HYPOXEMIC RESPIRATORY FAILURE (HCC): ICD-10-CM

## 2025-04-17 DIAGNOSIS — G47.33 OSA (OBSTRUCTIVE SLEEP APNEA): ICD-10-CM

## 2025-04-17 DIAGNOSIS — U09.9 COVID-19 LONG HAULER: ICD-10-CM

## 2025-04-17 DIAGNOSIS — E66.812 CLASS 2 SEVERE OBESITY WITH BODY MASS INDEX (BMI) OF 35 TO 39.9 WITH SERIOUS COMORBIDITY (HCC): ICD-10-CM

## 2025-04-17 DIAGNOSIS — J43.2 CENTRILOBULAR EMPHYSEMA (HCC): ICD-10-CM

## 2025-04-17 DIAGNOSIS — R06.02 SOB (SHORTNESS OF BREATH): Primary | ICD-10-CM

## 2025-04-17 DIAGNOSIS — E66.01 CLASS 2 SEVERE OBESITY WITH BODY MASS INDEX (BMI) OF 35 TO 39.9 WITH SERIOUS COMORBIDITY (HCC): ICD-10-CM

## 2025-04-17 PROCEDURE — 99214 OFFICE O/P EST MOD 30 MIN: CPT | Performed by: INTERNAL MEDICINE

## 2025-04-17 NOTE — ASSESSMENT & PLAN NOTE
Prior history of obstructive sleep apnea moderate obstructive sleep apnea not using the CPAP he did not get the CPAP because of the cost he states.  Consequences of untreated sleep apnea discussed with excessive daytime sleepiness, increased risk for myocardial infarction stroke atrial fibrillation discussed

## 2025-04-17 NOTE — ASSESSMENT & PLAN NOTE
Centrilobular emphysema bilaterally with prior significant smoking history  Continue with Anoro 1 puff once a day  Continue with albuterol MDI 2 puffs 4 times daily as needed

## 2025-04-17 NOTE — ASSESSMENT & PLAN NOTE
Greater than 94-pack-year smoking history who quit in April 2020  Candidate for lung cancer screening last CT of the chest done in June 2024 no suspicious lung nodules would need repeat again in June 2025 which has been ordered

## 2025-04-17 NOTE — PROGRESS NOTES
Follow-up  Visit - Pulmonary Medicine   Name: Devon Aly      : 1955      MRN: [id number]  Encounter Provider: Sera Garcia MD  Encounter Date: 2025   Encounter department: [facility] PULMONARY [facility]  :  Assessment & Plan  SOB (shortness of breath)  Shortness of breath and dyspnea on exertion multifactorial secondary to his underlying emphysema and also with consequences of his previous severe COVID-19 infection       Centrilobular emphysema (HCC)  Centrilobular emphysema bilaterally with prior significant smoking history  Continue with Anoro 1 puff once a day  Continue with albuterol MDI 2 puffs 4 times daily as needed       Chronic hypoxemic respiratory failure (HCC)  Currently on 2 L of oxygen by nasal cannula       COVID-19 long hauler  COVID-19 long-hauler prior exposure at workplace       ANTHONY (obstructive sleep apnea)  Prior history of obstructive sleep apnea moderate obstructive sleep apnea not using the CPAP he did not get the CPAP because of the cost he states.  Consequences of untreated sleep apnea discussed with excessive daytime sleepiness, increased risk for myocardial infarction stroke atrial fibrillation discussed       Class 2 severe obesity with body mass index (BMI) of 35 to 39.9 with serious comorbidity (HCC)  Recommend weight loss       Cigarette nicotine dependence in remission  Greater than 94-pack-year smoking history who quit in 2020  Candidate for lung cancer screening last CT of the chest done in 2024 no suspicious lung nodules would need repeat again in 2025 which has been ordered         Return in about 6 months (around 10/17/2025).    History of Present Illness   eDvon Aly is a 69 y.o. male who presents for follow-up he states his breathing has been better than before he has been using his Anoro 1 puff once a day and not needing his rescue inhaler frequently    Review of Systems   Constitutional: Negative.  Negative for  "appetite change and fever.   HENT: Negative.  Negative for ear pain, postnasal drip, rhinorrhea, sneezing, sore throat and trouble swallowing.    Eyes: Negative.    Respiratory:  Positive for shortness of breath.    Cardiovascular: Negative.  Negative for chest pain.   Gastrointestinal: Negative.    Endocrine: Negative.    Genitourinary: Negative.    Musculoskeletal: Negative.  Negative for myalgias.   Allergic/Immunologic: Negative.    Neurological: Negative.  Negative for headaches.   Psychiatric/Behavioral:  Positive for sleep disturbance.        Aside from what is mentioned in the HPI, ROS is otherwise negative    Medical History Reviewed by provider this encounter:  Meds  Problems     .     Medical History Reviewed by provider this encounter:  Meds  Problems     .    Objective   /74   Pulse 86 Comment: WITH 2 LTS OF OXYGEN  Temp 98.2 °F (36.8 °C)   Ht 5' 8\" (1.727 m)   Wt 118 kg (261 lb)   SpO2 94%   BMI 39.68 kg/m²     Physical Exam  Vitals and nursing note reviewed.   Constitutional:       Appearance: He is well-developed.   HENT:      Head: Normocephalic and atraumatic.   Eyes:      Conjunctiva/sclera: Conjunctivae normal.      Pupils: Pupils are equal, round, and reactive to light.   Neck:      Thyroid: No thyromegaly.      Vascular: No JVD.   Cardiovascular:      Rate and Rhythm: Normal rate and regular rhythm.      Heart sounds: Normal heart sounds. No murmur heard.     No friction rub. No gallop.   Pulmonary:      Effort: Pulmonary effort is normal. No respiratory distress.      Breath sounds: Normal breath sounds. No wheezing or rales.   Chest:      Chest wall: No tenderness.   Musculoskeletal:         General: No tenderness or deformity. Normal range of motion.      Cervical back: Normal range of motion and neck supple.   Lymphadenopathy:      Cervical: No cervical adenopathy.   Skin:     General: Skin is warm and dry.   Neurological:      Mental Status: He is alert and oriented to " person, place, and time.           Diagnostic Data:  Labs: I personally reviewed the most recent laboratory data pertinent to today's visit.      Radiology results:  Radiology Results Review: I personally reviewed the following image studies in PACS and associated radiology reports: CT chest. My interpretation of the radiology images/reports is: CT lung screening June 2024 no suspicious lung nodules.      PFT/spirometry results:  Normal spirometry no postbronchodilator response normal lung volumes moderate decrease in DLCO normal flow-volume loop      Sera Garcia MD      Answers submitted by the patient for this visit:  Pulmonology Questionnaire (Submitted on 4/15/2025)  Chief Complaint: Primary symptoms  Do you have difficulty breathing?: Yes  Chronicity: chronic  When did you first notice your symptoms?: more than 1 year ago  How often do your symptoms occur?: every several days  Since you first noticed this problem, how has it changed?: unchanged  Do you have shortness of breath that occurs with effort or exertion?: Yes  Do you have ear congestion?: No  Do you have heartburn?: No  Do you have fatigue?: No  Do you have nasal congestion?: No  Do you have shortness of breath when lying flat?: No  Do you have shortness of breath when you wake up?: No  Do you have sweats?: No  Have you experienced weight loss?: No  Which of the following makes your symptoms worse?: any activity, climbing stairs, emotional stress

## 2025-05-07 ENCOUNTER — TELEPHONE (OUTPATIENT)
Age: 70
End: 2025-05-07

## 2025-05-07 NOTE — TELEPHONE ENCOUNTER
Pt asking for status of his  paperwork that he dropped off last month/ Pt stating he has no heard anything.     Pt requesting a call back .

## 2025-05-07 NOTE — TELEPHONE ENCOUNTER
Caller: Patient    Doctor: Dr. Wolf    Reason for call: Would like to schedule a procedure for left hip    Call back#:

## 2025-05-08 NOTE — TELEPHONE ENCOUNTER
Done if it can be faxed and the rx where it needs to be sent needs update please see the paper work he gave , thanks

## 2025-05-12 ENCOUNTER — TELEPHONE (OUTPATIENT)
Age: 70
End: 2025-05-12

## 2025-05-14 ENCOUNTER — PATIENT MESSAGE (OUTPATIENT)
Dept: PAIN MEDICINE | Facility: CLINIC | Age: 70
End: 2025-05-14

## 2025-05-14 NOTE — TELEPHONE ENCOUNTER
Caller: Patient    Doctor: Dr. Wolf     Reason for call: Calling to schedule procedure, Calls are not coming in   Please advise    Call back#:

## 2025-05-14 NOTE — TELEPHONE ENCOUNTER
Caller: pt    Doctor: Dr. gaspar    Reason for call: pt is calling again.    Call back#: [phone]

## 2025-05-14 NOTE — PATIENT COMMUNICATION
Pt is scheduled for hip injection with Dr Wolf on 5/28/25     Pt is not diabetic and no med holds are needed for hip injection     Pt given instructions review via myc message (recent injection)     Have you completed PT/HEP/Chiro in the past 6 months for dedicated area? no  If yes, how long did you complete?  What was the frequency?  Did it provide relief?  If no, reason therapy was not completed?    Previous hip injections for pain

## 2025-05-21 ENCOUNTER — OFFICE VISIT (OUTPATIENT)
Age: 70
End: 2025-05-21
Payer: MEDICARE

## 2025-05-21 VITALS
TEMPERATURE: 97.8 F | BODY MASS INDEX: 39.1 KG/M2 | SYSTOLIC BLOOD PRESSURE: 132 MMHG | WEIGHT: 258 LBS | DIASTOLIC BLOOD PRESSURE: 80 MMHG | RESPIRATION RATE: 18 BRPM | OXYGEN SATURATION: 92 % | HEIGHT: 68 IN | HEART RATE: 89 BPM

## 2025-05-21 DIAGNOSIS — R73.03 PREDIABETES: Primary | ICD-10-CM

## 2025-05-21 DIAGNOSIS — N18.31 STAGE 3A CHRONIC KIDNEY DISEASE (HCC): ICD-10-CM

## 2025-05-21 DIAGNOSIS — J98.4 RESTRICTIVE LUNG DISEASE: ICD-10-CM

## 2025-05-21 DIAGNOSIS — U09.9 COVID-19 LONG HAULER: ICD-10-CM

## 2025-05-21 DIAGNOSIS — I25.10 CORONARY ARTERY CALCIFICATION SEEN ON CT SCAN: ICD-10-CM

## 2025-05-21 PROCEDURE — 99214 OFFICE O/P EST MOD 30 MIN: CPT | Performed by: STUDENT IN AN ORGANIZED HEALTH CARE EDUCATION/TRAINING PROGRAM

## 2025-05-21 PROCEDURE — G2211 COMPLEX E/M VISIT ADD ON: HCPCS | Performed by: STUDENT IN AN ORGANIZED HEALTH CARE EDUCATION/TRAINING PROGRAM

## 2025-05-21 NOTE — ASSESSMENT & PLAN NOTE
A1c 6.1. Recommend to continue metformin 500 mg BID to prevent progression to diabetes. Recheck A1c and BMP prior to next office visit.    Orders:    Hemoglobin A1C; Future    Basic metabolic panel; Future

## 2025-05-21 NOTE — ASSESSMENT & PLAN NOTE
Following with Pulmonology. Chronically on 2L  O2 NC. Continue Anoro ellipta and albuterol PRN.

## 2025-05-21 NOTE — ASSESSMENT & PLAN NOTE
Lab Results   Component Value Date    EGFR 62 02/07/2025    EGFR 57 08/01/2024    EGFR 62 03/14/2024    CREATININE 1.20 02/07/2025    CREATININE 1.28 08/01/2024    CREATININE 1.28 03/14/2024     Kidney function is stable. Following with Nephrology. Avoid nephrotoxic agents.

## 2025-05-28 ENCOUNTER — HOSPITAL ENCOUNTER (OUTPATIENT)
Dept: RADIOLOGY | Facility: CLINIC | Age: 70
Discharge: HOME | End: 2025-05-28
Attending: PHYSICIAN ASSISTANT
Payer: MEDICARE

## 2025-05-28 VITALS
RESPIRATION RATE: 20 BRPM | HEART RATE: 82 BPM | SYSTOLIC BLOOD PRESSURE: 122 MMHG | OXYGEN SATURATION: 95 % | DIASTOLIC BLOOD PRESSURE: 73 MMHG

## 2025-05-28 DIAGNOSIS — M16.0 BILATERAL PRIMARY OSTEOARTHRITIS OF HIP: ICD-10-CM

## 2025-05-28 PROCEDURE — 77002 NEEDLE LOCALIZATION BY XRAY: CPT

## 2025-05-28 PROCEDURE — 20610 DRAIN/INJ JOINT/BURSA W/O US: CPT | Performed by: STUDENT IN AN ORGANIZED HEALTH CARE EDUCATION/TRAINING PROGRAM

## 2025-05-28 PROCEDURE — 77002 NEEDLE LOCALIZATION BY XRAY: CPT | Performed by: STUDENT IN AN ORGANIZED HEALTH CARE EDUCATION/TRAINING PROGRAM

## 2025-05-28 RX ORDER — METHYLPREDNISOLONE ACETATE 80 MG/ML
80 INJECTION, SUSPENSION INTRA-ARTICULAR; INTRALESIONAL; INTRAMUSCULAR; PARENTERAL; SOFT TISSUE ONCE
Status: COMPLETED | OUTPATIENT
Start: 2025-05-28 | End: 2025-05-28

## 2025-05-28 RX ORDER — ROPIVACAINE HYDROCHLORIDE 2 MG/ML
4 INJECTION, SOLUTION EPIDURAL; INFILTRATION; PERINEURAL ONCE
Status: COMPLETED | OUTPATIENT
Start: 2025-05-28 | End: 2025-05-28

## 2025-05-28 RX ADMIN — ROPIVACAINE HYDROCHLORIDE 4 ML: 2 INJECTION, SOLUTION EPIDURAL; INFILTRATION at 11:09

## 2025-05-28 RX ADMIN — METHYLPREDNISOLONE ACETATE 80 MG: 80 INJECTION, SUSPENSION INTRA-ARTICULAR; INTRALESIONAL; INTRAMUSCULAR; SOFT TISSUE at 11:09

## 2025-05-28 RX ADMIN — IOHEXOL 1 ML: 300 INJECTION, SOLUTION INTRAVENOUS at 11:08

## 2025-05-28 NOTE — DISCHARGE INSTR - LAB

## 2025-05-28 NOTE — H&P
History of Present Illness: The patient is a 69 y.o. male who presents with complaints of left hip pain    Past Medical History[1]    Past Surgical History[2]    Current Medications[3]    Allergies[4]    Physical Exam: There were no vitals filed for this visit.  General: Awake, Alert, Oriented x 3, Mood and affect appropriate  Respiratory: Respirations even and unlabored  Cardiovascular: Peripheral pulses intact; no edema  Musculoskeletal Exam: pain with left hip flexion    ASA Score: 3         Assessment:   1. Bilateral primary osteoarthritis of hip        Plan: left hip intra-articular cortisone injection under fluoro         [1]   Past Medical History:  Diagnosis Date    ADHD (attention deficit hyperactivity disorder)     Adjustment disorder     Anxiety     Chronic kidney disease     COVID-19 03/2020    Depression     Headache(784.0)     Headache, tension-type     Hypertension 4/02/2020    Liver disease     Memory loss     Neurological disorder     Pneumonia 3/32/2020    PTSD (post-traumatic stress disorder)     Sleep difficulties    [2]   Past Surgical History:  Procedure Laterality Date    FL INJECTION RIGHT HIP (NON ARTHROGRAM)  11/30/2020    FL INJECTION RIGHT HIP (NON ARTHROGRAM)  8/4/2021    FL INJECTION RIGHT HIP (NON ARTHROGRAM)  12/27/2021    FL INJECTION RIGHT HIP (NON ARTHROGRAM)  4/25/2022    NO PAST SURGERIES     [3]   Current Outpatient Medications:     albuterol (2.5 mg/3 mL) 0.083 % nebulizer solution, Take 3 mL (2.5 mg total) by nebulization every 6 (six) hours as needed for wheezing or shortness of breath, Disp: 375 mL, Rfl: 2    atorvastatin (LIPITOR) 20 mg tablet, Take 1 tablet (20 mg total) by mouth daily, Disp: 90 tablet, Rfl: 1    donepezil (ARICEPT) 10 mg tablet, TAKE 1 TABLET BY MOUTH EVERYDAY AT BEDTIME, Disp: 90 tablet, Rfl: 1    DULoxetine (CYMBALTA) 60 mg delayed release capsule, Take 1 capsule (60 mg total) by mouth every morning, Disp: 90 capsule, Rfl: 0    gabapentin (NEURONTIN)  100 mg capsule, TAKE 1 CAPSULE EVERY MORNING AND TAKE 2 CAPSULES AT BEDTIME, Disp: 90 capsule, Rfl: 2    metFORMIN (GLUCOPHAGE-XR) 500 mg 24 hr tablet, Take 1 tablet (500 mg total) by mouth 2 (two) times a day with meals, Disp: 180 tablet, Rfl: 3    Multiple Vitamins-Minerals (MULTIVITAMIN ADULT PO), Take 1 tablet by mouth daily, Disp: , Rfl:     oxygen gas, Inhale 2 L/min continuous as needed, Disp: , Rfl:     prazosin (MINIPRESS) 1 mg capsule, Take 1 capsule (1 mg total) by mouth daily at bedtime, Disp: 90 capsule, Rfl: 0    rOPINIRole (REQUIP) 3 mg tablet, Take 1 tablet (3 mg total) by mouth daily at bedtime, Disp: 90 tablet, Rfl: 0    sildenafil (VIAGRA) 50 MG tablet, Take 1 tablet (50 mg total) by mouth daily as needed for erectile dysfunction, Disp: 10 tablet, Rfl: 0    tiZANidine (ZANAFLEX) 2 mg tablet, TAKE 1 TABLET (2 MG TOTAL) BY MOUTH DAILY AT BEDTIME AS NEEDED FOR MUSCLE SPASMS, Disp: 30 tablet, Rfl: 0    umeclidinium-vilanterol (Anoro Ellipta) 62.5-25 mcg/actuation inhaler, Inhale 1 puff daily, Disp: 60 each, Rfl: 5    Ventolin  (90 Base) MCG/ACT inhaler, TAKE 2 PUFFS BY MOUTH EVERY 6 HOURS AS NEEDED FOR WHEEZE, Disp: 18 g, Rfl: 5  [4]   Allergies  Allergen Reactions    Tetracycline Rash

## 2025-06-13 DIAGNOSIS — F32.A DEPRESSION, UNSPECIFIED DEPRESSION TYPE: ICD-10-CM

## 2025-06-13 DIAGNOSIS — R73.03 PREDIABETES: ICD-10-CM

## 2025-06-13 DIAGNOSIS — R41.3 MEMORY DIFFICULTY: ICD-10-CM

## 2025-06-13 DIAGNOSIS — J84.9 INTERSTITIAL LUNG DISEASE (HCC): ICD-10-CM

## 2025-06-13 DIAGNOSIS — I25.10 CORONARY ARTERY CALCIFICATION SEEN ON CT SCAN: ICD-10-CM

## 2025-06-13 DIAGNOSIS — J43.2 CENTRILOBULAR EMPHYSEMA (HCC): ICD-10-CM

## 2025-06-13 DIAGNOSIS — G25.81 RESTLESS LEGS: ICD-10-CM

## 2025-06-13 DIAGNOSIS — R06.02 SHORTNESS OF BREATH: ICD-10-CM

## 2025-06-13 DIAGNOSIS — F43.10 PTSD (POST-TRAUMATIC STRESS DISORDER): ICD-10-CM

## 2025-06-13 DIAGNOSIS — M62.838 MUSCLE SPASM: ICD-10-CM

## 2025-06-13 DIAGNOSIS — N52.9 ERECTILE DYSFUNCTION, UNSPECIFIED ERECTILE DYSFUNCTION TYPE: ICD-10-CM

## 2025-06-13 RX ORDER — TIZANIDINE 2 MG/1
2 TABLET ORAL
Qty: 30 TABLET | Refills: 0 | Status: SHIPPED | OUTPATIENT
Start: 2025-06-13 | End: 2025-07-13

## 2025-06-13 RX ORDER — DONEPEZIL HYDROCHLORIDE 10 MG/1
10 TABLET, FILM COATED ORAL
Qty: 90 TABLET | Refills: 1 | Status: SHIPPED | OUTPATIENT
Start: 2025-06-13

## 2025-06-13 RX ORDER — ALBUTEROL SULFATE 0.83 MG/ML
2.5 SOLUTION RESPIRATORY (INHALATION) EVERY 6 HOURS PRN
Qty: 375 ML | Refills: 1 | Status: SHIPPED | OUTPATIENT
Start: 2025-06-13

## 2025-06-13 RX ORDER — UMECLIDINIUM BROMIDE AND VILANTEROL TRIFENATATE 62.5; 25 UG/1; UG/1
1 POWDER RESPIRATORY (INHALATION) DAILY
Qty: 60 EACH | Refills: 5 | Status: SHIPPED | OUTPATIENT
Start: 2025-06-13

## 2025-06-13 RX ORDER — SILDENAFIL 50 MG/1
50 TABLET, FILM COATED ORAL DAILY PRN
Qty: 10 TABLET | Refills: 1 | Status: SHIPPED | OUTPATIENT
Start: 2025-06-13

## 2025-06-13 RX ORDER — METFORMIN HYDROCHLORIDE 500 MG/1
500 TABLET, EXTENDED RELEASE ORAL 2 TIMES DAILY WITH MEALS
Qty: 180 TABLET | Refills: 1 | Status: SHIPPED | OUTPATIENT
Start: 2025-06-13 | End: 2026-06-08

## 2025-06-13 RX ORDER — ATORVASTATIN CALCIUM 20 MG/1
20 TABLET, FILM COATED ORAL DAILY
Qty: 90 TABLET | Refills: 1 | Status: SHIPPED | OUTPATIENT
Start: 2025-06-13

## 2025-06-13 RX ORDER — ALBUTEROL SULFATE 90 UG/1
2 AEROSOL, METERED RESPIRATORY (INHALATION) EVERY 6 HOURS PRN
Qty: 18 G | Refills: 3 | Status: SHIPPED | OUTPATIENT
Start: 2025-06-13

## 2025-06-13 NOTE — TELEPHONE ENCOUNTER
Reason for call:   [x] Refill   [] Prior Auth  [] Other:     Office:   [] PCP/Provider -   [x] Specialty/Provider - [facility]     Medication: Minipress    Dose/Frequency: 1 mg    Quantity: #90    Pharmacy: [facility] [address]    Local Pharmacy   Does the patient have enough for 3 days?   [x] Yes   [] No - Send as HP to POD    Mail Away Pharmacy   Does the patient have enough for 10 days?   [] Yes   [] No - Send as HP to POD                Reason for call:   [x] Refill   [] Prior Auth  [] Other:     Office:   [] PCP/Provider -   [x] Specialty/Provider -     Medication: Cymbalta    Dose/Frequency: 60 mg delayed release    Quantity: #90    Pharmacy: [facility]   Does the patient have enough for 3 days?   [x] Yes   [] No - Send as HP to POD    Mail Away Pharmacy   Does the patient have enough for 10 days?   [] Yes   [] No - Send as HP to POD                Reason for call:   [x] Refill   [] Prior Auth  [] Other:     Office:   [] PCP/Provider -   [x] Specialty/Provider -     Medication: Gabapentin    Dose/Frequency: 100 mg     Quantity: #90    Pharmacy: [facility]   Does the patient have enough for 3 days?   [x] Yes   [] No - Send as HP to POD    Mail Away Pharmacy   Does the patient have enough for 10 days?   [] Yes   [] No - Send as HP to POD                      Reason for call:   [x] Refill   [] Prior Auth  [] Other:     Office:   [] PCP/Provider -   [x] Specialty/Provider -     Medication: Requip    Dose/Frequency: 3 mg     Quantity: #90    Pharmacy: [facility]   Does the patient have enough for 3 days?   [x] Yes   [] No - Send as HP to POD    Mail Away Pharmacy   Does the patient have enough for 10 days?   [] Yes   [] No - Send as HP to POD

## 2025-06-17 ENCOUNTER — TELEMEDICINE (OUTPATIENT)
Dept: PSYCHIATRY | Facility: CLINIC | Age: 70
End: 2025-06-17
Payer: MEDICARE

## 2025-06-17 DIAGNOSIS — F43.10 PTSD (POST-TRAUMATIC STRESS DISORDER): ICD-10-CM

## 2025-06-17 DIAGNOSIS — G25.81 RESTLESS LEGS: ICD-10-CM

## 2025-06-17 DIAGNOSIS — F33.9 DEPRESSION, RECURRENT (HCC): Primary | ICD-10-CM

## 2025-06-17 PROCEDURE — 99214 OFFICE O/P EST MOD 30 MIN: CPT | Performed by: PHYSICIAN ASSISTANT

## 2025-06-18 RX ORDER — ROPINIROLE 3 MG/1
3 TABLET, FILM COATED ORAL
Qty: 90 TABLET | Refills: 0 | Status: SHIPPED | OUTPATIENT
Start: 2025-06-18 | End: 2025-09-16

## 2025-06-18 RX ORDER — GABAPENTIN 100 MG/1
CAPSULE ORAL
Qty: 90 CAPSULE | Refills: 2 | Status: SHIPPED | OUTPATIENT
Start: 2025-06-18 | End: 2025-09-16

## 2025-06-18 RX ORDER — DULOXETIN HYDROCHLORIDE 60 MG/1
60 CAPSULE, DELAYED RELEASE ORAL EVERY MORNING
Qty: 90 CAPSULE | Refills: 0 | Status: SHIPPED | OUTPATIENT
Start: 2025-06-18 | End: 2025-09-16

## 2025-06-18 RX ORDER — PRAZOSIN HYDROCHLORIDE 1 MG/1
1 CAPSULE ORAL
Qty: 90 CAPSULE | Refills: 0 | Status: SHIPPED | OUTPATIENT
Start: 2025-06-18 | End: 2025-09-16

## 2025-06-18 NOTE — PSYCH
MEDICATION MANAGEMENT NOTE    Name: Devon Aly      : 1955      MRN: [id number]  Encounter Provider: Abril Jones PA-C  Encounter Date: 2025   Encounter department: [facility]    Insurance: Payor: MEDICARE / Plan: MEDICARE A AND B / Product Type: Medicare A & B Fee for Service /      Reason for Visit: No chief complaint on file.  :  Assessment & Plan  Depression, recurrent (HCC)  Cont ind therapy.  Cont cymbalta 60 mg/d       PTSD (post-traumatic stress disorder)  Cont ind therapy.  Cont neurontin 100/200 mg, prazosin 1 mg q bedtime       Restless legs  Cont requip 3 mg q bedtime       Current Outpatient Medications   Medication Sig Dispense Refill    albuterol (2.5 mg/3 mL) 0.083 % nebulizer solution Take 3 mL (2.5 mg total) by nebulization every 6 (six) hours as needed for wheezing or shortness of breath 375 mL 1    atorvastatin (LIPITOR) 20 mg tablet Take 1 tablet (20 mg total) by mouth daily 90 tablet 1    donepezil (ARICEPT) 10 mg tablet Take 1 tablet (10 mg total) by mouth daily at bedtime 90 tablet 1    DULoxetine (CYMBALTA) 60 mg delayed release capsule Take 1 capsule (60 mg total) by mouth every morning 90 capsule 0    gabapentin (NEURONTIN) 100 mg capsule TAKE 1 CAPSULE EVERY MORNING AND TAKE 2 CAPSULES AT BEDTIME 90 capsule 2    metFORMIN (GLUCOPHAGE-XR) 500 mg 24 hr tablet Take 1 tablet (500 mg total) by mouth 2 (two) times a day with meals 180 tablet 1    Multiple Vitamins-Minerals (MULTIVITAMIN ADULT PO) Take 1 tablet by mouth daily      oxygen gas Inhale 2 L/min continuous as needed      prazosin (MINIPRESS) 1 mg capsule Take 1 capsule (1 mg total) by mouth daily at bedtime 90 capsule 0    rOPINIRole (REQUIP) 3 mg tablet Take 1 tablet (3 mg total) by mouth daily at bedtime 90 tablet 0    sildenafil (VIAGRA) 50 MG tablet Take 1 tablet (50 mg total) by mouth daily as needed for erectile dysfunction 10 tablet 1    tiZANidine (ZANAFLEX) 2 mg tablet  Take 1 tablet (2 mg total) by mouth daily at bedtime as needed for muscle spasms 30 tablet 0    umeclidinium-vilanterol (Anoro Ellipta) 62.5-25 mcg/actuation inhaler Inhale 1 puff daily 60 each 5    Ventolin  (90 Base) MCG/ACT inhaler Inhale 2 puffs every 6 (six) hours as needed for wheezing 18 g 3     No current facility-administered medications for this visit.           Treatment Recommendations:  No med change.  F/u Jose Luis 9/16/25, sooner prn   Educated about diagnosis and treatment modalities. Verbalizes understanding and agreement with the treatment plan.  Discussed self monitoring of symptoms, and symptom monitoring tools.  Discussed medications and if treatment adjustment was needed or desired.  Aware of 24 hour and weekend coverage for urgent situations accessed by calling [facility] main practice number      Medications Risks/Benefits:      Risks, Benefits And Possible Side Effects Of Medications:    Risks, benefits, and possible side effects of medications explained to Devon and he (or legal representative) verbalizes understanding and agreement for treatment.        History of Present Illness     Devon seen by Jose Luis 3/14 at which time meds unchanged. Devon has been out of cymbalta x 3 days secondary to ongoing ins issues.  Reports feeling ambrocio and depressed with low frustration tolerance and easily agitated- demario in high stimulus environments, like when he has visitors to his home. Continues to struggle with financial stress and dealing with ins co/medical bills. Enjoying his birds and restless legs fairly well-controlled. Does get pins and needles feeling in his feet as well as feet feeling cold.  Was encouraged to f/u with PCP. Nightmares much less. Denies dizziness on position change. Denies SI.     Review Of Systems: Review of systems as noted above in HPI/Subjective. A relevant review of symptoms was otherwise negative      Areas of Improvement: reviewed in  HPI/Subjective Section      Past Medical History[1]  Past Surgical History[2]  Allergies: Allergies[3]     Psychiatric History     This office since 5/7/20.  No IP or suicide attempts.  Past med trial- zoloft.      Trauma/Loss History       ARDS secondary to covid- intubated x 10 days, April 2020. Lost brother and coworkers to covid.  Has been too ill to return to work.      Social History        Lives with wife Kailyn -  >40 yrs. 2 daugthers and a son.  Worked at [employer] x 37 yrs -.  Hobby: [employer]       Medical History Reviewed by provider this encounter:          Objective   There were no vitals taken for this visit.     Mental Status Evaluation:    Appearance Portable O2, age appropriate, casually dressed   Behavior pleasant, cooperative   Speech normal rate, normal volume, normal pitch, spontaneous   Mood low   Affect mood-congruent   Thought Processes organized, goal directed   Thought Content no overt delusions, normal   Perceptual Disturbances: none   Abnormal Thoughts  Risk Potential Suicidal ideation - None  Homicidal ideation - None  Potential for aggression - No   Orientation oriented to person, place, time/date, and situation   Memory recent and remote memory grossly intact   Consciousness alert and awake   Attention Span Concentration Span attention span and concentration are age appropriate   Intellect appears to be of average intelligence   Insight intact   Judgement intact   Muscle Strength and  Gait unable to assess today due to virtual visit   Motor activity unable to assess today due to virtual visit   Language intact   Fund of Knowledge adequate knowledge of current events  adequate fund of knowledge regarding past history  adequate fund of knowledge regarding vocabulary        Laboratory Results: I have personally reviewed all pertinent laboratory/tests results    CBC:   Lab Results   Component Value Date    WBC 11.26 (H) 02/07/2025    RBC 5.26 02/07/2025    HGB  15.0 02/07/2025    HCT 47.5 02/07/2025    MCV 90 02/07/2025     02/07/2025    MCH 28.5 02/07/2025    MCHC 31.6 02/07/2025    RDW 14.9 02/07/2025    MPV 10.5 02/07/2025    NEUTROABS 8.33 (H) 02/07/2025    TOTANEUTABS 9.30 (H) 05/05/2020     CMP:   Lab Results   Component Value Date    SODIUM 139 02/07/2025    K 3.6 02/07/2025     02/07/2025    CO2 29 02/07/2025    AGAP 6 02/07/2025    BUN 12 02/07/2025    CREATININE 1.20 02/07/2025    GLUC 65 08/01/2024    GLUF 79 02/07/2025    CALCIUM 9.0 02/07/2025    AST 22 02/07/2025    ALT 25 02/07/2025    ALKPHOS 83 02/07/2025    TP 7.0 02/07/2025    ALB 3.9 02/07/2025    TBILI 0.62 02/07/2025    EGFR 62 02/07/2025     Lipid Profile:   Lab Results   Component Value Date    CHOLESTEROL 119 02/07/2025    HDL 36 (L) 02/07/2025    TRIG 119 02/07/2025    LDLCALC 59 02/07/2025    NONHDLC 155 04/06/2021     Hemoglobin A1C:   Lab Results   Component Value Date    HGBA1C 6.1 (H) 02/07/2025     02/07/2025       BMP:   Lab Results   Component Value Date    SODIUM 139 02/07/2025    K 3.6 02/07/2025     02/07/2025    CO2 29 02/07/2025    AGAP 6 02/07/2025    BUN 12 02/07/2025    CREATININE 1.20 02/07/2025    GLUC 65 08/01/2024    GLUF 79 02/07/2025    CALCIUM 9.0 02/07/2025    EGFR 62 02/07/2025       Vitamin D Level   Lab Results   Component Value Date    QUWN20TRPULE 54.3 02/07/2025         Suicide/Homicide Risk Assessment:    Risk of Harm to Self:  Based on today's assessment, Devon presents the following risk of harm to self: none    Risk of Harm to Others:  Based on today's assessment, Devon presents the following risk of harm to others: none    The following interventions are recommended: Continue medication management. Continue psychotherapy. No other intervention changes indicated at this time.    Psychotherapy Provided:     Individual psychotherapy provided: No    Treatment Plan:    Completed and signed during the session: Not applicable - Treatment  Plan not due at this session.    Goals: Progress towards Treatment Plan goals - Yes, moderate progress, as evidenced by subjective findings in HPI/Subjective Section    Depression Follow-up Plan Completed: Yes    Note Share:    This note was shared with patient.    Administrative Statements   Administrative Statements   Encounter provider Abril Jones PA-C    The Patient is located at Home and in the following state in which I hold an active license [place].    The patient was identified by name and date of birth. Devon Aly was informed that this is a telemedicine visit and that the visit is being conducted through the Epic Embedded platform. He agrees to proceed..  My office door was closed. The patient was notified the following individuals were present in the room CORRINE Hooker.  He acknowledged consent and understanding of privacy and security of the video platform. The patient has agreed to participate and understands they can discontinue the visit at any time.    I have spent a total time of 19 minutes in caring for this patient on the day of the visit/encounter including Risks and benefits of tx options, Instructions for management, Risk factor reductions, Impressions, Counseling / Coordination of care, Documenting in the medical record, Reviewing/placing orders in the medical record (including tests, medications, and/or procedures), and Obtaining or reviewing history  , not including the time spent for establishing the audio/video connection.    Visit Time  Visit Start Time: 1330  Visit Stop Time: 1349  Total Visit Duration: 19 minutes        Abril Jones PA-C 06/18/25         [1]   Past Medical History:  Diagnosis Date    ADHD (attention deficit hyperactivity disorder)     Adjustment disorder     Anxiety     Chronic kidney disease     COVID-19 03/2020    Depression     Headache(784.0)     Headache, tension-type     Hypertension 4/02/2020    Liver disease     Memory loss     Neurological  disorder     Pneumonia 3/32/2020    PTSD (post-traumatic stress disorder)     Sleep difficulties    [2]   Past Surgical History:  Procedure Laterality Date    FL INJECTION RIGHT HIP (NON ARTHROGRAM)  11/30/2020    FL INJECTION RIGHT HIP (NON ARTHROGRAM)  8/4/2021    FL INJECTION RIGHT HIP (NON ARTHROGRAM)  12/27/2021    FL INJECTION RIGHT HIP (NON ARTHROGRAM)  4/25/2022    NO PAST SURGERIES     [3]   Allergies  Allergen Reactions    Tetracycline Rash

## 2025-06-18 NOTE — ASSESSMENT & PLAN NOTE
Cont requip 3 mg q bedtime       Current Outpatient Medications   Medication Sig Dispense Refill    albuterol (2.5 mg/3 mL) 0.083 % nebulizer solution Take 3 mL (2.5 mg total) by nebulization every 6 (six) hours as needed for wheezing or shortness of breath 375 mL 1    atorvastatin (LIPITOR) 20 mg tablet Take 1 tablet (20 mg total) by mouth daily 90 tablet 1    donepezil (ARICEPT) 10 mg tablet Take 1 tablet (10 mg total) by mouth daily at bedtime 90 tablet 1    DULoxetine (CYMBALTA) 60 mg delayed release capsule Take 1 capsule (60 mg total) by mouth every morning 90 capsule 0    gabapentin (NEURONTIN) 100 mg capsule TAKE 1 CAPSULE EVERY MORNING AND TAKE 2 CAPSULES AT BEDTIME 90 capsule 2    metFORMIN (GLUCOPHAGE-XR) 500 mg 24 hr tablet Take 1 tablet (500 mg total) by mouth 2 (two) times a day with meals 180 tablet 1    Multiple Vitamins-Minerals (MULTIVITAMIN ADULT PO) Take 1 tablet by mouth daily      oxygen gas Inhale 2 L/min continuous as needed      prazosin (MINIPRESS) 1 mg capsule Take 1 capsule (1 mg total) by mouth daily at bedtime 90 capsule 0    rOPINIRole (REQUIP) 3 mg tablet Take 1 tablet (3 mg total) by mouth daily at bedtime 90 tablet 0    sildenafil (VIAGRA) 50 MG tablet Take 1 tablet (50 mg total) by mouth daily as needed for erectile dysfunction 10 tablet 1    tiZANidine (ZANAFLEX) 2 mg tablet Take 1 tablet (2 mg total) by mouth daily at bedtime as needed for muscle spasms 30 tablet 0    umeclidinium-vilanterol (Anoro Ellipta) 62.5-25 mcg/actuation inhaler Inhale 1 puff daily 60 each 5    Ventolin  (90 Base) MCG/ACT inhaler Inhale 2 puffs every 6 (six) hours as needed for wheezing 18 g 3     No current facility-administered medications for this visit.

## 2025-06-27 DIAGNOSIS — F43.10 PTSD (POST-TRAUMATIC STRESS DISORDER): ICD-10-CM

## 2025-06-27 RX ORDER — PRAZOSIN HYDROCHLORIDE 1 MG/1
1 CAPSULE ORAL
Qty: 90 CAPSULE | Refills: 0 | OUTPATIENT
Start: 2025-06-27 | End: 2025-09-25

## 2025-07-01 ENCOUNTER — OFFICE VISIT (OUTPATIENT)
Dept: OBGYN CLINIC | Facility: CLINIC | Age: 70
End: 2025-07-01
Payer: COMMERCIAL

## 2025-07-01 VITALS — BODY MASS INDEX: 38.19 KG/M2 | HEIGHT: 68 IN | WEIGHT: 252 LBS

## 2025-07-01 DIAGNOSIS — M16.0 BILATERAL PRIMARY OSTEOARTHRITIS OF HIP: Primary | ICD-10-CM

## 2025-07-01 PROCEDURE — 99213 OFFICE O/P EST LOW 20 MIN: CPT | Performed by: ORTHOPAEDIC SURGERY

## 2025-07-01 NOTE — PROGRESS NOTES
"Name: Devon Aly      : 1955       MRN: [id number]   Encounter Provider: Tray Sahu MD   Encounter Date: 25  Encounter department: [facility] [place]         Assessment & Plan  Bilateral primary osteoarthritis of hip  Right > left  Continue conservative management for bilateral hip osteoarthritis  Right hip IA CSI ordered today  OTC analgesics as needed  Ice/heat as needed  Follow up 3 months for repeat evaluation  Orders:    FL spine and pain procedure; Future            To Do Next Visit:  Evaluate bilateral hips    _____________________________________________________  CHIEF COMPLAINT:  Chief Complaint   Patient presents with    Right Hip - Follow-up    Left Hip - Follow-up         SUBJECTIVE:  Devon Aly is a 69 y.o. male who presents for follow up evaluation of bilateral hip pain. He was last seen for this on 2025 when he was referred to repeat hip injection.  Patient had left sided IA hip CSI 25, right sided IA hip CSI on 3/27/25. He admits good relief from the injections. Patient admits his right hip is more painful today, and he would like to schedule another injection. Pain managed with tylenol.     PAST MEDICAL HISTORY:  Past Medical History[1]    PAST SURGICAL HISTORY:  Past Surgical History[2]    FAMILY HISTORY:  Family History[3]    SOCIAL HISTORY:  Social History[4]    MEDICATIONS:  Current Medications[5]    ALLERGIES:  Allergies[6]    LABS:  HgA1c:   Lab Results   Component Value Date    HGBA1C 6.1 (H) 2025     BMP:   Lab Results   Component Value Date    CALCIUM 9.0 2025    K 3.6 2025    CO2 29 2025     2025    BUN 12 2025    CREATININE 1.20 2025     CBC: No components found for: \"CBC\"    _____________________________________________________  PHYSICAL EXAMINATION:  Vital signs: Ht 5' 8\" (1.727 m)   Wt 114 kg (252 lb)   BMI 38.32 kg/m²   General: No acute distress, awake and " alert  Psychiatric: Mood and affect appear appropriate  HEENT: Trachea Midline, No torticollis, no apparent facial trauma  Cardiovascular: No audible murmurs; Extremities appear perfused  Pulmonary: No audible wheezing or stridor  Skin: No open lesions; see further details (if any) below    MUSCULOSKELETAL EXAMINATION:  Extremities:    Bilateral Hip:  Range of motion 100 degrees hip flexion  35 degrees external rotation  10 degrees internal rotation  No tenderness to palpation over hip  able to perform straight leg raise  positive log roll  positive Stinchfield  positive SUMAN, FADIR  Sensation intact to L2-S1 dermatomes   Extremity warm and well perfused         _____________________________________________________  STUDIES REVIEWED:  I personally reviewed the images obtained in office today and my independent interpretation is as follows:  No new images today      PROCEDURES PERFORMED:  Procedures      Scribe Attestation      I,:  Katja Hassan am acting as a scribe while in the presence of the attending physician.:       I,:  Tray Sahu MD personally performed the services described in this documentation    as scribed in my presence.:                  [1]   Past Medical History:  Diagnosis Date    ADHD (attention deficit hyperactivity disorder)     Adjustment disorder     Anxiety     Chronic kidney disease     COVID-19 03/2020    Depression     Headache(784.0)     Headache, tension-type     Hypertension 4/02/2020    Liver disease     Memory loss     Neurological disorder     Pneumonia 3/32/2020    PTSD (post-traumatic stress disorder)     Sleep difficulties    [2]   Past Surgical History:  Procedure Laterality Date    FL INJECTION RIGHT HIP (NON ARTHROGRAM)  11/30/2020    FL INJECTION RIGHT HIP (NON ARTHROGRAM)  8/4/2021    FL INJECTION RIGHT HIP (NON ARTHROGRAM)  12/27/2021    FL INJECTION RIGHT HIP (NON ARTHROGRAM)  4/25/2022    NO PAST SURGERIES     [3]   Family History  Problem Relation Name Age of  Onset    Heart disease Mother Miriam smith     Coronary artery disease Mother Miriam smith     Heart failure Mother Miriam smith     Rheum arthritis Mother Miriam smith     Arthritis Mother Miriam smith     Stroke Mother Miriam smith     Neurological problems Mother Miriam smith     Vascular Disease Mother Miriam smith     Sudden death Father      No Known Problems Son      No Known Problems Daughter      No Known Problems Maternal Grandmother      No Known Problems Maternal Grandfather      No Known Problems Paternal Grandmother      No Known Problems Paternal Grandfather      No Known Problems Daughter      Kidney disease Brother Alonzo Aly     Cancer Brother Hi    [4]   Social History  Tobacco Use    Smoking status: Former     Current packs/day: 0.00     Average packs/day: 2.0 packs/day for 47.2 years (94.5 ttl pk-yrs)     Types: Cigarettes     Start date: 1973     Quit date: 2020     Years since quittin.2     Passive exposure: Past    Smokeless tobacco: Never   Vaping Use    Vaping status: Former    Quit date: 2020    Substances: Nicotine, Flavoring   Substance Use Topics    Alcohol use: Not Currently    Drug use: Never   [5]   Current Outpatient Medications:     albuterol (2.5 mg/3 mL) 0.083 % nebulizer solution, Take 3 mL (2.5 mg total) by nebulization every 6 (six) hours as needed for wheezing or shortness of breath, Disp: 375 mL, Rfl: 1    atorvastatin (LIPITOR) 20 mg tablet, Take 1 tablet (20 mg total) by mouth daily, Disp: 90 tablet, Rfl: 1    donepezil (ARICEPT) 10 mg tablet, Take 1 tablet (10 mg total) by mouth daily at bedtime, Disp: 90 tablet, Rfl: 1    DULoxetine (CYMBALTA) 60 mg delayed release capsule, Take 1 capsule (60 mg total) by mouth every morning, Disp: 90 capsule, Rfl: 0    gabapentin (NEURONTIN) 100 mg capsule, TAKE 1 CAPSULE EVERY MORNING AND TAKE 2 CAPSULES AT BEDTIME, Disp: 90 capsule, Rfl: 2    metFORMIN (GLUCOPHAGE-XR) 500 mg 24 hr tablet, Take 1 tablet (500 mg total) by mouth 2  (two) times a day with meals, Disp: 180 tablet, Rfl: 1    Multiple Vitamins-Minerals (MULTIVITAMIN ADULT PO), Take 1 tablet by mouth in the morning., Disp: , Rfl:     oxygen gas, Inhale 2 L/min continuous as needed, Disp: , Rfl:     prazosin (MINIPRESS) 1 mg capsule, Take 1 capsule (1 mg total) by mouth daily at bedtime, Disp: 90 capsule, Rfl: 0    rOPINIRole (REQUIP) 3 mg tablet, Take 1 tablet (3 mg total) by mouth daily at bedtime, Disp: 90 tablet, Rfl: 0    sildenafil (VIAGRA) 50 MG tablet, Take 1 tablet (50 mg total) by mouth daily as needed for erectile dysfunction, Disp: 10 tablet, Rfl: 1    tiZANidine (ZANAFLEX) 2 mg tablet, Take 1 tablet (2 mg total) by mouth daily at bedtime as needed for muscle spasms, Disp: 30 tablet, Rfl: 0    umeclidinium-vilanterol (Anoro Ellipta) 62.5-25 mcg/actuation inhaler, Inhale 1 puff daily, Disp: 60 each, Rfl: 5    Ventolin  (90 Base) MCG/ACT inhaler, Inhale 2 puffs every 6 (six) hours as needed for wheezing, Disp: 18 g, Rfl: 3  [6]   Allergies  Allergen Reactions    Tetracycline Rash

## 2025-07-02 ENCOUNTER — TELEPHONE (OUTPATIENT)
Dept: NEUROLOGY | Facility: CLINIC | Age: 70
End: 2025-07-02

## 2025-07-02 ENCOUNTER — TELEPHONE (OUTPATIENT)
Age: 70
End: 2025-07-02

## 2025-07-02 NOTE — ASSESSMENT & PLAN NOTE
Right > left  Continue conservative management for bilateral hip osteoarthritis  Right hip IA CSI ordered today  OTC analgesics as needed  Ice/heat as needed  Follow up 3 months for repeat evaluation  Orders:    FL spine and pain procedure; Future

## 2025-07-02 NOTE — TELEPHONE ENCOUNTER
Patient contacted the office to schedule a follow up visit with provider. Patient is now scheduled for Tuesday, 7/8/2025 at 3:00PM in office with Tray Garzon at the [facility].

## 2025-07-05 DIAGNOSIS — M62.838 MUSCLE SPASM: ICD-10-CM

## 2025-07-08 ENCOUNTER — SOCIAL WORK (OUTPATIENT)
Dept: BEHAVIORAL/MENTAL HEALTH CLINIC | Facility: CLINIC | Age: 70
End: 2025-07-08
Payer: COMMERCIAL

## 2025-07-08 DIAGNOSIS — F43.10 PTSD (POST-TRAUMATIC STRESS DISORDER): Primary | ICD-10-CM

## 2025-07-08 DIAGNOSIS — F43.11 ACUTE POST-TRAUMATIC STRESS DISORDER: ICD-10-CM

## 2025-07-08 DIAGNOSIS — F33.2 MAJOR DEPRESSIVE DISORDER, RECURRENT SEVERE WITHOUT PSYCHOTIC FEATURES (HCC): ICD-10-CM

## 2025-07-08 DIAGNOSIS — R41.3 MEMORY DIFFICULTY: ICD-10-CM

## 2025-07-08 PROCEDURE — 90837 PSYTX W PT 60 MINUTES: CPT | Performed by: SOCIAL WORKER

## 2025-07-08 RX ORDER — TIZANIDINE 2 MG/1
2 TABLET ORAL
Qty: 90 TABLET | Refills: 1 | OUTPATIENT
Start: 2025-07-08 | End: 2025-08-07

## 2025-07-08 NOTE — TELEPHONE ENCOUNTER
Appointment schedule for patient to come and speak with you about the medication because he says he needs it and takes it
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
## 2025-07-08 NOTE — TELEPHONE ENCOUNTER
Advise to come in to discuss further refills for this medication, not intended to be a long-term medication

## 2025-07-09 DIAGNOSIS — M62.838 MUSCLE SPASM: ICD-10-CM

## 2025-07-10 ENCOUNTER — OFFICE VISIT (OUTPATIENT)
Dept: NEUROLOGY | Facility: CLINIC | Age: 70
End: 2025-07-10
Payer: OTHER MISCELLANEOUS

## 2025-07-10 VITALS
HEIGHT: 68 IN | BODY MASS INDEX: 39.19 KG/M2 | SYSTOLIC BLOOD PRESSURE: 90 MMHG | HEART RATE: 87 BPM | OXYGEN SATURATION: 96 % | DIASTOLIC BLOOD PRESSURE: 60 MMHG | WEIGHT: 258.6 LBS

## 2025-07-10 DIAGNOSIS — R41.3 MEMORY DIFFICULTY: ICD-10-CM

## 2025-07-10 DIAGNOSIS — G44.229 CHRONIC TENSION-TYPE HEADACHE, NOT INTRACTABLE: ICD-10-CM

## 2025-07-10 PROCEDURE — 99214 OFFICE O/P EST MOD 30 MIN: CPT | Performed by: PSYCHIATRY & NEUROLOGY

## 2025-07-10 RX ORDER — TIZANIDINE 2 MG/1
2 TABLET ORAL
Qty: 30 TABLET | Refills: 0 | OUTPATIENT
Start: 2025-07-10 | End: 2025-08-09

## 2025-07-10 NOTE — PROGRESS NOTES
Neurology Ambulatory Visit  Name: Devon Aly       : 1955       MRN: [id number]   Encounter Provider: Jasper Daniel MD   Encounter Date: 7/10/2025  Encounter department: [facility]      Devon Aly is a 69 y.o. male.       Assessment & Plan  Memory difficulty       Patient does have mild cognitive impairment with no evidence of neurodegeneration in the MRI scan of the brain from , I would repeat an MRI of the brain to follow-up on that also he was not able to tolerate neuropsych testing in the past I advised him that if he could try that again maybe we can have him go at a different place to see if we can do the test so that we can get a better assessment of the cognitive function I think it could be multifactorial given that his history of posttraumatic stress disorder and sleep apnea, I have advised him to go for cognitive therapy he had issues with his insurance company he is going to talk with him again and see if he can go for that and continue with mentally stimulating exercises at home and to eat a healthy nutritious diet and to take a multivitamin every day to continue with Aricept 10 mg once a day as he is tolerating it well and to take safety precautions, I have advised him to go for  adaptability test to make sure that he is okay to drive, his initial MoCA was  repeat was  in the last MoCA on his last visit was .    He was advised the importance of using his CPAP machine and continue follow-up with his psychiatrist I did give him the option that [facility] he would like to hold off on that,  Chronic tension-type headache, not intractable         Patient is on gabapentin 300 mg a day for other reasons which could be helping with his headache advised him to avoid migraine triggers which we discussed in detail including foods to avoid he was advised to keep himself well-hydrated avoid excessive use  of over-the-counter analgesics he was advised to try magnesium folate milligrams at nighttime and riboflavin 400 mg a day to see if it will help with the headaches avoid stress and to sleep regularly for 7 to 8 hours at night, continue follow-up with the psychiatrist regarding his history of posttraumatic stress disorder go to the hospital if has any worsening symptoms and call me otherwise to see me back in 6 months or sooner if needed and follow-up with the other physicians.    Subjective:  Chief Complaint   Patient presents with    Memory difficulty    Headache       HISTORY OF PRESENT ILLNESS  Patient is here in follow-up for headaches and memory difficulty, he feels he is about the same he does not think that anything is worse or any better, he gets headaches twice a week, it is there sometimes in the front on on the right side does not, he gets a headache in the night and then he goes to sleep and the headache is resolved in the morning if he gets a headache in the morning sometimes it could last for 20 minutes, he lives with his wife and still has short-term memory issues, wife always did the bank finances he tells me that he is able to drive without any issues, he could not go for neuropsych testing secondary to the fluorescent lights in the room there as it was bothering him, he is in follow-up with a psychiatrist regarding his posttraumatic stress disorder, his appetite is good weight has been stable, mood is good, sleep is good, he is on continuous 2 L oxygen and is in follow-up with the pulmonologist , patient denies being depressed, no suicidal or homicidal thoughts.  No other complaint          Prior Work-up:   MRI: Pain neuro quant without contrast from 8/16/2023 was reported as no acute intracranial abnormality, similar mild chronic microangiopathy.       Past Medical History:    Past Medical History[1]  Past Surgical History[2]    Family History:  Family History[3]    Social History:  Social  "History[4]   Living situation:    Work:      Allergies:  Allergies[5]    Medications:  Current Medications[6]    Objective:  BP 90/60 (BP Location: Left arm, Patient Position: Sitting, Cuff Size: Large)   Pulse 87   Ht 5' 8\" (1.727 m)   Wt 117 kg (258 lb 9.6 oz)   SpO2 96%   BMI 39.32 kg/m²      Labs  I have reviewed pertinent labs:  CBC:   Lab Results   Component Value Date    WBC 11.26 (H) 02/07/2025    RBC 5.26 02/07/2025    HGB 15.0 02/07/2025    HCT 47.5 02/07/2025    MCV 90 02/07/2025     02/07/2025    MCH 28.5 02/07/2025    MCHC 31.6 02/07/2025    RDW 14.9 02/07/2025    MPV 10.5 02/07/2025    NEUTROABS 8.33 (H) 02/07/2025     CMP:   Lab Results   Component Value Date    SODIUM 139 02/07/2025    K 3.6 02/07/2025     02/07/2025    CO2 29 02/07/2025    AGAP 6 02/07/2025    BUN 12 02/07/2025    CREATININE 1.20 02/07/2025    GLUC 65 08/01/2024    GLUF 79 02/07/2025    CALCIUM 9.0 02/07/2025    AST 22 02/07/2025    ALT 25 02/07/2025    ALKPHOS 83 02/07/2025    TP 7.0 02/07/2025    ALB 3.9 02/07/2025    TBILI 0.62 02/07/2025    EGFR 62 02/07/2025     Lipid Profile:   Lab Results   Component Value Date    CHOLESTEROL 119 02/07/2025    HDL 36 (L) 02/07/2025    TRIG 119 02/07/2025    LDLCALC 59 02/07/2025    NONHDLC 155 04/06/2021     Thyroid Studies:   Lab Results   Component Value Date    LQU2ZBUHFNDK 1.890 04/13/2022     Hemoglobin A1C/EST AVG Glucose   Lab Results   Component Value Date    HGBA1C 6.1 (H) 02/07/2025     02/07/2025     Vitamin D Level   Lab Results   Component Value Date    QCQI38OQJAGO 54.3 02/07/2025     Vitamin B12   Lab Results   Component Value Date    GAEIHBXR71 431 02/06/2023     Folate   Lab Results   Component Value Date    FOLATE 9.0 02/06/2023              General Exam  GENERAL APPEARANCE:  No distress, alert, interactive and cooperative.  Patient is on continuous 2 L oxygen  CARDIOVASCULAR: Warm and well perfused  LUNGS: normal work of breathing on room " air  EXTREMITIES: no peripheral edema     Neurologic Exam  MENTAL STATE:  Orientation was normal to time, place and person without aphasia or apraxia.  Initial MoCA was 14/30 repeat 1 was 18/30    CRANIAL NERVES:  CN 2       visual fields full to confrontation.  Visual acuity is 20/20 with hand-held chart  CN 3, 4, 6  Pupils round, 4 mm in diameter, equally reactive to light. Lids symmetric; no ptosis. EOMs normal alignment, full range. No nystagmus.  CN 5  Facial sensation intact bilaterally.  CN 7  Normal and symmetric facial strength.   CN 8  Hearing intact to finger rub bilaterally.  CN 9  Palate elevates symmetrically.  CN 11  Normal strength of shoulder shrug and neck turning.  CN 12  Tongue midline, with normal bulk and strength.     MOTOR:  Motor exam was normal. Muscle bulk and tone were normal in both upper and lower extremities. Muscle strength was 5/5 in distal and proximal muscles in both upper and lower extremities. No fasciculations, tremor or other abnormal movements were present.     REFLEXES:  RIGHT UE   LEFT UE  BR:2              BR:2    Biceps:2      Biceps:2    Triceps:2     Triceps:2       RIGHT LLE   LEFT LLE    Knee:2           Knee:2    Ankle:2         Ankle:2        COORDINATION:   Coordination exam was normal. In both upper extremities, finger-nose-finger was intact without dysmetria or overshoot.      GAIT:  Gait was normal. Station was stable with a normal base. Gait was stable with a normal arm swing and speed.     ROS:  Review of Systems   Constitutional:  Negative for appetite change, fatigue and fever.   HENT: Negative.  Negative for hearing loss, tinnitus, trouble swallowing and voice change.    Eyes: Negative.  Negative for photophobia, pain and visual disturbance.   Respiratory: Negative.  Negative for shortness of breath.    Cardiovascular: Negative.  Negative for palpitations.   Gastrointestinal: Negative.  Negative for nausea and vomiting.   Endocrine: Negative.  Negative for  cold intolerance.   Genitourinary: Negative.  Negative for dysuria, frequency and urgency.   Musculoskeletal:  Negative for back pain, gait problem, myalgias, neck pain and neck stiffness.   Skin: Negative.  Negative for rash.   Allergic/Immunologic: Negative.    Neurological:  Positive for headaches (2x weekly). Negative for tremors, seizures, syncope, facial asymmetry, speech difficulty, weakness, light-headedness and numbness.   Hematological: Negative.  Does not bruise/bleed easily.   Psychiatric/Behavioral:  Positive for confusion. Negative for hallucinations and sleep disturbance.        I have reviewed the past medical history, surgical history, social and family history, current medications, allergies vitals, review of systems, and updated this information as appropriate today.    Administrative Statements   The total amount of time spent with the patient and on chart review and documentation was minutes. Issues addressed during this clinic visit included overall management, medication counseling or monitoring, and counseling and coordination of care.         Jasper Daniel MD                [1]   Past Medical History:  Diagnosis Date    ADHD (attention deficit hyperactivity disorder)     Adjustment disorder     Anxiety     Chronic kidney disease     COVID-19 03/2020    Depression     Headache(784.0)     Headache, tension-type     Hypertension 4/02/2020    Liver disease     Memory loss     Neurological disorder     Pneumonia 3/32/2020    PTSD (post-traumatic stress disorder)     Sleep difficulties    [2]   Past Surgical History:  Procedure Laterality Date    FL INJECTION RIGHT HIP (NON ARTHROGRAM)  11/30/2020    FL INJECTION RIGHT HIP (NON ARTHROGRAM)  8/4/2021    FL INJECTION RIGHT HIP (NON ARTHROGRAM)  12/27/2021    FL INJECTION RIGHT HIP (NON ARTHROGRAM)  4/25/2022    NO PAST SURGERIES     [3]   Family History  Problem Relation Name Age of Onset    Heart disease Mother Miriam smith     Coronary artery  disease Mother Miriam smith     Heart failure Mother Miriam smith     Rheum arthritis Mother Miriam smith     Arthritis Mother Miriam smith     Stroke Mother Miriam smith     Neurological problems Mother Miriam smith     Vascular Disease Mother Miriam smith     Sudden death Father      No Known Problems Son      No Known Problems Daughter      No Known Problems Maternal Grandmother      No Known Problems Maternal Grandfather      No Known Problems Paternal Grandmother      No Known Problems Paternal Grandfather      No Known Problems Daughter      Kidney disease Brother Alonzo Aly     Cancer Brother Hi    [4]   Social History  Tobacco Use    Smoking status: Former     Current packs/day: 0.00     Average packs/day: 2.0 packs/day for 47.2 years (94.5 ttl pk-yrs)     Types: Cigarettes     Start date: 1973     Quit date: 2020     Years since quittin.2     Passive exposure: Past    Smokeless tobacco: Never   Vaping Use    Vaping status: Former    Quit date: 2020    Substances: Nicotine, Flavoring   Substance Use Topics    Alcohol use: Not Currently    Drug use: Never   [5]   Allergies  Allergen Reactions    Tetracycline Rash   [6]   Current Outpatient Medications:     albuterol (2.5 mg/3 mL) 0.083 % nebulizer solution, Take 3 mL (2.5 mg total) by nebulization every 6 (six) hours as needed for wheezing or shortness of breath, Disp: 375 mL, Rfl: 1    atorvastatin (LIPITOR) 20 mg tablet, Take 1 tablet (20 mg total) by mouth daily, Disp: 90 tablet, Rfl: 1    donepezil (ARICEPT) 10 mg tablet, Take 1 tablet (10 mg total) by mouth daily at bedtime, Disp: 90 tablet, Rfl: 1    DULoxetine (CYMBALTA) 60 mg delayed release capsule, Take 1 capsule (60 mg total) by mouth every morning, Disp: 90 capsule, Rfl: 0    gabapentin (NEURONTIN) 100 mg capsule, TAKE 1 CAPSULE EVERY MORNING AND TAKE 2 CAPSULES AT BEDTIME, Disp: 90 capsule, Rfl: 2    metFORMIN (GLUCOPHAGE-XR) 500 mg 24 hr tablet, Take 1 tablet (500 mg total) by mouth 2  (two) times a day with meals, Disp: 180 tablet, Rfl: 1    Multiple Vitamins-Minerals (MULTIVITAMIN ADULT PO), Take 1 tablet by mouth in the morning., Disp: , Rfl:     oxygen gas, Inhale 2 L/min continuous as needed, Disp: , Rfl:     prazosin (MINIPRESS) 1 mg capsule, Take 1 capsule (1 mg total) by mouth daily at bedtime, Disp: 90 capsule, Rfl: 0    rOPINIRole (REQUIP) 3 mg tablet, Take 1 tablet (3 mg total) by mouth daily at bedtime, Disp: 90 tablet, Rfl: 0    sildenafil (VIAGRA) 50 MG tablet, Take 1 tablet (50 mg total) by mouth daily as needed for erectile dysfunction, Disp: 10 tablet, Rfl: 1    tiZANidine (ZANAFLEX) 2 mg tablet, Take 1 tablet (2 mg total) by mouth daily at bedtime as needed for muscle spasms, Disp: 30 tablet, Rfl: 0    umeclidinium-vilanterol (Anoro Ellipta) 62.5-25 mcg/actuation inhaler, Inhale 1 puff daily, Disp: 60 each, Rfl: 5    Ventolin  (90 Base) MCG/ACT inhaler, Inhale 2 puffs every 6 (six) hours as needed for wheezing, Disp: 18 g, Rfl: 3

## 2025-07-10 NOTE — ASSESSMENT & PLAN NOTE
Patient does have mild cognitive impairment with no evidence of neurodegeneration in the MRI scan of the brain from 2023, I would repeat an MRI of the brain to follow-up on that also he was not able to tolerate neuropsych testing in the past I advised him that if he could try that again maybe we can have him go at a different place to see if we can do the test so that we can get a better assessment of the cognitive function I think it could be multifactorial given that his history of posttraumatic stress disorder and sleep apnea, I have advised him to go for cognitive therapy he had issues with his insurance company he is going to talk with him again and see if he can go for that and continue with mentally stimulating exercises at home and to eat a healthy nutritious diet and to take a multivitamin every day to continue with Aricept 10 mg once a day as he is tolerating it well and to take safety precautions, I have advised him to go for  adaptability test to make sure that he is okay to drive, his initial MoCA was 14/30 repeat was 18/30 in the last MoCA on his last visit was 19/30.    He was advised the importance of using his CPAP machine and continue follow-up with his psychiatrist I did give him the option that [facility] he would like to hold off on that,

## 2025-07-14 ENCOUNTER — OFFICE VISIT (OUTPATIENT)
Age: 70
End: 2025-07-14
Payer: COMMERCIAL

## 2025-07-14 VITALS
HEART RATE: 103 BPM | HEIGHT: 68 IN | BODY MASS INDEX: 38.34 KG/M2 | RESPIRATION RATE: 18 BRPM | TEMPERATURE: 98 F | OXYGEN SATURATION: 96 % | DIASTOLIC BLOOD PRESSURE: 82 MMHG | WEIGHT: 253 LBS | SYSTOLIC BLOOD PRESSURE: 126 MMHG

## 2025-07-14 DIAGNOSIS — G25.81 RESTLESS LEGS: ICD-10-CM

## 2025-07-14 DIAGNOSIS — L02.31 ABSCESS OF LEFT BUTTOCK: Primary | ICD-10-CM

## 2025-07-14 PROCEDURE — 99214 OFFICE O/P EST MOD 30 MIN: CPT | Performed by: STUDENT IN AN ORGANIZED HEALTH CARE EDUCATION/TRAINING PROGRAM

## 2025-07-14 NOTE — PROGRESS NOTES
Name: Devon Aly      : 1955      MRN: [id number]  Encounter Provider: Zhao Duff MD  Encounter Date: 2025   Encounter department: [facility]  :  Assessment & Plan  Abscess of left buttock  Spontaneously drained 2 days ago but patient reports history of recurrent abscesses in this area and would like referral to General Surgery for removal of sac, as he has had previously recommended to him. Does not appear to have acute infection in the area surrounding. Refer to General surgery.    Orders:    Ambulatory Referral to General Surgery; Future    Restless legs  Currently on gabapentin and Requip through his Psychiatrist. Was also using Zanaflex but discussed with patient that this is not really an indication for Zanaflex and that prolonged use of this medication is not recommended at his age. He understands and is agreeable to discontinue Zanaflex. He will discuss with his Psychiatrist if there can be any uptitration of his gabapentin or Requip to help with his symptoms.              History of Present Illness   Devon Aly RLD on 2-4 L O2 NC, ANTHONY, COVID long haul syndrome, CKD and depression who presents today to discuss his tizanidine prescription as well as abscess of left buttocks.  Patient reports history of recurrent abscesses on his left buttocks.  He has had incision and drainage in the past but he reports that he never had the sac fully removed, though this was recommended at some point.  He reports abscess developed about 1 week ago and grew to the point that it started spontaneously draining pus and blood.  This was about 2 days ago.  He reports that he has chronically been on Zanaflex for restless leg syndrome.  He did get a fill of this about 1 month ago. Prior to this, he was last prescribed Zanaflex about 1 year ago.  Discussed with patient that this is not really an indication for Zanaflex and Zanaflex is not recommended in somebody his age due to  "potential for adverse events.  He is agreeable to stop Zanaflex at this time.        Review of Systems   Musculoskeletal:  Positive for arthralgias and myalgias.   Skin:         Abscess left buttocks       Objective   /82 (BP Location: Right arm, Patient Position: Sitting, Cuff Size: Standard)   Pulse 103   Temp 98 °F (36.7 °C) (Tympanic)   Resp 18   Ht 5' 8\" (1.727 m)   Wt 115 kg (253 lb)   SpO2 96%   BMI 38.47 kg/m²      Physical Exam  Constitutional:       General: He is not in acute distress.    Eyes:      Conjunctiva/sclera: Conjunctivae normal.       Cardiovascular:      Rate and Rhythm: Normal rate and regular rhythm.      Heart sounds: Normal heart sounds.   Pulmonary:      Effort: Pulmonary effort is normal.      Breath sounds: Normal breath sounds.   Genitourinary:     Comments: Left buttocks with evidence of 3 mm punctum without drainage or bleeding; area is not raised, hot, or tender to palpation    Musculoskeletal:      Right lower leg: No edema.      Left lower leg: No edema.     Skin:     General: Skin is warm and dry.     Neurological:      Mental Status: He is alert.     Psychiatric:         Speech: Speech normal.         Behavior: Behavior normal. Behavior is cooperative.         "

## 2025-07-14 NOTE — ASSESSMENT & PLAN NOTE
Currently on gabapentin and Requip through his Psychiatrist. Was also using Zanaflex but discussed with patient that this is not really an indication for Zanaflex and that prolonged use of this medication is not recommended at his age. He understands and is agreeable to discontinue Zanaflex. He will discuss with his Psychiatrist if there can be any uptitration of his gabapentin or Requip to help with his symptoms.

## 2025-07-17 ENCOUNTER — TELEPHONE (OUTPATIENT)
Age: 70
End: 2025-07-17

## 2025-07-17 NOTE — TELEPHONE ENCOUNTER
Patient calling with PCP referral for left buttock abscess.  States it has recurred multiple times. Spontaneous drainage.  No I&D, No abx.    Scheduled for 8/6 with Dr Sanches  CB#[phone]

## 2025-07-22 ENCOUNTER — TELEPHONE (OUTPATIENT)
Dept: NEPHROLOGY | Facility: CLINIC | Age: 70
End: 2025-07-22

## 2025-07-22 NOTE — TELEPHONE ENCOUNTER
Called spoke with patient advised I was calling to schedule a follow-up appointment from our February 2026 recall list with nephrology provider MD Pablo. Patient has been scheduled for 02/16/26 @ 11:40am. Patient verbalized understanding and is okay with it.

## 2025-07-31 ENCOUNTER — PREP FOR PROCEDURE (OUTPATIENT)
Dept: SURGERY | Facility: CLINIC | Age: 70
End: 2025-07-31

## 2025-07-31 ENCOUNTER — CONSULT (OUTPATIENT)
Dept: SURGERY | Facility: CLINIC | Age: 70
End: 2025-07-31
Attending: STUDENT IN AN ORGANIZED HEALTH CARE EDUCATION/TRAINING PROGRAM
Payer: COMMERCIAL

## 2025-07-31 VITALS
OXYGEN SATURATION: 94 % | HEART RATE: 85 BPM | HEIGHT: 68 IN | WEIGHT: 263 LBS | TEMPERATURE: 97.8 F | SYSTOLIC BLOOD PRESSURE: 120 MMHG | BODY MASS INDEX: 39.86 KG/M2 | DIASTOLIC BLOOD PRESSURE: 64 MMHG

## 2025-07-31 DIAGNOSIS — L02.31 ABSCESS OF LEFT BUTTOCK: ICD-10-CM

## 2025-07-31 PROCEDURE — 99204 OFFICE O/P NEW MOD 45 MIN: CPT | Performed by: STUDENT IN AN ORGANIZED HEALTH CARE EDUCATION/TRAINING PROGRAM

## 2025-07-31 RX ORDER — CHLORHEXIDINE GLUCONATE 40 MG/ML
SOLUTION TOPICAL DAILY PRN
Status: CANCELLED | OUTPATIENT
Start: 2025-07-31

## 2025-08-06 ENCOUNTER — PATIENT MESSAGE (OUTPATIENT)
Dept: RADIOLOGY | Facility: CLINIC | Age: 70
End: 2025-08-06

## 2025-08-08 ENCOUNTER — HOSPITAL ENCOUNTER (OUTPATIENT)
Facility: HOSPITAL | Age: 70
Setting detail: OUTPATIENT SURGERY
Discharge: HOME | End: 2025-08-08
Attending: STUDENT IN AN ORGANIZED HEALTH CARE EDUCATION/TRAINING PROGRAM | Admitting: STUDENT IN AN ORGANIZED HEALTH CARE EDUCATION/TRAINING PROGRAM
Payer: COMMERCIAL

## 2025-08-13 ENCOUNTER — SOCIAL WORK (OUTPATIENT)
Dept: BEHAVIORAL/MENTAL HEALTH CLINIC | Facility: CLINIC | Age: 70
End: 2025-08-13
Payer: OTHER MISCELLANEOUS

## 2025-08-13 ENCOUNTER — HOSPITAL ENCOUNTER (OUTPATIENT)
Dept: MRI IMAGING | Facility: HOSPITAL | Age: 70
Discharge: HOME | End: 2025-08-13
Attending: PSYCHIATRY & NEUROLOGY
Payer: OTHER MISCELLANEOUS

## 2025-08-13 DIAGNOSIS — R41.3 MEMORY DIFFICULTY: ICD-10-CM

## 2025-08-13 PROCEDURE — 70551 MRI BRAIN STEM W/O DYE: CPT

## 2025-08-18 ENCOUNTER — APPOINTMENT (OUTPATIENT)
Age: 70
End: 2025-08-18
Payer: COMMERCIAL

## 2025-08-18 ENCOUNTER — OFFICE VISIT (OUTPATIENT)
Age: 70
End: 2025-08-18
Payer: COMMERCIAL

## 2025-08-18 VITALS
TEMPERATURE: 98.1 F | SYSTOLIC BLOOD PRESSURE: 120 MMHG | DIASTOLIC BLOOD PRESSURE: 70 MMHG | HEIGHT: 68 IN | BODY MASS INDEX: 39.4 KG/M2 | HEART RATE: 87 BPM | OXYGEN SATURATION: 97 % | WEIGHT: 260 LBS

## 2025-08-18 DIAGNOSIS — I25.10 CORONARY ARTERY CALCIFICATION SEEN ON CT SCAN: ICD-10-CM

## 2025-08-18 DIAGNOSIS — J43.2 CENTRILOBULAR EMPHYSEMA (HCC): ICD-10-CM

## 2025-08-18 DIAGNOSIS — J96.11 CHRONIC HYPOXEMIC RESPIRATORY FAILURE (HCC): ICD-10-CM

## 2025-08-18 DIAGNOSIS — G47.33 OSA (OBSTRUCTIVE SLEEP APNEA): ICD-10-CM

## 2025-08-18 DIAGNOSIS — R06.09 DOE (DYSPNEA ON EXERTION): Primary | ICD-10-CM

## 2025-08-18 DIAGNOSIS — R73.03 PREDIABETES: ICD-10-CM

## 2025-08-18 DIAGNOSIS — E66.9 OBESITY (BMI 30-39.9): ICD-10-CM

## 2025-08-18 DIAGNOSIS — F17.211 CIGARETTE NICOTINE DEPENDENCE IN REMISSION: ICD-10-CM

## 2025-08-18 DIAGNOSIS — U09.9 COVID-19 LONG HAULER: ICD-10-CM

## 2025-08-18 LAB
ANION GAP SERPL CALCULATED.3IONS-SCNC: 10 MMOL/L (ref 4–13)
BUN SERPL-MCNC: 16 MG/DL (ref 5–25)
CALCIUM SERPL-MCNC: 9.4 MG/DL (ref 8.4–10.2)
CHLORIDE SERPL-SCNC: 105 MMOL/L (ref 96–108)
CHOLEST SERPL-MCNC: 127 MG/DL (ref ?–200)
CO2 SERPL-SCNC: 25 MMOL/L (ref 21–32)
CREAT SERPL-MCNC: 1.27 MG/DL (ref 0.6–1.3)
GFR SERPL CREATININE-BSD FRML MDRD: 58 ML/MIN/1.73SQ M
GLUCOSE P FAST SERPL-MCNC: 82 MG/DL (ref 65–99)
HDLC SERPL-MCNC: 41 MG/DL
LDLC SERPL CALC-MCNC: 60 MG/DL (ref 0–100)
NONHDLC SERPL-MCNC: 86 MG/DL
POTASSIUM SERPL-SCNC: 4.1 MMOL/L (ref 3.5–5.3)
SODIUM SERPL-SCNC: 140 MMOL/L (ref 135–147)
TRIGL SERPL-MCNC: 129 MG/DL (ref ?–150)

## 2025-08-18 PROCEDURE — 99214 OFFICE O/P EST MOD 30 MIN: CPT | Performed by: INTERNAL MEDICINE

## 2025-08-18 PROCEDURE — 80048 BASIC METABOLIC PNL TOTAL CA: CPT

## 2025-08-18 PROCEDURE — 80061 LIPID PANEL: CPT

## 2025-08-18 PROCEDURE — 83036 HEMOGLOBIN GLYCOSYLATED A1C: CPT

## 2025-08-18 PROCEDURE — 36415 COLL VENOUS BLD VENIPUNCTURE: CPT

## 2025-08-19 ENCOUNTER — TELEPHONE (OUTPATIENT)
Age: 70
End: 2025-08-19

## 2025-08-19 ENCOUNTER — RESULTS FOLLOW-UP (OUTPATIENT)
Age: 70
End: 2025-08-19

## 2025-08-19 LAB
DME PARACHUTE DELIVERY DATE REQUESTED: NORMAL
DME PARACHUTE ITEM DESCRIPTION: NORMAL
DME PARACHUTE ORDER STATUS: NORMAL
DME PARACHUTE SUPPLIER NAME: NORMAL
DME PARACHUTE SUPPLIER PHONE: NORMAL
EST. AVERAGE GLUCOSE BLD GHB EST-MCNC: 123 MG/DL
HBA1C MFR BLD: 5.9 %

## 2025-08-21 ENCOUNTER — OFFICE VISIT (OUTPATIENT)
Dept: SURGERY | Facility: CLINIC | Age: 70
End: 2025-08-21

## 2025-08-21 VITALS
HEART RATE: 95 BPM | RESPIRATION RATE: 18 BRPM | TEMPERATURE: 98 F | OXYGEN SATURATION: 91 % | WEIGHT: 259.6 LBS | SYSTOLIC BLOOD PRESSURE: 122 MMHG | HEIGHT: 68 IN | DIASTOLIC BLOOD PRESSURE: 68 MMHG | BODY MASS INDEX: 39.34 KG/M2

## 2025-08-21 DIAGNOSIS — L02.31 ABSCESS OF LEFT BUTTOCK: Primary | ICD-10-CM

## 2025-08-21 PROCEDURE — 99024 POSTOP FOLLOW-UP VISIT: CPT | Performed by: STUDENT IN AN ORGANIZED HEALTH CARE EDUCATION/TRAINING PROGRAM
